# Patient Record
Sex: MALE | Race: WHITE | NOT HISPANIC OR LATINO | Employment: FULL TIME | URBAN - METROPOLITAN AREA
[De-identification: names, ages, dates, MRNs, and addresses within clinical notes are randomized per-mention and may not be internally consistent; named-entity substitution may affect disease eponyms.]

---

## 2019-09-17 ENCOUNTER — TRANSFERRED RECORDS (OUTPATIENT)
Dept: HEALTH INFORMATION MANAGEMENT | Facility: CLINIC | Age: 59
End: 2019-09-17

## 2020-09-28 ENCOUNTER — COMMUNICATION - HEALTHEAST (OUTPATIENT)
Dept: SCHEDULING | Facility: CLINIC | Age: 60
End: 2020-09-28

## 2020-10-19 ENCOUNTER — TRANSFERRED RECORDS (OUTPATIENT)
Dept: HEALTH INFORMATION MANAGEMENT | Facility: CLINIC | Age: 60
End: 2020-10-19

## 2020-10-23 ENCOUNTER — TRANSFERRED RECORDS (OUTPATIENT)
Dept: HEALTH INFORMATION MANAGEMENT | Facility: CLINIC | Age: 60
End: 2020-10-23

## 2020-12-14 ENCOUNTER — TRANSFERRED RECORDS (OUTPATIENT)
Dept: HEALTH INFORMATION MANAGEMENT | Facility: CLINIC | Age: 60
End: 2020-12-14

## 2021-01-11 ENCOUNTER — TRANSFERRED RECORDS (OUTPATIENT)
Dept: HEALTH INFORMATION MANAGEMENT | Facility: CLINIC | Age: 61
End: 2021-01-11

## 2021-01-26 ENCOUNTER — MEDICAL CORRESPONDENCE (OUTPATIENT)
Dept: HEALTH INFORMATION MANAGEMENT | Facility: CLINIC | Age: 61
End: 2021-01-26

## 2021-02-01 ENCOUNTER — TRANSFERRED RECORDS (OUTPATIENT)
Dept: HEALTH INFORMATION MANAGEMENT | Facility: CLINIC | Age: 61
End: 2021-02-01

## 2021-02-03 ENCOUNTER — REFERRAL (OUTPATIENT)
Dept: TRANSPLANT | Facility: CLINIC | Age: 61
End: 2021-02-03

## 2021-02-03 VITALS — WEIGHT: 167.99 LBS | HEIGHT: 71 IN | BODY MASS INDEX: 23.52 KG/M2

## 2021-02-03 DIAGNOSIS — N18.6 END STAGE RENAL DISEASE (H): ICD-10-CM

## 2021-02-03 DIAGNOSIS — N18.6 ESRD (END STAGE RENAL DISEASE) (H): Primary | ICD-10-CM

## 2021-02-03 DIAGNOSIS — Z01.818 PRE-TRANSPLANT EVALUATION FOR KIDNEY TRANSPLANT: ICD-10-CM

## 2021-02-03 DIAGNOSIS — Z76.82 ORGAN TRANSPLANT CANDIDATE: ICD-10-CM

## 2021-02-03 SDOH — HEALTH STABILITY: MENTAL HEALTH: HOW OFTEN DO YOU HAVE A DRINK CONTAINING ALCOHOL?: MONTHLY OR LESS

## 2021-02-03 SDOH — HEALTH STABILITY: MENTAL HEALTH: HOW MANY STANDARD DRINKS CONTAINING ALCOHOL DO YOU HAVE ON A TYPICAL DAY?: NOT ASKED

## 2021-02-03 SDOH — HEALTH STABILITY: MENTAL HEALTH: HOW OFTEN DO YOU HAVE 6 OR MORE DRINKS ON ONE OCCASION?: NOT ASKED

## 2021-02-03 ASSESSMENT — MIFFLIN-ST. JEOR: SCORE: 1589.13

## 2021-02-03 NOTE — TELEPHONE ENCOUNTER
PCP: Antonio Hawthorne  Referring Provider: Deyanira Henriquez   Referring Diagnosis: ESRD    Is patient under the age of 65? Y  Is patient diabetic? N  Is patient on insulin?   Was patient offered a pancreas transplant referral?     Is patient in a group home/assisted living? n  Does patient have a guardian? n    Referral intake process completed.  Patient is aware that after financial approval is received, medical records will be requested.   Patient confirmed for a callback from transplant coordinator on February 18, 2021. (within 2 weeks)  Tentative evaluation date March 2, 2021. (within 4 weeks)    Confirmed coordinator will discuss evaluation process in more detail at the time of their call.   Patient is aware of the need to arrange age appropriate cancer screening, vaccinations, and dental care.  Reminded patient to complete questionnaire, complete medical records release, and review packet prior to evaluation visit .  Assessed patient for special needs (ie--wheelchair, assistance, guardian, and ):  none   Patient instructed to call [phone] with questions.     Patient gave verbal consent during intake call to obtain medical records and documents outside of [facility]:  yes

## 2021-02-03 NOTE — LETTER
Tacos Dominguez  [address]  [facility] [address]   1960              February 3, 2021                                                                                        MEDICAL RECORDS REQUEST    [facility] Records Request                      Facility: [facility]    Thank you for referring your patient to the [facility], in order to process the referral we will need the following information;    1. Demographics  2. 2728 form   3. Immunizations records  4. Providers Progress notes, (last 3 note)      Please call our office at [phone] if you have any questions or concerns.                Please fax all paper records to [phone] within 3-5 business days.      Thank you,   The SOT Referral Intake Team     [facility]  Solid Organ Transplant Office  [address] [place] [address], [address] [address]

## 2021-02-03 NOTE — LETTER
February 10, 2021      Tacos Dominguez  [address]  [facility] [address]          Dear Tacos,    Thank you for your interest in the Transplant Center at [facility]. We look forward to being a part of your care team and assisting you through the transplant process.    As we discussed, your transplant coordinator is Barbara Vasquez (Kidney).  You may call your coordinator at any time with questions or concerns.  Your first scheduled call will be on February 18, 2021.  If this needs to change, call [phone].    Please complete the following.    1. Fill out and return the enclosed forms    Authorization for Electronic Communication    Authorization to Discuss Protected Health Information    Authorization for Release of Protected Health Information    Authorization for Care Everywhere Release of Information    2. Sign up for:    [employer], access to your electronic medical record (see enclosed pamphlet)    [email or web address].[email or web address], a transplant education website    You can use these tools to learn more about your transplant, communicate with your care team, and track your medical details  Sincerely,  Solid Organ Transplant  [facility]\    cc: Antonio Benitez (PCP) Deyanira Coto PA-C ([facility])

## 2021-02-08 ENCOUNTER — TRANSFERRED RECORDS (OUTPATIENT)
Dept: HEALTH INFORMATION MANAGEMENT | Facility: CLINIC | Age: 61
End: 2021-02-08

## 2021-02-18 NOTE — TELEPHONE ENCOUNTER
Called pt patricia to move his kidney eval from March 2 to Thurs March 25 due to being on dialysis.  I asked for him to call back to confirm,

## 2021-02-18 NOTE — TELEPHONE ENCOUNTER
Reviewed pt's chart for pre-kidney transplant evaluation planning. Pt lives in [place]. Pt has ESRD with unknown etiology. He has followed with nephrology since 2014, with stable kidney function CKD stage 3. In October, a wellness check was called to his home and he was found on the floor an altered mental state. Patient does not remember these events. He was intubated prior to transfer to [facility] due to respiratory compromise and hypotension requiring pressors. Noted to be in a fib with RVR, cardioverted and mag repleted. His creatinine was 19 on arrival and he was started on dialysis. Biopsy not done. Pt is on hemo dialysis via CVC until his fistula is mature. Pt is not diabetic. Other hx includes Crohn's disease s/p hemicollectomy.  Heart hx: during October admission there was suspicion for CVA/NSTEMI; lexiscan completed with LVEF 52%.  Lung status: no history of infections or oxygen use. Surgical hx includes: colostomy creation.  BMI 24 on 10/22/2020.  Last colonoscopy was during admission in October, findings were negative.  Dental: encouraged to keep up to date. Pt is a smoker- states he smokes a pack a week; does occasionally consume alcohol, and abstains from recreational drugs. Pt is independent w/ ADLs, working full time. Pt lives w/ wife and daughter and has support following transplant.    Patient has an account with [email or web address] and has watched the kidney transplant videos. Confirmed STD March 2; but patient would like to come into clinic. Informed pt they will hear from scheduling to arrange the evaluation. Smartset orders entered, chart routed to scheduling pool.

## 2021-03-20 ENCOUNTER — HEALTH MAINTENANCE LETTER (OUTPATIENT)
Age: 61
End: 2021-03-20

## 2021-03-22 ENCOUNTER — TELEPHONE (OUTPATIENT)
Dept: TRANSPLANT | Facility: CLINIC | Age: 61
End: 2021-03-22

## 2021-03-22 NOTE — TELEPHONE ENCOUNTER
Spoke with patient and confirmed upcoming PKE appointments 3/25/21 starting at 7:30 am. Patient instructed he may eat breakfast, take regularly scheduled medications and have 1 adult visitor accompany him. Patient stated no Covid 19 symptoms and/or exposure and has received 2 doses of vaccination. Patient has call back numbers for questions/concerns/need to reschedule.

## 2021-03-23 NOTE — PROGRESS NOTES
[facility] Solid Organ Transplant  Outpatient MNT: Kidney Transplant Evaluation    Current BMI: 24.1 (HT 70.5 in,  lbs/78 kg)  BMI is within recommendation of <35 for kidney transplant    Frailty Assessment -- Not Frail (0/5)      Time Spent: 15 minutes  Visit Type: Initial   Referring Physician: Tristin   Pt accompanied by: self     History of previous txp: none   Dialysis: yes    Dialysis Info: HD 10/2020 MWF 5 am   Protein supplement: no     Nutrition Assessment  Appetite: good/baseline    Vitamins, Supplements, Pertinent Meds: calcium, vit D, iron, MVI, tums (takes ~50% of the time)   Herbal Medicines/Supplements: none     Edema: none     Weight hx: overall stable     Food Security: any concerns about having enough money to buy food or access to grocery stores? No     Diet Recall  Breakfast [employer] ([employer] + pancakes)   Lunch None    Dinner Eggs/toast or steak with potatoes   Snacks Small snacks throughout the day- crackers, banana    Beverages Coffee, 1 regular Coke/day, water    Alcohol 6 drinks/month    Dining out 5x/week      Physical Activity  Walks dog daily 60 minutes (year round)     Labs (Jan 2021)  K 4.4   Phos 13.4-->this week 11 per pt    Nutrition Diagnosis  No nutrition diagnosis identified at this time     Nutrition Intervention  Nutrition education provided:  Discussed sodium intake (low sodium foods and drinks, seasoning food without salt and tips for low sodium diet).  Reviewed wnl K level, elevated phos level (although improving). Reviewed protein needs for dialysis.     Reviewed post txp diet guidelines in brief (will review in further detail post txp):  (1) Review of proper food safety measures d/t immunosuppressant therapy post-op and increased risk for food-borne illness    (2) Avoid the following post txp d/t risk for rejection, unknown effects on the organs, and/or potential interactions with immunosuppressants:  - Herbal, Chinese, holistic, chiropractic, natural,  alternative medicines and supplements  - Detoxes and cleanses  - Weight loss pills  - Protein powders or other products with extracts or herbs (ie green tea extract)    (3) Med regimen and possible side effects    Patient Understanding: Pt verbalized understanding of education provided.  Expected Engagement: Good  Follow-Up Plans: PRN     Nutrition Goals  No nutrition goals identified at this time     Purvi Arora, RD, LD, CCTD

## 2021-03-25 ENCOUNTER — OFFICE VISIT (OUTPATIENT)
Dept: TRANSPLANT | Facility: CLINIC | Age: 61
End: 2021-03-25
Attending: INTERNAL MEDICINE
Payer: COMMERCIAL

## 2021-03-25 ENCOUNTER — ANCILLARY PROCEDURE (OUTPATIENT)
Dept: CARDIOLOGY | Facility: CLINIC | Age: 61
End: 2021-03-25
Attending: PHYSICIAN ASSISTANT
Payer: COMMERCIAL

## 2021-03-25 ENCOUNTER — ANCILLARY PROCEDURE (OUTPATIENT)
Dept: GENERAL RADIOLOGY | Facility: CLINIC | Age: 61
End: 2021-03-25
Attending: PHYSICIAN ASSISTANT
Payer: COMMERCIAL

## 2021-03-25 ENCOUNTER — DOCUMENTATION ONLY (OUTPATIENT)
Dept: TRANSPLANT | Facility: CLINIC | Age: 61
End: 2021-03-25

## 2021-03-25 VITALS
BODY MASS INDEX: 23.94 KG/M2 | SYSTOLIC BLOOD PRESSURE: 148 MMHG | HEART RATE: 83 BPM | OXYGEN SATURATION: 98 % | WEIGHT: 171 LBS | DIASTOLIC BLOOD PRESSURE: 79 MMHG | HEIGHT: 71 IN

## 2021-03-25 VITALS
HEIGHT: 71 IN | WEIGHT: 171 LBS | DIASTOLIC BLOOD PRESSURE: 79 MMHG | HEART RATE: 83 BPM | OXYGEN SATURATION: 98 % | SYSTOLIC BLOOD PRESSURE: 148 MMHG | BODY MASS INDEX: 23.94 KG/M2

## 2021-03-25 DIAGNOSIS — N18.6 ESRD (END STAGE RENAL DISEASE) (H): ICD-10-CM

## 2021-03-25 DIAGNOSIS — Z01.818 PRE-TRANSPLANT EVALUATION FOR KIDNEY TRANSPLANT: ICD-10-CM

## 2021-03-25 DIAGNOSIS — Z76.82 ORGAN TRANSPLANT CANDIDATE: ICD-10-CM

## 2021-03-25 DIAGNOSIS — N18.6 END STAGE RENAL DISEASE (H): ICD-10-CM

## 2021-03-25 LAB
ABO + RH BLD: NORMAL
ALBUMIN SERPL-MCNC: 3.4 G/DL (ref 3.4–5)
ALBUMIN UR-MCNC: 100 MG/DL
ALP SERPL-CCNC: 91 U/L (ref 40–150)
ALT SERPL W P-5'-P-CCNC: 22 U/L (ref 0–70)
ANION GAP SERPL CALCULATED.3IONS-SCNC: 15 MMOL/L (ref 3–14)
APPEARANCE UR: ABNORMAL
APTT PPP: 33 SEC (ref 22–37)
AST SERPL W P-5'-P-CCNC: 16 U/L (ref 0–45)
BACTERIA #/AREA URNS HPF: ABNORMAL /HPF
BASOPHILS # BLD AUTO: 0.1 10E9/L (ref 0–0.2)
BASOPHILS NFR BLD AUTO: 1 %
BILIRUB SERPL-MCNC: 0.5 MG/DL (ref 0.2–1.3)
BILIRUB UR QL STRIP: NEGATIVE
BLD GP AB SCN SERPL QL: NORMAL
BLOOD BANK CMNT PATIENT-IMP: NORMAL
BUN SERPL-MCNC: 34 MG/DL (ref 7–30)
CALCIUM SERPL-MCNC: 7.2 MG/DL (ref 8.5–10.1)
CHLORIDE SERPL-SCNC: 105 MMOL/L (ref 94–109)
CO2 SERPL-SCNC: 20 MMOL/L (ref 20–32)
COLOR UR AUTO: YELLOW
CREAT SERPL-MCNC: 9.8 MG/DL (ref 0.66–1.25)
DIFFERENTIAL METHOD BLD: ABNORMAL
EOSINOPHIL # BLD AUTO: 0.2 10E9/L (ref 0–0.7)
EOSINOPHIL NFR BLD AUTO: 2.9 %
ERYTHROCYTE [DISTWIDTH] IN BLOOD BY AUTOMATED COUNT: 14.1 % (ref 10–15)
GFR SERPL CREATININE-BSD FRML MDRD: 5 ML/MIN/{1.73_M2}
GLUCOSE SERPL-MCNC: 78 MG/DL (ref 70–99)
GLUCOSE UR STRIP-MCNC: NEGATIVE MG/DL
HBV CORE AB SERPL QL IA: NONREACTIVE
HBV SURFACE AB SERPL IA-ACNC: 0.75 M[IU]/ML
HBV SURFACE AG SERPL QL IA: NONREACTIVE
HCT VFR BLD AUTO: 32.8 % (ref 40–53)
HCV AB SERPL QL IA: NONREACTIVE
HGB BLD-MCNC: 10.4 G/DL (ref 13.3–17.7)
HGB UR QL STRIP: ABNORMAL
HIV 1+2 AB+HIV1 P24 AG SERPL QL IA: NONREACTIVE
IMM GRANULOCYTES # BLD: 0 10E9/L (ref 0–0.4)
IMM GRANULOCYTES NFR BLD: 0.4 %
INR PPP: 1.31 (ref 0.86–1.14)
KETONES UR STRIP-MCNC: NEGATIVE MG/DL
LEUKOCYTE ESTERASE UR QL STRIP: NEGATIVE
LYMPHOCYTES # BLD AUTO: 1.1 10E9/L (ref 0.8–5.3)
LYMPHOCYTES NFR BLD AUTO: 16.1 %
MCH RBC QN AUTO: 33.5 PG (ref 26.5–33)
MCHC RBC AUTO-ENTMCNC: 31.7 G/DL (ref 31.5–36.5)
MCV RBC AUTO: 106 FL (ref 78–100)
MONOCYTES # BLD AUTO: 0.4 10E9/L (ref 0–1.3)
MONOCYTES NFR BLD AUTO: 5.5 %
NEUTROPHILS # BLD AUTO: 5 10E9/L (ref 1.6–8.3)
NEUTROPHILS NFR BLD AUTO: 74.1 %
NITRATE UR QL: NEGATIVE
NRBC # BLD AUTO: 0 10*3/UL
NRBC BLD AUTO-RTO: 0 /100
PH UR STRIP: 6 PH (ref 5–7)
PLATELET # BLD AUTO: 232 10E9/L (ref 150–450)
POTASSIUM SERPL-SCNC: 3.2 MMOL/L (ref 3.4–5.3)
PROT SERPL-MCNC: 7.9 G/DL (ref 6.8–8.8)
RBC # BLD AUTO: 3.1 10E12/L (ref 4.4–5.9)
RBC #/AREA URNS AUTO: 1 /HPF (ref 0–2)
SODIUM SERPL-SCNC: 140 MMOL/L (ref 133–144)
SOURCE: ABNORMAL
SP GR UR STRIP: 1.02 (ref 1–1.03)
SPECIMEN EXP DATE BLD: NORMAL
SPECIMEN EXP DATE BLD: NORMAL
SQUAMOUS #/AREA URNS AUTO: 1 /HPF (ref 0–1)
T PALLIDUM AB SER QL: NONREACTIVE
UROBILINOGEN UR STRIP-MCNC: 0.2 MG/DL (ref 0–2)
WBC # BLD AUTO: 6.8 10E9/L (ref 4–11)
WBC #/AREA URNS AUTO: 2 /HPF (ref 0–5)

## 2021-03-25 PROCEDURE — 93306 TTE W/DOPPLER COMPLETE: CPT | Performed by: INTERNAL MEDICINE

## 2021-03-25 PROCEDURE — 86886 COOMBS TEST INDIRECT TITER: CPT | Performed by: PATHOLOGY

## 2021-03-25 PROCEDURE — 86900 BLOOD TYPING SEROLOGIC ABO: CPT | Performed by: PATHOLOGY

## 2021-03-25 PROCEDURE — 86850 RBC ANTIBODY SCREEN: CPT | Performed by: PATHOLOGY

## 2021-03-25 PROCEDURE — 85670 THROMBIN TIME PLASMA: CPT | Performed by: PATHOLOGY

## 2021-03-25 PROCEDURE — 81241 F5 GENE: CPT | Performed by: PATHOLOGY

## 2021-03-25 PROCEDURE — 86787 VARICELLA-ZOSTER ANTIBODY: CPT | Performed by: PATHOLOGY

## 2021-03-25 PROCEDURE — 85613 RUSSELL VIPER VENOM DILUTED: CPT | Performed by: PATHOLOGY

## 2021-03-25 PROCEDURE — 71046 X-RAY EXAM CHEST 2 VIEWS: CPT | Performed by: RADIOLOGY

## 2021-03-25 PROCEDURE — 86706 HEP B SURFACE ANTIBODY: CPT | Performed by: PATHOLOGY

## 2021-03-25 PROCEDURE — 86704 HEP B CORE ANTIBODY TOTAL: CPT | Performed by: PATHOLOGY

## 2021-03-25 PROCEDURE — 86780 TREPONEMA PALLIDUM: CPT | Performed by: PATHOLOGY

## 2021-03-25 PROCEDURE — 86803 HEPATITIS C AB TEST: CPT | Performed by: PATHOLOGY

## 2021-03-25 PROCEDURE — 85390 FIBRINOLYSINS SCREEN I&R: CPT | Mod: 90 | Performed by: PATHOLOGY

## 2021-03-25 PROCEDURE — 87340 HEPATITIS B SURFACE AG IA: CPT | Performed by: PATHOLOGY

## 2021-03-25 PROCEDURE — 85730 THROMBOPLASTIN TIME PARTIAL: CPT | Mod: 59 | Performed by: PATHOLOGY

## 2021-03-25 PROCEDURE — 85025 COMPLETE CBC W/AUTO DIFF WBC: CPT | Performed by: PATHOLOGY

## 2021-03-25 PROCEDURE — 81240 F2 GENE: CPT | Mod: 90 | Performed by: PATHOLOGY

## 2021-03-25 PROCEDURE — 86644 CMV ANTIBODY: CPT | Performed by: PATHOLOGY

## 2021-03-25 PROCEDURE — 86481 TB AG RESPONSE T-CELL SUSP: CPT | Performed by: PATHOLOGY

## 2021-03-25 PROCEDURE — 85730 THROMBOPLASTIN TIME PARTIAL: CPT | Performed by: PATHOLOGY

## 2021-03-25 PROCEDURE — 99204 OFFICE O/P NEW MOD 45 MIN: CPT | Performed by: SURGERY

## 2021-03-25 PROCEDURE — 99245 OFF/OP CONSLTJ NEW/EST HI 55: CPT

## 2021-03-25 PROCEDURE — 36415 COLL VENOUS BLD VENIPUNCTURE: CPT | Performed by: PATHOLOGY

## 2021-03-25 PROCEDURE — 86901 BLOOD TYPING SEROLOGIC RH(D): CPT | Performed by: PATHOLOGY

## 2021-03-25 PROCEDURE — 86665 EPSTEIN-BARR CAPSID VCA: CPT | Performed by: PATHOLOGY

## 2021-03-25 PROCEDURE — 85610 PROTHROMBIN TIME: CPT | Performed by: PATHOLOGY

## 2021-03-25 PROCEDURE — 81001 URINALYSIS AUTO W/SCOPE: CPT | Performed by: PATHOLOGY

## 2021-03-25 PROCEDURE — G0452 MOLECULAR PATHOLOGY INTERPR: HCPCS | Performed by: PATHOLOGY

## 2021-03-25 PROCEDURE — 86147 CARDIOLIPIN ANTIBODY EA IG: CPT | Performed by: PATHOLOGY

## 2021-03-25 PROCEDURE — 80053 COMPREHEN METABOLIC PANEL: CPT | Performed by: PATHOLOGY

## 2021-03-25 PROCEDURE — 86905 BLOOD TYPING RBC ANTIGENS: CPT | Performed by: PATHOLOGY

## 2021-03-25 RX ORDER — FERROUS SULFATE 325(65) MG
325 TABLET ORAL
COMMUNITY
Start: 2019-09-02 | End: 2021-07-29

## 2021-03-25 RX ORDER — ASPIRIN 81 MG/1
81 TABLET, CHEWABLE ORAL EVERY MORNING
COMMUNITY
Start: 2020-10-29 | End: 2022-11-23

## 2021-03-25 RX ORDER — AMLODIPINE BESYLATE 10 MG/1
10 TABLET ORAL
COMMUNITY
End: 2021-11-01

## 2021-03-25 RX ORDER — MIDODRINE HYDROCHLORIDE 5 MG/1
TABLET ORAL
COMMUNITY
Start: 2021-01-24 | End: 2021-07-29

## 2021-03-25 RX ORDER — DOXAZOSIN 4 MG/1
4 TABLET ORAL
COMMUNITY
End: 2021-11-01

## 2021-03-25 RX ORDER — FOLIC ACID 1 MG/1
1 TABLET ORAL
COMMUNITY
Start: 2020-10-29 | End: 2021-10-29

## 2021-03-25 RX ORDER — PHENOL 1.4 %
10 AEROSOL, SPRAY (ML) MUCOUS MEMBRANE
COMMUNITY
End: 2022-11-30

## 2021-03-25 RX ORDER — CALCIUM CARBONATE/VITAMIN D3 500-10/5ML
1 LIQUID (ML) ORAL EVERY MORNING
COMMUNITY
Start: 2021-01-04 | End: 2022-11-30

## 2021-03-25 RX ORDER — MULTIPLE VITAMINS W/ MINERALS TAB 9MG-400MCG
TAB ORAL
COMMUNITY
End: 2022-03-04

## 2021-03-25 RX ORDER — CITALOPRAM HYDROBROMIDE 40 MG/1
20 TABLET ORAL
COMMUNITY
Start: 2020-10-01 | End: 2021-07-29

## 2021-03-25 RX ORDER — SIMVASTATIN 20 MG
20 TABLET ORAL EVERY MORNING
COMMUNITY
Start: 2021-03-10

## 2021-03-25 RX ORDER — AMIODARONE HYDROCHLORIDE 200 MG/1
TABLET ORAL
COMMUNITY
Start: 2021-03-14 | End: 2021-06-21

## 2021-03-25 RX ORDER — HYDROXYZINE HYDROCHLORIDE 50 MG/1
50 TABLET, FILM COATED ORAL
COMMUNITY
Start: 2019-08-09 | End: 2021-07-29

## 2021-03-25 RX ORDER — IBUPROFEN 200 MG
1000 CAPSULE ORAL
COMMUNITY
Start: 2020-10-01 | End: 2021-07-29

## 2021-03-25 RX ORDER — CYANOCOBALAMIN 1000 UG/ML
1000 INJECTION, SOLUTION INTRAMUSCULAR; SUBCUTANEOUS
COMMUNITY
Start: 2020-10-29 | End: 2021-03-29

## 2021-03-25 ASSESSMENT — MIFFLIN-ST. JEOR
SCORE: 1594.84
SCORE: 1594.84

## 2021-03-25 NOTE — NURSING NOTE
"Chief Complaint   Patient presents with     Consult     PKE Eval     Blood pressure (!) 148/79, pulse 83, height 1.791 m (5' 10.5\"), weight 77.6 kg (171 lb), SpO2 98 %.    Tash Faith on 3/25/2021 at 8:43 AM    "

## 2021-03-25 NOTE — PROGRESS NOTES
TRANSPLANT NEPHROLOGY RECIPIENT EVALUATION NOTE    Assessment and Plan:  # Kidney Transplant Evaluation: Patient is a good candidate overall. Benefits of a living donor transplant were discussed.    # ESKD from unknown etiology (no kidney biopsy): unclear in origin and never biopsied. I suspect this is related to recurrent CLARISA in the settings of dehydration with short bowels. An element of IgA can not be r/o although his UAs in our system did not show significant hematuria, although historic.      # Cardiac Risk: recent MI, hypokinesis on stress test. paroxysmal atrial fibrillation. Will need LHC     # Crohn's / chronic diarrhea: active on his colonoscopy exam, will need to follow up with GI, will even need a trial of MMF.    # TIA: needs carotid     # left renal mass: needs formal urology evaluation, possible RCC. This will need to be either biopsied or removed.     # Right incisional hernia: surgery team plans on using the left side    # Health Maintenance: Lung Cancer screening recent CT scan will need to be reviewed, Colonoscopy: Up to date, Dermatology: Not up to date (several lesions on his back) and Dental: Up to date    Discussed the risks and benefits of a transplant, including the risk of surgery and immunosuppression medications.  Patient's overall evaluation will be discussed in the Transplant Program's regular meeting with a final recommendation on the patients suitability for transplant to be made at that time.    Evaluation:  Tacos Dominguez was seen in consultation at the request of Dr. David Stein for evaluation as a potential kidney transplant recipient.    Reason for Visit:  Tacos Dominguez is a 61 year old male with ESKD from possibly recurrent CLARISA in the settings of IBD vs IBD related IgA (he was never biopsied), who presents for kidney transplant evaluation.    History of Present Illness:    Scar is delightful 61 year old gentleman who lives alone. He had known crohn's disease s/p  surgical resection of large segment of his small bowels. He has chronic diarrhea that he learned to mitigate over the years by drinking less fluid. In 2008 his creatinine was noted at 2 mg/dL. His UAs showed minimal albumin and no rbcs. He had a recent hospitalization after was found down at home for at least 2 days. This was discovered when his work send the police for a wellness check. He was then taken to one of the [facility] hospital and was found in to be in acute on chronic renal failure. He was initiated on dialysis. He had marked troponinemia and abnormal stress but he was not told he had sustain a cardiac event. Unclear if the diagnosis was made or he was not aware.   He had atrial fibrillation and was discovered to have left renal mass that is vascular. He did not call urologic evaluation but he mentioned that he was told a biopsy is not needed. He used to smoke leading up to his remarkable hospitalization but quit ever since. He had hx of TIA in the past. He smoked for over 40 years at least 1PPD.   He does not see GI for his IBD and not on any meds. He had an ostomy on the right side with very large incisional hernia.          Kidney Disease Hx:        Kidney Disease Dx: Unknown etiology       Biopsy Proven: No         On Dialysis: Yes, Date initiated: 10/2020, Dialysis Type: [facility] HD; and Dialysis unit: [facility]        Primary Nephrologist: Raquel        H/o Kidney Stones: None current but historic        H/o Recurrent/Frequent UTI: No         Diabetic Hx: None           Cardiac/Vascular Disease Risk Factors:        Cardiac Risk Factors: Hypertension, CKD, CVA/TIA, Smoking, Age (Male > 55, Female > 65) and Family History of Premature CAD       Known CAD: yes on Lexiscan had an abnormal perfusion, depressed ef and hypokinesis        Known PAD/Caludication Symptoms: No       Known Heart Failure: No       Arrhythmia: No       Pulmonary Hypertension: No       Valvular Disease: No       Other:  Syncope         Viral Serology Status       CMV IgG Antibody: Unknown       EBV IgG Antibody: Unknown         Volume Status/Weight:        Volume status: Euvolemic       Weight:  Acceptable BMI       BMI: Body mass index is 24.19 kg/m .         Functional Capacity/Frailty:        Excellent, work full time. Able to walk any distance but does not exercise regularly.       Fatigue/Decreased Energy: [x] No [] Yes Occasional    Chest Pain or SOB with Exertion: [x] No [] Yes    Significant Weight Change: [x] No [] Yes    Nausea, Vomiting or Diarrhea: [] No [x] Yes Chronic diarrhea due to crohn's    Fever, Sweats or Chills:  [x] No [] Yes    Leg Swelling [x] No [] Yes        History of Cancer: None    Other Significant Medical Issues:   As above     Review of Systems:  A comprehensive review of systems was obtained and negative, except as noted in the HPI or PMH.    Past Medical History:   Medical record was reviewed and PMH was discussed with patient and noted below.  Past Medical History:   Diagnosis Date     Hypertension        Past Social History:   Past Surgical History:   Procedure Laterality Date     VASCULAR SURGERY      dialysis access- left upper     Personal history of bleeding or anesthesia problems: No    Family History:  Family History   Problem Relation Age of Onset     Breast Cancer Mother      Coronary Artery Disease Father      Hypertension Brother        Personal History:   Social History     Socioeconomic History     Marital status:      Spouse name: Not on file     Number of children: Not on file     Years of education: Not on file     Highest education level: Not on file   Occupational History     Not on file   Social Needs     Financial resource strain: Not on file     Food insecurity     Worry: Not on file     Inability: Not on file     Transportation needs     Medical: Not on file     Non-medical: Not on file   Tobacco Use     Smoking status: Former Smoker     Packs/day: 0.75     Years:  50.00     Pack years: 37.50     Quit date: 10/19/2020     Years since quittin.5     Smokeless tobacco: Never Used   Substance and Sexual Activity     Alcohol use: Yes     Frequency: Monthly or less     Comment: rare     Drug use: Not Currently     Sexual activity: Not on file   Lifestyle     Physical activity     Days per week: Not on file     Minutes per session: Not on file     Stress: Not on file   Relationships     Social connections     Talks on phone: Not on file     Gets together: Not on file     Attends Latter-day service: Not on file     Active member of club or organization: Not on file     Attends meetings of clubs or organizations: Not on file     Relationship status: Not on file     Intimate partner violence     Fear of current or ex partner: Not on file     Emotionally abused: Not on file     Physically abused: Not on file     Forced sexual activity: Not on file   Other Topics Concern     Parent/sibling w/ CABG, MI or angioplasty before 65F 55M? Not Asked   Social History Narrative     Not on file       Allergies:  Allergies   Allergen Reactions     Lisinopril Anaphylaxis and Other (See Comments)     Shortness of breath       Medications:  Current Outpatient Medications   Medication Sig     amiodarone (PACERONE) 200 MG tablet TAKE ONE TABLET BY MOUTH AT BEDTIME. START AFTER FINISHING AMIODARONE 400 MG TABLETS     amLODIPine (NORVASC) 10 MG tablet Take 10 mg by mouth     aspirin (ASA) 81 MG chewable tablet Take 81 mg by mouth     calcium carbonate 500 mg, elemental, (OSCAL 500) 1250 (500 Ca) MG TABS tablet Take 1,000 mg by mouth     cholecalciferol 25 MCG (1000 UT) TABS Take 2,000 Units by mouth     citalopram (CELEXA) 40 MG tablet Take 20 mg by mouth     doxazosin (CARDURA) 4 MG tablet Take 4 mg by mouth     ferrous sulfate (FEROSUL) 325 (65 Fe) MG tablet Take 325 mg by mouth     folic acid (FOLVITE) 1 MG tablet Take 1 mg by mouth     hydrOXYzine (ATARAX) 50 MG tablet Take 50 mg by mouth      "magnesium oxide 400 MG CAPS Take 1 tablet by mouth     Melatonin 10 MG TABS tablet      midodrine (PROAMATINE) 5 MG tablet TAKE 1 TABLET BY MOUTH THREE TIMES A WEEK WITH DIALYSIS     multivitamin w/minerals (MULTI-VITAMIN) tablet      omeprazole (PRILOSEC) 20 MG DR capsule Take 20 mg by mouth     simvastatin (ZOCOR) 20 MG tablet TAKE ONE TABLET BY MOUTH IN THE EVENING. INDICATIONS: CEREBROVASCULAR ACCIDENT OR STROKE     No current facility-administered medications for this visit.        Vitals:  BP (!) 148/79   Pulse 83   Ht 1.791 m (5' 10.5\")   Wt 77.6 kg (171 lb)   SpO2 98%   BMI 24.19 kg/m      Exam:  GENERAL APPEARANCE: alert and no distress  HENT: mouth without ulcers or lesions  LYMPHATICS: no cervical or supraclavicular nodes  RESP: lungs clear to auscultation - no rales, rhonchi or wheezes  CV: regular rhythm, normal rate, no rub, no murmur  EDEMA: no LE edema bilaterally  ABDOMEN: soft, nondistended, nontender, bowel sounds normal  MS: extremities normal - no gross deformities noted, no evidence of inflammation in joints, no muscle tenderness  SKIN: no rash, several skin lesions on the back some with irregular border and some acanthosis.   Iliac: pulses felt on the right but slightly less on the left     Results:   No results found for this or any previous visit (from the past 336 hour(s)).          "

## 2021-03-25 NOTE — PROGRESS NOTES
Transplant Surgery Consult Note     Medical record number: [id number]  YOB: 1960,   Consult requested by Dr García  for evaluation of kidney transplant candidacy.    Assessment and Recommendations:Mr. Dominguez appears to be a good candidate for kidney transplantation and has a good understanding of the risks and benefits of this approach to the management of renal failure. The following issues should be addressed prior to finalizing his transplant candidacy:   60 yo male on dialysis since Oct 2020  HTN presumed etioology  H/O Crohns - currently controlled   Multiple laparotomies with bowel resection  Long midline scar  Large right lower quadrant stomal site hernia from 2006 - softball sized, reducible  Surgical team deferred on repair due to op risk  Blood transfusions +  Physically active and functional status is excellent    Will need to do left sided tx    Risks of the surgical procedure including but not limited to the rare risk of mortality discussed in detail. Patient verbalized good understanding and had several pertinent questions which were answered satisfactorily.     Immunosuppressive regimen, management and long term risks discussed in detail.      Transplant order: Mr. Dominguez has End stage renal failure due to hypertension whose condition is not expected to resolve, is expected to progress, and is expected to continue to develop related comorbid conditions.  Recommend he be considered as a candidate for kidney.  Cardiology consult for cardiac risk stratification to be ordered: Yes  CT abdomen and pelvis without contrast to be ordered for assessment of vascular targets: Yes  Transplant listing labs ordered to include HLA, ABOx2, Cr, etc.  Dietician consult ordered: Yes  Social work consult ordered: Yes  Imaging reports reviewed:  yes  Radiology images reviewed:no  Recipient suitable to move forward with work up of living donors:  Yes      The majority of our visit was spent in counselling,  discussing the medical and surgical risks of kidney transplantation. We discussed approximate wait time and how that is influenced by issues such as blood type and sensitization (PRA) and access to a living donor. I contrasted potential waiting time for living vs  donor kidneys from  normal (0-85%) or higher (%) kidney donor profile index (KDPI) donors and their associated outcomes. I would not recommend this individual to consider kidneys from high KDPI donors. The reason for this decision is best summarized as: potential living donors. Potential surgical complications of kidney transplantation include bleeding, superficial or deep wound complications (infection, hernia, lymphocele), ureteral anastomotic failure (leak or stenosis), graft thrombosis, need for reoperation and other issues such as cardiac complications, pneumonia, deep venous thrombosis, pulmonary embolism, post transplant diabetes and death. The potential for recurrent disease or need for retransplantation was also addressed. We discussed the possible need for ureteral stent (and subsequent removal), and the utility of protocol biopsy and laboratory studies to evaluate for rejection or recurrent disease. We discussed the risk of graft rejection, our center's average graft and patient survival rates, immunosuppression protocols, as well as the potential opportunity to participate in clinical trials.  We also discussed the average length of stay, recovery process, and posttransplant lab and monitoring protocol.  I emphasized the need for strict immunosuppression medication adherence and the potential for complications of immunosuppression such as skin cancer or lymphoma, as well as a very low but not zero risk of donor-derived disease transmission risks (infection, cancer). Mr. Dominguez asked good questions and his candidacy will be reviewed at our Multidisciplinary Selection Committee. Thank you for the opportunity to participate in Mr.  Angelica's care.      Total time: 60 minutes  Counselling time: 35 minutes    .    ---------------------------------------------------------------------------------------------------    HPI: Mr. Dominguez has End stage renal failure due to hypertension. The patient is non-diabetic.       The patient is on dialysis.    Has potential kidney donors:  Yes .  Interested in participation in paired exchange if a donor is willing: Doesn't know     The patient has the following pertinent history:       No    Yes  Dialysis:    []      [] via:       Blood Transfusion                  []      []  Number of units:   Most recently:  Pregnancy:    []      [] Number:       Previous Transplant:  []      [] Details:    Cancer    []      [] Comment:   Kidney stones   []      [] Comment:      Recurrent infections  []      []  Type:                  Bladder dysfunction  []      [] Cause:    Claudication   []      [] Distance:    Previous Amputation  []      [] Cause:     Chronic anticoagulation  []      [] Indication:   Anabaptism  []      []      Past Medical History:   Diagnosis Date     Hypertension      Past Surgical History:   Procedure Laterality Date     VASCULAR SURGERY      dialysis access- left upper     No family history on file.  Social History     Socioeconomic History     Marital status: Single     Spouse name: Not on file     Number of children: Not on file     Years of education: Not on file     Highest education level: Not on file   Occupational History     Not on file   Social Needs     Financial resource strain: Not on file     Food insecurity     Worry: Not on file     Inability: Not on file     Transportation needs     Medical: Not on file     Non-medical: Not on file   Tobacco Use     Smoking status: Former Smoker     Packs/day: 0.75     Years: 50.00     Pack years: 37.50     Quit date: 10/19/2020     Years since quittin.4     Smokeless tobacco: Never Used   Substance and Sexual Activity     Alcohol use: Yes      Frequency: Monthly or less     Comment: rare     Drug use: Not Currently     Sexual activity: Not on file   Lifestyle     Physical activity     Days per week: Not on file     Minutes per session: Not on file     Stress: Not on file   Relationships     Social connections     Talks on phone: Not on file     Gets together: Not on file     Attends Jainism service: Not on file     Active member of club or organization: Not on file     Attends meetings of clubs or organizations: Not on file     Relationship status: Not on file     Intimate partner violence     Fear of current or ex partner: Not on file     Emotionally abused: Not on file     Physically abused: Not on file     Forced sexual activity: Not on file   Other Topics Concern     Parent/sibling w/ CABG, MI or angioplasty before 65F 55M? Not Asked   Social History Narrative     Not on file       ROS:   CONSTITUTIONAL:  No fevers or chills  EYES: negative for icterus  ENT:  negative for hearing loss, tinnitus and sore throat  RESPIRATORY:  negative for cough, sputum, dyspnea  CARDIOVASCULAR:  negative for chest pain Fatigue  GASTROINTESTINAL:  negative for nausea, vomiting, diarrhea or constipation  GENITOURINARY:  negative for incontinence, dysuria, bladder emptying problems  HEME:  No easy bruising  INTEGUMENT:  negative for rash and pruritus  NEURO:  Negative for headache, seizure disorder  Allergies:   Allergies   Allergen Reactions     Lisinopril Anaphylaxis     Medications:  Prescription Medications as of 3/25/2021       Rx Number Disp Refills Start End Last Dispensed Date Next Fill Date Owning Pharmacy    amiodarone (PACERONE) 200 MG tablet    3/14/2021        Sig: TAKE ONE TABLET BY MOUTH AT BEDTIME. START AFTER FINISHING AMIODARONE 400 MG TABLETS    Class: Historical    amLODIPine (NORVASC) 10 MG tablet            Sig: Take 10 mg by mouth    Class: Historical    Route: Oral    aspirin (ASA) 81 MG chewable tablet    10/29/2020        Sig: Take 81 mg by  "mouth    Class: Historical    Route: Oral    calcium carbonate 500 mg, elemental, (OSCAL 500) 1250 (500 Ca) MG TABS tablet    10/1/2020        Sig: Take 1,000 mg by mouth    Class: Historical    Route: Oral    cholecalciferol 25 MCG (1000 UT) TABS    10/30/2020        Sig: Take 2,000 Units by mouth    Class: Historical    Route: Oral    citalopram (CELEXA) 40 MG tablet    10/1/2020        Sig: Take 20 mg by mouth    Class: Historical    Route: Oral    cyanocobalamin (CYANOCOBALAMIN) 1000 MCG/ML injection    10/29/2020 3/29/2021       Sig: Inject 1,000 mcg into the muscle every 30 days    Class: Historical    Route: Intramuscular    doxazosin (CARDURA) 4 MG tablet            Sig: Take 4 mg by mouth    Class: Historical    Route: Oral    ferrous sulfate (FEROSUL) 325 (65 Fe) MG tablet    9/2/2019        Sig: Take 325 mg by mouth    Class: Historical    Route: Oral    folic acid (FOLVITE) 1 MG tablet    10/29/2020 10/29/2021       Sig: Take 1 mg by mouth    Class: Historical    Route: Oral    hydrOXYzine (ATARAX) 50 MG tablet    8/9/2019        Sig: Take 50 mg by mouth    Class: Historical    Route: Oral    magnesium oxide 400 MG CAPS    1/4/2021        Sig: Take 1 tablet by mouth    Class: Historical    Route: Oral    Melatonin 10 MG TABS tablet            Class: Historical    midodrine (PROAMATINE) 5 MG tablet    1/24/2021        Sig: TAKE 1 TABLET BY MOUTH THREE TIMES A WEEK WITH DIALYSIS    Class: Historical    multivitamin w/minerals (MULTI-VITAMIN) tablet            Class: Historical    omeprazole (PRILOSEC) 20 MG DR capsule    10/29/2020        Sig: Take 20 mg by mouth    Class: Historical    Route: Oral    simvastatin (ZOCOR) 20 MG tablet    3/10/2021        Sig: TAKE ONE TABLET BY MOUTH IN THE EVENING. INDICATIONS: CEREBROVASCULAR ACCIDENT OR STROKE    Class: Historical        Exam:     BP (!) 148/79   Pulse 83   Ht 1.791 m (5' 10.5\")   Wt 77.6 kg (171 lb)   SpO2 98%   BMI 24.19 kg/m    Appearance: in no " apparent distress.   Skin: normal  Eyes:  no redness or discharge.  Sclera anicteric  Head and Neck: Normal, no rashes or jaundice  Respiratory: easy respirations, no audible wheezing.  Abdomen: flat, Surgical scars consistent with history , large hernia as noted above    Psychiatric: Normal mood and affect    Diagnostics:   No results found for this or any previous visit (from the past 672 hour(s)).  No results found for: CPRA

## 2021-03-25 NOTE — LETTER
3/25/2021         RE: Tacos Dominguez  [address] [facility] [address]        Dear Colleague,    Thank you for referring your patient, Tacos Dominguez, to the [facility]ANSPL[facility]. Please see a copy of my visit note below.    TRANSPLANT NEPHROLOGY RECIPIENT EVALUATION NOTE    Assessment and Plan:  # Kidney Transplant Evaluation: Patient is a good candidate overall. Benefits of a living donor transplant were discussed.    # ESKD from unknown etiology (no kidney biopsy): unclear in origin and never biopsied. I suspect this is related to recurrent CLARISA in the settings of dehydration with short bowels. An element of IgA can not be r/o although his UAs in our system did not show significant hematuria, although historic.      # Cardiac Risk: recent MI, hypokinesis on stress test. paroxysmal atrial fibrillation. Will need LHC     # Crohn's / chronic diarrhea: active on his colonoscopy exam, will need to follow up with GI, will even need a trial of MMF.    # TIA: needs carotid     # left renal mass: needs formal urology evaluation, possible RCC. This will need to be either biopsied or removed.     # Right incisional hernia: surgery team plans on using the left side    # Health Maintenance: Lung Cancer screening recent CT scan will need to be reviewed, Colonoscopy: Up to date, Dermatology: Not up to date (several lesions on his back) and Dental: Up to date    Discussed the risks and benefits of a transplant, including the risk of surgery and immunosuppression medications.  Patient's overall evaluation will be discussed in the Transplant Program's regular meeting with a final recommendation on the patients suitability for transplant to be made at that time.    Evaluation:  Tacos Dominguez was seen in consultation at the request of Dr. David Stein for evaluation as a potential kidney transplant recipient.    Reason for Visit:  Tacos Dominguez is a 61 year old male with ESKD from possibly  recurrent CLARISA in the settings of IBD vs IBD related IgA (he was never biopsied), who presents for kidney transplant evaluation.    History of Present Illness:    Scar is delightful 61 year old gentleman who lives alone. He had known crohn's disease s/p surgical resection of large segment of his small bowels. He has chronic diarrhea that he learned to mitigate over the years by drinking less fluid. In 2008 his creatinine was noted at 2 mg/dL. His UAs showed minimal albumin and no rbcs. He had a recent hospitalization after was found down at home for at least 2 days. This was discovered when his work send the police for a wellness check. He was then taken to one of the [facility] hospital and was found in to be in acute on chronic renal failure. He was initiated on dialysis. He had marked troponinemia and abnormal stress but he was not told he had sustain a cardiac event. Unclear if the diagnosis was made or he was not aware.   He had atrial fibrillation and was discovered to have left renal mass that is vascular. He did not call urologic evaluation but he mentioned that he was told a biopsy is not needed. He used to smoke leading up to his remarkable hospitalization but quit ever since. He had hx of TIA in the past. He smoked for over 40 years at least 1PPD.   He does not see GI for his IBD and not on any meds. He had an ostomy on the right side with very large incisional hernia.          Kidney Disease Hx:        Kidney Disease Dx: Unknown etiology       Biopsy Proven: No         On Dialysis: Yes, Date initiated: 10/2020, Dialysis Type: [facility]; and Dialysis unit: [facility]        Primary Nephrologist: Raquel        H/o Kidney Stones: None current but historic        H/o Recurrent/Frequent UTI: No         Diabetic Hx: None           Cardiac/Vascular Disease Risk Factors:        Cardiac Risk Factors: Hypertension, CKD, CVA/TIA, Smoking, Age (Male > 55, Female > 65) and Family History of Premature CAD        Known CAD: yes on Lexiscan had an abnormal perfusion, depressed ef and hypokinesis        Known PAD/Caludication Symptoms: No       Known Heart Failure: No       Arrhythmia: No       Pulmonary Hypertension: No       Valvular Disease: No       Other: Syncope         Viral Serology Status       CMV IgG Antibody: Unknown       EBV IgG Antibody: Unknown         Volume Status/Weight:        Volume status: Euvolemic       Weight:  Acceptable BMI       BMI: Body mass index is 24.19 kg/m .         Functional Capacity/Frailty:        Excellent, work full time. Able to walk any distance but does not exercise regularly.       Fatigue/Decreased Energy: [x] No [] Yes Occasional    Chest Pain or SOB with Exertion: [x] No [] Yes    Significant Weight Change: [x] No [] Yes    Nausea, Vomiting or Diarrhea: [] No [x] Yes Chronic diarrhea due to crohn's    Fever, Sweats or Chills:  [x] No [] Yes    Leg Swelling [x] No [] Yes        History of Cancer: None    Other Significant Medical Issues:   As above     Review of Systems:  A comprehensive review of systems was obtained and negative, except as noted in the HPI or PMH.    Past Medical History:   Medical record was reviewed and PMH was discussed with patient and noted below.  Past Medical History:   Diagnosis Date     Hypertension        Past Social History:   Past Surgical History:   Procedure Laterality Date     VASCULAR SURGERY      dialysis access- left upper     Personal history of bleeding or anesthesia problems: No    Family History:  No family history on file.    Personal History:   Social History     Socioeconomic History     Marital status: Single     Spouse name: Not on file     Number of children: Not on file     Years of education: Not on file     Highest education level: Not on file   Occupational History     Not on file   Social Needs     Financial resource strain: Not on file     Food insecurity     Worry: Not on file     Inability: Not on file     Transportation needs      Medical: Not on file     Non-medical: Not on file   Tobacco Use     Smoking status: Former Smoker     Packs/day: 0.75     Years: 50.00     Pack years: 37.50     Quit date: 10/19/2020     Years since quittin.4     Smokeless tobacco: Never Used   Substance and Sexual Activity     Alcohol use: Yes     Frequency: Monthly or less     Comment: rare     Drug use: Not Currently     Sexual activity: Not on file   Lifestyle     Physical activity     Days per week: Not on file     Minutes per session: Not on file     Stress: Not on file   Relationships     Social connections     Talks on phone: Not on file     Gets together: Not on file     Attends Jehovah's witness service: Not on file     Active member of club or organization: Not on file     Attends meetings of clubs or organizations: Not on file     Relationship status: Not on file     Intimate partner violence     Fear of current or ex partner: Not on file     Emotionally abused: Not on file     Physically abused: Not on file     Forced sexual activity: Not on file   Other Topics Concern     Parent/sibling w/ CABG, MI or angioplasty before 65F 55M? Not Asked   Social History Narrative     Not on file       Allergies:  Allergies   Allergen Reactions     Lisinopril Anaphylaxis       Medications:  Current Outpatient Medications   Medication Sig     amiodarone (PACERONE) 200 MG tablet TAKE ONE TABLET BY MOUTH AT BEDTIME. START AFTER FINISHING AMIODARONE 400 MG TABLETS     amLODIPine (NORVASC) 10 MG tablet Take 10 mg by mouth     aspirin (ASA) 81 MG chewable tablet Take 81 mg by mouth     calcium carbonate 500 mg, elemental, (OSCAL 500) 1250 (500 Ca) MG TABS tablet Take 1,000 mg by mouth     cholecalciferol 25 MCG (1000 UT) TABS Take 2,000 Units by mouth     citalopram (CELEXA) 40 MG tablet Take 20 mg by mouth     cyanocobalamin (CYANOCOBALAMIN) 1000 MCG/ML injection Inject 1,000 mcg into the muscle every 30 days     doxazosin (CARDURA) 4 MG tablet Take 4 mg by mouth      "ferrous sulfate (FEROSUL) 325 (65 Fe) MG tablet Take 325 mg by mouth     folic acid (FOLVITE) 1 MG tablet Take 1 mg by mouth     hydrOXYzine (ATARAX) 50 MG tablet Take 50 mg by mouth     magnesium oxide 400 MG CAPS Take 1 tablet by mouth     Melatonin 10 MG TABS tablet      midodrine (PROAMATINE) 5 MG tablet TAKE 1 TABLET BY MOUTH THREE TIMES A WEEK WITH DIALYSIS     multivitamin w/minerals (MULTI-VITAMIN) tablet      omeprazole (PRILOSEC) 20 MG DR capsule Take 20 mg by mouth     simvastatin (ZOCOR) 20 MG tablet TAKE ONE TABLET BY MOUTH IN THE EVENING. INDICATIONS: CEREBROVASCULAR ACCIDENT OR STROKE     No current facility-administered medications for this visit.        Vitals:  BP (!) 148/79   Pulse 83   Ht 1.791 m (5' 10.5\")   Wt 77.6 kg (171 lb)   SpO2 98%   BMI 24.19 kg/m      Exam:  GENERAL APPEARANCE: alert and no distress  HENT: mouth without ulcers or lesions  LYMPHATICS: no cervical or supraclavicular nodes  RESP: lungs clear to auscultation - no rales, rhonchi or wheezes  CV: regular rhythm, normal rate, no rub, no murmur  EDEMA: no LE edema bilaterally  ABDOMEN: soft, nondistended, nontender, bowel sounds normal  MS: extremities normal - no gross deformities noted, no evidence of inflammation in joints, no muscle tenderness  SKIN: no rash, several skin lesions on the back some with irregular border and some acanthosis.   Iliac: pulses felt on the right but slightly less on the left     Results:   Recent Results (from the past 336 hour(s))   EKG 12-lead, tracing only [EKG1]    Collection Time: 03/25/21 11:57 AM   Result Value Ref Range    Interpretation ECG Click View Image link to view waveform and result    UA with Microscopic reflex to Culture    Collection Time: 03/25/21 12:25 PM    Specimen: Midstream Urine   Result Value Ref Range    Color Urine Yellow     Appearance Urine Slightly Cloudy     Glucose Urine Negative NEG^Negative mg/dL    Bilirubin Urine Negative NEG^Negative    Ketones Urine " Negative NEG^Negative mg/dL    Specific Gravity Urine 1.020 1.003 - 1.035    Blood Urine Large (A) NEG^Negative    pH Urine 6.0 5.0 - 7.0 pH    Protein Albumin Urine 100 (A) NEG^Negative mg/dL    Urobilinogen mg/dL 0.2 0.0 - 2.0 mg/dL    Nitrite Urine Negative NEG^Negative    Leukocyte Esterase Urine Negative NEG^Negative    Source Midstream Urine     WBC Urine 2 0 - 5 /HPF    RBC Urine 1 0 - 2 /HPF    Bacteria Urine Few (A) NEG^Negative /HPF    Squamous Epithelial /HPF Urine 1 0 - 1 /HPF   Blood Group A Subtype    Collection Time: 03/25/21 12:29 PM   Result Value Ref Range    Antigen Type A1 Negative      Blood Bank Comment       Serologic tests for A1 subtype were negative, indicating the patient is a subgroup of A,   such as A2.  For clinical relevance of this finding, please page the Transfusion MD on   call.     Comprehensive metabolic panel [LAB17]    Collection Time: 03/25/21 12:29 PM   Result Value Ref Range    Sodium 140 133 - 144 mmol/L    Potassium 3.2 (L) 3.4 - 5.3 mmol/L    Chloride 105 94 - 109 mmol/L    Carbon Dioxide 20 20 - 32 mmol/L    Anion Gap 15 (H) 3 - 14 mmol/L    Glucose 78 70 - 99 mg/dL    Urea Nitrogen 34 (H) 7 - 30 mg/dL    Creatinine 9.80 (H) 0.66 - 1.25 mg/dL    GFR Estimate 5 (L) >60 mL/min/[1.73_m2]    GFR Estimate If Black 6 (L) >60 mL/min/[1.73_m2]    Calcium 7.2 (L) 8.5 - 10.1 mg/dL    Bilirubin Total 0.5 0.2 - 1.3 mg/dL    Albumin 3.4 3.4 - 5.0 g/dL    Protein Total 7.9 6.8 - 8.8 g/dL    Alkaline Phosphatase 91 40 - 150 U/L    ALT 22 0 - 70 U/L    AST 16 0 - 45 U/L   CBC with platelets differential [[id number]]    Collection Time: 03/25/21 12:29 PM   Result Value Ref Range    WBC 6.8 4.0 - 11.0 10e9/L    RBC Count 3.10 (L) 4.4 - 5.9 10e12/L    Hemoglobin 10.4 (L) 13.3 - 17.7 g/dL    Hematocrit 32.8 (L) 40.0 - 53.0 %     (H) 78 - 100 fl    MCH 33.5 (H) 26.5 - 33.0 pg    MCHC 31.7 31.5 - 36.5 g/dL    RDW 14.1 10.0 - 15.0 %    Platelet Count 232 150 - 450 10e9/L    Diff Method  Automated Method     % Neutrophils 74.1 %    % Lymphocytes 16.1 %    % Monocytes 5.5 %    % Eosinophils 2.9 %    % Basophils 1.0 %    % Immature Granulocytes 0.4 %    Nucleated RBCs 0 0 /100    Absolute Neutrophil 5.0 1.6 - 8.3 10e9/L    Absolute Lymphocytes 1.1 0.8 - 5.3 10e9/L    Absolute Monocytes 0.4 0.0 - 1.3 10e9/L    Absolute Eosinophils 0.2 0.0 - 0.7 10e9/L    Absolute Basophils 0.1 0.0 - 0.2 10e9/L    Abs Immature Granulocytes 0.0 0 - 0.4 10e9/L    Absolute Nucleated RBC 0.0    Cardiolipin Karo IgG and IgM [LAB 6836]    Collection Time: 03/25/21 12:29 PM   Result Value Ref Range    Cardiolipin Antibody IgG <1.6 0.0 - 19.9 GPL-U/mL    Cardiolipin Antibody IgM 0.6 0.0 - 19.9 MPL-U/mL   INR [[id number]]    Collection Time: 03/25/21 12:29 PM   Result Value Ref Range    INR 1.31 (H) 0.86 - 1.14   Partial thromboplastin time [LAB56]    Collection Time: 03/25/21 12:29 PM   Result Value Ref Range    PTT 33 22 - 37 sec   Thrombin time [[id number]]    Collection Time: 03/25/21 12:29 PM   Result Value Ref Range    Thrombin Time 15.2 13.0 - 19.0 sec   CMV Antibody IgG [[id number]]    Collection Time: 03/25/21 12:29 PM   Result Value Ref Range    CMV Antibody IgG <0.2 0.0 - 0.8 AI   EBV Capsid Antibody IgG [[id number]]    Collection Time: 03/25/21 12:29 PM   Result Value Ref Range    EBV Capsid Antibody IgG >8.0 (H) 0.0 - 0.8 AI   Hepatitis B core antibody [[id number]]    Collection Time: 03/25/21 12:29 PM   Result Value Ref Range    Hepatitis B Core Karo Nonreactive NR^Nonreactive   Hepatitis B Surface Antibody [[id number]]    Collection Time: 03/25/21 12:29 PM   Result Value Ref Range    Hepatitis B Surface Antibody 0.75 <8.00 m[IU]/mL   Hepatitis B surface antigen [[id number]]    Collection Time: 03/25/21 12:29 PM   Result Value Ref Range    Hep B Surface Agn Nonreactive NR^Nonreactive   Hepatitis C antibody [[id number]]    Collection Time: 03/25/21 12:29 PM   Result Value Ref Range    Hepatitis C Antibody Nonreactive NR^Nonreactive   HIV  Antigen Antibody Combo Pretransplant    Collection Time: 03/25/21 12:29 PM   Result Value Ref Range    HIV Antigen Antibody Combo Pretransplant Nonreactive NR^Nonreactive   Quantiferon-TB Gold Plus    Collection Time: 03/25/21 12:29 PM    Specimen: Blood   Result Value Ref Range    MTB Quantiferon Result Negative NEG^Negative    TB1 Ag minus Nil 0.00 IU/mL    TB2 Ag minus Nil 0.00 IU/mL    Mitogen minus Nil 6.57 IU/mL    NIL Result 0.00 IU/mL   Varicella Zoster Virus Antibody IgG [[id number]]    Collection Time: 03/25/21 12:29 PM   Result Value Ref Range    Varicella Zoster Virus Antibody IgG 5.8 (H) 0.0 - 0.8 AI   Treponema Abs w Reflex to RPR and Titer [[id number]]    Collection Time: 03/25/21 12:29 PM   Result Value Ref Range    Treponema Antibodies Nonreactive NR^Nonreactive   ABO/Rh type and screen [[id number]]    Collection Time: 03/25/21 12:30 PM   Result Value Ref Range    ABO A     RH(D) Pos     Antibody Screen Neg     Test Valid Only At          [facility]    Specimen Expires 03/28/2021    Antibody titer red cell [[id number]]    Collection Time: 03/25/21 12:30 PM   Result Value Ref Range    Antibody Titer Anti B: IgM 64, IgG 32    ABO type    Collection Time: 03/25/21 12:53 PM   Result Value Ref Range    ABO A     RH(D) Pos     Specimen Expires 03/28/2021        Again, thank you for allowing me to participate in the care of your patient.        Sincerely,        DONAL

## 2021-03-25 NOTE — PROGRESS NOTES
Kidney Transplant Referral - 2/3/2021      Summary    Team s concerns/comments:   1) Cardial risk assessment  2) PAD assessment  3) Crohn's disease/chronic diarrhea  4) TIA  5) Left renal mass/possible RCC  6) Incisional hernia  7) Hx tobacco use  8) Skin check  9) Health maintentance    Candidacy category: Yellow    Action/Plan:   1) EKG, Echo today, Cardiology consult, C  2) Abd/pelvic CT 9/26/20 [facility].       Abd/iliac US w doppler ordered  3) GI consult,  May need trial of MMF  4) Bilateral carotid US ordered  5) Urology consult, will need biopsied or removal  6) Left side placement surgical plan  7) Recently quit, review outside Chest CT  8) Dermatology consult  9) Colonoscopy, Dental UTD    Expected Selection Meeting Discussion: 4/7/21

## 2021-03-25 NOTE — PROGRESS NOTES
Psychosocial Assessment  Patient Name/ Age: Tacos Dominguez 61 year old   Medical Record #: [id number]  Duration of Interview:     40  min  Process:   Face-to-Face Interview                (counseling < 50%)   Present at Appointment: Scar        :BORIS Griggs, LICSW Date:  March 25, 2021        Type of transplant: Kidney    Donor type:   Scar indicated at this time he does not know of any potential donors.   [facility]   Prior Transplants:    No Status of Transplant:       Current Living Situation    Location:   [address] [place] [address]  With Whom: lives alone       Family/ Social Support:    Scar has one daughter Lilia (35) who lives in [place], [place].  She is mentally and physically handicapped.  Scar has four brothers (1-[place], [place], 1-[place] and [place] [place] and one sister ([place], [place]).   Available, helpful   Committed relationship:  Scar indicated he is  and does not consider his ex-spouse to be a part of his support system.   Single   Other supports:   Friends Available, helpful       Activities/ Functional Ability    Current level: Ambulatory and independent with ADL's     Transportation Drives self       Vocational/Employment/Financial     Employment  [employer]   Full time   Job Description  Computer  administration      Income   Salary/wages   Insurance  [facility] through employer.    At this time, patient can afford medication costs:  Yes  Private Insurance       Medical Status    Current mode of treatment for ESRD Dialysis   Complications - Non diabetic        Behavioral    Tobacco Use No Chemical Dependency No   Scar indicated he quit smoking in October 2020. Scar indicated he may have six drinks a month.     Psychiatric Impairment No  Scar indicated he has PRN anxiety medication which continues to be of assistance.    Reading ability Good  Education Level: Some College Recent Legal History No          Coping Style/Strategies: Scar indicated when  "under stress  He will \"deal with it\" or \"don't think about it\".   Ability to Adhere to Complex Medical Regime: Yes     Adherence History:  Scar indicated he will usually follow his physician's recommendations.        Education  _X_ Medicare  _X_ Rehabilitation  _X_ Donor issues  _X_ Community resources  _X_ Post discharge housing  _X_ Financial resources  _X_ Medical insurance options  _X_ Psych adjustment  _X_ Family adjustment  _X_ Health Care Directive - Provided Education and Provided a HCD. Psychosocial Risks of Transplant Reviewed and Discussed:  _X_ Increased stress related to emotional,            family, social, employment or financial           situation  _X_ Affect on work and/or disability benefits  _X_ Affect on future life insurance  _X_ Transplant outcome expectations may           not be met  _X_ Mental Health Risks: anxiety,           depression, PTSD, guilt, grief and           chronic fatigue     Notable Items:   None noted.       Final Evaluation/Assessment   Patient seemed to process information well. Appeared well informed, motivated and able to follow post transplant requirements. Behavior was appropriate during interview. Has adequate income and insurance coverage. Adequate social support. No major contraindications noted for transplant.  At this time patient appears to understand the risks and benefits of transplant.      Recommendation  Acceptable    Selection Criteria Met:  Plan for support Yes   Chemical Dependence Yes   Smoking Yes   Mental Health Yes   Adequate Finances Yes    Signature: BORIS Griggs, Dorothea Dix Psychiatric CenterSW   Title: Clinical      "

## 2021-03-25 NOTE — LETTER
3/25/2021         RE: Tacos Dominguez  [address] [facility] [address]        Dear Colleague,    Thank you for referring your patient, Tacos Dominguez, to the [facility]. Please see a copy of my visit note below.    Transplant Surgery Consult Note     Medical record number: [id number]  YOB: 1960,   Consult requested by Dr García  for evaluation of kidney transplant candidacy.    Assessment and Recommendations:Mr. Dominguez appears to be a good candidate for kidney transplantation and has a good understanding of the risks and benefits of this approach to the management of renal failure. The following issues should be addressed prior to finalizing his transplant candidacy:   60 yo male on dialysis since Oct 2020  HTN presumed etioology  H/O Crohns - currently controlled   Multiple laparotomies with bowel resection  Long midline scar  Large right lower quadrant stomal site hernia from 2006 - softball sized, reducible  Surgical team deferred on repair due to op risk  Blood transfusions +  Physically active and functional status is excellent    Will need to do left sided tx    Risks of the surgical procedure including but not limited to the rare risk of mortality discussed in detail. Patient verbalized good understanding and had several pertinent questions which were answered satisfactorily.     Immunosuppressive regimen, management and long term risks discussed in detail.      Transplant order: Mr. Dominguez has End stage renal failure due to hypertension whose condition is not expected to resolve, is expected to progress, and is expected to continue to develop related comorbid conditions.  Recommend he be considered as a candidate for kidney.  Cardiology consult for cardiac risk stratification to be ordered: Yes  CT abdomen and pelvis without contrast to be ordered for assessment of vascular targets: Yes  Transplant listing labs ordered to include HLA, ABOx2, Cr,  etc.  Dietician consult ordered: Yes  Social work consult ordered: Yes  Imaging reports reviewed:  yes  Radiology images reviewed:no  Recipient suitable to move forward with work up of living donors:  Yes      The majority of our visit was spent in counselling, discussing the medical and surgical risks of kidney transplantation. We discussed approximate wait time and how that is influenced by issues such as blood type and sensitization (PRA) and access to a living donor. I contrasted potential waiting time for living vs  donor kidneys from  normal (0-85%) or higher (%) kidney donor profile index (KDPI) donors and their associated outcomes. I would not recommend this individual to consider kidneys from high KDPI donors. The reason for this decision is best summarized as: potential living donors. Potential surgical complications of kidney transplantation include bleeding, superficial or deep wound complications (infection, hernia, lymphocele), ureteral anastomotic failure (leak or stenosis), graft thrombosis, need for reoperation and other issues such as cardiac complications, pneumonia, deep venous thrombosis, pulmonary embolism, post transplant diabetes and death. The potential for recurrent disease or need for retransplantation was also addressed. We discussed the possible need for ureteral stent (and subsequent removal), and the utility of protocol biopsy and laboratory studies to evaluate for rejection or recurrent disease. We discussed the risk of graft rejection, our center's average graft and patient survival rates, immunosuppression protocols, as well as the potential opportunity to participate in clinical trials.  We also discussed the average length of stay, recovery process, and posttransplant lab and monitoring protocol.  I emphasized the need for strict immunosuppression medication adherence and the potential for complications of immunosuppression such as skin cancer or lymphoma, as well as  a very low but not zero risk of donor-derived disease transmission risks (infection, cancer). Mr. Dominguez asked good questions and his candidacy will be reviewed at our Multidisciplinary Selection Committee. Thank you for the opportunity to participate in Mr. Dominguez's care.      Total time: 60 minutes  Counselling time: 35 minutes    .    ---------------------------------------------------------------------------------------------------    HPI: Mr. Dominguez has End stage renal failure due to hypertension. The patient is non-diabetic.       The patient is on dialysis.    Has potential kidney donors:  Yes .  Interested in participation in paired exchange if a donor is willing: Doesn't know     The patient has the following pertinent history:       No    Yes  Dialysis:    []      [] via:       Blood Transfusion                  []      []  Number of units:   Most recently:  Pregnancy:    []      [] Number:       Previous Transplant:  []      [] Details:    Cancer    []      [] Comment:   Kidney stones   []      [] Comment:      Recurrent infections  []      []  Type:                  Bladder dysfunction  []      [] Cause:    Claudication   []      [] Distance:    Previous Amputation  []      [] Cause:     Chronic anticoagulation  []      [] Indication:   Mandaeism  []      []      Past Medical History:   Diagnosis Date     Hypertension      Past Surgical History:   Procedure Laterality Date     VASCULAR SURGERY      dialysis access- left upper     No family history on file.  Social History     Socioeconomic History     Marital status: Single     Spouse name: Not on file     Number of children: Not on file     Years of education: Not on file     Highest education level: Not on file   Occupational History     Not on file   Social Needs     Financial resource strain: Not on file     Food insecurity     Worry: Not on file     Inability: Not on file     Transportation needs     Medical: Not on file     Non-medical: Not  on file   Tobacco Use     Smoking status: Former Smoker     Packs/day: 0.75     Years: 50.00     Pack years: 37.50     Quit date: 10/19/2020     Years since quittin.4     Smokeless tobacco: Never Used   Substance and Sexual Activity     Alcohol use: Yes     Frequency: Monthly or less     Comment: rare     Drug use: Not Currently     Sexual activity: Not on file   Lifestyle     Physical activity     Days per week: Not on file     Minutes per session: Not on file     Stress: Not on file   Relationships     Social connections     Talks on phone: Not on file     Gets together: Not on file     Attends [employer] service: Not on file     Active member of club or organization: Not on file     Attends meetings of clubs or organizations: Not on file     Relationship status: Not on file     Intimate partner violence     Fear of current or ex partner: Not on file     Emotionally abused: Not on file     Physically abused: Not on file     Forced sexual activity: Not on file   Other Topics Concern     Parent/sibling w/ CABG, MI or angioplasty before 65F 55M? Not Asked   Social History Narrative     Not on file       ROS:   CONSTITUTIONAL:  No fevers or chills  EYES: negative for icterus  ENT:  negative for hearing loss, tinnitus and sore throat  RESPIRATORY:  negative for cough, sputum, dyspnea  CARDIOVASCULAR:  negative for chest pain Fatigue  GASTROINTESTINAL:  negative for nausea, vomiting, diarrhea or constipation  GENITOURINARY:  negative for incontinence, dysuria, bladder emptying problems  HEME:  No easy bruising  INTEGUMENT:  negative for rash and pruritus  NEURO:  Negative for headache, seizure disorder  Allergies:   Allergies   Allergen Reactions     Lisinopril Anaphylaxis     Medications:  Prescription Medications as of 3/25/2021       Rx Number Disp Refills Start End Last Dispensed Date Next Fill Date Owning Pharmacy    amiodarone (PACERONE) 200 MG tablet    3/14/2021        Sig: TAKE ONE TABLET BY MOUTH AT  BEDTIME. START AFTER FINISHING AMIODARONE 400 MG TABLETS    Class: Historical    amLODIPine (NORVASC) 10 MG tablet            Sig: Take 10 mg by mouth    Class: Historical    Route: Oral    aspirin (ASA) 81 MG chewable tablet    10/29/2020        Sig: Take 81 mg by mouth    Class: Historical    Route: Oral    calcium carbonate 500 mg, elemental, (OSCAL 500) 1250 (500 Ca) MG TABS tablet    10/1/2020        Sig: Take 1,000 mg by mouth    Class: Historical    Route: Oral    cholecalciferol 25 MCG (1000 UT) TABS    10/30/2020        Sig: Take 2,000 Units by mouth    Class: Historical    Route: Oral    citalopram (CELEXA) 40 MG tablet    10/1/2020        Sig: Take 20 mg by mouth    Class: Historical    Route: Oral    cyanocobalamin (CYANOCOBALAMIN) 1000 MCG/ML injection    10/29/2020 3/29/2021       Sig: Inject 1,000 mcg into the muscle every 30 days    Class: Historical    Route: Intramuscular    doxazosin (CARDURA) 4 MG tablet            Sig: Take 4 mg by mouth    Class: Historical    Route: Oral    ferrous sulfate (FEROSUL) 325 (65 Fe) MG tablet    9/2/2019        Sig: Take 325 mg by mouth    Class: Historical    Route: Oral    folic acid (FOLVITE) 1 MG tablet    10/29/2020 10/29/2021       Sig: Take 1 mg by mouth    Class: Historical    Route: Oral    hydrOXYzine (ATARAX) 50 MG tablet    8/9/2019        Sig: Take 50 mg by mouth    Class: Historical    Route: Oral    magnesium oxide 400 MG CAPS    1/4/2021        Sig: Take 1 tablet by mouth    Class: Historical    Route: Oral    Melatonin 10 MG TABS tablet            Class: Historical    midodrine (PROAMATINE) 5 MG tablet    1/24/2021        Sig: TAKE 1 TABLET BY MOUTH THREE TIMES A WEEK WITH DIALYSIS    Class: Historical    multivitamin w/minerals (MULTI-VITAMIN) tablet            Class: Historical    omeprazole (PRILOSEC) 20 MG DR capsule    10/29/2020        Sig: Take 20 mg by mouth    Class: Historical    Route: Oral    simvastatin (ZOCOR) 20 MG tablet     "3/10/2021        Sig: TAKE ONE TABLET BY MOUTH IN THE EVENING. INDICATIONS: CEREBROVASCULAR ACCIDENT OR STROKE    Class: Historical        Exam:     BP (!) 148/79   Pulse 83   Ht 1.791 m (5' 10.5\")   Wt 77.6 kg (171 lb)   SpO2 98%   BMI 24.19 kg/m    Appearance: in no apparent distress.   Skin: normal  Eyes:  no redness or discharge.  Sclera anicteric  Head and Neck: Normal, no rashes or jaundice  Respiratory: easy respirations, no audible wheezing.  Abdomen: flat, Surgical scars consistent with history , large hernia as noted above    Psychiatric: Normal mood and affect    Diagnostics:   No results found for this or any previous visit (from the past 672 hour(s)).  No results found for: CPRA      Again, thank you for allowing me to participate in the care of your patient.        Sincerely,        David Stein MD    "

## 2021-03-26 LAB
BLD GP AB SCN TITR SERPL: NORMAL {TITER}
CARDIOLIPIN ANTIBODY IGG: <1.6 GPL-U/ML (ref 0–19.9)
CARDIOLIPIN ANTIBODY IGM: 0.6 MPL-U/ML (ref 0–19.9)
CMV IGG SERPL QL IA: <0.2 AI (ref 0–0.8)
EBV VCA IGG SER QL IA: >8 AI (ref 0–0.8)
GAMMA INTERFERON BACKGROUND BLD IA-ACNC: 0 IU/ML
INTERPRETATION ECG - MUSE: NORMAL
M TB IFN-G CD4+ BCKGRND COR BLD-ACNC: 6.57 IU/ML
M TB TUBERC IFN-G BLD QL: NEGATIVE
MITOGEN IGNF BCKGRD COR BLD-ACNC: 0 IU/ML
MITOGEN IGNF BCKGRD COR BLD-ACNC: 0 IU/ML
THROMBIN TIME: 15.2 SEC (ref 13–19)
VZV IGG SER QL IA: 5.8 AI (ref 0–0.8)

## 2021-03-26 NOTE — TELEPHONE ENCOUNTER
MEDICAL RECORDS REQUEST   [facility]  Urology Clinic  [address] [place] [address]  PHONE: [phone]  Fax: [phone]        FUTURE VISIT INFORMATION                                                   Tacos Dominguez : 1960 scheduled for future visit at [facility]    APPOINTMENT INFORMATION:    Date: 2021    Provider:  Liam RICHARD    Reason for Visit/Diagnosis: Organ transplant     REFERRAL INFORMATION:    Referring provider:  N/A    Specialty: N/A    Referring providers clinic:      Clinic contact number:  N/A    RECORDS REQUESTED FOR VISIT                                                     NOTES  STATUS/DETAILS   OFFICE NOTE from referring provider  yes   OFFICE NOTE from other specialist  no   DISCHARGE SUMMARY from hospital  no   DISCHARGE REPORT from the ER  no   OPERATIVE REPORT  yes   MEDICATION LIST  no     PRE-VISIT CHECKLIST      Record collection complete Yes   Appointment appropriately scheduled           (right time/right provider) Yes   MyChart activation Yes   Questionnaire complete If no, please explain: In process      Completed by: Daiana Schilling

## 2021-03-29 LAB — LA PPP-IMP: NEGATIVE

## 2021-03-30 LAB
A* LOCUS: NORMAL
A*: NORMAL
ABTEST METHOD: NORMAL
B* LOCUS: NORMAL
BW-1: NORMAL
C* LOCUS: NORMAL
COPATH REPORT: NORMAL
DPA1* NMDP: NORMAL
DPA1*: NORMAL
DPB1* NMDP: NORMAL
DPB1*: NORMAL
DQA1*LOCUS: NORMAL
DQB1* LOCUS: NORMAL
DRB1* LOCUS: NORMAL
DRB5* LOCUS: NORMAL
DRSSO TEST METHOD: NORMAL
PROTOCOL CUTOFF: NORMAL
SA1 CELL: NORMAL
SA1 COMMENTS: NORMAL
SA1 HI RISK ABY: NORMAL
SA1 MOD RISK ABY: NORMAL
SA1 TEST METHOD: NORMAL
SA2 CELL: NORMAL
SA2 COMMENTS: NORMAL
SA2 HI RISK ABY UA: NORMAL
SA2 MOD RISK ABY: NORMAL
SA2 TEST METHOD: NORMAL
UNACCEPTABLE ANTIGEN: NORMAL
UNOS CPRA: 22

## 2021-03-31 ENCOUNTER — COMMITTEE REVIEW (OUTPATIENT)
Dept: TRANSPLANT | Facility: CLINIC | Age: 61
End: 2021-03-31

## 2021-03-31 NOTE — COMMITTEE REVIEW
Abdominal Committee Review Note     Evaluation Date: 3/25/2021  Committee Review Date: 3/31/2021    Organ being evaluated for: Kidney    Transplant Phase: Evaluation  Transplant Status: Active    Transplant Coordinator: Barbara Vasquez  Transplant Surgeon:       Referring Physician: Provider Not In System    Primary Diagnosis:   Secondary Diagnosis:     Committee Review Members:  Nephrology Wesley Hill MD, Xavier Carrizales MD, Fred Calderon MD   Nurse Ayleen Blackwell, JR   Pharmacy Kemal Santiago [facility]    - Clinical Jaydadarius Engel[place] [facility]   Transplant Priyanka Perez, RN, Asya Matos, JR, Krysta Noonan, JR, Sandy Sands, JR, David Stein MD, Barbara Vasquez, JR, Yanni Kim RN   Transplant Surgery Humberto Marquez MD, David Stein MD       Transplant Eligibility: Irreversible chronic kidney disease treated w/dialysis or expected need for dialysis    Committee Review Decision: Approved    Relative Contraindications:     Absolute Contraindications:     Committee Chair David Stein MD verbally attested to the committee's decision.    Committee Discussion Details: Reviewed pt's medical status and evaluation results to date.  Pt is determined to be a fair candidate for kidney transplant. Dr. Stein recommends the following items be completed as part of the pt's evaluation:   1. Cardiac assessment with L heart cath.    2. GI consult with Willian Kee for Crohns assessment  3. Urology consult for renal mass  4. Dermatology consult  5. Iliac US     Will not list the pt inactive status at this time.  Pt will be rediscussed once the above items are complete.

## 2021-04-01 NOTE — TELEPHONE ENCOUNTER
REFERRAL INFORMATION:    Referring Provider:  Fred Calderon MD    Referring Clinic:  [facility]     Reason for Visit/Diagnosis: Crohn's, diarrhea, apt per Pt,     FUTURE VISIT INFORMATION:    Appointment Date: 6/29/2021    Appointment Time: 1PM     NOTES STATUS DETAILS   OFFICE NOTE from Referring Provider Internal 3/25/2021 OV Fred Calderon MD   OFFICE NOTE from Other Specialist Care Everywhere / Internal  11/13/08 Jeffrey Vo (Epic)    5/15/2014 Jeffrey Vo MD ( [facility] )   HOSPITAL DISCHARGE SUMMARY/  ED VISITS Care Everywhere 10/19/2020 - 10/30/2020 (regions)  06/12/2006 - 06/26/2006 (regions)       OPERATIVE REPORT Care Everywhere 05/16/2006 RESECTION SMALL BOWEL ([facility])    MEDICATION LIST Internal         ENDOSCOPY  Care Everywhere 10/29/2020 GI Upper Endoscopy    ([facility])      COLONOSCOPY Care Everywhere 10/29/2020, 2/9/12 Colonoscopy    ([facility])    ERCP N/A    EUS N/A    STOOL TESTING N/A    PERTINENT LABS Internal    PATHOLOGY REPORTS (RELATED) N/A    IMAGING (CT, MRI, EGD, MRCP, Small Bowel Follow Through/SBT, MR/CT Enterography) Care Everywhere [facility]  9/26/2020 CT Abd Pelvis        Records Requested  04/01/21 @ 5:03PM Requester - Amay    Facility  [facility]   Release of Information  [address] [place] [id number]  Tel [phone]  Fax [phone]   Outcome 9/26/2020 CT Abd Pelvis

## 2021-04-05 ENCOUNTER — TELEPHONE (OUTPATIENT)
Dept: TRANSPLANT | Facility: CLINIC | Age: 61
End: 2021-04-05

## 2021-04-05 ENCOUNTER — NURSE TRIAGE (OUTPATIENT)
Dept: NURSING | Facility: CLINIC | Age: 61
End: 2021-04-05

## 2021-04-05 NOTE — TELEPHONE ENCOUNTER
Friend of patient asking to be contacted about being possible donor, Cortney Dominguez LE[address]DR[address] 73 former  employee ph [phone].     She would like to be contacted about being a donor and ordering testing to see if she is a fit.    Sil Evans RN  [facility] Nurse Advisor

## 2021-04-05 NOTE — TELEPHONE ENCOUNTER
Left message for patient to call back regarding evaluation next steps, made aware that he will be receiving a letter via [employer] as well.

## 2021-04-05 NOTE — Clinical Note
Hi marya Scar has orders written by Dr. Calderon for cardiology, gastroenterology, urology and iliac/carotid ultrasounds. He called to get scheduled but the orders couldn't be seen. If this is still an issue, please let me know and we can figure out what's up. Otherwise, he'd like to get these on the books. Thanks!

## 2021-04-05 NOTE — LETTER
04/05/21      Tacos Dominguez  [address]  [facility] [address]      Dear Tacos,      It was a pleasure to see you recently for consideration of kidney transplantation. Your pre-transplant evaluation results were reviewed at our Multidisciplinary Selection Committee. The committee is requesting the following items are completed before determining your candidacy:      1. Cardiology consult due to your history of heart attack. The transplant team is requesting a left heart catheterization.  2. Gastroenterology consult for follow up on your Crohn's disease.  3. Urology consult for a formal assessment of the mass on your kidney  4. Ultrasounds of your Iliac and carotid arteries and veins  5. Dermatology consult for cancer risk screening.      Items #1-4 can be scheduled with our transplant team. You should consult your primary care to set up a visit with a dermatologist in your insurance network. For any questions, please contact the Transplant Office at [phone].      I believe we have spoken about this in the past but I'd like to remind you of a great resource for those patients being evaluated/preparing for transplant that [facility] has developed called [facility] Transplant Place, [email or web address]. We suggest patients watch videos under the Kidney I and Kidney II categories to get a feel for what they can expect. I think you have created an account but if I am remembering wrong, that is a great place to go to learn and/or review information about transplant.       In evaluation our providers spoke with you about living donation. If you have anyone that is interested in donating a kidney to you, they can register at [email or web address]. The site has a new address but if the old addressed is used, it should route to the current. They can also call [phone] and option 3 will reach our living donor team.        If you have questions about your transplant evaluation, please call my direct  line at [phone].     Sincerely,      Barbara Vasquez  Solid Organ Transplant  [facility], [facility]    CC: Dr. Lorenzo

## 2021-04-06 NOTE — TELEPHONE ENCOUNTER
Spoke with Scar about his evaluation. He gave his sister information on the living donor program. He will wait to hear from scheduling to get his appointments scheduled. Otherwise he is doing well and has no questions.

## 2021-04-06 NOTE — TELEPHONE ENCOUNTER
Patient called and left message stating that he received the [employer] message regarding his evaluation but when he tried to schedule the appointments the schedulers couldn't find the orders.    Attempted to reach patient to address this, left a message stating that I would have the transplant schedulers call him and if they had problems seeing the orders we would figure it out before he's scheduled. Given [facility] contact info if needed.

## 2021-04-20 ENCOUNTER — TELEPHONE (OUTPATIENT)
Dept: TRANSPLANT | Facility: CLINIC | Age: 61
End: 2021-04-20

## 2021-04-20 NOTE — TELEPHONE ENCOUNTER
Spoke with the patient and confirmed Urology, Aorta and Carotid Ultrasounds on 4/23/21, Cardiology on 4/26/21 and Gastroenterology on 6/29.  Informed patient an itinerary can be accessed on [employer].

## 2021-04-21 NOTE — TELEPHONE ENCOUNTER
Action    Action Taken 4-21: Requested from :    EKG Strips    Cardiac monitor strips   4 most recent    10-1-19   4-21: [facility]:    NM Card Lexiscan    CTA Chest   10-29-20    10-19-20   4-21: Requested 10-20-20 echo from [facility]    4-22: sent EKGs and biotel to scanning, resolved lexiscan and CTA chest  4-22: resolved echo in PACS

## 2021-04-23 ENCOUNTER — ANCILLARY PROCEDURE (OUTPATIENT)
Dept: ULTRASOUND IMAGING | Facility: CLINIC | Age: 61
End: 2021-04-23
Attending: INTERNAL MEDICINE
Payer: COMMERCIAL

## 2021-04-23 ENCOUNTER — PRE VISIT (OUTPATIENT)
Dept: UROLOGY | Facility: CLINIC | Age: 61
End: 2021-04-23

## 2021-04-23 ENCOUNTER — OFFICE VISIT (OUTPATIENT)
Dept: UROLOGY | Facility: CLINIC | Age: 61
End: 2021-04-23
Payer: COMMERCIAL

## 2021-04-23 VITALS
WEIGHT: 171 LBS | HEIGHT: 70 IN | SYSTOLIC BLOOD PRESSURE: 130 MMHG | HEART RATE: 87 BPM | BODY MASS INDEX: 24.48 KG/M2 | DIASTOLIC BLOOD PRESSURE: 80 MMHG

## 2021-04-23 DIAGNOSIS — N18.6 ESRD (END STAGE RENAL DISEASE) (H): ICD-10-CM

## 2021-04-23 DIAGNOSIS — N28.1 ACQUIRED CYST OF KIDNEY: ICD-10-CM

## 2021-04-23 DIAGNOSIS — Z76.82 ORGAN TRANSPLANT CANDIDATE: ICD-10-CM

## 2021-04-23 PROCEDURE — 93880 EXTRACRANIAL BILAT STUDY: CPT | Mod: GC | Performed by: RADIOLOGY

## 2021-04-23 PROCEDURE — 93978 VASCULAR STUDY: CPT | Mod: GC | Performed by: RADIOLOGY

## 2021-04-23 PROCEDURE — 99204 OFFICE O/P NEW MOD 45 MIN: CPT | Performed by: UROLOGY

## 2021-04-23 ASSESSMENT — PAIN SCALES - GENERAL: PAINLEVEL: NO PAIN (0)

## 2021-04-23 ASSESSMENT — MIFFLIN-ST. JEOR: SCORE: 1586.9

## 2021-04-23 NOTE — LETTER
"4/23/2021       RE: Tacos Dominguez  [address]  [facility] [address]     Dear Colleague,    Thank you for referring your patient, Tacos Dominguez, to the [facility] [place] at [facility]. Please see a copy of my visit note below.      Urology Clinic    Assessment and Plan  Left 5cm possible renal mass but probable cyst in patient on kidney transplant list and history of Crohns and incisional hernia.  New since 2008.      Plan for CT Abdomen/Pelvis with and without contrast given poor quality of previous CT scan.    __________________________________________________    HPI  He is here at the request of Dr Calderon for left renal mass as part of kidney transplant evaluation.  This is a new finding.  He denies any flank pain, hematuria, family history, or previous renal mass or cyst.      From Dr Calderon 3/25/21 note  \"Scar is delightful 61 year old gentleman who lives alone. He had known crohn's disease s/p surgical resection of large segment of his small bowels. He has chronic diarrhea that he learned to mitigate over the years by drinking less fluid. In 2008 his creatinine was noted at 2 mg/dL. His UAs showed minimal albumin and no rbcs. He had a recent hospitalization after was found down at home for at least 2 days. This was discovered when his work send the police for a wellness check. He was then taken to one of the [facility] hospital and was found in to be in acute on chronic renal failure. He was initiated on dialysis. He had marked troponinemia and abnormal stress but he was not told he had sustain a cardiac event. Unclear if the diagnosis was made or he was not aware.   He had atrial fibrillation and was discovered to have left renal mass that is vascular. He did not call urologic evaluation but he mentioned that he was told a biopsy is not needed. He used to smoke leading up to his remarkable hospitalization but quit ever since. He " "had hx of TIA in the past. He smoked for over 40 years at least 1PPD.   He does not see GI for his IBD and not on any meds. He had an ostomy on the right side with very large incisional hernia. \"    10/20/20 renal ultrasound [facility]    Radiologist Impression  EXAM: US RENAL W BLADDER  LOCATION: [facility]  DATE/TIME: 10/20/2020 2:49 AM    INDICATION: Further evaluation of left renal mass.  COMPARISON: CTA chest, abdomen, and pelvis yesterday.  TECHNIQUE: Routine Bilateral Renal and Bladder Ultrasound.    FINDINGS:    RIGHT KIDNEY: 10.0 cm. 8 mm benign avascular interpolar cortical cyst. No hydronephrosis, shadowing stone or solid mass.     LEFT KIDNEY: 10.0 cm. 5.4 cm benign avascular left lower pole cortical cyst corresponds to the finding of interest on the prior CT. No hydronephrosis, shadowing stone or solid mass.     BLADDER: Mostly decompressed by the Alcantara catheter.    Bowel containing spigelian hernia on the right incompletely imaged by ultrasound.    IMPRESSION:  Benign bilateral renal cysts.          3/25/21 Chest Xray   I reviewed the radiologic images and report from this radiologic exam.  My independent interpretation is:    No lung mass    Radiologist Impression  1. Right IJ central line tip at the superior atriocaval junction.  2. No cardiopulmonary disease identified.      10/19/20 CT Abdomen/Pelvis with and without contrast.  Angio primarily  I reviewed the radiologic images and report from this radiologic exam.  My independent interpretation is:    Left renal mass cyst vs solid mass, but mass poorly characterized.    Radiologist Impression  1.  Atherosclerotic aorta in the chest, abdomen and pelvis without aneurysm. Tight stenosis of the celiac axis origin, likely attributable to arcuate ligament impression. There is a thin dissection flap in the distal aorta at the bifurcation and in the proximal right common iliac artery, without flow limitation. Left IJ dialysis catheter " "placement.    2.  Mild emphysematous changes, diffuse thickening of the bronchi and foci of endobronchial mucus plugging. Groundglass density infiltrates in the right upper lobe raise the possibility of an infectious or an inflammatory process. Evidence of remote granulomatous disease. Trace bilateral effusions. The patient is intubated.    3.  Further dilatation of the pancreatic duct, without obstructive lesion or stone material. Stable mild peripancreatic soft tissue stranding.    4.  Normal cardiac size. Mild coronary artery calcifications. Small pericardial effusion.    5.  Enlarging exophytic hypodense lesion left kidney. Consider targeted ultrasound of the left kidney. No stones or obstruction on either side. Alcantara catheter in situ, interval placement. Prostatomegaly.    6.  Right hemicolectomy. Diffuse thickening of the colon representing long segment colitis, without secondary complications. Right abdominal wall hernia containing unobstructed small bowel loops.        2/28/08 renal ultrasound and 11/29/06 CT Abdomen/Pelvis with and without contrast show no evidence of mass.         PHYSICAL EXAM  Vitals:    04/23/21 0701   BP: 130/80   Pulse: 87   Weight: 77.6 kg (171 lb)   Height: 1.778 m (5' 10\")     Wt Readings from Last 3 Encounters:   04/23/21 77.6 kg (171 lb)   03/25/21 77.6 kg (171 lb)   03/25/21 77.6 kg (171 lb)     Constitutional: Alert, no acute distress  Psychiatric: Normal mood and affect  Head: Normocephalic.  Neck: Neck supple. No adenopathy. Thyroid symmetric, normal size  Back: No spinal tenderness.  No costovertebral angle tenderness  Cardiovascular: RRR.  Respiratory: Good diaphragmatic excursion.  Gastrointestinal: Abdomen soft, non-tender. No masses, organomegaly. Left sided hernia  Skin: no suspicious lesions or rashes on abdomen  Extremities: No lower extremity edema.  No clubbing or cyanosis.  Neurologic:  Cranial nerves grossly intact.  Equal strength and sensation on bilateral " extremities.   : Deferred    I reviewed the following labs  I reviewed the following laboratory data and went over findings with patient:  Recent Labs   Lab Test 03/25/21  1229   WBC 6.8   HGB 10.4*        Recent Labs   Lab Test 03/25/21  1229   CR 9.80*   GFRESTIMATED 5*   GFRESTBLACK 6*   GLC 78           CC  Patient Care Team:  Antonio Madrigal as PCP - General (Family Medicine)  Eric Wheeler MD as MD (Urology)  Olga Bowles, RN as Registered Nurse (Oncology)  JACQUI LINO    Copy to patient  JACQUELIN SNYDER  [address] [facility] [address]

## 2021-04-23 NOTE — PROGRESS NOTES
"  Urology Clinic    Assessment and Plan  Left 5cm possible renal mass but probable cyst in patient on kidney transplant list and history of Crohns and incisional hernia.  New since 2008.      Plan for CT Abdomen/Pelvis with and without contrast given poor quality of previous CT scan.    __________________________________________________    HPI  He is here at the request of Dr Calderon for left renal mass as part of kidney transplant evaluation.  This is a new finding.  He denies any flank pain, hematuria, family history, or previous renal mass or cyst.      From Dr Calderon 3/25/21 note  \"Scar is delightful 61 year old gentleman who lives alone. He had known crohn's disease s/p surgical resection of large segment of his small bowels. He has chronic diarrhea that he learned to mitigate over the years by drinking less fluid. In 2008 his creatinine was noted at 2 mg/dL. His UAs showed minimal albumin and no rbcs. He had a recent hospitalization after was found down at home for at least 2 days. This was discovered when his work send the police for a wellness check. He was then taken to one of the [facility] hospital and was found in to be in acute on chronic renal failure. He was initiated on dialysis. He had marked troponinemia and abnormal stress but he was not told he had sustain a cardiac event. Unclear if the diagnosis was made or he was not aware.   He had atrial fibrillation and was discovered to have left renal mass that is vascular. He did not call urologic evaluation but he mentioned that he was told a biopsy is not needed. He used to smoke leading up to his remarkable hospitalization but quit ever since. He had hx of TIA in the past. He smoked for over 40 years at least 1PPD.   He does not see GI for his IBD and not on any meds. He had an ostomy on the right side with very large incisional hernia. \"    10/20/20 renal ultrasound [facility]    Radiologist Impression  EXAM: US RENAL W BLADDER  [facility]  DATE/TIME: 10/20/2020 2:49 AM    INDICATION: Further evaluation of left renal mass.  COMPARISON: CTA chest, abdomen, and pelvis yesterday.  TECHNIQUE: Routine Bilateral Renal and Bladder Ultrasound.    FINDINGS:    RIGHT KIDNEY: 10.0 cm. 8 mm benign avascular interpolar cortical cyst. No hydronephrosis, shadowing stone or solid mass.     LEFT KIDNEY: 10.0 cm. 5.4 cm benign avascular left lower pole cortical cyst corresponds to the finding of interest on the prior CT. No hydronephrosis, shadowing stone or solid mass.     BLADDER: Mostly decompressed by the Alcantara catheter.    Bowel containing spigelian hernia on the right incompletely imaged by ultrasound.    IMPRESSION:  Benign bilateral renal cysts.          3/25/21 Chest Xray   I reviewed the radiologic images and report from this radiologic exam.  My independent interpretation is:    No lung mass    Radiologist Impression  1. Right IJ central line tip at the superior atriocaval junction.  2. No cardiopulmonary disease identified.      10/19/20 CT Abdomen/Pelvis with and without contrast.  Angio primarily  I reviewed the radiologic images and report from this radiologic exam.  My independent interpretation is:    Left renal mass cyst vs solid mass, but mass poorly characterized.    Radiologist Impression  1.  Atherosclerotic aorta in the chest, abdomen and pelvis without aneurysm. Tight stenosis of the celiac axis origin, likely attributable to arcuate ligament impression. There is a thin dissection flap in the distal aorta at the bifurcation and in the proximal right common iliac artery, without flow limitation. Left IJ dialysis catheter placement.    2.  Mild emphysematous changes, diffuse thickening of the bronchi and foci of endobronchial mucus plugging. Groundglass density infiltrates in the right upper lobe raise the possibility of an infectious or an inflammatory process. Evidence of remote granulomatous disease. Trace bilateral effusions. The  "patient is intubated.    3.  Further dilatation of the pancreatic duct, without obstructive lesion or stone material. Stable mild peripancreatic soft tissue stranding.    4.  Normal cardiac size. Mild coronary artery calcifications. Small pericardial effusion.    5.  Enlarging exophytic hypodense lesion left kidney. Consider targeted ultrasound of the left kidney. No stones or obstruction on either side. Alcantara catheter in situ, interval placement. Prostatomegaly.    6.  Right hemicolectomy. Diffuse thickening of the colon representing long segment colitis, without secondary complications. Right abdominal wall hernia containing unobstructed small bowel loops.        2/28/08 renal ultrasound and 11/29/06 CT Abdomen/Pelvis with and without contrast show no evidence of mass.         PHYSICAL EXAM  Vitals:    04/23/21 0701   BP: 130/80   Pulse: 87   Weight: 77.6 kg (171 lb)   Height: 1.778 m (5' 10\")     Wt Readings from Last 3 Encounters:   04/23/21 77.6 kg (171 lb)   03/25/21 77.6 kg (171 lb)   03/25/21 77.6 kg (171 lb)     Constitutional: Alert, no acute distress  Psychiatric: Normal mood and affect  Head: Normocephalic.  Neck: Neck supple. No adenopathy. Thyroid symmetric, normal size  Back: No spinal tenderness.  No costovertebral angle tenderness  Cardiovascular: RRR.  Respiratory: Good diaphragmatic excursion.  Gastrointestinal: Abdomen soft, non-tender. No masses, organomegaly. Left sided hernia  Skin: no suspicious lesions or rashes on abdomen  Extremities: No lower extremity edema.  No clubbing or cyanosis.  Neurologic:  Cranial nerves grossly intact.  Equal strength and sensation on bilateral extremities.   : Deferred    I reviewed the following labs  I reviewed the following laboratory data and went over findings with patient:  Recent Labs   Lab Test 03/25/21  1229   WBC 6.8   HGB 10.4*        Recent Labs   Lab Test 03/25/21  1229   CR 9.80*   GFRESTIMATED 5*   GFRESTBLACK 6*   GLC 78 "           CC  Patient Care Team:  Antonio Madrigal as PCP - General (Family Medicine)  Eric Wheeler MD as MD (Urology)  Olga Bowles, RN as Registered Nurse (Oncology)  JACQUI LINO    Copy to patient  KRAIG CHAUDHARI  [address]  [facility] [address]

## 2021-04-23 NOTE — PATIENT INSTRUCTIONS
Please call [phone] to schedule CT.    It was a pleasure meeting with you today.  Thank you for allowing me and my team the privilege of caring for you today.  YOU are the reason we are here, and I truly hope we provided you with the excellent service you deserve.  Please let us know if there is anything else we can do for you so that we can be sure you are leaving completely satisfied with your care experience.

## 2021-04-26 ENCOUNTER — TEAM CONFERENCE (OUTPATIENT)
Dept: TRANSPLANT | Facility: CLINIC | Age: 61
End: 2021-04-26

## 2021-04-26 ENCOUNTER — PRE VISIT (OUTPATIENT)
Dept: CARDIOLOGY | Facility: CLINIC | Age: 61
End: 2021-04-26

## 2021-04-26 ENCOUNTER — VIRTUAL VISIT (OUTPATIENT)
Dept: CARDIOLOGY | Facility: CLINIC | Age: 61
End: 2021-04-26
Attending: INTERNAL MEDICINE
Payer: COMMERCIAL

## 2021-04-26 DIAGNOSIS — I48.91 ATRIAL FIBRILLATION, UNSPECIFIED TYPE (H): ICD-10-CM

## 2021-04-26 DIAGNOSIS — I21.4 NSTEMI (NON-ST ELEVATED MYOCARDIAL INFARCTION) (H): ICD-10-CM

## 2021-04-26 DIAGNOSIS — Z01.810 PRE-OPERATIVE CARDIOVASCULAR EXAMINATION: Primary | ICD-10-CM

## 2021-04-26 DIAGNOSIS — Z76.82 ORGAN TRANSPLANT CANDIDATE: ICD-10-CM

## 2021-04-26 DIAGNOSIS — N18.6 ESRD (END STAGE RENAL DISEASE) (H): ICD-10-CM

## 2021-04-26 DIAGNOSIS — I10 BENIGN ESSENTIAL HYPERTENSION: ICD-10-CM

## 2021-04-26 PROCEDURE — 99204 OFFICE O/P NEW MOD 45 MIN: CPT | Mod: 95 | Performed by: INTERNAL MEDICINE

## 2021-04-26 RX ORDER — POTASSIUM CHLORIDE 1500 MG/1
40 TABLET, EXTENDED RELEASE ORAL
Status: CANCELLED | OUTPATIENT
Start: 2021-04-26

## 2021-04-26 RX ORDER — SODIUM CHLORIDE 9 MG/ML
INJECTION, SOLUTION INTRAVENOUS CONTINUOUS
Status: CANCELLED | OUTPATIENT
Start: 2021-04-26

## 2021-04-26 RX ORDER — ASPIRIN 81 MG/1
81 TABLET ORAL DAILY
Status: CANCELLED | OUTPATIENT
Start: 2021-04-26 | End: 2021-04-28

## 2021-04-26 RX ORDER — POTASSIUM CHLORIDE 1500 MG/1
20 TABLET, EXTENDED RELEASE ORAL
Status: CANCELLED | OUTPATIENT
Start: 2021-04-26

## 2021-04-26 NOTE — TELEPHONE ENCOUNTER
ATTENDEES: Christina Botello RN; Priyanka Perez, JR; Krysta Noonan, JR; Dr. Carey     DISCUSSION:  Reviewed iliacs and carotid US.    DECISION: Both okay for transplant. Dr. Carey requests that CT ordered by urology be extended thru pelvis to use for transplant clearance as well.

## 2021-04-26 NOTE — PROGRESS NOTES
[facility]  CARDIOVASCULAR MEDICINE VIDEO VISIT NOTE    Referring Provider: Fred Calderon   Primary Care Provider: Antonio Madrigal     Patient Name: Tacos Dominguez   MRN: [id number]     PERTINENT CLINICAL HISTORY:   Tacos Dominguez is a 61 year old male w/ h/o Crohn's dz, HTN, and CKD on HD (started 10/2020).      Mr. Dominguez was admitted to [facility] in 10/2020 after being found down with 2 days of unknown whereabouts.  He had CLARISA with Cr 19, NSTEMI w/ Tpn peaked at 77, and MSSA pna.  He was found to have afib w/ RVR as well and was treated with amiodarone.  He was not on immunosuppressants for his Crohn's at the time.  He was also found to have a distal aortic dissection extending to the R JULISA - found on imaging with no intervention.  His cardiac workup included a nuclear stress test showing no ischemia and a normal EF.  There was inferior hypokinesis noted on TTE.  He did not have a coronary angiogram at that time.  However, he needed HD for his CLARISA and is now undergoing evaluation for a renal transplant.    Since his admission, he has done well.  He denies any chest pain or SOB with exertion.  He could walk for miles if needed without symptoms.         PAST MEDICAL HISTORY:     Past Medical History:   Diagnosis Date     Hypertension         PAST SURGICAL HISTORY:     Past Surgical History:   Procedure Laterality Date     VASCULAR SURGERY      dialysis access- left upper        CURRENT MEDICATIONS:     Current Outpatient Medications   Medication Sig Dispense Refill     amiodarone (PACERONE) 200 MG tablet TAKE ONE TABLET BY MOUTH AT BEDTIME. START AFTER FINISHING AMIODARONE 400 MG TABLETS       amLODIPine (NORVASC) 10 MG tablet Take 10 mg by mouth       aspirin (ASA) 81 MG chewable tablet Take 81 mg by mouth       calcium carbonate 500 mg, elemental, (OSCAL 500) 1250 (500 Ca) MG TABS tablet Take 1,000 mg by mouth       cholecalciferol 25 MCG (1000 UT) TABS Take 2,000 Units by mouth        citalopram (CELEXA) 40 MG tablet Take 20 mg by mouth       doxazosin (CARDURA) 4 MG tablet Take 4 mg by mouth       ferrous sulfate (FEROSUL) 325 (65 Fe) MG tablet Take 325 mg by mouth       folic acid (FOLVITE) 1 MG tablet Take 1 mg by mouth       hydrOXYzine (ATARAX) 50 MG tablet Take 50 mg by mouth       magnesium oxide 400 MG CAPS Take 1 tablet by mouth       Melatonin 10 MG TABS tablet        multivitamin w/minerals (MULTI-VITAMIN) tablet        omeprazole (PRILOSEC) 20 MG DR capsule Take 20 mg by mouth       simvastatin (ZOCOR) 20 MG tablet TAKE ONE TABLET BY MOUTH IN THE EVENING. INDICATIONS: CEREBROVASCULAR ACCIDENT OR STROKE       midodrine (PROAMATINE) 5 MG tablet TAKE 1 TABLET BY MOUTH THREE TIMES A WEEK WITH DIALYSIS          ALLERGIES:     Allergies   Allergen Reactions     Lisinopril Anaphylaxis and Other (See Comments)     Shortness of breath        FAMILY HISTORY:   No family history on file.     SOCIAL HISTORY:     Social History     Socioeconomic History     Marital status: Single     Spouse name: None     Number of children: None     Years of education: None     Highest education level: None   Occupational History     None   Social Needs     Financial resource strain: None     Food insecurity     Worry: None     Inability: None     Transportation needs     Medical: None     Non-medical: None   Tobacco Use     Smoking status: Former Smoker     Packs/day: 0.75     Years: 50.00     Pack years: 37.50     Quit date: 10/19/2020     Years since quittin.5     Smokeless tobacco: Never Used   Substance and Sexual Activity     Alcohol use: Yes     Frequency: Monthly or less     Comment: rare     Drug use: Not Currently     Sexual activity: None   Lifestyle     Physical activity     Days per week: None     Minutes per session: None     Stress: None   Relationships     Social connections     Talks on phone: None     Gets together: None     Attends Zoroastrian service: None     Active member of club or  organization: None     Attends meetings of clubs or organizations: None     Relationship status: None     Intimate partner violence     Fear of current or ex partner: None     Emotionally abused: None     Physically abused: None     Forced sexual activity: None   Other Topics Concern     Parent/sibling w/ CABG, MI or angioplasty before 65F 55M? Not Asked   Social History Narrative     None        REVIEW OF SYSTEMS:   A comprehensive review of systems was performed and negative unless otherwise noted in the HPI above.      PHYSICAL EXAMINATION:   No vitals were collected as this was a video visit.    Constitutional: no acute distress, pleasant and cooperative, appears overall well.  Eyes: EOMI, PERRLA, sclera white, conjunctiva clear, without icterus  Cardiovascular: extremities with no edema or cyanosis  Respiratory: no audible wheezes or coughing; no accessory muscle use  Gastrointestinal: non distended  Musculoskeletal: normal muscle bulk and range of motion  Skin: normal skin appearance without worrisome lesions.   Neurologic: AOx3, gait smooth and symmetric  Psychiatric: appropriate affect, eye contact, intact thought and speech    The rest of a comprehensive physical examination is deferred due to PHE (public health emergency) video visit restrictions     LABORATORY DATA:     LIPID RESULTS:  No results for input(s): CHOL, HDL, LDL, TRIG, CHOLHDLRATIO in the last 86752 hours.     LIVER ENZYME RESULTS:  Recent Labs   Lab Test 03/25/21  1229   AST 16   ALT 22       CBC RESULTS:  Recent Labs   Lab Test 03/25/21  1229   WBC 6.8   HGB 10.4*   HCT 32.8*          BMP RESULTS:  Recent Labs   Lab Test 03/25/21  1229      POTASSIUM 3.2*   CHLORIDE 105   CO2 20   ANIONGAP 15*   GLC 78   BUN 34*   CR 9.80*   KARY 7.2*       A1C RESULTS:  No results found for: A1C    INR RESULTS:  Recent Labs   Lab Test 03/25/21  1229   INR 1.31*          PROCEDURES & FURTHER ASSESSMENTS:     ECHO: 10/2020  Summary    1.  Technically difficult exam.    2. Patient imaged supine, on ventilator.    3. Contrast imaging agent used.    4. Sinus rhythm during study.    5. Left atrial chamber dimension is mildly enlarged.    6. Right atrial chamber dimension is normal.    7. Left ventricular chamber dimension is normal.    8. The inferior wall is hypokinetic.    9. Low normal LV systolic function, EF 50-55%.    10. Borderline concentric LVH.    11. Grade 1 diastolic filling pattern (impaired relaxation with low to normal filling pressure).    12. Right ventricular chamber dimension is normal.    13. Right ventricular systolic function is normal.    14. There is mild mitral valve regurgitation.    15. There is mild tricuspid valve regurgitation.    16. The pulmonary artery systolic pressure cannot be estimated due to inadequate tricuspid regurgitation jet.    17. IVC is dilated, likely related to patient being on ventilator.    18. Compared to report of study dated 9/1/2019, inferior wall hypokinesis is now present.    STRESS TEST:  Nuclear: 10/2020  Summary    1. A regadenoson infusion pharmacologic stress test was performed.    2. Functional capacity was not assessed.    3. Negative stress ECG for ST segment depression.    4. Myocardial perfusion imaging is abnormal.    5. Fixed inferior wall perfusion abnormality indicative of a non transmural scar without significant ischemia.    6. Low normal left ventricular systolic function. Calculated LVEF 52 %.    7. Mild hypokinesis of the inferior wall consistent with non transmural scar.     CLINICAL IMPRESSION:   Tacos Dominguez is a 61 year old male w/ h/o Crohn's dz, HTN, and CKD on HD (started 10/2020).  He had an NSTEMI in 10/2020 without coronary angiogram (Tpn 77) and had inferior hypokinesis noted on TTE.  He also had afib with RVR and is taking amiodarone.    Plan:  --Coronary angiogram for evaluation of current surgical risk and past NSTEMI etiology  --Consider switching simvastatin to  atorvastatin following angiogram  --Continue ASA 81mg Qday  --Consider switching amiodarone to beta blocker after angiogram    Follow-up: 6 months    Thank you for allowing us to take part in the care of this very pleasant patient.  Please do not hesitate to call if any further questions or concerns arise.    I spent 50 min reviewing the medical record, meeting with the patient, and completing this note.    Judson Ybarra MD, PhD  Interventional/Critical Care Cardiology  [phone]    April 26, 2021        Patient Care Team:  Antonio Madrigal as PCP - General (Family Medicine)  Eric Wheeler MD as MD (Urology)  Olga Bowles, RN as Registered Nurse (Oncology)  David Stein MD as Assigned Surgical Provider  JACQUI LINO

## 2021-04-26 NOTE — PROGRESS NOTES
"Scar is a 61 year old who is being evaluated via a billable video visit.      How would you like to obtain your AVS? MyChart  If the video visit is dropped, the invitation should be resent by: Send to e-mail at: [email or web address].Multispectral Imaging  Will anyone else be joining your video visit? No    Vitals - Patient Reported  Weight (Patient Reported): 76 kg (167 lb 8.8 oz)  Height (Patient Reported): 177.8 cm (5' 10\")  BMI (Based on Pt Reported Ht/Wt): 24.04  Pain Score: No Pain (0)  Pain Loc: Chest      6}    Video-Visit Details    Video Start Time: 1:34 PM    Video End Time:1:46 PM    Originating Location (pt. Location): Home    Distant Location (provider location):  [facility] HEART [facility]     Platform used for Video Visit: Allen      "

## 2021-04-26 NOTE — LETTER
"4/26/2021      RE: Tacos Dominguez  [address] [facility] [address]       Dear Colleague,    Thank you for the opportunity to participate in the care of your patient, Tacos Dominguez, at the [facility] HEART [facility]. Please see a copy of my visit note below.    Scar is a 61 year old who is being evaluated via a billable video visit.      How would you like to obtain your AVS? MyChart  If the video visit is dropped, the invitation should be resent by: Send to e-mail at: [email or web address]  Will anyone else be joining your video visit? No    Vitals - Patient Reported  Weight (Patient Reported): 76 kg (167 lb 8.8 oz)  Height (Patient Reported): 177.8 cm (5' 10\")  BMI (Based on Pt Reported Ht/Wt): 24.04  Pain Score: No Pain (0)  Pain Loc: Chest      6}    Video-Visit Details    Video Start Time: 1:34 PM    Video End Time:1:46 PM    Originating Location (pt. Location): Home    Distant Location (provider location):  [facility]     Platform used for Video Visit: [facility]  CARDIOVASCULAR MEDICINE VIDEO VISIT NOTE    Referring Provider: Fred Calderon   Primary Care Provider: Antonio Madrigal     Patient Name: Tacos Dominguez   MRN: [id number]     PERTINENT CLINICAL HISTORY:   Tacos Dominguez is a 61 year old male w/ h/o Crohn's dz, HTN, and CKD on HD (started 10/2020).      Mr. Dominguez was admitted to [facility] in 10/2020 after being found down with 2 days of unknown whereabouts.  He had CLARISA with Cr 19, NSTEMI w/ Tpn peaked at 77, and MSSA pna.  He was found to have afib w/ RVR as well and was treated with amiodarone.  He was not on immunosuppressants for his Crohn's at the time.  He was also found to have a distal aortic dissection extending to the R JULISA - found on imaging with no intervention.  His cardiac workup included a nuclear stress test showing no ischemia and " a normal EF.  There was inferior hypokinesis noted on TTE.  He did not have a coronary angiogram at that time.  However, he needed HD for his CLARISA and is now undergoing evaluation for a renal transplant.    Since his admission, he has done well.  He denies any chest pain or SOB with exertion.  He could walk for miles if needed without symptoms.         PAST MEDICAL HISTORY:     Past Medical History:   Diagnosis Date     Hypertension         PAST SURGICAL HISTORY:     Past Surgical History:   Procedure Laterality Date     VASCULAR SURGERY      dialysis access- left upper        CURRENT MEDICATIONS:     Current Outpatient Medications   Medication Sig Dispense Refill     amiodarone (PACERONE) 200 MG tablet TAKE ONE TABLET BY MOUTH AT BEDTIME. START AFTER FINISHING AMIODARONE 400 MG TABLETS       amLODIPine (NORVASC) 10 MG tablet Take 10 mg by mouth       aspirin (ASA) 81 MG chewable tablet Take 81 mg by mouth       calcium carbonate 500 mg, elemental, (OSCAL 500) 1250 (500 Ca) MG TABS tablet Take 1,000 mg by mouth       cholecalciferol 25 MCG (1000 UT) TABS Take 2,000 Units by mouth       citalopram (CELEXA) 40 MG tablet Take 20 mg by mouth       doxazosin (CARDURA) 4 MG tablet Take 4 mg by mouth       ferrous sulfate (FEROSUL) 325 (65 Fe) MG tablet Take 325 mg by mouth       folic acid (FOLVITE) 1 MG tablet Take 1 mg by mouth       hydrOXYzine (ATARAX) 50 MG tablet Take 50 mg by mouth       magnesium oxide 400 MG CAPS Take 1 tablet by mouth       Melatonin 10 MG TABS tablet        multivitamin w/minerals (MULTI-VITAMIN) tablet        omeprazole (PRILOSEC) 20 MG DR capsule Take 20 mg by mouth       simvastatin (ZOCOR) 20 MG tablet TAKE ONE TABLET BY MOUTH IN THE EVENING. INDICATIONS: CEREBROVASCULAR ACCIDENT OR STROKE       midodrine (PROAMATINE) 5 MG tablet TAKE 1 TABLET BY MOUTH THREE TIMES A WEEK WITH DIALYSIS          ALLERGIES:     Allergies   Allergen Reactions     Lisinopril Anaphylaxis and Other (See Comments)      Shortness of breath        FAMILY HISTORY:   No family history on file.     SOCIAL HISTORY:     Social History     Socioeconomic History     Marital status: Single     Spouse name: None     Number of children: None     Years of education: None     Highest education level: None   Occupational History     None   Social Needs     Financial resource strain: None     Food insecurity     Worry: None     Inability: None     Transportation needs     Medical: None     Non-medical: None   Tobacco Use     Smoking status: Former Smoker     Packs/day: 0.75     Years: 50.00     Pack years: 37.50     Quit date: 10/19/2020     Years since quittin.5     Smokeless tobacco: Never Used   Substance and Sexual Activity     Alcohol use: Yes     Frequency: Monthly or less     Comment: rare     Drug use: Not Currently     Sexual activity: None   Lifestyle     Physical activity     Days per week: None     Minutes per session: None     Stress: None   Relationships     Social connections     Talks on phone: None     Gets together: None     Attends Hoah[employer] service: None     Active member of club or organization: None     Attends meetings of clubs or organizations: None     Relationship status: None     Intimate partner violence     Fear of current or ex partner: None     Emotionally abused: None     Physically abused: None     Forced sexual activity: None   Other Topics Concern     Parent/sibling w/ CABG, MI or angioplasty before 65F 55M? Not Asked   Social History Narrative     None        REVIEW OF SYSTEMS:   A comprehensive review of systems was performed and negative unless otherwise noted in the HPI above.      PHYSICAL EXAMINATION:   No vitals were collected as this was a video visit.    Constitutional: no acute distress, pleasant and cooperative, appears overall well.  Eyes: EOMI, PERRLA, sclera white, conjunctiva clear, without icterus  Cardiovascular: extremities with no edema or cyanosis  Respiratory: no audible wheezes or  coughing; no accessory muscle use  Gastrointestinal: non distended  Musculoskeletal: normal muscle bulk and range of motion  Skin: normal skin appearance without worrisome lesions.   Neurologic: AOx3, gait smooth and symmetric  Psychiatric: appropriate affect, eye contact, intact thought and speech    The rest of a comprehensive physical examination is deferred due to PHE (public health emergency) video visit restrictions     LABORATORY DATA:     LIPID RESULTS:  No results for input(s): CHOL, HDL, LDL, TRIG, CHOLHDLRATIO in the last 74257 hours.     LIVER ENZYME RESULTS:  Recent Labs   Lab Test 03/25/21  1229   AST 16   ALT 22       CBC RESULTS:  Recent Labs   Lab Test 03/25/21  1229   WBC 6.8   HGB 10.4*   HCT 32.8*          BMP RESULTS:  Recent Labs   Lab Test 03/25/21  1229      POTASSIUM 3.2*   CHLORIDE 105   CO2 20   ANIONGAP 15*   GLC 78   BUN 34*   CR 9.80*   KARY 7.2*       A1C RESULTS:  No results found for: A1C    INR RESULTS:  Recent Labs   Lab Test 03/25/21  1229   INR 1.31*          PROCEDURES & FURTHER ASSESSMENTS:     ECHO: 10/2020  Summary    1. Technically difficult exam.    2. Patient imaged supine, on ventilator.    3. Contrast imaging agent used.    4. Sinus rhythm during study.    5. Left atrial chamber dimension is mildly enlarged.    6. Right atrial chamber dimension is normal.    7. Left ventricular chamber dimension is normal.    8. The inferior wall is hypokinetic.    9. Low normal LV systolic function, EF 50-55%.    10. Borderline concentric LVH.    11. Grade 1 diastolic filling pattern (impaired relaxation with low to normal filling pressure).    12. Right ventricular chamber dimension is normal.    13. Right ventricular systolic function is normal.    14. There is mild mitral valve regurgitation.    15. There is mild tricuspid valve regurgitation.    16. The pulmonary artery systolic pressure cannot be estimated due to inadequate tricuspid regurgitation jet.    17. IVC is  dilated, likely related to patient being on ventilator.    18. Compared to report of study dated 9/1/2019, inferior wall hypokinesis is now present.    STRESS TEST:  Nuclear: 10/2020  Summary    1. A regadenoson infusion pharmacologic stress test was performed.    2. Functional capacity was not assessed.    3. Negative stress ECG for ST segment depression.    4. Myocardial perfusion imaging is abnormal.    5. Fixed inferior wall perfusion abnormality indicative of a non transmural scar without significant ischemia.    6. Low normal left ventricular systolic function. Calculated LVEF 52 %.    7. Mild hypokinesis of the inferior wall consistent with non transmural scar.     CLINICAL IMPRESSION:   Tacos Dominguez is a 61 year old male w/ h/o Crohn's dz, HTN, and CKD on HD (started 10/2020).  He had an NSTEMI in 10/2020 without coronary angiogram (Tpn 77) and had inferior hypokinesis noted on TTE.  He also had afib with RVR and is taking amiodarone.    Plan:  --Coronary angiogram for evaluation of current surgical risk and past NSTEMI etiology  --Consider switching simvastatin to atorvastatin following angiogram  --Continue ASA 81mg Qday  --Consider switching amiodarone to beta blocker after angiogram    Follow-up: 6 months    Thank you for allowing us to take part in the care of this very pleasant patient.  Please do not hesitate to call if any further questions or concerns arise.    I spent 50 min reviewing the medical record, meeting with the patient, and completing this note.    Judson Ybarra MD, PhD  Interventional/Critical Care Cardiology  [phone]    April 26, 2021    CC  Patient Care Team:  Antonio Madrigal as PCP - General (Family Medicine)  Eric Wheeler MD as MD (Urology)  Olga Bowles RN as Registered Nurse (Oncology)  David Stein MD as Assigned Surgical Provider  JACQUI LINO

## 2021-04-26 NOTE — PATIENT INSTRUCTIONS
You have been scheduled for a coronary angiogram on Tuesday May 25.  Please arrive at the [facility] Waiting Room at 11:00 am    [facility]  [address] [place] [address]    You will be contacted by our COVID team for testing. They will be responsible for getting you the results.    Due to COVID the hospital is allowing only one visitor per patient.    Please do not eat or drink anything after midnight. You should take all your morning medications as prescribed with sips of water.       When you arrive you will be escorted back to the pre procedure area called 2A. Here they will insert an IV, draw labs, and obtain a short medical history. Please bring an updated list of your current medications.    A physician will come and talk with you about the procedure and obtain consent.    A nurse from the Cardiac Catheterization Lab will then escort you to the procedure area. You will be receiving sedation during the procedure so you will need someone to drive you to and from the hospital.    After the procedure you will recover for a short period (2 - 6 hours) on 6B. You will be discharged with instructions for post procedure care.  However, depending on what the angiogram shows you may have to have stents placed and this might require an overnight stay. We ask that you bring a small bag of necessities for your comfort if you would need to stay overnight. DO NOT BRING ANY VALUABLES!

## 2021-05-11 DIAGNOSIS — Z11.59 ENCOUNTER FOR SCREENING FOR OTHER VIRAL DISEASES: ICD-10-CM

## 2021-05-21 ENCOUNTER — AMBULATORY - HEALTHEAST (OUTPATIENT)
Dept: LAB | Facility: CLINIC | Age: 61
End: 2021-05-21

## 2021-05-21 ENCOUNTER — RECORDS - HEALTHEAST (OUTPATIENT)
Dept: ADMINISTRATIVE | Facility: OTHER | Age: 61
End: 2021-05-21

## 2021-05-21 DIAGNOSIS — Z11.59 SCREENING FOR VIRAL DISEASE: ICD-10-CM

## 2021-05-22 LAB
SARS-COV-2 PCR COMMENT: NORMAL
SARS-COV-2 RNA SPEC QL NAA+PROBE: NEGATIVE
SARS-COV-2 VIRUS SPECIMEN SOURCE: NORMAL

## 2021-05-23 ENCOUNTER — COMMUNICATION - HEALTHEAST (OUTPATIENT)
Dept: SCHEDULING | Facility: CLINIC | Age: 61
End: 2021-05-23

## 2021-05-24 ENCOUNTER — TELEPHONE (OUTPATIENT)
Dept: CARDIOLOGY | Facility: CLINIC | Age: 61
End: 2021-05-24

## 2021-05-24 NOTE — TELEPHONE ENCOUNTER
Left voicemail for patient to complete Travel Screen for Cardiac Cath Lab appointment on 5/25/21 and inform patient of updated Visitor Policy.  COVID negative.  Jessica Wheeler RN

## 2021-05-25 ENCOUNTER — APPOINTMENT (OUTPATIENT)
Dept: MEDSURG UNIT | Facility: CLINIC | Age: 61
End: 2021-05-25
Attending: INTERNAL MEDICINE
Payer: COMMERCIAL

## 2021-05-25 ENCOUNTER — HOSPITAL ENCOUNTER (OUTPATIENT)
Facility: CLINIC | Age: 61
Discharge: HOME | End: 2021-05-25
Attending: INTERNAL MEDICINE | Admitting: INTERNAL MEDICINE
Payer: COMMERCIAL

## 2021-05-25 ENCOUNTER — APPOINTMENT (OUTPATIENT)
Dept: LAB | Facility: CLINIC | Age: 61
End: 2021-05-25
Attending: INTERNAL MEDICINE
Payer: COMMERCIAL

## 2021-05-25 VITALS
HEART RATE: 85 BPM | BODY MASS INDEX: 23.77 KG/M2 | RESPIRATION RATE: 18 BRPM | DIASTOLIC BLOOD PRESSURE: 71 MMHG | OXYGEN SATURATION: 100 % | SYSTOLIC BLOOD PRESSURE: 131 MMHG | HEIGHT: 71 IN | WEIGHT: 169.75 LBS | TEMPERATURE: 98.2 F

## 2021-05-25 DIAGNOSIS — Z76.82 ORGAN TRANSPLANT CANDIDATE: ICD-10-CM

## 2021-05-25 DIAGNOSIS — N18.6 ESRD (END STAGE RENAL DISEASE) (H): ICD-10-CM

## 2021-05-25 LAB
ANION GAP SERPL CALCULATED.3IONS-SCNC: 14 MMOL/L (ref 3–14)
B-HCG SERPL-ACNC: <1 IU/L
BUN SERPL-MCNC: 36 MG/DL (ref 7–30)
CALCIUM SERPL-MCNC: 6.6 MG/DL (ref 8.5–10.1)
CHLORIDE SERPL-SCNC: 101 MMOL/L (ref 94–109)
CHOLEST SERPL-MCNC: 86 MG/DL
CO2 SERPL-SCNC: 21 MMOL/L (ref 20–32)
CREAT SERPL-MCNC: 10.6 MG/DL (ref 0.66–1.25)
ERYTHROCYTE [DISTWIDTH] IN BLOOD BY AUTOMATED COUNT: 14.2 % (ref 10–15)
GFR SERPL CREATININE-BSD FRML MDRD: 5 ML/MIN/{1.73_M2}
GLUCOSE SERPL-MCNC: 73 MG/DL (ref 70–99)
HCT VFR BLD AUTO: 33.3 % (ref 40–53)
HDLC SERPL-MCNC: 62 MG/DL
HGB BLD-MCNC: 10.6 G/DL (ref 13.3–17.7)
INR PPP: 1.33 (ref 0.86–1.14)
LDLC SERPL CALC-MCNC: 3 MG/DL
MCH RBC QN AUTO: 32.3 PG (ref 26.5–33)
MCHC RBC AUTO-ENTMCNC: 31.8 G/DL (ref 31.5–36.5)
MCV RBC AUTO: 102 FL (ref 78–100)
NONHDLC SERPL-MCNC: 24 MG/DL
PLATELET # BLD AUTO: 224 10E9/L (ref 150–450)
POTASSIUM SERPL-SCNC: 3.6 MMOL/L (ref 3.4–5.3)
RBC # BLD AUTO: 3.28 10E12/L (ref 4.4–5.9)
SODIUM SERPL-SCNC: 136 MMOL/L (ref 133–144)
TRIGL SERPL-MCNC: 108 MG/DL
WBC # BLD AUTO: 7.2 10E9/L (ref 4–11)

## 2021-05-25 PROCEDURE — 93454 CORONARY ARTERY ANGIO S&I: CPT | Performed by: INTERNAL MEDICINE

## 2021-05-25 PROCEDURE — 250N000011 HC RX IP 250 OP 636: Performed by: INTERNAL MEDICINE

## 2021-05-25 PROCEDURE — 85610 PROTHROMBIN TIME: CPT | Performed by: INTERNAL MEDICINE

## 2021-05-25 PROCEDURE — 99152 MOD SED SAME PHYS/QHP 5/>YRS: CPT | Performed by: INTERNAL MEDICINE

## 2021-05-25 PROCEDURE — 250N000009 HC RX 250: Performed by: INTERNAL MEDICINE

## 2021-05-25 PROCEDURE — 250N000013 HC RX MED GY IP 250 OP 250 PS 637: Performed by: INTERNAL MEDICINE

## 2021-05-25 PROCEDURE — 999N000054 HC STATISTIC EKG NON-CHARGEABLE

## 2021-05-25 PROCEDURE — C1894 INTRO/SHEATH, NON-LASER: HCPCS | Performed by: INTERNAL MEDICINE

## 2021-05-25 PROCEDURE — 85027 COMPLETE CBC AUTOMATED: CPT | Performed by: INTERNAL MEDICINE

## 2021-05-25 PROCEDURE — 36415 COLL VENOUS BLD VENIPUNCTURE: CPT | Performed by: INTERNAL MEDICINE

## 2021-05-25 PROCEDURE — 80048 BASIC METABOLIC PNL TOTAL CA: CPT | Performed by: INTERNAL MEDICINE

## 2021-05-25 PROCEDURE — 80061 LIPID PANEL: CPT | Performed by: INTERNAL MEDICINE

## 2021-05-25 PROCEDURE — 84702 CHORIONIC GONADOTROPIN TEST: CPT | Performed by: INTERNAL MEDICINE

## 2021-05-25 PROCEDURE — 272N000002 HC OR SUPPLY OTHER OPNP: Performed by: INTERNAL MEDICINE

## 2021-05-25 PROCEDURE — 93010 ELECTROCARDIOGRAM REPORT: CPT | Performed by: INTERNAL MEDICINE

## 2021-05-25 PROCEDURE — 999N000142 HC STATISTIC PROCEDURE PREP ONLY

## 2021-05-25 PROCEDURE — 272N000001 HC OR GENERAL SUPPLY STERILE: Performed by: INTERNAL MEDICINE

## 2021-05-25 PROCEDURE — 250N000011 HC RX IP 250 OP 636: Performed by: NURSE PRACTITIONER

## 2021-05-25 PROCEDURE — 93005 ELECTROCARDIOGRAM TRACING: CPT

## 2021-05-25 RX ORDER — NALOXONE HYDROCHLORIDE 0.4 MG/ML
0.4 INJECTION, SOLUTION INTRAMUSCULAR; INTRAVENOUS; SUBCUTANEOUS
Status: CANCELLED | OUTPATIENT
Start: 2021-05-25

## 2021-05-25 RX ORDER — LIDOCAINE 40 MG/G
CREAM TOPICAL
Status: CANCELLED | OUTPATIENT
Start: 2021-05-25

## 2021-05-25 RX ORDER — EPTIFIBATIDE 2 MG/ML
1 INJECTION, SOLUTION INTRAVENOUS CONTINUOUS PRN
Status: DISCONTINUED | OUTPATIENT
Start: 2021-05-25 | End: 2021-05-25 | Stop reason: HOSPADM

## 2021-05-25 RX ORDER — NALOXONE HYDROCHLORIDE 0.4 MG/ML
0.2 INJECTION, SOLUTION INTRAMUSCULAR; INTRAVENOUS; SUBCUTANEOUS
Status: CANCELLED | OUTPATIENT
Start: 2021-05-25

## 2021-05-25 RX ORDER — HYDRALAZINE HYDROCHLORIDE 20 MG/ML
INJECTION INTRAMUSCULAR; INTRAVENOUS
Status: DISCONTINUED | OUTPATIENT
Start: 2021-05-25 | End: 2021-05-25 | Stop reason: HOSPADM

## 2021-05-25 RX ORDER — FENTANYL CITRATE 50 UG/ML
INJECTION, SOLUTION INTRAMUSCULAR; INTRAVENOUS
Status: DISCONTINUED | OUTPATIENT
Start: 2021-05-25 | End: 2021-05-25 | Stop reason: HOSPADM

## 2021-05-25 RX ORDER — ASPIRIN 81 MG/1
81 TABLET ORAL DAILY
Status: DISCONTINUED | OUTPATIENT
Start: 2021-05-25 | End: 2021-05-25 | Stop reason: HOSPADM

## 2021-05-25 RX ORDER — HEPARIN SODIUM 10000 [USP'U]/100ML
100-1000 INJECTION, SOLUTION INTRAVENOUS CONTINUOUS PRN
Status: DISCONTINUED | OUTPATIENT
Start: 2021-05-25 | End: 2021-05-25 | Stop reason: HOSPADM

## 2021-05-25 RX ORDER — NALOXONE HYDROCHLORIDE 0.4 MG/ML
0.4 INJECTION, SOLUTION INTRAMUSCULAR; INTRAVENOUS; SUBCUTANEOUS
Status: DISCONTINUED | OUTPATIENT
Start: 2021-05-25 | End: 2021-05-25 | Stop reason: HOSPADM

## 2021-05-25 RX ORDER — SODIUM CHLORIDE 9 MG/ML
INJECTION, SOLUTION INTRAVENOUS CONTINUOUS
Status: DISCONTINUED | OUTPATIENT
Start: 2021-05-25 | End: 2021-05-25 | Stop reason: HOSPADM

## 2021-05-25 RX ORDER — POTASSIUM CHLORIDE 750 MG/1
40 TABLET, EXTENDED RELEASE ORAL
Status: DISCONTINUED | OUTPATIENT
Start: 2021-05-25 | End: 2021-05-25 | Stop reason: HOSPADM

## 2021-05-25 RX ORDER — ARGATROBAN 1 MG/ML
350 INJECTION, SOLUTION INTRAVENOUS
Status: DISCONTINUED | OUTPATIENT
Start: 2021-05-25 | End: 2021-05-25 | Stop reason: HOSPADM

## 2021-05-25 RX ORDER — ATROPINE SULFATE 0.1 MG/ML
0.5 INJECTION INTRAVENOUS
Status: DISCONTINUED | OUTPATIENT
Start: 2021-05-25 | End: 2021-05-25 | Stop reason: HOSPADM

## 2021-05-25 RX ORDER — FLUMAZENIL 0.1 MG/ML
0.2 INJECTION, SOLUTION INTRAVENOUS
Status: DISCONTINUED | OUTPATIENT
Start: 2021-05-25 | End: 2021-05-25 | Stop reason: HOSPADM

## 2021-05-25 RX ORDER — EPTIFIBATIDE 2 MG/ML
180 INJECTION, SOLUTION INTRAVENOUS EVERY 10 MIN PRN
Status: DISCONTINUED | OUTPATIENT
Start: 2021-05-25 | End: 2021-05-25 | Stop reason: HOSPADM

## 2021-05-25 RX ORDER — DOPAMINE HYDROCHLORIDE 160 MG/100ML
2-20 INJECTION, SOLUTION INTRAVENOUS CONTINUOUS PRN
Status: DISCONTINUED | OUTPATIENT
Start: 2021-05-25 | End: 2021-05-25 | Stop reason: HOSPADM

## 2021-05-25 RX ORDER — FENTANYL CITRATE 50 UG/ML
25-50 INJECTION, SOLUTION INTRAMUSCULAR; INTRAVENOUS
Status: ACTIVE | OUTPATIENT
Start: 2021-05-25 | End: 2021-05-25

## 2021-05-25 RX ORDER — NITROGLYCERIN 20 MG/100ML
10-200 INJECTION INTRAVENOUS CONTINUOUS PRN
Status: DISCONTINUED | OUTPATIENT
Start: 2021-05-25 | End: 2021-05-25 | Stop reason: HOSPADM

## 2021-05-25 RX ORDER — ACETAMINOPHEN 325 MG/1
650 TABLET ORAL EVERY 4 HOURS PRN
Status: CANCELLED | OUTPATIENT
Start: 2021-05-25

## 2021-05-25 RX ORDER — IOPAMIDOL 755 MG/ML
INJECTION, SOLUTION INTRAVASCULAR
Status: DISCONTINUED | OUTPATIENT
Start: 2021-05-25 | End: 2021-05-25 | Stop reason: HOSPADM

## 2021-05-25 RX ORDER — NALOXONE HYDROCHLORIDE 0.4 MG/ML
0.2 INJECTION, SOLUTION INTRAMUSCULAR; INTRAVENOUS; SUBCUTANEOUS
Status: DISCONTINUED | OUTPATIENT
Start: 2021-05-25 | End: 2021-05-25 | Stop reason: HOSPADM

## 2021-05-25 RX ORDER — DOBUTAMINE HYDROCHLORIDE 200 MG/100ML
2-20 INJECTION INTRAVENOUS CONTINUOUS PRN
Status: DISCONTINUED | OUTPATIENT
Start: 2021-05-25 | End: 2021-05-25 | Stop reason: HOSPADM

## 2021-05-25 RX ORDER — POTASSIUM CHLORIDE 750 MG/1
20 TABLET, EXTENDED RELEASE ORAL
Status: DISCONTINUED | OUTPATIENT
Start: 2021-05-25 | End: 2021-05-25 | Stop reason: HOSPADM

## 2021-05-25 RX ORDER — ARGATROBAN 1 MG/ML
150 INJECTION, SOLUTION INTRAVENOUS
Status: DISCONTINUED | OUTPATIENT
Start: 2021-05-25 | End: 2021-05-25 | Stop reason: HOSPADM

## 2021-05-25 RX ADMIN — ASPIRIN 81 MG: 81 TABLET, COATED ORAL at 13:41

## 2021-05-25 RX ADMIN — FENTANYL CITRATE 50 MCG: 50 INJECTION, SOLUTION INTRAMUSCULAR; INTRAVENOUS at 16:37

## 2021-05-25 ASSESSMENT — MIFFLIN-ST. JEOR: SCORE: 1597.13

## 2021-05-25 NOTE — DISCHARGE INSTRUCTIONS
Going Home after an Angioplasty or Stent Placement (Cardiac)  ______________________________________________    After you go home:    Have an adult stay with you for 24 hours.    Drink plenty of fluids.    You may eat your normal diet, unless your doctor tells you otherwise.    For 24 hours:    Relax and take it easy.    Do NOT smoke.    Do NOT make any important or legal decisions.    Do NOT drive or operate machines at home or at work.    Do NOT drink alcohol.    Remove the Band-Aid after 24 hours. If there is minor oozing, apply another Band-aid and remove it after 12 hours.    For 2 days, do NOT have sex or do any heavy exercise.    Do NOT take a bath, or use a hot tub or pool for at least 3 days. You may shower.    Care of groin site  It is normal to have a small bruise or lump at the site.    Do not scrub the site.    For the first 2 days: Do not stoop or squat. When you cough, sneeze or move your bowels, hold your hand over the puncture site and press gently.    Do not lift more than 10 pounds for at least 3 to 5 days.    Do not use lotion or powder near the puncture site for 3 days.    If you start bleeding from the site in your groin, lie down flat and press firmly  on the site. Call your doctor as soon as you can.    Medicines    If you have started taking Plavix or Effient, do not stop taking it until you talk to your heart doctor (cardiologist).    If you are on metformin (Glucophage), do not restart it until you have blood tests (within 2 to 3 days after discharge). When your doctor tells you it is safe, you may restart the metformin.    If you have stopped any other medicines, check with your nurse or provider about when to restart them.    Call 911 right away if you have bleeding that is heavy or does not stop.    Call your doctor if:    You have a large or growing hard lump around the site.    The site is red, swollen, hot or tender.    Blood or fluid is draining from the site.    You have chills  or a fever greater than 101 F (38 C).    Your leg or arm feels numb or cool.    You have hives, a rash or unusual itching.      [facility] [place]:  [phone] (7 days a week)

## 2021-05-25 NOTE — PROGRESS NOTES
Prepped for cors procedure.  Denies pain.  Patient trying to get post-procedure transport now.  H & P current.  Labs complete.  Needs consent.

## 2021-05-25 NOTE — PROGRESS NOTES
Potassium = 3.6.  Verified with RENETTA Guzman that replacement not needed at this time as he is a dialysis patient.

## 2021-05-25 NOTE — PROGRESS NOTES
D/I/A: Manual pressure held for 20 minutes to R Groin.  Sheath, size 4FR RFA,  pulled by Venkata Whitman RN.  Site CDI, no hematoma.  Stasis achieved at 1705. Off bedrest @ 1905.  A+O x4 and making needs known.  Denies pain or sob.  VSSA.  Tolerated sheath removal well.  P: Continue to monitor status.  Discharge to home once meeting criteria.

## 2021-05-26 LAB — INTERPRETATION ECG - MUSE: NORMAL

## 2021-05-28 ENCOUNTER — TELEPHONE (OUTPATIENT)
Dept: CARDIOLOGY | Facility: CLINIC | Age: 61
End: 2021-05-28

## 2021-05-28 NOTE — TELEPHONE ENCOUNTER
Left message for patient to call [phone], option one and then option four to schedule follow up with [facility] post angiogram

## 2021-06-07 ASSESSMENT — ENCOUNTER SYMPTOMS
INSOMNIA: 1
JAUNDICE: 0
CONSTIPATION: 0
DEPRESSION: 0
BOWEL INCONTINENCE: 0
DIARRHEA: 1
DECREASED CONCENTRATION: 0
NAUSEA: 0
NERVOUS/ANXIOUS: 1
HEARTBURN: 0
ABDOMINAL PAIN: 0
PANIC: 1
RECTAL PAIN: 0
BLOOD IN STOOL: 0
BLOATING: 0
VOMITING: 0

## 2021-06-15 ENCOUNTER — ANCILLARY PROCEDURE (OUTPATIENT)
Dept: CT IMAGING | Facility: CLINIC | Age: 61
End: 2021-06-15
Attending: UROLOGY
Payer: COMMERCIAL

## 2021-06-15 DIAGNOSIS — N28.1 ACQUIRED CYST OF KIDNEY: ICD-10-CM

## 2021-06-15 LAB — RADIOLOGIST FLAGS: ABNORMAL

## 2021-06-15 PROCEDURE — 74178 CT ABD&PLV WO CNTR FLWD CNTR: CPT | Performed by: RADIOLOGY

## 2021-06-15 RX ORDER — IOPAMIDOL 755 MG/ML
104 INJECTION, SOLUTION INTRAVASCULAR ONCE
Status: COMPLETED | OUTPATIENT
Start: 2021-06-15 | End: 2021-06-15

## 2021-06-15 RX ADMIN — IOPAMIDOL 104 ML: 755 INJECTION, SOLUTION INTRAVASCULAR at 07:12

## 2021-06-15 NOTE — PROGRESS NOTES
"[facility]  CARDIOVASCULAR MEDICINE CLINIC NOTE    Referring Provider: Megan Ralph   Primary Care Provider: Antonio Madrigal     Patient Name: Tacos Dominguez   MRN: [id number]     PERTINENT CLINICAL HISTORY:   Tacos Dominguez is a 61 year old male with past medical history of HTN, Crohn's disease, AF w/ RVR, and NSTEMI 10/20 who presents for follow up after coronary angiogram. Patient was last seen by Cardiology (Dr. Ybarra) in 4/21. Given NSTEMI in 10/20 without definitive assessment of coronaries at that time, he was referred for coronary angiogram. He underwent angiogram on 5/25/21 which showed minimal non-obstructive CAD.     Scar has been feeling well since angiogram. We briefly reviewed the results which showed non-obstructive CAD (he had been informed of this at the time of the procedure). Per his report, the right groin site had a small lump and was \"black and blue\" for a few days but lump is now gone. Denies pain at the site and no numbness, tingling, or pain in the leg. He has gotten back to his regular activities such as walking the dog. He denies chest pain, SOB, COHEN, palpitations, lightheadedness, dizziness, leg swelling, and weight gain. Of note, blood pressure today is 98/66 (96/61 on recheck) an -113. Heart rate is regular. He had dialysis today and reports that he often has lower blood pressures after the run; typically the dialysis center will keep him there until SBP >100. He lost 1 kg of weight during the run so presumably 1 liter of volume removed. He reports that he is sometimes \"dry from the weekend\" on Mondays since he is so active and thus can have lower BPs. During visit today he is completely asymptomatic. Had previously been taking midodrine with HD though no longer takes it. Had AF w/ RVR during hospitalization in 10/20 and was cardioverted at that time and started on amiodarone. This was discontinued by Cardiology at OSH.      Cardiac medications include " amlodipine 10 mg, ASA 81 mg, and simvastatin 20 mg.    PAST MEDICAL HISTORY:     Past Medical History:   Diagnosis Date     Crohn's colitis (H)      Hypertension         PAST SURGICAL HISTORY:     Past Surgical History:   Procedure Laterality Date     CV CORONARY ANGIOGRAM N/A 5/25/2021    Procedure: CV CORONARY ANGIOGRAM;  Surgeon: Judson Ybarra MD;  Location:  HEART CARDIAC CATH LAB     VASCULAR SURGERY      dialysis access- left upper        CURRENT MEDICATIONS:     Current Outpatient Medications   Medication Sig Dispense Refill     amiodarone (PACERONE) 200 MG tablet TAKE ONE TABLET BY MOUTH AT BEDTIME. START AFTER FINISHING AMIODARONE 400 MG TABLETS       amLODIPine (NORVASC) 10 MG tablet Take 10 mg by mouth       aspirin (ASA) 81 MG chewable tablet Take 81 mg by mouth       calcium carbonate 500 mg, elemental, (OSCAL 500) 1250 (500 Ca) MG TABS tablet Take 1,000 mg by mouth       cholecalciferol 25 MCG (1000 UT) TABS Take 2,000 Units by mouth       citalopram (CELEXA) 40 MG tablet Take 20 mg by mouth       doxazosin (CARDURA) 4 MG tablet Take 4 mg by mouth       ferrous sulfate (FEROSUL) 325 (65 Fe) MG tablet Take 325 mg by mouth       folic acid (FOLVITE) 1 MG tablet Take 1 mg by mouth       hydrOXYzine (ATARAX) 50 MG tablet Take 50 mg by mouth       magnesium oxide 400 MG CAPS Take 1 tablet by mouth       Melatonin 10 MG TABS tablet        midodrine (PROAMATINE) 5 MG tablet TAKE 1 TABLET BY MOUTH THREE TIMES A WEEK WITH DIALYSIS       multivitamin w/minerals (MULTI-VITAMIN) tablet        omeprazole (PRILOSEC) 20 MG DR capsule Take 20 mg by mouth       simvastatin (ZOCOR) 20 MG tablet TAKE ONE TABLET BY MOUTH IN THE EVENING. INDICATIONS: CEREBROVASCULAR ACCIDENT OR STROKE          ALLERGIES:     Allergies   Allergen Reactions     Lisinopril Anaphylaxis and Other (See Comments)     Shortness of breath        FAMILY HISTORY:     Family History   Problem Relation Age of Onset     Breast Cancer Mother       Coronary Artery Disease Father      Hypertension Brother         SOCIAL HISTORY:     Social History     Socioeconomic History     Marital status:      Spouse name: Not on file     Number of children: Not on file     Years of education: Not on file     Highest education level: Not on file   Occupational History     Not on file   Social Needs     Financial resource strain: Not on file     Food insecurity     Worry: Not on file     Inability: Not on file     Transportation needs     Medical: Not on file     Non-medical: Not on file   Tobacco Use     Smoking status: Former Smoker     Packs/day: 0.75     Years: 50.00     Pack years: 37.50     Quit date: 10/19/2020     Years since quittin.6     Smokeless tobacco: Never Used   Substance and Sexual Activity     Alcohol use: Yes     Frequency: Monthly or less     Comment: rare     Drug use: Not Currently     Sexual activity: Not on file   Lifestyle     Physical activity     Days per week: Not on file     Minutes per session: Not on file     Stress: Not on file   Relationships     Social connections     Talks on phone: Not on file     Gets together: Not on file     Attends Rastafarian service: Not on file     Active member of club or organization: Not on file     Attends meetings of clubs or organizations: Not on file     Relationship status: Not on file     Intimate partner violence     Fear of current or ex partner: Not on file     Emotionally abused: Not on file     Physically abused: Not on file     Forced sexual activity: Not on file   Other Topics Concern     Parent/sibling w/ CABG, MI or angioplasty before 65F 55M? Not Asked   Social History Narrative     Not on file     Tacos  reports current alcohol use. and  reports that he quit smoking about 7 months ago. He has a 37.50 pack-year smoking history. He has never used smokeless tobacco..     REVIEW OF SYSTEMS:   A comprehensive review of systems was performed and negative unless otherwise noted in the HPI  "above.      PHYSICAL EXAMINATION:   BP 98/66 (BP Location: Right arm, Patient Position: Chair, Cuff Size: Adult Large)   Pulse 113   Ht 1.786 m (5' 10.32\")   Wt 77.1 kg (170 lb)   SpO2 97%   BMI 24.17 kg/m     Body mass index is 24.17 kg/m .  Wt Readings from Last 2 Encounters:   05/25/21 77 kg (169 lb 12.1 oz)   04/23/21 77.6 kg (171 lb)     Constitutional: no acute distress, pleasant and cooperative, appears overall well.  Eyes:sclera white, conjunctiva clear, without icterus or pallor   Ears/Nose/Mouth/Throat: Pinna, tragus, and external canal non-tender are normal, Nares patent b/l, moist mucous membranes  Cardiovascular: tachycardia, radial pulse regular, nl S1S2, JVP not elevated, extremities with no edema or cyanosis  Respiratory: clear to auscultation and percussion bilaterally anterior and posterior  Gastrointestinal: soft, nontender, non distended, no hepatosplenomegaly or masses  Musculoskeletal: normal muscle bulk and tone, joints   Skin: normal skin appearance without worrisome lesions.   Neurologic: Alert and oriented, face symmetric, normal gait  Psychiatric: appropriate affect, eye contact, intact thought and speech       LABORATORY DATA:     LIPID RESULTS:  Recent Labs   Lab Test 05/25/21  1143   CHOL 86   HDL 62   LDL 3   TRIG 108        LIVER ENZYME RESULTS:  Recent Labs   Lab Test 03/25/21  1229   AST 16   ALT 22       CBC RESULTS:  Recent Labs   Lab Test 05/25/21  1143 03/25/21  1229   WBC 7.2 6.8   HGB 10.6* 10.4*   HCT 33.3* 32.8*    232       BMP RESULTS:  Recent Labs   Lab Test 05/25/21  1143 03/25/21  1229    140   POTASSIUM 3.6 3.2*   CHLORIDE 101 105   CO2 21 20   ANIONGAP 14 15*   GLC 73 78   BUN 36* 34*   CR 10.60* 9.80*   KARY 6.6* 7.2*       A1C RESULTS:  No results found for: A1C    INR RESULTS:  Recent Labs   Lab Test 05/25/21  1143 03/25/21  1229   INR 1.33* 1.31*          PROCEDURES & FURTHER ASSESSMENTS:     ECHO: 3/21 -   Global and regional left ventricular " function is normal with an EF of 60-65%.  Right ventricular function, chamber size, wall motion, and thickness are  normal.  Mild to moderate mitral insufficiency is present.  The inferior vena cava was normal in size with preserved respiratory  variability.     There is no prior study for direct comparison.    CARDIAC CATH: 5/21 -     Minimal non-obstructive coronary artery disease.     CLINICAL IMPRESSION:     Tacos Dominguez is a 61 year old male with past medical history of HTN, Crohn's disease, AF w/ RVR, and NSTEMI 10/20 who presents for follow up after coronary angiogram.    PLAN:  Non-obstructive CAD  -continue ASA 81 mg   -continue simvastatin 20 mg     HTN  Takes amlodipine for HTN. Previously had been taking midodrine on HD days though no longer taking. BP 90s/60s today as he had HD run earlier today. Also tachycardic to 110s though radial pulse is regular on palpitation. Asymptomatic.   -encouraged oral fluid intake   -recheck BP this evening on home cuff   -call 911 or present to ED if any new lightheadedness, dizziness, SOB, or chest pain    AF w/ RVR   Developed AF w/ RVR during hospitalization in 10/20. He was cardioverted and started on amiodarone; this was discontinued by his primary Cardiologist at [facility] in 2/21. CHADS-Vasc 4. Anticoagulation was discussed at OSH Cardiology visit in 2/21 but contraindicated due to patient's prior severe anemia of unknown etiology.   -radial pulse normal on palpation today   -removed amiodarone from med list     Follow-up: with Cardiology in one year    Thank you for allowing me to take part in the care of this very pleasant patient.  Please do not hesitate to call if any further questions or concerns arise.    Jayshree Nowak, ILANA APRN CNP  Interventional and Critical Care Cardiology  [phone]    CC  Patient Care Team:  Antonio Madrigal as PCP - General (Family Medicine)  Eric Wheeler MD as MD (Urology)  Olga Bowles, JR as  Registered Nurse (Oncology)  Eric Wheeler MD as Assigned Surgical Provider  Judson Ybarra MD as Assigned Heart and Vascular Provider  QUENTIN BLANK    Answers for HPI/ROS submitted by the patient on 6/7/2021   General Symptoms: No  Skin Symptoms: No  HENT Symptoms: No  EYE SYMPTOMS: No  HEART SYMPTOMS: No  LUNG SYMPTOMS: No  INTESTINAL SYMPTOMS: Yes  URINARY SYMPTOMS: No  REPRODUCTIVE SYMPTOMS: No  SKELETAL SYMPTOMS: No  BLOOD SYMPTOMS: No  NERVOUS SYSTEM SYMPTOMS: No  MENTAL HEALTH SYMPTOMS: Yes  Heart burn or indigestion: No  Nausea: No  Vomiting: No  Abdominal pain: No  Bloating: No  Constipation: No  Diarrhea: Yes  Blood in stool: No  Black stools: No  Rectal or Anal pain: No  Fecal incontinence: No  Yellowing of skin or eyes: No  Vomit with blood: No  Change in stools: No  Nervous or Anxious: Yes  Depression: No  Trouble sleeping: Yes  Trouble thinking or concentrating: No  Mood changes: No  Panic attacks: Yes

## 2021-06-16 ENCOUNTER — TELEPHONE (OUTPATIENT)
Dept: UROLOGY | Facility: CLINIC | Age: 61
End: 2021-06-16

## 2021-06-21 ENCOUNTER — OFFICE VISIT (OUTPATIENT)
Dept: CARDIOLOGY | Facility: CLINIC | Age: 61
End: 2021-06-21
Attending: NURSE PRACTITIONER
Payer: COMMERCIAL

## 2021-06-21 VITALS
HEART RATE: 113 BPM | WEIGHT: 170 LBS | SYSTOLIC BLOOD PRESSURE: 98 MMHG | DIASTOLIC BLOOD PRESSURE: 66 MMHG | BODY MASS INDEX: 24.34 KG/M2 | OXYGEN SATURATION: 97 % | HEIGHT: 70 IN

## 2021-06-21 DIAGNOSIS — I10 BENIGN ESSENTIAL HYPERTENSION: ICD-10-CM

## 2021-06-21 DIAGNOSIS — Z98.890 S/P CORONARY ANGIOGRAM: Primary | ICD-10-CM

## 2021-06-21 DIAGNOSIS — N18.6 ESRD (END STAGE RENAL DISEASE) (H): ICD-10-CM

## 2021-06-21 DIAGNOSIS — I21.4 NSTEMI (NON-ST ELEVATED MYOCARDIAL INFARCTION) (H): ICD-10-CM

## 2021-06-21 DIAGNOSIS — I48.91 ATRIAL FIBRILLATION, UNSPECIFIED TYPE (H): ICD-10-CM

## 2021-06-21 PROCEDURE — G0463 HOSPITAL OUTPT CLINIC VISIT: HCPCS

## 2021-06-21 PROCEDURE — 99213 OFFICE O/P EST LOW 20 MIN: CPT | Performed by: NURSE PRACTITIONER

## 2021-06-21 ASSESSMENT — PAIN SCALES - GENERAL: PAINLEVEL: NO PAIN (0)

## 2021-06-21 ASSESSMENT — MIFFLIN-ST. JEOR: SCORE: 1587.36

## 2021-06-21 NOTE — LETTER
"6/21/2021      RE: Tacos Dominguez  [address]  [facility] [place] [address]       Dear Colleague,    Thank you for the opportunity to participate in the care of your patient, Tacos Dominguez, at the [facility] [place] at [facility]. Please see a copy of my visit note below.    [facility]  CARDIOVASCULAR MEDICINE CLINIC NOTE    Referring Provider: Megan Ralph   Primary Care Provider: Antonio Madrigal     Patient Name: Tacos Dominguez   MRN: [id number]     PERTINENT CLINICAL HISTORY:   Tacos Dominguez is a 61 year old male with past medical history of HTN, Crohn's disease, AF w/ RVR, and NSTEMI 10/20 who presents for follow up after coronary angiogram. Patient was last seen by Cardiology (Dr. Ybarra) in 4/21. Given NSTEMI in 10/20 without definitive assessment of coronaries at that time, he was referred for coronary angiogram. He underwent angiogram on 5/25/21 which showed minimal non-obstructive CAD.     Scar has been feeling well since angiogram. We briefly reviewed the results which showed non-obstructive CAD (he had been informed of this at the time of the procedure). Per his report, the right groin site had a small lump and was \"black and blue\" for a few days but lump is now gone. Denies pain at the site and no numbness, tingling, or pain in the leg. He has gotten back to his regular activities such as walking the dog. He denies chest pain, SOB, COHEN, palpitations, lightheadedness, dizziness, leg swelling, and weight gain. Of note, blood pressure today is 98/66 (96/61 on recheck) an -113. Heart rate is regular. He had dialysis today and reports that he often has lower blood pressures after the run; typically the dialysis center will keep him there until SBP >100. He lost 1 kg of weight during the run so presumably 1 liter of volume removed. He reports that he is sometimes \"dry from the weekend\" on Mondays since he " is so active and thus can have lower BPs. During visit today he is completely asymptomatic. Had previously been taking midodrine with HD though no longer takes it. Had AF w/ RVR during hospitalization in 10/20 and was cardioverted at that time and started on amiodarone. This was discontinued by Cardiology at OSH.      Cardiac medications include amlodipine 10 mg, ASA 81 mg, and simvastatin 20 mg.    PAST MEDICAL HISTORY:     Past Medical History:   Diagnosis Date     Crohn's colitis (H)      Hypertension         PAST SURGICAL HISTORY:     Past Surgical History:   Procedure Laterality Date     CV CORONARY ANGIOGRAM N/A 5/25/2021    Procedure: CV CORONARY ANGIOGRAM;  Surgeon: Judson Ybarra MD;  Location:  HEART CARDIAC CATH LAB     VASCULAR SURGERY      dialysis access- left upper        CURRENT MEDICATIONS:     Current Outpatient Medications   Medication Sig Dispense Refill     amiodarone (PACERONE) 200 MG tablet TAKE ONE TABLET BY MOUTH AT BEDTIME. START AFTER FINISHING AMIODARONE 400 MG TABLETS       amLODIPine (NORVASC) 10 MG tablet Take 10 mg by mouth       aspirin (ASA) 81 MG chewable tablet Take 81 mg by mouth       calcium carbonate 500 mg, elemental, (OSCAL 500) 1250 (500 Ca) MG TABS tablet Take 1,000 mg by mouth       cholecalciferol 25 MCG (1000 UT) TABS Take 2,000 Units by mouth       citalopram (CELEXA) 40 MG tablet Take 20 mg by mouth       doxazosin (CARDURA) 4 MG tablet Take 4 mg by mouth       ferrous sulfate (FEROSUL) 325 (65 Fe) MG tablet Take 325 mg by mouth       folic acid (FOLVITE) 1 MG tablet Take 1 mg by mouth       hydrOXYzine (ATARAX) 50 MG tablet Take 50 mg by mouth       magnesium oxide 400 MG CAPS Take 1 tablet by mouth       Melatonin 10 MG TABS tablet        midodrine (PROAMATINE) 5 MG tablet TAKE 1 TABLET BY MOUTH THREE TIMES A WEEK WITH DIALYSIS       multivitamin w/minerals (MULTI-VITAMIN) tablet        omeprazole (PRILOSEC) 20 MG DR capsule Take 20 mg by mouth       simvastatin  (ZOCOR) 20 MG tablet TAKE ONE TABLET BY MOUTH IN THE EVENING. INDICATIONS: CEREBROVASCULAR ACCIDENT OR STROKE          ALLERGIES:     Allergies   Allergen Reactions     Lisinopril Anaphylaxis and Other (See Comments)     Shortness of breath        FAMILY HISTORY:     Family History   Problem Relation Age of Onset     Breast Cancer Mother      Coronary Artery Disease Father      Hypertension Brother         SOCIAL HISTORY:     Social History     Socioeconomic History     Marital status:      Spouse name: Not on file     Number of children: Not on file     Years of education: Not on file     Highest education level: Not on file   Occupational History     Not on file   Social Needs     Financial resource strain: Not on file     Food insecurity     Worry: Not on file     Inability: Not on file     Transportation needs     Medical: Not on file     Non-medical: Not on file   Tobacco Use     Smoking status: Former Smoker     Packs/day: 0.75     Years: 50.00     Pack years: 37.50     Quit date: 10/19/2020     Years since quittin.6     Smokeless tobacco: Never Used   Substance and Sexual Activity     Alcohol use: Yes     Frequency: Monthly or less     Comment: rare     Drug use: Not Currently     Sexual activity: Not on file   Lifestyle     Physical activity     Days per week: Not on file     Minutes per session: Not on file     Stress: Not on file   Relationships     Social connections     Talks on phone: Not on file     Gets together: Not on file     Attends Congregation service: Not on file     Active member of club or organization: Not on file     Attends meetings of clubs or organizations: Not on file     Relationship status: Not on file     Intimate partner violence     Fear of current or ex partner: Not on file     Emotionally abused: Not on file     Physically abused: Not on file     Forced sexual activity: Not on file   Other Topics Concern     Parent/sibling w/ CABG, MI or angioplasty before 65F 55M? Not  "Asked   Social History Narrative     Not on file     Tacos  reports current alcohol use. and  reports that he quit smoking about 7 months ago. He has a 37.50 pack-year smoking history. He has never used smokeless tobacco..     REVIEW OF SYSTEMS:   A comprehensive review of systems was performed and negative unless otherwise noted in the HPI above.      PHYSICAL EXAMINATION:   BP 98/66 (BP Location: Right arm, Patient Position: Chair, Cuff Size: Adult Large)   Pulse 113   Ht 1.786 m (5' 10.32\")   Wt 77.1 kg (170 lb)   SpO2 97%   BMI 24.17 kg/m     Body mass index is 24.17 kg/m .  Wt Readings from Last 2 Encounters:   05/25/21 77 kg (169 lb 12.1 oz)   04/23/21 77.6 kg (171 lb)     Constitutional: no acute distress, pleasant and cooperative, appears overall well.  Eyes:sclera white, conjunctiva clear, without icterus or pallor   Ears/Nose/Mouth/Throat: Pinna, tragus, and external canal non-tender are normal, Nares patent b/l, moist mucous membranes  Cardiovascular: tachycardia, radial pulse regular, nl S1S2, JVP not elevated, extremities with no edema or cyanosis  Respiratory: clear to auscultation and percussion bilaterally anterior and posterior  Gastrointestinal: soft, nontender, non distended, no hepatosplenomegaly or masses  Musculoskeletal: normal muscle bulk and tone, joints   Skin: normal skin appearance without worrisome lesions.   Neurologic: Alert and oriented, face symmetric, normal gait  Psychiatric: appropriate affect, eye contact, intact thought and speech       LABORATORY DATA:     LIPID RESULTS:  Recent Labs   Lab Test 05/25/21  1143   CHOL 86   HDL 62   LDL 3   TRIG 108        LIVER ENZYME RESULTS:  Recent Labs   Lab Test 03/25/21  1229   AST 16   ALT 22       CBC RESULTS:  Recent Labs   Lab Test 05/25/21  1143 03/25/21  1229   WBC 7.2 6.8   HGB 10.6* 10.4*   HCT 33.3* 32.8*    232       BMP RESULTS:  Recent Labs   Lab Test 05/25/21  1143 03/25/21  1229    140   POTASSIUM 3.6 3.2* "   CHLORIDE 101 105   CO2 21 20   ANIONGAP 14 15*   GLC 73 78   BUN 36* 34*   CR 10.60* 9.80*   KARY 6.6* 7.2*       A1C RESULTS:  No results found for: A1C    INR RESULTS:  Recent Labs   Lab Test 05/25/21  1143 03/25/21  1229   INR 1.33* 1.31*          PROCEDURES & FURTHER ASSESSMENTS:     ECHO: 3/21 -   Global and regional left ventricular function is normal with an EF of 60-65%.  Right ventricular function, chamber size, wall motion, and thickness are  normal.  Mild to moderate mitral insufficiency is present.  The inferior vena cava was normal in size with preserved respiratory  variability.     There is no prior study for direct comparison.    CARDIAC CATH: 5/21 -     Minimal non-obstructive coronary artery disease.     CLINICAL IMPRESSION:     Tacos Dominguez is a 61 year old male with past medical history of HTN, Crohn's disease, AF w/ RVR, and NSTEMI 10/20 who presents for follow up after coronary angiogram.    PLAN:  Non-obstructive CAD  -continue ASA 81 mg   -continue simvastatin 20 mg     HTN  Takes amlodipine for HTN. Previously had been taking midodrine on HD days though no longer taking. BP 90s/60s today as he had HD run earlier today. Also tachycardic to 110s though radial pulse is regular on palpitation. Asymptomatic.   -encouraged oral fluid intake   -recheck BP this evening on home cuff   -call 911 or present to ED if any new lightheadedness, dizziness, SOB, or chest pain    AF w/ RVR   Developed AF w/ RVR during hospitalization in 10/20. He was cardioverted and started on amiodarone; this was discontinued by his primary Cardiologist at [facility] in 2/21. CHADS-Vasc 4. Anticoagulation was discussed at OSH Cardiology visit in 2/21 but contraindicated due to patient's prior severe anemia of unknown etiology.   -radial pulse normal on palpation today   -removed amiodarone from med list     Follow-up: with Cardiology in one year    Thank you for allowing me to take part in the care of this very  pleasant patient.  Please do not hesitate to call if any further questions or concerns arise.    Jayshree Nowak DNP APRN CNP  Interventional and Critical Care Cardiology  [phone]    CC  Patient Care Team:  Antonio Madrigal as PCP - General (Family Medicine)  Eric Wheeler MD as MD (Urology)  Olga Bowles, RN as Registered Nurse (Oncology)  Judson Ybarra MD as Assigned Heart and Vascular Provider  QUENTIN BLANK    Answers for HPI/ROS submitted by the patient on 6/7/2021   General Symptoms: No  Skin Symptoms: No  HENT Symptoms: No  EYE SYMPTOMS: No  HEART SYMPTOMS: No  LUNG SYMPTOMS: No  INTESTINAL SYMPTOMS: Yes  URINARY SYMPTOMS: No  REPRODUCTIVE SYMPTOMS: No  SKELETAL SYMPTOMS: No  BLOOD SYMPTOMS: No  NERVOUS SYSTEM SYMPTOMS: No  MENTAL HEALTH SYMPTOMS: Yes  Heart burn or indigestion: No  Nausea: No  Vomiting: No  Abdominal pain: No  Bloating: No  Constipation: No  Diarrhea: Yes  Blood in stool: No  Black stools: No  Rectal or Anal pain: No  Fecal incontinence: No  Yellowing of skin or eyes: No  Vomit with blood: No  Change in stools: No  Nervous or Anxious: Yes  Depression: No  Trouble sleeping: Yes  Trouble thinking or concentrating: No  Mood changes: No  Panic attacks: Yes        Please do not hesitate to contact me if you have any questions/concerns.     Sincerely,     JANNET Andrade CNP

## 2021-06-21 NOTE — NURSING NOTE
Chief Complaint   Patient presents with     Follow Up     ESRD     Vitals were taken and medications reconciled.    Barry Samson, EMT  1:48 PM

## 2021-06-29 ENCOUNTER — VIRTUAL VISIT (OUTPATIENT)
Dept: GASTROENTEROLOGY | Facility: CLINIC | Age: 61
End: 2021-06-29
Attending: INTERNAL MEDICINE
Payer: COMMERCIAL

## 2021-06-29 ENCOUNTER — PRE VISIT (OUTPATIENT)
Dept: GASTROENTEROLOGY | Facility: CLINIC | Age: 61
End: 2021-06-29

## 2021-06-29 VITALS — BODY MASS INDEX: 23.71 KG/M2 | HEIGHT: 71 IN

## 2021-06-29 DIAGNOSIS — K50.818 CROHN'S DISEASE OF BOTH SMALL AND LARGE INTESTINE WITH OTHER COMPLICATION (H): Primary | ICD-10-CM

## 2021-06-29 DIAGNOSIS — Z76.82 ORGAN TRANSPLANT CANDIDATE: ICD-10-CM

## 2021-06-29 DIAGNOSIS — N18.6 ESRD (END STAGE RENAL DISEASE) (H): ICD-10-CM

## 2021-06-29 DIAGNOSIS — K90.9 DIARRHEA DUE TO MALABSORPTION: ICD-10-CM

## 2021-06-29 DIAGNOSIS — R19.7 DIARRHEA DUE TO MALABSORPTION: ICD-10-CM

## 2021-06-29 PROCEDURE — 99204 OFFICE O/P NEW MOD 45 MIN: CPT | Mod: GT | Performed by: INTERNAL MEDICINE

## 2021-06-29 RX ORDER — CHOLESTYRAMINE 4 G/9G
1 POWDER, FOR SUSPENSION ORAL
Qty: 60 PACKET | Refills: 1 | Status: SHIPPED | OUTPATIENT
Start: 2021-06-29 | End: 2021-11-01

## 2021-06-29 ASSESSMENT — PAIN SCALES - GENERAL: PAINLEVEL: NO PAIN (0)

## 2021-06-29 NOTE — PATIENT INSTRUCTIONS
PLAN  ---Referral for colonoscopy under CS   ---Referral to Dr. Menjivar for nutrition  ---FC, to be mailed  ---Cholestyramine   ---Avoid NSAIDs  ---Continue to avoid smoking

## 2021-06-29 NOTE — PROGRESS NOTES
Scar is a 61 year old who is being evaluated via a billable video visit.      How would you like to obtain your AVS? MyChart  If the video visit is dropped, the invitation should be resent by: Send to e-mail at: [email or web address]  Will anyone else be joining your video visit? No      Video Start Time: 1:17 PM    Video-Visit Details    Type of service:  Video Visit    Originating Location (pt. Location): Home    Distant Location (provider location):  [facility] [place]     Platform used for Video Visit: Allen     CD NEW    PATIENT: Tacos Dominguez    MRN: [id number]    Date of Birth 1960    Tel: There are no phone numbers on file.    PCP: Antonio Madrigal     HPI: Mr. Dominguez is a 61 year old male here to establish care for Crohn's disease.    In 1975, underwent an emergency appendectomy and was dx with CD at that time, underwent R crystal. Had multiple surgeries after then, 76, 77, 78, including LOAs and a cholecystectomy. Reports having 9 feet of bowel left, in total. Did well until 4/2006, at which time he had intermittent flares and underwent resection with a colostomy bag that was reversed in 11/2006.  At that time, he required TPN for weight loss.  Had a peristomal hernia that was not corrected in an effort to avoid further bowel loss. Previously saw Dr. Finnegan, 2013/2014.     Currently, has 4-5 BMs per day, loose. No blood. Occasional urgency based on diet. Weight has been stable around 75-80 kg.     Last cscope was in 10/2020 that showed ulcers in the TI and at the anastomosis.   Does have ESRD in the setting of CLARISA from afib and aortic dissection in April 2021.  Skin cancer (BCC) was removed on forearm 2 years.     Quit smoking in October 2020. Smoked about 1/2-3/4 ppd x 50 years.     Noteworthy diet history- well balanced    HBI  General well-being 0 = very well  Abdominal pain 0 = none  Number of liquid stools per day 4-5   Abdominal mass cannot assess  virtually  Current Complications arthralgias (knees)    Constitutional symptoms:  Fever NO  Weight loss NO    Other GI symptoms present REFLUX SYMPTOMS - acid taste in mouth  Takes omeprazole 20 mg before breakfast      [place] Classification  AGE AT DIAGNOSIS: A1 below 16 y  CURRENT DISEASE LOCATION: jejunum  L4 upper GI: YES possibly, given chronic gastritis seen on EGD in 10/2020  DISEASE BEHAVIOR (since disease onset): B2: stricturing  Perianal disease: NO    Total number of IBD surgeries (except perianal): multiple    Remaining bowel: 9 feet, per patient    Current IBD Medications:  none    Past IBD Medications:   Sulfasalazine in the 1970s  Remicade around early s. Was  On this for at least 2 years. Stopped due to remission.   Azathioprine ~    Past Medical History:   Diagnosis Date     Crohn's colitis (H)      Hypertension         Past Surgical History:   Procedure Laterality Date     CV CORONARY ANGIOGRAM N/A 2021    Procedure: CV CORONARY ANGIOGRAM;  Surgeon: Judson Ybarra MD;  Location:  [facility]     VASCULAR SURGERY      dialysis access- left upper       Social History     Tobacco Use     Smoking status: Former Smoker     Packs/day: 0.75     Years: 50.00     Pack years: 37.50     Quit date: 10/19/2020     Years since quittin.6     Smokeless tobacco: Never Used   Substance Use Topics     Alcohol use: Yes     Frequency: Monthly or less     Comment: rare       Family History   Problem Relation Age of Onset     Breast Cancer Mother      Coronary Artery Disease Father      Hypertension Brother        Allergies   Allergen Reactions     Lisinopril Anaphylaxis and Other (See Comments)     Shortness of breath        Outpatient Encounter Medications as of 2021   Medication Sig Dispense Refill     amLODIPine (NORVASC) 10 MG tablet Take 10 mg by mouth       aspirin (ASA) 81 MG chewable tablet Take 81 mg by mouth       calcium carbonate 500 mg, elemental, (OSCAL 500) 1250 (500  "Ca) MG TABS tablet Take 1,000 mg by mouth       cholecalciferol 25 MCG (1000 UT) TABS Take 2,000 Units by mouth       cholestyramine (QUESTRAN) 4 g packet Take 1 packet (4 g) by mouth 3 times daily (with meals) 60 packet 1     citalopram (CELEXA) 40 MG tablet Take 20 mg by mouth       ferrous sulfate (FEROSUL) 325 (65 Fe) MG tablet Take 325 mg by mouth       folic acid (FOLVITE) 1 MG tablet Take 1 mg by mouth       hydrOXYzine (ATARAX) 50 MG tablet Take 50 mg by mouth       Melatonin 10 MG TABS tablet        multivitamin w/minerals (MULTI-VITAMIN) tablet        omeprazole (PRILOSEC) 20 MG DR capsule Take 20 mg by mouth       simvastatin (ZOCOR) 20 MG tablet TAKE ONE TABLET BY MOUTH IN THE EVENING. INDICATIONS: CEREBROVASCULAR ACCIDENT OR STROKE       doxazosin (CARDURA) 4 MG tablet Take 4 mg by mouth       magnesium oxide 400 MG CAPS Take 1 tablet by mouth       midodrine (PROAMATINE) 5 MG tablet TAKE 1 TABLET BY MOUTH THREE TIMES A WEEK WITH DIALYSIS       No facility-administered encounter medications on file as of 6/29/2021.       NSAID  YES ASA 81 mg daily for heart health.     Review of Systems  Complete 10 System ROS performed. All are negative except as documented below, in the HPI, or in patient questionnaire from today's visit.    1) Constitutional: No fevers, chills, night sweats or malaise, weight loss or gain  2) Skin: No rash  3) Pulmonary: No wheeze, SOB, cough, sputum or hemoptysis  4) Cardiovascular: No Chest pain or palpitations  5) Genitourinary: No blood in urine or dysuria  6) Endocrine: No increased sweating, hunger, thirst or thyroid problems  7) Hematologic: No bruising and easy bleeding  8) Musculoskeletal: no new pain in joints or limitation in ROM  9) Neurologic: No dizziness, paresthesias or weakness or falls  10) Psychiatric:  not depressed/anxious, no sleep problems    PHYSICAL EXAM  Vitals: Ht 1.803 m (5' 11\")   BMI 23.71 kg/m      General appearance  Healthy appearing adult, in no " acute distress     Eyes  Sclera anicteric  Pupils round and reactive to light     Ears, nose, mouth and throat  No obvious external lesions of ears and nose  Hearing intact     Neck  Symmetric  No obvious external lesions     Respiratory  Normal respiration, no use of accessory muscles      MSK  Gait normal     Skin  No rashes or jaundice      Psychiatric  Oriented to person, place and time  Appropriate mood and affect.       DATA:  Reviewed in detail past documentation, medications and prior workup available in electronic health records or through outside records.    PERTINENT STUDIES:  Most recent CBC:  WBC   Date Value Ref Range Status   05/25/2021 7.2 4.0 - 11.0 10e9/L Final   ]  Hemoglobin   Date Value Ref Range Status   05/25/2021 10.6 (L) 13.3 - 17.7 g/dL Final   ]   Platelet Count   Date Value Ref Range Status   05/25/2021 224 150 - 450 10e9/L Final       Most recent coag:  INR   Date Value Ref Range Status   05/25/2021 1.33 (H) 0.86 - 1.14 Final       Most recent hepatic panel:  AST   Date Value Ref Range Status   03/25/2021 16 0 - 45 U/L Final     ALT   Date Value Ref Range Status   03/25/2021 22 0 - 70 U/L Final     Bilirubin Conjugated   Date Value Ref Range Status   11/13/2008 0.0 0.0 - 0.3 mg/dL Final      Bilirubin Total   Date Value Ref Range Status   03/25/2021 0.5 0.2 - 1.3 mg/dL Final     Albumin   Date Value Ref Range Status   03/25/2021 3.4 3.4 - 5.0 g/dL Final     Alkaline Phosphatase   Date Value Ref Range Status   03/25/2021 91 40 - 150 U/L Final       Most recent creatinine:  Creatinine   Date Value Ref Range Status   05/25/2021 10.60 (H) 0.66 - 1.25 mg/dL Final     Endoscopy:   10/2020:  EGD: chronic gastritis. Bx neg for HP.  Cscope: ulcers in small intestine and at anastomosis. Bx normal.    Imaging:  CT a/p:    IMPRESSION:   1. Marked dilation of the main pancreatic duct up to 2.8 cm in the  pancreatic tail with associated parenchymal atrophy, raising concern  for main duct type IPMN,  though sequela of prior pancreatitis could  have a similar appearance. Follow-up GI consultation as directed  below.  2. Atheromatous changes of the infrarenal aorta and iliac arteries.  Iliac arteries demonstrating a medial and posterior predominance of  calcification, greater on the right.   3. Multiple simple and hemorrhagic/proteinaceous cysts arising from  the atrophic kidneys; no evidence of abnormal enhancement to suggest  neoplasm.  4. Incidentally noted sigmoid pneumatosis without abnormal  enhancement, wall thickening, or associated portal venous gas. This is  likely benign idiopathic pneumatosis in the asymptomatic patient,  though recommend correlation with clinical exam and lactate levels as  bowel ischemia can have similar findings.  5. Slightly increased bowel containing right anterolateral abdominal  wall hernia.    IMPRESSION:    Mr. Dominguez is a 61 year old here with long-standing Crohn's disease s/p multiple bowel resections with minimal remaining bowel. Fortunately, he is doing well, not requiring TPN and stable in weight. He is not on any maintenance CD medications. His last endoscopic examination did report active disease, but bx were negative. We will proceed with a colonoscopy at this time. If there is disease recurrence (not just Rutgeert's 2a disease), we will nee to initiate a biologic therapy.     # Crohn's disease, dx age 16, s/p multiple bowel resections, on no maintenance therapy  # Loose stools    PLAN:  ---Referral for colonoscopy under CS   ---Referral to Dr. Menjivar for nutrition  ---Fecal calprotectin check, to be mailed  ---Cholestyramine   ---Avoid NSAIDs  ---Continue to avoid smoking   ---Messaged Dr. Preston (pancreas specialist) re: 2.8 cm likely IPMN     Return in about 3 months (around 9/29/2021).    Betty Rubio MD   of Medicine  Division of Gastroenterology, Hepatology and Nutrition  [facility]    June 29, 2021    46 minutes spent on the  date of the encounter performing chart review, history and exam, documentation and further activities as noted above.

## 2021-06-29 NOTE — LETTER
6/29/2021         RE: Tacos Dominguez  [address]  [place] [facility] [address]        Dear Colleague,    Thank you for referring your patient, Tacos Dominguez, to the [facility] [place]. Please see a copy of my visit note below.    Scar is a 61 year old who is being evaluated via a billable video visit.      How would you like to obtain your AVS? MyChart  If the video visit is dropped, the invitation should be resent by: Send to e-mail at: [email or web address]  Will anyone else be joining your video visit? No      Video Start Time: 1:17 PM    Video-Visit Details    Type of service:  Video Visit    Originating Location (pt. Location): Home    Distant Location (provider location):  [facility]     Platform used for Video Visit: Allen     CD NEW    PATIENT: Tacos Dominguez    MRN: [id number]    Date of Birth 1960    Tel: There are no phone numbers on file.    PCP: Antonio Madrigal     HPI: Mr. Dominguez is a 61 year old male here to establish care for Crohn's disease.    In 1975, underwent an emergency appendectomy and was dx with CD at that time, underwent R crystal. Had multiple surgeries after then, 76, 77, 78, including LOAs and a cholecystectomy. Reports having 9 feet of bowel left, in total. Did well until 4/2006, at which time he had intermittent flares and underwent resection with a colostomy bag that was reversed in 11/2006.  At that time, he required TPN for weight loss.  Had a peristomal hernia that was not corrected in an effort to avoid further bowel loss. Previously saw Dr. Finnegna, 2013/2014.     Currently, has 4-5 BMs per day, loose. No blood. Occasional urgency based on diet. Weight has been stable around 75-80 kg.     Last cscope was in 10/2020 that showed ulcers in the TI and at the anastomosis.   Does have ESRD in the setting of CLARISA from afib and aortic dissection in April 2021.  Skin cancer (BCC) was  removed on forearm 2 years.     Quit smoking in 2020. Smoked about 1/2-3/4 ppd x 50 years.     Noteworthy diet history- well balanced    HBI  General well-being 0 = very well  Abdominal pain 0 = none  Number of liquid stools per day 4-5   Abdominal mass cannot assess virtually  Current Complications arthralgias (knees)    Constitutional symptoms:  Fever NO  Weight loss NO    Other GI symptoms present REFLUX SYMPTOMS - acid taste in mouth  Takes omeprazole 20 mg before breakfast      [place] Classification  AGE AT DIAGNOSIS: A1 below 16 y  CURRENT DISEASE LOCATION: jejunum  L4 upper GI: YES possibly, given chronic gastritis seen on EGD in 10/2020  DISEASE BEHAVIOR (since disease onset): B2: stricturing  Perianal disease: NO    Total number of IBD surgeries (except perianal): multiple    Remaining bowel: 9 feet, per patient    Current IBD Medications:  none    Past IBD Medications:   Sulfasalazine in the 1970s  Remicade around early . Was  On this for at least 2 years. Stopped due to remission.   Azathioprine ~    Past Medical History:   Diagnosis Date     Crohn's colitis (H)      Hypertension         Past Surgical History:   Procedure Laterality Date     CV CORONARY ANGIOGRAM N/A 2021    Procedure: CV CORONARY ANGIOGRAM;  Surgeon: Judson Ybarra MD;  Location:  HEART CARDIAC CATH LAB     VASCULAR SURGERY      dialysis access- left upper       Social History     Tobacco Use     Smoking status: Former Smoker     Packs/day: 0.75     Years: 50.00     Pack years: 37.50     Quit date: 10/19/2020     Years since quittin.6     Smokeless tobacco: Never Used   Substance Use Topics     Alcohol use: Yes     Frequency: Monthly or less     Comment: rare       Family History   Problem Relation Age of Onset     Breast Cancer Mother      Coronary Artery Disease Father      Hypertension Brother        Allergies   Allergen Reactions     Lisinopril Anaphylaxis and Other (See Comments)     Shortness of  breath        Outpatient Encounter Medications as of 6/29/2021   Medication Sig Dispense Refill     amLODIPine (NORVASC) 10 MG tablet Take 10 mg by mouth       aspirin (ASA) 81 MG chewable tablet Take 81 mg by mouth       calcium carbonate 500 mg, elemental, (OSCAL 500) 1250 (500 Ca) MG TABS tablet Take 1,000 mg by mouth       cholecalciferol 25 MCG (1000 UT) TABS Take 2,000 Units by mouth       cholestyramine (QUESTRAN) 4 g packet Take 1 packet (4 g) by mouth 3 times daily (with meals) 60 packet 1     citalopram (CELEXA) 40 MG tablet Take 20 mg by mouth       ferrous sulfate (FEROSUL) 325 (65 Fe) MG tablet Take 325 mg by mouth       folic acid (FOLVITE) 1 MG tablet Take 1 mg by mouth       hydrOXYzine (ATARAX) 50 MG tablet Take 50 mg by mouth       Melatonin 10 MG TABS tablet        multivitamin w/minerals (MULTI-VITAMIN) tablet        omeprazole (PRILOSEC) 20 MG DR capsule Take 20 mg by mouth       simvastatin (ZOCOR) 20 MG tablet TAKE ONE TABLET BY MOUTH IN THE EVENING. INDICATIONS: CEREBROVASCULAR ACCIDENT OR STROKE       doxazosin (CARDURA) 4 MG tablet Take 4 mg by mouth       magnesium oxide 400 MG CAPS Take 1 tablet by mouth       midodrine (PROAMATINE) 5 MG tablet TAKE 1 TABLET BY MOUTH THREE TIMES A WEEK WITH DIALYSIS       No facility-administered encounter medications on file as of 6/29/2021.       NSAID  YES ASA 81 mg daily for heart health.     Review of Systems  Complete 10 System ROS performed. All are negative except as documented below, in the HPI, or in patient questionnaire from today's visit.    1) Constitutional: No fevers, chills, night sweats or malaise, weight loss or gain  2) Skin: No rash  3) Pulmonary: No wheeze, SOB, cough, sputum or hemoptysis  4) Cardiovascular: No Chest pain or palpitations  5) Genitourinary: No blood in urine or dysuria  6) Endocrine: No increased sweating, hunger, thirst or thyroid problems  7) Hematologic: No bruising and easy bleeding  8) Musculoskeletal: no new  "pain in joints or limitation in ROM  9) Neurologic: No dizziness, paresthesias or weakness or falls  10) Psychiatric:  not depressed/anxious, no sleep problems    PHYSICAL EXAM  Vitals: Ht 1.803 m (5' 11\")   BMI 23.71 kg/m      General appearance  Healthy appearing adult, in no acute distress     Eyes  Sclera anicteric  Pupils round and reactive to light     Ears, nose, mouth and throat  No obvious external lesions of ears and nose  Hearing intact     Neck  Symmetric  No obvious external lesions     Respiratory  Normal respiration, no use of accessory muscles      MSK  Gait normal     Skin  No rashes or jaundice      Psychiatric  Oriented to person, place and time  Appropriate mood and affect.       DATA:  Reviewed in detail past documentation, medications and prior workup available in electronic health records or through outside records.    PERTINENT STUDIES:  Most recent CBC:  WBC   Date Value Ref Range Status   05/25/2021 7.2 4.0 - 11.0 10e9/L Final   ]  Hemoglobin   Date Value Ref Range Status   05/25/2021 10.6 (L) 13.3 - 17.7 g/dL Final   ]   Platelet Count   Date Value Ref Range Status   05/25/2021 224 150 - 450 10e9/L Final       Most recent coag:  INR   Date Value Ref Range Status   05/25/2021 1.33 (H) 0.86 - 1.14 Final       Most recent hepatic panel:  AST   Date Value Ref Range Status   03/25/2021 16 0 - 45 U/L Final     ALT   Date Value Ref Range Status   03/25/2021 22 0 - 70 U/L Final     Bilirubin Conjugated   Date Value Ref Range Status   11/13/2008 0.0 0.0 - 0.3 mg/dL Final      Bilirubin Total   Date Value Ref Range Status   03/25/2021 0.5 0.2 - 1.3 mg/dL Final     Albumin   Date Value Ref Range Status   03/25/2021 3.4 3.4 - 5.0 g/dL Final     Alkaline Phosphatase   Date Value Ref Range Status   03/25/2021 91 40 - 150 U/L Final       Most recent creatinine:  Creatinine   Date Value Ref Range Status   05/25/2021 10.60 (H) 0.66 - 1.25 mg/dL Final     Endoscopy:   10/2020:  EGD: chronic gastritis. Bx " neg for HP.  Cscope: ulcers in small intestine and at anastomosis. Bx normal.    Imaging:  CT a/p:    IMPRESSION:   1. Marked dilation of the main pancreatic duct up to 2.8 cm in the  pancreatic tail with associated parenchymal atrophy, raising concern  for main duct type IPMN, though sequela of prior pancreatitis could  have a similar appearance. Follow-up GI consultation as directed  below.  2. Atheromatous changes of the infrarenal aorta and iliac arteries.  Iliac arteries demonstrating a medial and posterior predominance of  calcification, greater on the right.   3. Multiple simple and hemorrhagic/proteinaceous cysts arising from  the atrophic kidneys; no evidence of abnormal enhancement to suggest  neoplasm.  4. Incidentally noted sigmoid pneumatosis without abnormal  enhancement, wall thickening, or associated portal venous gas. This is  likely benign idiopathic pneumatosis in the asymptomatic patient,  though recommend correlation with clinical exam and lactate levels as  bowel ischemia can have similar findings.  5. Slightly increased bowel containing right anterolateral abdominal  wall hernia.    IMPRESSION:    Mr. Dominguez is a 61 year old here with long-standing Crohn's disease s/p multiple bowel resections with minimal remaining bowel. Fortunately, he is doing well, not requiring TPN and stable in weight. He is not on any maintenance CD medications. His last endoscopic examination did report active disease, but bx were negative. We will proceed with a colonoscopy at this time. If there is disease recurrence (not just Rutgeert's 2a disease), we will nee to initiate a biologic therapy.     # Crohn's disease, dx age 16, s/p multiple bowel resections, on no maintenance therapy  # Loose stools    PLAN:  ---Referral for colonoscopy under CS   ---Referral to Dr. Menjivar for nutrition  ---Fecal calprotectin check, to be mailed  ---Cholestyramine   ---Avoid NSAIDs  ---Continue to avoid smoking   ---Messaged   Mohan (pancreas specialist) re: 2.8 cm likely IPMN     Return in about 3 months (around 9/29/2021).    Betty Rubio MD   of Medicine  Division of Gastroenterology, Hepatology and Nutrition  [facility]    June 29, 2021    46 minutes spent on the date of the encounter performing chart review, history and exam, documentation and further activities as noted above.            Again, thank you for allowing me to participate in the care of your patient.        Sincerely,        Betty Rubio MD

## 2021-07-01 ENCOUNTER — DOCUMENTATION ONLY (OUTPATIENT)
Dept: GASTROENTEROLOGY | Facility: CLINIC | Age: 61
End: 2021-07-01

## 2021-07-01 ENCOUNTER — PATIENT OUTREACH (OUTPATIENT)
Dept: GASTROENTEROLOGY | Facility: CLINIC | Age: 61
End: 2021-07-01

## 2021-07-01 NOTE — PROGRESS NOTES
Fecal elizabeth kit mailed via [employer] to patient's home address listed in chart.    ROSALINA Rivero

## 2021-07-01 NOTE — PROGRESS NOTES
Called patient to discuss referral from Dr Rubio regarding incidental panc finding on CT. Dr Preston offering clinic vs procedure  Left VM    Cammie Santoyo, RN, BSN,   [facility]  Care coordinator   [phone]  Fax [phone]

## 2021-07-02 ENCOUNTER — PATIENT OUTREACH (OUTPATIENT)
Dept: GASTROENTEROLOGY | Facility: CLINIC | Age: 61
End: 2021-07-02

## 2021-07-02 NOTE — PROGRESS NOTES
Called to discuss with patient.  EUS vs clinic with Dr Preston  HD on M/W/Fr    Explained they can expect a call from  for date and time of procedure, will need a , someone to stay with them for 24 hours and should stay in town for 24 hours (within 45 min of Hospital) post procedure    Patient needs to get pre-op physical completed. If outside  health system will need physical faxed to number [phone]   If you do not get a preop physical, your procedure could be cancelled, patient voiced understanding*    Preop Plan: Recent office visit with cardiology on 6/21 with full assessment    Med Review    Blood thinner -  none  ASA - yes  Diabetic - none    COVID test discussed: yes understands needs to be within 4 days of procedure    Patient Education r/t procedure: gonzalo uses for education    Does patient have any history of gastric bypass/gastric surgery/altered panc/bili anatomy? Ileostomy takedown in 2006    A pre-op nurse will call 1-2 days prior to the procedure. Is advised to be NPO/no solid food 8 hours before the procedure. Ok to drink clear liquids (Water, Apple Juice or Gatorade) up to 2 hours prior to procedure.     Verbalized understanding of all instructions. All questions answered.      Will clarify sedation with Dr Preston before ordering    Cammie Santoyo, RN, BSN,   [facility]  Care coordinator   [phone]  Fax [phone]

## 2021-07-06 DIAGNOSIS — K50.818 CROHN'S DISEASE OF BOTH SMALL AND LARGE INTESTINE WITH OTHER COMPLICATION (H): Primary | ICD-10-CM

## 2021-07-09 ENCOUNTER — AMBULATORY - HEALTHEAST (OUTPATIENT)
Dept: LAB | Facility: CLINIC | Age: 61
End: 2021-07-09

## 2021-07-09 ENCOUNTER — TELEPHONE (OUTPATIENT)
Dept: GASTROENTEROLOGY | Facility: CLINIC | Age: 61
End: 2021-07-09

## 2021-07-09 DIAGNOSIS — K50.818 CROHN'S DISEASE OF BOTH SMALL AND LARGE INTESTINE WITH OTHER COMPLICATION (H): ICD-10-CM

## 2021-07-09 NOTE — TELEPHONE ENCOUNTER
I spoke to Scar about the CT findings, with marked dilation of the PD and need for EUS with Dr. Preston. Scar was understanding and in agreement. Plan will be to perform EUS and cscope under the same sedation. All questions were answered.

## 2021-07-12 ENCOUNTER — PATIENT OUTREACH (OUTPATIENT)
Dept: GASTROENTEROLOGY | Facility: CLINIC | Age: 61
End: 2021-07-12

## 2021-07-12 DIAGNOSIS — Z11.59 ENCOUNTER FOR SCREENING FOR OTHER VIRAL DISEASES: ICD-10-CM

## 2021-07-12 LAB — CALPROTECTIN STL-MCNT: 88.1 MG/KG (ref 0–49.9)

## 2021-07-12 NOTE — PROGRESS NOTES
Called patient to discuss scheduling of procedure, colonoscopy and EUS, with MAC sedation in Endo.  Left VM  Will send AVIAhart message with colonoscopy prep information  Will message endo to assist in scheduling    Cammie Santoyo RN, BSN,   [facility]  Care coordinator   [phone]  Fax [phone]

## 2021-07-13 ENCOUNTER — TELEPHONE (OUTPATIENT)
Dept: GASTROENTEROLOGY | Facility: CLINIC | Age: 61
End: 2021-07-13

## 2021-07-13 DIAGNOSIS — K50.818 CROHN'S DISEASE OF BOTH SMALL AND LARGE INTESTINE WITH OTHER COMPLICATION (H): Primary | ICD-10-CM

## 2021-07-13 RX ORDER — BISACODYL 5 MG
TABLET, DELAYED RELEASE (ENTERIC COATED) ORAL
Qty: 4 TABLET | Refills: 0 | Status: SHIPPED | OUTPATIENT
Start: 2021-07-13 | End: 2021-11-01

## 2021-07-13 NOTE — TELEPHONE ENCOUNTER
"Pre assessment questions completed for upcoming colonoscopy/EUS procedure scheduled on 7/20/21    COVID test scheduled 7/16/21    Pre-op scheduled 6/21/21 cardiology Jayshree Nowak - per patient outreach encounter dated 7/2/21 from Cammie Santoyo - \"Preop Plan: Recent office visit with cardiology on 6/21 with full assessment\"    Golytely prep script sent to [facility] pharmacy. Prep instructions sent via [employer]. Patient with ESRD    Reviewed Golytely prep instructions with patient.     Procedural arrival time and facility location reviewed.    Designated  policy reviewed.    Patient verbalized understanding and had no questions or concerns at this time.    Bri Cordero RN      "

## 2021-07-16 ENCOUNTER — LAB (OUTPATIENT)
Dept: LAB | Facility: CLINIC | Age: 61
End: 2021-07-16
Payer: COMMERCIAL

## 2021-07-16 DIAGNOSIS — Z11.59 ENCOUNTER FOR SCREENING FOR OTHER VIRAL DISEASES: ICD-10-CM

## 2021-07-16 PROCEDURE — U0003 INFECTIOUS AGENT DETECTION BY NUCLEIC ACID (DNA OR RNA); SEVERE ACUTE RESPIRATORY SYNDROME CORONAVIRUS 2 (SARS-COV-2) (CORONAVIRUS DISEASE [COVID-19]), AMPLIFIED PROBE TECHNIQUE, MAKING USE OF HIGH THROUGHPUT TECHNOLOGIES AS DESCRIBED BY CMS-2020-01-R: HCPCS

## 2021-07-16 PROCEDURE — U0005 INFEC AGEN DETEC AMPLI PROBE: HCPCS

## 2021-07-17 LAB — SARS-COV-2 RNA RESP QL NAA+PROBE: NEGATIVE

## 2021-07-19 ENCOUNTER — ANESTHESIA EVENT (OUTPATIENT)
Dept: GASTROENTEROLOGY | Facility: CLINIC | Age: 61
End: 2021-07-19
Payer: COMMERCIAL

## 2021-07-20 ENCOUNTER — ANESTHESIA (OUTPATIENT)
Dept: GASTROENTEROLOGY | Facility: CLINIC | Age: 61
End: 2021-07-20
Payer: COMMERCIAL

## 2021-07-20 ENCOUNTER — HOSPITAL ENCOUNTER (OUTPATIENT)
Facility: CLINIC | Age: 61
Discharge: HOME | End: 2021-07-20
Attending: INTERNAL MEDICINE | Admitting: INTERNAL MEDICINE
Payer: COMMERCIAL

## 2021-07-20 VITALS
RESPIRATION RATE: 16 BRPM | DIASTOLIC BLOOD PRESSURE: 71 MMHG | HEART RATE: 80 BPM | TEMPERATURE: 98 F | OXYGEN SATURATION: 96 % | WEIGHT: 171.3 LBS | HEIGHT: 71 IN | BODY MASS INDEX: 23.98 KG/M2 | SYSTOLIC BLOOD PRESSURE: 128 MMHG

## 2021-07-20 DIAGNOSIS — K86.2 PANCREAS CYST: Primary | ICD-10-CM

## 2021-07-20 LAB
COLONOSCOPY: NORMAL
POTASSIUM BLD-SCNC: 3.3 MMOL/L (ref 3.4–5.3)
UPPER EUS: NORMAL

## 2021-07-20 PROCEDURE — 43238 EGD US FINE NEEDLE BX/ASPIR: CPT | Mod: XS | Performed by: INTERNAL MEDICINE

## 2021-07-20 PROCEDURE — 88305 TISSUE EXAM BY PATHOLOGIST: CPT | Mod: TC | Performed by: INTERNAL MEDICINE

## 2021-07-20 PROCEDURE — 258N000003 HC RX IP 258 OP 636: Performed by: NURSE ANESTHETIST, CERTIFIED REGISTERED

## 2021-07-20 PROCEDURE — 250N000009 HC RX 250: Performed by: NURSE ANESTHETIST, CERTIFIED REGISTERED

## 2021-07-20 PROCEDURE — 45380 COLONOSCOPY AND BIOPSY: CPT | Performed by: INTERNAL MEDICINE

## 2021-07-20 PROCEDURE — 370N000017 HC ANESTHESIA TECHNICAL FEE, PER MIN: Performed by: INTERNAL MEDICINE

## 2021-07-20 PROCEDURE — 250N000011 HC RX IP 250 OP 636: Performed by: NURSE ANESTHETIST, CERTIFIED REGISTERED

## 2021-07-20 PROCEDURE — 43242 EGD US FINE NEEDLE BX/ASPIR: CPT | Mod: XS | Performed by: INTERNAL MEDICINE

## 2021-07-20 PROCEDURE — 36415 COLL VENOUS BLD VENIPUNCTURE: CPT | Performed by: ANESTHESIOLOGY

## 2021-07-20 PROCEDURE — 88342 IMHCHEM/IMCYTCHM 1ST ANTB: CPT | Mod: TC | Performed by: INTERNAL MEDICINE

## 2021-07-20 PROCEDURE — 84132 ASSAY OF SERUM POTASSIUM: CPT | Performed by: ANESTHESIOLOGY

## 2021-07-20 RX ORDER — ONDANSETRON 4 MG/1
4 TABLET, ORALLY DISINTEGRATING ORAL EVERY 6 HOURS PRN
Status: CANCELLED | OUTPATIENT
Start: 2021-07-20

## 2021-07-20 RX ORDER — ONDANSETRON 2 MG/ML
INJECTION INTRAMUSCULAR; INTRAVENOUS PRN
Status: DISCONTINUED | OUTPATIENT
Start: 2021-07-20 | End: 2021-07-20

## 2021-07-20 RX ORDER — LIDOCAINE 40 MG/G
CREAM TOPICAL
Status: DISCONTINUED | OUTPATIENT
Start: 2021-07-20 | End: 2021-07-20 | Stop reason: HOSPADM

## 2021-07-20 RX ORDER — FLUMAZENIL 0.1 MG/ML
0.2 INJECTION, SOLUTION INTRAVENOUS
Status: CANCELLED | OUTPATIENT
Start: 2021-07-20 | End: 2021-07-21

## 2021-07-20 RX ORDER — NALOXONE HYDROCHLORIDE 0.4 MG/ML
0.4 INJECTION, SOLUTION INTRAMUSCULAR; INTRAVENOUS; SUBCUTANEOUS
Status: CANCELLED | OUTPATIENT
Start: 2021-07-20

## 2021-07-20 RX ORDER — LEVOFLOXACIN 5 MG/ML
INJECTION, SOLUTION INTRAVENOUS PRN
Status: DISCONTINUED | OUTPATIENT
Start: 2021-07-20 | End: 2021-07-20

## 2021-07-20 RX ORDER — LIDOCAINE HYDROCHLORIDE 20 MG/ML
INJECTION, SOLUTION INFILTRATION; PERINEURAL PRN
Status: DISCONTINUED | OUTPATIENT
Start: 2021-07-20 | End: 2021-07-20

## 2021-07-20 RX ORDER — NALOXONE HYDROCHLORIDE 0.4 MG/ML
0.2 INJECTION, SOLUTION INTRAMUSCULAR; INTRAVENOUS; SUBCUTANEOUS
Status: CANCELLED | OUTPATIENT
Start: 2021-07-20

## 2021-07-20 RX ORDER — SODIUM CHLORIDE, SODIUM LACTATE, POTASSIUM CHLORIDE, CALCIUM CHLORIDE 600; 310; 30; 20 MG/100ML; MG/100ML; MG/100ML; MG/100ML
INJECTION, SOLUTION INTRAVENOUS CONTINUOUS PRN
Status: DISCONTINUED | OUTPATIENT
Start: 2021-07-20 | End: 2021-07-20

## 2021-07-20 RX ORDER — LIDOCAINE HYDROCHLORIDE 20 MG/ML
SOLUTION OROPHARYNGEAL PRN
Status: DISCONTINUED | OUTPATIENT
Start: 2021-07-20 | End: 2021-07-20

## 2021-07-20 RX ORDER — PROPOFOL 10 MG/ML
INJECTION, EMULSION INTRAVENOUS CONTINUOUS PRN
Status: DISCONTINUED | OUTPATIENT
Start: 2021-07-20 | End: 2021-07-20

## 2021-07-20 RX ORDER — ONDANSETRON 2 MG/ML
4 INJECTION INTRAMUSCULAR; INTRAVENOUS EVERY 6 HOURS PRN
Status: CANCELLED | OUTPATIENT
Start: 2021-07-20

## 2021-07-20 RX ORDER — LEVOFLOXACIN 250 MG/1
250 TABLET, FILM COATED ORAL EVERY OTHER DAY
Qty: 2 TABLET | Refills: 0 | Status: SHIPPED | OUTPATIENT
Start: 2021-07-22 | End: 2021-11-01

## 2021-07-20 RX ORDER — ONDANSETRON 2 MG/ML
4 INJECTION INTRAMUSCULAR; INTRAVENOUS
Status: DISCONTINUED | OUTPATIENT
Start: 2021-07-20 | End: 2021-07-20 | Stop reason: HOSPADM

## 2021-07-20 RX ADMIN — PROPOFOL 30 MG: 10 INJECTION, EMULSION INTRAVENOUS at 11:58

## 2021-07-20 RX ADMIN — TOPICAL ANESTHETIC 1 EACH: 200 SPRAY DENTAL; PERIODONTAL at 11:47

## 2021-07-20 RX ADMIN — PHENYLEPHRINE HYDROCHLORIDE 50 MCG: 10 INJECTION INTRAVENOUS at 12:58

## 2021-07-20 RX ADMIN — PHENYLEPHRINE HYDROCHLORIDE 50 MCG: 10 INJECTION INTRAVENOUS at 12:53

## 2021-07-20 RX ADMIN — PHENYLEPHRINE HYDROCHLORIDE 100 MCG: 10 INJECTION INTRAVENOUS at 13:02

## 2021-07-20 RX ADMIN — LEVOFLOXACIN 500 MG: 5 INJECTION, SOLUTION INTRAVENOUS at 11:45

## 2021-07-20 RX ADMIN — ONDANSETRON 4 MG: 2 INJECTION INTRAMUSCULAR; INTRAVENOUS at 11:52

## 2021-07-20 RX ADMIN — LIDOCAINE HYDROCHLORIDE 5 ML: 20 SOLUTION ORAL; TOPICAL at 11:46

## 2021-07-20 RX ADMIN — SODIUM CHLORIDE, POTASSIUM CHLORIDE, SODIUM LACTATE AND CALCIUM CHLORIDE: 600; 310; 30; 20 INJECTION, SOLUTION INTRAVENOUS at 11:45

## 2021-07-20 RX ADMIN — LIDOCAINE HYDROCHLORIDE 30 MG: 20 INJECTION, SOLUTION INFILTRATION; PERINEURAL at 11:48

## 2021-07-20 RX ADMIN — PROPOFOL 125 MCG/KG/MIN: 10 INJECTION, EMULSION INTRAVENOUS at 11:49

## 2021-07-20 RX ADMIN — PHENYLEPHRINE HYDROCHLORIDE 50 MCG: 10 INJECTION INTRAVENOUS at 12:45

## 2021-07-20 ASSESSMENT — LIFESTYLE VARIABLES: TOBACCO_USE: 1

## 2021-07-20 ASSESSMENT — ENCOUNTER SYMPTOMS: DYSRHYTHMIAS: 1

## 2021-07-20 ASSESSMENT — MIFFLIN-ST. JEOR: SCORE: 1604.13

## 2021-07-20 NOTE — OR NURSING
The following page was sent to Dr. Preston: 3C Angelica. Patient has met discharge criteria pending a bedside visit by you. When can we expect you? Merete Charge RN [phone]

## 2021-07-20 NOTE — DISCHARGE SUMMARY
Care of patient assumed from previous RN @ 1420, discharge instructions reviewed with patient and friend, Vasu via phone, friend verbalized understanding of all instructions, all questions addressed, written instructions sent home.

## 2021-07-20 NOTE — DISCHARGE INSTRUCTIONS
[facility] Colonoscopy/Flexible Sigmoidoscopy with Monitored Anesthesia Care  For 24 hours after your procedure  Sedation:  1. Get plenty of rest. A responsible adult must stay with you for at least 24 hours after you leave the hospital.   2. Do not drive or use heavy equipment. If you have weakness or tingling, don't drive or use heavy equipment until this feeling goes away.  3. Do not drink alcohol.  4. Avoid strenuous or risky activities. Ask for help when climbing stairs.   5. You may feel lightheaded. IF so, sit for a few minutes before standing. Have someone help you get up.   6. If you have nausea (feel sick to your stomach): Drink only clear liquids such as apple juice, ginger ale, broth or 7-Up. Rest may also help. Be sure to drink enough fluids. Move to a regular diet as you feel able.  7. You may have a slight fever. Call the doctor if your fever is over 100 F (37.7 C) (taken under the tongue) or lasts longer than 24 hours.  8. You may have a dry mouth, a sore throat, muscle aches or trouble sleeping. These should go away after 24 hours.  9. Do not make important or legal decisions.   Procedural:  1. You may return to your normal diet and medicines.  2. Air was placed in your colon during the exam in order to see it. Walking helps to pass the air.  3. You may take Tylenol (acetaminophen) for pain unless your doctor has told you not to.   Do not take aspirin or ibuprofen (Advil, Motrin, or other anti-inflammatory  drugs) for _____ days.  Call your doctor for any of the following  1. Unusual pain in belly or chest pain not relieved with passing air.  2. More than 1 to 2 Tablespoons of bleeding from your rectum.  3. Signs of infection (fever, growing tenderness at the surgery site, a large amount of drainage or bleeding, severe pain, foul-smelling drainage, redness, swelling).  4. It has been over 8 to 10 hours since surgery and you are still not able to urinate (pass water).  5. Headache for over 24  hours.  6. Numbness, tingling or weakness the day after surgery (if you had spinal anesthesia).  Follow-up:  xWe took small tissue samples or polyps to study. Your doctor will call you with the results within two weeks.  If you have severe pain, bleeding, or shortness of breath, go to an emergency room.  If you have questions, call:  Endoscopy: Monday to Friday, 8 a.m. to 4:30 p.m. [phone] (We may have to call you back)  After hours: Hospital  [phone] (Ask for the GI fellow on call)

## 2021-07-20 NOTE — ANESTHESIA PREPROCEDURE EVALUATION
Anesthesia Pre-Procedure Evaluation    Patient: Tacos Dominguez   MRN: [id number] : 1960        Preoperative Diagnosis: Crohn's disease of both small and large intestine with other complication (H) [K50.818]   Procedure : Procedure(s):  COLONOSCOPY  ENDOSCOPIC ULTRASOUND, ESOPHAGOSCOPY / UPPER GASTROINTESTINAL TRACT (GI)     Past Medical History:   Diagnosis Date     Crohn's colitis (H)      Hypertension       Past Surgical History:   Procedure Laterality Date     CV CORONARY ANGIOGRAM N/A 2021    Procedure: CV CORONARY ANGIOGRAM;  Surgeon: Judson Ybarra MD;  Location:  HEART CARDIAC CATH LAB     VASCULAR SURGERY      dialysis access- left upper      Allergies   Allergen Reactions     Lisinopril Anaphylaxis and Other (See Comments)     Shortness of breath      Social History     Tobacco Use     Smoking status: Former Smoker     Packs/day: 0.75     Years: 50.00     Pack years: 37.50     Quit date: 10/19/2020     Years since quittin.7     Smokeless tobacco: Never Used   Substance Use Topics     Alcohol use: Yes     Comment: rare      Wt Readings from Last 1 Encounters:   21 77.7 kg (171 lb 4.8 oz)        Anesthesia Evaluation   Pt has had prior anesthetic.     No history of anesthetic complications       ROS/MED HX  ENT/Pulmonary:     (+) tobacco use, Past use,  (-) sleep apnea   Neurologic: Comment: Found down late , per patient, large workup that was negative for cause      Cardiovascular: Comment: Stress 10/2020 fixed inferior wall perfusion abnormality EF 52%    C 2021: pRCA 30% otherwise clean    TTE 3/3021:  LVEF 60-65%  RV normal  Mild to mod MR    (+) hypertension--CAD -past MI --dysrhythmias (1x episode of Afib s/p conversion, now NSR), a-fib,     METS/Exercise Tolerance: 4 - Raking leaves, gardening    Hematologic:       Musculoskeletal:       GI/Hepatic: Comment: Hx of Crohn's disease s/p multiple bowel resections    (+) GERD,     Renal/Genitourinary:     (+) renal  disease, type: ESRD, Pt requires dialysis, type: Hemodialysis,     Endo:       Psychiatric/Substance Use:       Infectious Disease:       Malignancy:       Other:            Physical Exam    Airway        Mallampati: I   TM distance: > 3 FB   Neck ROM: full   Mouth opening: > 3 cm    Respiratory Devices and Support         Dental  no notable dental history         Cardiovascular          Rhythm and rate: regular and normal     Pulmonary           breath sounds clear to auscultation           OUTSIDE LABS:  CBC:   Lab Results   Component Value Date    WBC 7.2 05/25/2021    WBC 6.8 03/25/2021    HGB 10.6 (L) 05/25/2021    HGB 10.4 (L) 03/25/2021    HCT 33.3 (L) 05/25/2021    HCT 32.8 (L) 03/25/2021     05/25/2021     03/25/2021     BMP:   Lab Results   Component Value Date     05/25/2021     03/25/2021    POTASSIUM 3.3 (L) 07/20/2021    POTASSIUM 3.6 05/25/2021    CHLORIDE 101 05/25/2021    CHLORIDE 105 03/25/2021    CO2 21 05/25/2021    CO2 20 03/25/2021    BUN 36 (H) 05/25/2021    BUN 34 (H) 03/25/2021    CR 10.60 (H) 05/25/2021    CR 9.80 (H) 03/25/2021    GLC 73 05/25/2021    GLC 78 03/25/2021     COAGS:   Lab Results   Component Value Date    PTT 33 03/25/2021    INR 1.33 (H) 05/25/2021     POC: No results found for: BGM, HCG, HCGS  HEPATIC:   Lab Results   Component Value Date    ALBUMIN 3.4 03/25/2021    PROTTOTAL 7.9 03/25/2021    ALT 22 03/25/2021    AST 16 03/25/2021    ALKPHOS 91 03/25/2021    BILITOTAL 0.5 03/25/2021     OTHER:   Lab Results   Component Value Date    PH 7.16 (LL) 09/28/2020    KARY 6.6 (L) 05/25/2021    PHOS 7.8 (H) 09/28/2020    MAG 1.8 09/30/2020    LIPASE 115 (H) 09/27/2020    CRP 5.6 05/07/2008    SED 36 (H) 05/31/2007       Anesthesia Plan    ASA Status:  3   NPO Status:  NPO Appropriate    Anesthesia Type: MAC.              Consents    Anesthesia Plan(s) and associated risks, benefits, and realistic alternatives discussed. Questions answered and  patient/representative(s) expressed understanding.     - Discussed with:  Patient         Postoperative Care            Comments:                Don Avila MD

## 2021-07-20 NOTE — ANESTHESIA CARE TRANSFER NOTE
Patient: Tacos Dominguez    Procedure(s):  COLONOSCOPY, WITH POLYPECTOMY AND BIOPSY  ESOPHAGOGASTRODUODENOSCOPY, WITH FINE NEEDLE ASPIRATION BIOPSY, WITH ENDOSCOPIC ULTRASOUND GUIDANCE    Diagnosis: Crohn's disease of both small and large intestine with other complication (H) [K50.818]  Diagnosis Additional Information: No value filed.    Anesthesia Type:   MAC     Note:    Oropharynx: oropharynx clear of all foreign objects and spontaneously breathing  Level of Consciousness: awake  Oxygen Supplementation: room air    Independent Airway: airway patency satisfactory and stable  Dentition: dentition unchanged  Vital Signs Stable: post-procedure vital signs reviewed and stable  Report to RN Given: handoff report given  Patient transferred to: Phase II    Handoff Report: Identifed the Patient, Identified the Reponsible Provider, Reviewed the pertinent medical history, Discussed the surgical course, Reviewed Intra-OP anesthesia mangement and issues during anesthesia, Set expectations for post-procedure period and Allowed opportunity for questions and acknowledgement of understanding      Vitals:  Vitals Value Taken Time   /70 07/20/21 1325   Temp 98 07/20/21 1325   Pulse 84 07/20/21 1325   Resp 16 07/20/21 1325   SpO2 99 07/20/21 1325       Electronically Signed By: JANNET Lyle CRNA  July 20, 2021  1:25 PM

## 2021-07-20 NOTE — ANESTHESIA POSTPROCEDURE EVALUATION
Patient: Tacos Dominguez    Procedure(s):  COLONOSCOPY, WITH POLYPECTOMY AND BIOPSY  ESOPHAGOGASTRODUODENOSCOPY, WITH FINE NEEDLE ASPIRATION BIOPSY, WITH ENDOSCOPIC ULTRASOUND GUIDANCE    Diagnosis:Crohn's disease of both small and large intestine with other complication (H) [K50.818]  Diagnosis Additional Information: No value filed.    Anesthesia Type:  MAC    Note:  Disposition: Outpatient   Postop Pain Control: Uneventful            Sign Out: Well controlled pain   PONV: No   Neuro/Psych: Uneventful            Sign Out: Acceptable/Baseline neuro status   Airway/Respiratory: Uneventful            Sign Out: Acceptable/Baseline resp. status   CV/Hemodynamics: Uneventful            Sign Out: Acceptable CV status; No obvious hypovolemia; No obvious fluid overload   Other NRE:    DID A NON-ROUTINE EVENT OCCUR?            Last vitals:  Vitals Value Taken Time   /71 07/20/21 1329   Temp 36.7  C (98  F) 07/20/21 1329   Pulse 80 07/20/21 1329   Resp 16 07/20/21 1329   SpO2 96 % 07/20/21 1329       Electronically Signed By: Don Avila MD  July 20, 2021  2:38 PM

## 2021-07-21 PROCEDURE — 88305 TISSUE EXAM BY PATHOLOGIST: CPT | Mod: 26 | Performed by: PATHOLOGY

## 2021-07-21 PROCEDURE — 88342 IMHCHEM/IMCYTCHM 1ST ANTB: CPT | Mod: 26 | Performed by: PATHOLOGY

## 2021-07-22 LAB
PATH REPORT.COMMENTS IMP SPEC: NORMAL
PATH REPORT.FINAL DX SPEC: NORMAL
PATH REPORT.GROSS SPEC: NORMAL
PATH REPORT.RELEVANT HX SPEC: NORMAL

## 2021-07-22 PROCEDURE — 88173 CYTOPATH EVAL FNA REPORT: CPT | Mod: 26 | Performed by: PATHOLOGY

## 2021-07-22 PROCEDURE — 88305 TISSUE EXAM BY PATHOLOGIST: CPT | Mod: 26 | Performed by: PATHOLOGY

## 2021-07-26 LAB
PATH REPORT.ADDENDUM SPEC: NORMAL
PATH REPORT.COMMENTS IMP SPEC: NORMAL
PATH REPORT.FINAL DX SPEC: NORMAL
PATH REPORT.GROSS SPEC: NORMAL
PATH REPORT.MICROSCOPIC SPEC OTHER STN: NORMAL
PATH REPORT.RELEVANT HX SPEC: NORMAL
PHOTO IMAGE: NORMAL

## 2021-07-29 ENCOUNTER — TELEPHONE (OUTPATIENT)
Dept: GASTROENTEROLOGY | Facility: CLINIC | Age: 61
End: 2021-07-29

## 2021-07-29 DIAGNOSIS — D49.0 IPMN (INTRADUCTAL PAPILLARY MUCINOUS NEOPLASM): ICD-10-CM

## 2021-07-29 DIAGNOSIS — K86.2 PANCREAS CYST: Primary | ICD-10-CM

## 2021-07-29 NOTE — TELEPHONE ENCOUNTER
Kailyn -   See below. Please arrange for  1) Surgical oncology consultation re consideration of distal pancreatectomy for IPMN  2) MRI/MRCP with contrast in 3 months and clinic visit with me 1 week after.        Called pt to regroup re the colonoscopy and EUS results.    Ileal biopsies show active Crohn's with negative CMV staining. Results forwarded to Dr. Rubio in the IBD clinic.    The cytology from the pancreas aspirate was essentially acellular, however this was thick highly viscous mucin consistent with the mucinous lesion (IPMN).     I recommended formal consultation with surgical oncology as this is consistent with main duct disease, however discussed that with his comorbidity we may well continue to follow expectantly. Will tentatively plan for alternating EUS and MRI every 3 months unless resected.    SIXTO Preston MD  Professor of Medicine  Division of Gastroenterology, Hepatology and Nutrition  [facility]

## 2021-07-30 ENCOUNTER — PATIENT OUTREACH (OUTPATIENT)
Dept: SURGERY | Facility: CLINIC | Age: 61
End: 2021-07-30

## 2021-08-02 NOTE — PROGRESS NOTES
New Patient Oncology Nurse Navigator Note     Referring provider: Dr. Preston    Referring Clinic/Organization: [facility]     Referred to: Hepatobiliary Surgery      Requested provider (if applicable): Dr. Sarath Smith    Referral Received: 08/02/21       Evaluation for : IPMN     Clinical History (per Nurse review of records provided):          Past Medical History:   Diagnosis Date     Crohn's colitis (H)      Hypertension        Past Surgical History:   Procedure Laterality Date     COLONOSCOPY N/A 7/20/2021    Procedure: COLONOSCOPY, WITH POLYPECTOMY AND BIOPSY;  Surgeon: Eric Preston MD;  Location:  GI     CV CORONARY ANGIOGRAM N/A 5/25/2021    Procedure: CV CORONARY ANGIOGRAM;  Surgeon: Judson Ybarra MD;  Location:  HEART CARDIAC CATH LAB     ESOPHAGOSCOPY, GASTROSCOPY, DUODENOSCOPY (EGD), COMBINED N/A 7/20/2021    Procedure: ESOPHAGOGASTRODUODENOSCOPY, WITH FINE NEEDLE ASPIRATION BIOPSY, WITH ENDOSCOPIC ULTRASOUND GUIDANCE;  Surgeon: Eric Preston MD;  Location:  GI     VASCULAR SURGERY      dialysis access- left upper       Current Outpatient Medications   Medication Sig Dispense Refill     amLODIPine (NORVASC) 10 MG tablet Take 10 mg by mouth       aspirin (ASA) 81 MG chewable tablet Take 81 mg by mouth       bisacodyl (DULCOLAX) 5 MG EC tablet Take as directed. Two days before the exam take 2 tablets at bedtime. One day before the exam take 2 tablets at 3:00pm. 4 tablet 0     cholecalciferol 25 MCG (1000 UT) TABS Take 2,000 Units by mouth       cholestyramine (QUESTRAN) 4 g packet Take 1 packet (4 g) by mouth 3 times daily (with meals) 60 packet 1     doxazosin (CARDURA) 4 MG tablet Take 4 mg by mouth       folic acid (FOLVITE) 1 MG tablet Take 1 mg by mouth       levofloxacin (LEVAQUIN) 250 MG tablet Take 1 tablet (250 mg) by mouth every other day 2 tablet 0     magnesium oxide 400 MG CAPS Take 1 tablet by mouth       Melatonin 10 MG TABS tablet        multivitamin  w/minerals (MULTI-VITAMIN) tablet        omeprazole (PRILOSEC) 20 MG DR capsule Take 20 mg by mouth       polyethylene glycol (GOLYTELY) 236 g suspension Take as directed. One day before exam fill the jug with water. Cover and shake until well mixed. At 6:00pm start drinking an 8oz glass of mixture every 15 minutes until jug is 1/2 empty. Store the rest in the refrigerator. Day of exam 6 hours before exam drink the other 1/2 of jug until it is gone 4000 mL 0     simvastatin (ZOCOR) 20 MG tablet TAKE ONE TABLET BY MOUTH IN THE EVENING. INDICATIONS: CEREBROVASCULAR ACCIDENT OR STROKE             Allergies   Allergen Reactions     Lisinopril Anaphylaxis and Other (See Comments)     Shortness of breath          Clinical Assessment / Barriers to Care (Per Nurse):    None at this time.     Records Location:     [facility] Everywhere     Records Needed:     NONE AT THIS TIME      Additional testing needed prior to consult:     NONE AT THIS TIME    Referral updates and Plan:       Consult with Surgical Oncology       Priscilla Benitez, RN, BSN   Surgical Oncology New Patient Nurse Navigator  [facility]  [phone]

## 2021-09-20 ENCOUNTER — OFFICE VISIT (OUTPATIENT)
Dept: SURGERY | Facility: CLINIC | Age: 61
End: 2021-09-20
Attending: INTERNAL MEDICINE
Payer: COMMERCIAL

## 2021-09-20 VITALS
TEMPERATURE: 98.8 F | BODY MASS INDEX: 23.99 KG/M2 | SYSTOLIC BLOOD PRESSURE: 122 MMHG | RESPIRATION RATE: 16 BRPM | WEIGHT: 172 LBS | HEART RATE: 110 BPM | OXYGEN SATURATION: 98 % | DIASTOLIC BLOOD PRESSURE: 76 MMHG

## 2021-09-20 DIAGNOSIS — D49.0 IPMN (INTRADUCTAL PAPILLARY MUCINOUS NEOPLASM): ICD-10-CM

## 2021-09-20 PROCEDURE — G0463 HOSPITAL OUTPT CLINIC VISIT: HCPCS

## 2021-09-20 PROCEDURE — 99204 OFFICE O/P NEW MOD 45 MIN: CPT | Performed by: SURGERY

## 2021-09-20 ASSESSMENT — PAIN SCALES - GENERAL: PAINLEVEL: NO PAIN (0)

## 2021-09-20 NOTE — NURSING NOTE
"Oncology Rooming Note    September 20, 2021 12:04 PM   Tacos Dominguez is a 61 year old male who presents for:    No chief complaint on file.    Initial Vitals: /76   Pulse 110   Temp 98.8  F (37.1  C) (Oral)   Resp 16   Wt 78 kg (172 lb)   SpO2 98%   BMI 23.99 kg/m   Estimated body mass index is 23.99 kg/m  as calculated from the following:    Height as of 7/20/21: 1.803 m (5' 11\").    Weight as of this encounter: 78 kg (172 lb). Body surface area is 1.98 meters squared.  No Pain (0) Comment: Data Unavailable   No LMP for male patient.  Allergies reviewed: Yes  Medications reviewed: Yes    Medications: Medication refills not needed today.  Pharmacy name entered into [employer]: [id number] [address] [facility]    Clinical concerns: New pt consult. Dr Smith was notified.      Suzanna Espinosa CMA                "

## 2021-09-20 NOTE — LETTER
9/20/2021         RE: Tacos Dominguez  [address]  [place] [facility] [address]        Dear Colleague,    Thank you for referring your patient, Tacos Dominguez, to the [facility]. Please see a copy of my visit note below.    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 Meets criteria for high risk features. Recommended distal pancreatectomy and splenectomy. 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    Patient is building a house and would like to delay surgery for a few months, which I thought was reasonable. I did discuss risk of malignant transformation of IPMN.    Plan is for repeat MRI in October with Dr. Preston.    I will follow up in January with another new MRI per patients request to delay surgery until new year.    I am happy to see sooner if any new or worrisome findings appear on MRI in October.    45 minutes spent on this case - 30 minutes face to face and 15 minutes in review of imaging, history and coordination of care.        Again, thank you for allowing me to participate in the care of your patient.        Sincerely,        Sarath Smith MD

## 2021-09-22 NOTE — PROGRESS NOTES
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 Meets criteria for high risk features. Recommended distal pancreatectomy and splenectomy. 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    Patient is building a house and would like to delay surgery for a few months, which I thought was reasonable. I did discuss risk of malignant transformation of IPMN.    Plan is for repeat MRI in October with Dr. Preston.    I will follow up in January with another new MRI per patients request to delay surgery until new year.    I am happy to see sooner if any new or worrisome findings appear on MRI in October.    45 minutes spent on this case - 30 minutes face to face and 15 minutes in review of imaging, history and coordination of care.

## 2021-09-25 ENCOUNTER — PATIENT OUTREACH (OUTPATIENT)
Dept: GASTROENTEROLOGY | Facility: CLINIC | Age: 61
End: 2021-09-25

## 2021-10-13 ENCOUNTER — DOCUMENTATION ONLY (OUTPATIENT)
Dept: SURGERY | Facility: CLINIC | Age: 61
End: 2021-10-13

## 2021-10-13 NOTE — PROGRESS NOTES
Received case request. Sent Sina Weibo to coordinate date in January with Dr. Smith.    __    URGENCY: January 1/2022 - patient wants to wait til then.     Pre Procedure testing needed:   MRI   PAC   Visit with tejas PEREZ, and MRI at least 2 weeks prior to the OR date.       Additional Instructions Case Request       Surgeon Procedure time (incision-close in minutes): 240 min   H&P to be completed by?: PAC provider   Postop appointment? 2-3 weeks   Surgical assistants?: No   Other\additional comments as needed?:  __

## 2021-10-22 ENCOUNTER — TELEPHONE (OUTPATIENT)
Dept: TRANSPLANT | Facility: CLINIC | Age: 61
End: 2021-10-22

## 2021-10-24 ENCOUNTER — HEALTH MAINTENANCE LETTER (OUTPATIENT)
Age: 61
End: 2021-10-24

## 2021-10-26 ENCOUNTER — ANCILLARY PROCEDURE (OUTPATIENT)
Dept: MRI IMAGING | Facility: CLINIC | Age: 61
End: 2021-10-26
Attending: INTERNAL MEDICINE
Payer: COMMERCIAL

## 2021-10-26 DIAGNOSIS — D49.0 IPMN (INTRADUCTAL PAPILLARY MUCINOUS NEOPLASM): ICD-10-CM

## 2021-10-26 PROCEDURE — A9585 GADOBUTROL INJECTION: HCPCS | Performed by: RADIOLOGY

## 2021-10-26 PROCEDURE — 74183 MRI ABD W/O CNTR FLWD CNTR: CPT | Performed by: RADIOLOGY

## 2021-10-26 RX ORDER — GADOBUTROL 604.72 MG/ML
7.5 INJECTION INTRAVENOUS ONCE
Status: COMPLETED | OUTPATIENT
Start: 2021-10-26 | End: 2021-10-26

## 2021-10-26 RX ADMIN — GADOBUTROL 7.5 ML: 604.72 INJECTION INTRAVENOUS at 10:18

## 2021-10-26 NOTE — DISCHARGE INSTRUCTIONS
MRI Contrast Discharge Instructions    The IV contrast you received today will pass out of your body in your  urine. This will happen in the next 24 hours. You will not feel this process.  Your urine will not change color.    Drink at least 4 extra glasses of water or juice today (unless your doctor  has restricted your fluids). This reduces the stress on your kidneys.  You may take your regular medicines.    If you are on dialysis: It is best to have dialysis today.    If you have a reaction: Most reactions happen right away. If you have  any new symptoms after leaving the hospital (such as hives or swelling),  call your hospital at the correct number below. Or call your family doctor.  If you have breathing distress or wheezing, call 911.    Special instructions: ***    I have read and understand the above information.    Signature:______________________________________ Date:___________    Staff:__________________________________________ Date:___________     Time:__________    [facility] Departments:    ___[place]: [phone]  ___[facility]: [phone]  ___[place]: [phone] ___[facility]: [phone]  ___[facility]: [phone]  ___[facility]: [phone]  ___[phone]  ___[facility]: [phone]  ___[phone]

## 2021-10-29 ENCOUNTER — DOCUMENTATION ONLY (OUTPATIENT)
Dept: SURGERY | Facility: CLINIC | Age: 61
End: 2021-10-29

## 2021-10-29 NOTE — PROGRESS NOTES
RNCC: Priscilla Benitez;  [phone]    Surgery is scheduled with Dr. Smith on 3/17 at [facility].  Scheduled per patient.    Additional appointments (pre-op/post-op):   MRI on 3/3 at 12:45 PM at the [facility]    H&P to be completed by PAC: Scheduled in person on 3/3 at 2:00 PM    Pre-surgical consult with Dr. Smith on 3/7 at 1:30 PM    COVID-19 test: 3/15 at [facility]    Post-op: 4/4 as a in person visit.    The RN completed the education regarding the surgery.     Patient will receive surgery arrival and start time from PAC.    The surgery packet was sent via US mail. and sent via [employer].    Patient will complete COVID-19 test that was scheduled by surgical coordinator 2-4 days prior to surgery.     I sent a MyChart to confirm the information above, will call if the patient requests.

## 2021-11-01 ENCOUNTER — VIRTUAL VISIT (OUTPATIENT)
Dept: GASTROENTEROLOGY | Facility: CLINIC | Age: 61
End: 2021-11-01
Attending: INTERNAL MEDICINE
Payer: COMMERCIAL

## 2021-11-01 DIAGNOSIS — K50.818 CROHN'S DISEASE OF BOTH SMALL AND LARGE INTESTINE WITH OTHER COMPLICATION (H): ICD-10-CM

## 2021-11-01 DIAGNOSIS — N18.6 ESRD (END STAGE RENAL DISEASE) (H): ICD-10-CM

## 2021-11-01 DIAGNOSIS — D49.0 IPMN (INTRADUCTAL PAPILLARY MUCINOUS NEOPLASM): Primary | ICD-10-CM

## 2021-11-01 PROCEDURE — G0463 HOSPITAL OUTPT CLINIC VISIT: HCPCS | Mod: PN,RTG | Performed by: INTERNAL MEDICINE

## 2021-11-01 PROCEDURE — 999N001193 HC VIDEO/TELEPHONE VISIT; NO CHARGE

## 2021-11-01 PROCEDURE — 99213 OFFICE O/P EST LOW 20 MIN: CPT | Mod: GT | Performed by: INTERNAL MEDICINE

## 2021-11-01 NOTE — PROGRESS NOTES
Scar is a 61 year old who is being evaluated via a billable video visit.      How would you like to obtain your AVS? MyChart  If the video visit is dropped, the invitation should be resent by: Text to cell phone: [phone]   Will anyone else be joining your video visit? No    Video Start Time: 8:04  Video-Visit Details    Type of service:  Video Visit    Video End Time:8:13    Originating Location (pt. Location): Home    Distant Location (provider location):  [facility]     Platform used for Video Visit: Allen     60 yo male with history of IPMN being seen for folllow-up after a surveillance MRI.    PMH significant for Crohn's with multiple bowel surgeries being followed by Dr. Rubio. CKD since an aortic dissection 4/2021 now on HD. Basal cell. Htn.    Had incidentally dilated pancreatic duct with cystic change in the pancreatic tail incidentally identified on a non-contrast CT 6/15/21.    EUS by myself 7/20/21 showed progressive fusiform dilation of the main pancreatic duct towards the left of midline with the duct measuring up to 13 mm in diameter. Needle aspiration returned thick viscous/gelatinous mucus. This was too viscous to perform fluid analysis. Cytology was bland but only a small amount of fluid was obtained. There were no solid mural nodules. The exam also suggested changes of chronic pancreatitis however there were no obstructing lesions.    He was referred to Dr. Smith in Surgical Oncology who recommended distal pancreatectomy, however the pt wished to delay this until March 2022 due the fact that he is in the midst of building a new home.      MRI 10/26/21 showed:  IMPRESSION:   1. Dilatation of the main pancreatic duct (measuring up to 3.1 cm) in  the pancreatic body/tail with associated parenchymal atrophy which has  slightly worsened compared to 6/15/2021 CT (and substantially worsened  since 9/26/2020), concerning for main duct type IPMN. No  convincing  associated nodular enhancement or other aggressive features to suggest  malignant transformation.  2. Atrophy of the bilateral kidneys with multiple renal cysts, the  largest with proteinaceous/hemorrhagic signal measuring 6.2 x 5.2 cm.  Attention follow-up.  3. Bowel containing right anterolateral abdominal wall hernia without  evidence for strangulation or obstruction.  4. Hepatic and splenic iron deposition.    He is feeling well. Has chronic diarrhea due to Crohn's but no steatorrhea. His weight is stable at 78 kg. Denies jaundice or significant abdominal pain.    A/P:  1) IPMN. No new concerning findings on recent MRI to suggest a need for urgent surgery.  He has scheduled follow-up with Dr. Smith in January with a repeat MRI and surgery scheduled in March. At this point, I will defer further follow-up to Dr. Smith, however will likely resume care post-operatively to coordinate surveillance of the remaining pancreas (likely with MRI in 1 year after resection unless unexpected findings).    2) Crohn's disease. Colonoscopy in July showed active Crohn's. Has not started any new medications as of yet and scheduled for visit with Dr. Rubio in January. Per report plan was to start biologics. I sent message to Dr.s Rubio and Luis to update re plans for surgery, which may include splenectomy. May wish to initiate vaccinations prior to any biologic and also discuss timing of beginning biologic in relationship to surgery.    At this time, will follow-up prn at discretion of Dr. Smith post-op.    Total time including visit, documentation and review of imaging was 21 minutes.    SIXTO Preston MD  Professor of Medicine  Division of Gastroenterology, Hepatology and Nutrition  [facility]

## 2021-11-01 NOTE — LETTER
11/1/2021         RE: Tacos Dominguez  [address]  [facility] [address]        Dear Colleague,    Thank you for referring your patient, Tacos Dominguez, to the [facility]. Please see a copy of my visit note below.    Scar is a 61 year old who is being evaluated via a billable video visit.      How would you like to obtain your AVS? MyChart  If the video visit is dropped, the invitation should be resent by: Text to cell phone: [phone]   Will anyone else be joining your video visit? No    Video Start Time: 8:04  Video-Visit Details    Type of service:  Video Visit    Video End Time:8:13    Originating Location (pt. Location): Home    Distant Location (provider location):  [facility]     Platform used for Video Visit: JoonJuan Antonio     62 yo male with history of IPMN being seen for folllow-up after a surveillance MRI.    PMH significant for Crohn's with multiple bowel surgeries being followed by Dr. Rubio. CKD since an aortic dissection 4/2021 now on HD. Basal cell. Htn.    Had incidentally dilated pancreatic duct with cystic change in the pancreatic tail incidentally identified on a non-contrast CT 6/15/21.    EUS by myself 7/20/21 showed progressive fusiform dilation of the main pancreatic duct towards the left of midline with the duct measuring up to 13 mm in diameter. Needle aspiration returned thick viscous/gelatinous mucus. This was too viscous to perform fluid analysis. Cytology was bland but only a small amount of fluid was obtained. There were no solid mural nodules. The exam also suggested changes of chronic pancreatitis however there were no obstructing lesions.    He was referred to Dr. Smith in Surgical Oncology who recommended distal pancreatectomy, however the pt wished to delay this until March 2022 due the fact that he is in the midst of building a new home.      MRI 10/26/21 showed:  IMPRESSION:   1. 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 No convincing  associated nodular enhancement or other aggressive features to suggest  malignant transformation.  2. Atrophy of the bilateral kidneys with multiple renal cysts, the  largest with proteinaceous/hemorrhagic signal measuring 6.2 x 5.2 cm.  Attention follow-up.  3. Bowel containing right anterolateral abdominal wall hernia without  evidence for strangulation or obstruction.  4. Hepatic and splenic iron deposition.    He is feeling well. Has chronic diarrhea due to Crohn's but no steatorrhea. His weight is stable at 78 kg. Denies jaundice or significant abdominal pain.    A/P:  1) IPMN. No new concerning findings on recent MRI to suggest a need for urgent surgery.  He has scheduled follow-up with Dr. Smith in January with a repeat MRI and surgery scheduled in March. At this point, I will defer further follow-up to Dr. Smith, however will likely resume care post-operatively to coordinate surveillance of the remaining pancreas (likely with MRI in 1 year after resection unless unexpected findings).    2) Crohn's disease. Colonoscopy in July showed active Crohn's. Has not started any new medications as of yet and scheduled for visit with Dr. Rubio in January. Per report plan was to start biologics. I sent message to Dr.s Rubio and Luis to update re plans for surgery, which may include splenectomy. May wish to initiate vaccinations prior to any biologic and also discuss timing of beginning biologic in relationship to surgery.    At this time, will follow-up prn at discretion of Dr. Smith post-op.    Total time including visit, documentation and review of imaging was 21 minutes.    SIXTO Preston MD  Professor of Medicine  Division of Gastroenterology, Hepatology  and Nutrition  [facility]

## 2021-11-09 NOTE — PROGRESS NOTES
Clinical Pharmacy Consult:                                                    Tacos Dominguez is a 61 year old male contacted via secure video for a clinical pharmacist consult.  He was referred to me from Dr. Rubio.     He is not seen for CMR today due to coverage.    Reason for Consult: discuss vaccinations prior to biologic start/planned splenectomy    Discussion:     Notes his primary care provider is Dr. Vianey Benitez ([facility]). Goes to [facility] in [place] but he is building a house [place] towards [place] which should be done in December, so he may have to move locations. He was in the service previously and notes being vaccinated for many things at once. Reports they just went down the line and got what felt like 10 jabs at one time.    IBD Health Care Maintenance:    Vaccinations:  All patients on biologics should avoid live vaccines.    -- Influenza (every year) up-to-date for 21-22 season, got at [facility] 10/27/21  -- TdaP (every 10 years) last 7/2012, due 7/2022  -- Pneumococcal Pneumonia    Prevnar-13: 6/16/2021 per [facility], Prevnar-7 noted 10/31/2000,    Pneumovax-23: 11/10/1994, [facility] records note unspecific pneumonia vaccine 12/2020  -- COVID-19 1/27/21 and 2/24/21, doesn't feel it is really needed at this time  -- Yearly assessment for latent Tb (verbal screening and exam, PPD or QuantiFERON-Tb testing)       negative in March 2021    One time confirmation of immunity or serologies:  -- Hepatitis A (serologies or immunizations) potentially during service  -- Hepatitis B (serologies or immunizations) 6/9/2014 and 7/8/2014, [facility] scanned records indicate non-immune 2020, non-immune 3/25/21  -- Varicella/Zoster not on file, had Shingles, denies Shingrix  -- Meningococcal meningitis (all patients at risk for meningitis)-- not on file    Vaccinations recommended for pre/post splenectomy:  - Pneumonia vaccines: in alignment with recommendations for IBD on immunosuppression  above  - Hib x1 dose (booster not applicable)  - MenACWY  - MenB  - seasonal influenza: in alignment with recommendations for IBD on immunosuppression above     Due to the planned immunosuppression in this patient, I would not advise administration of live vaccines such as varicella/VZV, intranasal influenza, MMR, or yellow fever vaccine (if traveling).        Plan:  1. Scar to consider the following vaccines:   -- covid booster (one dose)   -- pneumovax-23 Received 12/2020 per [facility]   -- hepatitis B series (3 doses -  Recombivax HB at 0, 1, 6 months [minimum intervals: dose 1 to dose 2: 4 weeks / dose 2 to dose 3: 8 weeks / dose 1 to dose 3: 16 weeks])   -- Shingrix (2 doses  by 2-6 months)   -- Men ACWY (Menactra 2 doses  by at least 8 weeks)   -- Men B (Bexsero 2 doses  by at least 4 weeks)   -- Hib (one dose)    Start (now) Week 4 Week 8  Week 12 After Splenectomy   Hib COVID-19 booster Menactra Dose #2 Shingrix #2 Hep B Dose #3   Menactra Dose #1 Shingrix #1 Bexsero Dose #2     Bexsero Dose #1 Hep B Dose #2      Hep B Dose #1            Ideally he can get as many immunizations as possible by 1/6/2021 (10 weeks prior to surgery). Vaccinations should not be given within 14 days of surgery (3/17/22 - scheduled). I routed a staff message to his care team and will connect back with Scar after I hear back from them on the plan as listed above.    Contacted [facility] to confirm vaccinations and spoke with Rosemary. They did give him Pnemovax-23 in December 2020 and Prevnar-13 in June 2021. She notes that he refused hepatitis B. They do not have the Shingrix, meningococcal, Hib, nor COVID-19 booster. He can get the hepatitis B series there if he would like, but the others are not available for them to give.

## 2021-11-10 ENCOUNTER — VIRTUAL VISIT (OUTPATIENT)
Dept: PHARMACY | Facility: CLINIC | Age: 61
End: 2021-11-10
Payer: COMMERCIAL

## 2021-11-10 DIAGNOSIS — K50.018 CROHN'S DISEASE OF SMALL INTESTINE WITH OTHER COMPLICATION (H): Primary | ICD-10-CM

## 2021-11-10 PROCEDURE — 99207 PR NO CHARGE LOS: CPT | Performed by: PHARMACIST

## 2021-11-16 ENCOUNTER — VIRTUAL VISIT (OUTPATIENT)
Dept: GASTROENTEROLOGY | Facility: CLINIC | Age: 61
End: 2021-11-16
Payer: COMMERCIAL

## 2021-11-16 DIAGNOSIS — K50.018 CROHN'S DISEASE OF SMALL INTESTINE WITH OTHER COMPLICATION (H): Primary | ICD-10-CM

## 2021-11-16 PROCEDURE — 99215 OFFICE O/P EST HI 40 MIN: CPT | Mod: GT | Performed by: INTERNAL MEDICINE

## 2021-11-16 NOTE — LETTER
11/16/2021         RE: Tacos Dominguez  [address] [facility] [address]        Dear Colleague,    Thank you for referring your patient, Tacos Dominguez, to the [facility] [place]. Please see a copy of my visit note below.    CD follow up    PATIENT: Tacos Dominguez    MRN: [id number]    Date of Birth 1960    Tel: There are no phone numbers on file.    PCP: Antonio Madrigal     HPI: Mr. Dominguez is a 61 year old male here to establish care for Crohn's disease.    In 1975, underwent an emergency appendectomy and was dx with CD at that time, underwent R crystal. Had multiple surgeries after then, 76, 77, 78, including LOAs and a cholecystectomy. Reports having 9 feet of bowel left, in total. Did well until 4/2006, at which time he had intermittent flares and underwent resection with a colostomy bag that was reversed in 11/2006.  At that time, he required TPN for weight loss.  Had a peristomal hernia that was not corrected in an effort to avoid further bowel loss. Previously saw Dr. Finnegan, 2013/2014.     Currently, has 4-5 BMs per day, loose. No blood. Occasional urgency based on diet. Weight has been stable around 75-80 kg.     Last cscope was in 10/2020 that showed ulcers in the TI and at the anastomosis.   Does have ESRD in the setting of CLARISA from afib and aortic dissection in April 2021.  Skin cancer (BCC) was removed on forearm 2 years.     Quit smoking in October 2020. Smoked about 1/2-3/4 ppd x 50 years.     Noteworthy diet history- well balanced    HBI  General well-being 0 = very well  Abdominal pain 0 = none  Number of liquid stools per day 4-5   Abdominal mass cannot assess virtually  Current Complications arthralgias (knees)    Constitutional symptoms:  Fever NO  Weight loss NO    Other GI symptoms present REFLUX SYMPTOMS - acid taste in mouth  Takes omeprazole 20 mg before breakfast      Yamil Classification  AGE AT DIAGNOSIS: A1 below 16  y  CURRENT DISEASE LOCATION: jejunum  L4 upper GI: YES possibly, given chronic gastritis seen on EGD in 10/2020  DISEASE BEHAVIOR (since disease onset): B2: stricturing  Perianal disease: NO    Total number of IBD surgeries (except perianal): multiple    Remaining bowel: 9 feet, per patient    Current IBD Medications:  none    Past IBD Medications:   Sulfasalazine in the 1970s  Remicade around early 2000s. Was  On this for at least 2 years. Stopped due to remission.   Azathioprine ~    Interval history, 21 (virtual)  Continues to feel well. Unchanged HBI from above.     Icscope in July showed active disease (below).     Cholestyramine was not very helpful for loose stools.   Smokes up to 2 cigs per day. Has tried Chantix and gum in the past. Will get OTC patches.     Met with Jayshree Jordan, Pharm D on 11/10 with recs for the following     -- COVID booster  -- pneumovax-23 Received 2020 per [facility].  -- hepatitis B series  -- Shingrix  -- Men ACWY/B  -- Hib    Past Medical History:   Diagnosis Date     Crohn's colitis (H)      Hypertension         Past Surgical History:   Procedure Laterality Date     COLONOSCOPY N/A 2021    Procedure: COLONOSCOPY, WITH POLYPECTOMY AND BIOPSY;  Surgeon: Eric Preston MD;  Location:  GI     CV CORONARY ANGIOGRAM N/A 2021    Procedure: CV CORONARY ANGIOGRAM;  Surgeon: Judson Ybarra MD;  Location:  HEART CARDIAC CATH LAB     ESOPHAGOSCOPY, GASTROSCOPY, DUODENOSCOPY (EGD), COMBINED N/A 2021    Procedure: ESOPHAGOGASTRODUODENOSCOPY, WITH FINE NEEDLE ASPIRATION BIOPSY, WITH ENDOSCOPIC ULTRASOUND GUIDANCE;  Surgeon: Eric Preston MD;  Location:  GI     VASCULAR SURGERY      dialysis access- left upper       Social History     Tobacco Use     Smoking status: Former Smoker     Packs/day: 0.75     Years: 50.00     Pack years: 37.50     Quit date: 10/19/2020     Years since quittin.0     Smokeless tobacco: Never Used   Substance Use  Topics     Alcohol use: Yes     Comment: rare       Family History   Problem Relation Age of Onset     Breast Cancer Mother      Coronary Artery Disease Father      Hypertension Brother        Allergies   Allergen Reactions     Lisinopril Anaphylaxis and Other (See Comments)     Shortness of breath        Outpatient Encounter Medications as of 11/16/2021   Medication Sig Dispense Refill     aspirin (ASA) 81 MG chewable tablet Take 81 mg by mouth       cholecalciferol 25 MCG (1000 UT) TABS Take 2,000 Units by mouth       magnesium oxide 400 MG CAPS Take 1 tablet by mouth       Melatonin 10 MG TABS tablet        multivitamin w/minerals (MULTI-VITAMIN) tablet        omeprazole (PRILOSEC) 20 MG DR capsule Take 20 mg by mouth       simvastatin (ZOCOR) 20 MG tablet TAKE ONE TABLET BY MOUTH IN THE EVENING. INDICATIONS: CEREBROVASCULAR ACCIDENT OR STROKE       No facility-administered encounter medications on file as of 11/16/2021.      NSAID  YES ASA 81 mg daily for heart health.     Review of Systems  Complete 10 System ROS performed. All are negative except as documented below, in the HPI, or in patient questionnaire from today's visit.    1) Constitutional: No fevers, chills, night sweats or malaise, weight loss or gain  2) Skin: No rash  3) Pulmonary: No wheeze, SOB, cough, sputum or hemoptysis  4) Cardiovascular: No Chest pain or palpitations  5) Genitourinary: No blood in urine or dysuria  6) Endocrine: No increased sweating, hunger, thirst or thyroid problems  7) Hematologic: No bruising and easy bleeding  8) Musculoskeletal: no new pain in joints or limitation in ROM  9) Neurologic: No dizziness, paresthesias or weakness or falls  10) Psychiatric:  not depressed/anxious, no sleep problems    PHYSICAL EXAM  General appearance  Healthy appearing adult, in no acute distress     Eyes  Sclera anicteric  Pupils round and reactive to light     Ears, nose, mouth and throat  No obvious external lesions of ears and  nose  Hearing intact     Neck  Symmetric  No obvious external lesions     Respiratory  Normal respiration, no use of accessory muscles      MSK  Gait normal     Skin  No rashes or jaundice      Psychiatric  Oriented to person, place and time  Appropriate mood and affect.       DATA:  Reviewed in detail past documentation, medications and prior workup available in electronic health records or through outside records.    PERTINENT STUDIES:  Most recent CBC:  WBC   Date Value Ref Range Status   05/25/2021 7.2 4.0 - 11.0 10e9/L Final   ]  Hemoglobin   Date Value Ref Range Status   05/25/2021 10.6 (L) 13.3 - 17.7 g/dL Final   ]   Platelet Count   Date Value Ref Range Status   05/25/2021 224 150 - 450 10e9/L Final       Most recent coag:  INR   Date Value Ref Range Status   05/25/2021 1.33 (H) 0.86 - 1.14 Final       Most recent hepatic panel:  AST   Date Value Ref Range Status   03/25/2021 16 0 - 45 U/L Final     ALT   Date Value Ref Range Status   03/25/2021 22 0 - 70 U/L Final     Bilirubin Conjugated   Date Value Ref Range Status   11/13/2008 0.0 0.0 - 0.3 mg/dL Final      Bilirubin Total   Date Value Ref Range Status   03/25/2021 0.5 0.2 - 1.3 mg/dL Final     Albumin   Date Value Ref Range Status   03/25/2021 3.4 3.4 - 5.0 g/dL Final     Alkaline Phosphatase   Date Value Ref Range Status   03/25/2021 91 40 - 150 U/L Final       Most recent creatinine:  Creatinine   Date Value Ref Range Status   05/25/2021 10.60 (H) 0.66 - 1.25 mg/dL Final     Endoscopy:   7/2021:   - Normal perirectal exam.                        - Apparenty end-end ileocolonic anastomosis at                        approximately 70 cm (likely distal transverse colon).                        - 15 mm ulcer in the terminal ileum. Additional                        cobblestoning in this region. Biopsies obtained.                        - Unusual diverticulae in the distal 5-10 cm of the                        rectum without ulceration. Perhaps related to  past                        fistulous disease.     PATH:  A. TERMINAL ILEUM, BIOPSY:  Severe ileitis with ulceration and granulation tissue; consistent with severe chronic (Crohn) ileitis; negative for dysplasia; report of CMV immunohistochemistry to follow     B. TERMINAL ILEUM, LABELED BIOPSY OF ULCER:  Chronic active ileitis with granulomas; consistent with mildly active Crohn; no ulceration or dysplasia identified    10/2020:  EGD: chronic gastritis. Bx neg for HP.  Cscope: ulcers in small intestine and at anastomosis. Bx normal.    Imaging:  CT a/p:    IMPRESSION:   1. Marked dilation of the main pancreatic duct up to 2.8 cm in the  pancreatic tail with associated parenchymal atrophy, raising concern  for main duct type IPMN, though sequela of prior pancreatitis could  have a similar appearance. Follow-up GI consultation as directed  below.  2. Atheromatous changes of the infrarenal aorta and iliac arteries.  Iliac arteries demonstrating a medial and posterior predominance of  calcification, greater on the right.   3. Multiple simple and hemorrhagic/proteinaceous cysts arising from  the atrophic kidneys; no evidence of abnormal enhancement to suggest  neoplasm.  4. Incidentally noted sigmoid pneumatosis without abnormal  enhancement, wall thickening, or associated portal venous gas. This is  likely benign idiopathic pneumatosis in the asymptomatic patient,  though recommend correlation with clinical exam and lactate levels as  bowel ischemia can have similar findings.  5. Slightly increased bowel containing right anterolateral abdominal  wall hernia.    IMPRESSION:    Mr. Dominguez is a 61 year old here with long-standing Crohn's disease s/p multiple bowel resections with minimal remaining bowel. Fortunately, he is not requiring TPN and stable in weight. He is not on any maintenance CD medications. His last endoscopic examination in July 2021 showed active disease with ulceration in the ileum, with bx confirming  severe inflammation. Given active disease, we will initiate biologic therapy. We discussed various options and due to the excellent safety profile and effectiveness, we will proceed with Stelara.     # Active Crohn's disease, dx age 16, s/p multiple bowel resections, on no maintenance therapy    PLAN:  ---Start Stelara   -------Aim for first infusion to be on Dec 23, first injection on Feb 17. This way, he will be 4 weeks from his injection at the time of his scheduled distal pancreatectomy (with possible splenectomy).   ---Smoking cessation encouraged   ---Continue to avoid NSAIDs  ---Vaccines recommended per Jayshree Jordan PharmD (Jayshree is looking into whether or not he can get these at [facility]):     -- COVID booster  -- pneumovax-23 Received 12/2020 per [facility].  -- hepatitis B series  -- Shingrix  -- Men ACWY/B  -- Hib    We discussed the risks, benefits and alternatives to Stelara (ustekinumab) for Crohn's disease. Risks discussed include risk of infections and reactivation of latent infections. Serious infections (bacterial, fungal and viral) were observed in clinical trials, but not at increased rates over placebo. There is a theoretical risk for mycobacterial infections and salmonella. Pre-treatment evaluation for TB will be done to minimize risk. Malignancies were reported in some patients in clinical trials, but not at increased frequency compared to placebo. Hypersensitivity reactions, including anaphylaxis, are reported but are rare. One case of reversible posterior leukoencephalopathy syndrome (RPLS) was observed in clinical studies of psoriasis and psoriatic arthritis, however no cases have been observed in Crohn's disease. Patients to be treated with Stelara should have all age-appropriate immunizations, but should not be given BCG during treatment or one year following discontinuation of Stelara. There is slight increased risk for nasopharyngitis, injection site reactions, and vuvlovaginal  candidiasis, UTI, sinusitis, bronchitis. Listeria meningitis and ophthalmic herpes were reported in 1 patient each. Approximately 3% of patients develop antibodies to Stelara. There is limited information to guide use of Stelara during pregnancy. No adverse developmental effects were observed in monkeys exposed to doses 100 time greater than use. The patient was instructed to call immediately for signs or symptoms of infection, such as fever, sweats, chills, muscle aches, cough, shortness of breath, blood in phlegm, weight loss, warm, red or painful skin or sores, burning with urination or frequent urination, severe fatigue. Patient was also advised to call if they know they have TB or have been in close contact with someone with TB. Patient was also instructed to call immediately for headaches, seizures, confusion or vision problems. Dosing is given as intravenous load of 260 mg for patients up to 55 kg, 390 mg for patients >55 kg to 85 kg, and 520 mg for patients greater than 85 kg.     Return in about 2 months (around 1/16/2022) for already scheduled with me, Follow up.    Betty Rubio MD   of Medicine  Division of Gastroenterology, Hepatology and Nutrition  [facility]    40 minutes spent on the date of the encounter performing chart review, history and exam, documentation and further activities as noted above.          Again, thank you for allowing me to participate in the care of your patient.      Sincerely,    Betty Rubio MD

## 2021-11-16 NOTE — PATIENT INSTRUCTIONS
PLAN  ---Start Stelara   -------Aim for first infusion to be on Dec 23, first injection on Feb 17   MyChart lit on Stelara   ---Smoking cessation encouraged   ---Continue to avoid NSAIDs  ---Vaccines recommended per Jayshree Jordan, PharmD:     -- COVID booster  -- pneumovax-23 Received 12/2020 per [facility].  -- hepatitis B series  -- Shingrix  -- Men ACWY/B  -- Hib    We discussed the risks, benefits and alternatives to Stelara (ustekinumab) for Crohn's disease. Risks discussed include risk of infections and reactivation of latent infections. Serious infections (bacterial, fungal and viral) were observed in clinical trials, but not at increased rates over placebo. There is a theoretical risk for mycobacterial infections and salmonella. Pre-treatment evaluation for TB will be done to minimize risk. Malignancies were reported in some patients in clinical trials, but not at increased frequency compared to placebo. Hypersensitivity reactions, including anaphylaxis, are reported but are rare. One case of reversible posterior leukoencephalopathy syndrome (RPLS) was observed in clinical studies of psoriasis and psoriatic arthritis, however no cases have been observed in Crohn's disease. Patients to be treated with Stelara should have all age-appropriate immunizations, but should not be given BCG during treatment or one year following discontinuation of Stelara. There is slight increased risk for nasopharyngitis, injection site reactions, and vuvlovaginal candidiasis, UTI, sinusitis, bronchitis. Listeria meningitis and ophthalmic herpes were reported in 1 patient each. Approximately 3% of patients develop antibodies to Stelara. There is limited information to guide use of Stelara during pregnancy. No adverse developmental effects were observed in monkeys exposed to doses 100 time greater than use. The patient was instructed to call immediately for signs or symptoms of infection, such as fever, sweats, chills, muscle aches,  cough, shortness of breath, blood in phlegm, weight loss, warm, red or painful skin or sores, burning with urination or frequent urination, severe fatigue. Patient was also advised to call if they know they have TB or have been in close contact with someone with TB. Patient was also instructed to call immediately for headaches, seizures, confusion or vision problems. Dosing is given as intravenous load of 260 mg for patients up to 55 kg, 390 mg for patients >55 kg to 85 kg, and 520 mg for patients greater than 85 kg.

## 2021-11-16 NOTE — PROGRESS NOTES
Scar is a 61 year old who is being evaluated via a billable video visit.      How would you like to obtain your AVS? MyChart  If the video visit is dropped, the invitation should be resent by: Text to cell phone: [phone]  Will anyone else be joining your video visit? No      Video Start Time: 12:04 PM  Video-Visit Details    Type of service:  Video Visit    Video End Time: 1:34 PM    Originating Location (pt. Location): Home    Distant Location (provider location):  [facility] [place]     Platform used for Video Visit: [employer]       CD follow up    PATIENT: Tacos Dominguez    MRN: [id number]    Date of Birth 1960    Tel: There are no phone numbers on file.    PCP: Antonio Madrigal     HPI: Mr. Dominguez is a 61 year old male here to establish care for Crohn's disease.    In 1975, underwent an emergency appendectomy and was dx with CD at that time, underwent R crystal. Had multiple surgeries after then, 76, 77, 78, including LOAs and a cholecystectomy. Reports having 9 feet of bowel left, in total. Did well until 4/2006, at which time he had intermittent flares and underwent resection with a colostomy bag that was reversed in 11/2006.  At that time, he required TPN for weight loss.  Had a peristomal hernia that was not corrected in an effort to avoid further bowel loss. Previously saw Dr. Finnegan, 2013/2014.     Currently, has 4-5 BMs per day, loose. No blood. Occasional urgency based on diet. Weight has been stable around 75-80 kg.     Last cscope was in 10/2020 that showed ulcers in the TI and at the anastomosis.   Does have ESRD in the setting of CLARISA from afib and aortic dissection in April 2021.  Skin cancer (BCC) was removed on forearm 2 years.     Quit smoking in October 2020. Smoked about 1/2-3/4 ppd x 50 years.     Noteworthy diet history- well balanced    HBI  General well-being 0 = very well  Abdominal pain 0 = none  Number of liquid stools per day 4-5    Abdominal mass cannot assess virtually  Current Complications arthralgias (knees)    Constitutional symptoms:  Fever NO  Weight loss NO    Other GI symptoms present REFLUX SYMPTOMS - acid taste in mouth  Takes omeprazole 20 mg before breakfast      [place] Classification  AGE AT DIAGNOSIS: A1 below 16 y  CURRENT DISEASE LOCATION: jejunum  L4 upper GI: YES possibly, given chronic gastritis seen on EGD in 10/2020  DISEASE BEHAVIOR (since disease onset): B2: stricturing  Perianal disease: NO    Total number of IBD surgeries (except perianal): multiple    Remaining bowel: 9 feet, per patient    Current IBD Medications:  none    Past IBD Medications:   Sulfasalazine in the 1970s  Remicade around early 2000s. Was  On this for at least 2 years. Stopped due to remission.   Azathioprine ~2014    Interval history, 11/16/21 (virtual)  Continues to feel well. Unchanged HBI from above.     Icscope in July showed active disease (below).     Cholestyramine was not very helpful for loose stools.   Smokes up to 2 cigs per day. Has tried Chantix and gum in the past. Will get OTC patches.     Met with Jayshree Jordan, Pharm D on 11/10 with recs for the following     -- COVID booster  -- pneumovax-23 Received 12/2020 per B[facility].  -- hepatitis B series  -- Shingrix  -- Men ACWY/B  -- Hib    Past Medical History:   Diagnosis Date     Crohn's colitis (H)      Hypertension         Past Surgical History:   Procedure Laterality Date     COLONOSCOPY N/A 7/20/2021    Procedure: COLONOSCOPY, WITH POLYPECTOMY AND BIOPSY;  Surgeon: Eric Preston MD;  Location:  GI     CV CORONARY ANGIOGRAM N/A 5/25/2021    Procedure: CV CORONARY ANGIOGRAM;  Surgeon: Judson Ybarra MD;  Location:  HEART CARDIAC CATH LAB     ESOPHAGOSCOPY, GASTROSCOPY, DUODENOSCOPY (EGD), COMBINED N/A 7/20/2021    Procedure: ESOPHAGOGASTRODUODENOSCOPY, WITH FINE NEEDLE ASPIRATION BIOPSY, WITH ENDOSCOPIC ULTRASOUND GUIDANCE;  Surgeon: Eric Preston MD;   Location:  GI     VASCULAR SURGERY      dialysis access- left upper       Social History     Tobacco Use     Smoking status: Former Smoker     Packs/day: 0.75     Years: 50.00     Pack years: 37.50     Quit date: 10/19/2020     Years since quittin.0     Smokeless tobacco: Never Used   Substance Use Topics     Alcohol use: Yes     Comment: rare       Family History   Problem Relation Age of Onset     Breast Cancer Mother      Coronary Artery Disease Father      Hypertension Brother        Allergies   Allergen Reactions     Lisinopril Anaphylaxis and Other (See Comments)     Shortness of breath        Outpatient Encounter Medications as of 2021   Medication Sig Dispense Refill     aspirin (ASA) 81 MG chewable tablet Take 81 mg by mouth       cholecalciferol 25 MCG (1000 UT) TABS Take 2,000 Units by mouth       magnesium oxide 400 MG CAPS Take 1 tablet by mouth       Melatonin 10 MG TABS tablet        multivitamin w/minerals (MULTI-VITAMIN) tablet        omeprazole (PRILOSEC) 20 MG DR capsule Take 20 mg by mouth       simvastatin (ZOCOR) 20 MG tablet TAKE ONE TABLET BY MOUTH IN THE EVENING. INDICATIONS: CEREBROVASCULAR ACCIDENT OR STROKE       No facility-administered encounter medications on file as of 2021.      NSAID  YES ASA 81 mg daily for heart health.     Review of Systems  Complete 10 System ROS performed. All are negative except as documented below, in the HPI, or in patient questionnaire from today's visit.    1) Constitutional: No fevers, chills, night sweats or malaise, weight loss or gain  2) Skin: No rash  3) Pulmonary: No wheeze, SOB, cough, sputum or hemoptysis  4) Cardiovascular: No Chest pain or palpitations  5) Genitourinary: No blood in urine or dysuria  6) Endocrine: No increased sweating, hunger, thirst or thyroid problems  7) Hematologic: No bruising and easy bleeding  8) Musculoskeletal: no new pain in joints or limitation in ROM  9) Neurologic: No dizziness, paresthesias or  weakness or falls  10) Psychiatric:  not depressed/anxious, no sleep problems    PHYSICAL EXAM  General appearance  Healthy appearing adult, in no acute distress     Eyes  Sclera anicteric  Pupils round and reactive to light     Ears, nose, mouth and throat  No obvious external lesions of ears and nose  Hearing intact     Neck  Symmetric  No obvious external lesions     Respiratory  Normal respiration, no use of accessory muscles      MSK  Gait normal     Skin  No rashes or jaundice      Psychiatric  Oriented to person, place and time  Appropriate mood and affect.       DATA:  Reviewed in detail past documentation, medications and prior workup available in electronic health records or through outside records.    PERTINENT STUDIES:  Most recent CBC:  WBC   Date Value Ref Range Status   05/25/2021 7.2 4.0 - 11.0 10e9/L Final   ]  Hemoglobin   Date Value Ref Range Status   05/25/2021 10.6 (L) 13.3 - 17.7 g/dL Final   ]   Platelet Count   Date Value Ref Range Status   05/25/2021 224 150 - 450 10e9/L Final       Most recent coag:  INR   Date Value Ref Range Status   05/25/2021 1.33 (H) 0.86 - 1.14 Final       Most recent hepatic panel:  AST   Date Value Ref Range Status   03/25/2021 16 0 - 45 U/L Final     ALT   Date Value Ref Range Status   03/25/2021 22 0 - 70 U/L Final     Bilirubin Conjugated   Date Value Ref Range Status   11/13/2008 0.0 0.0 - 0.3 mg/dL Final      Bilirubin Total   Date Value Ref Range Status   03/25/2021 0.5 0.2 - 1.3 mg/dL Final     Albumin   Date Value Ref Range Status   03/25/2021 3.4 3.4 - 5.0 g/dL Final     Alkaline Phosphatase   Date Value Ref Range Status   03/25/2021 91 40 - 150 U/L Final       Most recent creatinine:  Creatinine   Date Value Ref Range Status   05/25/2021 10.60 (H) 0.66 - 1.25 mg/dL Final     Endoscopy:   7/2021:   - Normal perirectal exam.                        - Apparenty end-end ileocolonic anastomosis at                        approximately 70 cm (likely distal  transverse colon).                        - 15 mm ulcer in the terminal ileum. Additional                        cobblestoning in this region. Biopsies obtained.                        - Unusual diverticulae in the distal 5-10 cm of the                        rectum without ulceration. Perhaps related to past                        fistulous disease.     PATH:  A. TERMINAL ILEUM, BIOPSY:  Severe ileitis with ulceration and granulation tissue; consistent with severe chronic (Crohn) ileitis; negative for dysplasia; report of CMV immunohistochemistry to follow     B. TERMINAL ILEUM, LABELED BIOPSY OF ULCER:  Chronic active ileitis with granulomas; consistent with mildly active Crohn; no ulceration or dysplasia identified    10/2020:  EGD: chronic gastritis. Bx neg for HP.  Cscope: ulcers in small intestine and at anastomosis. Bx normal.    Imaging:  CT a/p:    IMPRESSION:   1. Marked dilation of the main pancreatic duct up to 2.8 cm in the  pancreatic tail with associated parenchymal atrophy, raising concern  for main duct type IPMN, though sequela of prior pancreatitis could  have a similar appearance. Follow-up GI consultation as directed  below.  2. Atheromatous changes of the infrarenal aorta and iliac arteries.  Iliac arteries demonstrating a medial and posterior predominance of  calcification, greater on the right.   3. Multiple simple and hemorrhagic/proteinaceous cysts arising from  the atrophic kidneys; no evidence of abnormal enhancement to suggest  neoplasm.  4. Incidentally noted sigmoid pneumatosis without abnormal  enhancement, wall thickening, or associated portal venous gas. This is  likely benign idiopathic pneumatosis in the asymptomatic patient,  though recommend correlation with clinical exam and lactate levels as  bowel ischemia can have similar findings.  5. Slightly increased bowel containing right anterolateral abdominal  wall hernia.    IMPRESSION:    Mr. Dominguez is a 61 year old here with  long-standing Crohn's disease s/p multiple bowel resections with minimal remaining bowel. Fortunately, he is not requiring TPN and stable in weight. He is not on any maintenance CD medications. His last endoscopic examination in July 2021 showed active disease with ulceration in the ileum, with bx confirming severe inflammation. Given active disease, we will initiate biologic therapy. We discussed various options and due to the excellent safety profile and effectiveness, we will proceed with Stelara.     # Active Crohn's disease, dx age 16, s/p multiple bowel resections, on no maintenance therapy    PLAN:  ---Start Stelara   -------Aim for first infusion to be on Dec 23, first injection on Feb 17. This way, he will be 4 weeks from his injection at the time of his scheduled distal pancreatectomy (with possible splenectomy).   ---Smoking cessation encouraged   ---Continue to avoid NSAIDs  ---Vaccines recommended per Jayshree Jordan PharmD (Jayshree is looking into whether or not he can get these at [facility]):     -- COVID booster  -- pneumovax-23 Received 12/2020 per [facility].  -- hepatitis B series  -- Shingrix  -- Men ACWY/B  -- Hib    We discussed the risks, benefits and alternatives to Stelara (ustekinumab) for Crohn's disease. Risks discussed include risk of infections and reactivation of latent infections. Serious infections (bacterial, fungal and viral) were observed in clinical trials, but not at increased rates over placebo. There is a theoretical risk for mycobacterial infections and salmonella. Pre-treatment evaluation for TB will be done to minimize risk. Malignancies were reported in some patients in clinical trials, but not at increased frequency compared to placebo. Hypersensitivity reactions, including anaphylaxis, are reported but are rare. One case of reversible posterior leukoencephalopathy syndrome (RPLS) was observed in clinical studies of psoriasis and psoriatic arthritis, however no cases have been  observed in Crohn's disease. Patients to be treated with Stelara should have all age-appropriate immunizations, but should not be given BCG during treatment or one year following discontinuation of Stelara. There is slight increased risk for nasopharyngitis, injection site reactions, and vuvlovaginal candidiasis, UTI, sinusitis, bronchitis. Listeria meningitis and ophthalmic herpes were reported in 1 patient each. Approximately 3% of patients develop antibodies to Stelara. There is limited information to guide use of Stelara during pregnancy. No adverse developmental effects were observed in monkeys exposed to doses 100 time greater than use. The patient was instructed to call immediately for signs or symptoms of infection, such as fever, sweats, chills, muscle aches, cough, shortness of breath, blood in phlegm, weight loss, warm, red or painful skin or sores, burning with urination or frequent urination, severe fatigue. Patient was also advised to call if they know they have TB or have been in close contact with someone with TB. Patient was also instructed to call immediately for headaches, seizures, confusion or vision problems. Dosing is given as intravenous load of 260 mg for patients up to 55 kg, 390 mg for patients >55 kg to 85 kg, and 520 mg for patients greater than 85 kg.     Return in about 2 months (around 1/16/2022) for already scheduled with me, Follow up.    Betty Rubio MD   of Medicine  Division of Gastroenterology, Hepatology and Nutrition  [facility]    40 minutes spent on the date of the encounter performing chart review, history and exam, documentation and further activities as noted above.

## 2021-11-17 ENCOUNTER — TELEPHONE (OUTPATIENT)
Dept: GASTROENTEROLOGY | Facility: CLINIC | Age: 61
End: 2021-11-17
Payer: COMMERCIAL

## 2021-11-17 NOTE — PATIENT INSTRUCTIONS
It was nice speaking with you today.     1. I will connect with [facility] to confirm the vaccinations they have given you recently. I will then outline a plan for vaccinations with your care team and connect back with you once we have a set plan.    Please let me know if you have any questions or concerns.    Jayshree Jordan PharmD, BCACP  MTM Pharmacist   [facility] Gastroenterology and Rheumatology  Phone: [phone]

## 2021-11-17 NOTE — TELEPHONE ENCOUNTER
You can go to any [facility] facility to have these drawn.  Please know that many [facility] facilities across the [facility] have changed hours and guidelines for entering the building due to the COVID-19 virus.  Please call ahead to make a lab  appointment prior to have your labs drawn.  If you are going to the clinics and surgery center to have your lab drawn you can call [phone]

## 2021-11-17 NOTE — TELEPHONE ENCOUNTER
----- Message from Jayshree Jordan [facility] sent at 11/16/2021  8:02 PM CST -----  Regarding: RE: Upcoming surgery and possibly starting biologics  Hello,    After our meeting and speaking with his dialysis center regarding vaccinations, here is what I have outlined to get him as caught up as possible before surgery. Scar is on board with grouping multiple vaccines in the same day.     Now: Hib, Menactra dose #1, Bexsero dose #1, Hep B dose #1    Week 4: COVID-19 booster, Shingrix dose #1, Hep B dose #2    Week 8: Menactra dose #2, Bexsero dose #2     Week 12: Shingrix dose #2    Post-splenectomy: Hep B dose #3    Unfortunately [facility] is only able to give him the hepatitis B series, so we will try to help him get vaccinated through primary care. Please let me know if you have any concerns or other feedback.     His pneumococcal status is considered up-to-date, though Brayan gave Prevnar-13 too close to his Pneumovax-23.     Jayshree Jordan PharmD, BCACP  MTM Pharmacist   [facility] Gastroenterology and Rheumatology  Phone: [phone]  ----- Message -----  From: Priscilla Benitez RN  Sent: 11/3/2021  12:12 PM CST  To: Eric Preston MD, Sarath Smith MD, #  Subject: RE: Upcoming surgery and possibly starting b#    He is on the books for surgery in March with our team.     ThanksKJ   ----- Message -----  From: Sarath Smith MD  Sent: 11/3/2021  11:58 AM CDT  To: Eric Preston MD, Betty Rubio MD, #  Subject: RE: Upcoming surgery and possibly starting b#    Timing sounds OK to me.  I agree that he should get his vaccines prior to starting if we can...    HIB, meningococcus, and pneumococcus  ThanksE  ----- Message -----  From: Betty Rubio MD  Sent: 11/2/2021   2:14 PM CDT  To: Eric Preston MD, Sarath Smith MD, #  Subject: RE: Upcoming surgery and possibly starting b#    Moris Buchanan,    Thank you for the follow up. Based on your colonoscopy findings, he does have active  Crohn's disease and I think we should start him on Stelara. We can time this so that he is 4 weeks out from his dose before and after the surgery. Sarath, are you OK with this?    I should have seen him in Sep, but for some reason he his scheduled to see me in January. Domi -- can you please move up his appointment with me so that I see him in the next 2-3 weeks and discuss Stelara at that appointment?    I briefly discussed him with Jayshree Jordan, PharmD, and she will plan to see Scar to discuss vaccines prior to his possible splenectomy and biologic start.    Thanks,  Betty  ----- Message -----  From: Eric Preston MD  Sent: 11/1/2021   8:29 AM CDT  To: Sarath Smith MD, Betty Rubio MD  Subject: Upcoming surgery and possibly starting biolo#    Sarath/Betty:    This pt has active Crohn's by colonoscopy in July and will be planning distal pancreatectomy in March, possibly requiring splenectomy.    My understanding from Betty's notes is that she may be starting a biologic.  I wanted to raised the issue to both of you re whether pt should receive splenectomy vaccinations prior to starting the biologics and also whether the planned surgery would influence timing of starting this.    Dewayne Preston

## 2021-11-22 ENCOUNTER — TELEPHONE (OUTPATIENT)
Dept: GASTROENTEROLOGY | Facility: CLINIC | Age: 61
End: 2021-11-22
Payer: COMMERCIAL

## 2021-11-22 DIAGNOSIS — K50.10 CROHN'S DISEASE OF LARGE INTESTINE (H): ICD-10-CM

## 2021-11-22 NOTE — TELEPHONE ENCOUNTER
Orders placed for infusion therapy to obtain coverage for stelara for this patient at the [facility]      ----- Message from Betty Rubio MD sent at 11/18/2021  9:55 AM CST -----  Regarding: RE: Upcoming surgery and possibly starting biologics  Wonderful, thank you very much Jayshree, for being so thorough!   Scar will work with his PCP to get this done, correct? Should we contact his PCP re: this?  ----- Message -----  From: Jayshree Jordan, [facility]  Sent: 11/16/2021   8:15 PM CST  To: Eric Preston MD, Sarath Smith MD, #  Subject: RE: Upcoming surgery and possibly starting b#    Hello,    After our meeting and speaking with his dialysis center regarding vaccinations, here is what I have outlined to get him as caught up as possible before surgery. Scar is on board with grouping multiple vaccines in the same day.     Now: Hib, Menactra dose #1, Bexsero dose #1, Hep B dose #1    Week 4: COVID-19 booster, Shingrix dose #1, Hep B dose #2    Week 8: Menactra dose #2, Bexsero dose #2     Week 12: Shingrix dose #2    Post-splenectomy: Hep B dose #3    Unfortunately [facility] is only able to give him the hepatitis B series, so we will try to help him get vaccinated through primary care. Please let me know if you have any concerns or other feedback.     His pneumococcal status is considered up-to-date, though Brayan gave Prevnar-13 too close to his Pneumovax-23.     Jayshree Jordan PharmD, BCACP  MTM Pharmacist   [facility] Gastroenterology and Rheumatology  Phone: [phone]  ----- Message -----  From: Priscilla Benitez, RN  Sent: 11/3/2021  12:12 PM CST  To: Eric Preston MD, Sarath Smith MD, #  Subject: RE: Upcoming surgery and possibly starting b#    He is on the books for surgery in March with our team.     ThanksKJ   ----- Message -----  From: Sarath Smith MD  Sent: 11/3/2021  11:58 AM CDT  To: Eric Preston MD, Betty Rubio MD, #  Subject: RE: Upcoming surgery and possibly  starting b#    Timing sounds OK to me.  I agree that he should get his vaccines prior to starting if we can...    HIB, meningococcus, and pneumococcus  HectorCHEY  ----- Message -----  From: Betty Rubio MD  Sent: 11/2/2021   2:14 PM CDT  To: Eric Preston MD, Sarath Smith MD, #  Subject: RE: Upcoming surgery and possibly starting b#    Moris Buchanan,    Thank you for the follow up. Based on your colonoscopy findings, he does have active Crohn's disease and I think we should start him on Stelara. We can time this so that he is 4 weeks out from his dose before and after the surgery. Sarath, are you OK with this?    I should have seen him in Sep, but for some reason he his scheduled to see me in January. Domi -- can you please move up his appointment with me so that I see him in the next 2-3 weeks and discuss Stelara at that appointment?    I briefly discussed him with Jayshree Jordan, Danielle, and she will plan to see Scar to discuss vaccines prior to his possible splenectomy and biologic start.    Betty Young  ----- Message -----  From: Eric Preston MD  Sent: 11/1/2021   8:29 AM CDT  To: Sarath Smith MD, Betty Rubio MD  Subject: Upcoming surgery and possibly starting biolo#    Sarath/Betty:    This pt has active Crohn's by colonoscopy in July and will be planning distal pancreatectomy in March, possibly requiring splenectomy.    My understanding from Betty's notes is that she may be starting a biologic.  I wanted to raised the issue to both of you re whether pt should receive splenectomy vaccinations prior to starting the biologics and also whether the planned surgery would influence timing of starting this.    Dewayne Preston

## 2021-12-01 ENCOUNTER — TELEPHONE (OUTPATIENT)
Dept: GASTROENTEROLOGY | Facility: CLINIC | Age: 61
End: 2021-12-01
Payer: COMMERCIAL

## 2021-12-01 NOTE — TELEPHONE ENCOUNTER
PA Initiation    Medication: STELARA  Insurance Company: [employer] ([facility]) - Phone [phone] Fax [phone]  Pharmacy Filling the Rx: [facility] - [place], [place] - [address]  Filling Pharmacy Phone:    Filling Pharmacy Fax:    Start Date: 12/1/2021    KRAIG CHAUDHARI ([place]: [id number])

## 2021-12-06 NOTE — TELEPHONE ENCOUNTER
Faxed letter back to insurance regarding letter insurance sent outlining reason why patient cannot obtain Stelara - all reasons do not make sense.

## 2021-12-06 NOTE — TELEPHONE ENCOUNTER
FUTURE VISIT INFORMATION      SURGERY INFORMATION:    Date: 3/17/22    Location: [facility]u or    Surgeon:  Sarath Smith MD    Anesthesia Type:  general    Procedure: OPEN DISTAL PANCREATECTOMY WITH SPLENECTOMY    RECORDS REQUESTED FROM:       Primary Care Provider: Antonio Hawthorne MD- [facility]    Pertinent Medical History: atrial fibrillation, hypertension    Most recent EKG+ Tracin21    Most recent ECHO: 3/25/21    Most recent Cardiac Stress Test: 10/29/20- [facility]    Most recent Coronary Angiogram: 21

## 2021-12-13 ENCOUNTER — TELEPHONE (OUTPATIENT)
Dept: GASTROENTEROLOGY | Facility: CLINIC | Age: 61
End: 2021-12-13
Payer: COMMERCIAL

## 2021-12-13 NOTE — TELEPHONE ENCOUNTER
Called insurance ([employer] line) - spoke to Chaitanya.  He stated that the appeal is under review with case # [id number] - decision is set for 12/17/21

## 2021-12-17 ENCOUNTER — PATIENT OUTREACH (OUTPATIENT)
Dept: GASTROENTEROLOGY | Facility: CLINIC | Age: 61
End: 2021-12-17
Payer: COMMERCIAL

## 2021-12-22 ENCOUNTER — PATIENT OUTREACH (OUTPATIENT)
Dept: GASTROENTEROLOGY | Facility: CLINIC | Age: 61
End: 2021-12-22
Payer: COMMERCIAL

## 2021-12-22 DIAGNOSIS — K50.10 CROHN'S DISEASE OF LARGE INTESTINE (H): Primary | ICD-10-CM

## 2021-12-22 RX ORDER — ALBUTEROL SULFATE 90 UG/1
1-2 AEROSOL, METERED RESPIRATORY (INHALATION)
Status: CANCELLED
Start: 2021-12-22

## 2021-12-22 RX ORDER — METHYLPREDNISOLONE SODIUM SUCCINATE 125 MG/2ML
125 INJECTION, POWDER, LYOPHILIZED, FOR SOLUTION INTRAMUSCULAR; INTRAVENOUS
Status: CANCELLED
Start: 2021-12-22

## 2021-12-22 RX ORDER — NALOXONE HYDROCHLORIDE 0.4 MG/ML
0.2 INJECTION, SOLUTION INTRAMUSCULAR; INTRAVENOUS; SUBCUTANEOUS
Status: CANCELLED | OUTPATIENT
Start: 2021-12-22

## 2021-12-22 RX ORDER — ALBUTEROL SULFATE 0.83 MG/ML
2.5 SOLUTION RESPIRATORY (INHALATION)
Status: CANCELLED | OUTPATIENT
Start: 2021-12-22

## 2021-12-22 RX ORDER — DIPHENHYDRAMINE HYDROCHLORIDE 50 MG/ML
50 INJECTION INTRAMUSCULAR; INTRAVENOUS
Status: CANCELLED
Start: 2021-12-22

## 2021-12-22 RX ORDER — HEPARIN SODIUM,PORCINE 10 UNIT/ML
5 VIAL (ML) INTRAVENOUS
Status: CANCELLED | OUTPATIENT
Start: 2021-12-22

## 2021-12-22 RX ORDER — EPINEPHRINE 1 MG/ML
0.3 INJECTION, SOLUTION, CONCENTRATE INTRAVENOUS EVERY 5 MIN PRN
Status: CANCELLED | OUTPATIENT
Start: 2021-12-22

## 2021-12-22 RX ORDER — MEPERIDINE HYDROCHLORIDE 25 MG/ML
25 INJECTION INTRAMUSCULAR; INTRAVENOUS; SUBCUTANEOUS EVERY 30 MIN PRN
Status: CANCELLED | OUTPATIENT
Start: 2021-12-22

## 2021-12-22 RX ORDER — HEPARIN SODIUM (PORCINE) LOCK FLUSH IV SOLN 100 UNIT/ML 100 UNIT/ML
5 SOLUTION INTRAVENOUS
Status: CANCELLED | OUTPATIENT
Start: 2021-12-22

## 2021-12-23 ENCOUNTER — INFUSION THERAPY VISIT (OUTPATIENT)
Dept: INFUSION THERAPY | Facility: CLINIC | Age: 61
End: 2021-12-23
Attending: INTERNAL MEDICINE
Payer: COMMERCIAL

## 2021-12-23 VITALS
WEIGHT: 179.1 LBS | BODY MASS INDEX: 24.98 KG/M2 | OXYGEN SATURATION: 96 % | TEMPERATURE: 97.9 F | HEART RATE: 82 BPM | DIASTOLIC BLOOD PRESSURE: 80 MMHG | SYSTOLIC BLOOD PRESSURE: 140 MMHG

## 2021-12-23 DIAGNOSIS — K50.119 CROHN'S DISEASE OF LARGE INTESTINE WITH COMPLICATION (H): Primary | ICD-10-CM

## 2021-12-23 PROCEDURE — 258N000003 HC RX IP 258 OP 636: Performed by: INTERNAL MEDICINE

## 2021-12-23 PROCEDURE — 96365 THER/PROPH/DIAG IV INF INIT: CPT

## 2021-12-23 PROCEDURE — 250N000011 HC RX IP 250 OP 636: Performed by: INTERNAL MEDICINE

## 2021-12-23 RX ORDER — HEPARIN SODIUM,PORCINE 10 UNIT/ML
5 VIAL (ML) INTRAVENOUS
Status: CANCELLED | OUTPATIENT
Start: 2021-12-23

## 2021-12-23 RX ORDER — MEPERIDINE HYDROCHLORIDE 25 MG/ML
25 INJECTION INTRAMUSCULAR; INTRAVENOUS; SUBCUTANEOUS EVERY 30 MIN PRN
Status: CANCELLED | OUTPATIENT
Start: 2021-12-23

## 2021-12-23 RX ORDER — NALOXONE HYDROCHLORIDE 0.4 MG/ML
0.2 INJECTION, SOLUTION INTRAMUSCULAR; INTRAVENOUS; SUBCUTANEOUS
Status: CANCELLED | OUTPATIENT
Start: 2021-12-23

## 2021-12-23 RX ORDER — DIPHENHYDRAMINE HYDROCHLORIDE 50 MG/ML
50 INJECTION INTRAMUSCULAR; INTRAVENOUS
Status: CANCELLED
Start: 2021-12-23

## 2021-12-23 RX ORDER — ALBUTEROL SULFATE 90 UG/1
1-2 AEROSOL, METERED RESPIRATORY (INHALATION)
Status: CANCELLED
Start: 2021-12-23

## 2021-12-23 RX ORDER — ALBUTEROL SULFATE 0.83 MG/ML
2.5 SOLUTION RESPIRATORY (INHALATION)
Status: CANCELLED | OUTPATIENT
Start: 2021-12-23

## 2021-12-23 RX ORDER — HEPARIN SODIUM (PORCINE) LOCK FLUSH IV SOLN 100 UNIT/ML 100 UNIT/ML
5 SOLUTION INTRAVENOUS
Status: CANCELLED | OUTPATIENT
Start: 2021-12-23

## 2021-12-23 RX ORDER — EPINEPHRINE 1 MG/ML
0.3 INJECTION, SOLUTION, CONCENTRATE INTRAVENOUS EVERY 5 MIN PRN
Status: CANCELLED | OUTPATIENT
Start: 2021-12-23

## 2021-12-23 RX ORDER — METHYLPREDNISOLONE SODIUM SUCCINATE 125 MG/2ML
125 INJECTION, POWDER, LYOPHILIZED, FOR SOLUTION INTRAMUSCULAR; INTRAVENOUS
Status: CANCELLED
Start: 2021-12-23

## 2021-12-23 RX ADMIN — USTEKINUMAB 390 MG: 130 SOLUTION INTRAVENOUS at 13:59

## 2021-12-23 ASSESSMENT — PAIN SCALES - GENERAL: PAINLEVEL: NO PAIN (0)

## 2021-12-23 NOTE — PATIENT INSTRUCTIONS
EDUCATION POST BIOLOGICAL/CHEMOTHERAPY INFUSION  Call the triage nurse at your clinic or seek medical attention if you have chills and/or temperature greater than or equal to 100.5, uncontrolled nausea/vomiting, diarrhea, constipation, dizziness, shortness of breath, chest pain, heart palpitations, weakness or any other new or concerning symptoms, questions or concerns.  You can not have any live virus vaccines prior to or during treatment or up to 6 months post infusion.  If you have an upcoming surgery, medical procedure or dental procedure during treatment, this should be discussed with your ordering physician and your surgeon/dentist.  If you are having any concerning symptom, if you are unsure if you should get your next infusion or wish to speak to a provider before your next infusion, please call your care coordinator or triage nurse at your clinic to notify them so we can adequately serve you.         Patient Education     Stelara Prefilled Syringe 90 mg/1 mL  Uses  This medicine is used for the following purposes:    arthritis    inflammatory bowel disease    psoriasis  Instructions  This medicine is used by injecting it into the skin. Please ask your doctor, nurse or pharmacist for the correct places on your body where this medicine can be injected.  Carefully follow the instructions for preparing this medicine before injection.  Read and make sure you understand the instructions for measuring your dose and using the syringe before using this medicine.  Do not dilute the medicine.  Do not mix this medicine with other solutions.  Check the medicine before each use. If the liquid medicine has any particles in it, appears discolored, or if the vial appears damaged, do not use it.  Do not shake the medicine before using.  Store new medicine in the refrigerator until you are ready to use it. Do not allow them to freeze.  If needed, this medicine may be stored at room temperature for 30 days or less. Do not put  it back in the refrigerator.  Protect medicine from light.  Take the medicine out of the refrigerator about 30 minutes before use to warm to room temperature.  Injecting cold drug may be uncomfortable.  Never use any medicine that has .  Discard any remaining medicine after your dose is given.  Please ask your doctor, nurse, or pharmacist how to discard unused medicines safely.  Ask your doctor, nurse or pharmacist to show you how to use this medicine correctly.  You or a family member can be trained to give this medicine at home.  Change the location of the injection each time. Choose a location at least 1 inch from the last injection.  Tell your doctor if you have ever had an allergic reaction to latex.  It may take several weeks for this medicine to fully work.  It is important that you keep taking each dose of this medicine on time even if you are feeling well.  If you miss a dose, contact your doctor for instructions.  Please tell your doctor and pharmacist about all the medicines you take. Include both prescription and over-the-counter medicines. Also tell them about any vitamins, herbal medicines, or anything else you take for your health.  If your symptoms do not improve or they worsen while on this medicine, contact your doctor.  It is very important that you follow your doctor's instructions for all blood tests.  It is very important that you keep all appointments for medical exams and tests while on this medicine.  Do not take the medicine more than once during 24 hours.  Cautions  Tell your doctor and pharmacist if you ever had an allergic reaction to a medicine. Symptoms of an allergic reaction can include trouble breathing, skin rash, itching, swelling, or severe dizziness.  Some patients on this medicine have developed severe, life-threatening infections. Please speak with your doctor about the risks and benefits of using this medicine.  Some patients taking this medicine have experienced  serious side effects. Please speak with your doctor to understand the risks and benefits associated with this medicine.  Do not use the medication any more than instructed.  Call the doctor if there are any signs of confusion or unusual changes in behavior.  This medicine may reduce your body's ability to fight infections. Try to avoid contact with people with colds, flu or other infections.  Contact your doctor if you develop any signs of a new infection such as fever, cough, sore throat, or chills.  Wash your hands often and avoid close contact with people with infections such as colds and flu.  Speak with your health care provider before receiving any vaccinations.  Tell the doctor or pharmacist if you are pregnant, planning to be pregnant, or breastfeeding.  Ask your pharmacist if this medicine can interact with any of your other medicines. Be sure to tell them about all the medicines you take.  Please tell all your doctors and dentists that you are on this medicine before they provide care.  Do not start or stop any other medicines without first speaking to your doctor or pharmacist.  Call your doctor right away if you notice any unusual bleeding or bruising.  Used needles and syringes should be thrown away properly in a medical waste container. Ask your doctor or pharmacist if you need help.  Do not share this medicine with anyone who has not been prescribed this medicine.  This medicine can cause serious side effects in some patients. Important information from the [place] Food and Drug Administration (FDA) is available from your pharmacist. Please review it carefully with your pharmacist to understand the risks associated with this medicine.  Side Effects  The following is a list of some common side effects from this medicine. Please speak with your doctor about what you should do if you experience these or other side effects.    back pain    unusual bruising or discoloration on skin    increased risk of  cancer    lack of energy and tiredness    headaches    reaction at the area of the injection (pain, redness, swelling)    itching    pain near injection site    sore throat    upper respiratory infection  Call your doctor or get medical help right away if you notice any of these more serious side effects:    confusion    cough that does not go away    fever or chills    flu-like symptoms    severe or persistent headache    muscle pain    seizures    shortness of breath    skin changes - brown or black area with uneven edges or coloring    change in the size or color of a skin mole    stomach pain    unusual or unexplained tiredness or weakness    unsteadiness while walking    increased urinary frequency    difficulty or discomfort urinating    blurring or changes of vision    weight loss  A few people may have an allergic reactions to this medicine. Symptoms can include difficulty breathing, skin rash, itching, swelling, or severe dizziness. If you notice any of these symptoms, seek medical help quickly.  Extra  Please speak with your doctor, nurse, or pharmacist if you have any questions about this medicine.  [email or web address]  IMPORTANT NOTE: This document tells you briefly how to take your medicine, but it does not tell you all there is to know about it.Your doctor or pharmacist may give you other documents about your medicine. Please talk to them if you have any questions.Always follow their advice. There is a more complete description of this medicine available in English.Scan this code on your smartphone or tablet or use the web address below. You can also ask your pharmacist for a printout. If you have any questions, please ask your pharmacist.     2021 [employer]

## 2021-12-23 NOTE — PROGRESS NOTES
Infusion Nursing Note:  Tacos Dominguez presents today for Stelara.    Patient seen by provider today: No   present during visit today: Not Applicable.    Note: First dose Stelara. Stelara infused over 60 mins. Will receive Stelara subcutaneous injections at home every 8 weeks.      Intravenous Access:  Peripheral IV placed.    Treatment Conditions:  Biological Infusion Checklist:  ~~~ NOTE: If the patient answers yes to any of the questions below, hold the infusion and contact ordering provider or on-call provider.    1. Have you recently had an elevated temperature, fever, chills, productive cough, coughing for 3 weeks or longer or hemoptysis, abnormal vital signs, night sweats,  chest pain or have you noticed a decrease in your appetite, unexplained weight loss or fatigue? No  2. Do you have any open wounds or new incisions? No  3. Do you have any recent or upcoming hospitalizations, surgeries or dental procedures? No  4. Do you currently have or recently have had any signs of illness or infection or are you on any antibiotics? No  5. Have you had any new, sudden or worsening abdominal pain? No  6. Have you or anyone in your household received a live vaccination in the past 4 weeks? Please note:  No live vaccines while on biologic/chemotherapy until 6 months after the last treatment.  Patient can receive the flu vaccine (shot only) and the pneumovax.  It is optimal for the patient to get these vaccines mid cycle, but they can be given at any time as long as it is not on the day of the infusion. No  7. Have you recently been diagnosed with any new nervous system diseases (ie. Multiple sclerosis, Guillain Henning, seizures, neurological changes) or cancer diagnosis? No  8. Are you on any form of radiation or chemotherapy? No  9. Are you pregnant or breast feeding or do you have plans of pregnancy in the future? No  10. Have you been having any signs of worsening depression or suicidal ideations?   (benlysta only) No  11. Have there been any other new onset medical symptoms? No        Post Infusion Assessment:  Patient tolerated infusion without incident.  Patient observed for 30 minutes post Stelara per protocol.  Site patent and intact, free from redness, edema or discomfort.  No evidence of extravasations.  Access discontinued per protocol.  Biologic Infusion Post Education: Call the triage nurse at your clinic or seek medical attention if you have chills and/or temperature greater than or equal to 100.5, uncontrolled nausea/vomiting, diarrhea, constipation, dizziness, shortness of breath, chest pain, heart palpitations, weakness or any other new or concerning symptoms, questions or concerns.  You cannot have any live virus vaccines prior to or during treatment or up to 6 months post infusion.  If you have an upcoming surgery, medical procedure or dental procedure during treatment, this should be discussed with your ordering physician and your surgeon/dentist.  If you are having any concerning symptom, if you are unsure if you should get your next infusion or wish to speak to a provider before your next infusion, please call your care coordinator or triage nurse at your clinic to notify them so we can adequately serve you.       Discharge Plan:   AVS to patient via MYCHART. Patient discharged in stable condition accompanied by: self.  Departure Mode: Ambulatory.      Heaven Ball RN

## 2022-01-12 ENCOUNTER — MEDICAL CORRESPONDENCE (OUTPATIENT)
Dept: HEALTH INFORMATION MANAGEMENT | Facility: CLINIC | Age: 62
End: 2022-01-12

## 2022-02-15 DIAGNOSIS — Z11.59 ENCOUNTER FOR SCREENING FOR OTHER VIRAL DISEASES: Primary | ICD-10-CM

## 2022-03-03 ENCOUNTER — ANCILLARY PROCEDURE (OUTPATIENT)
Dept: MRI IMAGING | Facility: CLINIC | Age: 62
End: 2022-03-03
Attending: SURGERY
Payer: COMMERCIAL

## 2022-03-03 ENCOUNTER — PRE VISIT (OUTPATIENT)
Dept: SURGERY | Facility: CLINIC | Age: 62
End: 2022-03-03

## 2022-03-03 ENCOUNTER — LAB (OUTPATIENT)
Dept: LAB | Facility: CLINIC | Age: 62
End: 2022-03-03
Payer: COMMERCIAL

## 2022-03-03 ENCOUNTER — OFFICE VISIT (OUTPATIENT)
Dept: SURGERY | Facility: CLINIC | Age: 62
End: 2022-03-03
Payer: COMMERCIAL

## 2022-03-03 ENCOUNTER — ANESTHESIA EVENT (OUTPATIENT)
Dept: SURGERY | Facility: CLINIC | Age: 62
DRG: 405 | End: 2022-03-03
Payer: COMMERCIAL

## 2022-03-03 VITALS
HEART RATE: 82 BPM | SYSTOLIC BLOOD PRESSURE: 172 MMHG | HEIGHT: 71 IN | OXYGEN SATURATION: 99 % | WEIGHT: 176.9 LBS | BODY MASS INDEX: 24.77 KG/M2 | DIASTOLIC BLOOD PRESSURE: 78 MMHG | RESPIRATION RATE: 16 BRPM | TEMPERATURE: 97.8 F

## 2022-03-03 DIAGNOSIS — D49.0 IPMN (INTRADUCTAL PAPILLARY MUCINOUS NEOPLASM): ICD-10-CM

## 2022-03-03 DIAGNOSIS — N18.6 ESRD ON DIALYSIS (H): ICD-10-CM

## 2022-03-03 DIAGNOSIS — Z01.818 PREOP EXAMINATION: Primary | ICD-10-CM

## 2022-03-03 DIAGNOSIS — Z99.2 ESRD ON DIALYSIS (H): ICD-10-CM

## 2022-03-03 DIAGNOSIS — Z01.818 PREOP EXAMINATION: ICD-10-CM

## 2022-03-03 LAB
ABO/RH(D): NORMAL
ALBUMIN SERPL-MCNC: 3.4 G/DL (ref 3.4–5)
ALP SERPL-CCNC: 112 U/L (ref 40–150)
ALT SERPL W P-5'-P-CCNC: 20 U/L (ref 0–70)
ANION GAP SERPL CALCULATED.3IONS-SCNC: 11 MMOL/L (ref 3–14)
ANTIBODY SCREEN: NEGATIVE
AST SERPL W P-5'-P-CCNC: 16 U/L (ref 0–45)
BILIRUB SERPL-MCNC: 0.6 MG/DL (ref 0.2–1.3)
BUN SERPL-MCNC: 39 MG/DL (ref 7–30)
CALCIUM SERPL-MCNC: 7.8 MG/DL (ref 8.5–10.1)
CHLORIDE BLD-SCNC: 103 MMOL/L (ref 94–109)
CO2 SERPL-SCNC: 27 MMOL/L (ref 20–32)
CREAT SERPL-MCNC: 8.76 MG/DL (ref 0.66–1.25)
ERYTHROCYTE [DISTWIDTH] IN BLOOD BY AUTOMATED COUNT: 13.3 % (ref 10–15)
FERRITIN SERPL-MCNC: 739 NG/ML (ref 26–388)
GFR SERPL CREATININE-BSD FRML MDRD: 6 ML/MIN/1.73M2
GLUCOSE BLD-MCNC: 91 MG/DL (ref 70–99)
HCT VFR BLD AUTO: 29.6 % (ref 40–53)
HGB BLD-MCNC: 9.7 G/DL (ref 13.3–17.7)
MCH RBC QN AUTO: 33.4 PG (ref 26.5–33)
MCHC RBC AUTO-ENTMCNC: 32.8 G/DL (ref 31.5–36.5)
MCV RBC AUTO: 102 FL (ref 78–100)
PLATELET # BLD AUTO: 163 10E3/UL (ref 150–450)
POTASSIUM BLD-SCNC: 4.3 MMOL/L (ref 3.4–5.3)
PROT SERPL-MCNC: 6.9 G/DL (ref 6.8–8.8)
RBC # BLD AUTO: 2.9 10E6/UL (ref 4.4–5.9)
SODIUM SERPL-SCNC: 141 MMOL/L (ref 133–144)
SPECIMEN EXPIRATION DATE: NORMAL
WBC # BLD AUTO: 6.8 10E3/UL (ref 4–11)

## 2022-03-03 PROCEDURE — 36415 COLL VENOUS BLD VENIPUNCTURE: CPT | Performed by: PATHOLOGY

## 2022-03-03 PROCEDURE — 86901 BLOOD TYPING SEROLOGIC RH(D): CPT | Performed by: NURSE PRACTITIONER

## 2022-03-03 PROCEDURE — 82728 ASSAY OF FERRITIN: CPT | Performed by: PATHOLOGY

## 2022-03-03 PROCEDURE — 80053 COMPREHEN METABOLIC PANEL: CPT | Performed by: PATHOLOGY

## 2022-03-03 PROCEDURE — 85027 COMPLETE CBC AUTOMATED: CPT | Performed by: PATHOLOGY

## 2022-03-03 PROCEDURE — A9585 GADOBUTROL INJECTION: HCPCS | Performed by: RADIOLOGY

## 2022-03-03 PROCEDURE — 86850 RBC ANTIBODY SCREEN: CPT | Performed by: NURSE PRACTITIONER

## 2022-03-03 PROCEDURE — 99205 OFFICE O/P NEW HI 60 MIN: CPT | Performed by: NURSE PRACTITIONER

## 2022-03-03 PROCEDURE — 74183 MRI ABD W/O CNTR FLWD CNTR: CPT | Performed by: RADIOLOGY

## 2022-03-03 RX ORDER — B COMPLEX, C NO.20/FOLIC ACID 1 MG
1 CAPSULE ORAL EVERY MORNING
COMMUNITY
Start: 2022-02-01

## 2022-03-03 RX ORDER — FOLIC ACID 1 MG/1
1 TABLET ORAL EVERY MORNING
COMMUNITY
Start: 2022-02-19

## 2022-03-03 RX ORDER — CALCIUM ACETATE 667 MG/1
2668 TABLET ORAL
COMMUNITY
Start: 2021-12-03

## 2022-03-03 RX ORDER — GADOBUTROL 604.72 MG/ML
10 INJECTION INTRAVENOUS ONCE
Status: COMPLETED | OUTPATIENT
Start: 2022-03-03 | End: 2022-03-03

## 2022-03-03 RX ORDER — LIDOCAINE/PRILOCAINE 2.5 %-2.5%
CREAM (GRAM) TOPICAL
COMMUNITY
Start: 2022-02-14

## 2022-03-03 RX ADMIN — GADOBUTROL 8.5 ML: 604.72 INJECTION INTRAVENOUS at 13:47

## 2022-03-03 ASSESSMENT — ENCOUNTER SYMPTOMS
DYSRHYTHMIAS: 1
ORTHOPNEA: 0

## 2022-03-03 ASSESSMENT — COPD QUESTIONNAIRES: COPD: 0

## 2022-03-03 ASSESSMENT — PAIN SCALES - GENERAL: PAINLEVEL: NO PAIN (0)

## 2022-03-03 ASSESSMENT — LIFESTYLE VARIABLES: TOBACCO_USE: 1

## 2022-03-03 NOTE — H&P
Pre-Operative H & P     CC:  Preoperative exam to assess for increased cardiopulmonary risk while undergoing surgery and anesthesia.    Date of Encounter: 3/3/2022  Primary Care Physician:  Good Pham     Reason for visit:   Encounter Diagnoses   Name Primary?     Preop examination Yes     IPMN (intraductal papillary mucinous neoplasm)      ESRD on dialysis (H)        HPI  Tacos Dominguez is a 62 year old male who presents for pre-operative H & P in preparation for  Procedure Information     Case: [id number] Date/Time: 03/17/22 0830    Procedure: OPEN DISTAL PANCREATECTOMY WITH SPLENECTOMY (N/A Abdomen)    Anesthesia type: General    Diagnosis: IPMN (intraductal papillary mucinous neoplasm) [D49.0]    Pre-op diagnosis: IPMN (intraductal papillary mucinous neoplasm) [D49.0]    [facility]  / [facility]    Providers: Sarath Smith MD          Tacos Dominguez is a 62 year old male with history of a-fib, distal aortic dissection, hyperlipidemia, old MI, hypertension, hyperlipidemia, tobacco use, anemia, ESRD and history of crohn's disease that has an intraductal papillary mucinous neoplasm (IPMN).  Mr. Dominguez was admitted to [facility] in 10/2020 after being found down at home x 2 days after not reporting to work.  He had CLARISA with Cr 19, NSTEMI w/ Tpn peaked at 77, and MSSA pna.  He was found to have afib w/ RVR as well and was treated with amiodarone.  He was not on immunosuppressants for his Crohn's at the time.  He was also found to have a distal aortic dissection extending to the R JULISA - found on imaging with no intervention.  His cardiac workup included a nuclear stress test showing no ischemia and a normal EF.  There was inferior hypokinesis noted on TTE.  He did not have a coronary angiogram at that time.  However, he needed HD for his CLARISA and is now undergoing evaluation for a renal transplant due to progressing to ESRD.  In the process of being worked up for kidney transplant, there was an  "incidental finding of an IPMN.  He notes that he has to \"get this taken care of\" before being listed for transplant.  He was referred to Dr. Smith for surgical consult and the above listed procedure has now been recommended for further evaluation and treatment.             History is obtained from the patient and chart review    Hx of abnormal bleeding or anti-platelet use: aspirin      Past Medical History  Past Medical History:   Diagnosis Date     Aortic dissection (H)      Benign essential hypertension      Chronic atrial fibrillation (H)      Crohn's colitis (H)      Crohn's disease of large intestine (H) 11/22/2021     Esophageal reflux      ESRD (end stage renal disease) on dialysis (H)      Hypertension      Mixed hyperlipidemia        Past Surgical History  Past Surgical History:   Procedure Laterality Date     ABDOMEN SURGERY      x 6.  colon resections for crohn's disease     COLONOSCOPY N/A 07/20/2021    Procedure: COLONOSCOPY, WITH POLYPECTOMY AND BIOPSY;  Surgeon: Eric Preston MD;  Location:  GI     CV CORONARY ANGIOGRAM N/A 05/25/2021    Procedure: CV CORONARY ANGIOGRAM;  Surgeon: Judson Ybarra MD;  Location:  HEART CARDIAC CATH LAB     ESOPHAGOSCOPY, GASTROSCOPY, DUODENOSCOPY (EGD), COMBINED N/A 07/20/2021    Procedure: ESOPHAGOGASTRODUODENOSCOPY, WITH FINE NEEDLE ASPIRATION BIOPSY, WITH ENDOSCOPIC ULTRASOUND GUIDANCE;  Surgeon: Eric Preston MD;  Location:  GI     VASCULAR SURGERY      dialysis access- left upper       Prior to Admission Medications  Current Outpatient Medications   Medication Sig Dispense Refill     aspirin (ASA) 81 MG chewable tablet Take 81 mg by mouth every morning        calcium acetate (CALPHRON) 667 MG TABS tablet 3 times daily       folic acid (FOLVITE) 1 MG tablet every morning       lidocaine-prilocaine (EMLA) 2.5-2.5 % external cream three times a week Monday, Wednesday, Friday       magnesium oxide 400 MG CAPS Take 1 tablet by mouth every " morning        Melatonin 10 MG TABS tablet Take 10 mg by mouth nightly as needed        multivitamin RENAL (MULTIVITAMIN RENAL) 1 MG capsule every morning       omeprazole (PRILOSEC) 20 MG DR capsule Take 20 mg by mouth every morning        simvastatin (ZOCOR) 20 MG tablet Take 20 mg by mouth every morning        cholecalciferol 25 MCG (1000 UT) TABS Take 2,000 Units by mouth       multivitamin w/minerals (MULTI-VITAMIN) tablet          Allergies  Allergies   Allergen Reactions     Lisinopril Anaphylaxis and Other (See Comments)     Shortness of breath       Social History  Social History     Socioeconomic History     Marital status:      Spouse name: Not on file     Number of children: 1     Years of education: Not on file     Highest education level: Not on file   Occupational History     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 Not Asked   Social History Narrative     Not on file     Social Determinants of Health     Financial Resource Strain: Not on file   Food Insecurity: Not on file   Transportation Needs: Not on file   Physical Activity: Not on file   Stress: Not on file   Social Connections: Not on file   Intimate Partner Violence: Not on file   Housing Stability: Not on file       Family History  Family History   Problem Relation Age of Onset     Breast Cancer Mother      Coronary Artery Disease Father      Hypertension Brother      Anesthesia Reaction No family hx of      Thrombosis No family hx of        Review of Systems  The complete review of systems is negative  other than noted in the HPI or here.   Anesthesia Evaluation   Pt has had prior anesthetic. Type: General and MAC.    No history of anesthetic complications       ROS/MED HX  ENT/Pulmonary:     (+) MICHAEL risk factors, hypertension, tobacco use, Current use,  (-) asthma, COPD and recent URI   Neurologic:  - neg neurologic ROS     Cardiovascular: Comment: Distal aortic dissection    (+) Dyslipidemia hypertension--CAD (non-obstructive CAD) -past MI --dysrhythmias, a-fib, valvular problems/murmurs mitral insufficiency. Previous cardiac testing   Echo: Date: 3/25/21 Results:  Echocardiogram  3/25/21    Interpretation Summary  Global and regional left ventricular function is normal with an EF of 60-65%.  Right ventricular function, chamber size, wall motion, and thickness are  normal.  Mild to moderate mitral insufficiency is present.  The inferior vena cava was normal in size with preserved respiratory  variability.     There is no prior study for direct comparison.    Stress Test: Date: Results:    ECG Reviewed: Date: 5/2021 Results:  Sinus rhythm Left anterior fascicular block Moderate voltage criteria for LVH, may be normal variant Prolonged QT Abnormal ECG When compared with ECG of 25-MAR-2021 11:57, No significant change was found   Cath:  Date: 5/25/21 Results:  Conclusion      Minimal non-obstructive coronary artery disease.   (-) COHEN and orthopnea/PND   METS/Exercise Tolerance: >4 METS    Hematologic:     (+) anemia, history of blood transfusion, no previous transfusion reaction,  (-) history of blood clots   Musculoskeletal:       GI/Hepatic: Comment: Crohn's disease- in remission since 2006    (+) GERD, Asymptomatic on medication,     Renal/Genitourinary:     (+) renal disease, type: ESRD, Pt requires dialysis, type: Hemodialysis,     Endo: Comment: IPMN - neg endo ROS     Psychiatric/Substance Use:  - neg psychiatric ROS     Infectious Disease:  - neg infectious disease ROS  (-) Recent Fever   Malignancy:   (+)  "Malignancy, History of Skin.Skin CA Remission status post Surgery.        Other:  - neg other ROS          BP (!) 172/78   Pulse 82   Temp 97.8  F (36.6  C) (Oral)   Resp 16   Ht 1.803 m (5' 11\")   Wt 80.2 kg (176 lb 14.4 oz)   SpO2 99%   BMI 24.67 kg/m      Physical Exam   Constitutional: Awake, alert, cooperative, no apparent distress, and appears stated age.  Eyes: Pupils equal, round and reactive to light, extra ocular muscles intact, sclera clear, conjunctiva normal.  HENT: Normocephalic, oral pharynx with moist mucus membranes, good dentition. No goiter appreciated.   Respiratory: Clear to auscultation bilaterally, no crackles or wheezing.  Cardiovascular: Regular rate and rhythm, normal S1 and S2, and no murmur noted.  Carotids +2, no bruits. No edema. Palpable pulses to radial  DP and PT arteries.   GI: Normal bowel sounds, soft, non-distended, non-tender, no masses palpated, no hepatosplenomegaly.  Large RLQ incisional hernia.  Surgical scars: midline incision  Lymph/Hematologic: No cervical lymphadenopathy and no supraclavicular lymphadenopathy.  Genitourinary:  deferred  Skin: Warm and dry.  No rashes at anticipated surgical site.   Musculoskeletal: Full ROM of neck. There is no redness, warmth, or swelling of the exposed joints. Gross motor strength is normal.    Neurologic: Awake, alert, oriented to name, place and time. Cranial nerves II-XII are grossly intact. Gait is normal.   Neuropsychiatric: Calm, cooperative. Normal affect.  Other: Left upper arm AV fistula; +bruit and thrill     Labs/Diagnostic Studies   All labs and imaging personally reviewed     Coronary Cath  5/25/2021  Conclusion      Minimal non-obstructive coronary artery disease.       EKG  5/2021  sinus rhythm Left anterior fascicular block Moderate voltage criteria for LVH, may be normal variant Prolonged QT Abnormal ECG When compared with ECG of 25-MAR-2021 11:57, No significant change was found "       Echocardiogram  3/25/21    Interpretation Summary  Global and regional left ventricular function is normal with an EF of 60-65%.  Right ventricular function, chamber size, wall motion, and thickness are  normal.  Mild to moderate mitral insufficiency is present.  The inferior vena cava was normal in size with preserved respiratory  variability.     There is no prior study for direct comparison.      Labs  Component      Latest Ref Rng & Units 3/3/2022   Sodium      133 - 144 mmol/L 141   Potassium      3.4 - 5.3 mmol/L 4.3   Chloride      94 - 109 mmol/L 103   Carbon Dioxide      20 - 32 mmol/L 27   Anion Gap      3 - 14 mmol/L 11   Urea Nitrogen      7 - 30 mg/dL 39 (H)   Creatinine      0.66 - 1.25 mg/dL 8.76 (H)   Calcium      8.5 - 10.1 mg/dL 7.8 (L)   Glucose      70 - 99 mg/dL 91   Alkaline Phosphatase      40 - 150 U/L 112   AST      0 - 45 U/L 16   ALT      0 - 70 U/L 20   Protein Total      6.8 - 8.8 g/dL 6.9   Albumin      3.4 - 5.0 g/dL 3.4   Bilirubin Total      0.2 - 1.3 mg/dL 0.6   GFR Estimate      >60 mL/min/1.73m2 6 (L)   WBC      4.0 - 11.0 10e3/uL 6.8   RBC Count      4.40 - 5.90 10e6/uL 2.90 (L)   Hemoglobin      13.3 - 17.7 g/dL 9.7 (L)   Hematocrit      40.0 - 53.0 % 29.6 (L)   MCV      78 - 100 fL 102 (H)   MCH      26.5 - 33.0 pg 33.4 (H)   MCHC      31.5 - 36.5 g/dL 32.8   RDW      10.0 - 15.0 % 13.3   Platelet Count      150 - 450 10e3/uL 163   Ferritin      26 - 388 ng/mL 739 (H)       The patient's records and results personally reviewed by this provider.     Outside records reviewed from: Care Everywhere        Assessment      Tacos Dominguez is a 62 year old male was seen as a PAC referral for risk assessment and optimization for anesthesia.    Plan/Recommendations  Pt will be optimized for the proposed procedure.  See below for details on the assessment, risk, and preoperative recommendations    NEUROLOGY  - No history of TIA, CVA or seizure    -Post Op delirium risk factors:   No risk identified    ENT  - No current airway concerns.  Will need to be reassessed day of surgery.  Mallampati: II  TM: > 3    CARDIAC  - He was found down in October 2020 after 2 days.  Admission diagnosis was MI, A-fib and CLARISA.  A-fib converted with amiodarone and there has been no evidence of reoccurrence.  Cardiac cath wasn't done at that time.  Kidneys never recovered and he now is on dialysis for ESRD.  He is being worked up for kidney transplant so he had an echocardiogram and cardiac cath this past year.  -Echocardiogram on 3/25/21 showed EF of 60-65%, normal ventricular function and mild/moderate mitral insufficiency.  The cardiac cath showed minimal non-obstructive CAD.    -He reports that his blood pressures are fairly labile depending on dialysis so he isn't on any hypertension medication.  His blood pressure in clinic today was 187/101 and 172/78.    - He has a thin distal aortic dissection that was noted originally on CT angio done at outside facility on 10/29/2020. No intervention was indicated.  He had a MRCP today that notes it to be stable.  Encourage tight intra-op blood pressure control.    - METS (Metabolic Equivalents)= >4.  He reports that he reports that he walks 0.25 miles per day for exercise.     Patient performs 4 or more METS exercise without symptoms  Total Score: 0      RCRI-High risk: Class 4  >11% complication reate  Total Score: 3           RCRI: High Risk Surgery    RCRI: CAD    RCRI: Elevated Creatinine        PULMONARY    MICHAEL Medium Risk  Total Score: 3           MICHAEL: Hypertension    MICHAEL: Over 50 ys old    MICHAEL: Male      - Denies asthma or inhaler use   He quit smoking in 2020, but recently restarted smoking.  He notes that he has purchased some nicotine patches (unsure of dosage) and plans to start those this weekend to try to quit again prior to surgery.    - Tobacco History      History   Smoking Status     Current Every Day Smoker     Packs/day: 0.50     Years: 50.00      "Last attempt to quit: 10/19/2020   Smokeless Tobacco     Never Used       GI  Denies GERD  Crohn's disease in remission since last abdominal surgery in 2006.  No longer has a colostomy.    PONV Low Risk  Total Score: 1           1 AN PONV: Intended Post Op Opioids        /RENAL  + ESRD as noted above in cardiac section.   On dialysis MWF at [facility].  Has a left upper arm AV fistula for dialysis access.    Hoping to get on kidney transplant list soon.     ENDOCRINE    - BMI: Estimated body mass index is 24.67 kg/m  as calculated from the following:    Height as of this encounter: 1.803 m (5' 11\").    Weight as of this encounter: 80.2 kg (176 lb 14.4 oz).      HEME  VTE Low Risk 0.5%  Total Score: 3           VTE: Greater than 59 yrs old    VTE: Male      + chronic anemia.  Hgb today is 9.7 which is significantly lower than the previous at 11.2.  Ferritin is above normal limits so iron infusion prior to surgery wouldn't be indicated.  Will notify Dr. Smith via staff messaging.    Hold aspirin 7 days prior to surgery.     **Addendum - Message received back form Dr. Smith noting okay to proceed as planned with hgb at 9.7.**            Patient was discussed with Dr Duffy    The patient is optimized for their procedure. AVS with information on surgery time/arrival time, meds and NPO status given by nursing staff. No further diagnostic testing indicated.      On the day of service:     Prep time: 22 minutes  Visit time: 25 minutes  Documentation time: 24 minutes  ------------------------------------------  Total time: 71 minutes      JANNET Badillo CNP  Preoperative Assessment Center  [facility]  Clinic and Surgery Center  Phone: [phone]  Fax: [phone]  "

## 2022-03-03 NOTE — PATIENT INSTRUCTIONS
Preparing for Your Surgery      Name:  Tacos Dominguez   MRN:  [id number]   :  1960   Today's Date:  3/3/2022       Arriving for surgery:  Surgery date:  3/17/22  Arrival time:  6:30am    Restrictions due to COVID 19       Effective 22 [facility] is implementing the following visitor policy:     1 person may accompany the patient through the Pre-Op process.      That same person may wait in the Surgery Waiting room, provided there is enough room to social distance         Inpatients are allowed 2 visitors per day for the duration of their stay.        Visitors must wear a mask.      Visitors must not be ill.      Visiting hours are 8 am to 8 pm.    [employer] parking is available for anyone with mobility limitations or disabilities.  ([place]  24 hours/ 7 days a week; [facility]  7 am- 3:30 pm, Mon- Fri)    Please come to:     [facility] [address]  [address] [place] [address]    -   Parking is available in the Patient Visitor Ramp on [facility].     -   When entering the hospital you will be asked COVID screening questions, you will then be directed to Registration.  Registration will direct you to the 3rd floor Surgery waiting room.     -   Please ask if you need an escort or a wheelchair to the Surgery Waiting Room.  Preop number- [phone]     What can I eat or drink?  -  You may eat and drink normally for up to 8 hours before your surgery. (Until 12:30am on 3/17/22)  -  You may have clear liquids until 2 hours before surgery. (Until 6:30am on 3/17/22)    Examples of clear liquids:  Water  Clear broth  Juices (apple, white grape, white cranberry  and cider) without pulp  Noncarbonated, powder based beverages  (lemonade and David-Aid)  Sodas (Sprite, 7-Up, ginger ale and seltzer)  Coffee or tea (without milk or cream)  Gatorade    -  No Alcohol for at least 24 hours before surgery     Which medicines can I  take?    Hold Aspirin for 7 days before surgery.   Hold Multivitamins for 7 days before surgery.  Hold Supplements for 7 days before surgery.  Hold Ibuprofen (Advil, Motrin) for 1 day before surgery--unless otherwise directed by surgeon.  Hold Naproxen (Aleve) for 4 days before surgery.    -  DO NOT take these medications the day of surgery:   Calcium acetate   Vitamin D   Folic acid   Magnesium oxide       -  PLEASE TAKE these medications the day of surgery:   Omeprazole (prilosec)   Simvastatin (zocor)    How do I prepare myself?  - Please take 2 showers before surgery using Scrubcare or Hibiclens soap.    Use this soap only from the neck to your toes.     Leave the soap on your skin for one minute--then rinse thoroughly.      You may use your own shampoo and conditioner; no other hair products.   - Please remove all jewelry and body piercings.  - No lotions, deodorants or fragrance.  - Bring your ID and insurance card.    -If you have a Deep Brain Stimulator, Spinal Cord Stimulator or any neuro stimulator device---you must bring the remote control to the hospital     - All patients are required to have a Covid-19 test within 4 days of surgery/procedure.      -Patients will be contacted by the [facility] scheduling team within 1 week of surgery to make an appointment.      - Patients may call the Scheduling team at [phone] if they have not been scheduled within 4 days of  surgery.        Questions or Concerns:    - For any questions regarding the day of surgery or your hospital stay, please contact the Pre Admission Nursing Office at [phone].       - If you have health changes between today and your surgery please call your surgeon.       For questions after surgery please call your surgeons office.

## 2022-03-04 NOTE — RESULT ENCOUNTER NOTE
Shannan Balderrama,    Your test results are attached.  All of your labs are essentially okay for surgery.  Your hemoglobin level is a lower than prior and shows you are still anemic (typical when on dialysis), so I have a message out to Dr. Smith about that.  We will notify you if anything else needs to be done.        Liz Benitez DNP, RN, ANP-C

## 2022-03-07 ENCOUNTER — VIRTUAL VISIT (OUTPATIENT)
Dept: SURGERY | Facility: CLINIC | Age: 62
End: 2022-03-07
Attending: SURGERY
Payer: COMMERCIAL

## 2022-03-07 DIAGNOSIS — D49.0 IPMN (INTRADUCTAL PAPILLARY MUCINOUS NEOPLASM): Primary | ICD-10-CM

## 2022-03-07 PROCEDURE — 99214 OFFICE O/P EST MOD 30 MIN: CPT | Mod: GT | Performed by: SURGERY

## 2022-03-07 NOTE — PROGRESS NOTES
"Scar is a 62 year old who is being evaluated via a billable video visit.      If the video visit is dropped, the invitation should be resent by: Text to cell phone: [phone]  Will anyone else be joining your video visit? No      Video Start Time: 1:28 PM  Video-Visit DetailsREASON FOR EVALUATION:  Main duct IPMN of the pancreas.    HISTORY OF PRESENT ILLNESS:  Mr. Dominguez is a 63-year-old gentleman well known to me.  He has a very complicated medical history with renal failure requiring dialysis, as well as Crohn disease.  He has also had an aortic dissection, AFib, hypertension, hyperlipidemia and tobacco abuse.  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  I met him initially at the end of last year, but because of personal issues, he preferred to delay surgery, and I am, therefore, seeing him currently to discuss moving forward.  Mr. Dominguez feels well and has no new medical issues.  He does have \"short gut syndrome,\" but that is unchanged.  He had a recent MRI, which reveals no significant change in the dilation of his pancreatic duct.  He has dilation over 3 cm of the main duct and this is consistent with main duct IPMN.  The duct tapers down towards normal size as it gets towards the neck of the pancreas.  The head appears essentially normal.    I discussed with Mr. Dominguez our plans for surgical intervention next week in the form of an open distal pancreatectomy with possible splenectomy.  I discussed the details of that surgery including risks such as bleeding, infection, wound healing complications, venous thromboembolism, and a risk of pancreatic fistula as well as diabetes and pancreatic insufficiency.  I discussed that we would hope to leave the spleen intact, but there is certainly a possibility that will have to be removed as well, which " could lead to mild immunosuppression.  Mr. Dominguez remembered all these issues from our last conversation and really had no additional questions for me today.  He does have a very complicated abdomen because of previous surgical history due to his Crohn disease as well as a fairly large hernia.  We discussed that we would not be correcting any abdominal wall hernia and that we would be using a separate incision in the left subcostal area to hopefully get into the abdomen safely, avoiding some of the more high risk areas from his previous surgeries.    I will look forward to seeing Mr. Dominguez again next week for a planned open distal pancreatectomy with possible splenectomy.  We will also get Nephrology involved during his hospital stay because he is dialysis dependent and will likely need to be dialyzed for fluid management.    A total of 20 minutes was spent on virtual video visit today.  An additional 15 minutes was spent in review of his interval history, recent imaging studies and coordination of care.        Type of service:  Video Visit    Video End Time:1:46 PM    Originating Location (pt. Location): Home    Distant Location (provider location):  [facility]     Platform used for Video Visit: ThisClicks

## 2022-03-07 NOTE — LETTER
"  3/7/2022     RE: Tacos Dominguez  [address]  [facility] [address]    Dear Colleague,    Thank you for referring your patient, Tacos Dominguez, to the [facility]. Please see a copy of my visit note below.    Scar is a 62 year old who is being evaluated via a billable video visit.      If the video visit is dropped, the invitation should be resent by: Text to cell phone: [phone]  Will anyone else be joining your video visit? No      Video Start Time: 1:28 PM  Video-Visit DetailsREASON FOR EVALUATION:  Main duct IPMN of the pancreas.    HISTORY OF PRESENT ILLNESS:  Mr. Dominguez is a 63-year-old gentleman well known to me.  He has a very complicated medical history with renal failure requiring dialysis, as well as Crohn disease.  He has also had an aortic dissection, AFib, hypertension, hyperlipidemia and tobacco abuse.  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  I met him initially at the end of last year, but because of personal issues, he preferred to delay surgery, and I am, therefore, seeing him currently to discuss moving forward.  Mr. Dominguez feels well and has no new medical issues.  He does have \"short gut syndrome,\" but that is unchanged.  He had a recent MRI, which reveals no significant change in the dilation of his pancreatic duct.  He has dilation over 3 cm of the main duct and this is consistent with main duct IPMN.  The duct tapers down towards normal size as it gets towards the neck of the pancreas.  The head appears essentially normal.    I discussed with Mr. Dominguez our plans for surgical intervention next week in the form of an open distal pancreatectomy with possible splenectomy.  I discussed the details of that surgery including risks such as bleeding, infection, wound healing complications, " venous thromboembolism, and a risk of pancreatic fistula as well as diabetes and pancreatic insufficiency.  I discussed that we would hope to leave the spleen intact, but there is certainly a possibility that will have to be removed as well, which could lead to mild immunosuppression.  Mr. Dominguez remembered all these issues from our last conversation and really had no additional questions for me today.  He does have a very complicated abdomen because of previous surgical history due to his Crohn disease as well as a fairly large hernia.  We discussed that we would not be correcting any abdominal wall hernia and that we would be using a separate incision in the left subcostal area to hopefully get into the abdomen safely, avoiding some of the more high risk areas from his previous surgeries.    I will look forward to seeing Mr. Dominguez again next week for a planned open distal pancreatectomy with possible splenectomy.  We will also get Nephrology involved during his hospital stay because he is dialysis dependent and will likely need to be dialyzed for fluid management.    A total of 20 minutes was spent on virtual video visit today.  An additional 15 minutes was spent in review of his interval history, recent imaging studies and coordination of care.    Type of service:  Video Visit  Video End Time:1:46 PM  Originating Location (pt. Location): Home  Distant Location (provider location):  [facility]   Platform used for Video Visit: Allen     Again, thank you for allowing me to participate in the care of your patient.      Sincerely,    Sarath Smith MD

## 2022-03-15 ENCOUNTER — LAB (OUTPATIENT)
Dept: LAB | Facility: CLINIC | Age: 62
End: 2022-03-15
Attending: FAMILY MEDICINE
Payer: COMMERCIAL

## 2022-03-15 DIAGNOSIS — Z20.822 ENCOUNTER FOR LABORATORY TESTING FOR COVID-19 VIRUS: ICD-10-CM

## 2022-03-15 DIAGNOSIS — Z11.59 ENCOUNTER FOR SCREENING FOR OTHER VIRAL DISEASES: ICD-10-CM

## 2022-03-15 PROCEDURE — U0005 INFEC AGEN DETEC AMPLI PROBE: HCPCS

## 2022-03-15 PROCEDURE — U0003 INFECTIOUS AGENT DETECTION BY NUCLEIC ACID (DNA OR RNA); SEVERE ACUTE RESPIRATORY SYNDROME CORONAVIRUS 2 (SARS-COV-2) (CORONAVIRUS DISEASE [COVID-19]), AMPLIFIED PROBE TECHNIQUE, MAKING USE OF HIGH THROUGHPUT TECHNOLOGIES AS DESCRIBED BY CMS-2020-01-R: HCPCS

## 2022-03-16 LAB — SARS-COV-2 RNA RESP QL NAA+PROBE: NEGATIVE

## 2022-03-17 ENCOUNTER — ANESTHESIA (OUTPATIENT)
Dept: SURGERY | Facility: CLINIC | Age: 62
DRG: 405 | End: 2022-03-17
Payer: COMMERCIAL

## 2022-03-17 ENCOUNTER — APPOINTMENT (OUTPATIENT)
Dept: GENERAL RADIOLOGY | Facility: CLINIC | Age: 62
DRG: 405 | End: 2022-03-17
Attending: SURGERY
Payer: COMMERCIAL

## 2022-03-17 ENCOUNTER — HOSPITAL ENCOUNTER (INPATIENT)
Facility: CLINIC | Age: 62
LOS: 5 days | Discharge: HOME | DRG: 405 | End: 2022-03-22
Attending: SURGERY | Admitting: SURGERY
Payer: COMMERCIAL

## 2022-03-17 DIAGNOSIS — D49.0 IPMN (INTRADUCTAL PAPILLARY MUCINOUS NEOPLASM): Primary | ICD-10-CM

## 2022-03-17 LAB
ANION GAP SERPL CALCULATED.3IONS-SCNC: 9 MMOL/L (ref 3–14)
APTT PPP: 32 SECONDS (ref 22–38)
BASE EXCESS BLDA CALC-SCNC: -1.9 MMOL/L (ref -9.6–2)
BLD PROD TYP BPU: NORMAL
BLD PROD TYP BPU: NORMAL
BLOOD COMPONENT TYPE: NORMAL
BLOOD COMPONENT TYPE: NORMAL
BUN SERPL-MCNC: 26 MG/DL (ref 7–30)
CA-I BLD-MCNC: 4.2 MG/DL (ref 4.4–5.2)
CALCIUM SERPL-MCNC: 7.7 MG/DL (ref 8.5–10.1)
CHLORIDE BLD-SCNC: 108 MMOL/L (ref 94–109)
CO2 SERPL-SCNC: 23 MMOL/L (ref 20–32)
CODING SYSTEM: NORMAL
CODING SYSTEM: NORMAL
CREAT SERPL-MCNC: 7.24 MG/DL (ref 0.66–1.25)
CREAT SERPL-MCNC: 7.29 MG/DL (ref 0.66–1.25)
CROSSMATCH: NORMAL
CROSSMATCH: NORMAL
ERYTHROCYTE [DISTWIDTH] IN BLOOD BY AUTOMATED COUNT: 13.3 % (ref 10–15)
GFR SERPL CREATININE-BSD FRML MDRD: 8 ML/MIN/1.73M2
GFR SERPL CREATININE-BSD FRML MDRD: 8 ML/MIN/1.73M2
GLUCOSE BLD-MCNC: 108 MG/DL (ref 70–99)
GLUCOSE BLD-MCNC: 96 MG/DL (ref 70–99)
GLUCOSE BLDC GLUCOMTR-MCNC: 73 MG/DL (ref 70–99)
HCO3 BLDA-SCNC: 24 MMOL/L (ref 21–28)
HCT VFR BLD AUTO: 27.4 % (ref 40–53)
HGB BLD-MCNC: 9 G/DL (ref 13.3–17.7)
HGB BLD-MCNC: 9.3 G/DL (ref 13.3–17.7)
HGB BLD-MCNC: 9.9 G/DL (ref 13.3–17.7)
INR PPP: 1.3 (ref 0.85–1.15)
LACTATE BLD-SCNC: 0.8 MMOL/L
MCH RBC QN AUTO: 34.1 PG (ref 26.5–33)
MCHC RBC AUTO-ENTMCNC: 32.8 G/DL (ref 31.5–36.5)
MCV RBC AUTO: 104 FL (ref 78–100)
O2/TOTAL GAS SETTING VFR VENT: 52 %
OXYHGB MFR BLDA: 96 % (ref 92–100)
PCO2 BLDA: 42 MM HG (ref 35–45)
PH BLDA: 7.36 [PH] (ref 7.35–7.45)
PLATELET # BLD AUTO: 144 10E3/UL (ref 150–450)
PO2 BLDA: 245 MM HG (ref 80–105)
POTASSIUM BLD-SCNC: 3.7 MMOL/L (ref 3.5–5)
POTASSIUM BLD-SCNC: 3.8 MMOL/L (ref 3.4–5.3)
POTASSIUM BLD-SCNC: 4.3 MMOL/L (ref 3.4–5.3)
RBC # BLD AUTO: 2.64 10E6/UL (ref 4.4–5.9)
SODIUM BLD-SCNC: 140 MMOL/L (ref 133–144)
SODIUM SERPL-SCNC: 140 MMOL/L (ref 133–144)
UNIT ABO/RH: NORMAL
UNIT ABO/RH: NORMAL
UNIT NUMBER: NORMAL
UNIT NUMBER: NORMAL
UNIT STATUS: NORMAL
UNIT STATUS: NORMAL
UNIT TYPE ISBT: 6200
UNIT TYPE ISBT: 6200
WBC # BLD AUTO: 15.8 10E3/UL (ref 4–11)

## 2022-03-17 PROCEDURE — 88331 PATH CONSLTJ SURG 1 BLK 1SPC: CPT | Mod: 26 | Performed by: PATHOLOGY

## 2022-03-17 PROCEDURE — P9041 ALBUMIN (HUMAN),5%, 50ML: HCPCS | Performed by: ANESTHESIOLOGY

## 2022-03-17 PROCEDURE — 0FBG0ZZ EXCISION OF PANCREAS, OPEN APPROACH: ICD-10-PCS | Performed by: SURGERY

## 2022-03-17 PROCEDURE — 250N000009 HC RX 250: Performed by: REGISTERED NURSE

## 2022-03-17 PROCEDURE — 82330 ASSAY OF CALCIUM: CPT

## 2022-03-17 PROCEDURE — 82810 BLOOD GASES O2 SAT ONLY: CPT

## 2022-03-17 PROCEDURE — 84132 ASSAY OF SERUM POTASSIUM: CPT | Performed by: STUDENT IN AN ORGANIZED HEALTH CARE EDUCATION/TRAINING PROGRAM

## 2022-03-17 PROCEDURE — 999N000141 HC STATISTIC PRE-PROCEDURE NURSING ASSESSMENT: Performed by: SURGERY

## 2022-03-17 PROCEDURE — 258N000003 HC RX IP 258 OP 636: Performed by: ANESTHESIOLOGY

## 2022-03-17 PROCEDURE — 36415 COLL VENOUS BLD VENIPUNCTURE: CPT | Performed by: ANESTHESIOLOGY

## 2022-03-17 PROCEDURE — 85730 THROMBOPLASTIN TIME PARTIAL: CPT | Performed by: STUDENT IN AN ORGANIZED HEALTH CARE EDUCATION/TRAINING PROGRAM

## 2022-03-17 PROCEDURE — 250N000011 HC RX IP 250 OP 636: Performed by: STUDENT IN AN ORGANIZED HEALTH CARE EDUCATION/TRAINING PROGRAM

## 2022-03-17 PROCEDURE — 85014 HEMATOCRIT: CPT | Performed by: STUDENT IN AN ORGANIZED HEALTH CARE EDUCATION/TRAINING PROGRAM

## 2022-03-17 PROCEDURE — 85610 PROTHROMBIN TIME: CPT | Performed by: STUDENT IN AN ORGANIZED HEALTH CARE EDUCATION/TRAINING PROGRAM

## 2022-03-17 PROCEDURE — 250N000009 HC RX 250: Performed by: STUDENT IN AN ORGANIZED HEALTH CARE EDUCATION/TRAINING PROGRAM

## 2022-03-17 PROCEDURE — 370N000017 HC ANESTHESIA TECHNICAL FEE, PER MIN: Performed by: SURGERY

## 2022-03-17 PROCEDURE — 250N000013 HC RX MED GY IP 250 OP 250 PS 637: Performed by: STUDENT IN AN ORGANIZED HEALTH CARE EDUCATION/TRAINING PROGRAM

## 2022-03-17 PROCEDURE — 250N000009 HC RX 250: Performed by: ANESTHESIOLOGY

## 2022-03-17 PROCEDURE — 82565 ASSAY OF CREATININE: CPT | Performed by: ANESTHESIOLOGY

## 2022-03-17 PROCEDURE — 48140 PARTIAL REMOVAL OF PANCREAS: CPT | Mod: 22 | Performed by: SURGERY

## 2022-03-17 PROCEDURE — 710N000010 HC RECOVERY PHASE 1, LEVEL 2, PER MIN: Performed by: SURGERY

## 2022-03-17 PROCEDURE — 250N000011 HC RX IP 250 OP 636: Performed by: ANESTHESIOLOGY

## 2022-03-17 PROCEDURE — 272N000001 HC OR GENERAL SUPPLY STERILE: Performed by: SURGERY

## 2022-03-17 PROCEDURE — 88309 TISSUE EXAM BY PATHOLOGIST: CPT | Mod: 26 | Performed by: PATHOLOGY

## 2022-03-17 PROCEDURE — 999N000157 HC STATISTIC RCP TIME EA 10 MIN

## 2022-03-17 PROCEDURE — 84132 ASSAY OF SERUM POTASSIUM: CPT | Performed by: ANESTHESIOLOGY

## 2022-03-17 PROCEDURE — 86923 COMPATIBILITY TEST ELECTRIC: CPT | Performed by: SURGERY

## 2022-03-17 PROCEDURE — 85018 HEMOGLOBIN: CPT | Performed by: ANESTHESIOLOGY

## 2022-03-17 PROCEDURE — 999N000065 XR CROSSTABLE LATERAL THORACIC SPINE PORTABLE: Mod: TC

## 2022-03-17 PROCEDURE — 999N000015 HC STATISTIC ARTERIAL MONITORING DAILY

## 2022-03-17 PROCEDURE — 250N000011 HC RX IP 250 OP 636: Performed by: REGISTERED NURSE

## 2022-03-17 PROCEDURE — 250N000025 HC SEVOFLURANE, PER MIN: Performed by: SURGERY

## 2022-03-17 PROCEDURE — 0FNG0ZZ RELEASE PANCREAS, OPEN APPROACH: ICD-10-PCS | Performed by: SURGERY

## 2022-03-17 PROCEDURE — 360N000077 HC SURGERY LEVEL 4, PER MIN: Performed by: SURGERY

## 2022-03-17 PROCEDURE — 88331 PATH CONSLTJ SURG 1 BLK 1SPC: CPT | Mod: TC | Performed by: SURGERY

## 2022-03-17 PROCEDURE — 120N000002 HC R&B MED SURG/OB UMMC

## 2022-03-17 PROCEDURE — 258N000003 HC RX IP 258 OP 636: Performed by: REGISTERED NURSE

## 2022-03-17 PROCEDURE — 76998 US GUIDE INTRAOP: CPT | Mod: 26 | Performed by: SURGERY

## 2022-03-17 RX ORDER — AMOXICILLIN 250 MG
2 CAPSULE ORAL 2 TIMES DAILY
Status: DISCONTINUED | OUTPATIENT
Start: 2022-03-17 | End: 2022-03-18

## 2022-03-17 RX ORDER — NALOXONE HYDROCHLORIDE 0.4 MG/ML
0.2 INJECTION, SOLUTION INTRAMUSCULAR; INTRAVENOUS; SUBCUTANEOUS
Status: DISCONTINUED | OUTPATIENT
Start: 2022-03-17 | End: 2022-03-17 | Stop reason: HOSPADM

## 2022-03-17 RX ORDER — HYDROMORPHONE HYDROCHLORIDE 1 MG/ML
0.2 INJECTION, SOLUTION INTRAMUSCULAR; INTRAVENOUS; SUBCUTANEOUS EVERY 5 MIN PRN
Status: DISCONTINUED | OUTPATIENT
Start: 2022-03-17 | End: 2022-03-17

## 2022-03-17 RX ORDER — ACETAMINOPHEN 500 MG
1000 TABLET ORAL 4 TIMES DAILY
Status: DISCONTINUED | OUTPATIENT
Start: 2022-03-17 | End: 2022-03-18

## 2022-03-17 RX ORDER — ONDANSETRON 2 MG/ML
4 INJECTION INTRAMUSCULAR; INTRAVENOUS EVERY 30 MIN PRN
Status: DISCONTINUED | OUTPATIENT
Start: 2022-03-17 | End: 2022-03-17 | Stop reason: HOSPADM

## 2022-03-17 RX ORDER — SODIUM CHLORIDE 9 MG/ML
INJECTION, SOLUTION INTRAVENOUS CONTINUOUS PRN
Status: DISCONTINUED | OUTPATIENT
Start: 2022-03-17 | End: 2022-03-17

## 2022-03-17 RX ORDER — HEPARIN SODIUM 5000 [USP'U]/.5ML
5000 INJECTION, SOLUTION INTRAVENOUS; SUBCUTANEOUS
Status: COMPLETED | OUTPATIENT
Start: 2022-03-17 | End: 2022-03-17

## 2022-03-17 RX ORDER — NALOXONE HYDROCHLORIDE 0.4 MG/ML
0.4 INJECTION, SOLUTION INTRAMUSCULAR; INTRAVENOUS; SUBCUTANEOUS
Status: DISCONTINUED | OUTPATIENT
Start: 2022-03-17 | End: 2022-03-22 | Stop reason: HOSPADM

## 2022-03-17 RX ORDER — FENTANYL CITRATE 50 UG/ML
25 INJECTION, SOLUTION INTRAMUSCULAR; INTRAVENOUS
Status: CANCELLED | OUTPATIENT
Start: 2022-03-17

## 2022-03-17 RX ORDER — SODIUM CHLORIDE, SODIUM LACTATE, POTASSIUM CHLORIDE, CALCIUM CHLORIDE 600; 310; 30; 20 MG/100ML; MG/100ML; MG/100ML; MG/100ML
INJECTION, SOLUTION INTRAVENOUS CONTINUOUS
Status: DISCONTINUED | OUTPATIENT
Start: 2022-03-17 | End: 2022-03-17 | Stop reason: HOSPADM

## 2022-03-17 RX ORDER — NALOXONE HYDROCHLORIDE 0.4 MG/ML
0.4 INJECTION, SOLUTION INTRAMUSCULAR; INTRAVENOUS; SUBCUTANEOUS
Status: DISCONTINUED | OUTPATIENT
Start: 2022-03-17 | End: 2022-03-17 | Stop reason: HOSPADM

## 2022-03-17 RX ORDER — ONDANSETRON 2 MG/ML
4 INJECTION INTRAMUSCULAR; INTRAVENOUS EVERY 6 HOURS PRN
Status: DISCONTINUED | OUTPATIENT
Start: 2022-03-17 | End: 2022-03-22 | Stop reason: HOSPADM

## 2022-03-17 RX ORDER — DEXMEDETOMIDINE HYDROCHLORIDE 4 UG/ML
.1-.5 INJECTION, SOLUTION INTRAVENOUS CONTINUOUS
Status: DISCONTINUED | OUTPATIENT
Start: 2022-03-17 | End: 2022-03-17 | Stop reason: HOSPADM

## 2022-03-17 RX ORDER — DEXMEDETOMIDINE HYDROCHLORIDE 4 UG/ML
.1-1.2 INJECTION, SOLUTION INTRAVENOUS CONTINUOUS
Status: DISCONTINUED | OUTPATIENT
Start: 2022-03-17 | End: 2022-03-17

## 2022-03-17 RX ORDER — NALOXONE HYDROCHLORIDE 0.4 MG/ML
0.2 INJECTION, SOLUTION INTRAMUSCULAR; INTRAVENOUS; SUBCUTANEOUS
Status: DISCONTINUED | OUTPATIENT
Start: 2022-03-17 | End: 2022-03-22 | Stop reason: HOSPADM

## 2022-03-17 RX ORDER — PHENYLEPHRINE HCL IN 0.9% NACL 50MG/250ML
.1-.5 PLASTIC BAG, INJECTION (ML) INTRAVENOUS CONTINUOUS
Status: DISCONTINUED | OUTPATIENT
Start: 2022-03-17 | End: 2022-03-19

## 2022-03-17 RX ORDER — AMOXICILLIN 250 MG
1 CAPSULE ORAL 2 TIMES DAILY
Status: DISCONTINUED | OUTPATIENT
Start: 2022-03-17 | End: 2022-03-18

## 2022-03-17 RX ORDER — METRONIDAZOLE 500 MG/100ML
500 INJECTION, SOLUTION INTRAVENOUS
Status: COMPLETED | OUTPATIENT
Start: 2022-03-17 | End: 2022-03-17

## 2022-03-17 RX ORDER — LIDOCAINE 40 MG/G
CREAM TOPICAL
Status: DISCONTINUED | OUTPATIENT
Start: 2022-03-17 | End: 2022-03-17 | Stop reason: HOSPADM

## 2022-03-17 RX ORDER — FENTANYL CITRATE 50 UG/ML
25 INJECTION, SOLUTION INTRAMUSCULAR; INTRAVENOUS EVERY 5 MIN PRN
Status: DISCONTINUED | OUTPATIENT
Start: 2022-03-17 | End: 2022-03-17

## 2022-03-17 RX ORDER — OXYCODONE HYDROCHLORIDE 5 MG/1
5 TABLET ORAL EVERY 4 HOURS PRN
Status: DISCONTINUED | OUTPATIENT
Start: 2022-03-17 | End: 2022-03-17 | Stop reason: HOSPADM

## 2022-03-17 RX ORDER — FENTANYL CITRATE 50 UG/ML
INJECTION, SOLUTION INTRAMUSCULAR; INTRAVENOUS PRN
Status: DISCONTINUED | OUTPATIENT
Start: 2022-03-17 | End: 2022-03-17

## 2022-03-17 RX ORDER — DEXAMETHASONE SODIUM PHOSPHATE 4 MG/ML
INJECTION, SOLUTION INTRA-ARTICULAR; INTRALESIONAL; INTRAMUSCULAR; INTRAVENOUS; SOFT TISSUE PRN
Status: DISCONTINUED | OUTPATIENT
Start: 2022-03-17 | End: 2022-03-17

## 2022-03-17 RX ORDER — ALBUMIN, HUMAN INJ 5% 5 %
250 SOLUTION INTRAVENOUS ONCE
Status: COMPLETED | OUTPATIENT
Start: 2022-03-17 | End: 2022-03-17

## 2022-03-17 RX ORDER — MEPERIDINE HYDROCHLORIDE 25 MG/ML
12.5 INJECTION INTRAMUSCULAR; INTRAVENOUS; SUBCUTANEOUS
Status: DISCONTINUED | OUTPATIENT
Start: 2022-03-17 | End: 2022-03-17 | Stop reason: HOSPADM

## 2022-03-17 RX ORDER — LIDOCAINE HYDROCHLORIDE 20 MG/ML
INJECTION, SOLUTION INFILTRATION; PERINEURAL PRN
Status: DISCONTINUED | OUTPATIENT
Start: 2022-03-17 | End: 2022-03-17

## 2022-03-17 RX ORDER — LIDOCAINE 40 MG/G
CREAM TOPICAL
Status: DISCONTINUED | OUTPATIENT
Start: 2022-03-17 | End: 2022-03-22 | Stop reason: HOSPADM

## 2022-03-17 RX ORDER — ONDANSETRON 2 MG/ML
INJECTION INTRAMUSCULAR; INTRAVENOUS PRN
Status: DISCONTINUED | OUTPATIENT
Start: 2022-03-17 | End: 2022-03-17

## 2022-03-17 RX ORDER — ONDANSETRON 4 MG/1
4 TABLET, ORALLY DISINTEGRATING ORAL EVERY 30 MIN PRN
Status: DISCONTINUED | OUTPATIENT
Start: 2022-03-17 | End: 2022-03-17 | Stop reason: HOSPADM

## 2022-03-17 RX ORDER — FENTANYL CITRATE 50 UG/ML
25-50 INJECTION, SOLUTION INTRAMUSCULAR; INTRAVENOUS
Status: DISCONTINUED | OUTPATIENT
Start: 2022-03-17 | End: 2022-03-17 | Stop reason: HOSPADM

## 2022-03-17 RX ORDER — POLYETHYLENE GLYCOL 3350 17 G/17G
17 POWDER, FOR SOLUTION ORAL DAILY
Status: DISCONTINUED | OUTPATIENT
Start: 2022-03-17 | End: 2022-03-18

## 2022-03-17 RX ORDER — MIDODRINE HYDROCHLORIDE 5 MG/1
5 TABLET ORAL ONCE
Status: DISCONTINUED | OUTPATIENT
Start: 2022-03-17 | End: 2022-03-17 | Stop reason: HOSPADM

## 2022-03-17 RX ORDER — METHOCARBAMOL 100 MG/ML
500 INJECTION, SOLUTION INTRAMUSCULAR; INTRAVENOUS ONCE
Status: COMPLETED | OUTPATIENT
Start: 2022-03-17 | End: 2022-03-17

## 2022-03-17 RX ORDER — NALBUPHINE HYDROCHLORIDE 10 MG/ML
2.5-5 INJECTION, SOLUTION INTRAMUSCULAR; INTRAVENOUS; SUBCUTANEOUS EVERY 6 HOURS PRN
Status: DISCONTINUED | OUTPATIENT
Start: 2022-03-17 | End: 2022-03-17

## 2022-03-17 RX ORDER — SODIUM CHLORIDE, SODIUM LACTATE, POTASSIUM CHLORIDE, CALCIUM CHLORIDE 600; 310; 30; 20 MG/100ML; MG/100ML; MG/100ML; MG/100ML
INJECTION, SOLUTION INTRAVENOUS CONTINUOUS
Status: DISCONTINUED | OUTPATIENT
Start: 2022-03-17 | End: 2022-03-17

## 2022-03-17 RX ORDER — ONDANSETRON 4 MG/1
4 TABLET, ORALLY DISINTEGRATING ORAL EVERY 6 HOURS PRN
Status: DISCONTINUED | OUTPATIENT
Start: 2022-03-17 | End: 2022-03-22 | Stop reason: HOSPADM

## 2022-03-17 RX ORDER — DEXMEDETOMIDINE HYDROCHLORIDE 4 UG/ML
.1-1.2 INJECTION, SOLUTION INTRAVENOUS CONTINUOUS
Status: DISCONTINUED | OUTPATIENT
Start: 2022-03-17 | End: 2022-03-17 | Stop reason: HOSPADM

## 2022-03-17 RX ORDER — SODIUM CHLORIDE 9 MG/ML
INJECTION, SOLUTION INTRAVENOUS CONTINUOUS
Status: DISCONTINUED | OUTPATIENT
Start: 2022-03-17 | End: 2022-03-17 | Stop reason: HOSPADM

## 2022-03-17 RX ORDER — PHENYLEPHRINE HCL IN 0.9% NACL 50MG/250ML
.1-1 PLASTIC BAG, INJECTION (ML) INTRAVENOUS CONTINUOUS
Status: DISCONTINUED | OUTPATIENT
Start: 2022-03-17 | End: 2022-03-17 | Stop reason: HOSPADM

## 2022-03-17 RX ORDER — FLUMAZENIL 0.1 MG/ML
0.2 INJECTION, SOLUTION INTRAVENOUS
Status: DISCONTINUED | OUTPATIENT
Start: 2022-03-17 | End: 2022-03-17 | Stop reason: HOSPADM

## 2022-03-17 RX ORDER — PROPOFOL 10 MG/ML
INJECTION, EMULSION INTRAVENOUS PRN
Status: DISCONTINUED | OUTPATIENT
Start: 2022-03-17 | End: 2022-03-17

## 2022-03-17 RX ORDER — LEVOFLOXACIN 5 MG/ML
500 INJECTION, SOLUTION INTRAVENOUS
Status: COMPLETED | OUTPATIENT
Start: 2022-03-17 | End: 2022-03-17

## 2022-03-17 RX ORDER — HALOPERIDOL 5 MG/ML
2 INJECTION INTRAMUSCULAR ONCE
Status: COMPLETED | OUTPATIENT
Start: 2022-03-17 | End: 2022-03-17

## 2022-03-17 RX ADMIN — PROPOFOL 180 MG: 10 INJECTION, EMULSION INTRAVENOUS at 08:30

## 2022-03-17 RX ADMIN — PHENYLEPHRINE HYDROCHLORIDE 0.3 MCG/KG/MIN: 10 INJECTION INTRAVENOUS at 09:22

## 2022-03-17 RX ADMIN — HEPARIN SODIUM 5000 UNITS: 5000 INJECTION, SOLUTION INTRAVENOUS; SUBCUTANEOUS at 07:54

## 2022-03-17 RX ADMIN — FENTANYL CITRATE 150 MCG: 50 INJECTION, SOLUTION INTRAMUSCULAR; INTRAVENOUS at 08:30

## 2022-03-17 RX ADMIN — PHENYLEPHRINE HYDROCHLORIDE 100 MCG: 10 INJECTION INTRAVENOUS at 09:49

## 2022-03-17 RX ADMIN — ALBUMIN HUMAN 250 ML: 0.05 INJECTION, SOLUTION INTRAVENOUS at 12:47

## 2022-03-17 RX ADMIN — HYDROMORPHONE HYDROCHLORIDE 0.2 MG: 1 INJECTION, SOLUTION INTRAMUSCULAR; INTRAVENOUS; SUBCUTANEOUS at 12:27

## 2022-03-17 RX ADMIN — MIDAZOLAM 2 MG: 1 INJECTION INTRAMUSCULAR; INTRAVENOUS at 08:17

## 2022-03-17 RX ADMIN — BUPIVACAINE HYDROCHLORIDE 4 ML: 2.5 INJECTION, SOLUTION EPIDURAL; INFILTRATION; INTRACAUDAL; PERINEURAL at 13:00

## 2022-03-17 RX ADMIN — PHENYLEPHRINE HYDROCHLORIDE 100 MCG: 10 INJECTION INTRAVENOUS at 09:20

## 2022-03-17 RX ADMIN — PHENYLEPHRINE HYDROCHLORIDE 100 MCG: 10 INJECTION INTRAVENOUS at 09:04

## 2022-03-17 RX ADMIN — FENTANYL CITRATE 25 MCG: 50 INJECTION, SOLUTION INTRAMUSCULAR; INTRAVENOUS at 14:53

## 2022-03-17 RX ADMIN — HYDROMORPHONE HYDROCHLORIDE 0.2 MG: 1 INJECTION, SOLUTION INTRAMUSCULAR; INTRAVENOUS; SUBCUTANEOUS at 12:20

## 2022-03-17 RX ADMIN — ROCURONIUM BROMIDE 50 MG: 50 INJECTION, SOLUTION INTRAVENOUS at 08:30

## 2022-03-17 RX ADMIN — SENNOSIDES AND DOCUSATE SODIUM 1 TABLET: 50; 8.6 TABLET ORAL at 20:00

## 2022-03-17 RX ADMIN — ROCURONIUM BROMIDE 20 MG: 50 INJECTION, SOLUTION INTRAVENOUS at 10:15

## 2022-03-17 RX ADMIN — HALOPERIDOL LACTATE 2 MG: 5 INJECTION, SOLUTION INTRAMUSCULAR at 12:50

## 2022-03-17 RX ADMIN — BUPIVACAINE HYDROCHLORIDE: 7.5 INJECTION, SOLUTION EPIDURAL; RETROBULBAR at 16:18

## 2022-03-17 RX ADMIN — SODIUM CHLORIDE: 9 INJECTION, SOLUTION INTRAVENOUS at 07:30

## 2022-03-17 RX ADMIN — PROCHLORPERAZINE EDISYLATE 5 MG: 5 INJECTION INTRAMUSCULAR; INTRAVENOUS at 12:35

## 2022-03-17 RX ADMIN — BUPIVACAINE HYDROCHLORIDE: 7.5 INJECTION, SOLUTION EPIDURAL; RETROBULBAR at 12:20

## 2022-03-17 RX ADMIN — Medication 0.2 MCG/KG/MIN: at 12:55

## 2022-03-17 RX ADMIN — LIDOCAINE HYDROCHLORIDE 100 MG: 20 INJECTION, SOLUTION INFILTRATION; PERINEURAL at 08:30

## 2022-03-17 RX ADMIN — PHENYLEPHRINE HYDROCHLORIDE 50 MCG: 10 INJECTION INTRAVENOUS at 10:43

## 2022-03-17 RX ADMIN — FENTANYL CITRATE 50 MCG: 50 INJECTION, SOLUTION INTRAMUSCULAR; INTRAVENOUS at 09:38

## 2022-03-17 RX ADMIN — FENTANYL CITRATE 50 MCG: 50 INJECTION, SOLUTION INTRAMUSCULAR; INTRAVENOUS at 12:18

## 2022-03-17 RX ADMIN — DEXMEDETOMIDINE HYDROCHLORIDE 0.4 MCG/KG/HR: 100 INJECTION, SOLUTION INTRAVENOUS at 09:27

## 2022-03-17 RX ADMIN — Medication: at 13:46

## 2022-03-17 RX ADMIN — SODIUM CHLORIDE: 9 INJECTION, SOLUTION INTRAVENOUS at 08:24

## 2022-03-17 RX ADMIN — MIDAZOLAM 1 MG: 1 INJECTION INTRAMUSCULAR; INTRAVENOUS at 07:30

## 2022-03-17 RX ADMIN — ROCURONIUM BROMIDE 30 MG: 50 INJECTION, SOLUTION INTRAVENOUS at 10:52

## 2022-03-17 RX ADMIN — PHENYLEPHRINE HYDROCHLORIDE 200 MCG: 10 INJECTION INTRAVENOUS at 08:54

## 2022-03-17 RX ADMIN — DEXAMETHASONE SODIUM PHOSPHATE 4 MG: 4 INJECTION, SOLUTION INTRA-ARTICULAR; INTRALESIONAL; INTRAMUSCULAR; INTRAVENOUS; SOFT TISSUE at 09:07

## 2022-03-17 RX ADMIN — METHOCARBAMOL 500 MG: 100 INJECTION, SOLUTION INTRAMUSCULAR; INTRAVENOUS at 15:26

## 2022-03-17 RX ADMIN — FENTANYL CITRATE 25 MCG: 50 INJECTION, SOLUTION INTRAMUSCULAR; INTRAVENOUS at 14:59

## 2022-03-17 RX ADMIN — LEVOFLOXACIN 500 MG: 5 INJECTION, SOLUTION INTRAVENOUS at 08:38

## 2022-03-17 RX ADMIN — SODIUM CHLORIDE, POTASSIUM CHLORIDE, SODIUM LACTATE AND CALCIUM CHLORIDE: 600; 310; 30; 20 INJECTION, SOLUTION INTRAVENOUS at 16:44

## 2022-03-17 RX ADMIN — FENTANYL CITRATE 50 MCG: 50 INJECTION, SOLUTION INTRAMUSCULAR; INTRAVENOUS at 10:30

## 2022-03-17 RX ADMIN — PHENYLEPHRINE HYDROCHLORIDE 200 MCG: 10 INJECTION INTRAVENOUS at 08:37

## 2022-03-17 RX ADMIN — ROCURONIUM BROMIDE 50 MG: 50 INJECTION, SOLUTION INTRAVENOUS at 08:59

## 2022-03-17 RX ADMIN — METRONIDAZOLE 500 MG: 500 INJECTION, SOLUTION INTRAVENOUS at 07:40

## 2022-03-17 RX ADMIN — DEXMEDETOMIDINE HYDROCHLORIDE 0.4 MCG/KG/HR: 400 INJECTION INTRAVENOUS at 12:40

## 2022-03-17 RX ADMIN — SUGAMMADEX 200 MG: 100 INJECTION, SOLUTION INTRAVENOUS at 12:04

## 2022-03-17 RX ADMIN — HYDROMORPHONE HYDROCHLORIDE 0.2 MG: 1 INJECTION, SOLUTION INTRAMUSCULAR; INTRAVENOUS; SUBCUTANEOUS at 13:32

## 2022-03-17 RX ADMIN — ROCURONIUM BROMIDE 20 MG: 50 INJECTION, SOLUTION INTRAVENOUS at 09:52

## 2022-03-17 RX ADMIN — FENTANYL CITRATE 50 MCG: 50 INJECTION, SOLUTION INTRAMUSCULAR; INTRAVENOUS at 07:35

## 2022-03-17 RX ADMIN — ONDANSETRON 4 MG: 2 INJECTION INTRAMUSCULAR; INTRAVENOUS at 11:34

## 2022-03-17 ASSESSMENT — ENCOUNTER SYMPTOMS
DYSRHYTHMIAS: 1
ORTHOPNEA: 0

## 2022-03-17 ASSESSMENT — ACTIVITIES OF DAILY LIVING (ADL)
ADLS_ACUITY_SCORE: 3
ADLS_ACUITY_SCORE: 1
ADLS_ACUITY_SCORE: 3
ADLS_ACUITY_SCORE: 1
ADLS_ACUITY_SCORE: 3

## 2022-03-17 ASSESSMENT — COPD QUESTIONNAIRES: COPD: 0

## 2022-03-17 ASSESSMENT — LIFESTYLE VARIABLES: TOBACCO_USE: 1

## 2022-03-17 NOTE — OR NURSING
Pt dialyzed yesterday. He does urinate a little, about 4-5 tablespoons at a time,m a few times a day.. His dialysis schedule is Mon-Wed-Fri. He has a large hernia to his RUQ.

## 2022-03-17 NOTE — ANESTHESIA PROCEDURE NOTES
Arterial Line Procedure Note    Pre-Procedure   Staff -        Anesthesiologist:  Behrens, Christopher J, MD       Performed By: anesthesiologist       Location: OR       Pre-Anesthestic Checklist: patient identified, IV checked, site marked, risks and benefits discussed, informed consent, monitors and equipment checked, pre-op evaluation, at physician/surgeon's request and post-op pain management  Timeout:       Correct Patient: Yes        Correct Procedure: Yes        Correct Site: Yes        Correct Position: Yes   Line Placement:   This line was placed Post Induction  Procedure   Procedure: arterial line       Laterality: right       Insertion Site: radial.  Sterile Prep        All elements of maximal sterile barrier technique followed       Patient Prep/Sterile Barriers: hand hygiene, sterile gloves, hat, mask       Skin prep: Chloraprep  Insertion/Injection        Technique: Seldinger Technique        Catheter Type/Size: 20 gauge, 12 cm  Narrative         Secured by: suture       Tegaderm dressing used.       Complications: None apparent,      Arterial waveform: Yes

## 2022-03-17 NOTE — BRIEF OP NOTE
[facility]    Brief Operative Note    Pre-operative diagnosis: IPMN (intraductal papillary mucinous neoplasm) [D49.0]  Post-operative diagnosis Same as pre-operative diagnosis    Procedure: Procedure(s):  Open Subtotal Pancreatectomy, intra-op ultrasound  Laparotomy, extensiv lysis adhesions, combined  Surgeon: Surgeon(s) and Role:     * Sarath Smith MD - Primary     * Neftaly Muñiz MD - Resident - Assisting  Anesthesia: Combined General with Block   Estimated Blood Loss: Less than 50 ml    Drains: Keyon-Chen  Specimens:   ID Type Source Tests Collected by Time Destination   1 : Body and Tail of Pancreas, Margin Marked with Reinforced Staple Line Tissue Pancreas SURGICAL PATHOLOGY EXAM, RESEARCH SPECIMEN FOR BIONET TESTING Sarath Smith MD 3/17/2022 11:11 AM      Findings:  Significant adhesions. Spleen preserved  Complications: None.  Implants: * No implants in log *    surg onc, 7C  Tylenol, PCA, epidural  NPO, chips/meds  TKO IVF d/t ESRD  Renal consult for HD  DVT ppx in AM  PT/OT    Neftaly Muñiz MD  PGY-5 Surgery

## 2022-03-17 NOTE — PROGRESS NOTES
"/69   Pulse 73   Temp 97.3  F (36.3  C) (Axillary)   Resp 14   Ht 1.803 m (5' 11\")   Wt 76.1 kg (167 lb 12.3 oz)   SpO2 100%   BMI 23.40 kg/m       Neuro: A&Ox4.   Cardiac: Afebrile, VSS.   Respiratory: 2L .On capno, arrived on the floor at 1630.  GI/: on HD- MWF. No BM this shift.   Diet/appetite: ice chips. Denies nausea   Activity: evening will get the pt up.   Pain: Agreeable to current regimen.  Skin: No new deficits noted. Incision CDI.  Lines: PIVx1. PCA dilaudid and Epidural.  Drains: JASMIN  No new complaints.Will continue to monitor and follow plan of care.       "

## 2022-03-17 NOTE — ANESTHESIA POSTPROCEDURE EVALUATION
Patient: Tacos Dominguez    Procedure: Procedure(s):  Open Subtotal Pancreatectomy, intra-op ultrasound  Laparotomy, extensiv lysis adhesions, combined       Anesthesia Type:  General    Note:  Disposition: Admission   Postop Pain Control: Uneventful            Sign Out: Well controlled pain   PONV: No   Neuro/Psych: Uneventful            Sign Out: Acceptable/Baseline neuro status   Airway/Respiratory: Uneventful            Sign Out: Acceptable/Baseline resp. status   CV/Hemodynamics: Uneventful            Sign Out: Acceptable CV status   Other NRE: NONE   DID A NON-ROUTINE EVENT OCCUR? No           Last vitals:  Vitals Value Taken Time   /68 03/17/22 1600   Temp 36.2  C (97.2  F) 03/17/22 1600   Pulse 70 03/17/22 1613   Resp 27 03/17/22 1613   SpO2 100 % 03/17/22 1614   Vitals shown include unvalidated device data.    Electronically Signed By: Christopher J. Behrens, MD  March 17, 2022  4:15 PM

## 2022-03-17 NOTE — ANESTHESIA CARE TRANSFER NOTE
Patient: Tacos Dominguez    Procedure: Procedure(s):  Open Subtotal Pancreatectomy, intra-op ultrasound  Laparotomy, extensiv lysis adhesions, combined       Diagnosis: IPMN (intraductal papillary mucinous neoplasm) [D49.0]  Diagnosis Additional Information: No value filed.    Anesthesia Type:   General     Note:    Oropharynx: oropharynx clear of all foreign objects and spontaneously breathing  Level of Consciousness: awake  Oxygen Supplementation: room air    Independent Airway: airway patency satisfactory and stable  Dentition: dentition unchanged    Report to RN Given: handoff report given  Patient transferred to: PACU  Comments: Started epidural pump at 5 ml/hr upon arrival to PACU, restarted phenylephrine and precedex infusions, Dr. Behrens called to bedside for uncontrolled pain and hypotension.   Handoff Report: Identifed the Patient, Identified the Reponsible Provider, Reviewed the pertinent medical history, Discussed the surgical course, Reviewed Intra-OP anesthesia mangement and issues during anesthesia, Set expectations for post-procedure period and Allowed opportunity for questions and acknowledgement of understanding      Vitals:  Vitals Value Taken Time   /67 03/17/22 1300   Temp     Pulse 63 03/17/22 1303   Resp 6 03/17/22 1303   SpO2 100 % 03/17/22 1303   Vitals shown include unvalidated device data.    Electronically Signed By: JANNET Sarmiento CRNA  March 17, 2022  1:04 PM

## 2022-03-17 NOTE — OP NOTE
Procedure Date: 03/17/2022    SURGEON:  Sarath Smith MD    FIRST ASSISTANT:  Neftaly Muñiz MD, PGY-5    PREOPERATIVE DIAGNOSIS:  Main duct intraductal papillary mucinous neoplasm of the pancreas.    POSTOPERATIVE DIAGNOSES:     1.  Extensive intraoperative extensive intra-abdominal adhesions.  2.  Main duct intraductal papillary mucinous neoplasm of the pancreas.    PROCEDURES PERFORMED:     1.  Exploratory laparotomy.  2.  Extensive lysis of adhesions lasting over 1 hour.  3.  Intraoperative pancreatic ultrasound.  4.  Open subtotal pancreatectomy.    ANESTHESIA:  General.    ESTIMATED BLOOD LOSS:  Less than 50 mL    SPECIMENS:   Neck, body and tail of the pancreas.    DESCRIPTION OF PROCEDURE:  Mr. Dominguez was put to sleep and positioned by Anesthesia.  He was prepped and draped sterilely.  Pause for the cause was performed and was accurate.  We entered the abdomen through a left subcostal incision, making an effort to stay away from his multiple previous abdominal incisions.  Immediately on getting through the fascia, we were met with dense intra-abdominal adhesions.  We spent well over 60 minutes and really almost 90 minutes freeing up adhesions.  Once we had freed up enough of the abdominal wall to get a self-retaining retractor in place, we did that.  The stomach was densely adherent and essentially inseparable from the left lateral segment of the liver as well as the diaphragm anteriorly and those adhesions were not taken down.  We were able to free up the colon and small bowel and ultimately we were able to open the gastrocolic ligament using the LigaSure device.  The lesser sac was similarly obliterated by adhesions, so the dissection was extremely tedious.  Ultimately, we were able to free up the back wall of the stomach from the anterior surface of the pancreas and grossly we could see cystic changes essentially throughout the pancreas, although towards the neck it did seem to normalize.  We then  performed an intraoperative ultrasound to get a feel for exactly exact extent of the abnormality.  The pancreatic duct was clearly abnormal essentially up to the confluence of the splenic vein with the superior mesenteric vein.  Because of that, it was my feeling that we would need to perform an essentially subtotal pancreatectomy, carrying our dissection all the way across the SMV.      We mobilized the colon inferiorly and began taking down the splenic flexure.  The short gastric vessels were left fully intact at this time.  At this point, we had the pancreas exposed and we began our dissection along the inferior border of the pancreas.  This portion of the surgery was actually quite straightforward.  Once we had dissected the entire inferior border.  We turned our attention to getting around the pancreatic parenchyma.  We made a tunnel over the superior mesenteric vein and encircled the neck of the pancreas with a vessel loop.  We were then able to use an Endo-HEATH 60 black load stapling device with reinforcement to divide the neck of the pancreas.  Hemostasis was excellent and the pancreas did not fracture during this process.  We then were able to circumferentially dissect and isolate the splenic artery and splenic vein.  Both were divided using individual white load stapling devices.  We then performed a medial to lateral dissection, preserving Gerota's fascia.  The posterior portion of this dissection was actually quite straightforward, although the tissue planes along the splenocolic ligament were completely obliterated by previous adhesions.  Ultimately, we had the neck, body and tail of the pancreas completely freed up and it did seem that we would be able to preserve the spleen.  We ultimately used 2 firings of an Endo-HEATH white load stapling device to divide the final tissue at the tip of the tail of the pancreas and then the specimen was removed and sent to pathology.  Frozen section of the pancreatic  neck revealed a mucinous metaplasia, but no evidence of high-grade dysplasia.      With that in mind, we did not proceed with a total pancreatectomy.  We inspected for hemostasis.  There was really no bleeding.  There was some raw surface oozing, but very minimal from our extensive adhesiolysis.  We also inspected the spleen, which remained pink and viable.  With that, we irrigated the abdomen thoroughly.  We placed a 15 fluted drain in our surgical bed and out through the abdominal wall and we tacked it in place using 3-0 nylon suture.  We then closed the abdomen in 2 layers using 0 looped PDS.  Subcutaneous tissues were irrigated with a liter of saline and the skin was closed in 2 layers using 3-0 Vicryl, followed by 4-0 Monocryl followed by Dermabond.  The patient tolerated the procedure well, was extubated in the operating room, transferred to recovery room in stable condition.      I was present throughout the entire procedure as described.    Sarath Smith MD        D: 2022   T: 2022   MT: ANNITA    Name:     KRAIG CHAUDHARIMattie  MRN:      [id number]        Account:        [id number]   :      1960           Procedure Date: 2022     Document: [id number]

## 2022-03-17 NOTE — OR NURSING
Epidural Thoracic catheter/block performed by Archana Odom. Pt tolerated well. Patient denies any adverse signs or symptoms. See flow sheet/MAR for monitoring.

## 2022-03-17 NOTE — ANESTHESIA PROCEDURE NOTES
Epidural catheter Procedure Note    Pre-Procedure   Staff -        Anesthesiologist:  Stephen Odom DO       Resident/Fellow: Rosario Reyes MD       Performed By: resident       Location: pre-op       Procedure Start/Stop Times: 3/17/2022 7:30 AM and 3/17/2022 7:45 AM       Pre-Anesthestic Checklist: patient identified, IV checked, risks and benefits discussed, informed consent, monitors and equipment checked, pre-op evaluation, at physician/surgeon's request and post-op pain management  Timeout:       Correct Patient: Yes        Correct Procedure: Yes        Correct Site: Yes        Correct Position: Yes   Procedure Documentation  Procedure: epidural catheter       Diagnosis: Post-op pain       Patient Position: sitting       Skin prep: Chloraprep       Local skin infiltrated with 3 mL of 1% lidocaine.        Insertion Site: T7-8. (midline approach).       Technique: LORT saline        CM at 6.5 cm.       Needle Type: Touhy needle       Needle Gauge: 17.        Needle Length (Inches): 3.5        Catheter: 19 G.         Catheter threaded easily.         Threaded 11 cm at skin.         # of attempts: 1 and  # of redirects:  1    Assessment/Narrative         Paresthesias: No.      Test dose of mL lidocaine 1.5% w/ 1:200,000 epinephrine at 07:42 CDT.         Test dose negative, 3 minutes after injection, for signs of intravascular, subdural, or intrathecal injection.       Insertion/Infusion Method: LORT saline       Aspiration negative for Heme or CSF via Epidural Catheter.

## 2022-03-17 NOTE — ANESTHESIA PROCEDURE NOTES
Airway       Patient location during procedure: OR       Procedure Start/Stop Times: 3/17/2022 8:33 AM  Staff -        Anesthesiologist:  Behrens, Christopher J, MD       CRNA: Licha Pal APRN CRNA       Performed By: CRNAIndications and Patient Condition       Indications for airway management: vernon-procedural       Induction type:intravenous       Mask difficulty assessment: 1 - vent by mask    Final Airway Details       Final airway type: endotracheal airway       Successful airway: ETT - single  Endotracheal Airway Details        ETT size (mm): 8.0       Cuffed: yes       Successful intubation technique: direct laryngoscopy       DL Blade Type: MAC 4       Grade View of Cords: 2       Adjucts: stylet       Position: Right       Measured from: lips       Secured at (cm): 23       Bite block used: None    Post intubation assessment        Placement verified by: capnometry, equal breath sounds and chest rise        Number of attempts at approach: 1       Number of other approaches attempted: 0       Secured with: pink tape       Ease of procedure: easy       Dentition: Intact and Unchanged

## 2022-03-17 NOTE — ANESTHESIA PREPROCEDURE EVALUATION
"  Pre-Operative H & P     CC:  Preoperative exam to assess for increased cardiopulmonary risk while undergoing surgery and anesthesia.    Date of Encounter: 3/3/2022  Primary Care Physician:  Good Pham     Reason for visit:   No diagnosis found.    HPI  Tacos Dominguez is a 62 year old male who presents for pre-operative H & P in preparation for  Procedure Information     Case: [id number] Date/Time: 03/17/22 0830    Procedure: OPEN DISTAL PANCREATECTOMY WITH SPLENECTOMY (N/A Abdomen)    Anesthesia type: Combined General with Block    Diagnosis: IPMN (intraductal papillary mucinous neoplasm) [D49.0]    Pre-op diagnosis: IPMN (intraductal papillary mucinous neoplasm) [D49.0]    [facility]  / [facility]    Providers: Sarath Smith MD          Tacos Dominguez is a 62 year old male with history of a-fib, distal aortic dissection, hyperlipidemia, old MI, hypertension, hyperlipidemia, tobacco use, anemia, ESRD and history of crohn's disease that has an intraductal papillary mucinous neoplasm (IPMN).  Mr. Dominguez was admitted to [facility] in 10/2020 after being found down at home x 2 days after not reporting to work.  He had CLARISA with Cr 19, NSTEMI w/ Tpn peaked at 77, and MSSA pna.  He was found to have afib w/ RVR as well and was treated with amiodarone.  He was not on immunosuppressants for his Crohn's at the time.  He was also found to have a distal aortic dissection extending to the R JULISA - found on imaging with no intervention.  His cardiac workup included a nuclear stress test showing no ischemia and a normal EF.  There was inferior hypokinesis noted on TTE.  He did not have a coronary angiogram at that time.  However, he needed HD for his CLARISA and is now undergoing evaluation for a renal transplant due to progressing to ESRD.  In the process of being worked up for kidney transplant, there was an incidental finding of an IPMN.  He notes that he has to \"get this taken care of\" before being listed for " transplant.  He was referred to Dr. Smith for surgical consult and the above listed procedure has now been recommended for further evaluation and treatment.             History is obtained from the patient and chart review    Hx of abnormal bleeding or anti-platelet use: aspirin      Past Medical History  Past Medical History:   Diagnosis Date     Aortic dissection (H)     distal, thin.  stable/chronic on MRCP 3/2022     Benign essential hypertension      Chronic atrial fibrillation (H)      Crohn's colitis (H)      Crohn's disease of large intestine (H) 11/22/2021     Esophageal reflux      ESRD (end stage renal disease) on dialysis (H)      Hypertension      Mixed hyperlipidemia        Past Surgical History  Past Surgical History:   Procedure Laterality Date     ABDOMEN SURGERY      x 6.  colon resections for crohn's disease     COLONOSCOPY N/A 07/20/2021    Procedure: COLONOSCOPY, WITH POLYPECTOMY AND BIOPSY;  Surgeon: Eric Preston MD;  Location:  GI     CV CORONARY ANGIOGRAM N/A 05/25/2021    Procedure: CV CORONARY ANGIOGRAM;  Surgeon: Judson Ybarra MD;  Location:  HEART CARDIAC CATH LAB     ESOPHAGOSCOPY, GASTROSCOPY, DUODENOSCOPY (EGD), COMBINED N/A 07/20/2021    Procedure: ESOPHAGOGASTRODUODENOSCOPY, WITH FINE NEEDLE ASPIRATION BIOPSY, WITH ENDOSCOPIC ULTRASOUND GUIDANCE;  Surgeon: Eric Preston MD;  Location:  GI     VASCULAR SURGERY      dialysis access- left upper       Prior to Admission Medications  No current outpatient medications on file.       Allergies  Allergies   Allergen Reactions     Lisinopril Anaphylaxis and Other (See Comments)     Shortness of breath       Social History  Social History     Socioeconomic History     Marital status:      Spouse name: Not on file     Number of children: 1     Years of education: Not on file     Highest education level: Not on file   Occupational History     Occupation: [employer] ,    Tobacco Use      Smoking status: Current Every Day Smoker     Packs/day: 0.50     Years: 50.00     Pack years: 25.00     Last attempt to quit: 10/19/2020     Years since quittin.4     Smokeless tobacco: Never Used   Substance and Sexual Activity     Alcohol use: Yes     Comment: rare     Drug use: Not Currently     Sexual activity: Not on file   Other Topics Concern     Parent/sibling w/ CABG, MI or angioplasty before 65F 55M? Not Asked   Social History Narrative     Not on file     Social Determinants of Health     Financial Resource Strain: Not on file   Food Insecurity: Not on file   Transportation Needs: Not on file   Physical Activity: Not on file   Stress: Not on file   Social Connections: Not on file   Intimate Partner Violence: Not on file   Housing Stability: Not on file       Family History  Family History   Problem Relation Age of Onset     Breast Cancer Mother      Coronary Artery Disease Father      Hypertension Brother      Anesthesia Reaction No family hx of      Thrombosis No family hx of        Review of Systems  The complete review of systems is negative other than noted in the HPI or here.   Anesthesia Evaluation   Pt has had prior anesthetic. Type: General and MAC.    No history of anesthetic complications       ROS/MED HX  ENT/Pulmonary:     (+) MICHAEL risk factors, hypertension, tobacco use, Current use,  (-) asthma, COPD and recent URI   Neurologic:  - neg neurologic ROS     Cardiovascular: Comment: Distal aortic dissection    (+) Dyslipidemia hypertension--CAD (non-obstructive CAD) -past MI --dysrhythmias, a-fib, valvular problems/murmurs mitral insufficiency. Previous cardiac testing   Echo: Date: 3/25/21 Results:  Echocardiogram  3/25/21    Interpretation Summary  Global and regional left ventricular function is normal with an EF of 60-65%.  Right ventricular function, chamber size, wall motion, and thickness are  normal.  Mild to moderate mitral insufficiency is present.  The inferior vena cava was normal  in size with preserved respiratory  variability.     There is no prior study for direct comparison.    Stress Test: Date: Results:    ECG Reviewed: Date: 5/2021 Results:  Sinus rhythm Left anterior fascicular block Moderate voltage criteria for LVH, may be normal variant Prolonged QT Abnormal ECG When compared with ECG of 25-MAR-2021 11:57, No significant change was found   Cath:  Date: 5/25/21 Results:  Conclusion      Minimal non-obstructive coronary artery disease.   (-) COHEN, orthopnea/PND, murmur and wheezes   METS/Exercise Tolerance: >4 METS    Hematologic:     (+) anemia, history of blood transfusion, no previous transfusion reaction,  (-) history of blood clots   Musculoskeletal:       GI/Hepatic: Comment: Crohn's disease- in remission since 2006    (+) GERD, Asymptomatic on medication,     Renal/Genitourinary:     (+) renal disease, type: ESRD, Pt requires dialysis, type: Hemodialysis,     Endo: Comment: IPMN - neg endo ROS     Psychiatric/Substance Use:  - neg psychiatric ROS     Infectious Disease:  - neg infectious disease ROS  (-) Recent Fever   Malignancy:   (+) Malignancy, History of Skin.Skin CA Remission status post Surgery.        Other:  - neg other ROS          There were no vitals taken for this visit.     Labs/Diagnostic Studies   All labs and imaging personally reviewed     Coronary Cath  5/25/2021  Conclusion      Minimal non-obstructive coronary artery disease.       EKG  5/2021  sinus rhythm Left anterior fascicular block Moderate voltage criteria for LVH, may be normal variant Prolonged QT Abnormal ECG When compared with ECG of 25-MAR-2021 11:57, No significant change was found       Echocardiogram  3/25/21    Interpretation Summary  Global and regional left ventricular function is normal with an EF of 60-65%.  Right ventricular function, chamber size, wall motion, and thickness are  normal.  Mild to moderate mitral insufficiency is present.  The inferior vena cava was normal in size with  preserved respiratory  variability.     There is no prior study for direct comparison.      Labs  Component      Latest Ref Rng & Units 3/3/2022   Sodium      133 - 144 mmol/L 141   Potassium      3.4 - 5.3 mmol/L 4.3   Chloride      94 - 109 mmol/L 103   Carbon Dioxide      20 - 32 mmol/L 27   Anion Gap      3 - 14 mmol/L 11   Urea Nitrogen      7 - 30 mg/dL 39 (H)   Creatinine      0.66 - 1.25 mg/dL 8.76 (H)   Calcium      8.5 - 10.1 mg/dL 7.8 (L)   Glucose      70 - 99 mg/dL 91   Alkaline Phosphatase      40 - 150 U/L 112   AST      0 - 45 U/L 16   ALT      0 - 70 U/L 20   Protein Total      6.8 - 8.8 g/dL 6.9   Albumin      3.4 - 5.0 g/dL 3.4   Bilirubin Total      0.2 - 1.3 mg/dL 0.6   GFR Estimate      >60 mL/min/1.73m2 6 (L)   WBC      4.0 - 11.0 10e3/uL 6.8   RBC Count      4.40 - 5.90 10e6/uL 2.90 (L)   Hemoglobin      13.3 - 17.7 g/dL 9.7 (L)   Hematocrit      40.0 - 53.0 % 29.6 (L)   MCV      78 - 100 fL 102 (H)   MCH      26.5 - 33.0 pg 33.4 (H)   MCHC      31.5 - 36.5 g/dL 32.8   RDW      10.0 - 15.0 % 13.3   Platelet Count      150 - 450 10e3/uL 163   Ferritin      26 - 388 ng/mL 739 (H)       The patient's records and results personally reviewed by this provider.     Outside records reviewed from: Care Everywhere        Assessment      Tacos Dominguez is a 62 year old male was seen as a PAC referral for risk assessment and optimization for anesthesia.    Plan/Recommendations  Pt will be optimized for the proposed procedure.  See below for details on the assessment, risk, and preoperative recommendations    NEUROLOGY  - No history of TIA, CVA or seizure    -Post Op delirium risk factors:  No risk identified    ENT  - No current airway concerns.  Will need to be reassessed day of surgery.  Mallampati: II  TM: > 3    CARDIAC  - He was found down in October 2020 after 2 days.  Admission diagnosis was MI, A-fib and CLARISA.  A-fib converted with amiodarone and there has been no evidence of reoccurrence.   Cardiac cath wasn't done at that time.  Kidneys never recovered and he now is on dialysis for ESRD.  He is being worked up for kidney transplant so he had an echocardiogram and cardiac cath this past year.  -Echocardiogram on 3/25/21 showed EF of 60-65%, normal ventricular function and mild/moderate mitral insufficiency.  The cardiac cath showed minimal non-obstructive CAD.    -He reports that his blood pressures are fairly labile depending on dialysis so he isn't on any hypertension medication.  His blood pressure in clinic today was 187/101 and 172/78.    - He has a thin distal aortic dissection that was noted originally on CT angio done at outside facility on 10/29/2020. No intervention was indicated.  He had a MRCP today that notes it to be stable.  Encourage tight intra-op blood pressure control.    - METS (Metabolic Equivalents)= >4.  He reports that he reports that he walks 0.25 miles per day for exercise.     Patient performs 4 or more METS exercise without symptoms            Total Score: 0      RCRI-High risk: Class 4  >11% complication reate            Total Score: 3    RCRI: High Risk Surgery    RCRI: CAD    RCRI: Elevated Creatinine        PULMONARY    MICHAEL Medium Risk            Total Score: 3    MICHAEL: Hypertension    MICHAEL: Over 50 ys old    MICHAEL: Male      - Denies asthma or inhaler use   He quit smoking in 2020, but recently restarted smoking.  He notes that he has purchased some nicotine patches (unsure of dosage) and plans to start those this weekend to try to quit again prior to surgery.    - Tobacco History      History   Smoking Status     Current Every Day Smoker     Packs/day: 0.50     Years: 50.00     Last attempt to quit: 10/19/2020   Smokeless Tobacco     Never Used       GI  Denies GERD  Crohn's disease in remission since last abdominal surgery in 2006.  No longer has a colostomy.    PONV Low Risk  Total Score: 1           1 AN PONV: Intended Post Op Opioids        /RENAL  + ESRD as noted  "above in cardiac section.   On dialysis MWF at [facility].  Has a left upper arm AV fistula for dialysis access.    Hoping to get on kidney transplant list soon.     ENDOCRINE    - BMI: Estimated body mass index is 23.4 kg/m  as calculated from the following:    Height as of an earlier encounter on 3/17/22: 1.803 m (5' 11\").    Weight as of an earlier encounter on 3/17/22: 76.1 kg (167 lb 12.3 oz).      HEME  VTE Low Risk 0.26%            Total Score: 1    VTE: Greater than 59 yrs old      + chronic anemia.  Hgb today is 9.7 which is significantly lower than the previous at 11.2.  Ferritin is above normal limits so iron infusion prior to surgery wouldn't be indicated.  Will notify Dr. Smith via staff messaging.    Hold aspirin 7 days prior to surgery.     **Addendum - Message received back form Dr. Smith noting okay to proceed as planned with hgb at 9.7.**            Patient was discussed with Dr Duffy    The patient is optimized for their procedure. AVS with information on surgery time/arrival time, meds and NPO status given by nursing staff. No further diagnostic testing indicated.      On the day of service:     Prep time: 22 minutes  Visit time: 25 minutes  Documentation time: 24 minutes  ------------------------------------------  Total time: 71 minutes      JANNET Badillo CNP  Preoperative Assessment Center  [facility]  Phone: [phone]  Fax: [phone]    Physical Exam    Airway        Mallampati: II   TM distance: > 3 FB   Neck ROM: full   Mouth opening: > 3 cm    Respiratory Devices and Support         Dental  no notable dental history         Cardiovascular          Rhythm and rate: regular and normal (-) no systolic click and no murmur    Pulmonary   pulmonary exam normal        breath sounds clear to auscultation   (-) no wheezes          Anesthesia Plan    ASA Status:  4   NPO Status:  NPO Appropriate    Anesthesia Type: General.     - " Airway: ETT   Induction: Intravenous.   Maintenance: Inhalation.   Techniques and Equipment:     - Lines/Monitors: 2nd IV, Arterial Line     - Blood: T&C     - Drips/Meds: Dexmed. infusion     Consents    Anesthesia Plan(s) and associated risks, benefits, and realistic alternatives discussed. Questions answered and patient/representative(s) expressed understanding.    - Discussed:     - Discussed with:  Patient      - Extended Intubation/Ventilatory Support Discussed: Yes.      - Patient is DNR/DNI Status: No    Use of blood products discussed: Yes.     - Discussed with: Patient.     Postoperative Care    Pain management: IV analgesics.   PONV prophylaxis: Ondansetron (or other 5HT-3), Dexamethasone or Solumedrol     Comments:

## 2022-03-18 PROBLEM — I21.4 NSTEMI (NON-ST ELEVATED MYOCARDIAL INFARCTION) (H): Status: ACTIVE | Noted: 2021-04-26

## 2022-03-18 PROBLEM — I10 BENIGN ESSENTIAL HYPERTENSION: Status: ACTIVE | Noted: 2021-04-26

## 2022-03-18 PROBLEM — I48.91 ATRIAL FIBRILLATION (H): Status: ACTIVE | Noted: 2021-04-26

## 2022-03-18 PROBLEM — K50.10 CROHN'S DISEASE OF LARGE INTESTINE (H): Status: ACTIVE | Noted: 2021-11-22

## 2022-03-18 PROBLEM — N18.6 ESRD (END STAGE RENAL DISEASE) (H): Status: ACTIVE | Noted: 2021-04-26

## 2022-03-18 LAB
AMYLASE FLD-CCNC: 1561 U/L
AMYLASE SERPL-CCNC: 34 U/L (ref 30–110)
ANION GAP SERPL CALCULATED.3IONS-SCNC: 14 MMOL/L (ref 3–14)
BUN SERPL-MCNC: 40 MG/DL (ref 7–30)
CALCIUM SERPL-MCNC: 8.3 MG/DL (ref 8.5–10.1)
CHLORIDE BLD-SCNC: 101 MMOL/L (ref 94–109)
CO2 SERPL-SCNC: 21 MMOL/L (ref 20–32)
CREAT SERPL-MCNC: 9.55 MG/DL (ref 0.66–1.25)
ERYTHROCYTE [DISTWIDTH] IN BLOOD BY AUTOMATED COUNT: 13.5 % (ref 10–15)
GFR SERPL CREATININE-BSD FRML MDRD: 6 ML/MIN/1.73M2
GLUCOSE BLD-MCNC: 104 MG/DL (ref 70–99)
GLUCOSE BLDC GLUCOMTR-MCNC: 73 MG/DL (ref 70–99)
GLUCOSE BLDC GLUCOMTR-MCNC: 84 MG/DL (ref 70–99)
GLUCOSE BLDC GLUCOMTR-MCNC: 86 MG/DL (ref 70–99)
GLUCOSE BLDC GLUCOMTR-MCNC: 92 MG/DL (ref 70–99)
HCT VFR BLD AUTO: 29.7 % (ref 40–53)
HGB BLD-MCNC: 9.4 G/DL (ref 13.3–17.7)
MCH RBC QN AUTO: 33.6 PG (ref 26.5–33)
MCHC RBC AUTO-ENTMCNC: 31.6 G/DL (ref 31.5–36.5)
MCV RBC AUTO: 106 FL (ref 78–100)
PLATELET # BLD AUTO: 144 10E3/UL (ref 150–450)
POTASSIUM BLD-SCNC: 4.6 MMOL/L (ref 3.4–5.3)
RBC # BLD AUTO: 2.8 10E6/UL (ref 4.4–5.9)
SODIUM SERPL-SCNC: 136 MMOL/L (ref 133–144)
WBC # BLD AUTO: 10.1 10E3/UL (ref 4–11)

## 2022-03-18 PROCEDURE — 82150 ASSAY OF AMYLASE: CPT | Performed by: PHYSICIAN ASSISTANT

## 2022-03-18 PROCEDURE — 85014 HEMATOCRIT: CPT | Performed by: STUDENT IN AN ORGANIZED HEALTH CARE EDUCATION/TRAINING PROGRAM

## 2022-03-18 PROCEDURE — 99253 IP/OBS CNSLTJ NEW/EST LOW 45: CPT | Performed by: PHYSICIAN ASSISTANT

## 2022-03-18 PROCEDURE — 250N000013 HC RX MED GY IP 250 OP 250 PS 637

## 2022-03-18 PROCEDURE — 250N000013 HC RX MED GY IP 250 OP 250 PS 637: Performed by: PHYSICIAN ASSISTANT

## 2022-03-18 PROCEDURE — 250N000011 HC RX IP 250 OP 636: Performed by: STUDENT IN AN ORGANIZED HEALTH CARE EDUCATION/TRAINING PROGRAM

## 2022-03-18 PROCEDURE — 99024 POSTOP FOLLOW-UP VISIT: CPT | Performed by: PHYSICIAN ASSISTANT

## 2022-03-18 PROCEDURE — 250N000013 HC RX MED GY IP 250 OP 250 PS 637: Performed by: STUDENT IN AN ORGANIZED HEALTH CARE EDUCATION/TRAINING PROGRAM

## 2022-03-18 PROCEDURE — 80048 BASIC METABOLIC PNL TOTAL CA: CPT | Performed by: STUDENT IN AN ORGANIZED HEALTH CARE EDUCATION/TRAINING PROGRAM

## 2022-03-18 PROCEDURE — 120N000002 HC R&B MED SURG/OB UMMC

## 2022-03-18 PROCEDURE — 82150 ASSAY OF AMYLASE: CPT | Performed by: STUDENT IN AN ORGANIZED HEALTH CARE EDUCATION/TRAINING PROGRAM

## 2022-03-18 PROCEDURE — 250N000011 HC RX IP 250 OP 636: Performed by: PHYSICIAN ASSISTANT

## 2022-03-18 PROCEDURE — 36415 COLL VENOUS BLD VENIPUNCTURE: CPT | Performed by: STUDENT IN AN ORGANIZED HEALTH CARE EDUCATION/TRAINING PROGRAM

## 2022-03-18 RX ORDER — POLYETHYLENE GLYCOL 3350 17 G/17G
17 POWDER, FOR SOLUTION ORAL DAILY
Status: DISCONTINUED | OUTPATIENT
Start: 2022-03-18 | End: 2022-03-22 | Stop reason: HOSPADM

## 2022-03-18 RX ORDER — SODIUM CHLORIDE 9 MG/ML
INJECTION, SOLUTION INTRAVENOUS CONTINUOUS
Status: DISCONTINUED | OUTPATIENT
Start: 2022-03-18 | End: 2022-03-21

## 2022-03-18 RX ORDER — HYDROXYZINE HYDROCHLORIDE 25 MG/1
50 TABLET, FILM COATED ORAL EVERY 6 HOURS PRN
Status: DISCONTINUED | OUTPATIENT
Start: 2022-03-18 | End: 2022-03-22 | Stop reason: HOSPADM

## 2022-03-18 RX ORDER — METHOCARBAMOL 500 MG/1
500 TABLET, FILM COATED ORAL EVERY 6 HOURS PRN
Status: DISCONTINUED | OUTPATIENT
Start: 2022-03-18 | End: 2022-03-22 | Stop reason: HOSPADM

## 2022-03-18 RX ORDER — AMOXICILLIN 250 MG
2 CAPSULE ORAL 2 TIMES DAILY
Status: DISCONTINUED | OUTPATIENT
Start: 2022-03-18 | End: 2022-03-22 | Stop reason: HOSPADM

## 2022-03-18 RX ORDER — LIDOCAINE/PRILOCAINE 2.5 %-2.5%
CREAM (GRAM) TOPICAL DAILY PRN
Status: DISCONTINUED | OUTPATIENT
Start: 2022-03-18 | End: 2022-03-22 | Stop reason: HOSPADM

## 2022-03-18 RX ORDER — DEXTROSE MONOHYDRATE 25 G/50ML
25-50 INJECTION, SOLUTION INTRAVENOUS
Status: DISCONTINUED | OUTPATIENT
Start: 2022-03-18 | End: 2022-03-22 | Stop reason: HOSPADM

## 2022-03-18 RX ORDER — NICOTINE POLACRILEX 4 MG
15-30 LOZENGE BUCCAL
Status: DISCONTINUED | OUTPATIENT
Start: 2022-03-18 | End: 2022-03-22 | Stop reason: HOSPADM

## 2022-03-18 RX ORDER — HYDROXYZINE HYDROCHLORIDE 25 MG/1
25 TABLET, FILM COATED ORAL EVERY 6 HOURS PRN
Status: DISCONTINUED | OUTPATIENT
Start: 2022-03-18 | End: 2022-03-22 | Stop reason: HOSPADM

## 2022-03-18 RX ORDER — AMOXICILLIN 250 MG
1 CAPSULE ORAL 2 TIMES DAILY
Status: DISCONTINUED | OUTPATIENT
Start: 2022-03-18 | End: 2022-03-22 | Stop reason: HOSPADM

## 2022-03-18 RX ORDER — SIMETHICONE 80 MG
80 TABLET,CHEWABLE ORAL EVERY 6 HOURS PRN
Status: DISCONTINUED | OUTPATIENT
Start: 2022-03-18 | End: 2022-03-22 | Stop reason: HOSPADM

## 2022-03-18 RX ADMIN — HYDROXYZINE HYDROCHLORIDE 50 MG: 25 TABLET, FILM COATED ORAL at 15:27

## 2022-03-18 RX ADMIN — ONDANSETRON 4 MG: 2 INJECTION INTRAMUSCULAR; INTRAVENOUS at 12:11

## 2022-03-18 RX ADMIN — HYDROXYZINE HYDROCHLORIDE 50 MG: 25 TABLET, FILM COATED ORAL at 09:49

## 2022-03-18 RX ADMIN — METHOCARBAMOL 500 MG: 500 TABLET ORAL at 19:01

## 2022-03-18 RX ADMIN — SENNOSIDES AND DOCUSATE SODIUM 2 TABLET: 50; 8.6 TABLET ORAL at 20:07

## 2022-03-18 RX ADMIN — HYDROXYZINE HYDROCHLORIDE 50 MG: 25 TABLET, FILM COATED ORAL at 21:26

## 2022-03-18 RX ADMIN — METHOCARBAMOL 500 MG: 500 TABLET ORAL at 13:11

## 2022-03-18 RX ADMIN — Medication: at 09:39

## 2022-03-18 RX ADMIN — SIMETHICONE CHEW TAB 80 MG 80 MG: 80 TABLET ORAL at 20:17

## 2022-03-18 ASSESSMENT — ACTIVITIES OF DAILY LIVING (ADL)
ADLS_ACUITY_SCORE: 3

## 2022-03-18 NOTE — CONSULTS
Nephrology Initial Consult  March 18, 2022      Tacos Dominguez MRN:[id number] YOB: 1960  Date of Admission:3/17/2022  Primary care provider: Good Pham  Requesting physician: Sarath Smith MD    ASSESSMENT AND RECOMMENDATIONS:   Tacos Dominguez is a 62 year old male with PMH of afib, distal aortic dissection, HTN, MI, hypertension, tobacco use, Crohn's disease, ESRD, admitted s/p partial pancreatectomy 3/17/22 for IPMN found as part of transplant evaluation.     ESKD: dialyzes MWF at [facility] with Dr. Scherer. Run time: 3.5 hrs. Access: LUE AVF. EDW: 76.5 kg. Does use heparin with OP HD but will hold for now post op. Dialyzes on K4.0/Ca 3.0  - Patient is having significant post op pain today and would like to defer dialysis until tomorrow (Saturday); lytes are stable. Will return to MWF schedule next week.  - Uses EMLA cream on fistula (ordered)  - Hold heparin post op    Volume: EDW 76.5 kg, minimal UOP, usual UF 3 to 3.5 kg  - very gentle UF tomorrow, 1 kg    BP: normotensive     Anemia: hgb 9.4 g/dL; on venofer 50 mg qMonday, epogen 1600 units per HD  - Continue venofer  - Will hold epogen in setting of malignancy (defer to oncology for when to restart)    BMD: recent ; hectorol 1.5 mcg per HD, on tums 2 tabs in between meals and at bedtime, Phoslo 3-4 tabs tid WM  - Continue home meds    Recommendations were communicated to primary team via this note and in person       RENETTA Vyas  [phone]      REASON FOR CONSULT: ESKD/dialysis    HISTORY OF PRESENT ILLNESS:  Tacos Dominguez is a 62 year old male with PMH of afib, distal aortic dissection, HTN, MI, hypertension, tobacco use, Crohn's disease, ESRD, admitted s/p partial pancreatectomy 3/17/22 for IPMN found as part of transplant evaluation. Outpatient HD records obtained and reviewed. Patient was seen bedside, having significant post op pain today and wanting to hold off on dialysis until tomorrow.  Lytes and breathing are ok so will plan on run tomorrow with gentle UF. Pt denies n/v, CP, SOB, chills    PAST MEDICAL HISTORY:  Reviewed with patient on 03/18/2022     Past Medical History:   Diagnosis Date     Aortic dissection (H)     distal, thin.  stable/chronic on MRCP 3/2022     Benign essential hypertension      Chronic atrial fibrillation (H)      Crohn's colitis (H)      Crohn's disease of large intestine (H) 11/22/2021     Esophageal reflux      ESRD (end stage renal disease) on dialysis (H)      Hypertension      Mixed hyperlipidemia        Past Surgical History:   Procedure Laterality Date     ABDOMEN SURGERY      x 6.  colon resections for crohn's disease     COLONOSCOPY N/A 07/20/2021    Procedure: COLONOSCOPY, WITH POLYPECTOMY AND BIOPSY;  Surgeon: Eric Preston MD;  [facility]     CV CORONARY ANGIOGRAM N/A 05/25/2021    Procedure: CV CORONARY ANGIOGRAM;  Surgeon: Judson Ybarra MD;  Location:  HEART CARDIAC CATH LAB     ESOPHAGOSCOPY, GASTROSCOPY, DUODENOSCOPY (EGD), COMBINED N/A 07/20/2021    Procedure: ESOPHAGOGASTRODUODENOSCOPY, WITH FINE NEEDLE ASPIRATION BIOPSY, WITH ENDOSCOPIC ULTRASOUND GUIDANCE;  Surgeon: Eric Preston MD;  [facility]     LAPAROTOMY, LYSIS ADHESIONS, COMBINED N/A 3/17/2022    Procedure: Laparotomy, extensive lysis adhesions, combined;  Surgeon: Sarath Smith MD;  [facility]     PANCREATECTOMY PARTIAL N/A 3/17/2022    Procedure: Open Subtotal Pancreatectomy, intra-op ultrasound;  Surgeon: Sarath Smith MD;  [facility]     VASCULAR SURGERY      dialysis access- left upper        MEDICATIONS:  PTA Meds  Prior to Admission medications    Medication Sig Last Dose Taking? Auth Provider   aspirin (ASA) 81 MG chewable tablet Take 81 mg by mouth every morning  Past Week at 3/10 Yes Reported, Patient   magnesium oxide 400 MG CAPS Take 1 tablet by mouth every morning  Past Week at 3/10 Yes Reported, Patient   Melatonin 10 MG TABS tablet  Take 10 mg by mouth nightly as needed  Past Week at 3/10 Yes Reported, Patient   multivitamin RENAL (MULTIVITAMIN RENAL) 1 MG capsule every morning Past Week at 3/10 Yes Reported, Patient   omeprazole (PRILOSEC) 20 MG DR capsule Take 20 mg by mouth every morning  Past Week at 3/10 Yes Reported, Patient   simvastatin (ZOCOR) 20 MG tablet Take 20 mg by mouth every morning  Past Week at 3/10 Yes Reported, Patient   calcium acetate (CALPHRON) 667 MG TABS tablet 3 times daily  at 3/10  Reported, Patient   folic acid (FOLVITE) 1 MG tablet every morning  at 3/10  Reported, Patient   lidocaine-prilocaine (EMLA) 2.5-2.5 % external cream three times a week Monday, Wednesday, Friday   Reported, Patient      Current Meds    sodium chloride 0.9%  250 mL Intravenous Once in dialysis/CRRT     sodium chloride 0.9%  300 mL Hemodialysis Machine Once     acetaminophen  1,000 mg Oral 4x Daily     insulin aspart  1-6 Units Subcutaneous Q4H     - MEDICATION INSTRUCTIONS -   Does not apply Once     polyethylene glycol  17 g Oral Daily     senna-docusate  1 tablet Oral BID    Or     senna-docusate  2 tablet Oral BID     sodium chloride (PF)  3 mL Intracatheter Q8H     Infusion Meds    fentanyl       phenylephrine Stopped (03/17/22 1529)     sodium chloride 10 mL/hr at 03/18/22 0958     - MEDICATION INSTRUCTIONS -         ALLERGIES:    Allergies   Allergen Reactions     Lisinopril Anaphylaxis and Other (See Comments)     Shortness of breath       REVIEW OF SYSTEMS:  A comprehensive of systems was negative except as noted above.    SOCIAL HISTORY:   Social History     Socioeconomic History     Marital status:      Spouse name: Not on file     Number of children: 1     Years of education: Not on file     Highest education level: Not on file   Occupational History     Occupation: [employer] ,    Tobacco Use     Smoking status: Current Every Day Smoker     Packs/day: 0.50     Years: 50.00     Pack years: 25.00      "Last attempt to quit: 10/19/2020     Years since quittin.4     Smokeless tobacco: Never Used   Substance and Sexual Activity     Alcohol use: Yes     Comment: rare     Drug use: Not Currently     Sexual activity: Not on file   Other Topics Concern     Parent/sibling w/ CABG, MI or angioplasty before 65F 55M? Not Asked   Social History Narrative     Not on file     Social Determinants of Health     Financial Resource Strain: Not on file   Food Insecurity: Not on file   Transportation Needs: Not on file   Physical Activity: Not on file   Stress: Not on file   Social Connections: Not on file   Intimate Partner Violence: Not on file   Housing Stability: Not on file     Reviewed with patient     FAMILY MEDICAL HISTORY:   Family History   Problem Relation Age of Onset     Breast Cancer Mother      Coronary Artery Disease Father      Hypertension Brother      Anesthesia Reaction No family hx of      Thrombosis No family hx of      No family history of kidney disease      PHYSICAL EXAM:   Temp  Av.2  F (36.2  C)  Min: 96.8  F (36  C)  Max: 97.6  F (36.4  C)  Arterial Line BP  Min: 83/36  Max: 136/75  Arterial Line MAP (mmHg)  Av.6 mmHg  Min: 53 mmHg  Max: 96 mmHg      Pulse  Av.7  Min: 60  Max: 111 Resp  Av.2  Min: 11  Max: 34  SpO2  Av %  Min: 93 %  Max: 100 %       /64   Pulse 63   Temp 97.6  F (36.4  C) (Temporal)   Resp 16   Ht 1.803 m (5' 11\")   Wt 76.1 kg (167 lb 12.3 oz)   SpO2 97%   BMI 23.40 kg/m        Admit Weight: 76.1 kg (167 lb 12.3 oz)     GENERAL APPEARANCE: alert, having a lot of post op pain  EYES: no scleral icterus, pupils equal  Pulmonary: lungs clear to auscultation with equal breath sounds bilaterally   CV: regular rhythm, normal rate   - Edema no peripheral  GI: soft, nontender, normal bowel sounds  MS: no evidence of inflammation in joints, no muscle tenderness  : no maza  SKIN: no rash, warm, dry, no cyanosis  NEURO: face symmetric, a/o3  Access: LUE AVF " with good thrill    LABS:   I have reviewed the following labs:  CMP  Recent Labs   Lab 03/18/22  1139 03/18/22  0826 03/17/22  1417 03/17/22  1116 03/17/22  0653   NA  --  136 140 140  --    POTASSIUM  --  4.6 3.8 3.7 4.3   CHLORIDE  --  101 108  --   --    CO2  --  21 23  --   --    ANIONGAP  --  14 9  --   --    GLC 86 104* 108* 96  --    BUN  --  40* 26  --   --    CR  --  9.55* 7.24*  --  7.29*   GFRESTIMATED  --  6* 8*  --  8*   KARY  --  8.3* 7.7*  --   --      CBC  Recent Labs   Lab 03/18/22  0826 03/17/22  1417 03/17/22  1116 03/17/22  0653   HGB 9.4* 9.0* 9.3* 9.9*   WBC 10.1 15.8*  --   --    RBC 2.80* 2.64*  --   --    HCT 29.7* 27.4*  --   --    * 104*  --   --    MCH 33.6* 34.1*  --   --    MCHC 31.6 32.8  --   --    RDW 13.5 13.3  --   --    * 144*  --   --      INR  Recent Labs   Lab 03/17/22  1426   INR 1.30*   PTT 32     ABG  Recent Labs   Lab 03/17/22  1116   PH 7.36   PCO2 42   PO2 245*   HCO3 24   O2PER 52.0      URINE STUDIES  Recent Labs   Lab Test 03/25/21  1225 09/26/20  1451   COLOR Yellow Yellow   APPEARANCE Slightly Cloudy Clear   URINEGLC Negative Negative   URINEBILI Negative Negative   URINEKETONE Negative Negative   SG 1.020 1.005   UBLD Large* Moderate*   URINEPH 6.0 5.0   PROTEIN 100* 30 mg/dL*   UROBILINOGEN  --  <2.0 E.U./dL   NITRITE Negative Negative   LEUKEST Negative Negative   RBCU 1 0-2   WBCU 2 0-5     No lab results found.  PTH  No lab results found.  IRON STUDIES  Recent Labs   Lab Test 03/03/22  1559   GRACE 739*       IMAGING:  Reviewed     Bryanna Saeed PA-C

## 2022-03-18 NOTE — PROGRESS NOTES
Surgery cross cover note    Paged regarding patient having increased pain.  Anesthesia pain team assessed on 3/17 at approximately 2000.  Gave epidural bolus at that time.    PCA Dilaudid at 0.2 mg every 10 minutes, although lockout rate was set at 0.6 mg/h.  Lockout rate increased to 1.2 mg/h.    Steven Senior MD  PGY1 [facility]  3/18/2022  [phone]

## 2022-03-18 NOTE — PROGRESS NOTES
Surgical Oncology Progress Note    Interval History:  Epidural leaking and stopped this morning, now on dilaudid PCA with poor pain control. He reports in the past he has had better pain control with fentanyl and atarax. He denies nausea.     Physical Exam:   Temp:  [96.8  F (36  C)-97.6  F (36.4  C)] 97.6  F (36.4  C)  Pulse:  [] 94  Resp:  [11-25] 16  BP: ()/(46-79) 125/63  MAP:  [53 mmHg-96 mmHg] 78 mmHg  Arterial Line BP: ()/(36-75) 128/50  SpO2:  [93 %-100 %] 93 %  General: Alert, oriented, appears comfortable, NAD.  Respiratory: breathing non labored  Abdomen: Abdomen is soft, non-tender, non-distended. Incision is clean, dry and intact. Drain with serosanguinous output.     Data:   All laboratory and imaging data in the past 24 hours reviewed  I/O last 3 completed shifts:  In: 649.29 [I.V.:649.29]  Out: 75 [Drains:75]  Recent Labs   Lab Test 03/17/22  1426 03/17/22  1417 03/17/22  1116 03/17/22  0653 03/03/22  1601 05/25/21  1143 03/25/21  1229   WBC  --  15.8*  --   --  6.8 7.2 6.8   HGB  --  9.0* 9.3* 9.9* 9.7* 10.6* 10.4*   PLT  --  144*  --   --  163 224 232   INR 1.30*  --   --   --   --  1.33* 1.31*      Recent Labs   Lab Test 03/18/22  0826 03/17/22  1417 03/17/22  1116 03/17/22  0653 03/17/22  0638 03/03/22  1559    140 140  --   --  141   POTASSIUM 4.6 3.8 3.7 4.3  --  4.3   CHLORIDE 101 108  --   --   --  103   CO2 21 23  --   --   --  27   BUN 40* 26  --   --   --  39*   CR 9.55* 7.24*  --  7.29*  --  8.76*   ANIONGAP 14 9  --   --   --  11   KARY 8.3* 7.7*  --   --   --  7.8*   * 108* 96  --    < > 91    < > = values in this interval not displayed.      Recent Labs   Lab Test 03/03/22  1559   PROTTOTAL 6.9   ALBUMIN 3.4   BILITOTAL 0.6   ALKPHOS 112   AST 16   ALT 20     Assessment and Plan:     Scar Dominguez is a 62 year old male with history of a-fib, aortic dissection, hyperlipidemia, old MI, hypertension, crohn's, and ESRD now POD 1 s/p subtotal pancreatectomy for  IPMN.     - Pain poorly controlled. Epidural discontinued. Switch to fentanyl PCA. Atarax added. Scheduled acetaminophen.   - NPO, miralax, senna  - JASMIN drain amylase today and on POD3  - ESRD: Nephology following, planning for dialysis today.   - Heparin this morning for DVT prophylaxsis pending morning labs.     Seen with Chief resident who will discuss with Staff.     BARBARA VazC   Surgical Oncology

## 2022-03-18 NOTE — PROGRESS NOTES
"Pain Service Progress Note  [facility]  Date: March 18, 2022      Patient Name: Tacos Dominguez  MRN: [id number]  Age: 62 year old  Sex: male      Plan/Recommendations:  1. Regional Anesthesia/Analgesia  Epidural infusion stopped since 0400  0820 epidural catheter removed without complication. Dark tip intact.  No tenderness, erythema, heme, edema.  Small bandage applied    Wait at least 4 hrs to start lovenox    Pain Service will sign off.    Discussed with attending anesthesiologist      Overnight Events: Nonfunctional epidural stopped      Subjective:  I don't think the epidural was ever working, pain unchanged since stopped overnight.   Reports postoperative abdominal pain 8/10, and initially when PCA dose increased he was able to control pain, but past few hours he is getting minimal relief with Dilaudid PCA, states Dilaudid doesn't usually work for him so primary team has ordered Fentanyl PCA and he is waiting for the new cassette.  Patient verbalizes understanding and agreement of plan to remove epidural catheter.       Diet: NPO for Medical/Clinical Reasons Except for: Meds, Ice Chips    Relevant Labs:  Recent Labs   Lab Test 03/17/22  1426 03/17/22  1417   INR 1.30*  --    PLT  --  144*   PTT 32  --    BUN  --  26       Physical Exam:  Vitals: /63 (BP Location: Right arm)   Pulse 94   Temp 97.6  F (36.4  C) (Temporal)   Resp 16   Ht 1.803 m (5' 11\")   Wt 76.1 kg (167 lb 12.3 oz)   SpO2 93%   BMI 23.40 kg/m      Physical Exam:   Orientation:  Alert, oriented, and in no acute distress: Yes  Sedation: No    Motor Examination:  5/5 Strength in lower extremities: Yes    Catheter Site:   Catheter entry site is clean/dry/intact: Yes    Tender: No      Relevant Medications:  Current Pain Medications:  Medications related to Pain Management (From now, onward)    Start     Dose/Rate Route Frequency Ordered Stop    03/18/22 0830  fentaNYL (SUBLIMAZE) PCA 50 mcg/mL OPIOID NAIVE " "         Intravenous CONTINUOUS 03/18/22 0730      03/18/22 0800  senna-docusate (SENOKOT-S/PERICOLACE) 8.6-50 MG per tablet 1 tablet        \"Or\" Linked Group Details    1 tablet Oral 2 TIMES DAILY 03/18/22 0730      03/18/22 0800  senna-docusate (SENOKOT-S/PERICOLACE) 8.6-50 MG per tablet 2 tablet        \"Or\" Linked Group Details    2 tablet Oral 2 TIMES DAILY 03/18/22 0730      03/18/22 0800  polyethylene glycol (MIRALAX) Packet 17 g         17 g Oral DAILY 03/18/22 0730      03/18/22 0729  hydrOXYzine (ATARAX) tablet 25 mg        \"Or\" Linked Group Details    25 mg Oral EVERY 6 HOURS PRN 03/18/22 0729      03/18/22 0729  hydrOXYzine (ATARAX) tablet 50 mg        \"Or\" Linked Group Details    50 mg Oral EVERY 6 HOURS PRN 03/18/22 0729      03/18/22 0716  lidocaine 1 % 0.5 mL         0.5 mL Intradermal ONCE PRN 03/18/22 0716      03/18/22 0716  lidocaine 1 % 0.5 mL         0.5 mL Intradermal ONCE PRN 03/18/22 0716      03/17/22 1700  acetaminophen (TYLENOL) tablet 1,000 mg         1,000 mg Oral 4 TIMES DAILY 03/17/22 1645      03/17/22 1645  lidocaine 1 % 0.1-1 mL         0.1-1 mL Other EVERY 1 HOUR PRN 03/17/22 1645      03/17/22 1645  lidocaine (LMX4) cream          Topical EVERY 1 HOUR PRN 03/17/22 1645            Primary Service Contacted with Recommendations? No    Sandee Rodríguez, JANNET CNP  3/18/2022    Time/Communication:  I personally spent 12 minutes with greater than 50% in consultation, education, counseliing and coordination of care.  Also discussed with RN    Contact Info (24 hour job code pager is the last 4 digits) For in-house use only:   Job code ID: [email or web address] [id number]   [place] 0506  Peds [phone]  [place] phone: dial * * * [phone], enter jobcode ID, then enter call-back number.    Text: Use [employer] on the Intranet <Paging/Directory> tab and enter Jobcode ID.   If no call back at any time, contact the hospital  and ask for Regional Anesthesia attending or backup     "

## 2022-03-18 NOTE — PROGRESS NOTES
"SURGERY PROGRESS NOTE    SUBJECTIVE  Increased pain overnight, dilaudid does not work well wondering about PO options. No vomiting, some nausea. No gas. Epidural dislodged overnight.    OBJECTIVE  /79 (BP Location: Right arm)   Pulse 100   Temp 96.8  F (36  C) (Oral)   Resp 18   Ht 1.803 m (5' 11\")   Wt 76.1 kg (167 lb 12.3 oz)   SpO2 94%   BMI 23.40 kg/m      Exam:   General: AAOx4, NAD, sitting in bed  CV: regular rate, warm, well-perfused  Pulm: no dyspnea, breathing comfortably on RA  Abd: soft, non-distended, appropriately tender, incision clean dry intact, JASMIN drain will small amount of serosanguinous fluid.   Extremities: no edema  Neuro: moving all extremities spontaneously without apparent deficit    I&Os:  Intake/Output Summary (Last 24 hours) at 3/18/2022 0551  Last data filed at 3/17/2022 2153  Gross per 24 hour   Intake 626.29 ml   Output 60 ml   Net 566.29 ml     JASMIN drain: 15 ml of serosanguinous fluid    Labs: Pending    Imaging: None    ASSESSMENT  Tacos Dominguez is a 62 year old male with a history of atrial fibrillation, hypertension, hyperlipidemia, renal failure requiring hemodialysis, Crohn's disease, and main duct IPMN now s/p open subtotal pancreatectomy on 3/17/22.     PLAN  - pain control: acetaminophen, dilaudid prn   - try atarax   - JASMIN drain to thumbprint suction   - Clear liquid diet  - Nephrology consult for chronic renal failure  - PT/OT      Fany Pulido  Medical Student, 3rd Year     "

## 2022-03-18 NOTE — PLAN OF CARE
Assumed cares of pt [id number]. Pt is AOVSS on 2L NC. Pain managed with PCA and epidural. Pt rating pain 8/10, per colo/rectal team writer paged pain service to see pt. Anesthesia explained to pt the need to lay flat for epidural to take full effect and also gave pt bolus. Pt was able to stand and dangle at bedside, denies any numbness or tingling and no lightheadedness or dizziness. Around 230 pt was unable to lay still and reports he had been trying to get comfortable for an hour. Paged team and PCA orders adjusted to correct hour totals. Pt resting more comfortably after adjustment. Pt is NPO with ice chips. Denies nausea. PIV TKO with PCA. JASMIN drain to bulb suction. Pt does HD MWF, no UOP. Pt is burping but doesn't feel he has passed gas, BS are faint.   0530 - Pt called and stated his back was wet and he could feel something running down it. Assessed epidural site and dressing is wet and fluid is leaking out the bottom left corner, stopped pump, placed gauze over leak and paged out to anesthesia pain team. Anesthesia will have morning team come to assess and remove epidural. Pt on RA and maintaining sats >92%. Incision is CDI/ESTRELLITA. JASMIN drain to thumbprint with 45mls output. Continue POC

## 2022-03-18 NOTE — PLAN OF CARE
VSS. Pt up with SBA. Pain somewhat controlled with Fentanyl PCA, Robaxin and Atarax. NPO with ice chips. Not passing gas, no BM. Pt on hemodialysis, anuric. JASMIN with small amount of serosang output. Abdominal binder on. Cont. POC

## 2022-03-19 ENCOUNTER — APPOINTMENT (OUTPATIENT)
Dept: OCCUPATIONAL THERAPY | Facility: CLINIC | Age: 62
DRG: 405 | End: 2022-03-19
Attending: STUDENT IN AN ORGANIZED HEALTH CARE EDUCATION/TRAINING PROGRAM
Payer: COMMERCIAL

## 2022-03-19 LAB
GLUCOSE BLDC GLUCOMTR-MCNC: 112 MG/DL (ref 70–99)
GLUCOSE BLDC GLUCOMTR-MCNC: 114 MG/DL (ref 70–99)
GLUCOSE BLDC GLUCOMTR-MCNC: 132 MG/DL (ref 70–99)
GLUCOSE BLDC GLUCOMTR-MCNC: 61 MG/DL (ref 70–99)
GLUCOSE BLDC GLUCOMTR-MCNC: 67 MG/DL (ref 70–99)
GLUCOSE BLDC GLUCOMTR-MCNC: 86 MG/DL (ref 70–99)
GLUCOSE BLDC GLUCOMTR-MCNC: 86 MG/DL (ref 70–99)
LACTATE SERPL-SCNC: 0.6 MMOL/L (ref 0.7–2)

## 2022-03-19 PROCEDURE — 250N000013 HC RX MED GY IP 250 OP 250 PS 637

## 2022-03-19 PROCEDURE — 97535 SELF CARE MNGMENT TRAINING: CPT | Mod: GO

## 2022-03-19 PROCEDURE — 97530 THERAPEUTIC ACTIVITIES: CPT | Mod: GO

## 2022-03-19 PROCEDURE — 99024 POSTOP FOLLOW-UP VISIT: CPT | Performed by: PHYSICIAN ASSISTANT

## 2022-03-19 PROCEDURE — 5A1D70Z PERFORMANCE OF URINARY FILTRATION, INTERMITTENT, LESS THAN 6 HOURS PER DAY: ICD-10-PCS | Performed by: STUDENT IN AN ORGANIZED HEALTH CARE EDUCATION/TRAINING PROGRAM

## 2022-03-19 PROCEDURE — 250N000011 HC RX IP 250 OP 636: Performed by: SURGERY

## 2022-03-19 PROCEDURE — 83605 ASSAY OF LACTIC ACID: CPT | Performed by: INTERNAL MEDICINE

## 2022-03-19 PROCEDURE — 999N000147 HC STATISTIC PT IP EVAL DEFER: Performed by: PHYSICAL THERAPIST

## 2022-03-19 PROCEDURE — 97165 OT EVAL LOW COMPLEX 30 MIN: CPT | Mod: GO

## 2022-03-19 PROCEDURE — 250N000013 HC RX MED GY IP 250 OP 250 PS 637: Performed by: STUDENT IN AN ORGANIZED HEALTH CARE EDUCATION/TRAINING PROGRAM

## 2022-03-19 PROCEDURE — 250N000009 HC RX 250: Performed by: SURGERY

## 2022-03-19 PROCEDURE — 120N000002 HC R&B MED SURG/OB UMMC

## 2022-03-19 PROCEDURE — 90937 HEMODIALYSIS REPEATED EVAL: CPT

## 2022-03-19 PROCEDURE — 258N000003 HC RX IP 258 OP 636: Performed by: SURGERY

## 2022-03-19 PROCEDURE — 250N000013 HC RX MED GY IP 250 OP 250 PS 637: Performed by: PHYSICIAN ASSISTANT

## 2022-03-19 PROCEDURE — 99233 SBSQ HOSP IP/OBS HIGH 50: CPT | Performed by: STUDENT IN AN ORGANIZED HEALTH CARE EDUCATION/TRAINING PROGRAM

## 2022-03-19 RX ORDER — DOXERCALCIFEROL 4 UG/2ML
2 INJECTION INTRAVENOUS
Status: COMPLETED | OUTPATIENT
Start: 2022-03-19 | End: 2022-03-19

## 2022-03-19 RX ADMIN — SIMETHICONE CHEW TAB 80 MG 80 MG: 80 TABLET ORAL at 08:27

## 2022-03-19 RX ADMIN — SODIUM CHLORIDE 300 ML: 9 INJECTION, SOLUTION INTRAVENOUS at 15:05

## 2022-03-19 RX ADMIN — HYDROXYZINE HYDROCHLORIDE 50 MG: 25 TABLET, FILM COATED ORAL at 18:54

## 2022-03-19 RX ADMIN — SIMETHICONE CHEW TAB 80 MG 80 MG: 80 TABLET ORAL at 18:54

## 2022-03-19 RX ADMIN — HYDROXYZINE HYDROCHLORIDE 50 MG: 25 TABLET, FILM COATED ORAL at 09:50

## 2022-03-19 RX ADMIN — Medication: at 15:06

## 2022-03-19 RX ADMIN — METHOCARBAMOL 500 MG: 500 TABLET ORAL at 07:43

## 2022-03-19 RX ADMIN — DOXERCALCIFEROL 2 MCG: 4 INJECTION, SOLUTION INTRAVENOUS at 15:06

## 2022-03-19 RX ADMIN — LIDOCAINE AND PRILOCAINE: 25; 25 CREAM TOPICAL at 12:15

## 2022-03-19 RX ADMIN — SODIUM CHLORIDE 250 ML: 9 INJECTION, SOLUTION INTRAVENOUS at 15:05

## 2022-03-19 RX ADMIN — METHOCARBAMOL 500 MG: 500 TABLET ORAL at 23:32

## 2022-03-19 RX ADMIN — METHOCARBAMOL 500 MG: 500 TABLET ORAL at 16:25

## 2022-03-19 RX ADMIN — HYDROXYZINE HYDROCHLORIDE 50 MG: 25 TABLET, FILM COATED ORAL at 03:47

## 2022-03-19 RX ADMIN — SENNOSIDES AND DOCUSATE SODIUM 2 TABLET: 50; 8.6 TABLET ORAL at 07:44

## 2022-03-19 RX ADMIN — SENNOSIDES AND DOCUSATE SODIUM 2 TABLET: 50; 8.6 TABLET ORAL at 21:42

## 2022-03-19 RX ADMIN — POLYETHYLENE GLYCOL 3350 17 G: 17 POWDER, FOR SOLUTION ORAL at 07:43

## 2022-03-19 RX ADMIN — SIMETHICONE CHEW TAB 80 MG 80 MG: 80 TABLET ORAL at 02:23

## 2022-03-19 RX ADMIN — METHOCARBAMOL 500 MG: 500 TABLET ORAL at 01:09

## 2022-03-19 ASSESSMENT — ACTIVITIES OF DAILY LIVING (ADL)
ADLS_ACUITY_SCORE: 3
ADLS_ACUITY_SCORE: 5
ADLS_ACUITY_SCORE: 3
ADLS_ACUITY_SCORE: 3
ADLS_ACUITY_SCORE: 5
PREVIOUS_RESPONSIBILITIES: MEAL PREP;HOUSEKEEPING;LAUNDRY;SHOPPING;MEDICATION MANAGEMENT;FINANCES;DRIVING;WORK
ADLS_ACUITY_SCORE: 3
ADLS_ACUITY_SCORE: 5
ADLS_ACUITY_SCORE: 3
ADLS_ACUITY_SCORE: 5
ADLS_ACUITY_SCORE: 5
ADLS_ACUITY_SCORE: 3
ADLS_ACUITY_SCORE: 5
ADLS_ACUITY_SCORE: 3
ADLS_ACUITY_SCORE: 5
ADLS_ACUITY_SCORE: 5

## 2022-03-19 NOTE — PROGRESS NOTES
03/19/22 0941   Quick Adds   Type of Visit Initial Occupational Therapy Evaluation   Living Environment   People in Home alone   Current Living Arrangements house   Home Accessibility no concerns   Transportation Anticipated car, drives self;family or friend will provide   Living Environment Comments Pt lives alone with his dog in a 1 level house with no RIGOBERTO. Pt has a tub/shower combo. Pt has arranged for a friend to take care of his dog for 1 week post discharge.    Self-Care   Usual Activity Tolerance good   Current Activity Tolerance fair   Regular Exercise No   Equipment Currently Used at Home none   Fall history within last six months no   Activity/Exercise/Self-Care Comment Pt is IND with all ADLs and mobility at baseline.    Instrumental Activities of Daily Living (IADL)   Previous Responsibilities meal prep;housekeeping;laundry;shopping;medication management;finances;driving;work   IADL Comments Pt is IND with all IADLs at baseline. Pt works from home at a [employer] job full time.    General Information   Onset of Illness/Injury or Date of Surgery 03/17/22   Referring Physician Neftaly Muñiz MD   Patient/Family Therapy Goal Statement (OT) return home    Additional Occupational Profile Info/Pertinent History of Current Problem Scar Dominguez is a 62 year old male with history of a-fib, aortic dissection, hyperlipidemia, old MI, hypertension, crohn's, and ESRD now POD 1 s/p subtotal pancreatectomy for IPMN.    Existing Precautions/Restrictions abdominal   Left Upper Extremity (Weight-bearing Status) other (see comments)  (10#)   Right Upper Extremity (Weight-bearing Status) other (see comments)  (10#)   Left Lower Extremity (Weight-bearing Status) full weight-bearing (FWB)   Right Lower Extremity (Weight-bearing Status) full weight-bearing (FWB)   General Observations and Info Pt sitting EOB upon arrival, agreeable to therapy.    Cognitive Status Examination   Orientation Status orientation to person, place and  time   Affect/Mental Status (Cognitive) WFL   Follows Commands WFL   Visual Perception   Visual Impairment/Limitations corrective lenses full-time   Sensory   Sensory Comments Pt reports some intermittent n/t in UEs, reporting mostly during dialysis or when waking from sleep, baseline.   Pain Assessment   Patient Currently in Pain Yes, see Vital Sign flowsheet   Integumentary/Edema   Integumentary/Edema no deficits were identifed   Posture   Posture forward head position;protracted shoulders   Range of Motion Comprehensive   General Range of Motion bilateral upper extremity ROM WFL   Strength Comprehensive (MMT)   Comment, General Manual Muscle Testing (MMT) Assessment generalized weakness post-op   Muscle Tone Assessment   Muscle Tone Quick Adds No deficits were identified   Coordination   Upper Extremity Coordination No deficits were identified   Gross Motor Coordination No deficits were identified   Fine Motor Coordination No deficits were identified   Bed Mobility   Bed Mobility supine-sit;sit-supine   Supine-Sit Day (Bed Mobility) supervision;verbal cues   Sit-Supine Day (Bed Mobility) supervision;verbal cues   Assistive Device (Bed Mobility) bed rails   Comment (Bed Mobility) HOB raised 45 degrees   Transfers   Transfers sit-stand transfer   Sit-Stand Transfer   Sit-Stand Day (Transfers) supervision   Activities of Daily Living   BADL Assessment/Intervention bathing;lower body dressing;grooming;toileting   Bathing Assessment/Intervention   Day Level (Bathing) supervision   Lower Body Dressing Assessment/Training   Day Level (Lower Body Dressing) supervision;verbal cues   Position (Lower Body Dressing) edge of bed sitting   Grooming Assessment/Training   Day Level (Grooming) supervision;set up   Toileting   Day Level (Toileting) supervision   Clinical Impression   Criteria for Skilled Therapeutic Interventions Met (OT) Yes, treatment indicated   OT  Diagnosis Pt with decreased activity tolerance and new post-surgical abdominal precautions impacting I/ADL I   Influenced by the following impairments abdominal precautions   OT Problem List-Impairments impacting ADL problems related to;activity tolerance impaired;strength;post-surgical precautions;pain   Assessment of Occupational Performance 3-5 Performance Deficits   Identified Performance Deficits dressing, bathing, toileting, g/h, home mgmt   Planned Therapy Interventions (OT) ADL retraining;IADL retraining;bed mobility training;transfer training;home program guidelines;progressive activity/exercise   Clinical Decision Making Complexity (OT) low complexity   Anticipated Equipment Needs Upon Discharge (OT) other (see comments)  (TBD with further therapy)   Risk & Benefits of therapy have been explained evaluation/treatment results reviewed;care plan/treatment goals reviewed;risks/benefits reviewed;current/potential barriers reviewed;participants voiced agreement with care plan;participants included;patient   Clinical Impression Comments Pt will benefit from skilled OT while IP to progress safety and independence with I/ADLs within precautions.    OT Discharge Planning   OT Discharge Recommendation (DC Rec) home with assist   OT Rationale for DC Rec Pt presents below baseline, limited by activity tolerance, pain, and post-surgical precautions. Anticipate once medically ready for discharge, pt will be safe to return home. Would recommend A for heavier I/ADLs to adhere to precautions.    OT Brief overview of current status SBA with GB; encourage ambulation in hallway w/o pushing IV pole   Total Evaluation Time (Minutes)   Total Evaluation Time (Minutes) 5   OT Goals   Therapy Frequency (OT) Daily   OT Predicated Duration/Target Date for Goal Attainment 03/25/22   OT Goals Hygiene/Grooming;Lower Body Dressing;Toilet Transfer/Toileting;Home Management;OT Goal 1   OT: Hygiene/Grooming independent;within  precautions;while standing   OT: Lower Body Dressing Modified independent;within precautions   OT: Toilet Transfer/Toileting Independent;toilet transfer;cleaning and garment management;within precautions   OT: Home Management Supervision/stand-by assist;with light demand household tasks;within precautions;ambulatory level   OT: Goal 1 Pt will demo safe tub transfer with SBA within precautions.

## 2022-03-19 NOTE — PROGRESS NOTES
HEMODIALYSIS TREATMENT NOTE    Date: 3/19/2022  Time: 6:17 PM    Data:  Pre Wt:     Desired Wt: 77 kg   Post Wt:    Weight change:   kg  Ultrafiltration - Post Run Net Total Removed (mL): 1000 mL  Vascular Access Status: patent  Dialyzer Rinse: Light, Streaked  Total Blood Volume Processed: 84.2 L Liters  Total Dialysis (Treatment) Time: 3.5 Hours    Lab:   No    Interventions:  Robaxin  Hectorol    Assessment:  Pt ran for 3.5 hours on a K2/Ca3 bath with a 400 BFR/600 DFR. Pt complaints of back discomfort, PRN pain medication admin and assistance provided with reposition. Run otherwise uneventful. Pt rinsed back and hemostasis achieved in about 8 minutes. Report given to PCN.      Plan:    Per renal team

## 2022-03-19 NOTE — PROGRESS NOTES
"  Nephrology Progress Note  03/19/2022         Assessment & Recommendations:   Tacos Dominguez is a 62 year old male with PMH of afib, distal aortic dissection, HTN, MI, hypertension, tobacco use, Crohn's disease, ESRD, admitted s/p partial pancreatectomy 3/17/22 for IPMN found as part of transplant evaluation.     ESRD: Typically dialyzes MWF at [facility] with Dr. Scherer for 3.5hrs via LUE AVF to EDW 76.5kg with heparin.  -HD later today, holding heparin due to post-op status, only 1kg of UF planned  -plan to return to MWF schedule of HD on Monday    Volume status: mildly hypervolemic by today's weight, limited UF as above on HD today due to likely third spacing of edema post-op    BP: normotensive / acceptable BP    Electrolytes, acid-base: no BMP today, will base HD on yesterday's labs given stability in serum K in recent labs    Anemia: hgb 9.4 g/dL on 3/18; on venofer 50 mg qMonday, epogen 1600 units per HD  - Continue venofer  - Will hold epogen in setting of malignancy (defer to oncology for when to restart)     BMD: recent ; hectorol 1.5 mcg per HD, on tums 2 tabs in between meals and at bedtime, Phoslo 3-4 tabs tid WM  - Continue home meds    Recommendations were communicated to primary team via note.    Balta Ascencio MD   Division of Renal Disease and Hypertension  [id number]  [facility]  myairmail  Vocera Web Console    Interval History :   Nursing and provider notes from last 24 hours reviewed.  In the last 24 hours Tacos Dominguez has improving abdominal pain. SOB resolved.       Review of Systems:   I reviewed the following systems:  GI: as above   Neuro:  no confusion  Constitutional:  no fever or chills  CV: no dyspnea or edema.  no chest pain.    Physical Exam:   I/O last 3 completed shifts:  In: 218.17 [I.V.:218.17]  Out: 20 [Drains:20]   /67 (BP Location: Left arm)   Pulse 58   Temp 98.6  F (37  C) (Temporal)   Resp 18   Ht 1.803 m (5' 11\")   Wt 78 kg (171 lb 15.3 oz)  "  SpO2 96%   BMI 23.98 kg/m       GENERAL APPEARANCE: NAD  EYES:  no scleral icterus  PULM: lungs CTAB  CV: regular rhythm, normal rate, no rub     -edema - none in LEs   GI: binder present, underneath are two large c/d/i surgical wounds and a drain  INTEGUMENT: no cyanosis, no rash on exposed skin  NEURO:  AOx3      Labs:   All labs reviewed by me  Electrolytes/Renal - Recent Labs   Lab Test 03/19/22  1213 03/19/22  0828 03/19/22  0741 03/18/22  1139 03/18/22  0826 03/17/22  1417 03/17/22  1116 03/17/22  0653 03/17/22  0638 03/03/22  1559 09/30/20  1351 09/30/20  0502 09/29/20  1219 09/29/20  0512 09/28/20  1813 09/28/20  0525 09/26/20  2312 09/26/20  1817   NA  --   --   --   --  136 140 140  --   --  141   < > 142   < > 138   < > 141   < > 140   POTASSIUM  --   --   --   --  4.6 3.8 3.7 4.3  --  4.3   < > 3.4*   < > 3.0*   < > 3.5   < > 2.5*   CHLORIDE  --   --   --   --  101 108  --   --   --  103   < > 99   < > 106   < > 112*   < > 109*   CO2  --   --   --   --  21 23  --   --   --  27   < > 24   < > 15*   < > 11*   < > 10*   BUN  --   --   --   --  40* 26  --   --   --  39*   < > 69*   < > 78*   < > 81*   < >  --    CR  --   --   --   --  9.55* 7.24*  --  7.29*  --  8.76*   < > 6.18*   < > 7.55*   < > 8.74*   < >  --    GLC 67* 86 61*   < > 104* 108* 96  --    < > 91   < > 113   < > 104   < > 88   < >  --    KARY  --   --   --   --  8.3* 7.7*  --   --   --  7.8*   < > 7.1*   < > 6.8*   < > 6.4*   < >  --    MAG  --   --   --   --   --   --   --   --   --   --   --  1.8  --  1.6*  --  1.9   < >  --    PHOS  --   --   --   --   --   --   --   --   --   --   --   --   --   --   --  7.8*  --  9.0*    < > = values in this interval not displayed.       CBC -   Recent Labs   Lab Test 03/18/22  0826 03/17/22  1417 03/17/22  1116 03/17/22  0653 03/03/22  1601   WBC 10.1 15.8*  --   --  6.8   HGB 9.4* 9.0* 9.3*   < > 9.7*   * 144*  --   --  163    < > = values in this interval not displayed.       LFTs -   Recent  Labs   Lab Test 03/03/22  1559 03/25/21  1229 10/01/20  0446   ALKPHOS 112 91 84   BILITOTAL 0.6 0.5 0.4   ALT 20 22 15   AST 16 16 17   PROTTOTAL 6.9 7.9 5.7*   ALBUMIN 3.4 3.4 2.4*       Iron Panel -   Recent Labs   Lab Test 03/03/22  1559   GRACE 739*         Imaging:  All imaging studies reviewed by me.     Current Medications:    sodium chloride 0.9%  250 mL Intravenous Once in dialysis/CRRT     sodium chloride 0.9%  300 mL Hemodialysis Machine Once     doxercalciferol  2 mcg Intravenous Once in dialysis/CRRT     insulin aspart  1-6 Units Subcutaneous Q4H     - MEDICATION INSTRUCTIONS -   Does not apply Once     polyethylene glycol  17 g Oral Daily     senna-docusate  1 tablet Oral BID    Or     senna-docusate  2 tablet Oral BID     sodium chloride (PF)  3 mL Intracatheter Q8H       fentanyl       sodium chloride 10 mL/hr at 03/18/22 0958     - MEDICATION INSTRUCTIONS -       Balta Ascencio MD

## 2022-03-19 NOTE — PLAN OF CARE
"  Plan of Care Note    Reason for Admission: IPMN (intraductal papillary mucinous neoplasm)   Procedures: 03/17/22: Open Subtotal Pancreatectomy, intra-op ultrasound (N/A Abdomen); Laparotomy, extensive lysis adhesions  IV Access/Incisions/Drains/Wounds:   Peripheral IV 03/17/22 Right Lower forearm (Active)   Site Assessment [facility] 03/18/22 1945   Line Status Infusing 03/18/22 1945   Phlebitis Scale 0-->no symptoms 03/18/22 1945   Infiltration Scale 0 03/18/22 1945   Number of days: 2       Hemodialysis Vascular Access Arteriovenous fistula Left Arm (Active)   Site Assessment [facility] 03/18/22 1945   Number of days:        Closed/Suction Drain 1 Left Abdomen Bulb 15 [place] LOT [id number] EXP 12- (Active)   Site Description [facility] 03/18/22 1945   Dressing Status Normal: Clean, Dry & Intact 03/18/22 1945   Drainage Appearance Bloody/Bright Red 03/18/22 1945   Status To bulb suction 03/18/22 1945   Output (ml) 10 ml 03/18/22 1945   Number of days: 2       Incision/Surgical Site 03/17/22 Left;Transverse Abdomen (Active)   Incision Assessment [facility] 03/18/22 1945   Closure Liquid bandage 03/18/22 1945   Incision Drainage Amount None 03/18/22 1945   Dressing Intervention Open to air / No Dressing 03/18/22 1945   Number of days: 2   IVF: None  VS: /66 (BP Location: Right arm)   Pulse 70   Temp 99  F (37.2  C) (Temporal)   Resp 16   Ht 1.803 m (5' 11\")   Wt 76.1 kg (167 lb 12.3 oz)   SpO2 97%   BMI 23.40 kg/m    Diet: NPO for Medical/Clinical Reasons Except for: Meds, Ice Chips    Activity: UAL/SBA at times  Pain Management: Fentanyl PCA, Robaxin, Simethicone, Atarax  GI/: Voiding spontaneously, -BM, -Flatus, -Nausea  Neuro: A&Ox4  Team: SurgOnc  Pertinent Labs: None      Shift Summary  Patient didn't sleep much on shift. Patient main c/o pain associated with gas. Paged the team to get Simethicone which patient said helped with the pain. I talked with patient about talking with morning team to see if they can change " the frequency of some of the adjunct pain meds to help with current pain. Continue POC.

## 2022-03-19 NOTE — PLAN OF CARE
VSS. Pt up with SBA. Pain somewhat controlled with Fentanyl PCA, robaxin, atarax and simethicone. Tolerating clear liquid diet. Not passing gas, no BM. Pt currently at dialysis. Cont. POC.

## 2022-03-19 NOTE — PROGRESS NOTES
surg onc note  3/19    No major events. Feeling better this AM. No nausea. Pain tolerable. No bowel function yet.     Vitals wnl  Abdomen soft, non-tender  Incision looks great.  JASMIN with serosang output    POD2 distal panc for IPMN. Doing well.     Tylenol, fent PCA  Clears  Anticipate HD today  Ambulate  Heparin ppx    Neftaly Muñiz MD  PGY-5 Surgery

## 2022-03-20 ENCOUNTER — APPOINTMENT (OUTPATIENT)
Dept: OCCUPATIONAL THERAPY | Facility: CLINIC | Age: 62
DRG: 405 | End: 2022-03-20
Attending: SURGERY
Payer: COMMERCIAL

## 2022-03-20 LAB
AMYLASE FLD-CCNC: 482 U/L
ANION GAP SERPL CALCULATED.3IONS-SCNC: 8 MMOL/L (ref 3–14)
BUN SERPL-MCNC: 27 MG/DL (ref 7–30)
CALCIUM SERPL-MCNC: 8.7 MG/DL (ref 8.5–10.1)
CHLORIDE BLD-SCNC: 98 MMOL/L (ref 94–109)
CO2 SERPL-SCNC: 27 MMOL/L (ref 20–32)
CREAT SERPL-MCNC: 8.12 MG/DL (ref 0.66–1.25)
GFR SERPL CREATININE-BSD FRML MDRD: 7 ML/MIN/1.73M2
GLUCOSE BLD-MCNC: 137 MG/DL (ref 70–99)
GLUCOSE BLDC GLUCOMTR-MCNC: 106 MG/DL (ref 70–99)
GLUCOSE BLDC GLUCOMTR-MCNC: 118 MG/DL (ref 70–99)
GLUCOSE BLDC GLUCOMTR-MCNC: 123 MG/DL (ref 70–99)
GLUCOSE BLDC GLUCOMTR-MCNC: 90 MG/DL (ref 70–99)
GLUCOSE BLDC GLUCOMTR-MCNC: 92 MG/DL (ref 70–99)
POTASSIUM BLD-SCNC: 3.5 MMOL/L (ref 3.4–5.3)
SODIUM SERPL-SCNC: 133 MMOL/L (ref 133–144)

## 2022-03-20 PROCEDURE — 99024 POSTOP FOLLOW-UP VISIT: CPT | Performed by: PHYSICIAN ASSISTANT

## 2022-03-20 PROCEDURE — 36415 COLL VENOUS BLD VENIPUNCTURE: CPT | Performed by: SURGERY

## 2022-03-20 PROCEDURE — 93010 ELECTROCARDIOGRAM REPORT: CPT | Performed by: INTERNAL MEDICINE

## 2022-03-20 PROCEDURE — 250N000013 HC RX MED GY IP 250 OP 250 PS 637: Performed by: PHYSICIAN ASSISTANT

## 2022-03-20 PROCEDURE — 97530 THERAPEUTIC ACTIVITIES: CPT | Mod: GO

## 2022-03-20 PROCEDURE — 97535 SELF CARE MNGMENT TRAINING: CPT | Mod: GO

## 2022-03-20 PROCEDURE — 250N000013 HC RX MED GY IP 250 OP 250 PS 637

## 2022-03-20 PROCEDURE — 80048 BASIC METABOLIC PNL TOTAL CA: CPT | Performed by: SURGERY

## 2022-03-20 PROCEDURE — 250N000013 HC RX MED GY IP 250 OP 250 PS 637: Performed by: STUDENT IN AN ORGANIZED HEALTH CARE EDUCATION/TRAINING PROGRAM

## 2022-03-20 PROCEDURE — 120N000002 HC R&B MED SURG/OB UMMC

## 2022-03-20 PROCEDURE — 82150 ASSAY OF AMYLASE: CPT | Performed by: STUDENT IN AN ORGANIZED HEALTH CARE EDUCATION/TRAINING PROGRAM

## 2022-03-20 PROCEDURE — 93005 ELECTROCARDIOGRAM TRACING: CPT

## 2022-03-20 PROCEDURE — 250N000011 HC RX IP 250 OP 636: Performed by: STUDENT IN AN ORGANIZED HEALTH CARE EDUCATION/TRAINING PROGRAM

## 2022-03-20 RX ADMIN — POLYETHYLENE GLYCOL 3350 17 G: 17 POWDER, FOR SOLUTION ORAL at 07:35

## 2022-03-20 RX ADMIN — Medication: at 17:32

## 2022-03-20 RX ADMIN — HYDROXYZINE HYDROCHLORIDE 50 MG: 25 TABLET, FILM COATED ORAL at 01:30

## 2022-03-20 RX ADMIN — SIMETHICONE CHEW TAB 80 MG 80 MG: 80 TABLET ORAL at 01:30

## 2022-03-20 RX ADMIN — HYDROXYZINE HYDROCHLORIDE 50 MG: 25 TABLET, FILM COATED ORAL at 07:36

## 2022-03-20 RX ADMIN — METHOCARBAMOL 500 MG: 500 TABLET ORAL at 12:38

## 2022-03-20 RX ADMIN — SIMETHICONE CHEW TAB 80 MG 80 MG: 80 TABLET ORAL at 14:09

## 2022-03-20 RX ADMIN — METHOCARBAMOL 500 MG: 500 TABLET ORAL at 18:53

## 2022-03-20 RX ADMIN — SIMETHICONE CHEW TAB 80 MG 80 MG: 80 TABLET ORAL at 07:36

## 2022-03-20 RX ADMIN — SENNOSIDES AND DOCUSATE SODIUM 2 TABLET: 50; 8.6 TABLET ORAL at 07:37

## 2022-03-20 RX ADMIN — SIMETHICONE CHEW TAB 80 MG 80 MG: 80 TABLET ORAL at 20:45

## 2022-03-20 RX ADMIN — HYDROXYZINE HYDROCHLORIDE 50 MG: 25 TABLET, FILM COATED ORAL at 20:45

## 2022-03-20 RX ADMIN — HYDROXYZINE HYDROCHLORIDE 50 MG: 25 TABLET, FILM COATED ORAL at 14:09

## 2022-03-20 RX ADMIN — METHOCARBAMOL 500 MG: 500 TABLET ORAL at 05:57

## 2022-03-20 ASSESSMENT — ACTIVITIES OF DAILY LIVING (ADL)
ADLS_ACUITY_SCORE: 5

## 2022-03-20 NOTE — PROGRESS NOTES
Patient experiencing discomfort and pain in abdomen due to lack of passing flatus and BM. Better than yesterday, 4 on scale from 1-10.     Pain eased by simethicone, miralax and the senna-docusate. Still using PCA but less. Patient stated main use before and after walks.     Keep encouraging activity, and monitor for passing flatus, and BM. Keep giving medications to ease the abdominal discomfort.

## 2022-03-20 NOTE — PLAN OF CARE
"   Plan of Care Note     Reason for Admission: IPMN (intraductal papillary mucinous neoplasm)   Procedures: 03/17/22: Open Subtotal Pancreatectomy, intra-op ultrasound (N/A Abdomen); Laparotomy, extensive lysis adhesions  IV Access/Incisions/Drains/Wounds:   Peripheral IV 03/17/22 Right Lower forearm (Active)   Site Assessment [facility] 03/20/22 0000   Line Status Infusing 03/20/22 0000   Phlebitis Scale 0-->no symptoms 03/20/22 0000   Infiltration Scale 0 03/20/22 0000   Number of days: 3       Hemodialysis Vascular Access Arteriovenous fistula Left Arm (Active)   Site Assessment [facility] 03/20/22 0000   Cannulation Needle Size 15 03/19/22 1803   Dressing Intervention New dressing 03/19/22 1803   Dressing Status Clean, dry, intact 03/19/22 1803   Verification by x-ray Not applicable 03/19/22 1803   Hand Off Report Yes 03/19/22 1803   Number of days:        Closed/Suction Drain 1 Left Abdomen Bulb 15 [place] LOT [id number] EXP 12- (Active)   Site Description [facility] 03/19/22 2130   Dressing Status Normal: Clean, Dry & Intact 03/19/22 2130   Dressing Change Due 03/21/22 03/19/22 0900   Drainage Appearance Bloody/Bright Red 03/19/22 2130   Status To bulb suction 03/19/22 2130   Output (ml) 10 ml 03/19/22 1256   Number of days: 3       Incision/Surgical Site 03/17/22 Left;Transverse Abdomen (Active)   Incision Assessment [facility] 03/19/22 2130   Closure Liquid bandage 03/19/22 2130   Incision Drainage Amount None 03/19/22 2130   Dressing Intervention Open to air / No Dressing 03/19/22 2130   Number of days: 3   IVF: NS 10 ml/hr  VS: /65 (BP Location: Right arm)   Pulse 120   Temp 99.1  F (37.3  C) (Oral)   Resp 16   Ht 1.803 m (5' 11\")   Wt 78 kg (171 lb 15.3 oz)   SpO2 98%   BMI 23.98 kg/m     Diet: Clear Liquid Diet      Activity: UAL/SBA at times  Pain Management: Fentanyl PCA, Robaxin, Simethicone, Atarax  GI/: Voiding spontaneously, -BM, -Flatus, -Nausea  Neuro: A&Ox4  Team: SurgOnc  Pertinent Labs: None      "   Shift Summary  Patient slept well between cares. Patient mentioned that he feels a lot better than he did on last night. Patient plans to ambulate at least 4 times today but may need some encouragement. Continue POC.

## 2022-03-20 NOTE — PROGRESS NOTES
surg onc note  3/20    No major events. No nausea. Pain improved. No bowel function still.     Vitals with some tachycardia  Abdomen soft, abd binder in place  Incision looks great.  JASMIN with serosang output    POD3 distal panc for IPMN. Doing well. AROBF    Tylenol, fent PCA  Clears  EKG and lytes  Ambulate  Heparin ppx    Neftaly Muñiz MD  PGY-5 Surgery

## 2022-03-20 NOTE — PLAN OF CARE
VSS. Pt up ad jil. Ambulated in halls. Pain controlled with fentanyl PCA, robaxin, simethicone and atarax. Tolerating clear liquid diet. Passing gas, pt had 2 loose BM's. JASMIN with scant amount of serosang output. Cont. POC.

## 2022-03-21 ENCOUNTER — APPOINTMENT (OUTPATIENT)
Dept: OCCUPATIONAL THERAPY | Facility: CLINIC | Age: 62
DRG: 405 | End: 2022-03-21
Attending: SURGERY
Payer: COMMERCIAL

## 2022-03-21 LAB
ANION GAP SERPL CALCULATED.3IONS-SCNC: 12 MMOL/L (ref 3–14)
BUN SERPL-MCNC: 42 MG/DL (ref 7–30)
CALCIUM SERPL-MCNC: 7.9 MG/DL (ref 8.5–10.1)
CHLORIDE BLD-SCNC: 96 MMOL/L (ref 94–109)
CO2 SERPL-SCNC: 23 MMOL/L (ref 20–32)
CREAT SERPL-MCNC: 9.37 MG/DL (ref 0.66–1.25)
GFR SERPL CREATININE-BSD FRML MDRD: 6 ML/MIN/1.73M2
GLUCOSE BLD-MCNC: 96 MG/DL (ref 70–99)
GLUCOSE BLDC GLUCOMTR-MCNC: 100 MG/DL (ref 70–99)
GLUCOSE BLDC GLUCOMTR-MCNC: 101 MG/DL (ref 70–99)
GLUCOSE BLDC GLUCOMTR-MCNC: 103 MG/DL (ref 70–99)
GLUCOSE BLDC GLUCOMTR-MCNC: 155 MG/DL (ref 70–99)
GLUCOSE BLDC GLUCOMTR-MCNC: 75 MG/DL (ref 70–99)
GLUCOSE BLDC GLUCOMTR-MCNC: 78 MG/DL (ref 70–99)
GLUCOSE BLDC GLUCOMTR-MCNC: 82 MG/DL (ref 70–99)
HGB BLD-MCNC: 8 G/DL (ref 13.3–17.7)
MAGNESIUM SERPL-MCNC: 1.2 MG/DL (ref 1.6–2.3)
PHOSPHATE SERPL-MCNC: 4.7 MG/DL (ref 2.5–4.5)
POTASSIUM BLD-SCNC: 3.6 MMOL/L (ref 3.4–5.3)
SODIUM SERPL-SCNC: 131 MMOL/L (ref 133–144)

## 2022-03-21 PROCEDURE — 250N000013 HC RX MED GY IP 250 OP 250 PS 637: Performed by: PHYSICIAN ASSISTANT

## 2022-03-21 PROCEDURE — 250N000011 HC RX IP 250 OP 636: Performed by: SURGERY

## 2022-03-21 PROCEDURE — 250N000011 HC RX IP 250 OP 636: Performed by: STUDENT IN AN ORGANIZED HEALTH CARE EDUCATION/TRAINING PROGRAM

## 2022-03-21 PROCEDURE — 97530 THERAPEUTIC ACTIVITIES: CPT | Mod: GO

## 2022-03-21 PROCEDURE — 84100 ASSAY OF PHOSPHORUS: CPT | Performed by: SURGERY

## 2022-03-21 PROCEDURE — 120N000002 HC R&B MED SURG/OB UMMC

## 2022-03-21 PROCEDURE — 250N000009 HC RX 250: Performed by: SURGERY

## 2022-03-21 PROCEDURE — 999N000128 HC STATISTIC PERIPHERAL IV START W/O US GUIDANCE

## 2022-03-21 PROCEDURE — 90937 HEMODIALYSIS REPEATED EVAL: CPT

## 2022-03-21 PROCEDURE — 250N000013 HC RX MED GY IP 250 OP 250 PS 637: Performed by: STUDENT IN AN ORGANIZED HEALTH CARE EDUCATION/TRAINING PROGRAM

## 2022-03-21 PROCEDURE — 250N000013 HC RX MED GY IP 250 OP 250 PS 637

## 2022-03-21 PROCEDURE — 99024 POSTOP FOLLOW-UP VISIT: CPT | Performed by: PHYSICIAN ASSISTANT

## 2022-03-21 PROCEDURE — 85018 HEMOGLOBIN: CPT | Performed by: SURGERY

## 2022-03-21 PROCEDURE — 258N000003 HC RX IP 258 OP 636: Performed by: SURGERY

## 2022-03-21 PROCEDURE — 83735 ASSAY OF MAGNESIUM: CPT | Performed by: SURGERY

## 2022-03-21 PROCEDURE — 80048 BASIC METABOLIC PNL TOTAL CA: CPT | Performed by: INTERNAL MEDICINE

## 2022-03-21 RX ORDER — OXYCODONE HYDROCHLORIDE 5 MG/1
5-10 TABLET ORAL EVERY 4 HOURS PRN
Status: DISCONTINUED | OUTPATIENT
Start: 2022-03-21 | End: 2022-03-22 | Stop reason: HOSPADM

## 2022-03-21 RX ORDER — MAGNESIUM OXIDE 400 MG/1
400 TABLET ORAL 2 TIMES DAILY
Status: DISCONTINUED | OUTPATIENT
Start: 2022-03-21 | End: 2022-03-22 | Stop reason: HOSPADM

## 2022-03-21 RX ORDER — PANTOPRAZOLE SODIUM 40 MG/1
40 TABLET, DELAYED RELEASE ORAL EVERY MORNING
Status: DISCONTINUED | OUTPATIENT
Start: 2022-03-22 | End: 2022-03-22 | Stop reason: HOSPADM

## 2022-03-21 RX ORDER — LANOLIN ALCOHOL/MO/W.PET/CERES
400 CREAM (GRAM) TOPICAL DAILY
Status: DISCONTINUED | OUTPATIENT
Start: 2022-03-22 | End: 2022-03-22 | Stop reason: HOSPADM

## 2022-03-21 RX ORDER — DOXERCALCIFEROL 4 UG/2ML
2 INJECTION INTRAVENOUS
Status: COMPLETED | OUTPATIENT
Start: 2022-03-21 | End: 2022-03-21

## 2022-03-21 RX ORDER — CALCIUM ACETATE 667 MG/1
2668 CAPSULE ORAL
Status: DISCONTINUED | OUTPATIENT
Start: 2022-03-21 | End: 2022-03-22 | Stop reason: HOSPADM

## 2022-03-21 RX ORDER — HEPARIN SODIUM 5000 [USP'U]/.5ML
5000 INJECTION, SOLUTION INTRAVENOUS; SUBCUTANEOUS EVERY 8 HOURS
Status: DISCONTINUED | OUTPATIENT
Start: 2022-03-21 | End: 2022-03-22 | Stop reason: HOSPADM

## 2022-03-21 RX ADMIN — Medication 400 MG: at 20:16

## 2022-03-21 RX ADMIN — HEPARIN SODIUM 5000 UNITS: 5000 INJECTION, SOLUTION INTRAVENOUS; SUBCUTANEOUS at 17:20

## 2022-03-21 RX ADMIN — SIMETHICONE CHEW TAB 80 MG 80 MG: 80 TABLET ORAL at 15:10

## 2022-03-21 RX ADMIN — LIDOCAINE HYDROCHLORIDE 0.5 ML: 10 INJECTION, SOLUTION EPIDURAL; INFILTRATION; INTRACAUDAL; PERINEURAL at 11:02

## 2022-03-21 RX ADMIN — SIMETHICONE CHEW TAB 80 MG 80 MG: 80 TABLET ORAL at 03:44

## 2022-03-21 RX ADMIN — CALCIUM ACETATE 2668 MG: 667 CAPSULE ORAL at 17:53

## 2022-03-21 RX ADMIN — METHOCARBAMOL 500 MG: 500 TABLET ORAL at 01:12

## 2022-03-21 RX ADMIN — SODIUM CHLORIDE 250 ML: 9 INJECTION, SOLUTION INTRAVENOUS at 11:00

## 2022-03-21 RX ADMIN — SENNOSIDES AND DOCUSATE SODIUM 1 TABLET: 50; 8.6 TABLET ORAL at 20:16

## 2022-03-21 RX ADMIN — Medication: at 11:03

## 2022-03-21 RX ADMIN — OXYCODONE HYDROCHLORIDE 10 MG: 5 TABLET ORAL at 08:13

## 2022-03-21 RX ADMIN — METHOCARBAMOL 500 MG: 500 TABLET ORAL at 06:52

## 2022-03-21 RX ADMIN — IRON SUCROSE 50 MG: 20 INJECTION, SOLUTION INTRAVENOUS at 11:02

## 2022-03-21 RX ADMIN — HYDROXYZINE HYDROCHLORIDE 50 MG: 25 TABLET, FILM COATED ORAL at 03:43

## 2022-03-21 RX ADMIN — DOXERCALCIFEROL 2 MCG: 4 INJECTION, SOLUTION INTRAVENOUS at 11:02

## 2022-03-21 RX ADMIN — HEPARIN SODIUM 5000 UNITS: 5000 INJECTION, SOLUTION INTRAVENOUS; SUBCUTANEOUS at 07:56

## 2022-03-21 RX ADMIN — SODIUM CHLORIDE 300 ML: 9 INJECTION, SOLUTION INTRAVENOUS at 11:00

## 2022-03-21 ASSESSMENT — ACTIVITIES OF DAILY LIVING (ADL)
ADLS_ACUITY_SCORE: 5

## 2022-03-21 NOTE — PROGRESS NOTES
HEMODIALYSIS TREATMENT NOTE    Date: 3/21/2022  Time: 2:44 PM    Data:  Pre Wt:   78.1kg  Desired Wt: 76.5 kg   Post Wt:  76.1kg  Weight change:  2.0 kg  Ultrafiltration - Post Run Net Total Removed (mL): 2300 mL  Vascular Access Status: patent  Dialyzer Rinse: Light, Streaked  Total Blood Volume Processed: 81.39 L Liters  Total Dialysis (Treatment) Time: 3.5 Hours    Lab:   Yes    Interventions:  hectorol admin and iron ivp- pt tolerated medications. Hemostasis obtained within 10 minutes. Bg/dL- no interventions. Pt refused insulin. Floor nurse aware     Assessment:  A&ox4. Hr-RRR. 20rpm. Lung sounds diminished ant & post bul/bll. Trace edema throughout all extremities. No complaints since last tx r/t HD. Pt c/o gas post tx- no interventions. B/t+. Hemostasis obtained within 10 minutes.      Plan:    Follow-up w renal team and JR Goff

## 2022-03-21 NOTE — PROGRESS NOTES
SURGERY PROGRESS NOTE     SUBJECTIVE  Pain controlled. Having gas and bowel movements.      OBJECTIVE  Temp: 99.1  F (37.3  C) Temp src: Temporal BP: 135/70 Pulse: 104   Resp: 16 SpO2: 94 % O2 Device: None (Room air)      Exam:   General: AAOx4, NAD, sitting in bed  CV: warm, well-perfused  Pulm: no dyspnea, breathing comfortably on RA  Abd: soft, mild distention, appropriately tender, incision clean dry intact, JASMIN drain will small amount of dark red serosanguinous fluid.   Extremities: no edema  Neuro: moving all extremities spontaneously without apparent deficit     I&Os:      Intake/Output Summary (Last 24 hours) at 3/21/2022 0630  Last data filed at 3/20/2022 2259  Gross per 24 hour   Intake 2536 ml   Output 25 ml   Net 2511 ml        JASMIN drain: 25 ml yesterday     Labs: None     Imaging: None    EKG  ECG results from 03/17/22   EKG 12-lead, tracing only     Value    Systolic Blood Pressure     Diastolic Blood Pressure     Ventricular Rate 111    Atrial Rate 111    IN Interval 130    QRS Duration 114        QTc 492    P Axis 66    R AXIS -51    T Axis 66    Interpretation ECG      Sinus tachycardia  Left anterior fascicular block  Moderate voltage criteria for LVH, may be normal variant  Abnormal ECG  When compared with ECG of 25-MAY-2021 13:25,  No significant change was found         ASSESSMENT  Tacos Dominguez is a 62 year old male with a history of atrial fibrillation, hypertension, hyperlipidemia, renal failure requiring hemodialysis, Crohn's disease, and main duct IPMN now s/p open subtotal pancreatectomy on 3/17/22.      PLAN  - pain control: acetaminophen, fentanyl PCA  - JASMIN drain to thumbprint suction   - Start regular diet  - PT/OT, ambulate  - Lovenox        Fany Pulido  Medical Student, 3rd Year

## 2022-03-21 NOTE — PROGRESS NOTES
"  Nephrology Progress Note  03/21/2022         Assessment & Recommendations:   Tacos Dominguez is a 62 year old male with PMH of afib, distal aortic dissection, HTN, MI, hypertension, tobacco use, Crohn's disease, ESRD, admitted s/p partial pancreatectomy 3/17/22 for IPMN found as part of transplant evaluation.     ESRD: Typically dialyzes MWF at [facility] with Dr. Scherer for 3.5hrs via LUE AVF to EDW 76.5kg with heparin.  -dialysis per MWF schedule   - Hold heparin in post op period    Volume status: EDW 76.5 kg  - UF to dry weight today    BP: well controlled      Anemia: hgb 9.4 g/dL on 3/18; on venofer 50 mg qMonday, epogen 1600 units per HD  - Continue venofer  - Discussed with surgical oncology, non-malignant findings, ok to resume epogen  - Would recheck hemoglobin (ordered)     BMD: Ca 7.9, alb 3.4, no phos, recent ; hectorol 1.5 mcg per HD, on tums 2 tabs in between meals and at bedtime, Phoslo 3-4 tabs tid WM  - Continue home meds    Recommendations were communicated to primary team via note and phone    RENETTA Vyas   [phone]    Interval History :   Seen on dialysis, stable run to dry weight. Post op pain controlled. Likely discharge tomorrow. Talked with surgery onc team, findings were non-malignant, ok to give ANGELES. Pt denies n/v, CP, SOB, chills    Review of Systems:   4 point ROS neg other than as noted above    Physical Exam:   I/O last 3 completed shifts:  In: 2133 [P.O.:1320; I.V.:813]  Out: 25 [Drains:25]   BP (!) 143/79   Pulse 93   Temp 98.2  F (36.8  C) (Oral)   Resp 18   Ht 1.803 m (5' 11\")   Wt 78.1 kg (172 lb 3.2 oz)   SpO2 (!) 89%   BMI 24.02 kg/m       GENERAL APPEARANCE: NAD  EYES:  no scleral icterus  PULM: CTA   CV: regular rhythm, normal rate      -edema - no peripheral  GI: binder present, underneath are two large c/d/i surgical wounds and a drain  INTEGUMENT: no cyanosis, no rash on exposed skin  NEURO:  AOx3  Access: LUE AVF      Labs:   All labs " reviewed by me  Electrolytes/Renal -   Recent Labs   Lab Test 03/21/22  1208 03/21/22  1204 03/21/22  0800 03/20/22  1138 03/20/22  1046 03/18/22  1139 03/18/22  0826 09/30/20  1351 09/30/20  0502 09/29/20  1219 09/29/20  0512 09/28/20  1813 09/28/20  0525 09/26/20  2312 09/26/20  1817   *  --   --   --  133  --  136   < > 142   < > 138   < > 141   < > 140   POTASSIUM 3.6  --   --   --  3.5  --  4.6   < > 3.4*   < > 3.0*   < > 3.5   < > 2.5*   CHLORIDE 96  --   --   --  98  --  101   < > 99   < > 106   < > 112*   < > 109*   CO2 23  --   --   --  27  --  21   < > 24   < > 15*   < > 11*   < > 10*   BUN 42*  --   --   --  27  --  40*   < > 69*   < > 78*   < > 81*   < >  --    CR 9.37*  --   --   --  8.12*  --  9.55*   < > 6.18*   < > 7.55*   < > 8.74*   < >  --    GLC 96 155* 78   < > 137*   < > 104*   < > 113   < > 104   < > 88   < >  --    KARY 7.9*  --   --   --  8.7  --  8.3*   < > 7.1*   < > 6.8*   < > 6.4*   < >  --    MAG  --   --   --   --   --   --   --   --  1.8  --  1.6*  --  1.9   < >  --    PHOS  --   --   --   --   --   --   --   --   --   --   --   --  7.8*  --  9.0*    < > = values in this interval not displayed.       CBC -   Recent Labs   Lab Test 03/18/22  0826 03/17/22  1417 03/17/22  1116 03/17/22  0653 03/03/22  1601   WBC 10.1 15.8*  --   --  6.8   HGB 9.4* 9.0* 9.3*   < > 9.7*   * 144*  --   --  163    < > = values in this interval not displayed.       LFTs -   Recent Labs   Lab Test 03/03/22  1559 03/25/21  1229 10/01/20  0446   ALKPHOS 112 91 84   BILITOTAL 0.6 0.5 0.4   ALT 20 22 15   AST 16 16 17   PROTTOTAL 6.9 7.9 5.7*   ALBUMIN 3.4 3.4 2.4*       Iron Panel -   Recent Labs   Lab Test 03/03/22  1559   GRACE 739*         Imaging:  All imaging studies reviewed by me.     Current Medications:    heparin ANTICOAGULANT  5,000 Units Subcutaneous Q8H     insulin aspart  1-6 Units Subcutaneous Q4H     polyethylene glycol  17 g Oral Daily     senna-docusate  1 tablet Oral BID    Or      senna-docusate  2 tablet Oral BID     sodium chloride (PF)  3 mL Intracatheter Q8H       - MEDICATION INSTRUCTIONS -       Bryanna Saeed PA

## 2022-03-21 NOTE — PLAN OF CARE
Time: 4652-6465    Reason for Admission: POD #4 partial pancreatectomy for IPMN.    Activity: Indpendent    Neuro: A&O x4. Calm and cooperative.     GI/: Pt on dialysis. Voiding small amounts. No BM this shift.     Diet: Regular, tolerating.     Incisions/Drains: Transverse Abd incision with liquid bandage, CDI. L JASMIN to thumbprint suction.     IV Access: R PIV saline locked.     Labs: B and 155. Dialysis RN called and reported pt refusing Novolog.     Vitals: Slightly hypertensive. AOVSS on RA.    Pain: Pt reported some abdominal pain. PRN oxycodone given x1.     New changes this shift: Pt left for dialysis around 1100.    Plan: Possible discharge tomorrow. Continue with plan of care.

## 2022-03-21 NOTE — PLAN OF CARE
"Plan of Care Note     Reason for Admission: IPMN (intraductal papillary mucinous neoplasm)   Procedures: 03/17/22: Open Subtotal Pancreatectomy, intra-op ultrasound (N/A Abdomen); Laparotomy, extensive lysis adhesions  IV Access/Incisions/Drains/Wounds:   Peripheral IV 03/17/22 Right Lower forearm (Active)   Site Assessment [facility] 03/20/22 2100   Line Status Infusing 03/20/22 2100   Phlebitis Scale 0-->no symptoms 03/20/22 2100   Infiltration Scale 0 03/20/22 2100   Number of days: 4       Hemodialysis Vascular Access Arteriovenous fistula Left Arm (Active)   Site Assessment [facility] 03/20/22 2100   Cannulation Needle Size 15 03/19/22 1803   Dressing Intervention New dressing 03/19/22 1803   Dressing Status Clean, dry, intact 03/19/22 1803   Verification by x-ray Not applicable 03/19/22 1803   Hand Off Report Yes 03/19/22 1803   Number of days:        Closed/Suction Drain 1 Left Abdomen Bulb 15 [place] LOT [id number] EXP 12- (Active)   Site Description [facility] 03/20/22 2111   Dressing Status Dressing Changed 03/20/22 2111   Dressing Change Due 03/21/22 03/20/22 2111   Drainage Appearance Bloody/Bright Red 03/20/22 2111   Status To bulb suction 03/20/22 2111   Output (ml) 10 ml 03/20/22 2111   Number of days: 4       Incision/Surgical Site 03/17/22 Left;Transverse Abdomen (Active)   Incision Assessment [facility] 03/20/22 2100   Closure Liquid bandage 03/20/22 2100   Incision Drainage Amount None 03/20/22 2100   Dressing Intervention Open to air / No Dressing 03/20/22 2100   Number of days: 4   IVF: NS 10 ml/hr  VS: /70 (BP Location: Right arm)   Pulse 104   Temp 99.1  F (37.3  C) (Temporal)   Resp 16   Ht 1.803 m (5' 11\")   Wt 78 kg (171 lb 15.3 oz)   SpO2 94%   BMI 23.98 kg/m      Diet: Clear Liquid Diet      Activity: UAL/SBA at times  Pain Management: Fentanyl PCA, Robaxin, Simethicone, Atarax  GI/: Voiding spontaneously, +BM, +Flatus, -Nausea  Neuro: A&Ox4  Team: SurgOnc  Pertinent Labs: None        Shift " Summary  Patient slept well between cares. Patient ambulated in halls twice on shift. Continue POC.

## 2022-03-21 NOTE — PROGRESS NOTES
Surgical Oncology Progress Note  03/21/2022    S: No acute events. Pain well managed. Had 4 bowel movements. Ambulating.    O:  Vitals:    03/19/22 2210 03/20/22 0626 03/20/22 1400 03/20/22 2200   BP: 117/65 117/63 131/68 135/70   BP Location: Right arm Right arm Right arm Right arm   Pulse: 120 120 104 104   Resp: 16 16 16 16   Temp: 99.1  F (37.3  C) 97  F (36.1  C) 99.2  F (37.3  C) 99.1  F (37.3  C)   TempSrc: Oral Oral Oral Temporal   SpO2: 98% 96% 99% 94%   Weight:       Height:         I/O last 3 completed shifts:  In: 2133 [P.O.:1320; I.V.:813]  Out: 25 [Drains:25]    Exam:  Alert, calmly lying in bed, NAD  Non labored breathing on room air  Abdomen soft, non distended, appropriately tender, incision c/d/i with skin glue in place  Abdominal JASMIN with serosanguinous output    A/P: 62 M  history of a-fib, aortic dissection, hyperlipidemia, old MI, hypertension, crohn's, and ESRD HD dependent, s/p distal pancreatectomy for IPMN on 3/17    - Stop PCA, switch to PO oxycodone  - ESRD: nephrology consult, appreciate recs, dialyzed 3/19  - SQH  - sliding scale insulin  - Regular diet  - Possible discharge tomorrow    Discussed with staff    Olu Perez MD, PhD, PGY-3  General Surgery

## 2022-03-22 VITALS
HEART RATE: 98 BPM | TEMPERATURE: 97.9 F | BODY MASS INDEX: 23.49 KG/M2 | SYSTOLIC BLOOD PRESSURE: 137 MMHG | OXYGEN SATURATION: 96 % | RESPIRATION RATE: 16 BRPM | DIASTOLIC BLOOD PRESSURE: 67 MMHG | WEIGHT: 167.77 LBS | HEIGHT: 71 IN

## 2022-03-22 LAB
ATRIAL RATE - MUSE: 111 BPM
DIASTOLIC BLOOD PRESSURE - MUSE: NORMAL MMHG
GLUCOSE BLDC GLUCOMTR-MCNC: 135 MG/DL (ref 70–99)
GLUCOSE BLDC GLUCOMTR-MCNC: 98 MG/DL (ref 70–99)
HOLD SPECIMEN: NORMAL
INTERPRETATION ECG - MUSE: NORMAL
P AXIS - MUSE: 66 DEGREES
PLATELET # BLD AUTO: 161 10E3/UL (ref 150–450)
PR INTERVAL - MUSE: 130 MS
QRS DURATION - MUSE: 114 MS
QT - MUSE: 362 MS
QTC - MUSE: 492 MS
R AXIS - MUSE: -51 DEGREES
SYSTOLIC BLOOD PRESSURE - MUSE: NORMAL MMHG
T AXIS - MUSE: 66 DEGREES
VENTRICULAR RATE- MUSE: 111 BPM

## 2022-03-22 PROCEDURE — 250N000013 HC RX MED GY IP 250 OP 250 PS 637: Performed by: STUDENT IN AN ORGANIZED HEALTH CARE EDUCATION/TRAINING PROGRAM

## 2022-03-22 PROCEDURE — 250N000013 HC RX MED GY IP 250 OP 250 PS 637

## 2022-03-22 PROCEDURE — 36415 COLL VENOUS BLD VENIPUNCTURE: CPT | Performed by: SURGERY

## 2022-03-22 PROCEDURE — 250N000013 HC RX MED GY IP 250 OP 250 PS 637: Performed by: PHYSICIAN ASSISTANT

## 2022-03-22 PROCEDURE — 85049 AUTOMATED PLATELET COUNT: CPT | Performed by: SURGERY

## 2022-03-22 PROCEDURE — 250N000011 HC RX IP 250 OP 636: Performed by: STUDENT IN AN ORGANIZED HEALTH CARE EDUCATION/TRAINING PROGRAM

## 2022-03-22 PROCEDURE — 99024 POSTOP FOLLOW-UP VISIT: CPT | Performed by: PHYSICIAN ASSISTANT

## 2022-03-22 RX ORDER — OXYCODONE HYDROCHLORIDE 5 MG/1
5 TABLET ORAL EVERY 6 HOURS PRN
Qty: 12 TABLET | Refills: 0 | Status: SHIPPED | OUTPATIENT
Start: 2022-03-22 | End: 2022-03-25

## 2022-03-22 RX ORDER — HEPARIN SODIUM 5000 [USP'U]/.5ML
5000 INJECTION, SOLUTION INTRAVENOUS; SUBCUTANEOUS EVERY 8 HOURS
Qty: 33 ML | Refills: 0 | Status: SHIPPED | OUTPATIENT
Start: 2022-03-22 | End: 2022-04-13

## 2022-03-22 RX ADMIN — HYDROXYZINE HYDROCHLORIDE 50 MG: 25 TABLET, FILM COATED ORAL at 00:56

## 2022-03-22 RX ADMIN — HEPARIN SODIUM 5000 UNITS: 5000 INJECTION, SOLUTION INTRAVENOUS; SUBCUTANEOUS at 00:57

## 2022-03-22 RX ADMIN — OXYCODONE HYDROCHLORIDE 10 MG: 5 TABLET ORAL at 00:56

## 2022-03-22 RX ADMIN — Medication 1 CAPSULE: at 07:53

## 2022-03-22 RX ADMIN — PANTOPRAZOLE SODIUM 40 MG: 40 TABLET, DELAYED RELEASE ORAL at 07:52

## 2022-03-22 RX ADMIN — METHOCARBAMOL 500 MG: 500 TABLET ORAL at 00:56

## 2022-03-22 RX ADMIN — SIMETHICONE CHEW TAB 80 MG 80 MG: 80 TABLET ORAL at 00:56

## 2022-03-22 RX ADMIN — SIMETHICONE CHEW TAB 80 MG 80 MG: 80 TABLET ORAL at 06:37

## 2022-03-22 RX ADMIN — Medication 400 MG: at 07:52

## 2022-03-22 RX ADMIN — HEPARIN SODIUM 5000 UNITS: 5000 INJECTION, SOLUTION INTRAVENOUS; SUBCUTANEOUS at 07:51

## 2022-03-22 RX ADMIN — Medication 400 MCG: at 07:52

## 2022-03-22 RX ADMIN — HYDROXYZINE HYDROCHLORIDE 50 MG: 25 TABLET, FILM COATED ORAL at 06:37

## 2022-03-22 RX ADMIN — METHOCARBAMOL 500 MG: 500 TABLET ORAL at 06:37

## 2022-03-22 RX ADMIN — OXYCODONE HYDROCHLORIDE 10 MG: 5 TABLET ORAL at 06:38

## 2022-03-22 RX ADMIN — OXYCODONE HYDROCHLORIDE 10 MG: 5 TABLET ORAL at 10:42

## 2022-03-22 RX ADMIN — CALCIUM ACETATE 2668 MG: 667 CAPSULE ORAL at 07:52

## 2022-03-22 RX ADMIN — SENNOSIDES AND DOCUSATE SODIUM 1 TABLET: 50; 8.6 TABLET ORAL at 07:53

## 2022-03-22 ASSESSMENT — ACTIVITIES OF DAILY LIVING (ADL)
ADLS_ACUITY_SCORE: 7
ADLS_ACUITY_SCORE: 5
ADLS_ACUITY_SCORE: 7
ADLS_ACUITY_SCORE: 5
ADLS_ACUITY_SCORE: 7
ADLS_ACUITY_SCORE: 5
ADLS_ACUITY_SCORE: 5

## 2022-03-22 NOTE — PROGRESS NOTES
Care Management Discharge Note    Discharge Date: 03/22/2022       Discharge Disposition: Home    Discharge Services:  OP HD    Discharge DME:  None    Discharge Transportation: car, drives self, family or friend will provide    Private pay costs discussed: e    Handoff Referral Completed: Yes    Additional Information:  Per care team rounds team anticipates discharge today.   left for [facility] with update.    Rosemary Fraga RN BSN, PHN, ACM-RN  7A RN Care Coordinator  Phone: [phone]  Pager [phone]  To contact the [facility] RNCC, Page: [phone]    3/22/2022 12:10 PM

## 2022-03-22 NOTE — DISCHARGE SUMMARY
[facility] Discharge Summary    Tacos Dominguez MRN# [id number]   Age: 62 year old YOB: 1960     Date of Admission:  3/17/2022  Date of Discharge::  3/22/2022 11:24 AM  Admitting Physician:  Sarath Smith MD  Discharge Physician:  Snehal Ardon MD     PCP:  [facility]    Disposition: Patient discharged to home in stable condition.    Admission Diagnosis:  Past Medical History:   Diagnosis Date     Aortic dissection (H)     distal, thin.  stable/chronic on MRCP 3/2022     Benign essential hypertension      Chronic atrial fibrillation (H)      Crohn's colitis (H)      Crohn's disease of large intestine (H) 11/22/2021     Esophageal reflux      ESRD (end stage renal disease) on dialysis (H)      Hypertension      Mixed hyperlipidemia        Discharge Diagnosis:  Patient Active Problem List   Diagnosis     Acquired cyst of kidney     ESRD (end stage renal disease) (H)     Organ transplant candidate     NSTEMI (non-ST elevated myocardial infarction) (H)     Atrial fibrillation (H)     Benign essential hypertension     Crohn's disease of large intestine (H)     IPMN (intraductal papillary mucinous neoplasm)       Discharge medications  Current Discharge Medication List      START taking these medications    Details   Heparin Sodium, Porcine, (HEPARIN ANTICOAGULANT) 5000 UNIT/0.5ML injection Inject 0.5 mLs (5,000 Units) Subcutaneous every 8 hours for 22 days  Qty: 33 mL, Refills: 0    Associated Diagnoses: IPMN (intraductal papillary mucinous neoplasm)      oxyCODONE (ROXICODONE) 5 MG tablet Take 1 tablet (5 mg) by mouth every 6 hours as needed for pain  Qty: 12 tablet, Refills: 0    Associated Diagnoses: IPMN (intraductal papillary mucinous neoplasm)         CONTINUE these medications which have NOT CHANGED    Details   aspirin (ASA) 81 MG chewable tablet Take 81 mg by mouth every morning       magnesium oxide 400 MG CAPS Take 1 tablet by mouth every morning        Melatonin 10 MG TABS tablet Take 10 mg by mouth nightly as needed       multivitamin RENAL (MULTIVITAMIN RENAL) 1 MG capsule every morning      omeprazole (PRILOSEC) 20 MG DR capsule Take 20 mg by mouth every morning       simvastatin (ZOCOR) 20 MG tablet Take 20 mg by mouth every morning       calcium acetate (CALPHRON) 667 MG TABS tablet 2,668 mg 3 times daily       folic acid (FOLVITE) 1 MG tablet Take 1 mg by mouth every morning       lidocaine-prilocaine (EMLA) 2.5-2.5 % external cream three times a week Monday, Wednesday, Friday             Follow up, Special Instructions:  After Care     Future Labs/Procedures    Diet     Comments:    Follow this diet upon discharge: Orders Placed This Encounter      Regular Diet Adult    Discharge Instructions     Comments:    If your travel plans upon discharge include prolonged periods of sitting (a lengthy car or plane ride), it is highly beneficial to get up and walk at least once per hour to help prevent swelling and blood clots.     You may shower after operation, let warm soapy water run over incision and pat dry. Do not submerge, soak, or scrub incision.    Take incentive spirometer home for continued frequent use    You will be discharged with narcotic pain medications. Please take them only as needed and do not operate a car or heavy machinery for 24 hours after taking them.  Narcotic pain medications like oxycodone are constipating. Please ensure that you are drinking adequate amounts of fluids and taking stool softeners while you are taking narcotics. Take Miralax as needed for constipation.     Do not drive until you can with stand pressing the brake pedal quickly and fully without pain and you are not distracted by pain.     Call for fever greater than 101.5, chills, red skin around incision, drainage from incision, increased swelling from the incision, bleeding from the incision that is not controlled with light pressure, inability to tolerate diet,new  "nausea/vomiting or other new/worsening symptoms.   You may also call the surgical oncology nurse care coordinator from 8:00am-4:30pm [facility] at [phone]. For after hours questions or concerns you can contact the on-call surgical oncology resident (nights and weekends [phone] and ask \"I would like to page the Surgical Oncology Resident on call.\"). In emergencies, call [phone]    Follow Up:  Follow up in clinic with Dr. Smith in 2 weeks. You should be called to make an appointment within 3 business days. If you are not contacted, call [phone] to make an appointment.            Procedures:  1.  Exploratory laparotomy.  2.  Extensive lysis of adhesions lasting over 1 hour.  3.  Intraoperative pancreatic ultrasound.  4.  Open subtotal pancreatectomy.    Consultations:  NEPHROLOGY ESRD ADULT IP CONSULT  OCCUPATIONAL THERAPY ADULT IP CONSULT  PHYSICAL THERAPY ADULT IP CONSULT  VASCULAR ACCESS CARE ADULT IP CONSULT    Brief HPI:  62 year old year old male with a history of Crohn's disease, A fib, MI, ESRD on HD, DM, who was found to have IPMN. He presents electively for the above procedure.     Hospital Course:  The patient was admitted and underwent the above procedure. The patient tolerated the procedure well. The patient recovered well with no post-operative complications. Prior to discharge pain was controlled with oral pain medication and the patient was able to ambulate and void without difficulty. The patient received appropriate education post operatively. On POD #5 the patient was discharged to home.    Surgical pathology  Pending     Olu Perez MD, PhD, PGY-3  General Surgery    "

## 2022-03-22 NOTE — PLAN OF CARE
"Plan of Care Note     Reason for Admission: IPMN (intraductal papillary mucinous neoplasm)   Procedures: 03/17/22: Open Subtotal Pancreatectomy, intra-op ultrasound (N/A Abdomen); Laparotomy, extensive lysis adhesions  IV Access/Incisions/Drains/Wounds:   Peripheral IV 03/21/22 Right;Posterior Hand (Active)   Site Assessment [facility] 03/22/22 0000   Line Status Saline locked 03/22/22 0000   Phlebitis Scale 0-->no symptoms 03/22/22 0000   Infiltration Scale 0 03/22/22 0000   Infiltration Site Treatment Method  None 03/21/22 1720   Number of days: 1       Hemodialysis Vascular Access Arteriovenous fistula Left Arm (Active)   Site Assessment [facility] 03/22/22 0000   Cannulation Needle Size 15 03/21/22 1050   Dressing Intervention Other (Comment) 03/21/22 1720   Dressing Status Clean, dry, intact 03/21/22 1720   Verification by x-ray Not applicable 03/21/22 1050   Hand Off Report Yes 03/19/22 1803   Number of days:        Closed/Suction Drain 1 Left Abdomen Bulb 15 [place] LOT [id number] EXP 12- (Active)   Site Description [facility] 03/22/22 0056   Dressing Status Normal: Clean, Dry & Intact 03/22/22 0056   Dressing Change Due 03/21/22 03/20/22 2111   Drainage Appearance Serous 03/22/22 0056   Status Other (Comment) 03/21/22 1724   Output (ml) 0 ml 03/22/22 0056   Number of days: 5       Incision/Surgical Site 03/17/22 Left;Transverse Abdomen (Active)   Incision Assessment [facility] 03/22/22 0056   Closure Liquid bandage 03/22/22 0056   Incision Drainage Amount None 03/22/22 0056   Dressing Intervention Open to air / No Dressing 03/22/22 0056   Number of days: 5   IVF: None  VS: BP (!) 144/70 (BP Location: Right arm)   Pulse 109   Temp 98.6  F (37  C) (Oral)   Resp 18   Ht 1.803 m (5' 11\")   Wt 76.1 kg (167 lb 12.3 oz)   SpO2 95%   BMI 23.40 kg/m    Diet: Clear Liquid Diet      Activity: UAL  Pain Management: Oxycodone, Robaxin, Simethicone, Atarax  GI/: Voiding spontaneously, +BM, +Flatus, -Nausea  Neuro: A&Ox4  Team: " SurgOnc  Pertinent Labs: None        Shift Summary  Patient slept well between cares. Patient ambulated in halls once on shift. Blood sugars stable with no coverage. Continue POC.

## 2022-03-22 NOTE — PLAN OF CARE
Time: 0008-4810    Reason for Admission: POD#4, partial pancreatectomy for IPMN.    Activity: Independent, ambulated in may x2     Neuro:  A&Ox4, calm and cooperative    GI/:  Denies N/V, pt on dialysis, no void today, no BM this shift    Diet:  Reg, tolerating well    Incisions/Drains: R PIV saline locked, JASMIN drain to thumb-print suction, transverse abd incision wih liquid bandage- CDI, right umbilical hernia    Labs:  BG q 4 hrs: 101 and 103, no Novolog given.    Pain: Reported minimal pain, scheduled meds given. Reported heartburn.    Plan: Continue POC, possible discharge tomorrow.    Caty Schilling RN on 3/21/2022 at 10:32 PM

## 2022-03-22 NOTE — PLAN OF CARE
Pt adequate for discharge. AVS printed and reviewed with pt. Heparin received from pharmacy and education was completed as able. Pt did not want to wait until next dose was due to demonstrate how to administer by himself. PIV was removed. Pt belongings were gathered and pt was transported to patient/visitor ramp via wheelchair.

## 2022-03-23 NOTE — PROGRESS NOTES
Dialysis Discharge Summary Brief    [facility]  Division of Nephrology  Nephrology Discharge Dialysis Orders  Ph: [phone]  Fax: [phone]    Tacos Dominguez  MRN: [id number]  YOB: 1960    [facility]: [facility]  Primary Nephrologist: Dr. Scherer    Please page me at  with any questions. Thanks much. Bryanna Saeed PA-C    Tacos Dominguez is a 62 year old male with PMH of afib, distal aortic dissection, HTN, MI, hypertension, tobacco use, Crohn's disease, ESRD, admitted s/p partial pancreatectomy 3/17/22 for IPMN found as part of transplant evaluation.      ESRD: Typically dialyzes MWF at [facility] with Dr. Scherer for 3.5hrs via LUE AVF to EDW 76.5kg with heparin.  -dialysis per MWF schedule   - Hold heparin in post op period (through 3/25)     Volume status: EDW 76.5 kg        BP: well controlled       Anemia:  on venofer 50 mg qMonday, epogen 1600 units per HD  - Continue venofer and ANGELES (ok for ANGELES, per surgical oncology)  - Hgb 8.0 g/dL on 3/21     BMD: Ca 7.9, alb 3.4, no phos, recent ; hectorol 1.5 mcg per HD, on tums 2 tabs in between meals and at bedtime, Phoslo 3-4 tabs tid WM         Date of Admission: 3/17/2022  Date of Discharge: 3/22/2022  Discharge Diagnosis:    ICD-10-CM    1. IPMN (intraductal papillary mucinous neoplasm)  D49.0 Heparin Sodium, Porcine, (HEPARIN ANTICOAGULANT) 5000 UNIT/0.5ML injection     oxyCODONE (ROXICODONE) 5 MG tablet          Name of physician completing this form: RENETTA Vyas

## 2022-03-24 ENCOUNTER — PATIENT OUTREACH (OUTPATIENT)
Dept: ONCOLOGY | Facility: CLINIC | Age: 62
End: 2022-03-24
Payer: COMMERCIAL

## 2022-03-24 NOTE — PROGRESS NOTES
Post Op Discharge Call    Surgery: Distal pancreatectomy with splenectomy     Surgery date: 3/17/2022    Discharge Date:  3/23/22    Immediate Concerns: None at this time.     Pain:   No concerns with pain management. , Using pain medications as recommended with appropriate relief. , Discussed use of Tylenol /Acetaminophen  (1000 mg every 8 hours)  and Discussed use of Ibuprofen (600 mg every 8 hours)     Incision:   No concerns, healing well, no redness, drainage or edema reported.     Drains:   No drain.   Drain to thumb print suction.   30-50mls of output reported in 24 hour period.    Diet:   Regular diet   Denies nausea and vomiting.     Bowels:   Passing gas.   Patient reports he has had bowel movement post op.   Taking stool softeners daily.   Denies feelings of constipation and cramping.     Activity:   No difficulty with ADLs reported.   Patient is up independently at home.   Encourage patient to continue to stay active.     Post op/follow up plans:   Post op appointment scheduled,confirmed date and time with patient.       Future Appointments   Date Time Provider Department Center   4/4/2022  1:30 PM Sarath Smith MD [facility]   6/22/2022 10:00 AM Betty Rubio MD [facility]         Patient has our direct number for any questions or concerns that may arise.      Priscilla Benitez, RN, BSN  Care Coordinator - Surgical Oncology

## 2022-03-25 LAB
PATH REPORT.COMMENTS IMP SPEC: NORMAL
PATH REPORT.COMMENTS IMP SPEC: NORMAL
PATH REPORT.FINAL DX SPEC: NORMAL
PATH REPORT.GROSS SPEC: NORMAL
PATH REPORT.INTRAOP OBS SPEC DOC: NORMAL
PATH REPORT.MICROSCOPIC SPEC OTHER STN: NORMAL
PATH REPORT.RELEVANT HX SPEC: NORMAL
PHOTO IMAGE: NORMAL

## 2022-03-30 ENCOUNTER — PATIENT OUTREACH (OUTPATIENT)
Dept: GASTROENTEROLOGY | Facility: CLINIC | Age: 62
End: 2022-03-30
Payer: COMMERCIAL

## 2022-03-30 DIAGNOSIS — K50.10 CROHN'S DISEASE OF LARGE INTESTINE (H): Primary | ICD-10-CM

## 2022-03-30 RX ORDER — USTEKINUMAB 90 MG/ML
INJECTION, SOLUTION SUBCUTANEOUS
Qty: 1 ML | Refills: 1 | Status: SHIPPED | OUTPATIENT
Start: 2022-03-30 | End: 2022-07-29

## 2022-03-30 NOTE — PROGRESS NOTES
Order placed for the patient to continue stelara injections.  Will contact the patient to set up first dose.

## 2022-04-04 ENCOUNTER — OFFICE VISIT (OUTPATIENT)
Dept: SURGERY | Facility: CLINIC | Age: 62
End: 2022-04-04
Attending: SURGERY
Payer: COMMERCIAL

## 2022-04-04 VITALS
RESPIRATION RATE: 16 BRPM | BODY MASS INDEX: 22.19 KG/M2 | HEART RATE: 96 BPM | WEIGHT: 159.1 LBS | DIASTOLIC BLOOD PRESSURE: 76 MMHG | SYSTOLIC BLOOD PRESSURE: 119 MMHG | TEMPERATURE: 98.5 F | OXYGEN SATURATION: 100 %

## 2022-04-04 DIAGNOSIS — D49.0 IPMN (INTRADUCTAL PAPILLARY MUCINOUS NEOPLASM): Primary | ICD-10-CM

## 2022-04-04 PROCEDURE — G0463 HOSPITAL OUTPT CLINIC VISIT: HCPCS

## 2022-04-04 PROCEDURE — 99024 POSTOP FOLLOW-UP VISIT: CPT | Performed by: SURGERY

## 2022-04-04 ASSESSMENT — PAIN SCALES - GENERAL: PAINLEVEL: NO PAIN (0)

## 2022-04-04 NOTE — NURSING NOTE
"Oncology Rooming Note    April 4, 2022 1:08 PM   Tacos Dominguez is a 62 year old male who presents for:    Chief Complaint   Patient presents with     Oncology Clinic Visit     IPMN (intraductal papillary mucinous neoplasm)     Initial Vitals: /76 (BP Location: Right arm, Patient Position: Sitting, Cuff Size: Adult Regular)   Pulse 96   Temp 98.5  F (36.9  C) (Oral)   Resp 16   Wt 72.2 kg (159 lb 1.6 oz)   SpO2 100%   BMI 22.19 kg/m   Estimated body mass index is 22.19 kg/m  as calculated from the following:    Height as of 3/17/22: 1.803 m (5' 11\").    Weight as of this encounter: 72.2 kg (159 lb 1.6 oz). Body surface area is 1.9 meters squared.  No Pain (0) Comment: Data Unavailable   No LMP for male patient.  Allergies reviewed: Yes  Medications reviewed: Yes    Medications: Medication refills not needed today.  Pharmacy name entered into EPIC:    [facility] - [place], [place] - [address] [place] [address] [facility] - [place], [place] - [address]    Clinical concerns: Please discuss if patient is to continue Heparin. Hopes to have drain removed today. Decreased appetite reported.       Evie Gutierrez LPN April 4, 2022 1:09 PM              "

## 2022-04-04 NOTE — LETTER
4/4/2022         RE: Tacos Dominguez  [address]  [place] [address]        Dear Colleague,    Thank you for referring your patient, Tacos Dominguez, to the [facility]. Please see a copy of my visit note below.    HISTORY OF PRESENT ILLNESS:  Tacos Dominguez looks great today.  He had a subtotal pancreatectomy for main duct IPMN.  He recovered uneventfully and has been doing well at home.  He did leave a drain in place; however, it fell out at home and the suture was cut and removed today in clinic.  He denies any fevers, chills or night sweats.    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  All lymph nodes were negative.  There was no invasive adenocarcinoma seen.  The pancreatic head margin revealed low-grade IPMN, which we knew about prior to surgery.  We did not perform a completion pancreatectomy because of the patient's short bowel syndrome and the difficulties associated with severe pancreatic insufficiency, etc.  Therefore, we discussed again today the plan for ongoing surveillance for the remnant head of his pancreas and he is fully aware and we discussed once again today, that if worrisome features develop within the head of the pancreas, there could come a time where a completion pancreatectomy would be necessary, although hopefully we will be able to avoid that.    Mr. Dominguez states that he is eating well.  He does have a followup set up with his gastroenterologist who cares for him for his Crohn disease.  I did recommend a fecal elastase level at some point just to be sure that he does not pancreas insufficiency or to find out if he does, which could be treated with Creon if necessary, but he preferred to address that with his current GI doctor, which I think is fine.    From a surgical standpoint, Mr. Dominguez looks great.  I did encourage him to call if he develops any fevers, chills, night sweats, because his drain did  come out prematurely.  Barring any problems from that, I will not need to see him again.  We will also get reconnected with Dr. Preston for ongoing surveillance of the head of his pancreas.          Again, thank you for allowing me to participate in the care of your patient.      Sincerely,    Sarath Smith MD

## 2022-04-04 NOTE — PROGRESS NOTES
HISTORY OF PRESENT ILLNESS:  Tacos Dominguez looks great today.  He had a subtotal pancreatectomy for main duct IPMN.  He recovered uneventfully and has been doing well at home.  He did leave a drain in place; however, it fell out at home and the suture was cut and removed today in clinic.  He denies any fevers, chills or night sweats.    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  All lymph nodes were negative.  There was no invasive adenocarcinoma seen.  The pancreatic head margin revealed low-grade IPMN, which we knew about prior to surgery.  We did not perform a completion pancreatectomy because of the patient's short bowel syndrome and the difficulties associated with severe pancreatic insufficiency, etc.  Therefore, we discussed again today the plan for ongoing surveillance for the remnant head of his pancreas and he is fully aware and we discussed once again today, that if worrisome features develop within the head of the pancreas, there could come a time where a completion pancreatectomy would be necessary, although hopefully we will be able to avoid that.    Mr. Dominguez states that he is eating well.  He does have a followup set up with his gastroenterologist who cares for him for his Crohn disease.  I did recommend a fecal elastase level at some point just to be sure that he does not pancreas insufficiency or to find out if he does, which could be treated with Creon if necessary, but he preferred to address that with his current GI doctor, which I think is fine.    From a surgical standpoint, Mr. Dominguez looks great.  I did encourage him to call if he develops any fevers, chills, night sweats, because his drain did come out prematurely.  Barring any problems from that, I will not need to see him again.  We will also get reconnected with Dr. Preston for ongoing surveillance of the head of his pancreas.

## 2022-04-10 ENCOUNTER — HEALTH MAINTENANCE LETTER (OUTPATIENT)
Age: 62
End: 2022-04-10

## 2022-04-12 ENCOUNTER — TELEPHONE (OUTPATIENT)
Dept: PHARMACY | Facility: CLINIC | Age: 62
End: 2022-04-12
Payer: COMMERCIAL

## 2022-04-12 DIAGNOSIS — D49.0 IPMN (INTRADUCTAL PAPILLARY MUCINOUS NEOPLASM): Primary | ICD-10-CM

## 2022-04-12 NOTE — PROGRESS NOTES
Crohn's/Ulcerative Colitis Clinical Follow Up Assessment  Assessment Link -Crohn's/Ulcerative Colitis Clinical Follow Up Assessment     2022/04/12 15:36:39 [facility], by Yany Bertrand     Activity date 2022/04/12       Providers    RACHEL MONTEMAYOR    Medications    STELARA    Summary notes  Spoke with Scar for assessment. Current Regimen: Stelara 90 mg every 8 wks   Tolerability: Denies side effects and injection site reactions.    Dosing Schedule: First dose: 4/7 (pt gave early despite mychart message from RN stating to give on 4/14), next due on June 2nd. Pt using calendar to keep track. Discussed how to order refills.   Pt says he is healing well after surgery. He is happy to be able to eat more and put some weight back on. Pt has no questions or concerns today.   Follow up: Patient opted out of future therapy management pharmacist calls.    Radha Bertrand PharmD  Therapy Management Pharmacist  [facility]

## 2022-06-13 NOTE — TELEPHONE ENCOUNTER
Obtained consent for Eplet testing via  msg from patient; emailed written consent via ReplySend. Patient aware.    Detail Level: Detailed Photo Preface (Leave Blank If You Do Not Want): Photographs were obtained today

## 2022-06-15 NOTE — TELEPHONE ENCOUNTER
Attempted to call patient yesterday.  Tried again today and left a message with my cell number.   No assistance needed

## 2022-07-18 DIAGNOSIS — K50.10 CROHN'S DISEASE OF LARGE INTESTINE (H): ICD-10-CM

## 2022-07-18 RX ORDER — USTEKINUMAB 90 MG/ML
INJECTION, SOLUTION SUBCUTANEOUS
Qty: 1 ML | Refills: 1 | Status: CANCELLED | OUTPATIENT
Start: 2022-07-18

## 2022-07-21 NOTE — TELEPHONE ENCOUNTER
[facility] Call Center    Phone Message    May a detailed message be left on voicemail: yes     Reason for Call: Medication Refill Request    Has the patient contacted the pharmacy for the refill? Yes     Name of medication being requested: ustekinumab (STELARA) 90 MG/ML    Provider who prescribed the medication: Dr. Betty Rubio    Pharmacy:  Specialty    Date medication is needed: RX is needed today so that medication is delivered tomorrow, 7/22/22     Action Taken: Message routed to:  [facility]    Travel Screening: Not Applicable

## 2022-07-21 NOTE — TELEPHONE ENCOUNTER
ustekinumab (STELARA) 90 MG/ML  Last Written Prescription Date:3/30/22  Last Fill Quantity: 1ML,   # refills: 1  Last Office Visit : 11/16/21  Future Office visit:  10/26/22  Routing refill request to provider for review/approval because:  Drug not on the refill protocol

## 2022-07-30 ENCOUNTER — LAB (OUTPATIENT)
Dept: LAB | Facility: CLINIC | Age: 62
End: 2022-07-30
Payer: COMMERCIAL

## 2022-07-30 DIAGNOSIS — K50.10 CROHN'S DISEASE OF LARGE INTESTINE (H): ICD-10-CM

## 2022-07-30 LAB
BASOPHILS # BLD AUTO: 0.1 10E3/UL (ref 0–0.2)
BASOPHILS NFR BLD AUTO: 1 %
EOSINOPHIL # BLD AUTO: 0.2 10E3/UL (ref 0–0.7)
EOSINOPHIL NFR BLD AUTO: 2 %
ERYTHROCYTE [DISTWIDTH] IN BLOOD BY AUTOMATED COUNT: 12.9 % (ref 10–15)
ERYTHROCYTE [SEDIMENTATION RATE] IN BLOOD BY WESTERGREN METHOD: 40 MM/HR (ref 0–20)
HCT VFR BLD AUTO: 33.2 % (ref 40–53)
HGB BLD-MCNC: 11 G/DL (ref 13.3–17.7)
LYMPHOCYTES # BLD AUTO: 0.9 10E3/UL (ref 0.8–5.3)
LYMPHOCYTES NFR BLD AUTO: 11 %
MCH RBC QN AUTO: 33 PG (ref 26.5–33)
MCHC RBC AUTO-ENTMCNC: 33.1 G/DL (ref 31.5–36.5)
MCV RBC AUTO: 100 FL (ref 78–100)
MONOCYTES # BLD AUTO: 0.5 10E3/UL (ref 0–1.3)
MONOCYTES NFR BLD AUTO: 6 %
NEUTROPHILS # BLD AUTO: 6.6 10E3/UL (ref 1.6–8.3)
NEUTROPHILS NFR BLD AUTO: 81 %
PLATELET # BLD AUTO: 120 10E3/UL (ref 150–450)
RBC # BLD AUTO: 3.33 10E6/UL (ref 4.4–5.9)
WBC # BLD AUTO: 8.2 10E3/UL (ref 4–11)

## 2022-07-30 PROCEDURE — 36415 COLL VENOUS BLD VENIPUNCTURE: CPT

## 2022-07-30 PROCEDURE — 80053 COMPREHEN METABOLIC PANEL: CPT

## 2022-07-30 PROCEDURE — 86140 C-REACTIVE PROTEIN: CPT

## 2022-07-30 PROCEDURE — 85652 RBC SED RATE AUTOMATED: CPT

## 2022-07-30 PROCEDURE — 85025 COMPLETE CBC W/AUTO DIFF WBC: CPT

## 2022-07-31 ENCOUNTER — TELEPHONE (OUTPATIENT)
Dept: MULTI SPECIALTY CLINIC | Facility: CLINIC | Age: 62
End: 2022-07-31

## 2022-07-31 ENCOUNTER — NURSE TRIAGE (OUTPATIENT)
Dept: NURSING | Facility: CLINIC | Age: 62
End: 2022-07-31

## 2022-07-31 LAB
ALBUMIN SERPL-MCNC: 3.7 G/DL (ref 3.4–5)
ALP SERPL-CCNC: 90 U/L (ref 40–150)
ALT SERPL W P-5'-P-CCNC: 21 U/L (ref 0–70)
ANION GAP SERPL CALCULATED.3IONS-SCNC: 7 MMOL/L (ref 3–14)
AST SERPL W P-5'-P-CCNC: 19 U/L (ref 0–45)
BILIRUB SERPL-MCNC: 0.9 MG/DL (ref 0.2–1.3)
BUN SERPL-MCNC: 14 MG/DL (ref 7–30)
CALCIUM SERPL-MCNC: 8.9 MG/DL (ref 8.5–10.1)
CHLORIDE BLD-SCNC: 100 MMOL/L (ref 94–109)
CO2 SERPL-SCNC: 30 MMOL/L (ref 20–32)
CREAT SERPL-MCNC: 5.93 MG/DL (ref 0.66–1.25)
CRP SERPL-MCNC: 10.7 MG/L
GFR SERPL CREATININE-BSD FRML MDRD: 10 ML/MIN/1.73M2
GLUCOSE BLD-MCNC: 166 MG/DL (ref 70–99)
POTASSIUM BLD-SCNC: 3.5 MMOL/L (ref 3.4–5.3)
PROT SERPL-MCNC: 7.1 G/DL (ref 6.8–8.8)
SODIUM SERPL-SCNC: 137 MMOL/L (ref 133–144)

## 2022-07-31 NOTE — TELEPHONE ENCOUNTER
Was paged by [facility] in [facility] regarding abnormal Creatinine value. Number I was given was not correct and brought me to nurse triage line. I spoke with one of the nurses who will work on finding the  and providing them with my contact information.     Upon chart review, I am not able to see the lab value from [facility]'s labs. However, it appears patient is ESRD patient and has had creatinine values as high as 9+ in the past, and any elevation in creatinine would likely not be unexpected at this time.     If I am unable to be reached by the lab, these values will be reviewed by the ordering provider during normal business hours and any additional interventions can be pursued at that time.    Judson Alegre MD  GI Fellow

## 2022-07-31 NOTE — TELEPHONE ENCOUNTER
"Lab paged by \"Sharon\". Call came to  main [facility].  [facility] [place] lab is now closed. [facility] lab is now closed. Unable to locate further information for provider calling as to reason for call.     Yoly Gaviria RN Triage Nurse Advisor 5:50 PM 7/31/2022   "

## 2022-08-19 ENCOUNTER — OFFICE VISIT (OUTPATIENT)
Dept: FAMILY MEDICINE | Facility: CLINIC | Age: 62
End: 2022-08-19
Payer: COMMERCIAL

## 2022-08-19 DIAGNOSIS — D49.2 NEOPLASM OF UNSPECIFIED BEHAVIOR OF BONE, SOFT TISSUE, AND SKIN: ICD-10-CM

## 2022-08-19 DIAGNOSIS — D22.9 MULTIPLE BENIGN NEVI: Primary | ICD-10-CM

## 2022-08-19 DIAGNOSIS — L82.1 SEBORRHEIC KERATOSIS: ICD-10-CM

## 2022-08-19 DIAGNOSIS — L82.0 SEBORRHEIC KERATOSIS, INFLAMED: ICD-10-CM

## 2022-08-19 PROCEDURE — 11103 TANGNTL BX SKIN EA SEP/ADDL: CPT | Mod: XS | Performed by: DERMATOLOGY

## 2022-08-19 PROCEDURE — 88305 TISSUE EXAM BY PATHOLOGIST: CPT | Performed by: DERMATOLOGY

## 2022-08-19 PROCEDURE — 99203 OFFICE O/P NEW LOW 30 MIN: CPT | Mod: 25 | Performed by: DERMATOLOGY

## 2022-08-19 PROCEDURE — 11102 TANGNTL BX SKIN SINGLE LES: CPT | Mod: XS | Performed by: DERMATOLOGY

## 2022-08-19 PROCEDURE — 17110 DESTRUCTION B9 LES UP TO 14: CPT | Performed by: DERMATOLOGY

## 2022-08-19 RX ORDER — LIDOCAINE HYDROCHLORIDE AND EPINEPHRINE 10; 10 MG/ML; UG/ML
3 INJECTION, SOLUTION INFILTRATION; PERINEURAL ONCE
Status: DISCONTINUED | OUTPATIENT
Start: 2022-08-19 | End: 2024-01-01

## 2022-08-19 ASSESSMENT — PAIN SCALES - GENERAL: PAINLEVEL: NO PAIN (0)

## 2022-08-19 NOTE — LETTER
8/19/2022         RE: Tacos Dominguez  [address]  [place] [address]        Dear Colleague,    Thank you for referring your patient, Tacos Dominguez, to the [facility]. Please see a copy of my visit note below.    [facility] Note - EP    Encounter Date: Aug 19, 2022    Dermatology Problem List:  0. NUBs x3 s/p shave biopsy 8/19/22  - Right proximal forearm  - Right distal forearm  - Left proximal forearm  2. ISK, left nasal sidewall s/p cryotherapy 8/19/22  3. Hx of ESRD pending kidney transplant   4. Hx of immunosuppression  - Stelara 1 mL (90 mg) injection every 8 weeks for Crohn's disease  ____________________________________________    Assessment & Plan:     1. NUBs, right proximal forearm ddx SCC vs. HAK vs. PN, right distal forearm SCC vs. HAK vs. PN, and left proximal forearm ddx BCC vs. scar vs. other  - Shave biopsy today, see procedure note below.    2. ISK, left nasal dorsum   - Cryotherapy today, see procedure note below.     # Benign lesions  Multiple benign nevi, seborrheic keratoses. Explained to patient benign nature of lesion. No treatment is necessary at this time unless the lesion changes or becomes symptomatic.   - ABCDs of melanoma were discussed and self skin checks were advised.  - Sun precaution was advised including the use of sun screens of SPF 30 or higher, sun protective clothing, and avoidance of tanning beds    Procedures Performed:   - Shave biopsy procedure note, location(s): see above. After discussion of benefits and risks including but not limited to bleeding, infection, scar, incomplete removal, recurrence, and non-diagnostic biopsy, written consent and photographs were obtained. The area was cleaned with isopropyl alcohol. 0.5mL of 1% lidocaine with epinephrine was injected to obtain adequate anesthesia of lesion(s). Shave biopsy at site(s) performed. Hemostasis was achieved with aluminium chloride. Petrolatum ointment and  a sterile dressing were applied. The patient tolerated the procedure and no complications were noted. The patient was provided with verbal and written post care instructions.     - Cryotherapy procedure note, location(s): see above. After verbal consent and discussion of risks and benefits including, but not limited to, dyspigmentation/scar, blister, and pain, 1 lesion(s) was(were) treated with 1-2 mm freeze border for 1-2 cycles with liquid nitrogen. Post cryotherapy instructions were provided.    Follow-up: pending pathology    Scribe Disclosure:  IShon, am serving as a scribe to document services personally performed by Don Khan MD based on data collection and the provider's statements to me.     Don Khan MD  Dermatology/Dermatopathology Staff Physician  , Department of Dermatology    ____________________________________________    CC: Derm Problem and Skin Check      HPI:  Mr. Tacos Dominguez is a(n) extremely pleasant 62 year old male who presents today as a new patient for a pre-kidney transplant FBSE. The patient notes a history of BCC to the right forearm with multiple nodules currently present near the previous cancer sites. He also mentions a spot of concern on his left nasal sidewall. He denies a family history of melanoma.    The patient denies any other painful, bleeding, or nonhealing lesions, or any new or changing moles.    Patient is otherwise feeling well, without additional skin concerns.     Labs Reviewed:  N/A    Physical Exam:  Vitals: There were no vitals taken for this visit.  SKIN: Total skin excluding the undergarment areas was performed. The exam included the head/face, neck, both arms, chest, back, abdomen, both legs, digits and/or nails.   - 1 cm plaque comprised of 3 separate keratotic nodules adjacent to a prior NMSC scar on the right distal forearm.  - 8 mm paque comprised of 2 separate keratotic nodules on the right proximal forearm.  -  5 mm shiny erythematous papule with a solitary telangectasia proximally and white fibrous-appearing structures on dermoscopy.  - There is a tan to brown waxy stuck on papule with surrounding erythema on the left nasal sidewall.  - Multiple regular brown pigmented macules and papules are identified on the trunk and extremities.   - There are waxy stuck on tan to brown papules on the trunk and extremities.   - No other lesions of concern on areas examined.     Medications:  Current Outpatient Medications   Medication     aspirin (ASA) 81 MG chewable tablet     calcium acetate (CALPHRON) 667 MG TABS tablet     folic acid (FOLVITE) 1 MG tablet     lidocaine-prilocaine (EMLA) 2.5-2.5 % external cream     multivitamin RENAL (MULTIVITAMIN RENAL) 1 MG capsule     omeprazole (PRILOSEC) 20 MG DR capsule     simvastatin (ZOCOR) 20 MG tablet     ustekinumab (STELARA) 90 MG/ML     magnesium oxide 400 MG CAPS     Melatonin 10 MG TABS tablet     Current Facility-Administered Medications   Medication     lidocaine 1% with EPINEPHrine 1:100,000 injection 3 mL      Past Medical History:   Patient Active Problem List   Diagnosis     Acquired cyst of kidney     ESRD (end stage renal disease) (H)     Organ transplant candidate     NSTEMI (non-ST elevated myocardial infarction) (H)     Atrial fibrillation (H)     Benign essential hypertension     Crohn's disease of large intestine (H)     IPMN (intraductal papillary mucinous neoplasm)     Past Medical History:   Diagnosis Date     Aortic dissection (H)     distal, thin.  stable/chronic on MRCP 3/2022     Benign essential hypertension      Chronic atrial fibrillation (H)      Crohn's colitis (H)      Crohn's disease of large intestine (H) 11/22/2021     Esophageal reflux      ESRD (end stage renal disease) on dialysis (H)      History of basal cell carcinoma      Hypertension      Mixed hyperlipidemia        CC Referred Self, MD  No address on file on close of this encounter.    This note  has been created using voice recognition software; while it has been reviewed, some errors may persist.       Again, thank you for allowing me to participate in the care of your patient.        Sincerely,        Don Khan MD

## 2022-08-19 NOTE — PROGRESS NOTES
[facility] Dermatology Clinic Note - EP    Encounter Date: Aug 19, 2022    Dermatology Problem List:  0. NUBs x3 s/p shave biopsy 8/19/22  - Right proximal forearm  - Right distal forearm  - Left proximal forearm  2. ISK, left nasal sidewall s/p cryotherapy 8/19/22  3. Hx of ESRD pending kidney transplant   4. Hx of immunosuppression  - Stelara 1 mL (90 mg) injection every 8 weeks for Crohn's disease  ____________________________________________    Assessment & Plan:     1. NUBs, right proximal forearm ddx SCC vs. HAK vs. PN, right distal forearm SCC vs. HAK vs. PN, and left proximal forearm ddx BCC vs. scar vs. other  - Shave biopsy today, see procedure note below.    2. ISK, left nasal dorsum   - Cryotherapy today, see procedure note below.     # Benign lesions  Multiple benign nevi, seborrheic keratoses. Explained to patient benign nature of lesion. No treatment is necessary at this time unless the lesion changes or becomes symptomatic.   - ABCDs of melanoma were discussed and self skin checks were advised.  - Sun precaution was advised including the use of sun screens of SPF 30 or higher, sun protective clothing, and avoidance of tanning beds    Procedures Performed:   - Shave biopsy procedure note, location(s): see above. After discussion of benefits and risks including but not limited to bleeding, infection, scar, incomplete removal, recurrence, and non-diagnostic biopsy, written consent and photographs were obtained. The area was cleaned with isopropyl alcohol. 0.5mL of 1% lidocaine with epinephrine was injected to obtain adequate anesthesia of lesion(s). Shave biopsy at site(s) performed. Hemostasis was achieved with aluminium chloride. Petrolatum ointment and a sterile dressing were applied. The patient tolerated the procedure and no complications were noted. The patient was provided with verbal and written post care instructions.     - Cryotherapy procedure note, location(s): see above. After verbal consent  and discussion of risks and benefits including, but not limited to, dyspigmentation/scar, blister, and pain, 1 lesion(s) was(were) treated with 1-2 mm freeze border for 1-2 cycles with liquid nitrogen. Post cryotherapy instructions were provided.    Follow-up: pending pathology    Scribe Disclosure:  I, Shonjim Villavicencio, am serving as a scribe to document services personally performed by Don Khan MD based on data collection and the provider's statements to me.     Don Khan MD  Dermatology/Dermatopathology Staff Physician  , Department of Dermatology    ____________________________________________    CC: Derm Problem and Skin Check      HPI:  Mr. Tacos Dominguez is a(n) extremely pleasant 62 year old male who presents today as a new patient for a pre-kidney transplant FBSE. The patient notes a history of BCC to the right forearm with multiple nodules currently present near the previous cancer sites. He also mentions a spot of concern on his left nasal sidewall. He denies a family history of melanoma.    The patient denies any other painful, bleeding, or nonhealing lesions, or any new or changing moles.    Patient is otherwise feeling well, without additional skin concerns.     Labs Reviewed:  N/A    Physical Exam:  Vitals: There were no vitals taken for this visit.  SKIN: Total skin excluding the undergarment areas was performed. The exam included the head/face, neck, both arms, chest, back, abdomen, both legs, digits and/or nails.   - 1 cm plaque comprised of 3 separate keratotic nodules adjacent to a prior NMSC scar on the right distal forearm.  - 8 mm paque comprised of 2 separate keratotic nodules on the right proximal forearm.  - 5 mm shiny erythematous papule with a solitary telangectasia proximally and white fibrous-appearing structures on dermoscopy.  - There is a tan to brown waxy stuck on papule with surrounding erythema on the left nasal sidewall.  - Multiple regular brown  pigmented macules and papules are identified on the trunk and extremities.   - There are waxy stuck on tan to brown papules on the trunk and extremities.   - No other lesions of concern on areas examined.     Medications:  Current Outpatient Medications   Medication     aspirin (ASA) 81 MG chewable tablet     calcium acetate (CALPHRON) 667 MG TABS tablet     folic acid (FOLVITE) 1 MG tablet     lidocaine-prilocaine (EMLA) 2.5-2.5 % external cream     multivitamin RENAL (MULTIVITAMIN RENAL) 1 MG capsule     omeprazole (PRILOSEC) 20 MG DR capsule     simvastatin (ZOCOR) 20 MG tablet     ustekinumab (STELARA) 90 MG/ML     magnesium oxide 400 MG CAPS     Melatonin 10 MG TABS tablet     Current Facility-Administered Medications   Medication     lidocaine 1% with EPINEPHrine 1:100,000 injection 3 mL      Past Medical History:   Patient Active Problem List   Diagnosis     Acquired cyst of kidney     ESRD (end stage renal disease) (H)     Organ transplant candidate     NSTEMI (non-ST elevated myocardial infarction) (H)     Atrial fibrillation (H)     Benign essential hypertension     Crohn's disease of large intestine (H)     IPMN (intraductal papillary mucinous neoplasm)     Past Medical History:   Diagnosis Date     Aortic dissection (H)     distal, thin.  stable/chronic on MRCP 3/2022     Benign essential hypertension      Chronic atrial fibrillation (H)      Crohn's colitis (H)      Crohn's disease of large intestine (H) 11/22/2021     Esophageal reflux      ESRD (end stage renal disease) on dialysis (H)      History of basal cell carcinoma      Hypertension      Mixed hyperlipidemia        CC Referred Self, MD  No address on file on close of this encounter.    This note has been created using voice recognition software; while it has been reviewed, some errors may persist.

## 2022-08-19 NOTE — PATIENT INSTRUCTIONS
Wound Care After a Biopsy    What is a skin biopsy?  A skin biopsy allows the doctor to examine a very small piece of tissue under the microscope to determine the diagnosis and the best treatment for the skin condition. A local anesthetic (numbing medicine)  is injected with a very small needle into the skin area to be tested. A small piece of skin is taken from the area. Sometimes a suture (stitch) is used.     What are the risks of a skin biopsy?  I will experience scar, bleeding, swelling, pain, crusting and redness. I may experience incomplete removal or recurrence. Risks of this procedure are excessive bleeding, bruising, infection, nerve damage, numbness, thick (hypertrophic or keloidal) scar and non-diagnostic biopsy.    How should I care for my wound for the first 24 hours?  Keep the wound dry and covered for 24 hours  If it bleeds, hold direct pressure on the area for 15 minutes. If bleeding does not stop then go to the emergency room  Avoid strenuous exercise the first 1-2 days or as your doctor instructs you    How should I care for the wound after 24 hours?  After 24 hours, remove the bandage  You may bathe or shower as normal  If you had a scalp biopsy, you can shampoo as usual and can use shower water to clean the biopsy site daily  Clean the wound twice a day with gentle soap and water  Do not scrub, be gentle  Apply white petroleum/Vaseline after cleaning the wound with a cotton swab or a clean finger, and keep the site covered with a Bandaid /bandage. Bandages are not necessary with a scalp biopsy  If you are unable to cover the site with a Bandaid /bandage, re-apply ointment 2-3 times a day to keep the site moist. Moisture will help with healing  Avoid strenuous activity for first 1-2 days  Avoid lakes, rivers, pools, and oceans until the stitches are removed or the site is healed    How do I clean my wound?  Wash hands thoroughly with soap or use hand  before all wound care  Clean the  wound with gentle soap and water  Apply white petroleum/Vaseline  to wound after it is clean  Replace the Bandaid /bandage to keep the wound covered for the first few days or as instructed by your doctor  If you had a scalp biopsy, warm shower water to the area on a daily basis should suffice    What should I use to clean my wound?   Cotton-tipped applicators (Qtips )  White petroleum jelly (Vaseline ). Use a clean new container and use Q-tips to apply.  Bandaids   as needed  Gentle soap     How should I care for my wound long term?  Do not get your wound dirty  Keep up with wound care for one week or until the area is healed.  A small scab will form and fall off by itself when the area is completely healed. The area will be red and will become pink in color as it heals. Sun protection is very important for how your scar will turn out. Sunscreen with an SPF 30 or greater is recommended once the area is healed.  You should have some soreness but it should be mild and slowly go away over several days. Talk to your doctor about using tylenol for pain,    When should I call my doctor?  If you have increased:   Pain or swelling  Pus or drainage (clear or slightly yellow drainage is ok)  Temperature over 100F  Spreading redness or warmth around wound    When will I hear about my results?  The biopsy results can take 2 weeks to come back.  Your results will automatically release to [employer] before your provider has even reviewed them.  The clinic will call you with the results, send you a [employer] message, or have you schedule a follow-up clinic or phone time to discuss the results.  Contact our clinics if you do not hear from us in 2 weeks.    Who should I call with questions?  [facility]: [phone]  [facility]: [phone]  For urgent needs outside of business hours call the [facility] at [phone] and ask for the dermatology resident on call      Cryotherapy    What is it?  Use of a very cold liquid, such as liquid nitrogen, to freeze and destroy abnormal skin cells that need to be removed    What should I expect?  Tenderness and redness  A small blister that might grow and fill with dark purple blood. There may be crusting.  More than one treatment may be needed if the lesions do not go away.    How do I care for the treated area?  Gently wash the area with your hands when bathing.  Use a thin layer of Vaseline to help with healing. You may use a Band-Aid.   The area should heal within 7-10 days and may leave behind a pink or lighter color.   Do not use an antibiotic or Neosporin ointment.   You may take acetaminophen (Tylenol) for pain.     Call your doctor if you have:  Severe pain  Signs of infection (warmth, redness, cloudy yellow drainage, and or a bad smell)  Questions or concerns    Who should I call with questions?      [facility]: [phone]      [facility]: [phone]      For urgent needs outside of business hours call the [facility] at [phone] and ask for the dermatology resident on call       Patient Education     Checking for Skin Cancer  You can find cancer early by checking your skin each month. There are 3 kinds of skin cancer. They are melanoma, basal cell carcinoma, and squamous cell carcinoma. Doing monthly skin checks is the best way to find new marks or skin changes. Follow the instructions below for checking your skin.   The ABCDEs of checking moles for melanoma   Check your moles or growths for signs of melanoma using ABCDE:   Asymmetry: the sides of the mole or growth don t match  Border: the edges are ragged, notched, or blurred  Color: the color within the mole or growth varies  Diameter: the mole or growth is larger than 6 mm (size of a pencil eraser)  Evolving: the size, shape, or color of the mole or growth is changing (evolving is not shown in  the images below)    Checking for other types of skin cancer  Basal cell carcinoma or squamous cell carcinoma have symptoms such as:     A spot or mole that looks different from all other marks on your skin  Changes in how an area feels, such as itching, tenderness, or pain  Changes in the skin's surface, such as oozing, bleeding, or scaliness  A sore that does not heal  New swelling or redness beyond the border of a mole    Who s at risk?  Anyone can get skin cancer. But you are at greater risk if you have:   Fair skin, light-colored hair, or light-colored eyes  Many moles or abnormal moles on your skin  A history of sunburns from sunlight or tanning beds  A family history of skin cancer  A history of exposure to radiation or chemicals  A weakened immune system  If you have had skin cancer in the past, you are at risk for recurring skin cancer.   How to check your skin  Do your monthly skin checkups in front of a full-length mirror. Check all parts of your body, including your:   Head (ears, face, neck, and scalp)  Torso (front, back, and sides)  Arms (tops, undersides, upper, and lower armpits)  Hands (palms, backs, and fingers, including under the nails)  Buttocks and genitals  Legs (front, back, and sides)  Feet (tops, soles, toes, including under the nails, and between toes)  If you have a lot of moles, take digital photos of them each month. Make sure to take photos both up close and from a distance. These can help you see if any moles change over time.   Most skin changes are not cancer. But if you see any changes in your skin, call your doctor right away. Only he or she can diagnose a problem. If you have skin cancer, seeing your doctor can be the first step toward getting the treatment that could save your life.   [employer] last reviewed this educational content on 4/1/2019 2000-2020 [employer]. [address] [place] [address], [place] [address]. All rights reserved. This information is not intended  as a substitute for professional medical care. Always follow your healthcare professional's instructions.       When should I call my doctor?  If you are worsening or not improving, please, contact us or seek urgent care as noted below.     Who should I call with questions (adults)?  [facility] (adult and pediatric): [phone]  [facility] (adult): [phone]  For urgent needs outside of business hours call the [facility] at [phone] and ask for the dermatology resident on call to be paged  If this is a medical emergency and you are unable to reach an ER, Call 911    Who should I call with questions (pediatric)?  [facility]- Pediatric Dermatology  Dr. Julieta Alvarez, Dr. Hussein Chávez, Dr. Hoda Jeffery, RENETTA Carlos, Dr. Mervat Jacinto, Dr. Alena Dawson & Dr. Nando Stanley  Non-urgent nurse triage line; [phone]- Emily and Viji NORRIS Care Coordinators   Den[place] (/Complex ) [phone]    If you need a prescription refill, please contact your pharmacy. Refills are approved or denied by our Physicians during normal business hours, Monday through Fridays  Per office policy, refills will not be granted if you have not been seen within the past year (or sooner depending on your child's condition)    Scheduling Information:  Pediatric Appointment Scheduling and Call Center [phone]  Radiology Scheduling- [phone]  Sedation Unit Scheduling- [phone]  [place] Scheduling- General [phone]; Pediatric Dermatology [phone]  Main  Services: [phone]  Armenian: [phone]  Slovak: [phone]  Hmong/Djiboutian/Chilean: [phone]  Preadmission Nursing Department Fax Number: [phone] (Fax all pre-operative paperwork to this number)    For urgent matters arising during evenings, weekends, or holidays that cannot wait for normal business hours  please call [phone] and ask for the dermatology resident on call to be paged.

## 2022-08-22 LAB
PATH REPORT.COMMENTS IMP SPEC: ABNORMAL
PATH REPORT.COMMENTS IMP SPEC: ABNORMAL
PATH REPORT.COMMENTS IMP SPEC: YES
PATH REPORT.FINAL DX SPEC: ABNORMAL
PATH REPORT.GROSS SPEC: ABNORMAL
PATH REPORT.MICROSCOPIC SPEC OTHER STN: ABNORMAL
PATH REPORT.RELEVANT HX SPEC: ABNORMAL

## 2022-08-23 ENCOUNTER — TELEPHONE (OUTPATIENT)
Dept: FAMILY MEDICINE | Facility: CLINIC | Age: 62
End: 2022-08-23

## 2022-08-23 NOTE — TELEPHONE ENCOUNTER
Pt called back and was given Dr. Khan' message below.  Scheduled excision for 11/4 at 12:45 pm and 6 month skin check on 2/17 at 10 am.    Pt states understanding.    Hillary HENSON RN  [facility] [place]  [phone]

## 2022-08-23 NOTE — TELEPHONE ENCOUNTER
----- Message from Don Khan MD sent at 8/23/2022  7:11 AM CDT -----  Biopsy showed carcinoma - please schedule at next available excision slot, and ensure 6 month follow up skin check is scheduled. Thanks!    Left proximal forearm BCC.

## 2022-08-23 NOTE — TELEPHONE ENCOUNTER
Left message for pt to call dermatology nurse at [phone].  Advised there is no vm at this number, if no answer to call back at a later time.    Give Dr. Khan' message below and schedule excision.    Hillary HENSON RN  [facility] [place]  [phone]

## 2022-09-20 DIAGNOSIS — N28.1 ACQUIRED CYST OF KIDNEY: Primary | ICD-10-CM

## 2022-09-27 ENCOUNTER — DOCUMENTATION ONLY (OUTPATIENT)
Dept: UROLOGY | Facility: CLINIC | Age: 62
End: 2022-09-27

## 2022-09-29 DIAGNOSIS — K50.10 CROHN'S DISEASE OF LARGE INTESTINE (H): ICD-10-CM

## 2022-09-29 RX ORDER — USTEKINUMAB 90 MG/ML
INJECTION, SOLUTION SUBCUTANEOUS
Qty: 1 ML | Refills: 0 | Status: SHIPPED | OUTPATIENT
Start: 2022-09-29 | End: 2022-11-15

## 2022-09-29 NOTE — TELEPHONE ENCOUNTER
Last Clinic Visit: 11/16/2021  [facility] [place]  Recommended 2 month follow up  NV 11/23/22  Most recent CRP, CBC, ESR, hep panel 7/30/22

## 2022-10-06 ENCOUNTER — ANCILLARY PROCEDURE (OUTPATIENT)
Dept: CT IMAGING | Facility: CLINIC | Age: 62
End: 2022-10-06
Attending: PHYSICIAN ASSISTANT
Payer: COMMERCIAL

## 2022-10-06 LAB
CREAT BLD-MCNC: 10.1 MG/DL (ref 0.7–1.3)
GFR SERPL CREATININE-BSD FRML MDRD: 5 ML/MIN/1.73M2

## 2022-10-06 PROCEDURE — 82565 ASSAY OF CREATININE: CPT | Performed by: PATHOLOGY

## 2022-10-06 PROCEDURE — 74178 CT ABD&PLV WO CNTR FLWD CNTR: CPT | Mod: GC | Performed by: RADIOLOGY

## 2022-10-06 RX ORDER — IOPAMIDOL 755 MG/ML
88 INJECTION, SOLUTION INTRAVASCULAR ONCE
Status: COMPLETED | OUTPATIENT
Start: 2022-10-06 | End: 2022-10-06

## 2022-10-06 RX ADMIN — IOPAMIDOL 88 ML: 755 INJECTION, SOLUTION INTRAVASCULAR at 07:48

## 2022-10-07 ENCOUNTER — PATIENT OUTREACH (OUTPATIENT)
Dept: UROLOGY | Facility: CLINIC | Age: 62
End: 2022-10-07

## 2022-10-07 NOTE — TELEPHONE ENCOUNTER
Writer reached out to pt to inform him of his imaging results.     Writer specifically stated that he needs to follow up with a cardiologist as there is an area on his aorta that needs to be monitored. Pt states he has never seen a cardiologist; the spot on his aorta was noted on imagining in 2020.       Pt has worked with a coordinator at dialysis for other referral processes. Pt states that he will work with her to coordinate an appointment with a cardiologist. Will continue to follow as needed.

## 2022-10-13 ENCOUNTER — MYC MEDICAL ADVICE (OUTPATIENT)
Dept: OTHER | Age: 62
End: 2022-10-13

## 2022-10-13 DIAGNOSIS — N18.6 END STAGE RENAL DISEASE (H): ICD-10-CM

## 2022-10-13 DIAGNOSIS — Z01.818 PRE-TRANSPLANT EVALUATION FOR KIDNEY TRANSPLANT: Primary | ICD-10-CM

## 2022-10-21 NOTE — PROGRESS NOTES
S  Patient complains of gas discomfort and due to lack of BM. Pain due to surgery managed properly by PCA. Does not help with the gas pain though.     O  Pain due to gas 8 on scale from 1-10. Continuous pain. Simethicone helped a little in relieving pain/discomfort.     A  BM and gas exchange still work in progress s/p. Medications given to help relieve pain due to gas build up. Laxatives given to get things moving. Switched from NPO to clear liquids, tolerating well. Promoting some activity to help get bowel moving.    P  Keep monitoring, pain, and administering medications to help with the gas pain and return of bowel function.    Dr. Briggs aware of Hct and Hgb from 10/21/2022 of 21.9 and 7.1

## 2022-10-24 ENCOUNTER — TRANSFERRED RECORDS (OUTPATIENT)
Dept: HEALTH INFORMATION MANAGEMENT | Facility: CLINIC | Age: 62
End: 2022-10-24

## 2022-10-24 LAB
ALT SERPL-CCNC: 17 U/L (ref 10–49)
AST SERPL-CCNC: 14 U/L (ref 0–33)
CREATININE (EXTERNAL): 12.99 MG/DL (ref 0.7–1.3)
POTASSIUM (EXTERNAL): 4 MEQ/L (ref 3.5–5.5)

## 2022-10-25 ENCOUNTER — TEAM CONFERENCE (OUTPATIENT)
Dept: TRANSPLANT | Facility: CLINIC | Age: 62
End: 2022-10-25

## 2022-11-03 NOTE — PROGRESS NOTES
DERMATOLOGIC EXCISION SURGERY PROCEDURE NOTE   Dermatology Problem List:  1. BCC, left proximal forearm, s/p shave biopsy 8/19/22  2. ISK, left nasal sidewall s/p cryotherapy 8/19/22  3. Hx of ESRD pending kidney transplant   4. Hx of immunosuppression  - Stelara 1 mL (90 mg) injection every 8 weeks for Crohn's disease  5. Benign biopsy  - Verrucoid keratosis, Right proximal forearm, s/p shave biopsy 8/19/22  - Verrucoid keratosis, Right distal forearm, s/p shave biopsy 8/19/22  NAME OF PROCEDURE: Excision with intermediate linear closure  Staff surgeon: Dr. Khan  Resident surgeon: Dr. Garrido  Scrub nurse: None    PRE-OPERATIVE DIAGNOSIS:  Basal Cell Carcinoma  POST-OPERATIVE DIAGNOSIS: Same   LOCATION: left proximal forearm  FINAL EXCISION SIZE(DEFECT SIZE): 2.1 cm  MARGIN: 0.3 cm  FINAL REPAIR LENGTH: 4.2 cm   ANESTHESIA: 1% lidocaine with 1:100,000 epinephrine    INDICATIONS: This patient presented with a 1.5cm scar from Basal Cell Carcinoma biopsy. Excision was indicated.   We discussed the principles of treatment and most likely complications including bleeding, infection, scarring, alteration in skin color and sensation, wound dehiscence,muscle weakness in the area, or recurrence of the lesion or disease. We reviewed that on occasion, after healing, a secondary procedure or revision may be recommended in order to obtain the best cosmetic or functional result.     PROCEDURE: The patient was taken to the operative suite. Time-out was performed. The treatment area was anesthetized. The area was washed with Hibiclens, rinsed with saline and draped with sterile towels. The lesion was delineated and excised down to deep subcutaneous fat in a fusiform manner. Hemostasis was obtained by electrocoagulation.     REPAIR:  After the excision of the tumor, the area was extensively and carefully undermined using blunt Metzenbaum scissors. Hemostasis was obtained with spot electrocautery and ligation of vessels where  necessary. The subcutaneous and dermal layers were then closed with 4-0 Vicryl sutures. The epidermis was then carefully approximated along the length of the wound using 4-0 Prolene running sutures.   Estimated blood loss was less than 10 ml for all surgical sites. A sterile pressure dressing was applied and wound care instructions, with a written handout, were given. The [facility] was [facility] alert and ambulatory.    Staff Involved:  Scribe/Staff    Scribe Disclosure:  I, DELIA SCOTT, am serving as a scribe to document services personally performed by Don Khan MD based on data collection and the provider's statements to me.     Staff attestation:  The documentation recorded by the scribe accurately reflects the services I personally performed and the decisions I personally made. I have made edits where needed.    Don Khan MD  Staff Dermatologist and Dermatopathologist  , Department of Dermatology

## 2022-11-03 NOTE — TELEPHONE ENCOUNTER
Image Review Meeting    ATTENDEES: Dr. Marquez    IMAGES REVIEWED: Non-contrast abdominal/pelvic CT completed 10/6/22    DECISION: Patient has targets available for kidney transplant. Cyst is followed by urology and does not require any additional imaging for transplant    INCIDENTALS: No

## 2022-11-04 ENCOUNTER — OFFICE VISIT (OUTPATIENT)
Dept: FAMILY MEDICINE | Facility: CLINIC | Age: 62
End: 2022-11-04
Payer: COMMERCIAL

## 2022-11-04 DIAGNOSIS — C44.619 BASAL CELL CARCINOMA (BCC) OF LEFT FOREARM: ICD-10-CM

## 2022-11-04 PROCEDURE — 11603 EXC TR-EXT MAL+MARG 2.1-3 CM: CPT | Performed by: DERMATOLOGY

## 2022-11-04 PROCEDURE — 12032 INTMD RPR S/A/T/EXT 2.6-7.5: CPT | Performed by: DERMATOLOGY

## 2022-11-04 PROCEDURE — 88305 TISSUE EXAM BY PATHOLOGIST: CPT | Performed by: PATHOLOGY

## 2022-11-04 NOTE — LETTER
11/4/2022         RE: Tacos Dominguez  [address]  [facility] [address]        Dear Colleague,    Thank you for referring your patient, Tacos Dominguez, to the [facility]. Please see a copy of my visit note below.    DERMATOLOGIC EXCISION SURGERY PROCEDURE NOTE   Dermatology Problem List:  1. BCC, left proximal forearm, s/p shave biopsy 8/19/22  2. ISK, left nasal sidewall s/p cryotherapy 8/19/22  3. Hx of ESRD pending kidney transplant   4. Hx of immunosuppression  - Stelara 1 mL (90 mg) injection every 8 weeks for Crohn's disease  5. Benign biopsy  - Verrucoid keratosis, Right proximal forearm, s/p shave biopsy 8/19/22  - Verrucoid keratosis, Right distal forearm, s/p shave biopsy 8/19/22  NAME OF PROCEDURE: Excision with intermediate linear closure  Staff surgeon: Dr. Khan  Resident surgeon: Dr. Garrido  Scrub nurse: None    PRE-OPERATIVE DIAGNOSIS:  Basal Cell Carcinoma  POST-OPERATIVE DIAGNOSIS: Same   LOCATION: left proximal forearm  FINAL EXCISION SIZE(DEFECT SIZE): 2.1 cm  MARGIN: 0.3 cm  FINAL REPAIR LENGTH: 4.2 cm   ANESTHESIA: 1% lidocaine with 1:100,000 epinephrine    INDICATIONS: This patient presented with a 1.5cm scar from Basal Cell Carcinoma biopsy. Excision was indicated.   We discussed the principles of treatment and most likely complications including bleeding, infection, scarring, alteration in skin color and sensation, wound dehiscence,muscle weakness in the area, or recurrence of the lesion or disease. We reviewed that on occasion, after healing, a secondary procedure or revision may be recommended in order to obtain the best cosmetic or functional result.     PROCEDURE: The patient was taken to the operative suite. Time-out was performed. The treatment area was anesthetized. The area was washed with Hibiclens, rinsed with saline and draped with sterile towels. The lesion was delineated and excised down to deep subcutaneous fat in a fusiform manner.  Hemostasis was obtained by electrocoagulation.     REPAIR:  After the excision of the tumor, the area was extensively and carefully undermined using blunt Metzenbaum scissors. Hemostasis was obtained with spot electrocautery and ligation of vessels where necessary. The subcutaneous and dermal layers were then closed with 4-0 Vicryl sutures. The epidermis was then carefully approximated along the length of the wound using 4-0 Prolene running sutures.   Estimated blood loss was less than 10 ml for all surgical sites. A sterile pressure dressing was applied and wound care instructions, with a written handout, were given. The patient was [facility] alert and ambulatory.    Staff Involved:  Scribe/Staff    Scribe Disclosure:  I, DELIA SCOTT, am serving as a scribe to document services personally performed by Don Khan MD based on data collection and the provider's statements to me.     Staff attestation:  The documentation recorded by the scribe accurately reflects the services I personally performed and the decisions I personally made. I have made edits where needed.    Don Khan MD  Staff Dermatologist and Dermatopathologist  , Department of Dermatology             Again, thank you for allowing me to participate in the care of your patient.        Sincerely,        Don Khan MD

## 2022-11-04 NOTE — PATIENT INSTRUCTIONS
Excision/Mohs Wound Care Instructions  I will experience scar, altered skin color, bleeding, swelling, pain, crusting and redness. I may experience altered sensation. Risks are excessive bleeding, infection, muscle weakness, thick (hypertrophic or keloidal) scar, and recurrence. A second procedure may be recommended to obtain the best cosmetic or functional result.  Possible complications of any surgical procedure are bleeding, infection, scarring, alteration in skin color and sensation, muscle weakness in the area, wound dehiscence or seperation, or recurrence of the lesion or disease. On occasion, after healing, a secondary procedure or revision may be recommended in order to obtain the best cosmetic or functional result.   After your surgery, a pressure bandage will be placed over the area that has sutures. This will help prevent bleeding. Please follow these instructions until you come back to clinic for suture removal on 11/17/22 8am in [place], as they will help you to prevent complications as your wound heals.  For the First 48 hours After Surgery:  Leave the pressure bandage on and keep it dry. If it should come loose, you may retape it, but do not take it off.  Relax and take it easy. Do not do any vigorous exercise, heavy lifting, or bending forward. This could cause the wound to bleed.  Post-operative pain is usually mild. You may alternate between 1000 mg of Tylenol (acetaminophen) and 400 mg of Ibuprofen every 4 hours.  Do not take more than 4,000mg of acetaminophen in a 24 hour period or 3200 mg of Ibuprofen in a 24 hr period.  Avoid alcohol and vitamin E as these may increase your tendency to bleed.  You may put an ice pack around the bandaged area for 20 minutes every 2-3 hours. This may help reduce swelling, bruising, and pain. Make sure the ice pack is waterproof so that the pressure bandage does not get wet.   You may see a small amount of drainage or blood on your pressure bandage. This is  normal. However, if drainage or bleeding continues or saturates the bandage, you will need to apply firm pressure over the bandage with a washcloth for 15 minutes. If bleeding continues after applying pressure for 15 minutes then go to the nearest emergency room.  48 Hours After Surgery  Carefully remove the bandage and start daily wound care and dressing changes. You may also now shower and get the wound wet.  Daily Wound Care:  Wash wound with a mild soap and water.  Use caution when washing the wound, be gentle and do not let the forceful shower stream hit the wound directly.  Pat dry.  Apply Vaseline (from a new container or tube) over the suture line with a Q-tip. It is very important to keep the wound continuously moist, as wounds heal best in a moist environment.  Keep the site covered until sutures have either been removed.  You can cover it with a Telfa (non-stick) dressing and tape or a band-aid.    If you are unable to keep wound covered, you must apply Vaseline every 2-3 hours (while awake) to ensure it is being kept moist for optimal healing. A dressing overnight is recommended to keep the area moist.  Call Us If:  You have pain that is not controlled with Tylenol/Ibuprofen  You have signs or symptoms of an infection, such as: fever over 100 degrees F, redness, warmth, or foul-smelling or yellow drainage from the wound.  Who should I call with questions?  [facility]: [phone]   [facility]: [phone]  For urgent needs outside of business hours call the [facility] at [phone] and ask to speak with the dermatology resident on call

## 2022-11-07 ENCOUNTER — TELEPHONE (OUTPATIENT)
Dept: FAMILY MEDICINE | Facility: CLINIC | Age: 62
End: 2022-11-07

## 2022-11-07 ENCOUNTER — MEDICAL CORRESPONDENCE (OUTPATIENT)
Dept: HEALTH INFORMATION MANAGEMENT | Facility: CLINIC | Age: 62
End: 2022-11-07

## 2022-11-07 NOTE — TELEPHONE ENCOUNTER
Subject: Appointment canceled                               Appointment canceled for Tacos Dominguez ([id number])   Visit Type: NURSE ONLY   Date        Time      Length    Provider                  Department   11/17/2022   8:00 AM  15 mins.  Ec N                      [facility]      Reason for Cancellation: Patient Initiated      Patient Comments: The Nurse where I have dialysis will be removing the stiches as it is much closer to my location. If you have questions, please contact me at [phone] which is my mobile number, thank you!       Nurse appt for suture removal canceled.    Hillary HENSON RN  [facility] [place]  [phone]

## 2022-11-08 LAB
PATH REPORT.COMMENTS IMP SPEC: NORMAL
PATH REPORT.COMMENTS IMP SPEC: NORMAL
PATH REPORT.FINAL DX SPEC: NORMAL
PATH REPORT.GROSS SPEC: NORMAL
PATH REPORT.MICROSCOPIC SPEC OTHER STN: NORMAL
PATH REPORT.RELEVANT HX SPEC: NORMAL

## 2022-11-10 ENCOUNTER — TELEPHONE (OUTPATIENT)
Dept: GASTROENTEROLOGY | Facility: CLINIC | Age: 62
End: 2022-11-10

## 2022-11-10 DIAGNOSIS — K50.10 CROHN'S DISEASE OF LARGE INTESTINE (H): ICD-10-CM

## 2022-11-15 RX ORDER — USTEKINUMAB 90 MG/ML
INJECTION, SOLUTION SUBCUTANEOUS
Qty: 1 ML | Refills: 0 | Status: SHIPPED | OUTPATIENT
Start: 2022-11-15 | End: 2022-12-22

## 2022-11-15 NOTE — TELEPHONE ENCOUNTER
[facility] Call Center    Phone Message    May a detailed message be left on voicemail: yes     Reason for Call: Medication Refill Request    Has the patient contacted the pharmacy for the refill? Yes   Name of medication being requested: ustekinumab (STELARA) 90 MG/ML  Provider who prescribed the medication:   Pharmacy: [facility] - [facility] [address]   Date medication is needed: Asap         Action Taken: Message routed to:  [facility] ([facility]): [facility]    Travel Screening: Not Applicable

## 2022-11-15 NOTE — TELEPHONE ENCOUNTER
Patient is overdue for labs. Sent [employer] message with lab reminder. Follow-up appointment with Dr. Rubio scheduled for 11/23.

## 2022-11-16 ENCOUNTER — HOSPITAL ENCOUNTER (OUTPATIENT)
Facility: CLINIC | Age: 62
End: 2022-11-16
Admitting: RADIOLOGY
Payer: COMMERCIAL

## 2022-11-18 ENCOUNTER — LAB (OUTPATIENT)
Dept: LAB | Facility: CLINIC | Age: 62
End: 2022-11-18
Payer: COMMERCIAL

## 2022-11-18 DIAGNOSIS — Z20.822 ENCOUNTER FOR LABORATORY TESTING FOR COVID-19 VIRUS: ICD-10-CM

## 2022-11-18 DIAGNOSIS — K50.10 CROHN'S DISEASE OF LARGE INTESTINE (H): ICD-10-CM

## 2022-11-18 LAB
ALBUMIN SERPL BCG-MCNC: 4.6 G/DL (ref 3.5–5.2)
ALP SERPL-CCNC: 65 U/L (ref 40–129)
ALT SERPL W P-5'-P-CCNC: 23 U/L (ref 10–50)
ANION GAP SERPL CALCULATED.3IONS-SCNC: 16 MMOL/L (ref 7–15)
AST SERPL W P-5'-P-CCNC: 33 U/L (ref 10–50)
BASOPHILS # BLD MANUAL: 0 10E3/UL (ref 0–0.2)
BASOPHILS NFR BLD MANUAL: 1 %
BILIRUB SERPL-MCNC: 0.8 MG/DL
BUN SERPL-MCNC: 12 MG/DL (ref 8–23)
CALCIUM SERPL-MCNC: 10 MG/DL (ref 8.8–10.2)
CHLORIDE SERPL-SCNC: 97 MMOL/L (ref 98–107)
CREAT SERPL-MCNC: 5.34 MG/DL (ref 0.67–1.17)
CRP SERPL-MCNC: 4.42 MG/L
DEPRECATED HCO3 PLAS-SCNC: 29 MMOL/L (ref 22–29)
EOSINOPHIL # BLD MANUAL: 0.1 10E3/UL (ref 0–0.7)
EOSINOPHIL NFR BLD MANUAL: 3 %
ERYTHROCYTE [DISTWIDTH] IN BLOOD BY AUTOMATED COUNT: 13.2 % (ref 10–15)
ERYTHROCYTE [SEDIMENTATION RATE] IN BLOOD BY WESTERGREN METHOD: 44 MM/HR (ref 0–20)
GFR SERPL CREATININE-BSD FRML MDRD: 11 ML/MIN/1.73M2
GLUCOSE SERPL-MCNC: 129 MG/DL (ref 70–99)
HCT VFR BLD AUTO: 32.8 % (ref 40–53)
HGB BLD-MCNC: 11.1 G/DL (ref 13.3–17.7)
LYMPHOCYTES # BLD MANUAL: 0.7 10E3/UL (ref 0.8–5.3)
LYMPHOCYTES NFR BLD MANUAL: 16 %
MCH RBC QN AUTO: 35.7 PG (ref 26.5–33)
MCHC RBC AUTO-ENTMCNC: 33.8 G/DL (ref 31.5–36.5)
MCV RBC AUTO: 106 FL (ref 78–100)
MONOCYTES # BLD MANUAL: 0.2 10E3/UL (ref 0–1.3)
MONOCYTES NFR BLD MANUAL: 4 %
NEUTROPHILS # BLD MANUAL: 3.3 10E3/UL (ref 1.6–8.3)
NEUTROPHILS NFR BLD MANUAL: 76 %
PLAT MORPH BLD: ABNORMAL
PLATELET # BLD AUTO: 88 10E3/UL (ref 150–450)
POTASSIUM SERPL-SCNC: 4.1 MMOL/L (ref 3.4–5.3)
PROT SERPL-MCNC: 7.5 G/DL (ref 6.4–8.3)
RBC # BLD AUTO: 3.11 10E6/UL (ref 4.4–5.9)
RBC MORPH BLD: ABNORMAL
SODIUM SERPL-SCNC: 142 MMOL/L (ref 136–145)
WBC # BLD AUTO: 4.3 10E3/UL (ref 4–11)

## 2022-11-18 PROCEDURE — 80053 COMPREHEN METABOLIC PANEL: CPT

## 2022-11-18 PROCEDURE — U0003 INFECTIOUS AGENT DETECTION BY NUCLEIC ACID (DNA OR RNA); SEVERE ACUTE RESPIRATORY SYNDROME CORONAVIRUS 2 (SARS-COV-2) (CORONAVIRUS DISEASE [COVID-19]), AMPLIFIED PROBE TECHNIQUE, MAKING USE OF HIGH THROUGHPUT TECHNOLOGIES AS DESCRIBED BY CMS-2020-01-R: HCPCS

## 2022-11-18 PROCEDURE — 85027 COMPLETE CBC AUTOMATED: CPT

## 2022-11-18 PROCEDURE — 85007 BL SMEAR W/DIFF WBC COUNT: CPT

## 2022-11-18 PROCEDURE — 85652 RBC SED RATE AUTOMATED: CPT

## 2022-11-18 PROCEDURE — 36415 COLL VENOUS BLD VENIPUNCTURE: CPT

## 2022-11-18 PROCEDURE — 86140 C-REACTIVE PROTEIN: CPT

## 2022-11-18 PROCEDURE — U0005 INFEC AGEN DETEC AMPLI PROBE: HCPCS

## 2022-11-19 LAB — SARS-COV-2 RNA RESP QL NAA+PROBE: POSITIVE

## 2022-11-23 ENCOUNTER — VIRTUAL VISIT (OUTPATIENT)
Dept: GASTROENTEROLOGY | Facility: CLINIC | Age: 62
End: 2022-11-23
Payer: COMMERCIAL

## 2022-11-23 DIAGNOSIS — K50.00 CROHN'S DISEASE OF ILEUM WITHOUT COMPLICATION (H): Primary | ICD-10-CM

## 2022-11-23 PROCEDURE — 99214 OFFICE O/P EST MOD 30 MIN: CPT | Mod: GT | Performed by: INTERNAL MEDICINE

## 2022-11-23 RX ORDER — AMLODIPINE BESYLATE 10 MG/1
10 TABLET ORAL DAILY
COMMUNITY
Start: 2022-11-21 | End: 2024-01-01

## 2022-11-23 NOTE — PATIENT INSTRUCTIONS
PLAN  ---Continue Stelara every 8 weeks  ------------BW every 3 months  ---Referral for colonoscopy for disease activity assessment  ---Re-referral to Jayshree Jordan PharmD   ---Stop smoking   ---Continue to avoid NSAIDs

## 2022-11-23 NOTE — LETTER
11/23/2022         RE: Tacos Dominguez  [address]  [facility] [address]        Dear Colleague,    Thank you for referring your patient, Tacos Dominguez, to the [facility] [place]. Please see a copy of my visit note below.    Scar is a 62 year old who is being evaluated via a billable video visit.      How would you like to obtain your AVS? MyChart  If the video visit is dropped, the invitation should be resent by: Text to cell phone: [phone]  Will anyone else be joining your video visit? No    Hadleysilva Flanaganshila      Video-Visit Details    Video Start Time: 3:19 PM    Type of service:  Video Visit    Video End Time: 3:35 PM    Originating Location (pt. Location): Home        Distant Location (provider location):  Off-site    Platform used for Video Visit: [facility]       CD follow up    PATIENT: Tacos Dominguez    MRN: [id number]    Date of Birth 1960    Tel: There are no phone numbers on file.    PCP: Antonio Madrigal     HPI: Mr. Dominguez is a 61 year old male here to establish care for Crohn's disease.    In 1975, underwent an emergency appendectomy and was dx with CD at that time, underwent R crystal. Had multiple surgeries after then, 76, 77, 78, including LOAs and a cholecystectomy. Reports having 9 feet of bowel left, in total. Did well until 4/2006, at which time he had intermittent flares and underwent resection with a colostomy bag that was reversed in 11/2006.  At that time, he required TPN for weight loss.  Had a peristomal hernia that was not corrected in an effort to avoid further bowel loss. Previously saw Dr. Finnegan, 2013/2014.     Currently, has 4-5 BMs per day, loose. No blood. Occasional urgency based on diet. Weight has been stable around 75-80 kg.     Last cscope was in 10/2020 that showed ulcers in the TI and at the anastomosis.   Does have ESRD in the setting of CLARISA from afib and aortic dissection in April 2021.  Skin cancer (BCC) was  removed on forearm 2 years.     Quit smoking in October 2020. Smoked about 1/2-3/4 ppd x 50 years.     Noteworthy diet history- well balanced    HBI  General well-being 0 = very well  Abdominal pain 0 = none  Number of liquid stools per day 4-5   Abdominal mass cannot assess virtually  Current Complications arthralgias (knees)    Constitutional symptoms:  Fever NO  Weight loss NO    Other GI symptoms present REFLUX SYMPTOMS - acid taste in mouth  Takes omeprazole 20 mg before breakfast      [place] Classification  AGE AT DIAGNOSIS: A1 below 16 y  CURRENT DISEASE LOCATION: jejunum  L4 upper GI: YES possibly, given chronic gastritis seen on EGD in 10/2020  DISEASE BEHAVIOR (since disease onset): B2: stricturing  Perianal disease: NO    Total number of IBD surgeries (except perianal): multiple    Remaining bowel: 9 feet, per patient    Current IBD Medications:  none    Past IBD Medications:   Sulfasalazine in the 1970s  Remicade around early 2000s. Was  On this for at least 2 years. Stopped due to remission.   Azathioprine ~2014    Interval history, 11/16/21 (virtual)  Continues to feel well. Unchanged HBI from above.     Icscope in July showed active disease (below).     Cholestyramine was not very helpful for loose stools.   Smokes up to 2 cigs per day. Has tried Chantix and gum in the past. Will get OTC patches.     Met with Jayshree Jordan, Pharm D on 11/10 with recs for the following     -- COVID booster  -- pneumovax-23 Received 12/2020 per Brayan.  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 Smith on 3/2022. 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    Continues on Stelara; last injection was last Tuesday. Every 8 weeks.     Smoking; down to a 1/4 pack per day. Using patches. Trying to quit.      HBI  General well-being 0 = very well  Abdominal pain 0 =  none  Number of liquid stools per day 4-5   Abdominal mass cannot assess virtually  Current Complications arthralgias resolved      Past Medical History:   Diagnosis Date     Aortic dissection (H)     distal, thin.  stable/chronic on MRCP 3/2022     Benign essential hypertension      Chronic atrial fibrillation (H)      Crohn's colitis (H)      Crohn's disease of large intestine (H) 2021     Esophageal reflux      ESRD (end stage renal disease) on dialysis (H)      History of basal cell carcinoma      Hypertension      Mixed hyperlipidemia         Past Surgical History:   Procedure Laterality Date     ABDOMEN SURGERY      x 6.  colon resections for crohn's disease     COLONOSCOPY N/A 2021    Procedure: COLONOSCOPY, WITH POLYPECTOMY AND BIOPSY;  Surgeon: Eric Preston MD;  [facility]     CV CORONARY ANGIOGRAM N/A 2021    Procedure: CV CORONARY ANGIOGRAM;  Surgeon: Judson Ybarra MD;  Location:  [facility]     ESOPHAGOSCOPY, GASTROSCOPY, DUODENOSCOPY (EGD), COMBINED N/A 2021    Procedure: ESOPHAGOGASTRODUODENOSCOPY, WITH FINE NEEDLE ASPIRATION BIOPSY, WITH ENDOSCOPIC ULTRASOUND GUIDANCE;  Surgeon: Eric Preston MD;  [facility]     LAPAROTOMY, LYSIS ADHESIONS, COMBINED N/A 3/17/2022    Procedure: Laparotomy, extensive lysis adhesions, combined;  Surgeon: Sarath Smith MD;  [facility]     PANCREATECTOMY PARTIAL N/A 3/17/2022    Procedure: Open Subtotal Pancreatectomy, intra-op ultrasound;  Surgeon: Sarath Smith MD;  [facility]     VASCULAR SURGERY      dialysis access- left upper       Social History     Tobacco Use     Smoking status: Every Day     Packs/day: 0.50     Years: 50.00     Pack years: 25.00     Types: Cigarettes     Last attempt to quit: 10/19/2020     Years since quittin.0     Smokeless tobacco: Never   Substance Use Topics     Alcohol use: Yes     Comment: rare       Family History   Problem Relation Age of Onset      Breast Cancer Mother      Coronary Artery Disease Father      Hypertension Brother      Anesthesia Reaction No family hx of      Thrombosis No family hx of        Allergies   Allergen Reactions     Lisinopril Anaphylaxis and Other (See Comments)     Shortness of breath        Outpatient Encounter Medications as of 11/23/2022   Medication Sig Dispense Refill     calcium acetate (CALPHRON) 667 MG TABS tablet 2,668 mg 3 times daily        folic acid (FOLVITE) 1 MG tablet Take 1 mg by mouth every morning        lidocaine-prilocaine (EMLA) 2.5-2.5 % external cream three times a week Monday, Wednesday, Friday       multivitamin RENAL (MULTIVITAMIN RENAL) 1 MG capsule every morning       omeprazole (PRILOSEC) 20 MG DR capsule Take 20 mg by mouth every morning        simvastatin (ZOCOR) 20 MG tablet Take 20 mg by mouth every morning        ustekinumab (STELARA) 90 MG/ML Inject 1 ml ( 90 mg) subcutaneous every 8 weeks. 11/15/22: needs labs completed for more refills. 1 mL 0     amLODIPine (NORVASC) 10 MG tablet        aspirin (ASA) 81 MG chewable tablet Take 81 mg by mouth every morning        magnesium oxide 400 MG CAPS Take 1 tablet by mouth every morning  (Patient not taking: Reported on 8/19/2022)       Melatonin 10 MG TABS tablet Take 10 mg by mouth nightly as needed        Facility-Administered Encounter Medications as of 11/23/2022   Medication Dose Route Frequency Provider Last Rate Last Admin     lidocaine 1% with EPINEPHrine 1:100,000 injection 3 mL  3 mL Intradermal Once Don Khan MD          NSAID  No     Review of Systems  Complete 10 System ROS performed. All are negative except as documented below, in the HPI, or in patient questionnaire from today's visit.    1) Constitutional: No fevers, chills, night sweats or malaise, weight loss or gain  2) Skin: No rash  3) Pulmonary: No wheeze, SOB, cough, sputum or hemoptysis  4) Cardiovascular: No Chest pain or palpitations  5) Genitourinary: No blood in  urine or dysuria  6) Endocrine: No increased sweating, hunger, thirst or thyroid problems  7) Hematologic: No bruising and easy bleeding  8) Musculoskeletal: no new pain in joints or limitation in ROM  9) Neurologic: No dizziness, paresthesias or weakness or falls  10) Psychiatric:  not depressed/anxious, no sleep problems    PHYSICAL EXAM  General appearance  Healthy appearing adult, in no acute distress     Eyes  Sclera anicteric  Pupils round and reactive to light     Ears, nose, mouth and throat  No obvious external lesions of ears and nose  Hearing intact     Neck  Symmetric  No obvious external lesions     Respiratory  Normal respiration, no use of accessory muscles      MSK  Gait normal     Skin  No rashes or jaundice      Psychiatric  Oriented to person, place and time  Appropriate mood and affect.       DATA:  Reviewed in detail past documentation, medications and prior workup available in electronic health records or through outside records.    PERTINENT STUDIES:  Most recent CBC:  WBC   Date Value Ref Range Status   05/25/2021 7.2 4.0 - 11.0 10e9/L Final     WBC Count   Date Value Ref Range Status   11/18/2022 4.3 4.0 - 11.0 10e3/uL Final   ]  Hemoglobin   Date Value Ref Range Status   11/18/2022 11.1 (L) 13.3 - 17.7 g/dL Final   05/25/2021 10.6 (L) 13.3 - 17.7 g/dL Final   ]   Platelet Count   Date Value Ref Range Status   11/18/2022 88 (L) 150 - 450 10e3/uL Final   05/25/2021 224 150 - 450 10e9/L Final       Most recent coag:  INR   Date Value Ref Range Status   03/17/2022 1.30 (H) 0.85 - 1.15 Final   05/25/2021 1.33 (H) 0.86 - 1.14 Final       Most recent hepatic panel:  AST   Date Value Ref Range Status   11/18/2022 33 10 - 50 U/L Final   03/25/2021 16 0 - 45 U/L Final     ALT   Date Value Ref Range Status   11/18/2022 23 10 - 50 U/L Final   03/25/2021 22 0 - 70 U/L Final     Bilirubin Conjugated   Date Value Ref Range Status   11/13/2008 0.0 0.0 - 0.3 mg/dL Final      Bilirubin Total   Date Value Ref  Range Status   11/18/2022 0.8 <=1.2 mg/dL Final   03/25/2021 0.5 0.2 - 1.3 mg/dL Final     Albumin   Date Value Ref Range Status   11/18/2022 4.6 3.5 - 5.2 g/dL Final   07/30/2022 3.7 3.4 - 5.0 g/dL Final   03/25/2021 3.4 3.4 - 5.0 g/dL Final     Alkaline Phosphatase   Date Value Ref Range Status   11/18/2022 65 40 - 129 U/L Final   03/25/2021 91 40 - 150 U/L Final     Most recent creatinine:  Creatinine   Date Value Ref Range Status   11/18/2022 5.34 (H) 0.67 - 1.17 mg/dL Final   05/25/2021 10.60 (H) 0.66 - 1.25 mg/dL Final     Endoscopy:   7/2021:   - Normal perirectal exam.                        - Apparenty end-end ileocolonic anastomosis at                        approximately 70 cm (likely distal transverse colon).                        - 15 mm ulcer in the terminal ileum. Additional                        cobblestoning in this region. Biopsies obtained.                        - Unusual diverticulae in the distal 5-10 cm of the                        rectum without ulceration. Perhaps related to past                        fistulous disease.     PATH:  A. TERMINAL ILEUM, BIOPSY:  Severe ileitis with ulceration and granulation tissue; consistent with severe chronic (Crohn) ileitis; negative for dysplasia; report of CMV immunohistochemistry to follow     B. TERMINAL ILEUM, LABELED BIOPSY OF ULCER:  Chronic active ileitis with granulomas; consistent with mildly active Crohn; no ulceration or dysplasia identified    10/2020:  EGD: chronic gastritis. Bx neg for HP.  Cscope: ulcers in small intestine and at anastomosis. Bx normal.    Imaging:  CT a/p:    IMPRESSION:   1. Marked dilation of the main pancreatic duct up to 2.8 cm in the  pancreatic tail with associated parenchymal atrophy, raising concern  for main duct type IPMN, though sequela of prior pancreatitis could  have a similar appearance. Follow-up GI consultation as directed  below.  2. Atheromatous changes of the infrarenal aorta and iliac  arteries.  Iliac arteries demonstrating a medial and posterior predominance of  calcification, greater on the right.   3. Multiple simple and hemorrhagic/proteinaceous cysts arising from  the atrophic kidneys; no evidence of abnormal enhancement to suggest  neoplasm.  4. Incidentally noted sigmoid pneumatosis without abnormal  enhancement, wall thickening, or associated portal venous gas. This is  likely benign idiopathic pneumatosis in the asymptomatic patient,  though recommend correlation with clinical exam and lactate levels as  bowel ischemia can have similar findings.  5. Slightly increased bowel containing right anterolateral abdominal  wall hernia.    IMPRESSION:    Mr. Dominguez is a 62 year old here with long-standing Crohn's disease s/p multiple bowel resections with minimal remaining bowel. Fortunately, he is not requiring TPN and stable in weight. He started on Stelara about 1 year ago and is feeling well, although he still has 4-5 BMs per day (not sure if this is related to his recent subtotal pancreatectomy in March, however). Also, he does still smoke about 1/4 ppd which is counterproductive to Crohn's healing; he is trying to quit using patches.     # Stelara every 8 weeks  # Active Crohn's disease, dx age 16, s/p multiple bowel resections, on no maintenance therapy    PLAN:  ---Continue Stelara every 8 weeks  ------------BW every 3 months  ---Referral for colonoscopy for disease activity assessment  ---Re-referral to Jayshree Jordan PharmD   ---Stop smoking   ---Continue to avoid NSAIDs    Return in about 6 months (around 5/23/2023).    30 minutes spent on the date of the encounter performing chart review, history and exam, documentation and further activities as noted above.      Again, thank you for allowing me to participate in the care of your patient.      Sincerely,    Betty Rubio MD

## 2022-11-23 NOTE — PROGRESS NOTES
Scar is a 62 year old who is being evaluated via a billable video visit.      How would you like to obtain your AVS? MyChart  If the video visit is dropped, the invitation should be resent by: Text to cell phone: [phone]  Will anyone else be joining your video visit? No    Debbie Miller      Video-Visit Details    Video Start Time: 3:19 PM    Type of service:  Video Visit    Video End Time: 3:35 PM    Originating Location (pt. Location): Home        Distant Location (provider location):  Off-site    Platform used for Video Visit: [facility]       CD follow up    PATIENT: Tacos Dominguez    MRN: [id number]    Date of Birth 1960    Tel: There are no phone numbers on file.    PCP: Antonio Madrigal     HPI: Mr. Dominguez is a 61 year old male here to establish care for Crohn's disease.    In 1975, underwent an emergency appendectomy and was dx with CD at that time, underwent R crystal. Had multiple surgeries after then, 76, 77, 78, including LOAs and a cholecystectomy. Reports having 9 feet of bowel left, in total. Did well until 4/2006, at which time he had intermittent flares and underwent resection with a colostomy bag that was reversed in 11/2006.  At that time, he required TPN for weight loss.  Had a peristomal hernia that was not corrected in an effort to avoid further bowel loss. Previously saw Dr. Finnegan, 2013/2014.     Currently, has 4-5 BMs per day, loose. No blood. Occasional urgency based on diet. Weight has been stable around 75-80 kg.     Last cscope was in 10/2020 that showed ulcers in the TI and at the anastomosis.   Does have ESRD in the setting of CLARISA from afib and aortic dissection in April 2021.  Skin cancer (BCC) was removed on forearm 2 years.     Quit smoking in October 2020. Smoked about 1/2-3/4 ppd x 50 years.     Noteworthy diet history- well balanced    HBI  General well-being 0 = very well  Abdominal pain 0 = none  Number of liquid stools per day 4-5   Abdominal mass cannot assess  virtually  Current Complications arthralgias (knees)    Constitutional symptoms:  Fever NO  Weight loss NO    Other GI symptoms present REFLUX SYMPTOMS - acid taste in mouth  Takes omeprazole 20 mg before breakfast      [place] Classification  AGE AT DIAGNOSIS: A1 below 16 y  CURRENT DISEASE LOCATION: jejunum  L4 upper GI: YES possibly, given chronic gastritis seen on EGD in 10/2020  DISEASE BEHAVIOR (since disease onset): B2: stricturing  Perianal disease: NO    Total number of IBD surgeries (except perianal): multiple    Remaining bowel: 9 feet, per patient    Current IBD Medications:  none    Past IBD Medications:   Sulfasalazine in the 1970s  Remicade around early 2000s. Was  On this for at least 2 years. Stopped due to remission.   Azathioprine ~2014    Interval history, 11/16/21 (virtual)  Continues to feel well. Unchanged HBI from above.     Icscope in July showed active disease (below).     Cholestyramine was not very helpful for loose stools.   Smokes up to 2 cigs per day. Has tried Chantix and gum in the past. Will get OTC patches.     Met with Jayshree Jordan, Pharm D on 11/10 with recs for the following     -- COVID booster  -- pneumovax-23 Received 12/2020 per Brayan.  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 Smith on 3/2022. 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    Continues on Stelara; last injection was last Tuesday. Every 8 weeks.     Smoking; down to a 1/4 pack per day. Using patches. Trying to quit.      HBI  General well-being 0 = very well  Abdominal pain 0 = none  Number of liquid stools per day 4-5   Abdominal mass cannot assess virtually  Current Complications arthralgias resolved      Past Medical History:   Diagnosis Date     Aortic dissection (H)     distal, thin.  stable/chronic on MRCP 3/2022     Benign essential  hypertension      Chronic atrial fibrillation (H)      Crohn's colitis (H)      Crohn's disease of large intestine (H) 2021     Esophageal reflux      ESRD (end stage renal disease) on dialysis (H)      History of basal cell carcinoma      Hypertension      Mixed hyperlipidemia         Past Surgical History:   Procedure Laterality Date     ABDOMEN SURGERY      x 6.  colon resections for crohn's disease     COLONOSCOPY N/A 2021    Procedure: COLONOSCOPY, WITH POLYPECTOMY AND BIOPSY;  Surgeon: Eric Preston MD;  [facility]     CV CORONARY ANGIOGRAM N/A 2021    Procedure: CV CORONARY ANGIOGRAM;  Surgeon: Judson Ybarra MD;  Location:  [facility]     ESOPHAGOSCOPY, GASTROSCOPY, DUODENOSCOPY (EGD), COMBINED N/A 2021    Procedure: ESOPHAGOGASTRODUODENOSCOPY, WITH FINE NEEDLE ASPIRATION BIOPSY, WITH ENDOSCOPIC ULTRASOUND GUIDANCE;  Surgeon: Eric Preston MD;  [facility]     LAPAROTOMY, LYSIS ADHESIONS, COMBINED N/A 3/17/2022    Procedure: Laparotomy, extensive lysis adhesions, combined;  Surgeon: Sarath Smith MD;  [facility]     PANCREATECTOMY PARTIAL N/A 3/17/2022    Procedure: Open Subtotal Pancreatectomy, intra-op ultrasound;  Surgeon: Sarath Smith MD;  [facility]     VASCULAR SURGERY      dialysis access- left upper       Social History     Tobacco Use     Smoking status: Every Day     Packs/day: 0.50     Years: 50.00     Pack years: 25.00     Types: Cigarettes     Last attempt to quit: 10/19/2020     Years since quittin.0     Smokeless tobacco: Never   Substance Use Topics     Alcohol use: Yes     Comment: rare       Family History   Problem Relation Age of Onset     Breast Cancer Mother      Coronary Artery Disease Father      Hypertension Brother      Anesthesia Reaction No family hx of      Thrombosis No family hx of        Allergies   Allergen Reactions     Lisinopril Anaphylaxis and Other (See Comments)     Shortness of  breath        Outpatient Encounter Medications as of 11/23/2022   Medication Sig Dispense Refill     calcium acetate (CALPHRON) 667 MG TABS tablet 2,668 mg 3 times daily        folic acid (FOLVITE) 1 MG tablet Take 1 mg by mouth every morning        lidocaine-prilocaine (EMLA) 2.5-2.5 % external cream three times a week Monday, Wednesday, Friday       multivitamin RENAL (MULTIVITAMIN RENAL) 1 MG capsule every morning       omeprazole (PRILOSEC) 20 MG DR capsule Take 20 mg by mouth every morning        simvastatin (ZOCOR) 20 MG tablet Take 20 mg by mouth every morning        ustekinumab (STELARA) 90 MG/ML Inject 1 ml ( 90 mg) subcutaneous every 8 weeks. 11/15/22: needs labs completed for more refills. 1 mL 0     amLODIPine (NORVASC) 10 MG tablet        aspirin (ASA) 81 MG chewable tablet Take 81 mg by mouth every morning        magnesium oxide 400 MG CAPS Take 1 tablet by mouth every morning  (Patient not taking: Reported on 8/19/2022)       Melatonin 10 MG TABS tablet Take 10 mg by mouth nightly as needed        Facility-Administered Encounter Medications as of 11/23/2022   Medication Dose Route Frequency Provider Last Rate Last Admin     lidocaine 1% with EPINEPHrine 1:100,000 injection 3 mL  3 mL Intradermal Once Don Khan MD          NSAID  No     Review of Systems  Complete 10 System ROS performed. All are negative except as documented below, in the HPI, or in patient questionnaire from today's visit.    1) Constitutional: No fevers, chills, night sweats or malaise, weight loss or gain  2) Skin: No rash  3) Pulmonary: No wheeze, SOB, cough, sputum or hemoptysis  4) Cardiovascular: No Chest pain or palpitations  5) Genitourinary: No blood in urine or dysuria  6) Endocrine: No increased sweating, hunger, thirst or thyroid problems  7) Hematologic: No bruising and easy bleeding  8) Musculoskeletal: no new pain in joints or limitation in ROM  9) Neurologic: No dizziness, paresthesias or weakness or  falls  10) Psychiatric:  not depressed/anxious, no sleep problems    PHYSICAL EXAM  General appearance  Healthy appearing adult, in no acute distress     Eyes  Sclera anicteric  Pupils round and reactive to light     Ears, nose, mouth and throat  No obvious external lesions of ears and nose  Hearing intact     Neck  Symmetric  No obvious external lesions     Respiratory  Normal respiration, no use of accessory muscles      MSK  Gait normal     Skin  No rashes or jaundice      Psychiatric  Oriented to person, place and time  Appropriate mood and affect.       DATA:  Reviewed in detail past documentation, medications and prior workup available in electronic health records or through outside records.    PERTINENT STUDIES:  Most recent CBC:  WBC   Date Value Ref Range Status   05/25/2021 7.2 4.0 - 11.0 10e9/L Final     WBC Count   Date Value Ref Range Status   11/18/2022 4.3 4.0 - 11.0 10e3/uL Final   ]  Hemoglobin   Date Value Ref Range Status   11/18/2022 11.1 (L) 13.3 - 17.7 g/dL Final   05/25/2021 10.6 (L) 13.3 - 17.7 g/dL Final   ]   Platelet Count   Date Value Ref Range Status   11/18/2022 88 (L) 150 - 450 10e3/uL Final   05/25/2021 224 150 - 450 10e9/L Final       Most recent coag:  INR   Date Value Ref Range Status   03/17/2022 1.30 (H) 0.85 - 1.15 Final   05/25/2021 1.33 (H) 0.86 - 1.14 Final       Most recent hepatic panel:  AST   Date Value Ref Range Status   11/18/2022 33 10 - 50 U/L Final   03/25/2021 16 0 - 45 U/L Final     ALT   Date Value Ref Range Status   11/18/2022 23 10 - 50 U/L Final   03/25/2021 22 0 - 70 U/L Final     Bilirubin Conjugated   Date Value Ref Range Status   11/13/2008 0.0 0.0 - 0.3 mg/dL Final      Bilirubin Total   Date Value Ref Range Status   11/18/2022 0.8 <=1.2 mg/dL Final   03/25/2021 0.5 0.2 - 1.3 mg/dL Final     Albumin   Date Value Ref Range Status   11/18/2022 4.6 3.5 - 5.2 g/dL Final   07/30/2022 3.7 3.4 - 5.0 g/dL Final   03/25/2021 3.4 3.4 - 5.0 g/dL Final     Alkaline  Phosphatase   Date Value Ref Range Status   11/18/2022 65 40 - 129 U/L Final   03/25/2021 91 40 - 150 U/L Final       Most recent creatinine:  Creatinine   Date Value Ref Range Status   11/18/2022 5.34 (H) 0.67 - 1.17 mg/dL Final   05/25/2021 10.60 (H) 0.66 - 1.25 mg/dL Final     Endoscopy:   7/2021:   - Normal perirectal exam.                        - Apparenty end-end ileocolonic anastomosis at                        approximately 70 cm (likely distal transverse colon).                        - 15 mm ulcer in the terminal ileum. Additional                        cobblestoning in this region. Biopsies obtained.                        - Unusual diverticulae in the distal 5-10 cm of the                        rectum without ulceration. Perhaps related to past                        fistulous disease.     PATH:  A. TERMINAL ILEUM, BIOPSY:  Severe ileitis with ulceration and granulation tissue; consistent with severe chronic (Crohn) ileitis; negative for dysplasia; report of CMV immunohistochemistry to follow     B. TERMINAL ILEUM, LABELED BIOPSY OF ULCER:  Chronic active ileitis with granulomas; consistent with mildly active Crohn; no ulceration or dysplasia identified    10/2020:  EGD: chronic gastritis. Bx neg for HP.  Cscope: ulcers in small intestine and at anastomosis. Bx normal.    Imaging:  CT a/p:    IMPRESSION:   1. Marked dilation of the main pancreatic duct up to 2.8 cm in the  pancreatic tail with associated parenchymal atrophy, raising concern  for main duct type IPMN, though sequela of prior pancreatitis could  have a similar appearance. Follow-up GI consultation as directed  below.  2. Atheromatous changes of the infrarenal aorta and iliac arteries.  Iliac arteries demonstrating a medial and posterior predominance of  calcification, greater on the right.   3. Multiple simple and hemorrhagic/proteinaceous cysts arising from  the atrophic kidneys; no evidence of abnormal enhancement to  suggest  neoplasm.  4. Incidentally noted sigmoid pneumatosis without abnormal  enhancement, wall thickening, or associated portal venous gas. This is  likely benign idiopathic pneumatosis in the asymptomatic patient,  though recommend correlation with clinical exam and lactate levels as  bowel ischemia can have similar findings.  5. Slightly increased bowel containing right anterolateral abdominal  wall hernia.    IMPRESSION:    Mr. Dominguez is a 62 year old here with long-standing Crohn's disease s/p multiple bowel resections with minimal remaining bowel. Fortunately, he is not requiring TPN and stable in weight. He started on Stelara about 1 year ago and is feeling well, although he still has 4-5 BMs per day (not sure if this is related to his recent subtotal pancreatectomy in March, however). Also, he does still smoke about 1/4 ppd which is counterproductive to Crohn's healing; he is trying to quit using patches.     # Stelara every 8 weeks  # Active Crohn's disease, dx age 16, s/p multiple bowel resections, on no maintenance therapy    PLAN:  ---Continue Stelara every 8 weeks  ------------BW every 3 months  ---Referral for colonoscopy for disease activity assessment  ---Re-referral to Jayshree Jordan PharmD   ---Stop smoking   ---Continue to avoid NSAIDs    Return in about 6 months (around 5/23/2023).    Betty Rubio MD   of Medicine  Division of Gastroenterology, Hepatology and Nutrition  [facility]    30 minutes spent on the date of the encounter performing chart review, history and exam, documentation and further activities as noted above.

## 2022-11-25 ENCOUNTER — HOSPITAL ENCOUNTER (OUTPATIENT)
Facility: CLINIC | Age: 62
End: 2022-11-25
Attending: INTERNAL MEDICINE | Admitting: INTERNAL MEDICINE
Payer: COMMERCIAL

## 2022-11-25 ENCOUNTER — TELEPHONE (OUTPATIENT)
Dept: GASTROENTEROLOGY | Facility: CLINIC | Age: 62
End: 2022-11-25

## 2022-11-25 NOTE — TELEPHONE ENCOUNTER
Screening Questions  BLUE  KIND OF PREP RED  LOCATION [review exclusion criteria] GREEN  SEDATION TYPE        Y Are you active on mychart?       SHMIDT Ordering/Referring Provider?        [facility] What type of coverage do you have?      Y - 11/18/2022 Have you had a positive covid test in the last 14 days?     23.7 1. BMI  [BMI 40+ - review exclusion criteria]    Y  2. Are you able to give consent for your medical care? [IF NO,RN REVIEW]        N  3. Are you taking any prescription pain medications on a routine schedule?        3a. EXTENDED PREP What kind of prescription?     N 4. Do you have any chemical dependencies such as alcohol, street drugs, or methadone?    N 5. Do you have any history of post-traumatic stress syndrome, severe anxiety or history of psychosis?      **If yes 3- 5 , please schedule with MAC sedation.**          IF YES TO ANY 6 - 10 - HOSPITAL SETTING ONLY.     N 6.   Do you need assistance transferring?     N 7.   Have you had a heart or lung transplant?    Y 8.   Are you currently on dialysis?   N 9.   Do you use daily home oxygen?   N 10. Do you take nitroglycerin?   10a.  If yes, how often?     11. [FEMALES]   Are you currently pregnant?    11a.  If yes, how many weeks? [ Greater than 12 weeks, OR NEEDED]    N 12. Do you have Pulmonary Hypertension? *NEED PAC APPT AT [facility]*     N 13. [review exclusion criteria]  Do you have any implantable devices in your body (pacemaker, defib, LVAD)?    N 14. In the past 6 months, have you had any heart related issues including cardiomyopathy or heart attack?     14a.  If yes, did it require cardiac stenting if so when?     N 15. Have you had a stroke or Transient ischemic attack (TIA - aka  mini stroke ) within 6 months?      N 16. Do you have mod to severe Obstructive Sleep Apnea?  [Hospital only - Ok at [place]]    N 17. Do you have SEVERE AND UNCONTROLLED asthma? *NEED PAC APPT AT [facility]*     N 18. Are you currently taking any blood thinners?     18a.  "If yes, inform patient to \"follow up w/ ordering provider for bridging instructions.\"    N 19. Do you take the medication Phentermine?    19a. If yes, \"Hold for 7 days before procedure.  Please consult your prescribing provider if you have questions about holding this medication.\"     Y  20. Do you have chronic kidney disease?      N  21. Do you have a diagnosis of diabetes?     N  22. On a regular basis do you go 3-5 days between bowel movements?      23. Preferred LOCAL Pharmacy for Pre Prescription    [ LIST ONLY ONE PHARMACY]        [facility] - [place], [place] - [facility] [address]        - CLOSING REMINDERS -    Informed patient they will need an adult    Cannot take any type of public or medical transportation alone    Conscious Sedation- Needs  for 6 hours after the procedure       MAC/General-Needs  for 24 hours after procedure    Pre-Procedure Covid test to be completed [[facility] PCR Testing Required]    Confirmed Nurse will call to complete assessment       - SCHEDULING DETAILS -     SIXTO EVANS  Surgeon    12/8/2022  Date of Procedure  Lower Endoscopy [Colonoscopy]  Type of Procedure Scheduled  [facility]   STANDARD [facility]-If you answer yes to questions #8, #20, #21Which Colonoscopy Prep was Sent?     MODERATE Sedation Type     NO PAC / Pre-op Required         Additional comments:       "

## 2022-11-25 NOTE — TELEPHONE ENCOUNTER
1st attempt and lvm to schedule 6mo video visit follow-up with gastro/wheeler due around 05/21/2023

## 2022-11-29 RX ORDER — NALOXONE HYDROCHLORIDE 0.4 MG/ML
0.4 INJECTION, SOLUTION INTRAMUSCULAR; INTRAVENOUS; SUBCUTANEOUS
Status: CANCELLED | OUTPATIENT
Start: 2022-11-29

## 2022-11-29 RX ORDER — FLUMAZENIL 0.1 MG/ML
0.2 INJECTION, SOLUTION INTRAVENOUS
Status: CANCELLED | OUTPATIENT
Start: 2022-11-29

## 2022-11-29 RX ORDER — NALOXONE HYDROCHLORIDE 0.4 MG/ML
0.2 INJECTION, SOLUTION INTRAMUSCULAR; INTRAVENOUS; SUBCUTANEOUS
Status: CANCELLED | OUTPATIENT
Start: 2022-11-29

## 2022-11-29 RX ORDER — FENTANYL CITRATE 50 UG/ML
25-50 INJECTION, SOLUTION INTRAMUSCULAR; INTRAVENOUS EVERY 5 MIN PRN
Status: CANCELLED | OUTPATIENT
Start: 2022-11-29

## 2022-11-29 RX ORDER — HEPARIN SODIUM 200 [USP'U]/100ML
1 INJECTION, SOLUTION INTRAVENOUS CONTINUOUS PRN
Status: CANCELLED | OUTPATIENT
Start: 2022-11-29

## 2022-11-29 RX ORDER — LIDOCAINE 40 MG/G
CREAM TOPICAL
Status: CANCELLED | OUTPATIENT
Start: 2022-11-29

## 2022-11-30 ENCOUNTER — VIRTUAL VISIT (OUTPATIENT)
Dept: PHARMACY | Facility: CLINIC | Age: 62
End: 2022-11-30
Attending: INTERNAL MEDICINE
Payer: COMMERCIAL

## 2022-11-30 DIAGNOSIS — K50.018 CROHN'S DISEASE OF SMALL INTESTINE WITH OTHER COMPLICATION (H): Primary | ICD-10-CM

## 2022-11-30 PROCEDURE — 99207 PR NO CHARGE LOS: CPT | Performed by: PHARMACIST

## 2022-11-30 NOTE — PATIENT INSTRUCTIONS
Plan:  1. Scar to consider the following vaccines:   -- tetanus booster    -- influenza vaccine (declined)   -- COVID-19 booster (delcined)     Please reach out if you have any medication questions or concerns going forward. It was nice meeting with you today!     Jayshree Jordan PharmD, BCACP  MTM Pharmacist   [facility]   Phone: [phone]

## 2022-11-30 NOTE — PROGRESS NOTES
Clinical Pharmacy Consult:                                                    Tacos Dominguez is a 62 year old male called for a clinical pharmacist consult.  He was referred to me from Dr. Rubio.    Scar is not seen for CMR today due to coverage.     Reason for Consult: IBD health maintenance review on Stelara    Discussion:     Notes he tested positive for COVID earlier this month. Notes very mild symptoms - was getting pre-screen for a procedure and this did not blossom. He is still getting Dialysis from [facility].    IBD Health Care Maintenance:    Vaccinations:  All patients on biologics should avoid live vaccines.    -- Influenza (every year) declined this year  -- TdaP (every 10 years) last 7/2012, due 7/2022  -- Pneumococcal Pneumonia               Prevnar-13: 1/11/2022, 6/16/2021 per [facility], Prevnar-7 noted 10/31/2000,               Pneumovax-23: 11/10/1994, [facility] records note unspecific pneumonia vaccine 12/2020,   -- COVID-19 1/27/21, 2/24/21, 11/29/2021, declined this year  -- Yearly assessment for latent Tb (verbal screening and exam, PPD or QuantiFERON-Tb testing)       negative in March 2022 per patient      One time confirmation of immunity or serologies:  -- Hepatitis A (serologies or immunizations) potentially during service  -- Hepatitis B (serologies or immunizations) 6/9/2014 and 7/8/2014, [facility] scanned records indicate non-immune 2020, non-immune 3/25/21 -- re-vaccinated 12/7/21, 2/16/2022, 6/15/2022  -- Varicella/Zoster 1/11/2022 and 3/14/2022 (Shingrix)  -- Meningococcal meningitis (all patients at risk for meningitis)-- Menveo 12/7/2021, Menactra 2/16/2022, Bexsero 12/7/2021, 1/11/2022    Due to the immunosuppression in this patient, I would not advise administration of live vaccines such as varicella/VZV, intranasal influenza, MMR, or yellow fever vaccine (if traveling).      Bone mineral density screening   -- Recommend all patients supplement with calcium and vitamin  D   -- Notes he is followed closely by Brayan re: blood levels    Cancer Screening:  Skin cancer screening: Annual visual exam of skin by dermatologist since patient is immunocompromised   -- Had this completed last month - - did have biopsies with some removal, notes follow-up scheduled in February    Depression Screening:  -- Over the last month, have you felt down, depressed, or hopeless? No  -- Over the last month, have you felt little interest or pleasure doing things? No    Misc:  -- Avoid tobacco use   -- Notes he had stopped previously, and now smoking 5 cigarettes (trying patch)    -- Avoid NSAIDs as there is potentially a 25% chance of causing an IBD flare    Plan:  1. Scar to consider the following vaccines:   -- tetanus booster    -- influenza vaccine (declined)   -- COVID-19 booster (delcined)     Jayshree Jordan PharmD, BCACP  MTM Pharmacist   [facility]   Phone: [phone]

## 2022-12-01 ENCOUNTER — TELEPHONE (OUTPATIENT)
Dept: GASTROENTEROLOGY | Facility: CLINIC | Age: 62
End: 2022-12-01

## 2022-12-01 ENCOUNTER — APPOINTMENT (OUTPATIENT)
Dept: INTERVENTIONAL RADIOLOGY/VASCULAR | Facility: CLINIC | Age: 62
End: 2022-12-01
Attending: INTERNAL MEDICINE
Payer: COMMERCIAL

## 2022-12-01 ENCOUNTER — HOSPITAL ENCOUNTER (OUTPATIENT)
Facility: CLINIC | Age: 62
Discharge: HOME | End: 2022-12-01
Attending: RADIOLOGY | Admitting: RADIOLOGY
Payer: COMMERCIAL

## 2022-12-01 VITALS
SYSTOLIC BLOOD PRESSURE: 170 MMHG | DIASTOLIC BLOOD PRESSURE: 95 MMHG | TEMPERATURE: 97.6 F | RESPIRATION RATE: 16 BRPM | HEART RATE: 84 BPM | OXYGEN SATURATION: 99 %

## 2022-12-01 DIAGNOSIS — N18.6 ESRD (END STAGE RENAL DISEASE) (H): ICD-10-CM

## 2022-12-01 DIAGNOSIS — K50.818 CROHN'S DISEASE OF BOTH SMALL AND LARGE INTESTINE WITH OTHER COMPLICATION (H): Primary | ICD-10-CM

## 2022-12-01 PROCEDURE — 99153 MOD SED SAME PHYS/QHP EA: CPT

## 2022-12-01 PROCEDURE — 272N000196 HC ACCESSORY CR5

## 2022-12-01 PROCEDURE — 36902 INTRO CATH DIALYSIS CIRCUIT: CPT

## 2022-12-01 PROCEDURE — 99152 MOD SED SAME PHYS/QHP 5/>YRS: CPT

## 2022-12-01 PROCEDURE — 250N000011 HC RX IP 250 OP 636: Performed by: PHYSICIAN ASSISTANT

## 2022-12-01 PROCEDURE — C1757 CATH, THROMBECTOMY/EMBOLECT: HCPCS

## 2022-12-01 PROCEDURE — 272N000116 HC CATH CR1

## 2022-12-01 PROCEDURE — C1769 GUIDE WIRE: HCPCS

## 2022-12-01 PROCEDURE — 36011 PLACE CATHETER IN VEIN: CPT | Mod: XU

## 2022-12-01 PROCEDURE — 272N000504 HC NEEDLE CR4

## 2022-12-01 PROCEDURE — 272N000559 HC SHEATH CR1

## 2022-12-01 PROCEDURE — C1725 CATH, TRANSLUMIN NON-LASER: HCPCS

## 2022-12-01 PROCEDURE — 250N000011 HC RX IP 250 OP 636

## 2022-12-01 PROCEDURE — 250N000011 HC RX IP 250 OP 636: Performed by: RADIOLOGY

## 2022-12-01 PROCEDURE — 250N000009 HC RX 250

## 2022-12-01 RX ORDER — LIDOCAINE HYDROCHLORIDE 10 MG/ML
INJECTION, SOLUTION EPIDURAL; INFILTRATION; INTRACAUDAL; PERINEURAL
Status: COMPLETED
Start: 2022-12-01 | End: 2022-12-01

## 2022-12-01 RX ORDER — NALOXONE HYDROCHLORIDE 1 MG/ML
0.4 INJECTION INTRAMUSCULAR; INTRAVENOUS; SUBCUTANEOUS
Status: DISCONTINUED | OUTPATIENT
Start: 2022-12-01 | End: 2022-12-02 | Stop reason: HOSPADM

## 2022-12-01 RX ORDER — BISACODYL 5 MG/1
TABLET, DELAYED RELEASE ORAL
Qty: 4 TABLET | Refills: 0 | Status: SHIPPED | OUTPATIENT
Start: 2022-12-01 | End: 2023-02-02

## 2022-12-01 RX ORDER — FENTANYL CITRATE 50 UG/ML
25-50 INJECTION, SOLUTION INTRAMUSCULAR; INTRAVENOUS EVERY 5 MIN PRN
Status: DISCONTINUED | OUTPATIENT
Start: 2022-12-01 | End: 2022-12-02 | Stop reason: HOSPADM

## 2022-12-01 RX ORDER — HEPARIN SODIUM 1000 [USP'U]/ML
500-6000 INJECTION, SOLUTION INTRAVENOUS; SUBCUTANEOUS
Status: COMPLETED | OUTPATIENT
Start: 2022-12-01 | End: 2022-12-01

## 2022-12-01 RX ORDER — NALOXONE HYDROCHLORIDE 1 MG/ML
0.2 INJECTION INTRAMUSCULAR; INTRAVENOUS; SUBCUTANEOUS
Status: DISCONTINUED | OUTPATIENT
Start: 2022-12-01 | End: 2022-12-02 | Stop reason: HOSPADM

## 2022-12-01 RX ORDER — HEPARIN SODIUM 1000 [USP'U]/ML
INJECTION, SOLUTION INTRAVENOUS; SUBCUTANEOUS
Status: COMPLETED
Start: 2022-12-01 | End: 2022-12-01

## 2022-12-01 RX ORDER — HEPARIN SODIUM 200 [USP'U]/100ML
1 INJECTION, SOLUTION INTRAVENOUS CONTINUOUS PRN
Status: DISCONTINUED | OUTPATIENT
Start: 2022-12-01 | End: 2022-12-02 | Stop reason: HOSPADM

## 2022-12-01 RX ORDER — LIDOCAINE 40 MG/G
CREAM TOPICAL
Status: DISCONTINUED | OUTPATIENT
Start: 2022-12-01 | End: 2022-12-02 | Stop reason: HOSPADM

## 2022-12-01 RX ORDER — FLUMAZENIL 0.1 MG/ML
0.2 INJECTION, SOLUTION INTRAVENOUS
Status: DISCONTINUED | OUTPATIENT
Start: 2022-12-01 | End: 2022-12-02 | Stop reason: HOSPADM

## 2022-12-01 RX ORDER — FENTANYL CITRATE 50 UG/ML
INJECTION, SOLUTION INTRAMUSCULAR; INTRAVENOUS
Status: COMPLETED
Start: 2022-12-01 | End: 2022-12-01

## 2022-12-01 RX ADMIN — MIDAZOLAM HYDROCHLORIDE 1 MG: 1 INJECTION, SOLUTION INTRAMUSCULAR; INTRAVENOUS at 09:42

## 2022-12-01 RX ADMIN — HEPARIN SODIUM 1000 UNITS: 1000 INJECTION, SOLUTION INTRAVENOUS; SUBCUTANEOUS at 09:42

## 2022-12-01 RX ADMIN — FENTANYL CITRATE 25 MCG: 50 INJECTION, SOLUTION INTRAMUSCULAR; INTRAVENOUS at 09:36

## 2022-12-01 RX ADMIN — FENTANYL CITRATE 25 MCG: 50 INJECTION, SOLUTION INTRAMUSCULAR; INTRAVENOUS at 09:19

## 2022-12-01 RX ADMIN — MIDAZOLAM HYDROCHLORIDE 1 MG: 1 INJECTION, SOLUTION INTRAMUSCULAR; INTRAVENOUS at 09:32

## 2022-12-01 RX ADMIN — MIDAZOLAM 1 MG: 1 INJECTION INTRAMUSCULAR; INTRAVENOUS at 09:42

## 2022-12-01 RX ADMIN — LIDOCAINE HYDROCHLORIDE 10 ML: 10 INJECTION, SOLUTION EPIDURAL; INFILTRATION; INTRACAUDAL; PERINEURAL at 10:15

## 2022-12-01 RX ADMIN — SODIUM CHLORIDE, PRESERVATIVE FREE 1000 UNITS: 5 INJECTION INTRAVENOUS at 10:08

## 2022-12-01 RX ADMIN — MIDAZOLAM HYDROCHLORIDE 1 MG: 1 INJECTION, SOLUTION INTRAMUSCULAR; INTRAVENOUS at 09:10

## 2022-12-01 RX ADMIN — MIDAZOLAM HYDROCHLORIDE 1 MG: 1 INJECTION, SOLUTION INTRAMUSCULAR; INTRAVENOUS at 09:57

## 2022-12-01 RX ADMIN — FENTANYL CITRATE 50 MCG: 50 INJECTION, SOLUTION INTRAMUSCULAR; INTRAVENOUS at 09:12

## 2022-12-01 RX ADMIN — MIDAZOLAM HYDROCHLORIDE 0.5 MG: 1 INJECTION, SOLUTION INTRAMUSCULAR; INTRAVENOUS at 09:20

## 2022-12-01 RX ADMIN — FENTANYL CITRATE 50 MCG: 50 INJECTION, SOLUTION INTRAMUSCULAR; INTRAVENOUS at 09:10

## 2022-12-01 RX ADMIN — MIDAZOLAM HYDROCHLORIDE 0.5 MG: 1 INJECTION, SOLUTION INTRAMUSCULAR; INTRAVENOUS at 09:30

## 2022-12-01 RX ADMIN — MIDAZOLAM HYDROCHLORIDE 1 MG: 1 INJECTION, SOLUTION INTRAMUSCULAR; INTRAVENOUS at 09:12

## 2022-12-01 RX ADMIN — FENTANYL CITRATE 25 MCG: 50 INJECTION, SOLUTION INTRAMUSCULAR; INTRAVENOUS at 09:43

## 2022-12-01 RX ADMIN — FENTANYL CITRATE 25 MCG: 50 INJECTION, SOLUTION INTRAMUSCULAR; INTRAVENOUS at 09:30

## 2022-12-01 ASSESSMENT — ACTIVITIES OF DAILY LIVING (ADL)
ADLS_ACUITY_SCORE: 35

## 2022-12-01 NOTE — TELEPHONE ENCOUNTER
Patient scheduled for colonoscopy  on 12/8/22 .     +Covid 11/18/22    Pre op exam scheduled: N/A (see below)     Arrival time: 0800    Facility location: [facility]; [address] [place] [address], [place] [address]    Sedation type: Conscious sedation - staff message sent to endoscopy scheduling to clarify as order states MAC and with Dr. Rubio. Patient is scheduled with Dr. Drew and under CS. (writer unsure if Dr Rubio has cases scheduled at [facility])    Anticoagulations? No    Electronic implanted devices? No    Diabetic? No    Indication for procedure: crohn's     Bowel prep recommendation: Standard Golytely d/t ESRD. Patient is on dialysis    Prep instructions sent via [employer]* Bowel prep script sent to    [place], [place] - [address]    Pre visit planning completed.    Bri Cordero RN

## 2022-12-01 NOTE — IP AVS SNAPSHOT
[facility]  [address]  [facility] [address]  Phone: [phone]                                    After Visit Summary   12/1/2022    Tacos Dominguez   MRN: [id number]           After Visit Summary Signature Page    I have received my discharge instructions, and my questions have been answered. I have discussed any challenges I see with this plan with the nurse or doctor.    ..........................................................................................................................................  Patient/Patient Representative Signature      ..........................................................................................................................................  Patient Representative Print Name and Relationship to Patient    ..................................................               ................................................  Date                                   Time    ..........................................................................................................................................  Reviewed by Signature/Title    ...................................................              ..............................................  Date                                               Time          22EPIC Rev 08/18

## 2022-12-01 NOTE — IP AVS SNAPSHOT
After Visit Summary Template Not Found    This Print Group is only intended to be used in the After Visit Summary and can only be used in a report that uses a released After Visit Summary Template.                       MRN:[id number]                      After Visit Summary   12/1/2022    Tacos Dominguez   MRN: [id number]           Visit Information        Department      12/1/2022  8:00 AM [facility]          Review of your medicines      UNREVIEWED medicines. Ask your doctor about these medicines       Dose / Directions   amLODIPine 10 MG tablet  Commonly known as: NORVASC      Refills: 0     calcium acetate 667 MG Tabs tablet  Commonly known as: CALPHRON      Dose: 2,668 mg  2,668 mg 3 times daily  Refills: 0     folic acid 1 MG tablet  Commonly known as: FOLVITE      Dose: 1 mg  Take 1 mg by mouth every morning  Refills: 0     lidocaine-prilocaine 2.5-2.5 % external cream  Commonly known as: EMLA      three times a week Monday, Wednesday, Friday  Refills: 0     multivitamin RENAL 1 MG capsule  Generic drug: multivitamin RENAL      every morning  Refills: 0     omeprazole 20 MG DR capsule  Commonly known as: priLOSEC      Dose: 20 mg  Take 20 mg by mouth every morning  Refills: 0     simvastatin 20 MG tablet  Commonly known as: ZOCOR      Dose: 20 mg  Take 20 mg by mouth every morning  Refills: 0     Stelara 90 MG/ML  Used for: Crohn's disease of large intestine (H)  Generic drug: ustekinumab      Inject 1 ml ( 90 mg) subcutaneous every 8 weeks. 11/15/22: needs labs completed for more refills.  Quantity: 1 mL  Refills: 0              Protect others around you: Learn how to safely use, store and throw away your medicines at www.disposemymeds.org.       Follow-ups after your visit       Your next 10 appointments already scheduled    Dec 08, 2022  Procedure with Eric Moncada MD  [facility] ([facility], [facility] ) [address] [place] [address]  ST  [facility] [place] [address]  [phone]   [facility] is located on the corner of [facility] [facility] and [facility] on the [facility]. It is easily accessible from virtually any point in the [facility] area, via [address] and [address].   Feb 17, 2023 10:00 AM  (Arrive by 9:45 AM)  Return Visit with Don Khan MD  [facility] ([facility] ) [address] [facility] [address] [place] [facility] [address]  [phone]      Mar 02, 2023  9:00 AM  (Arrive by 8:45 AM)  Return Patient with RENETTA Coombs  [facility] [place] ([facility] ) [address] [place] [phone]   Please Note: This is a virtual visit; there is no need to come to the facility.       May 24, 2023  2:20 PM  (Arrive by 2:05 PM)  RETURN INFLAMMATORY BOWEL DISEASE with Betty Rubio MD  [facility] [place] ([facility] ) [address] [place] [id number] [phone]   Please arrive 15 minutes prior to your scheduled appointment time to fill out any necessary paperwork.  If you have [employer], you can complete questionnaires and register insurance information via e-checkin.  Copays cannot be collected via LoveSpace at this time.  After completing e-checkin, you will still need to stop at the  prior to the appointment but the process will be faster.  Other important items needed for your visit are:    -Insurance card, ID and copay  -Any paperwork (FMLA, WC, etc.)  -List of all medications you are taking (include prescriptions, over-the-counter, and herbal)        Care Instructions       Further instructions from your care team       After a Fistulagram -  If you go home with sheaths in place, keep the arm  straight. Don t bend or use your arm until after dialysis.   For the first 24 hours, keep the arm raised on pillows as much as you can. This reduces swelling.   If you have bleeding or swelling at the puncture site, use your fingertip to put gentle pressure on the area. Do this until the bleeding stops or the swelling goes down.   If you lose the pulse in your fistula, or you no longer fill a thrill (buzzing feeling), call your doctor or dialysis clinic  Invasive Radiology Procedures Discharge Instructions    ___Paracentesis   ___Biliary Tube ___Stent/Tube Exchange   ___Nephrostomy Tube  ___Thoracentesis _X__Fistulagram ___Chest Tube Placement ___Biopsy of      The doctor who did your procedure today was Dr. Coombs     Diet and medicines    Go back to your normal diet.  You may start taking your normal medicines again (including Coumadin, or warfarin),         as shown on your medicine sheet.   If you take aspirin, Plavix or other anti-platelet drugs: Start taking it tomorrow.   If take Coumadin (warfarin): Ask your doctor when to have your INR checked      For minor pain, you may take Tylenol (acetaminophen) or Advil (ibuprofen).    Activity and puncture site  You may go back to normal activity in 24 hours. Wait 48 hours before lifting,straining, exercise or other strenuous activity.  For the next day or two, check your puncture site often while you are awake.  Change the Band-Aid or bandage tomorrow.  You may shower tomorrow morning. No bathing or swimming until yourpuncture site has fully healed.     If you received IV medicine to sedate you: __X_versed__X_fentanyl  You may feel drowsy, forgetful or unsteady. For the next 24 hours, do not drive,  drink alcohol or make any important decisions.      Special instructions:Monitor your left arm site for any signs of increased redness, warm or bleeding    After a fistulagram  ??If you go home with sheaths in place, keep the arm straight. Don t bend or use your  arm  until after dialysis.  ??For the first 24 hours, keep the arm raised on pillows as much as you can. This reduces swelling.  ??If you have bleeding or swelling at the puncture site, use your fingertip to put gentle pressure  on the area. Do this until the bleeding stops or the swelling goes down.  ??If you lose the pulse in your fistula, or you no longer fill a thrill (buzzing feeling), call your  doctor or dialysis clinic.  ??Know when to call for help  Call your doctor if you have:  ??A fever greater over 101 F (38.3 C), taken under the tongue.  ??A lot of bleeding or swelling at your puncture site.  ??Pain that is getting worse.  ??Shortness of breath.  Call 911 or go the emergency room if you have:  ??Severe chest pain or trouble breathing.  ??A tube that falls out.  ??Increased blood in your sputum (phlegm).  ??Bleeding that you cannot control.  Important phone numbers:  ??[facility] ..................................................................... [phone]  ??[facility] ........................................................... [phone]  ??[facility] ................................................................ [phone]  ??[facility].............. [phone]  Person receiving instructions Instructor Date/Time    Additional Information About Your Visit       Get[employer]hart Information    [employer] gives you secure access to your electronic health record. If you see a primary care provider, you can also send messages to your care team and make appointments. If you have questions, please call your primary care clinic.  If you do not have a primary care provider, please call [phone] and they will assist you.       Care EveryWhere ID    This is your Care EveryWhere ID. This could be used by other organizations to access your [facility] medical records  [id number]       Your Vitals Were  Most  recent update: 12/1/2022  8:07 AM    Blood Pressure   179/96 (BP Location: Right arm)    Pulse   109    Temperature   98  F (36.7  C) (Temporal)    Respirations   18    Pulse Oximetry   98%          Primary Care Provider  Good Pham MD Fax #  [phone]      Equal Access to Services    GERMAN GORDON : Hadii aad ku hadasho Soomaali, waaxda luqadaha, qaybta kaalmada adeegyada, waxay idiin hayaan adecurtis khbrendash la'aan ah. So [facility] [phone].    ATENCIÓN: Si habla español, tiene a dallas disposición servicios gratuitos de asistencia lingüística. Llame al [phone].    We comply with applicable federal and state civil rights laws, including the [place] Human Rights Act. We do not discriminate on the basis of race, color, creed, Jew, national origin, marital status, age, disability, sex, sexual orientation, or gender identity.    If you would like an itemization of your charges they will now be available in Ondax 30 days after discharge. To access the itemized statements in Ondax go to billing/billing summary. From there select view account. There will be multiple tabs showing an overview of your account, detail, payments, and communications. From the communications tab you can see your monthly statements, your itemized statements, and any billing letters generated for your account. If you do not have a Ondax account and need help getting access please contact Ondax support at [phone].  If you would prefer to have your itemized statements mailed please contact our automated itemized bill request line at [phone] option  2.       Thank you!    Thank you for choosing [facility] for your care. Our goal is always to provide you with excellent care. Hearing back from our patients is one way we can continue to improve our services. Please take a few minutes to complete the written survey that you may receive in the mail after you visit. If you would like to speak to someone directly  about your visit please contact Patient Relations at [phone]. Thank you!              Medication List      ASK your doctor about these medications          Morning Afternoon Evening Bedtime As Needed    amLODIPine 10 MG tablet  Also known as: NORVASC                     calcium acetate 667 MG Tabs tablet  Also known as: CALPHRON  INSTRUCTIONS: 2,668 mg 3 times daily                     folic acid 1 MG tablet  Also known as: FOLVITE  INSTRUCTIONS: Take 1 mg by mouth every morning                     lidocaine-prilocaine 2.5-2.5 % external cream  Also known as: EMLA  INSTRUCTIONS: three times a week Monday, Wednesday, Friday                     multivitamin RENAL 1 MG capsule  INSTRUCTIONS: every morning  Generic drug: multivitamin RENAL                     omeprazole 20 MG DR capsule  Also known as: priLOSEC  INSTRUCTIONS: Take 20 mg by mouth every morning                     simvastatin 20 MG tablet  Also known as: ZOCOR  INSTRUCTIONS: Take 20 mg by mouth every morning                     Stelara 90 MG/ML  INSTRUCTIONS: Inject 1 ml ( 90 mg) subcutaneous every 8 weeks. 11/15/22: needs labs completed for more refills.  Generic drug: ustekinumab

## 2022-12-01 NOTE — DISCHARGE INSTRUCTIONS
After a Fistulagram -  If you go home with sheaths in place, keep the arm straight. Don t bend or use your arm until after dialysis.   For the first 24 hours, keep the arm raised on pillows as much as you can. This reduces swelling.   If you have bleeding or swelling at the puncture site, use your fingertip to put gentle pressure on the area. Do this until the bleeding stops or the swelling goes down.   If you lose the pulse in your fistula, or you no longer fill a thrill (buzzing feeling), call your doctor or dialysis clinic  Invasive Radiology Procedures Discharge Instructions    ___Paracentesis   ___Biliary Tube ___Stent/Tube Exchange   ___Nephrostomy Tube  ___Thoracentesis _X__Fistulagram ___Chest Tube Placement ___Biopsy of      The doctor who did your procedure today was Dr. Coombs     Diet and medicines    Go back to your normal diet.  You may start taking your normal medicines again (including Coumadin, or warfarin),         as shown on your medicine sheet.   If you take aspirin, Plavix or other anti-platelet drugs: Start taking it tomorrow.   If take Coumadin (warfarin): Ask your doctor when to have your INR checked      For minor pain, you may take Tylenol (acetaminophen) or Advil (ibuprofen).    Activity and puncture site  You may go back to normal activity in 24 hours. Wait 48 hours before lifting,straining, exercise or other strenuous activity.  For the next day or two, check your puncture site often while you are awake.  Change the Band-Aid or bandage tomorrow.  You may shower tomorrow morning. No bathing or swimming until yourpuncture site has fully healed.     If you received IV medicine to sedate you: __X_versed__X_fentanyl  You may feel drowsy, forgetful or unsteady. For the next 24 hours, do not drive,  drink alcohol or make any important decisions.      Special instructions:Monitor your left arm site for any signs of increased redness, warm or bleeding    After a fistulagram  ??If you go home  with sheaths in place, keep the arm straight. Don t bend or use your arm  until after dialysis.  ??For the first 24 hours, keep the arm raised on pillows as much as you can. This reduces swelling.  ??If you have bleeding or swelling at the puncture site, use your fingertip to put gentle pressure  on the area. Do this until the bleeding stops or the swelling goes down.  ??If you lose the pulse in your fistula, or you no longer fill a thrill (buzzing feeling), call your  doctor or dialysis clinic.  ??Know when to call for help  Call your doctor if you have:  ??A fever greater over 101 F (38.3 C), taken under the tongue.  ??A lot of bleeding or swelling at your puncture site.  ??Pain that is getting worse.  ??Shortness of breath.  Call 911 or go the emergency room if you have:  ??Severe chest pain or trouble breathing.  ??A tube that falls out.  ??Increased blood in your sputum (phlegm).  ??Bleeding that you cannot control.  Important phone numbers:  ??[facility] ..................................................................... [phone]  ??[facility] ........................................................... [phone]  ??[facility] ................................................................ [phone]  ??[facility].............. [phone]  Person receiving instructions Instructor Date/Time

## 2022-12-02 ENCOUNTER — TELEPHONE (OUTPATIENT)
Dept: INTERNAL MEDICINE | Facility: CLINIC | Age: 62
End: 2022-12-02

## 2022-12-02 ENCOUNTER — TELEPHONE (OUTPATIENT)
Dept: GASTROENTEROLOGY | Facility: CLINIC | Age: 62
End: 2022-12-02

## 2022-12-02 ASSESSMENT — ACTIVITIES OF DAILY LIVING (ADL)
ADLS_ACUITY_SCORE: 35

## 2022-12-02 NOTE — TELEPHONE ENCOUNTER
Caller: Tacos Dominguez      Procedure: COLONOSCOPY    Date, Location, and Surgeon of Procedure Cancelled: 12/8, [facility],  [facility]    Ordering Provider:ALBINA    Reason for cancel (please be detailed, any staff messages or encounters to note?): Patient needs [facility] hospital and to be scheduled with [facility]. Per Ainsley patient should be scheduled with Erick or [place] at . Held spot on 12/7 at  with [facility]. Lvm for patient to call back for rescheduling.        Rescheduled: No

## 2022-12-02 NOTE — TELEPHONE ENCOUNTER
If rescheduled:    Date: 12/7/2022   Location:    Prep Resent: NO(changes to prep?)   Covid Test Rescheduled:    Note any change or update to original order/sedation: MAC

## 2022-12-02 NOTE — TELEPHONE ENCOUNTER
Call received from patient stating he has a full sedation colonoscopy scheduled 12/7/22 and needs a preop. Patient is not a [facility] patient. Per patient his primary care provider is at a [facility]. Patient states he was told by scheduling there are no appointments available within a reasonable distance. Advised unable to schedule sooner than next available due to the fact that patient does not have a [facility] primary care provider to ask for patient to be worked in. Advised patient to contact primary care clinic. Patient agrees.

## 2022-12-05 ENCOUNTER — TRANSFERRED RECORDS (OUTPATIENT)
Dept: MULTI SPECIALTY CLINIC | Facility: CLINIC | Age: 62
End: 2022-12-05

## 2022-12-05 LAB
CREATININE (EXTERNAL): 6.3 MG/DL (ref 0.74–1.35)
GLUCOSE (EXTERNAL): 164 MG/DL (ref 70–140)
POTASSIUM (EXTERNAL): 4 MMOL/L (ref 3.6–5.2)

## 2022-12-05 NOTE — TELEPHONE ENCOUNTER
Upon review primary is not with [facility]. Patient is established at [facility]. Writer unable to see pre op completed or scheduled.     Left message to return call [phone] #4    Bri Cordero RN

## 2022-12-07 ENCOUNTER — ANESTHESIA EVENT (OUTPATIENT)
Dept: GASTROENTEROLOGY | Facility: CLINIC | Age: 62
End: 2022-12-07
Payer: COMMERCIAL

## 2022-12-07 ENCOUNTER — HOSPITAL ENCOUNTER (OUTPATIENT)
Facility: CLINIC | Age: 62
Discharge: HOME | End: 2022-12-07
Attending: INTERNAL MEDICINE | Admitting: INTERNAL MEDICINE
Payer: COMMERCIAL

## 2022-12-07 ENCOUNTER — ANESTHESIA (OUTPATIENT)
Dept: GASTROENTEROLOGY | Facility: CLINIC | Age: 62
End: 2022-12-07
Payer: COMMERCIAL

## 2022-12-07 VITALS
OXYGEN SATURATION: 95 % | HEART RATE: 82 BPM | SYSTOLIC BLOOD PRESSURE: 158 MMHG | RESPIRATION RATE: 17 BRPM | DIASTOLIC BLOOD PRESSURE: 99 MMHG | WEIGHT: 177 LBS | HEIGHT: 71 IN | BODY MASS INDEX: 24.78 KG/M2

## 2022-12-07 LAB — COLONOSCOPY: NORMAL

## 2022-12-07 PROCEDURE — 88305 TISSUE EXAM BY PATHOLOGIST: CPT | Mod: TC | Performed by: INTERNAL MEDICINE

## 2022-12-07 PROCEDURE — 370N000017 HC ANESTHESIA TECHNICAL FEE, PER MIN: Performed by: INTERNAL MEDICINE

## 2022-12-07 PROCEDURE — 258N000003 HC RX IP 258 OP 636: Performed by: NURSE ANESTHETIST, CERTIFIED REGISTERED

## 2022-12-07 PROCEDURE — 999N000010 HC STATISTIC ANES STAT CODE-CRNA PER MINUTE: Performed by: INTERNAL MEDICINE

## 2022-12-07 PROCEDURE — 45380 COLONOSCOPY AND BIOPSY: CPT | Performed by: INTERNAL MEDICINE

## 2022-12-07 PROCEDURE — 250N000011 HC RX IP 250 OP 636: Performed by: NURSE ANESTHETIST, CERTIFIED REGISTERED

## 2022-12-07 PROCEDURE — 250N000009 HC RX 250: Performed by: NURSE ANESTHETIST, CERTIFIED REGISTERED

## 2022-12-07 RX ORDER — ONDANSETRON 2 MG/ML
4 INJECTION INTRAMUSCULAR; INTRAVENOUS EVERY 6 HOURS PRN
Status: CANCELLED | OUTPATIENT
Start: 2022-12-07

## 2022-12-07 RX ORDER — ONDANSETRON 4 MG/1
4 TABLET, ORALLY DISINTEGRATING ORAL EVERY 6 HOURS PRN
Status: CANCELLED | OUTPATIENT
Start: 2022-12-07

## 2022-12-07 RX ORDER — NALOXONE HYDROCHLORIDE 0.4 MG/ML
0.2 INJECTION, SOLUTION INTRAMUSCULAR; INTRAVENOUS; SUBCUTANEOUS
Status: CANCELLED | OUTPATIENT
Start: 2022-12-07

## 2022-12-07 RX ORDER — PROPOFOL 10 MG/ML
INJECTION, EMULSION INTRAVENOUS CONTINUOUS PRN
Status: DISCONTINUED | OUTPATIENT
Start: 2022-12-07 | End: 2022-12-07

## 2022-12-07 RX ORDER — SODIUM CHLORIDE, SODIUM LACTATE, POTASSIUM CHLORIDE, CALCIUM CHLORIDE 600; 310; 30; 20 MG/100ML; MG/100ML; MG/100ML; MG/100ML
INJECTION, SOLUTION INTRAVENOUS CONTINUOUS PRN
Status: DISCONTINUED | OUTPATIENT
Start: 2022-12-07 | End: 2022-12-07

## 2022-12-07 RX ORDER — NALOXONE HYDROCHLORIDE 0.4 MG/ML
0.4 INJECTION, SOLUTION INTRAMUSCULAR; INTRAVENOUS; SUBCUTANEOUS
Status: CANCELLED | OUTPATIENT
Start: 2022-12-07

## 2022-12-07 RX ORDER — ONDANSETRON 2 MG/ML
4 INJECTION INTRAMUSCULAR; INTRAVENOUS
Status: DISCONTINUED | OUTPATIENT
Start: 2022-12-07 | End: 2022-12-07 | Stop reason: HOSPADM

## 2022-12-07 RX ORDER — FLUMAZENIL 0.1 MG/ML
0.2 INJECTION, SOLUTION INTRAVENOUS
Status: CANCELLED | OUTPATIENT
Start: 2022-12-07 | End: 2022-12-08

## 2022-12-07 RX ORDER — ONDANSETRON 2 MG/ML
INJECTION INTRAMUSCULAR; INTRAVENOUS PRN
Status: DISCONTINUED | OUTPATIENT
Start: 2022-12-07 | End: 2022-12-07

## 2022-12-07 RX ORDER — PROCHLORPERAZINE MALEATE 10 MG
10 TABLET ORAL EVERY 6 HOURS PRN
Status: CANCELLED | OUTPATIENT
Start: 2022-12-07

## 2022-12-07 RX ORDER — LIDOCAINE HYDROCHLORIDE 20 MG/ML
INJECTION, SOLUTION INFILTRATION; PERINEURAL PRN
Status: DISCONTINUED | OUTPATIENT
Start: 2022-12-07 | End: 2022-12-07

## 2022-12-07 RX ORDER — PROPOFOL 10 MG/ML
INJECTION, EMULSION INTRAVENOUS PRN
Status: DISCONTINUED | OUTPATIENT
Start: 2022-12-07 | End: 2022-12-07

## 2022-12-07 RX ORDER — LIDOCAINE 40 MG/G
CREAM TOPICAL
Status: DISCONTINUED | OUTPATIENT
Start: 2022-12-07 | End: 2022-12-07 | Stop reason: HOSPADM

## 2022-12-07 RX ADMIN — ONDANSETRON 4 MG: 2 INJECTION INTRAMUSCULAR; INTRAVENOUS at 13:39

## 2022-12-07 RX ADMIN — PROPOFOL 50 MG: 10 INJECTION, EMULSION INTRAVENOUS at 13:39

## 2022-12-07 RX ADMIN — PROPOFOL 150 MCG/KG/MIN: 10 INJECTION, EMULSION INTRAVENOUS at 13:39

## 2022-12-07 RX ADMIN — PROPOFOL 50 MG: 10 INJECTION, EMULSION INTRAVENOUS at 13:42

## 2022-12-07 RX ADMIN — LIDOCAINE HYDROCHLORIDE 50 MG: 20 INJECTION, SOLUTION INFILTRATION; PERINEURAL at 13:39

## 2022-12-07 RX ADMIN — SODIUM CHLORIDE, POTASSIUM CHLORIDE, SODIUM LACTATE AND CALCIUM CHLORIDE: 600; 310; 30; 20 INJECTION, SOLUTION INTRAVENOUS at 13:37

## 2022-12-07 ASSESSMENT — ACTIVITIES OF DAILY LIVING (ADL)
ADLS_ACUITY_SCORE: 35
ADLS_ACUITY_SCORE: 33
ADLS_ACUITY_SCORE: 35

## 2022-12-07 ASSESSMENT — LIFESTYLE VARIABLES: TOBACCO_USE: 1

## 2022-12-07 ASSESSMENT — ENCOUNTER SYMPTOMS: DYSRHYTHMIAS: 1

## 2022-12-07 NOTE — ANESTHESIA POSTPROCEDURE EVALUATION
Patient: Tacos Dominguez    Procedure: Procedure(s):  COLONOSCOPY, WITH POLYPECTOMY AND BIOPSY       Anesthesia Type:  MAC    Note:     Postop Pain Control: Uneventful            Sign Out: Well controlled pain   PONV: No   Neuro/Psych: Uneventful            Sign Out: Acceptable/Baseline neuro status   Airway/Respiratory: Uneventful            Sign Out: Acceptable/Baseline resp. status   CV/Hemodynamics: Uneventful            Sign Out: Acceptable CV status; No obvious hypovolemia; No obvious fluid overload   Other NRE: NONE   DID A NON-ROUTINE EVENT OCCUR? No           Last vitals:  Vitals Value Taken Time   /72 12/07/22 1430   Temp     Pulse 87 12/07/22 1435   Resp 28 12/07/22 1435   SpO2 98 % 12/07/22 1435   Vitals shown include unvalidated device data.    Electronically Signed By: Bennie Cabello MD  December 7, 2022  2:36 PM

## 2022-12-07 NOTE — ANESTHESIA PREPROCEDURE EVALUATION
Anesthesia Pre-Procedure Evaluation    Patient: Tacos Dominguez   MRN: [id number] : 1960        Procedure : Procedure(s):  COLONOSCOPY          Past Medical History:   Diagnosis Date     Aortic dissection (H)     distal, thin.  stable/chronic on MRCP 3/2022     Benign essential hypertension      Chronic atrial fibrillation (H)      Crohn's colitis (H)      Crohn's disease of large intestine (H) 2021     Esophageal reflux      ESRD (end stage renal disease) on dialysis (H)      History of basal cell carcinoma      Hypertension      Mixed hyperlipidemia       Past Surgical History:   Procedure Laterality Date     ABDOMEN SURGERY      x 6.  colon resections for crohn's disease     COLONOSCOPY N/A 2021    Procedure: COLONOSCOPY, WITH POLYPECTOMY AND BIOPSY;  Surgeon: Eric Preston MD;  [facility]     CV CORONARY ANGIOGRAM N/A 2021    Procedure: CV CORONARY ANGIOGRAM;  Surgeon: Judson Ybarra MD;  Location:  [facility]     ESOPHAGOSCOPY, GASTROSCOPY, DUODENOSCOPY (EGD), COMBINED N/A 2021    Procedure: ESOPHAGOGASTRODUODENOSCOPY, WITH FINE NEEDLE ASPIRATION BIOPSY, WITH ENDOSCOPIC ULTRASOUND GUIDANCE;  Surgeon: Eric Preston MD;  [facility]     IR DIALYSIS FISTULOGRAM LEFT  2022     LAPAROTOMY, LYSIS ADHESIONS, COMBINED N/A 3/17/2022    Procedure: Laparotomy, extensive lysis adhesions, combined;  Surgeon: Sarath Smith MD;  [facility]     PANCREATECTOMY PARTIAL N/A 3/17/2022    Procedure: Open Subtotal Pancreatectomy, intra-op ultrasound;  Surgeon: Sarath Smith MD;  [facility]     VASCULAR SURGERY      dialysis access- left upper      Allergies   Allergen Reactions     Lisinopril Anaphylaxis and Other (See Comments)     Shortness of breath      Social History     Tobacco Use     Smoking status: Every Day     Packs/day: 0.50     Years: 50.00     Pack years: 25.00     Types: Cigarettes     Last attempt to quit: 10/19/2020      Years since quittin.1     Smokeless tobacco: Never   Substance Use Topics     Alcohol use: Yes     Comment: rare      Wt Readings from Last 1 Encounters:   22 72.2 kg (159 lb 1.6 oz)        Anesthesia Evaluation   Pt has had prior anesthetic.     No history of anesthetic complications       ROS/MED HX  ENT/Pulmonary:     (+) tobacco use, Current use,  (-) sleep apnea   Neurologic:     (+) CVA,     Cardiovascular: Comment: Aortic dissection    (+) Dyslipidemia hypertension--CAD -past MI --dysrhythmias, a-fib,     METS/Exercise Tolerance:     Hematologic:       Musculoskeletal:       GI/Hepatic: Comment: S/p partial pancreatectomy    (+) GERD, Inflammatory bowel disease,     Renal/Genitourinary:     (+) renal disease, type: ESRD, Pt requires dialysis, type: Hemodialysis,     Endo:    (-) Type II DM   Psychiatric/Substance Use:       Infectious Disease:       Malignancy:       Other:            Physical Exam    Airway        Mallampati: II   TM distance: > 3 FB   Neck ROM: full   Mouth opening: > 3 cm    Respiratory Devices and Support         Dental  no notable dental history         Cardiovascular   cardiovascular exam normal          Pulmonary   pulmonary exam normal                OUTSIDE LABS:  CBC:   Lab Results   Component Value Date    WBC 4.3 2022    WBC 8.2 2022    HGB 11.1 (L) 2022    HGB 11.0 (L) 2022    HCT 32.8 (L) 2022    HCT 33.2 (L) 2022    PLT 88 (L) 2022     (L) 2022     BMP:   Lab Results   Component Value Date     2022     2022    POTASSIUM 4.1 2022    POTASSIUM 3.5 2022    CHLORIDE 97 (L) 2022    CHLORIDE 100 2022    CO2 29 2022    CO2 30 2022    BUN 12.0 2022    BUN 14 2022    CR 5.34 (H) 2022    CR 10.1 (H) 10/06/2022     (H) 2022     (H) 2022     COAGS:   Lab Results   Component Value Date    PTT 32 2022    INR 1.30 (H)  03/17/2022     POC: No results found for: BGM, HCG, HCGS  HEPATIC:   Lab Results   Component Value Date    ALBUMIN 4.6 11/18/2022    PROTTOTAL 7.5 11/18/2022    ALT 23 11/18/2022    AST 33 11/18/2022    ALKPHOS 65 11/18/2022    BILITOTAL 0.8 11/18/2022     OTHER:   Lab Results   Component Value Date    PH 7.36 03/17/2022    LACT 0.6 (L) 03/19/2022    KARY 10.0 11/18/2022    PHOS 4.7 (H) 03/21/2022    MAG 1.2 (L) 03/21/2022    LIPASE 115 (H) 09/27/2020    AMYLASE 34 03/18/2022    CRP 4.42 11/18/2022    SED 44 (H) 11/18/2022       Anesthesia Plan    ASA Status:  3   NPO Status:  NPO Appropriate    Anesthesia Type: MAC.     - Reason for MAC: immobility needed              Consents    Anesthesia Plan(s) and associated risks, benefits, and realistic alternatives discussed. Questions answered and patient/representative(s) expressed understanding.    - Discussed:     - Discussed with:  Patient         Postoperative Care    Pain management: IV analgesics.   PONV prophylaxis: Ondansetron (or other 5HT-3)     Comments:                Bennie Cabello MD

## 2022-12-07 NOTE — ANESTHESIA CARE TRANSFER NOTE
Patient: Tacos Dominguez    Procedure: Procedure(s):  COLONOSCOPY, WITH POLYPECTOMY AND BIOPSY       Diagnosis: Crohn's disease of ileum without complication (H) [K50.00]  Diagnosis Additional Information: No value filed.    Anesthesia Type:   MAC     Note:    Oropharynx: oropharynx clear of all foreign objects and spontaneously breathing  Level of Consciousness: awake  Oxygen Supplementation: face mask  Level of Supplemental Oxygen (L/min / FiO2): 6  Independent Airway: airway patency satisfactory and stable  Dentition: dentition unchanged  Vital Signs Stable: post-procedure vital signs reviewed and stable  Report to RN Given: handoff report given  Patient transferred to: PACU  Comments: At end of procedure, spontaneous respirations, patient alert to voice, able to follow commands. Oxygen via facemask at 6 liters per minute to PACU. Oxygen tubing connected to wall O2 in PACU, SpO2, NiBP, and EKG monitors and alarms on and functioning, report on patient's clinical status given to PACU RN, RN questions answered.  Handoff Report: Identifed the Patient, Identified the Reponsible Provider, Reviewed the pertinent medical history, Discussed the surgical course, Reviewed Intra-OP anesthesia mangement and issues during anesthesia, Set expectations for post-procedure period and Allowed opportunity for questions and acknowledgement of understanding      Vitals:  Vitals Value Taken Time   BP     Temp     Pulse 71 12/07/22 1425   Resp 21 12/07/22 1425   SpO2 100 % 12/07/22 1425   Vitals shown include unvalidated device data.    Electronically Signed By: JANNET Pinon CRNA  December 7, 2022  2:25 PM

## 2022-12-07 NOTE — H&P
Tacos Dominguez  [id number]  male  62 year old      Reason for procedure/surgery: Follow up Crohn's Disease    Patient Active Problem List   Diagnosis     Acquired cyst of kidney     ESRD (end stage renal disease) (H)     Organ transplant candidate     NSTEMI (non-ST elevated myocardial infarction) (H)     Atrial fibrillation (H)     Benign essential hypertension     Crohn's disease of large intestine (H)     IPMN (intraductal papillary mucinous neoplasm)       Past Surgical History:    Past Surgical History:   Procedure Laterality Date     ABDOMEN SURGERY      x 6.  colon resections for crohn's disease     APPENDECTOMY       CHOLECYSTECTOMY       COLONOSCOPY N/A 07/20/2021    Procedure: COLONOSCOPY, WITH POLYPECTOMY AND BIOPSY;  Surgeon: Eric Preston MD;  [facility]     CV CORONARY ANGIOGRAM N/A 05/25/2021    Procedure: CV CORONARY ANGIOGRAM;  Surgeon: Judson Ybarra MD;  Location:  [facility]     ESOPHAGOSCOPY, GASTROSCOPY, DUODENOSCOPY (EGD), COMBINED N/A 07/20/2021    Procedure: ESOPHAGOGASTRODUODENOSCOPY, WITH FINE NEEDLE ASPIRATION BIOPSY, WITH ENDOSCOPIC ULTRASOUND GUIDANCE;  Surgeon: Eric Preston MD;  [facility]     IR DIALYSIS FISTULOGRAM LEFT  12/01/2022     LAPAROTOMY, LYSIS ADHESIONS, COMBINED N/A 03/17/2022    Procedure: Laparotomy, extensive lysis adhesions, combined;  Surgeon: Sarath Smith MD;  [facility]     PANCREATECTOMY PARTIAL N/A 03/17/2022    Procedure: Open Subtotal Pancreatectomy, intra-op ultrasound;  Surgeon: Sarath Smith MD;  [facility]     VASCULAR SURGERY      dialysis access- left upper       Past Medical History:   Past Medical History:   Diagnosis Date     Aortic dissection (H)     distal, thin.  stable/chronic on MRCP 3/2022     Benign essential hypertension      Cerebral infarction (H)      Chronic atrial fibrillation (H)      Crohn's colitis (H)      Crohn's disease of large intestine (H) 11/22/2021     Esophageal  "reflux      ESRD (end stage renal disease) on dialysis (H)      History of basal cell carcinoma      Hypertension      Mixed hyperlipidemia      NSTEMI (non-ST elevated myocardial infarction) (H)        Social History:   Social History     Tobacco Use     Smoking status: Every Day     Packs/day: 0.25     Years: 50.00     Pack years: 12.50     Types: Cigarettes     Last attempt to quit: 10/19/2020     Years since quittin.1     Smokeless tobacco: Never   Substance Use Topics     Alcohol use: Yes     Comment: rare       Family History:   Family History   Problem Relation Age of Onset     Breast Cancer Mother      Coronary Artery Disease Father      Hypertension Brother      Anesthesia Reaction No family hx of      Thrombosis No family hx of        Allergies:   Allergies   Allergen Reactions     Lisinopril Anaphylaxis and Other (See Comments)     Shortness of breath       Active Medications:   No current outpatient medications on file.       Systemic Review:   CONSTITUTIONAL: NEGATIVE for fever, chills, change in weight  ENT/MOUTH: NEGATIVE for ear, mouth and throat problems  RESP: NEGATIVE for significant cough or SOB  CV: NEGATIVE for chest pain, palpitations or peripheral edema    Physical Examination:   Vital Signs: BP (!) 184/96   Resp 16   Ht 1.803 m (5' 11\")   Wt 80.3 kg (177 lb)   SpO2 100%   BMI 24.69 kg/m    GENERAL: healthy, alert and no distress  NECK: no adenopathy, no asymmetry, masses, or scars  RESP: lungs clear to auscultation - no rales, rhonchi or wheezes  CV: regular rate and rhythm, normal S1 S2, no S3 or S4, no murmur, click or rub, no peripheral edema and peripheral pulses strong  ABDOMEN: soft, nontender, no hepatosplenomegaly, no masses and bowel sounds normal  MS: no gross musculoskeletal defects noted, no edema    Plan: Appropriate to proceed as scheduled.      Willian Kee MD  2022    PCP:  [facility]    "

## 2022-12-09 LAB
PATH REPORT.COMMENTS IMP SPEC: NORMAL
PATH REPORT.FINAL DX SPEC: NORMAL
PATH REPORT.GROSS SPEC: NORMAL
PATH REPORT.MICROSCOPIC SPEC OTHER STN: NORMAL
PATH REPORT.RELEVANT HX SPEC: NORMAL
PHOTO IMAGE: NORMAL

## 2022-12-09 PROCEDURE — 88305 TISSUE EXAM BY PATHOLOGIST: CPT | Mod: 26 | Performed by: PATHOLOGY

## 2022-12-09 PROCEDURE — 88342 IMHCHEM/IMCYTCHM 1ST ANTB: CPT | Mod: 26 | Performed by: PATHOLOGY

## 2022-12-12 ENCOUNTER — TELEPHONE (OUTPATIENT)
Dept: GASTROENTEROLOGY | Facility: CLINIC | Age: 62
End: 2022-12-12

## 2022-12-12 DIAGNOSIS — K50.119 CROHN'S DISEASE OF LARGE INTESTINE WITH COMPLICATION (H): Primary | ICD-10-CM

## 2022-12-12 NOTE — TELEPHONE ENCOUNTER
PA Initiation    Medication: Stelara PA renewal  Insurance Company: [employer] ([facility]) - Phone [phone] Fax [phone]  Pharmacy Filling the Rx:    Filling Pharmacy Phone:    Filling Pharmacy Fax:    Start Date: 12/12/2022    Key: [id number]

## 2022-12-21 NOTE — TELEPHONE ENCOUNTER
Prior Authorization Approval    Authorization Effective Date: 12/12/2022  Authorization Expiration Date: 12/12/2023  Medication: Andrea JACKSON Approved  Approved Dose/Quantity: 90mg/ml  Reference #: Key: [id number]   Insurance Company: [employer] ([facility]) - Phone [phone] Fax [phone]  Expected CoPay:       CoPay Card Available:      Foundation Assistance Needed:    Which Pharmacy is filling the prescription (Not needed for infusion/clinic administered): [facility] - [place], [place] - [facility] [address]  Pharmacy Notified: Yes  Patient Notified: Yes

## 2022-12-22 RX ORDER — USTEKINUMAB 90 MG/ML
INJECTION, SOLUTION SUBCUTANEOUS
Qty: 1 ML | Refills: 3 | Status: SHIPPED | OUTPATIENT
Start: 2022-12-22 | End: 2023-02-02

## 2022-12-22 NOTE — TELEPHONE ENCOUNTER
Labs recently completed. Was seen in clinic by Dr. Rubio in November 2022. Plan to proceed with Stelara Q8w. New prescription sent per request associated with PA renewal.

## 2023-01-01 ENCOUNTER — LAB (OUTPATIENT)
Dept: LAB | Facility: CLINIC | Age: 63
End: 2023-01-01
Attending: NURSE PRACTITIONER
Payer: COMMERCIAL

## 2023-01-01 ENCOUNTER — MYC MEDICAL ADVICE (OUTPATIENT)
Dept: GASTROENTEROLOGY | Facility: CLINIC | Age: 63
End: 2023-01-01
Payer: COMMERCIAL

## 2023-01-01 ENCOUNTER — TELEPHONE (OUTPATIENT)
Dept: GASTROENTEROLOGY | Facility: CLINIC | Age: 63
End: 2023-01-01
Payer: COMMERCIAL

## 2023-01-01 ENCOUNTER — ANCILLARY PROCEDURE (OUTPATIENT)
Dept: CT IMAGING | Facility: CLINIC | Age: 63
End: 2023-01-01
Attending: NURSE PRACTITIONER
Payer: COMMERCIAL

## 2023-01-01 ENCOUNTER — APPOINTMENT (OUTPATIENT)
Dept: LAB | Facility: CLINIC | Age: 63
End: 2023-01-01
Payer: COMMERCIAL

## 2023-01-01 ENCOUNTER — VIRTUAL VISIT (OUTPATIENT)
Dept: GASTROENTEROLOGY | Facility: CLINIC | Age: 63
End: 2023-01-01
Payer: COMMERCIAL

## 2023-01-01 ENCOUNTER — TELEPHONE (OUTPATIENT)
Dept: GASTROENTEROLOGY | Facility: CLINIC | Age: 63
End: 2023-01-01

## 2023-01-01 ENCOUNTER — HEALTH MAINTENANCE LETTER (OUTPATIENT)
Age: 63
End: 2023-01-01

## 2023-01-01 ENCOUNTER — DOCUMENTATION ONLY (OUTPATIENT)
Dept: TRANSPLANT | Facility: CLINIC | Age: 63
End: 2023-01-01
Payer: COMMERCIAL

## 2023-01-01 ENCOUNTER — VIRTUAL VISIT (OUTPATIENT)
Dept: GASTROENTEROLOGY | Facility: CLINIC | Age: 63
End: 2023-01-01
Attending: INTERNAL MEDICINE
Payer: COMMERCIAL

## 2023-01-01 ENCOUNTER — INFUSION THERAPY VISIT (OUTPATIENT)
Dept: TRANSPLANT | Facility: CLINIC | Age: 63
End: 2023-01-01
Attending: INTERNAL MEDICINE
Payer: COMMERCIAL

## 2023-01-01 ENCOUNTER — MYC REFILL (OUTPATIENT)
Dept: GASTROENTEROLOGY | Facility: CLINIC | Age: 63
End: 2023-01-01
Payer: COMMERCIAL

## 2023-01-01 ENCOUNTER — MYC MEDICAL ADVICE (OUTPATIENT)
Dept: GASTROENTEROLOGY | Facility: CLINIC | Age: 63
End: 2023-01-01

## 2023-01-01 ENCOUNTER — TELEPHONE (OUTPATIENT)
Dept: TRANSPLANT | Facility: CLINIC | Age: 63
End: 2023-01-01
Payer: COMMERCIAL

## 2023-01-01 ENCOUNTER — OFFICE VISIT (OUTPATIENT)
Dept: PULMONOLOGY | Facility: CLINIC | Age: 63
End: 2023-01-01
Payer: COMMERCIAL

## 2023-01-01 ENCOUNTER — HOSPITAL ENCOUNTER (OUTPATIENT)
Dept: ULTRASOUND IMAGING | Facility: CLINIC | Age: 63
Discharge: HOME | End: 2023-04-13
Attending: SURGERY
Payer: COMMERCIAL

## 2023-01-01 ENCOUNTER — MYC MEDICAL ADVICE (OUTPATIENT)
Dept: OTHER | Age: 63
End: 2023-01-01

## 2023-01-01 ENCOUNTER — OFFICE VISIT (OUTPATIENT)
Dept: OTHER | Facility: CLINIC | Age: 63
End: 2023-01-01
Attending: SURGERY
Payer: COMMERCIAL

## 2023-01-01 ENCOUNTER — LAB (OUTPATIENT)
Dept: LAB | Facility: CLINIC | Age: 63
End: 2023-01-01
Payer: COMMERCIAL

## 2023-01-01 ENCOUNTER — HOSPITAL ENCOUNTER (OUTPATIENT)
Dept: CARDIOLOGY | Facility: CLINIC | Age: 63
Discharge: HOME | End: 2023-12-26
Attending: INTERNAL MEDICINE
Payer: COMMERCIAL

## 2023-01-01 ENCOUNTER — HOSPITAL ENCOUNTER (OUTPATIENT)
Dept: ULTRASOUND IMAGING | Facility: CLINIC | Age: 63
Discharge: HOME | End: 2023-08-24
Attending: SURGERY
Payer: COMMERCIAL

## 2023-01-01 ENCOUNTER — DOCUMENTATION ONLY (OUTPATIENT)
Dept: GASTROENTEROLOGY | Facility: CLINIC | Age: 63
End: 2023-01-01
Payer: COMMERCIAL

## 2023-01-01 ENCOUNTER — TEAM CONFERENCE (OUTPATIENT)
Dept: TRANSPLANT | Facility: CLINIC | Age: 63
End: 2023-01-01
Payer: COMMERCIAL

## 2023-01-01 ENCOUNTER — ANCILLARY PROCEDURE (OUTPATIENT)
Dept: ULTRASOUND IMAGING | Facility: CLINIC | Age: 63
End: 2023-01-01
Attending: NURSE PRACTITIONER
Payer: COMMERCIAL

## 2023-01-01 ENCOUNTER — TELEPHONE (OUTPATIENT)
Dept: CARDIOLOGY | Facility: CLINIC | Age: 63
End: 2023-01-01
Payer: COMMERCIAL

## 2023-01-01 ENCOUNTER — COMMITTEE REVIEW (OUTPATIENT)
Dept: TRANSPLANT | Facility: CLINIC | Age: 63
End: 2023-01-01
Payer: COMMERCIAL

## 2023-01-01 ENCOUNTER — HOSPITAL ENCOUNTER (OUTPATIENT)
Dept: CARDIOLOGY | Facility: CLINIC | Age: 63
Discharge: HOME | End: 2023-12-28
Attending: INTERNAL MEDICINE | Admitting: INTERNAL MEDICINE
Payer: COMMERCIAL

## 2023-01-01 ENCOUNTER — OFFICE VISIT (OUTPATIENT)
Dept: CARDIOLOGY | Facility: CLINIC | Age: 63
End: 2023-01-01
Attending: INTERNAL MEDICINE
Payer: COMMERCIAL

## 2023-01-01 ENCOUNTER — ANCILLARY PROCEDURE (OUTPATIENT)
Dept: MRI IMAGING | Facility: CLINIC | Age: 63
End: 2023-01-01
Attending: INTERNAL MEDICINE
Payer: COMMERCIAL

## 2023-01-01 ENCOUNTER — OFFICE VISIT (OUTPATIENT)
Dept: TRANSPLANT | Facility: CLINIC | Age: 63
End: 2023-01-01
Attending: NURSE PRACTITIONER
Payer: COMMERCIAL

## 2023-01-01 ENCOUNTER — TRANSFERRED RECORDS (OUTPATIENT)
Dept: HEALTH INFORMATION MANAGEMENT | Facility: CLINIC | Age: 63
End: 2023-01-01

## 2023-01-01 ENCOUNTER — OFFICE VISIT (OUTPATIENT)
Dept: FAMILY MEDICINE | Facility: CLINIC | Age: 63
End: 2023-01-01
Payer: COMMERCIAL

## 2023-01-01 VITALS
SYSTOLIC BLOOD PRESSURE: 169 MMHG | WEIGHT: 167.99 LBS | BODY MASS INDEX: 23.43 KG/M2 | HEART RATE: 100 BPM | OXYGEN SATURATION: 93 % | DIASTOLIC BLOOD PRESSURE: 78 MMHG

## 2023-01-01 VITALS — HEIGHT: 71 IN | WEIGHT: 160.94 LBS | BODY MASS INDEX: 22.53 KG/M2

## 2023-01-01 VITALS — SYSTOLIC BLOOD PRESSURE: 161 MMHG | HEART RATE: 80 BPM | DIASTOLIC BLOOD PRESSURE: 65 MMHG

## 2023-01-01 VITALS
BODY MASS INDEX: 24.24 KG/M2 | SYSTOLIC BLOOD PRESSURE: 126 MMHG | HEART RATE: 101 BPM | WEIGHT: 173.8 LBS | DIASTOLIC BLOOD PRESSURE: 63 MMHG | OXYGEN SATURATION: 98 %

## 2023-01-01 VITALS
RESPIRATION RATE: 16 BRPM | HEART RATE: 97 BPM | SYSTOLIC BLOOD PRESSURE: 106 MMHG | OXYGEN SATURATION: 94 % | DIASTOLIC BLOOD PRESSURE: 62 MMHG

## 2023-01-01 VITALS
RESPIRATION RATE: 16 BRPM | TEMPERATURE: 98.6 F | SYSTOLIC BLOOD PRESSURE: 175 MMHG | DIASTOLIC BLOOD PRESSURE: 70 MMHG | HEART RATE: 91 BPM | OXYGEN SATURATION: 96 %

## 2023-01-01 VITALS — BODY MASS INDEX: 25.24 KG/M2 | WEIGHT: 181 LBS

## 2023-01-01 VITALS — HEART RATE: 85 BPM | DIASTOLIC BLOOD PRESSURE: 76 MMHG | SYSTOLIC BLOOD PRESSURE: 143 MMHG

## 2023-01-01 VITALS — BODY MASS INDEX: 22.69 KG/M2 | WEIGHT: 162.7 LBS

## 2023-01-01 DIAGNOSIS — I10 ESSENTIAL HYPERTENSION: ICD-10-CM

## 2023-01-01 DIAGNOSIS — L28.1 PRURIGO NODULARIS: Primary | ICD-10-CM

## 2023-01-01 DIAGNOSIS — N18.6 END STAGE RENAL DISEASE (H): ICD-10-CM

## 2023-01-01 DIAGNOSIS — Z99.2 ESRD (END STAGE RENAL DISEASE) ON DIALYSIS (H): Primary | ICD-10-CM

## 2023-01-01 DIAGNOSIS — N18.6 ESRD (END STAGE RENAL DISEASE) ON DIALYSIS (H): Primary | ICD-10-CM

## 2023-01-01 DIAGNOSIS — Z87.891 HISTORY OF TOBACCO USE: ICD-10-CM

## 2023-01-01 DIAGNOSIS — I25.10 CAD (CORONARY ARTERY DISEASE): Primary | ICD-10-CM

## 2023-01-01 DIAGNOSIS — K50.119 CROHN'S DISEASE OF LARGE INTESTINE WITH COMPLICATION (H): Primary | ICD-10-CM

## 2023-01-01 DIAGNOSIS — I05.9 MITRAL VALVE DISORDER: ICD-10-CM

## 2023-01-01 DIAGNOSIS — N18.6 ESRD (END STAGE RENAL DISEASE) ON DIALYSIS (H): ICD-10-CM

## 2023-01-01 DIAGNOSIS — K50.818 CROHN'S DISEASE OF BOTH SMALL AND LARGE INTESTINE WITH OTHER COMPLICATION (H): Primary | ICD-10-CM

## 2023-01-01 DIAGNOSIS — K50.119 CROHN'S DISEASE OF LARGE INTESTINE WITH COMPLICATION (H): ICD-10-CM

## 2023-01-01 DIAGNOSIS — I35.9 AORTIC VALVE DISORDER: ICD-10-CM

## 2023-01-01 DIAGNOSIS — Z01.818 PRE-TRANSPLANT EVALUATION FOR KIDNEY TRANSPLANT: ICD-10-CM

## 2023-01-01 DIAGNOSIS — I25.10 CARDIOVASCULAR DISEASE: ICD-10-CM

## 2023-01-01 DIAGNOSIS — N18.5 CHRONIC KIDNEY DISEASE, STAGE V (H): ICD-10-CM

## 2023-01-01 DIAGNOSIS — K50.10 CROHN'S DISEASE OF LARGE INTESTINE (H): Primary | ICD-10-CM

## 2023-01-01 DIAGNOSIS — I25.10 CARDIOVASCULAR DISEASE: Primary | ICD-10-CM

## 2023-01-01 DIAGNOSIS — D64.9 ANEMIA: ICD-10-CM

## 2023-01-01 DIAGNOSIS — N18.5 CKD (CHRONIC KIDNEY DISEASE) STAGE 5, GFR LESS THAN 15 ML/MIN (H): ICD-10-CM

## 2023-01-01 DIAGNOSIS — D49.0 IPMN (INTRADUCTAL PAPILLARY MUCINOUS NEOPLASM): ICD-10-CM

## 2023-01-01 DIAGNOSIS — I71.02 AORTIC DISSECTION, ABDOMINAL (H): ICD-10-CM

## 2023-01-01 DIAGNOSIS — K50.10 CROHN'S DISEASE OF LARGE INTESTINE (H): ICD-10-CM

## 2023-01-01 DIAGNOSIS — I07.9 TRICUSPID VALVE DISORDER: ICD-10-CM

## 2023-01-01 DIAGNOSIS — Z87.891 HISTORY OF TOBACCO USE: Primary | ICD-10-CM

## 2023-01-01 DIAGNOSIS — Z76.82 ORGAN TRANSPLANT CANDIDATE: ICD-10-CM

## 2023-01-01 DIAGNOSIS — L85.3 XEROSIS CUTIS: ICD-10-CM

## 2023-01-01 DIAGNOSIS — L82.0 SEBORRHEIC KERATOSES, INFLAMED: ICD-10-CM

## 2023-01-01 DIAGNOSIS — I82.B12 SUBCLAVIAN VEIN OCCLUSION, LEFT (H): ICD-10-CM

## 2023-01-01 DIAGNOSIS — D64.9 ANEMIA: Primary | ICD-10-CM

## 2023-01-01 DIAGNOSIS — I05.9 MITRAL VALVE DISORDER: Primary | ICD-10-CM

## 2023-01-01 DIAGNOSIS — D49.0 IPMN (INTRADUCTAL PAPILLARY MUCINOUS NEOPLASM): Primary | ICD-10-CM

## 2023-01-01 DIAGNOSIS — I77.0 ARTERIOVENOUS FISTULA (H): ICD-10-CM

## 2023-01-01 DIAGNOSIS — D22.9 MULTIPLE BENIGN NEVI: ICD-10-CM

## 2023-01-01 DIAGNOSIS — L82.1 SK (SEBORRHEIC KERATOSIS): ICD-10-CM

## 2023-01-01 DIAGNOSIS — Z99.2 ESRD (END STAGE RENAL DISEASE) ON DIALYSIS (H): ICD-10-CM

## 2023-01-01 DIAGNOSIS — T82.858D AV FISTULA STENOSIS, SUBSEQUENT ENCOUNTER: ICD-10-CM

## 2023-01-01 DIAGNOSIS — N18.6 ESRD (END STAGE RENAL DISEASE) (H): ICD-10-CM

## 2023-01-01 DIAGNOSIS — Z85.828 HISTORY OF BASAL CELL CARCINOMA: ICD-10-CM

## 2023-01-01 DIAGNOSIS — I25.10 CAD (CORONARY ARTERY DISEASE): ICD-10-CM

## 2023-01-01 LAB
A*: NORMAL
A*LOCUS SEROLOGIC EQUIVALENT: 3
A*LOCUS: NORMAL
A*SEROLOGIC EQUIVALENT: 11
ABO/RH(D): NORMAL
ABTEST METHOD: NORMAL
ALBUMIN SERPL BCG-MCNC: 3.7 G/DL (ref 3.5–5.2)
ALBUMIN SERPL BCG-MCNC: 3.8 G/DL (ref 3.5–5.2)
ALBUMIN SERPL BCG-MCNC: 4 G/DL (ref 3.5–5.2)
ALBUMIN SERPL BCG-MCNC: 4.1 G/DL (ref 3.5–5.2)
ALP SERPL-CCNC: 65 U/L (ref 40–129)
ALP SERPL-CCNC: 68 U/L (ref 40–129)
ALP SERPL-CCNC: 71 U/L (ref 40–129)
ALP SERPL-CCNC: 87 U/L (ref 40–129)
ALT SERPL W P-5'-P-CCNC: 13 U/L (ref 0–70)
ALT SERPL W P-5'-P-CCNC: 14 U/L (ref 0–70)
ALT SERPL W P-5'-P-CCNC: 15 U/L (ref 0–70)
ALT SERPL W P-5'-P-CCNC: 16 U/L (ref 10–50)
ANION GAP SERPL CALCULATED.3IONS-SCNC: 11 MMOL/L (ref 7–15)
ANION GAP SERPL CALCULATED.3IONS-SCNC: 11 MMOL/L (ref 7–15)
ANION GAP SERPL CALCULATED.3IONS-SCNC: 15 MMOL/L (ref 7–15)
ANION GAP SERPL CALCULATED.3IONS-SCNC: 16 MMOL/L (ref 7–15)
ANTIBODY SCREEN: NEGATIVE
AST SERPL W P-5'-P-CCNC: 16 U/L (ref 0–45)
AST SERPL W P-5'-P-CCNC: 18 U/L (ref 10–50)
AST SERPL W P-5'-P-CCNC: 22 U/L (ref 0–45)
AST SERPL W P-5'-P-CCNC: 26 U/L (ref 0–45)
B*LOCUS SEROLOGIC EQUIVALENT: 7
B*LOCUS: NORMAL
BASOPHILS # BLD AUTO: 0.1 10E3/UL (ref 0–0.2)
BASOPHILS # BLD AUTO: ABNORMAL 10*3/UL
BASOPHILS # BLD MANUAL: 0 10E3/UL (ref 0–0.2)
BASOPHILS NFR BLD AUTO: 1 %
BASOPHILS NFR BLD AUTO: ABNORMAL %
BASOPHILS NFR BLD MANUAL: 0 %
BASOPHILS NFR BLD MANUAL: 0 %
BASOPHILS NFR BLD MANUAL: 1 %
BILIRUB SERPL-MCNC: 0.5 MG/DL
BILIRUB SERPL-MCNC: 0.8 MG/DL
BILIRUB SERPL-MCNC: 1.1 MG/DL
BILIRUB SERPL-MCNC: 1.1 MG/DL
BLD PROD TYP BPU: NORMAL
BLD PROD TYP BPU: NORMAL
BLOOD COMPONENT TYPE: NORMAL
BLOOD COMPONENT TYPE: NORMAL
BUN SERPL-MCNC: 11.6 MG/DL (ref 8–23)
BUN SERPL-MCNC: 12.6 MG/DL (ref 8–23)
BUN SERPL-MCNC: 19.1 MG/DL (ref 8–23)
BUN SERPL-MCNC: 35.2 MG/DL (ref 8–23)
BW-1: NORMAL
C*LOCUS SEROLOGIC EQUIVALENT: 7
C*LOCUS: NORMAL
CALCIUM SERPL-MCNC: 10.1 MG/DL (ref 8.8–10.2)
CALCIUM SERPL-MCNC: 10.3 MG/DL (ref 8.8–10.2)
CALCIUM SERPL-MCNC: 9.8 MG/DL (ref 8.8–10.2)
CALCIUM SERPL-MCNC: 9.9 MG/DL (ref 8.8–10.2)
CHLORIDE SERPL-SCNC: 94 MMOL/L (ref 98–107)
CHLORIDE SERPL-SCNC: 95 MMOL/L (ref 98–107)
CHLORIDE SERPL-SCNC: 97 MMOL/L (ref 98–107)
CHLORIDE SERPL-SCNC: 98 MMOL/L (ref 98–107)
CODING SYSTEM: NORMAL
CODING SYSTEM: NORMAL
CREAT SERPL-MCNC: 3.61 MG/DL (ref 0.67–1.17)
CREAT SERPL-MCNC: 4.37 MG/DL (ref 0.67–1.17)
CREAT SERPL-MCNC: 5.98 MG/DL (ref 0.67–1.17)
CREAT SERPL-MCNC: 7.66 MG/DL (ref 0.67–1.17)
CROSSMATCH: NORMAL
CROSSMATCH: NORMAL
CRP SERPL-MCNC: 154 MG/L
CRP SERPL-MCNC: 25.5 MG/L
CRP SERPL-MCNC: 26.7 MG/L
CRP SERPL-MCNC: 7.1 MG/L
DEPRECATED HCO3 PLAS-SCNC: 27 MMOL/L (ref 22–29)
DEPRECATED HCO3 PLAS-SCNC: 30 MMOL/L (ref 22–29)
DEPRECATED HCO3 PLAS-SCNC: 31 MMOL/L (ref 22–29)
DEPRECATED HCO3 PLAS-SCNC: 31 MMOL/L (ref 22–29)
DLCOCOR-%PRED-PRE: 70 %
DLCOCOR-PRE: 19.45 ML/MIN/MMHG
DLCOUNC-%PRED-PRE: 60 %
DLCOUNC-PRE: 16.76 ML/MIN/MMHG
DLCOUNC-PRED: 27.72 ML/MIN/MMHG
DPA1*: NORMAL
DPB1*: NORMAL
DQA1*LOCUS: NORMAL
DQB1*LOCUS SEROLOGIC EQUIVALENT: 6
DQB1*LOCUS: NORMAL
DRB1*LOCUS SEROLOGIC EQUIVALENT: 15
DRB1*LOCUS: NORMAL
DRB5*LOCUS SEROLOGIC EQUIVALENT: 51
DRB5*LOCUS: NORMAL
DRSSO TEST METHOD: NORMAL
EGFRCR SERPLBLD CKD-EPI 2021: 7 ML/MIN/1.73M2
EOSINOPHIL # BLD AUTO: 0 10E3/UL (ref 0–0.7)
EOSINOPHIL # BLD AUTO: ABNORMAL 10*3/UL
EOSINOPHIL # BLD MANUAL: 0 10E3/UL (ref 0–0.7)
EOSINOPHIL # BLD MANUAL: 0.1 10E3/UL (ref 0–0.7)
EOSINOPHIL # BLD MANUAL: 0.1 10E3/UL (ref 0–0.7)
EOSINOPHIL NFR BLD AUTO: 1 %
EOSINOPHIL NFR BLD AUTO: ABNORMAL %
EOSINOPHIL NFR BLD MANUAL: 0 %
EOSINOPHIL NFR BLD MANUAL: 2 %
EOSINOPHIL NFR BLD MANUAL: 3 %
ERV-%PRED-PRE: 77 %
ERV-PRE: 1.24 L
ERV-PRED: 1.59 L
ERYTHROCYTE [DISTWIDTH] IN BLOOD BY AUTOMATED COUNT: 14.2 % (ref 10–15)
ERYTHROCYTE [DISTWIDTH] IN BLOOD BY AUTOMATED COUNT: 15.2 % (ref 10–15)
ERYTHROCYTE [DISTWIDTH] IN BLOOD BY AUTOMATED COUNT: 17.2 % (ref 10–15)
ERYTHROCYTE [DISTWIDTH] IN BLOOD BY AUTOMATED COUNT: 18 % (ref 10–15)
ERYTHROCYTE [SEDIMENTATION RATE] IN BLOOD BY WESTERGREN METHOD: 104 MM/HR (ref 0–20)
ERYTHROCYTE [SEDIMENTATION RATE] IN BLOOD BY WESTERGREN METHOD: 65 MM/HR (ref 0–20)
ERYTHROCYTE [SEDIMENTATION RATE] IN BLOOD BY WESTERGREN METHOD: 77 MM/HR (ref 0–20)
ERYTHROCYTE [SEDIMENTATION RATE] IN BLOOD BY WESTERGREN METHOD: 81 MM/HR (ref 0–20)
EXPTIME-PRE: 14.44 SEC
FEF2575-%PRED-POST: 18 %
FEF2575-%PRED-PRE: 13 %
FEF2575-POST: 0.51 L/SEC
FEF2575-PRE: 0.37 L/SEC
FEF2575-PRED: 2.7 L/SEC
FEFMAX-%PRED-PRE: 35 %
FEFMAX-PRE: 3.25 L/SEC
FEFMAX-PRED: 9.16 L/SEC
FERRITIN SERPL-MCNC: 1455 NG/ML (ref 31–409)
FEV1-%PRED-PRE: 34 %
FEV1-PRE: 1.16 L
FEV1FEV6-PRE: 43 %
FEV1FEV6-PRED: 79 %
FEV1FVC-PRE: 34 %
FEV1FVC-PRED: 78 %
FEV1SVC-PRE: 35 %
FEV1SVC-PRED: 74 %
FIFMAX-PRE: 5.29 L/SEC
FOLATE SERPL-MCNC: >40 NG/ML (ref 4.6–34.8)
FRCPLETH-%PRED-PRE: 122 %
FRCPLETH-PRE: 4.53 L
FRCPLETH-PRED: 3.7 L
FVC-%PRED-PRE: 79 %
FVC-PRE: 3.41 L
FVC-PRED: 4.28 L
GFR SERPL CREATININE-BSD FRML MDRD: 10 ML/MIN/1.73M2
GFR SERPL CREATININE-BSD FRML MDRD: 14 ML/MIN/1.73M2
GFR SERPL CREATININE-BSD FRML MDRD: 18 ML/MIN/1.73M2
GLUCOSE SERPL-MCNC: 128 MG/DL (ref 70–99)
GLUCOSE SERPL-MCNC: 146 MG/DL (ref 70–99)
GLUCOSE SERPL-MCNC: 166 MG/DL (ref 70–99)
GLUCOSE SERPL-MCNC: 82 MG/DL (ref 70–99)
HCT VFR BLD AUTO: 23 % (ref 40–53)
HCT VFR BLD AUTO: 24.2 % (ref 40–53)
HCT VFR BLD AUTO: 29.5 % (ref 40–53)
HCT VFR BLD AUTO: 33.2 % (ref 40–53)
HGB BLD-MCNC: 10.5 G/DL (ref 13.3–17.7)
HGB BLD-MCNC: 7.2 G/DL (ref 13.3–17.7)
HGB BLD-MCNC: 7.9 G/DL (ref 13.3–17.7)
HGB BLD-MCNC: 9.3 G/DL (ref 13.3–17.7)
IC-%PRED-PRE: 64 %
IC-PRE: 2.04 L
IC-PRED: 3.18 L
IMM GRANULOCYTES # BLD: 0 10E3/UL
IMM GRANULOCYTES # BLD: ABNORMAL 10*3/UL
IMM GRANULOCYTES NFR BLD: 1 %
IMM GRANULOCYTES NFR BLD: ABNORMAL %
IRON BINDING CAPACITY (ROCHE): 265 UG/DL (ref 240–430)
IRON SATN MFR SERPL: 22 % (ref 15–46)
IRON SERPL-MCNC: 58 UG/DL (ref 61–157)
ISSUE DATE AND TIME: NORMAL
ISSUE DATE AND TIME: NORMAL
LVEF ECHO: NORMAL
LYMPHOCYTES # BLD AUTO: 0.8 10E3/UL (ref 0.8–5.3)
LYMPHOCYTES # BLD AUTO: ABNORMAL 10*3/UL
LYMPHOCYTES # BLD MANUAL: 0.4 10E3/UL (ref 0.8–5.3)
LYMPHOCYTES # BLD MANUAL: 0.6 10E3/UL (ref 0.8–5.3)
LYMPHOCYTES # BLD MANUAL: 0.7 10E3/UL (ref 0.8–5.3)
LYMPHOCYTES NFR BLD AUTO: 13 %
LYMPHOCYTES NFR BLD AUTO: ABNORMAL %
LYMPHOCYTES NFR BLD MANUAL: 10 %
LYMPHOCYTES NFR BLD MANUAL: 8 %
LYMPHOCYTES NFR BLD MANUAL: 9 %
MCH RBC QN AUTO: 33.3 PG (ref 26.5–33)
MCH RBC QN AUTO: 33.7 PG (ref 26.5–33)
MCH RBC QN AUTO: 35.5 PG (ref 26.5–33)
MCH RBC QN AUTO: 35.6 PG (ref 26.5–33)
MCHC RBC AUTO-ENTMCNC: 31.3 G/DL (ref 31.5–36.5)
MCHC RBC AUTO-ENTMCNC: 31.5 G/DL (ref 31.5–36.5)
MCHC RBC AUTO-ENTMCNC: 31.6 G/DL (ref 31.5–36.5)
MCHC RBC AUTO-ENTMCNC: 32.6 G/DL (ref 31.5–36.5)
MCV RBC AUTO: 105 FL (ref 78–100)
MCV RBC AUTO: 107 FL (ref 78–100)
MCV RBC AUTO: 109 FL (ref 78–100)
MCV RBC AUTO: 113 FL (ref 78–100)
MONOCYTES # BLD AUTO: 0.3 10E3/UL (ref 0–1.3)
MONOCYTES # BLD AUTO: ABNORMAL 10*3/UL
MONOCYTES # BLD MANUAL: 0.3 10E3/UL (ref 0–1.3)
MONOCYTES # BLD MANUAL: 0.3 10E3/UL (ref 0–1.3)
MONOCYTES # BLD MANUAL: 0.4 10E3/UL (ref 0–1.3)
MONOCYTES NFR BLD AUTO: 6 %
MONOCYTES NFR BLD AUTO: ABNORMAL %
MONOCYTES NFR BLD MANUAL: 4 %
MONOCYTES NFR BLD MANUAL: 5 %
MONOCYTES NFR BLD MANUAL: 8 %
NEUTROPHILS # BLD AUTO: 5 10E3/UL (ref 1.6–8.3)
NEUTROPHILS # BLD AUTO: ABNORMAL 10*3/UL
NEUTROPHILS # BLD MANUAL: 3.3 10E3/UL (ref 1.6–8.3)
NEUTROPHILS # BLD MANUAL: 6.2 10E3/UL (ref 1.6–8.3)
NEUTROPHILS # BLD MANUAL: 6.4 10E3/UL (ref 1.6–8.3)
NEUTROPHILS NFR BLD AUTO: 80 %
NEUTROPHILS NFR BLD AUTO: ABNORMAL %
NEUTROPHILS NFR BLD MANUAL: 78 %
NEUTROPHILS NFR BLD MANUAL: 84 %
NEUTROPHILS NFR BLD MANUAL: 88 %
NRBC # BLD AUTO: 0 10E3/UL
NRBC BLD AUTO-RTO: 1 /100
PLAT MORPH BLD: ABNORMAL
PLATELET # BLD AUTO: 100 10E3/UL (ref 150–450)
PLATELET # BLD AUTO: 117 10E3/UL (ref 150–450)
PLATELET # BLD AUTO: 144 10E3/UL (ref 150–450)
PLATELET # BLD AUTO: 63 10E3/UL (ref 150–450)
POTASSIUM SERPL-SCNC: 3.5 MMOL/L (ref 3.4–5.3)
POTASSIUM SERPL-SCNC: 4 MMOL/L (ref 3.4–5.3)
POTASSIUM SERPL-SCNC: 4.7 MMOL/L (ref 3.4–5.3)
POTASSIUM SERPL-SCNC: 4.8 MMOL/L (ref 3.4–5.3)
PROT SERPL-MCNC: 6.8 G/DL (ref 6.4–8.3)
PROT SERPL-MCNC: 6.8 G/DL (ref 6.4–8.3)
PROT SERPL-MCNC: 6.9 G/DL (ref 6.4–8.3)
PROT SERPL-MCNC: 7.1 G/DL (ref 6.4–8.3)
PROTOCOL CUTOFF: NORMAL
PSA SERPL DL<=0.01 NG/ML-MCNC: 0.23 NG/ML (ref 0–4.5)
RBC # BLD AUTO: 2.03 10E6/UL (ref 4.4–5.9)
RBC # BLD AUTO: 2.22 10E6/UL (ref 4.4–5.9)
RBC # BLD AUTO: 2.76 10E6/UL (ref 4.4–5.9)
RBC # BLD AUTO: 3.15 10E6/UL (ref 4.4–5.9)
RBC MORPH BLD: ABNORMAL
RVPLETH-%PRED-PRE: 130 %
RVPLETH-PRE: 3.3 L
RVPLETH-PRED: 2.53 L
SA 1 CELL: NORMAL
SA 1 TEST METHOD: NORMAL
SA 2 CELL: NORMAL
SA 2 TEST METHOD: NORMAL
SA1 HI RISK ABY: NORMAL
SA1 MOD RISK ABY: NORMAL
SA2 HI RISK ABY: NORMAL
SA2 MOD RISK ABY: NORMAL
SODIUM SERPL-SCNC: 137 MMOL/L (ref 135–145)
SODIUM SERPL-SCNC: 137 MMOL/L (ref 136–145)
SODIUM SERPL-SCNC: 140 MMOL/L (ref 136–145)
SODIUM SERPL-SCNC: 142 MMOL/L (ref 136–145)
SPECIMEN EXPIRATION DATE: NORMAL
TLCPLETH-%PRED-PRE: 89 %
TLCPLETH-PRE: 6.58 L
TLCPLETH-PRED: 7.33 L
UNACCEPTABLE ANTIGENS: NORMAL
UNIT ABO/RH: NORMAL
UNIT ABO/RH: NORMAL
UNIT NUMBER: NORMAL
UNIT NUMBER: NORMAL
UNIT STATUS: NORMAL
UNIT STATUS: NORMAL
UNIT TYPE ISBT: 600
UNIT TYPE ISBT: 600
UNOS CPRA: 23
VA-%PRED-PRE: 82 %
VA-PRE: 5.54 L
VC-%PRED-PRE: 73 %
VC-PRE: 3.28 L
VC-PRED: 4.48 L
VIT B12 SERPL-MCNC: 364 PG/ML (ref 232–1245)
WBC # BLD AUTO: 4.2 10E3/UL (ref 4–11)
WBC # BLD AUTO: 6.2 10E3/UL (ref 4–11)
WBC # BLD AUTO: 7.3 10E3/UL (ref 4–11)
WBC # BLD AUTO: 7.4 10E3/UL (ref 4–11)
ZZZSA 1  COMMENTS: NORMAL
ZZZSA 2 COMMENTS: NORMAL

## 2023-01-01 PROCEDURE — 93325 DOPPLER ECHO COLOR FLOW MAPG: CPT | Mod: 74

## 2023-01-01 PROCEDURE — 17110 DESTRUCTION B9 LES UP TO 14: CPT | Performed by: PHYSICIAN ASSISTANT

## 2023-01-01 PROCEDURE — 86833 HLA CLASS II HIGH DEFIN QUAL: CPT

## 2023-01-01 PROCEDURE — G0103 PSA SCREENING: HCPCS | Performed by: PATHOLOGY

## 2023-01-01 PROCEDURE — 86832 HLA CLASS I HIGH DEFIN QUAL: CPT

## 2023-01-01 PROCEDURE — 86850 RBC ANTIBODY SCREEN: CPT | Performed by: INTERNAL MEDICINE

## 2023-01-01 PROCEDURE — 83540 ASSAY OF IRON: CPT

## 2023-01-01 PROCEDURE — 93306 TTE W/DOPPLER COMPLETE: CPT | Mod: 26 | Performed by: STUDENT IN AN ORGANIZED HEALTH CARE EDUCATION/TRAINING PROGRAM

## 2023-01-01 PROCEDURE — 85025 COMPLETE CBC W/AUTO DIFF WBC: CPT

## 2023-01-01 PROCEDURE — G0463 HOSPITAL OUTPT CLINIC VISIT: HCPCS

## 2023-01-01 PROCEDURE — 82607 VITAMIN B-12: CPT

## 2023-01-01 PROCEDURE — 85027 COMPLETE CBC AUTOMATED: CPT

## 2023-01-01 PROCEDURE — 250N000009 HC RX 250: Performed by: STUDENT IN AN ORGANIZED HEALTH CARE EDUCATION/TRAINING PROGRAM

## 2023-01-01 PROCEDURE — 36415 COLL VENOUS BLD VENIPUNCTURE: CPT

## 2023-01-01 PROCEDURE — 82728 ASSAY OF FERRITIN: CPT

## 2023-01-01 PROCEDURE — 999N000248 HC STATISTIC IV INSERT WITH US BY RN

## 2023-01-01 PROCEDURE — 93978 VASCULAR STUDY: CPT | Mod: GC | Performed by: SURGERY

## 2023-01-01 PROCEDURE — 80053 COMPREHEN METABOLIC PANEL: CPT | Performed by: PATHOLOGY

## 2023-01-01 PROCEDURE — 85652 RBC SED RATE AUTOMATED: CPT | Performed by: PATHOLOGY

## 2023-01-01 PROCEDURE — P9016 RBC LEUKOCYTES REDUCED: HCPCS | Performed by: INTERNAL MEDICINE

## 2023-01-01 PROCEDURE — 85652 RBC SED RATE AUTOMATED: CPT

## 2023-01-01 PROCEDURE — 93312 ECHO TRANSESOPHAGEAL: CPT | Mod: 26 | Performed by: STUDENT IN AN ORGANIZED HEALTH CARE EDUCATION/TRAINING PROGRAM

## 2023-01-01 PROCEDURE — 93306 TTE W/DOPPLER COMPLETE: CPT

## 2023-01-01 PROCEDURE — 85007 BL SMEAR W/DIFF WBC COUNT: CPT | Performed by: PATHOLOGY

## 2023-01-01 PROCEDURE — 80053 COMPREHEN METABOLIC PANEL: CPT

## 2023-01-01 PROCEDURE — A9585 GADOBUTROL INJECTION: HCPCS | Mod: JZ | Performed by: RADIOLOGY

## 2023-01-01 PROCEDURE — 85027 COMPLETE CBC AUTOMATED: CPT | Performed by: PATHOLOGY

## 2023-01-01 PROCEDURE — 93325 DOPPLER ECHO COLOR FLOW MAPG: CPT | Mod: 26 | Performed by: STUDENT IN AN ORGANIZED HEALTH CARE EDUCATION/TRAINING PROGRAM

## 2023-01-01 PROCEDURE — 94726 PLETHYSMOGRAPHY LUNG VOLUMES: CPT | Performed by: INTERNAL MEDICINE

## 2023-01-01 PROCEDURE — 74183 MRI ABD W/O CNTR FLWD CNTR: CPT | Performed by: RADIOLOGY

## 2023-01-01 PROCEDURE — 258N000003 HC RX IP 258 OP 636: Performed by: STUDENT IN AN ORGANIZED HEALTH CARE EDUCATION/TRAINING PROGRAM

## 2023-01-01 PROCEDURE — 99213 OFFICE O/P EST LOW 20 MIN: CPT | Performed by: SURGERY

## 2023-01-01 PROCEDURE — 86140 C-REACTIVE PROTEIN: CPT

## 2023-01-01 PROCEDURE — 83550 IRON BINDING TEST: CPT

## 2023-01-01 PROCEDURE — 86140 C-REACTIVE PROTEIN: CPT | Performed by: PATHOLOGY

## 2023-01-01 PROCEDURE — 36430 TRANSFUSION BLD/BLD COMPNT: CPT

## 2023-01-01 PROCEDURE — 86833 HLA CLASS II HIGH DEFIN QUAL: CPT | Performed by: NURSE PRACTITIONER

## 2023-01-01 PROCEDURE — 93990 DOPPLER FLOW TESTING: CPT

## 2023-01-01 PROCEDURE — 36415 COLL VENOUS BLD VENIPUNCTURE: CPT | Performed by: PATHOLOGY

## 2023-01-01 PROCEDURE — 99214 OFFICE O/P EST MOD 30 MIN: CPT | Performed by: SURGERY

## 2023-01-01 PROCEDURE — 99215 OFFICE O/P EST HI 40 MIN: CPT | Mod: VID | Performed by: INTERNAL MEDICINE

## 2023-01-01 PROCEDURE — 81378 HLA I & II TYPING HR: CPT | Performed by: SURGERY

## 2023-01-01 PROCEDURE — 82746 ASSAY OF FOLIC ACID SERUM: CPT

## 2023-01-01 PROCEDURE — 99214 OFFICE O/P EST MOD 30 MIN: CPT | Mod: VID | Performed by: INTERNAL MEDICINE

## 2023-01-01 PROCEDURE — 85007 BL SMEAR W/DIFF WBC COUNT: CPT

## 2023-01-01 PROCEDURE — 94729 DIFFUSING CAPACITY: CPT | Performed by: INTERNAL MEDICINE

## 2023-01-01 PROCEDURE — 99213 OFFICE O/P EST LOW 20 MIN: CPT | Mod: 25 | Performed by: INTERNAL MEDICINE

## 2023-01-01 PROCEDURE — 99214 OFFICE O/P EST MOD 30 MIN: CPT | Mod: 25 | Performed by: PHYSICIAN ASSISTANT

## 2023-01-01 PROCEDURE — 94060 EVALUATION OF WHEEZING: CPT | Performed by: INTERNAL MEDICINE

## 2023-01-01 PROCEDURE — 36415 COLL VENOUS BLD VENIPUNCTURE: CPT | Performed by: INTERNAL MEDICINE

## 2023-01-01 PROCEDURE — 71250 CT THORAX DX C-: CPT | Performed by: RADIOLOGY

## 2023-01-01 PROCEDURE — 86923 COMPATIBILITY TEST ELECTRIC: CPT | Performed by: INTERNAL MEDICINE

## 2023-01-01 PROCEDURE — 99214 OFFICE O/P EST MOD 30 MIN: CPT | Mod: VID | Performed by: PHYSICIAN ASSISTANT

## 2023-01-01 PROCEDURE — 99000 SPECIMEN HANDLING OFFICE-LAB: CPT | Performed by: PATHOLOGY

## 2023-01-01 PROCEDURE — 99152 MOD SED SAME PHYS/QHP 5/>YRS: CPT | Mod: 74 | Performed by: STUDENT IN AN ORGANIZED HEALTH CARE EDUCATION/TRAINING PROGRAM

## 2023-01-01 PROCEDURE — 93320 DOPPLER ECHO COMPLETE: CPT | Mod: 26 | Performed by: STUDENT IN AN ORGANIZED HEALTH CARE EDUCATION/TRAINING PROGRAM

## 2023-01-01 PROCEDURE — 250N000011 HC RX IP 250 OP 636: Performed by: STUDENT IN AN ORGANIZED HEALTH CARE EDUCATION/TRAINING PROGRAM

## 2023-01-01 PROCEDURE — G0463 HOSPITAL OUTPT CLINIC VISIT: HCPCS | Performed by: SURGERY

## 2023-01-01 PROCEDURE — 86832 HLA CLASS I HIGH DEFIN QUAL: CPT | Performed by: NURSE PRACTITIONER

## 2023-01-01 PROCEDURE — 99214 OFFICE O/P EST MOD 30 MIN: CPT | Mod: 25 | Performed by: INTERNAL MEDICINE

## 2023-01-01 RX ORDER — LIDOCAINE 40 MG/G
CREAM TOPICAL
Status: DISCONTINUED | OUTPATIENT
Start: 2023-01-01 | End: 2023-01-01 | Stop reason: HOSPADM

## 2023-01-01 RX ORDER — FENTANYL CITRATE 50 UG/ML
50 INJECTION, SOLUTION INTRAMUSCULAR; INTRAVENOUS ONCE
Status: DISCONTINUED | OUTPATIENT
Start: 2023-01-01 | End: 2023-01-01 | Stop reason: HOSPADM

## 2023-01-01 RX ORDER — HEPARIN SODIUM (PORCINE) LOCK FLUSH IV SOLN 100 UNIT/ML 100 UNIT/ML
5 SOLUTION INTRAVENOUS
Status: CANCELLED | OUTPATIENT
Start: 2023-01-01

## 2023-01-01 RX ORDER — SODIUM CHLORIDE 9 MG/ML
INJECTION, SOLUTION INTRAVENOUS CONTINUOUS PRN
Status: DISCONTINUED | OUTPATIENT
Start: 2023-01-01 | End: 2023-01-01 | Stop reason: HOSPADM

## 2023-01-01 RX ORDER — FLUMAZENIL 0.1 MG/ML
0.2 INJECTION, SOLUTION INTRAVENOUS
Status: DISCONTINUED | OUTPATIENT
Start: 2023-01-01 | End: 2023-01-01 | Stop reason: HOSPADM

## 2023-01-01 RX ORDER — USTEKINUMAB 90 MG/ML
INJECTION, SOLUTION SUBCUTANEOUS
Qty: 1 ML | Refills: 0 | Status: CANCELLED | OUTPATIENT
Start: 2023-01-01

## 2023-01-01 RX ORDER — USTEKINUMAB 90 MG/ML
INJECTION, SOLUTION SUBCUTANEOUS
Qty: 1 ML | Refills: 0 | Status: SHIPPED | OUTPATIENT
Start: 2023-01-01 | End: 2023-01-01

## 2023-01-01 RX ORDER — HEPARIN SODIUM,PORCINE 10 UNIT/ML
5 VIAL (ML) INTRAVENOUS
Status: CANCELLED | OUTPATIENT
Start: 2023-01-01

## 2023-01-01 RX ORDER — NALOXONE HYDROCHLORIDE 0.4 MG/ML
0.4 INJECTION, SOLUTION INTRAMUSCULAR; INTRAVENOUS; SUBCUTANEOUS
Status: DISCONTINUED | OUTPATIENT
Start: 2023-01-01 | End: 2023-01-01 | Stop reason: HOSPADM

## 2023-01-01 RX ORDER — GADOBUTROL 604.72 MG/ML
7.5 INJECTION INTRAVENOUS ONCE
Status: COMPLETED | OUTPATIENT
Start: 2023-01-01 | End: 2023-01-01

## 2023-01-01 RX ORDER — USTEKINUMAB 90 MG/ML
INJECTION, SOLUTION SUBCUTANEOUS
Qty: 1 ML | Refills: 5 | Status: SHIPPED | OUTPATIENT
Start: 2023-01-01

## 2023-01-01 RX ORDER — LIDOCAINE HYDROCHLORIDE 20 MG/ML
15 SOLUTION OROPHARYNGEAL ONCE
Status: COMPLETED | OUTPATIENT
Start: 2023-01-01 | End: 2023-01-01

## 2023-01-01 RX ORDER — ACYCLOVIR 200 MG/1
9.5 CAPSULE ORAL
Status: DISCONTINUED | OUTPATIENT
Start: 2023-01-01 | End: 2023-01-01 | Stop reason: HOSPADM

## 2023-01-01 RX ORDER — USTEKINUMAB 90 MG/ML
INJECTION, SOLUTION SUBCUTANEOUS
Qty: 1 ML | Refills: 3 | Status: SHIPPED | OUTPATIENT
Start: 2023-01-01 | End: 2023-01-01

## 2023-01-01 RX ORDER — NALOXONE HYDROCHLORIDE 0.4 MG/ML
0.2 INJECTION, SOLUTION INTRAMUSCULAR; INTRAVENOUS; SUBCUTANEOUS
Status: DISCONTINUED | OUTPATIENT
Start: 2023-01-01 | End: 2023-01-01 | Stop reason: HOSPADM

## 2023-01-01 RX ORDER — FENTANYL CITRATE 50 UG/ML
25 INJECTION, SOLUTION INTRAMUSCULAR; INTRAVENOUS
Status: DISCONTINUED | OUTPATIENT
Start: 2023-01-01 | End: 2023-01-01 | Stop reason: HOSPADM

## 2023-01-01 RX ADMIN — FENTANYL CITRATE 25 MCG: 50 INJECTION, SOLUTION INTRAMUSCULAR; INTRAVENOUS at 10:40

## 2023-01-01 RX ADMIN — MIDAZOLAM 1 MG: 1 INJECTION INTRAMUSCULAR; INTRAVENOUS at 10:45

## 2023-01-01 RX ADMIN — GADOBUTROL 7.5 ML: 604.72 INJECTION INTRAVENOUS at 11:03

## 2023-01-01 RX ADMIN — LIDOCAINE HYDROCHLORIDE 30 ML: 20 SOLUTION ORAL; TOPICAL at 10:24

## 2023-01-01 RX ADMIN — FENTANYL CITRATE 50 MCG: 50 INJECTION, SOLUTION INTRAMUSCULAR; INTRAVENOUS at 10:37

## 2023-01-01 RX ADMIN — MIDAZOLAM 0.5 MG: 1 INJECTION INTRAMUSCULAR; INTRAVENOUS at 10:42

## 2023-01-01 RX ADMIN — MIDAZOLAM 0.5 MG: 1 INJECTION INTRAMUSCULAR; INTRAVENOUS at 10:43

## 2023-01-01 RX ADMIN — TOPICAL ANESTHETIC 0.5 ML: 200 SPRAY DENTAL; PERIODONTAL at 10:29

## 2023-01-01 RX ADMIN — SODIUM CHLORIDE 500 ML/HR: 9 INJECTION, SOLUTION INTRAVENOUS at 11:15

## 2023-01-01 RX ADMIN — MIDAZOLAM 1 MG: 1 INJECTION INTRAMUSCULAR; INTRAVENOUS at 10:40

## 2023-01-01 RX ADMIN — MIDAZOLAM 2 MG: 1 INJECTION INTRAMUSCULAR; INTRAVENOUS at 10:37

## 2023-01-01 ASSESSMENT — PAIN SCALES - GENERAL
PAINLEVEL: NO PAIN (0)

## 2023-01-02 ENCOUNTER — TELEPHONE (OUTPATIENT)
Dept: GASTROENTEROLOGY | Facility: CLINIC | Age: 63
End: 2023-01-02

## 2023-01-02 DIAGNOSIS — Z87.891 HISTORY OF TOBACCO USE: ICD-10-CM

## 2023-01-02 DIAGNOSIS — N18.6 END STAGE RENAL DISEASE (H): ICD-10-CM

## 2023-01-02 DIAGNOSIS — K50.119 CROHN'S DISEASE OF LARGE INTESTINE WITH COMPLICATION (H): Primary | ICD-10-CM

## 2023-01-02 DIAGNOSIS — Z01.818 PRE-TRANSPLANT EVALUATION FOR KIDNEY TRANSPLANT: ICD-10-CM

## 2023-01-02 RX ORDER — USTEKINUMAB 90 MG/ML
INJECTION, SOLUTION SUBCUTANEOUS
Qty: 1 ML | Refills: 3 | Status: SHIPPED | OUTPATIENT
Start: 2023-01-02 | End: 2023-01-01

## 2023-01-02 NOTE — TELEPHONE ENCOUNTER
PA Initiation    Medication: Stelara q4w dosing initiated  Insurance Company: [employer] ([facility]) - Phone [phone] Fax [phone]  Pharmacy Filling the Rx:    Filling Pharmacy Phone:    Filling Pharmacy Fax:    Start Date: 1/2/2023    [id number]

## 2023-01-02 NOTE — TELEPHONE ENCOUNTER
----- Message from Betty Rubio MD sent at 12/30/2022  3:23 PM CST -----  Regarding: RE: Screening colonoscopy  Thanks Zonia. This patient does not have more than 1/3 of his colon involved, so he is not at increased risk for colon cancer and does not require enhanced colon cancer screening. He had a cscope in 2021. Therefore, he does not need a repeat cscope for surveillance/screening purposes at this time.       On a separate note, he does have some persistent (although improved disease) in his ileum.   Sonia -- can you please increase his Stelara to every 4 weeks and notify the patient?  Can you also please place a colorectal surgery consult for evaluation of anorectal findings on Dr. Kee's most recent scope?    Thank you!  E    ----- Message -----  From: Zonia Astudillo RN  Sent: 12/16/2022   3:37 PM CST  To: Betty Rubio MD  Subject: Screening colonoscopy                            Hi Dr. Rubio,     I am on the Colorectal Cancer Specialist Team and was in the process of updating health maintenance for future colonoscopy screening/surveillance. Scar had a cscope with Dr. Kee on 12/7 which was deemed inadequate for colorectal cancer or polyps screening. Please order the repeat colonoscopy with appropriate sedation, timeframe, and double prep.     This will help follow-up rates, lessen the load on PCPs, as well as ensure continuity of care.  Thanks!   Zonia Astudillo RN, BSN  Colorectal Cancer   Division of Gastroenterology at [facility]

## 2023-01-02 NOTE — TELEPHONE ENCOUNTER
Called patient to update him on Dr. Rubio's plan after she reviewed his colonoscopy findings from 12/7/22. No answer; left voicemail with information.    -- Order placed for Stelara every 4 weeks. PA to be initiated.  -- Order placed for CRS consult.

## 2023-01-03 NOTE — TELEPHONE ENCOUNTER
Patient already has a stelara savings card    Prior Authorization Approval    Authorization Effective Date: 12/12/2022  Authorization Expiration Date: 12/12/2023  Medication: Stelara q4w dosing   Approved Dose/Quantity: 1q4w  Reference #: [id number]   Insurance Company: [employer] ([facility]) - Phone [phone] Fax [phone]  Expected CoPay:       CoPay Card Available:      Foundation Assistance Needed:    Which Pharmacy is filling the prescription (Not needed for infusion/clinic administered):    Pharmacy Notified:    Patient Notified:

## 2023-01-05 ENCOUNTER — TELEPHONE (OUTPATIENT)
Dept: GASTROENTEROLOGY | Facility: CLINIC | Age: 63
End: 2023-01-05

## 2023-01-05 NOTE — TELEPHONE ENCOUNTER
----- Message from Betty Rubio MD sent at 1/5/2023  9:46 AM CST -----  Regarding: Stelara trough  Hi L,    Can you please arrange for a stelara trough level check in this patient?    Thanks,    E

## 2023-01-06 NOTE — TELEPHONE ENCOUNTER
Clarified with Dr. Rubio about a plan for a Stelara trough level as we already have approval for Q4w dosing. If the patient takes his next dose at the 8 week interval, please proceed with a trough level prior to injection. If he is able to get his next dose before the 8 week samson and initiate every 4 week dosing earlier, he should do so. In this case, no trough level is necessary.    Called the patient to relay the message. He is going to schedule his delivery for his next dose today. He expects to take it next week (around week 5), in which case he will not proceed with the trough level.    Order for level has NOT been placed. Will follow-up with patient next week to confirm he took his dose at the escalated schedule.

## 2023-01-09 ENCOUNTER — MEDICAL CORRESPONDENCE (OUTPATIENT)
Dept: HEALTH INFORMATION MANAGEMENT | Facility: CLINIC | Age: 63
End: 2023-01-09

## 2023-01-09 DIAGNOSIS — N18.6 ESRD (END STAGE RENAL DISEASE) (H): Primary | ICD-10-CM

## 2023-01-10 ENCOUNTER — MYC MEDICAL ADVICE (OUTPATIENT)
Dept: OTHER | Age: 63
End: 2023-01-10

## 2023-01-10 DIAGNOSIS — Z01.818 PRE-TRANSPLANT EVALUATION FOR KIDNEY TRANSPLANT: Primary | ICD-10-CM

## 2023-01-10 DIAGNOSIS — I10 ESSENTIAL HYPERTENSION: ICD-10-CM

## 2023-01-10 DIAGNOSIS — N18.6 END STAGE RENAL DISEASE (H): ICD-10-CM

## 2023-01-10 DIAGNOSIS — Z76.82 ORGAN TRANSPLANT CANDIDATE: ICD-10-CM

## 2023-01-12 ENCOUNTER — TELEPHONE (OUTPATIENT)
Dept: INTERVENTIONAL RADIOLOGY/VASCULAR | Facility: CLINIC | Age: 63
End: 2023-01-12

## 2023-01-13 ENCOUNTER — APPOINTMENT (OUTPATIENT)
Dept: INTERVENTIONAL RADIOLOGY/VASCULAR | Facility: CLINIC | Age: 63
End: 2023-01-13
Payer: COMMERCIAL

## 2023-01-13 ENCOUNTER — HOSPITAL ENCOUNTER (OUTPATIENT)
Facility: CLINIC | Age: 63
Discharge: HOME | End: 2023-01-13
Admitting: RADIOLOGY
Payer: COMMERCIAL

## 2023-01-13 ENCOUNTER — TRANSFERRED RECORDS (OUTPATIENT)
Dept: HEALTH INFORMATION MANAGEMENT | Facility: CLINIC | Age: 63
End: 2023-01-13

## 2023-01-13 VITALS
HEART RATE: 105 BPM | TEMPERATURE: 97.9 F | DIASTOLIC BLOOD PRESSURE: 74 MMHG | OXYGEN SATURATION: 99 % | SYSTOLIC BLOOD PRESSURE: 187 MMHG | RESPIRATION RATE: 16 BRPM

## 2023-01-13 DIAGNOSIS — N18.6 ESRD (END STAGE RENAL DISEASE) (H): ICD-10-CM

## 2023-01-13 LAB
ANION GAP SERPL CALCULATED.3IONS-SCNC: 14 MMOL/L (ref 3–14)
BUN SERPL-MCNC: 36 MG/DL (ref 7–30)
CALCIUM SERPL-MCNC: 10 MG/DL (ref 8.5–10.1)
CHLORIDE BLD-SCNC: 98 MMOL/L (ref 94–109)
CO2 SERPL-SCNC: 28 MMOL/L (ref 20–32)
CREAT SERPL-MCNC: 9.99 MG/DL (ref 0.66–1.25)
GFR SERPL CREATININE-BSD FRML MDRD: 5 ML/MIN/1.73M2
GLUCOSE BLD-MCNC: 76 MG/DL (ref 70–99)
POTASSIUM BLD-SCNC: 4 MMOL/L (ref 3.4–5.3)
SODIUM SERPL-SCNC: 140 MMOL/L (ref 133–144)

## 2023-01-13 PROCEDURE — 82310 ASSAY OF CALCIUM: CPT | Performed by: NURSE PRACTITIONER

## 2023-01-13 PROCEDURE — 36901 INTRO CATH DIALYSIS CIRCUIT: CPT

## 2023-01-13 PROCEDURE — 272N000564 HC SHEATH CR2

## 2023-01-13 PROCEDURE — 36415 COLL VENOUS BLD VENIPUNCTURE: CPT | Performed by: NURSE PRACTITIONER

## 2023-01-13 PROCEDURE — 272N000570 HC SHEATH CR7

## 2023-01-13 PROCEDURE — 250N000011 HC RX IP 250 OP 636: Performed by: RADIOLOGY

## 2023-01-13 PROCEDURE — 36591 DRAW BLOOD OFF VENOUS DEVICE: CPT

## 2023-01-13 PROCEDURE — 255N000002 HC RX 255 OP 636: Performed by: RADIOLOGY

## 2023-01-13 PROCEDURE — 250N000011 HC RX IP 250 OP 636: Performed by: NURSE PRACTITIONER

## 2023-01-13 PROCEDURE — 999N000163 HC STATISTIC SIMPLE TUBE INSERTION/CHARGE, PORT, CATH, FISTULOGRAM

## 2023-01-13 PROCEDURE — 272N000196 HC ACCESSORY CR5

## 2023-01-13 PROCEDURE — 272N000116 HC CATH CR1

## 2023-01-13 PROCEDURE — 99152 MOD SED SAME PHYS/QHP 5/>YRS: CPT

## 2023-01-13 PROCEDURE — C1769 GUIDE WIRE: HCPCS

## 2023-01-13 PROCEDURE — 272N000302 HC DEVICE INFLATION CR5

## 2023-01-13 PROCEDURE — 272N000124 HC CATH CR11

## 2023-01-13 PROCEDURE — 250N000009 HC RX 250: Performed by: NURSE PRACTITIONER

## 2023-01-13 RX ORDER — NALOXONE HYDROCHLORIDE 0.4 MG/ML
0.4 INJECTION, SOLUTION INTRAMUSCULAR; INTRAVENOUS; SUBCUTANEOUS
Status: DISCONTINUED | OUTPATIENT
Start: 2023-01-13 | End: 2023-01-13 | Stop reason: HOSPADM

## 2023-01-13 RX ORDER — NALOXONE HYDROCHLORIDE 0.4 MG/ML
0.2 INJECTION, SOLUTION INTRAMUSCULAR; INTRAVENOUS; SUBCUTANEOUS
Status: DISCONTINUED | OUTPATIENT
Start: 2023-01-13 | End: 2023-01-13 | Stop reason: HOSPADM

## 2023-01-13 RX ORDER — HYDRALAZINE HYDROCHLORIDE 20 MG/ML
10 INJECTION INTRAMUSCULAR; INTRAVENOUS EVERY 6 HOURS PRN
Status: DISCONTINUED | OUTPATIENT
Start: 2023-01-13 | End: 2023-01-13 | Stop reason: HOSPADM

## 2023-01-13 RX ORDER — FLUMAZENIL 0.1 MG/ML
0.2 INJECTION, SOLUTION INTRAVENOUS
Status: DISCONTINUED | OUTPATIENT
Start: 2023-01-13 | End: 2023-01-13 | Stop reason: HOSPADM

## 2023-01-13 RX ORDER — IOPAMIDOL 612 MG/ML
100 INJECTION, SOLUTION INTRAVASCULAR ONCE
Status: COMPLETED | OUTPATIENT
Start: 2023-01-13 | End: 2023-01-13

## 2023-01-13 RX ORDER — FENTANYL CITRATE 50 UG/ML
25-50 INJECTION, SOLUTION INTRAMUSCULAR; INTRAVENOUS EVERY 5 MIN PRN
Status: DISCONTINUED | OUTPATIENT
Start: 2023-01-13 | End: 2023-01-13 | Stop reason: HOSPADM

## 2023-01-13 RX ORDER — HYDRALAZINE HYDROCHLORIDE 20 MG/ML
10 INJECTION INTRAMUSCULAR; INTRAVENOUS ONCE
Status: COMPLETED | OUTPATIENT
Start: 2023-01-13 | End: 2023-01-13

## 2023-01-13 RX ORDER — HEPARIN SODIUM 200 [USP'U]/100ML
1 INJECTION, SOLUTION INTRAVENOUS CONTINUOUS PRN
Status: DISCONTINUED | OUTPATIENT
Start: 2023-01-13 | End: 2023-01-13 | Stop reason: HOSPADM

## 2023-01-13 RX ADMIN — MIDAZOLAM HYDROCHLORIDE 1 MG: 1 INJECTION, SOLUTION INTRAMUSCULAR; INTRAVENOUS at 16:39

## 2023-01-13 RX ADMIN — MIDAZOLAM HYDROCHLORIDE 0.5 MG: 1 INJECTION, SOLUTION INTRAMUSCULAR; INTRAVENOUS at 16:11

## 2023-01-13 RX ADMIN — HYDRALAZINE HYDROCHLORIDE 10 MG: 20 INJECTION INTRAMUSCULAR; INTRAVENOUS at 16:01

## 2023-01-13 RX ADMIN — MIDAZOLAM HYDROCHLORIDE 1 MG: 1 INJECTION, SOLUTION INTRAMUSCULAR; INTRAVENOUS at 16:24

## 2023-01-13 RX ADMIN — MIDAZOLAM HYDROCHLORIDE 0.5 MG: 1 INJECTION, SOLUTION INTRAMUSCULAR; INTRAVENOUS at 16:07

## 2023-01-13 RX ADMIN — FENTANYL CITRATE 25 MCG: 50 INJECTION INTRAMUSCULAR; INTRAVENOUS at 16:12

## 2023-01-13 RX ADMIN — MIDAZOLAM HYDROCHLORIDE 1 MG: 1 INJECTION, SOLUTION INTRAMUSCULAR; INTRAVENOUS at 15:45

## 2023-01-13 RX ADMIN — FENTANYL CITRATE 50 MCG: 50 INJECTION INTRAMUSCULAR; INTRAVENOUS at 15:35

## 2023-01-13 RX ADMIN — IOPAMIDOL 30 ML: 612 INJECTION, SOLUTION INTRAVENOUS at 16:54

## 2023-01-13 RX ADMIN — FENTANYL CITRATE 50 MCG: 50 INJECTION INTRAMUSCULAR; INTRAVENOUS at 16:40

## 2023-01-13 RX ADMIN — LIDOCAINE HYDROCHLORIDE 4 ML: 10 INJECTION, SOLUTION INFILTRATION; PERINEURAL at 15:44

## 2023-01-13 RX ADMIN — MIDAZOLAM HYDROCHLORIDE 1 MG: 1 INJECTION, SOLUTION INTRAMUSCULAR; INTRAVENOUS at 15:26

## 2023-01-13 RX ADMIN — FENTANYL CITRATE 25 MCG: 50 INJECTION INTRAMUSCULAR; INTRAVENOUS at 16:07

## 2023-01-13 RX ADMIN — HYDRALAZINE HYDROCHLORIDE 10 MG: 20 INJECTION INTRAMUSCULAR; INTRAVENOUS at 15:46

## 2023-01-13 RX ADMIN — FENTANYL CITRATE 50 MCG: 50 INJECTION INTRAMUSCULAR; INTRAVENOUS at 15:50

## 2023-01-13 RX ADMIN — HEPARIN SODIUM 3 BAG: 200 INJECTION, SOLUTION INTRAVENOUS at 16:04

## 2023-01-13 RX ADMIN — FENTANYL CITRATE 25 MCG: 50 INJECTION INTRAMUSCULAR; INTRAVENOUS at 15:38

## 2023-01-13 RX ADMIN — FENTANYL CITRATE 50 MCG: 50 INJECTION INTRAMUSCULAR; INTRAVENOUS at 16:48

## 2023-01-13 RX ADMIN — FENTANYL CITRATE 50 MCG: 50 INJECTION INTRAMUSCULAR; INTRAVENOUS at 16:23

## 2023-01-13 RX ADMIN — MIDAZOLAM HYDROCHLORIDE 1 MG: 1 INJECTION, SOLUTION INTRAMUSCULAR; INTRAVENOUS at 15:40

## 2023-01-13 RX ADMIN — MIDAZOLAM HYDROCHLORIDE 1 MG: 1 INJECTION, SOLUTION INTRAMUSCULAR; INTRAVENOUS at 16:48

## 2023-01-13 RX ADMIN — FENTANYL CITRATE 25 MCG: 50 INJECTION INTRAMUSCULAR; INTRAVENOUS at 15:43

## 2023-01-13 ASSESSMENT — ACTIVITIES OF DAILY LIVING (ADL)
ADLS_ACUITY_SCORE: 35

## 2023-01-13 NOTE — PROGRESS NOTES
Care Suites Admission Nursing Note    Patient Information  Name: Tacos Dominguez  Age: 62 year old  Reason for admission: Fistulogram  Care Suites arrival time: 1200    Visitor Information  Name: none  Informed of visitor restrictions: N/A  1 visitor allowed per patient   Visitor must screen negative for COVID symptoms   Visitor must wear a mask  Waiting rooms closed to visitors    Patient Admission/Assessment   Pre-procedure assessment complete: Yes  If abnormal assessment/labs, provider notified: N/A  NPO: Yes  Medications held per instructions/orders: Yes  Consent: obtained  If applicable, pregnancy test status: deferred  Patient oriented to room: Yes  Education/questions answered: Yes  Plan/other: getting prepped for their procedure    Discharge Planning  Discharge name/phone number: [facility] -   [phone]  Overnight post sedation caregiver: [facility]   Discharge location: [place]    Shena Yin RN

## 2023-01-13 NOTE — IR NOTE
RADIOLOGY POST PROCEDURE NOTE    Patient name: Tacos Dominguez  MRN: [id number]  : 1960    Pre-procedure diagnosis: LUE swelling with subclavian vein occlusion  Post-procedure diagnosis: Same    Procedure Date/Time: 2023  5:24 PM  Procedure: LUE fistulagram and attempt to cross left subclavian vein.  Ultimately unsuccessful in crossing chronic occlusion.  Estimated blood loss: 10 ml  Specimen(s) collected with description: none    The patient tolerated the procedure well with no immediate complications.  Significant findings:  Please see above.    See imaging dictation for procedural details.    Provider name: Juan Ruelas MD  Assistant(s):None

## 2023-01-13 NOTE — IR NOTE
Interventional Radiology Intra-procedural Nursing Note    Patient Name: Tacos Dominguez  Medical Record Number: [id number]  Today's Date: January 13, 2023    Procedure: left upper arm fistulagram with moderate sedation  Start time: 1640  End time: 1642  Report provided to: MARTY RN  Patient depart time and location: 1710  to     Note: Patient entered Interventional Radiology Suite number 2 via cart. Patient awake, alert and oriented. Assisted onto procedural table in supine position. Prepped and draped.  Dr. Ruelas in room. Time out and procedure started. Vital signs stable. Telemetry reading sinustach.    Procedure well tolerated by patient without complications. Procedure end with debrief by Dr. Ruelas.  Manual pressure applied until hemostasis achieved. Quick clot and Tegaderm  dressing applied to left arm interventional procedure access site.    Administered medication totals:  Lidocaine 1% 4 mL Intradermal  Versed 7 mg IVP  Fentanyl 350 mcg IVP  Hydralazine 20 IIVP    Last dose of sedation administered at 1648.

## 2023-01-13 NOTE — DISCHARGE INSTRUCTIONS
Fistulagram Discharge Instructions     After you go home:    You may resume your normal diet  Have an adult stay with you for 6 hours if you received sedation       For 24 hours - due to the sedation you received:  Relax and take it easy  Do NOT make any important or legal decisions  Do NOT drive or operate machines at home or at work  Do NOT drink alcohol    Care of Puncture Site:    For the first 48 hrs, check your puncture site every couple hours while you are awake   You may remove/change the bandage tomorrow  You may shower tomorrow  No tub baths, whirlpools or swimming until your puncture site has fully healed  If puncture site starts to bleed - apply light pressure to site for 5 minutes or until bleeding stops     Activity     You should try to elevate your arm for the remainder of today to prevent increased swelling  You may go back to your normal activity in 24 hours   Wait 48 hours before lifting, straining, exercise or other strenuous activity    Medicines:    You may resume all your medications  For minor pain, you may take Acetaminophen (Tylenol) or Ibuprofen (Advil)                 Call the provider who ordered this procedure if:    Increased pain or a large or growing hard lump around the site  Blood or fluid is draining from the site  The site is red, swollen, hot or tender  Chills or a fever greater than 101 F (38 C)  Pain that is getting worse  Any questions or concerns      Call  911 or go to the Emergency Room if:  Severe pain or trouble breathing  Bleeding that you cannot control      If you have questions call:          [facility] @ [phone]        The provider who performed your procedure was _________________.

## 2023-01-13 NOTE — PROGRESS NOTES
Care Suites Post Procedure Note    Patient Information  Name: Tacos Dominguez  Age: 62 year old    Post Procedure  Time patient returned to Care Suites: 1715  Concerns/abnormal assessment: no  If abnormal assessment, provider notified: N/A  Plan/Other: one hour bedrest following last dose of sedation at 1645    Shena Yin RN

## 2023-01-13 NOTE — PRE-PROCEDURE
GENERAL PRE-PROCEDURE:   Procedure:  Left upper extremity fistulagram  Date/Time:  1/13/2023 1:19 PM    Written consent obtained?: Yes    Risks and benefits: Risks, benefits and alternatives were discussed    Consent given by:  Patient  Patient states understanding of procedure being performed: Yes    Patient's understanding of procedure matches consent: Yes    Procedure consent matches procedure scheduled: Yes    Expected level of sedation:  Moderate  Appropriately NPO:  Yes  ASA Class:  3  Mallampati  :  Grade 2- soft palate, base of uvula, tonsillar pillars, and portion of posterior pharyngeal wall visible  Lungs:  Lungs clear with good breath sounds bilaterally  Heart:  Normal heart sounds and rate  History & Physical reviewed:  History and physical reviewed and no updates needed  Statement of review:  I have reviewed the lab findings, diagnostic data, medications, and the plan for sedation

## 2023-01-14 NOTE — PROGRESS NOTES
Care Suites Discharge Nursing Note    Patient Information  Name: Tacos Dominguez  Age: 62 year old    Discharge Education:  Discharge instructions reviewed: Yes  Additional education/resources provided: none  Patient/patient representative verbalizes understanding: N/A  Patient discharging on new medications: No  Medication education completed: N/A    Discharge Plans:   Discharge location: home  Discharge ride contacted: Yes  Approximate discharge time: 1830    Discharge Criteria:  Discharge criteria met and vital signs stable: Yes    Patient Belongs:  Patient belongings returned to patient: Yes    Shena Yin RN

## 2023-01-19 ENCOUNTER — TELEPHONE (OUTPATIENT)
Dept: OTHER | Facility: CLINIC | Age: 63
End: 2023-01-19
Payer: COMMERCIAL

## 2023-01-19 DIAGNOSIS — N18.6 ESRD (END STAGE RENAL DISEASE) ON DIALYSIS (H): Primary | ICD-10-CM

## 2023-01-19 DIAGNOSIS — Z01.818 PREOP TESTING: ICD-10-CM

## 2023-01-19 DIAGNOSIS — Z99.2 ESRD (END STAGE RENAL DISEASE) ON DIALYSIS (H): Primary | ICD-10-CM

## 2023-01-19 NOTE — TELEPHONE ENCOUNTER
Allergic Rhinitis  Allergic rhinitis is an allergic reaction that affects the nose, and often the eyes. It’s often known as nasal allergies. Nasal allergies are often due to things in the environment that are breathed in. Depending what you are sensitive to, nasal allergies may occur only during certain seasons. Or they may occur year round. Common indoor allergens include house dust mites, mold, cockroaches, and pet dander. Outdoor allergens include pollen from trees, grasses, and weeds.   Symptoms include a drippy, stuffy, and itchy nose. They also include sneezing and red and itchy eyes. You may feel tired more often. Severe allergies may also affect your breathing and trigger a condition called asthma.   Tests can be done to see what allergens are affecting you. You may be referred to an allergy specialist for testing and further evaluation.  Home care  The healthcare provider may prescribe medicines to help relieve allergy symptoms.   Ask the provider for advice on how to avoid substances that you are allergic to. Below are a few tips for each type of allergen.  Pet dander:  · Do not have pets with fur and feathers.  · If you cannot avoid having a pet, keep it out of your bedroom and off upholstered furniture.  Pollen:  · When pollen counts are high, keep windows of your car and home closed. If possible, use an air conditioner instead.  · Wear a filter mask when mowing or doing yard work.  House dust mites:  · Wash bedding every week in warm water and detergent and dry on a hot setting.  · Cover the mattress, box spring, and pillows with allergy covers.   · If possible, sleep in a room with no carpet, curtains, or upholstered furniture.  Cockroaches:  · Store food in sealed containers.  · Remove garbage from the home promptly.  · Fix water leaks  Mold:  · Keep humidity low by using a dehumidifier or air conditioner. Keep the dehumidifier and air conditioner clean and free of mold.  · Clean moldy areas with  Referral received via fax on 1/17/23.    Pt referred to [facility] by LENNY Dick for fistula creation.    Pt needs to be scheduled for BUE vein map and consult with Dr. Gramajo (per referring request).  Will route to scheduling to coordinate an appointment at next available.    Appt note: Ref by LENNY Dick // Dr. Scherer for fistula creation; dialyzes MWF at [place] via [facility].    BARBARA BullN, RN-[facility] [place]       bleach and water.  In general:  · Vacuum once or twice a week. If possible, use a vacuum with a high-efficiency particulate air (HEPA) filter.  · Do not smoke. Avoid cigarette smoke. Cigarette smoke is an irritant that can make symptoms worse.  Follow-up care  Follow up as advised by the health care provider or our staff. If you were referred to an allergy specialist, make this appointment promptly.  When to seek medical advice  Call your healthcare provider right away if the following occur:  · Coughing or wheezing  · Fever greater than 100.4°F (38°C)  · Continuing symptoms, new symptoms, or worsening symptoms  Call 911 right away if you have:  · Trouble breathing  · Hives (raised red bumps)  · Severe swelling of the face or severe itching of the eyes or mouth  © 8062-6704 [employer]. [address] [place] [address]. All rights reserved. This information is not intended as a substitute for professional medical care. Always follow your healthcare professional's instructions.      Oral Steroids: Things you need to know    · When taking oral steroids (such as Prednisone, Prednisolone/Orapred, Medrol), you should take them after eating food and take them early in the day (no later than 5 pm) to avoid stomach upset and insomnia.    · A small percentage of people who take Prednisone can also feel \"over-emotional\" while taking it (anxious, muñoz, etc).  Children will often behave \"hyper\" while on this medication. If these effects occur, and you feel that they cannot be tolerated for the course of the medication, stop the medication.    · While taking oral steroids, you may be more sensitive to sunlight. Protect yourself with sunscreen and ultraviolet-protecting clothing, and avoid prolonged sun exposure especially between 10 a.m to 4 p.m., while on this medication.    *NOTE: We have extended our hours of operation -- see  below!    --------------------------------------------------------------------------------------------------------------  [facility]    Monday - Thursday 8 a.m. - 8 p.m.  *Friday  8 a.m. - 8 p.m.  *Saturday  8 a.m. - 4 p.m.  Sunday   Closed  -*-*-*-*-*-*-*-  The Urgent Care at [facility] ([address] [place])   is now open on SUNDAYS: 8 a.m. to 4 p.m.  -*-*-*-*-*-*-*-  [email or web address]  See current wait times for [facility] in real-time!  Reserve your waiting-room spot in line!   Receive text/email messages if our wait times are long,  so that you can do your waiting at home or work, instead of in our waiting room.     Note: This system does not guarantee an \"appointment time\" to see a provider. Also, patients may be called out of the waiting room \"ahead of turn\" in emergency situations, at discretion of Urgent Care staff.  ----------------  Thank you for choosing [facility] today. We hope you had a pleasant experience and we look forward to serving your future needs.    If you have any questions about your VISIT, please call [phone].    If you have any questions about your BILL, please call [phone].    If you need a copy of your MEDICAL RECORD, please call [phone].    If you receive a survey in the mail about today's services, we hope that you will take a few minutes to let us know about your experience.  --------------------------------------------------------------------------------------------------------------  UNLESS OTHERWISE INSTRUCTED BY YOUR URGENT CARE PROVIDER TODAY, all follow-up for your medical issues should be managed by your primary care provider. The Urgent Care does not manage chronic medical issues or refill medications. You are responsible for scheduling and keeping any necessary follow-up visits with your primary care provider after this visit today.    --------------------------------------------------------------------------------------------------------------  IF YOU WERE PRESCRIBED AN ANTIBIOTIC TODAY: We recommend taking an over-the-counter probiotic (Such as Florajen 3 for adults, or [id number] for children -- AVAILABLE IN THE [facility] and other local pharmacies too) once a day for the entire duration of your antibiotics, and continuing it for 2 weeks after the antibiotics are finished. This will help reduce your chance of developing antibiotic-related diarrhea and/or yeast infections.  --------------------------------------------------------------------------------------------------------------  IF YOU HAVE LAB RESULTS or XRAY REPORTS STILL PENDING: We typically call patients back only if (1) the results are \"significantly abnormal\", (2) we need to change your treatment plan based on the results, or (3) the report is different than what we told you during your visit. If we have not called you about your results 48 hours after your visit, you can call us at [phone] to check on the status.  --------------------------------------------------------------------------------------------------------------    [facility] (Adult and Pediatric)     Dr. Marciano Ruiz  · Monday and Thursday: [place] [phone]  · Tuesday: [place] [phone]  · Wednesday: [place] [phone]        [facility] Providers Accepting New Patients  In [facility]   [phone]  Adolfo Erazo MD Family Practice -- Limited Openings  Karen Arora MD OB/ GYN  Bleser, Vicki NP Pediatrics  Clementina Toscano DO Family Practice and Sports Medicine  Vinayak Correia MD Pediatrics  Kimberly Adams NP Family Practice and Diabetes Management  Chidi Chappell MD Pediatrics  Mariia, Molina DEAL OB/ GYN  Nomi Carlton MD Family Practice and Sports Medicine  Chau Weathers MD Internal Medicine -- Limited Openings  Александр  Misael DEAL Internal Medicine -- Limited Openings  April Lyons  DO Family Practice -- Limited Openings  Suzi Cowart MD Internal Medicine - 25 years and older  Carla Dee MD Family Practice    [place]              [phone]  Radha Medeiros MD Pediatrics  Deanna Curtis MD  OB/ GYN  Saritha Murphy NP Family Practice  Dixie Ruby MD Family Practice  Johana Pickett NP Internal Medicine -- Limited Openings    [place]  [phone]  Shashank Guzman MD Family Practice (starts 11/7/16)  [facility] (through 12/31/16)  Angie Major  DO Pediatrics    [place]   [phone]  Ian Ochoa MD OB/GYN  Leslye Lainez NP Family Practice - 6 months and up  Magdalena Acevedo CNM OB/GYN (Midwife)  Melanie Bah [facility]  Carrie Nava Family Practice  Rowdy Vail MD Family Practice -- Limited Openings  Paresh Arcos MD [facility] -- Limited Openings    [facility] [phone]  JasminmsNathen patrick MD [facility]    [place]             [phone]  Sharon Torres NP Internal Medicine (also follow-up visits for ages 2 and older)  Radha Medeiros MD Pediatrics  ChadwickZach MD Internal Medicine  Deanna Curtis MD OB/GYN  Steffi Ardon MD Internal Medicine -- Limited Openings  Akin Santacruz MD Family Practice  Katlyn Christina MD OB/GYN -- Limited Openings  Chelsey Major  DO Pediatrics  Kanika Layne MD OB/ GYN -- Limited Openings  Irina Warren DO Family Practice  Nila Fan NP Family Practice (ages 12 and older)    [place]              [phone]  Yuri Mayers MD Family Practice  Gladys Santana MD [facility]  Steve Francisco  PA [facility]             [phone]  Kirsten Traore MD Internal Medicine  Padmini Lemon MD Family Practice  Sabra Hunt [facility]/Internal Medicine    [place]  [phone]  Camille Ortez MD Family Practice  Belle Hudson MD [facility]  [facility]  [phone]  Valdez Hoyt MD  Family Practice  Jeremy Duran MD [facility] -- Limited Openings  ChristinaKatlyn garnica MD OB/GYN -- Limited Openings      [place]  [phone]  Karen Arora MD OB/GYN  Nancy Delcid MD Family Practice  Cherelle Sands NP [facility]  Luis Valverde NP Pediatrics  Karlie Rodriguez MD Pediatrics  Saqib Jules MD Internal Medicine  WacoLianne bejarano NP Internal Medicine

## 2023-01-20 NOTE — TELEPHONE ENCOUNTER
Spoke with patient and scheduled his BUE vein map on 2/2/23 at [facility] and scheduled his consult with Dr. Gramajo on 2/2/3.

## 2023-01-30 NOTE — PROGRESS NOTES
[place] [facility]    Tacos Dominguez has end-stage renal disease and comes today for evaluation of permanent hemodialysis access.  Dialyzes at [facility] unit [id number] Has a left upper arm fistula.    Patent fistula itself has been working well but he notices increased swelling in his hand.  He has had 2 attempts at opening up the subclavian vein which was not successful.  Swelling is present but not severe though his arms at least twice the diameter of the dominant right arm.      PMH: Medications:  Norvasc, Zocor, calcium acetate, Prilosec     Allergies: Lisinopril     Medical: Hypertension    Hyperlipidemia on statin    History of Crohn's disease--large intestine     History of NSTEMI and atrial fibrillation    History aortic dissection     Intraductal papillary mucinous neoplasm of pancreas    GERD      Known occlusion left subclavian vein.  Unable to recannulate on 1/13/2023 venogram.  Review of fistulogram reveals a patent left upper arm brachial cephalic fistula with occlusion of the medial cephalic vein and medial subclavian vein.  Internal jugular vein and innominate vein fill via collaterals and appear to be relatively normal diameter.    This was also documented on the fistulogram on 12/1/2022 but they could not pass to the occluded subclavian vein.    Exam: Alert and appropriate.  Normal affect.  Quite pleasant.   Blood pressure 155/73 right arm.  Pulse 88 regular   HEENT= full range of motion.  Thin   Chest = clear.  Easily palpable left intraclavicular cephalic vein fistul a Cardiovascular= regular rate   Left arm is swollen down to the digits.  Full range of motion.   Well-developed left upper arm fistula with strong pulse.      Vein mapping today revealed smaller veins in the right arm.  Left fistula averages approximately 10 mm in diameter.        IMPRESSION: Left arm swelling due to left subclavian vein occlusion that cannot be reopened.  Has extensive collaterals which fill  into the jugular vein which appears to be patent otherwise.  I would recommend salvaging his fistula with an outflow revision from the infraclavicular cephalic vein portion to the jugular vein with a 10 mm thin-walled ringed PTFE graft and this is been discussed with the patient.  Would not ligate any collaterals since they do increase the outflow of the fistula.  This should resolve his arm swelling also.    Risks and benefits were discussed with the patient.  This would be performed under general anesthetic with likely 1 day stay in the hospital.  They would be able to continue using the fistula immediately postoperatively.    35 minutes with patient today including review of prior fistulograms.      Mo Gramajo MD   This note was created using Dragon voice recognition software which may result in transcription errors.      CC: [facility]    Dr Scherer

## 2023-01-31 ENCOUNTER — OFFICE VISIT (OUTPATIENT)
Dept: CARDIOLOGY | Facility: CLINIC | Age: 63
End: 2023-01-31
Attending: NURSE PRACTITIONER
Payer: COMMERCIAL

## 2023-01-31 VITALS
OXYGEN SATURATION: 100 % | SYSTOLIC BLOOD PRESSURE: 179 MMHG | HEIGHT: 69 IN | BODY MASS INDEX: 26.82 KG/M2 | HEART RATE: 96 BPM | WEIGHT: 181.1 LBS | DIASTOLIC BLOOD PRESSURE: 84 MMHG

## 2023-01-31 DIAGNOSIS — N18.6 END STAGE RENAL DISEASE (H): ICD-10-CM

## 2023-01-31 DIAGNOSIS — Z76.82 ORGAN TRANSPLANT CANDIDATE: ICD-10-CM

## 2023-01-31 DIAGNOSIS — Z01.818 PRE-TRANSPLANT EVALUATION FOR KIDNEY TRANSPLANT: ICD-10-CM

## 2023-01-31 DIAGNOSIS — I10 ESSENTIAL HYPERTENSION: ICD-10-CM

## 2023-01-31 PROCEDURE — G0463 HOSPITAL OUTPT CLINIC VISIT: HCPCS | Performed by: INTERNAL MEDICINE

## 2023-01-31 PROCEDURE — 99215 OFFICE O/P EST HI 40 MIN: CPT | Performed by: INTERNAL MEDICINE

## 2023-01-31 PROCEDURE — G0463 HOSPITAL OUTPT CLINIC VISIT: HCPCS

## 2023-01-31 RX ORDER — CALCITRIOL 0.5 UG/1
1.5 CAPSULE, LIQUID FILLED ORAL DAILY
COMMUNITY
Start: 2023-01-30 | End: 2023-01-01

## 2023-01-31 RX ORDER — CARVEDILOL 12.5 MG/1
12.5 TABLET ORAL 2 TIMES DAILY
COMMUNITY
Start: 2023-01-30 | End: 2024-01-01

## 2023-01-31 ASSESSMENT — PAIN SCALES - GENERAL: PAINLEVEL: NO PAIN (0)

## 2023-01-31 NOTE — NURSING NOTE
Chief Complaint   Patient presents with     New Patient     kidney transplant eval           Vitals were taken and medications reconciled.     Kurt Diaz, EMT   10:07 AM

## 2023-01-31 NOTE — LETTER
1/31/2023      RE: Tacos Dominguez  [address]  [facility] [address]       Dear Colleague,    Thank you for the opportunity to participate in the care of your patient, Tacos Dominguez, at the [facility] [place] [facility]. Please see a copy of my visit note below.       SUBJECTIVE:  Tacos Dominguez is a 63 year old male who presents for renal transplant evaluation.  He is currently on hemodialysis for about 3 years as per history.  As per patient he is active and had no cardiac symptoms.  Do have occasional shortness of breath.  Past medical history is significant for  Crohn's dz, HTN, paroxysmal  atrial fibrillation and CKD on HD (started 10/2020).  He had an NSTEMI in 10/2020 without coronary angiogram (trop 77) and had inferior hypokinesis noted on TTE, normal stress test.   Current cardiac medications are carvedilol 12.5 mg twice a day, amlodipine 10 mg daily and simvastatin 20 mg daily..  No diabetes.  Patient is a current smoker planning to quit.    Patient Active Problem List    Diagnosis Date Noted     IPMN (intraductal papillary mucinous neoplasm) 03/17/2022     Priority: Medium     Crohn's disease of large intestine (H) 11/22/2021     Priority: Medium     ESRD (end stage renal disease) (H) 04/26/2021     Priority: Medium     Added automatically from request for surgery [id number]       Organ transplant candidate 04/26/2021     Priority: Medium     Added automatically from request for surgery [id number]       NSTEMI (non-ST elevated myocardial infarction) (H) 04/26/2021     Priority: Medium     Atrial fibrillation (H) 04/26/2021     Priority: Medium     Benign essential hypertension 04/26/2021     Priority: Medium     Acquired cyst of kidney 04/23/2021     Priority: Medium    .  Current Outpatient Medications   Medication Sig     amLODIPine (NORVASC) 10 MG tablet      calcitRIOL (ROCALTROL) 0.5 MCG capsule 1.5 mcg daily     calcium  acetate (CALPHRON) 667 MG TABS tablet 2,668 mg 3 times daily      carvedilol (COREG) 12.5 MG tablet Take 12.5 mg by mouth 2 times daily     folic acid (FOLVITE) 1 MG tablet Take 1 mg by mouth every morning      lidocaine-prilocaine (EMLA) 2.5-2.5 % external cream three times a week Monday, Wednesday, Friday     multivitamin RENAL (MULTIVITAMIN RENAL) 1 MG capsule every morning     omeprazole (PRILOSEC) 20 MG DR capsule Take 20 mg by mouth every morning      simvastatin (ZOCOR) 20 MG tablet Take 20 mg by mouth every morning      ustekinumab (STELARA) 90 MG/ML Inject 1 ml ( 90 mg) subcutaneous every 4 weeks.     bisacodyl (DULCOLAX) 5 MG EC tablet Take 2 tablets at 3 pm the day before your procedure. If your procedure is before 11 am, take 2 additional tablets at 11 pm. If your procedure is after 11 am, take 2 additional tablets at 6 am. For additional instructions refer to your colonoscopy prep instructions. (Patient not taking: Reported on 1/31/2023)     polyethylene glycol (GOLYTELY) 236 g suspension The night before the exam at 6 pm drink an 8-ounce glass every 15 minutes until the jug is half empty. If you arrive before 11 AM: Drink the other half of the Golytely jug at 11 PM night before procedure. If you arrive after 11 AM: Drink the other half of the Golytely jug at 6 AM day of procedure. For additional instructions refer to your colonoscopy prep instructions. (Patient not taking: Reported on 1/31/2023)     ustekinumab (STELARA) 90 MG/ML Inject 1 ml ( 90 mg) subcutaneous every 8 weeks. (Patient not taking: Reported on 1/31/2023)     Current Facility-Administered Medications   Medication     lidocaine 1% with EPINEPHrine 1:100,000 injection 3 mL     Past Medical History:   Diagnosis Date     Aortic dissection (H)     distal, thin.  stable/chronic on MRCP 3/2022     Benign essential hypertension      Cerebral infarction (H)      Chronic atrial fibrillation (H)      Crohn's colitis (H)      Crohn's disease of  large intestine (H) 11/22/2021     Esophageal reflux      ESRD (end stage renal disease) on dialysis (H)      History of basal cell carcinoma      Hypertension      Mixed hyperlipidemia      NSTEMI (non-ST elevated myocardial infarction) (H)      Past Surgical History:   Procedure Laterality Date     ABDOMEN SURGERY      x 6.  colon resections for crohn's disease     APPENDECTOMY       CHOLECYSTECTOMY       COLONOSCOPY N/A 07/20/2021    Procedure: COLONOSCOPY, WITH POLYPECTOMY AND BIOPSY;  Surgeon: Eric Preston MD;  Location:  GI     COLONOSCOPY N/A 12/7/2022    Procedure: COLONOSCOPY, WITH POLYPECTOMY AND BIOPSY;  Surgeon: Willian Kee MD;  Location:  GI     CV CORONARY ANGIOGRAM N/A 05/25/2021    Procedure: CV CORONARY ANGIOGRAM;  Surgeon: Judson Ybarra MD;  Location:  HEART CARDIAC CATH LAB     ESOPHAGOSCOPY, GASTROSCOPY, DUODENOSCOPY (EGD), COMBINED N/A 07/20/2021    Procedure: ESOPHAGOGASTRODUODENOSCOPY, WITH FINE NEEDLE ASPIRATION BIOPSY, WITH ENDOSCOPIC ULTRASOUND GUIDANCE;  Surgeon: Eric Preston MD;  Location:  GI     IR DIALYSIS FISTULOGRAM LEFT  12/01/2022     IR DIALYSIS FISTULOGRAM LEFT  1/13/2023     LAPAROTOMY, LYSIS ADHESIONS, COMBINED N/A 03/17/2022    Procedure: Laparotomy, extensive lysis adhesions, combined;  Surgeon: Sarath Smith MD;  Location:  OR     PANCREATECTOMY PARTIAL N/A 03/17/2022    Procedure: Open Subtotal Pancreatectomy, intra-op ultrasound;  Surgeon: Sarath Smith MD;  Location:  OR     VASCULAR SURGERY      dialysis access- left upper     Allergies   Allergen Reactions     Lisinopril Anaphylaxis and Other (See Comments)     Shortness of breath     Social History     Socioeconomic History     Marital status:      Spouse name: Not on file     Number of children: 1     Years of education: Not on file     Highest education level: Not on file   Occupational History     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 Not Asked   Social History Narrative     Not on file     Social Determinants of Health     Financial Resource Strain: Not on file   Food Insecurity: Not on file   Transportation Needs: Not on file   Physical Activity: Not on file   Stress: Not on file   Social Connections: Not on file   Intimate Partner Violence: Not on file   Housing Stability: Not on file     Family History   Problem Relation Age of Onset     Breast Cancer Mother      Coronary Artery Disease Father      Hypertension Brother      Anesthesia Reaction No family hx of      Thrombosis No family hx of           REVIEW OF SYSTEMS:  General: negative, fever, chills, night sweats  Skin: negative, acne, rash and scaling  Eyes: negative, double vision, eye pain and photophobia  Ears/Nose/Throat: negative, nasal congestion and purulent rhinorrhea  Respiratory: No dyspnea on exertion, No cough, No hemoptysis and negative  Cardiovascular: negative, palpitations, tachycardia, irregular heart beat, chest pain, exertional chest pain or pressure, paroxysmal nocturnal dyspnea, dyspnea on exertion and orthopnea       OBJECTIVE:  Blood pressure (!) 179/84, pulse 96, height 1.754 m (5' 9.06\"), weight 82.1 kg (181 lb 1.6 oz), SpO2 100 %.  General Appearance: alert and no distress  Head: Normocephalic. No masses, lesions, tenderness or abnormalities  Eyes: conjuctiva clear, PERRL, EOM intact  Ears: External ears normal. Canals clear. TM's normal.  Nose: Nares normal  Mouth: normal  Neck: Supple, no cervical adenopathy, no thyromegaly  Lungs: clear to " auscultation  Cardiac: A. fib.  No murmur.       ASSESSMENT/PLAN:  Patient here for renal transplant evaluation.  Currently patient is on hemodialysis for the past 3 years.  As per patient he is not very active but do have occasional shortness of breath but no other cardiac symptoms.  Cardiac risk factors are hypertension, hyperlipidemia and current smoking.  No diabetes or premature coronary artery disease in family.  Patient with prior history of atrial fibrillation but not on anticoagulation currently.  EKG reviewed normal sinus rhythm.  LVH by voltage.  Prior echocardiogram showed normal biventricular function.  Mild to moderate mitral insufficiency otherwise unremarkable.  Because of prior history of NSTEMI with peak troponin of 77 patient had a coronary angiogram on 5/25/2021 which is shown below.                Coronary angiogram showed normal coronary arteries except 30% proximal RCA stenosis.  Patient had a recent coronary angiogram showing nonflow limiting lesions no additional testing is needed at this time.  But will need a repeat echocardiogram to assess mitral regurgitation as well as possible stress test in future.\    Patient may be placed on the waiting list as he do not have any potential donors available.  Will need follow-up in 1 year with a repeat echocardiogram.  Total visit duration 60 minutes.  This included face-to-face interview, physical exam, chart review, review of Care Everywhere, EKGs, echocardiogram coronary angiogram and documentation.      Please do not hesitate to contact me if you have any questions/concerns.     Sincerely,     JEN Oliver MD

## 2023-01-31 NOTE — PROGRESS NOTES
SUBJECTIVE:  Tacos Dominguez is a 63 year old male who presents for renal transplant evaluation.  He is currently on hemodialysis for about 3 years as per history.  As per patient he is active and had no cardiac symptoms.  Do have occasional shortness of breath.  Past medical history is significant for  Crohn's dz, HTN, paroxysmal  atrial fibrillation and CKD on HD (started 10/2020).  He had an NSTEMI in 10/2020 without coronary angiogram (trop 77) and had inferior hypokinesis noted on TTE, normal stress test.   Current cardiac medications are carvedilol 12.5 mg twice a day, amlodipine 10 mg daily and simvastatin 20 mg daily..  No diabetes.  Patient is a current smoker planning to quit.    Patient Active Problem List    Diagnosis Date Noted     IPMN (intraductal papillary mucinous neoplasm) 03/17/2022     Priority: Medium     Crohn's disease of large intestine (H) 11/22/2021     Priority: Medium     ESRD (end stage renal disease) (H) 04/26/2021     Priority: Medium     Added automatically from request for surgery [id number]       Organ transplant candidate 04/26/2021     Priority: Medium     Added automatically from request for surgery [id number]       NSTEMI (non-ST elevated myocardial infarction) (H) 04/26/2021     Priority: Medium     Atrial fibrillation (H) 04/26/2021     Priority: Medium     Benign essential hypertension 04/26/2021     Priority: Medium     Acquired cyst of kidney 04/23/2021     Priority: Medium    .  Current Outpatient Medications   Medication Sig     amLODIPine (NORVASC) 10 MG tablet      calcitRIOL (ROCALTROL) 0.5 MCG capsule 1.5 mcg daily     calcium acetate (CALPHRON) 667 MG TABS tablet 2,668 mg 3 times daily      carvedilol (COREG) 12.5 MG tablet Take 12.5 mg by mouth 2 times daily     folic acid (FOLVITE) 1 MG tablet Take 1 mg by mouth every morning      lidocaine-prilocaine (EMLA) 2.5-2.5 % external cream three times a week Monday, Wednesday, Friday     multivitamin RENAL (MULTIVITAMIN  RENAL) 1 MG capsule every morning     omeprazole (PRILOSEC) 20 MG DR capsule Take 20 mg by mouth every morning      simvastatin (ZOCOR) 20 MG tablet Take 20 mg by mouth every morning      ustekinumab (STELARA) 90 MG/ML Inject 1 ml ( 90 mg) subcutaneous every 4 weeks.     bisacodyl (DULCOLAX) 5 MG EC tablet Take 2 tablets at 3 pm the day before your procedure. If your procedure is before 11 am, take 2 additional tablets at 11 pm. If your procedure is after 11 am, take 2 additional tablets at 6 am. For additional instructions refer to your colonoscopy prep instructions. (Patient not taking: Reported on 1/31/2023)     polyethylene glycol (GOLYTELY) 236 g suspension The night before the exam at 6 pm drink an 8-ounce glass every 15 minutes until the jug is half empty. If you arrive before 11 AM: Drink the other half of the Golytely jug at 11 PM night before procedure. If you arrive after 11 AM: Drink the other half of the Golytely jug at 6 AM day of procedure. For additional instructions refer to your colonoscopy prep instructions. (Patient not taking: Reported on 1/31/2023)     ustekinumab (STELARA) 90 MG/ML Inject 1 ml ( 90 mg) subcutaneous every 8 weeks. (Patient not taking: Reported on 1/31/2023)     Current Facility-Administered Medications   Medication     lidocaine 1% with EPINEPHrine 1:100,000 injection 3 mL     Past Medical History:   Diagnosis Date     Aortic dissection (H)     distal, thin.  stable/chronic on MRCP 3/2022     Benign essential hypertension      Cerebral infarction (H)      Chronic atrial fibrillation (H)      Crohn's colitis (H)      Crohn's disease of large intestine (H) 11/22/2021     Esophageal reflux      ESRD (end stage renal disease) on dialysis (H)      History of basal cell carcinoma      Hypertension      Mixed hyperlipidemia      NSTEMI (non-ST elevated myocardial infarction) (H)      Past Surgical History:   Procedure Laterality Date     ABDOMEN SURGERY      x 6.  colon resections for  crohn's disease     APPENDECTOMY       CHOLECYSTECTOMY       COLONOSCOPY N/A 2021    Procedure: COLONOSCOPY, WITH POLYPECTOMY AND BIOPSY;  Surgeon: Eric Preston MD;  Location:  GI     COLONOSCOPY N/A 2022    Procedure: COLONOSCOPY, WITH POLYPECTOMY AND BIOPSY;  Surgeon: Willian Kee MD;  Location:  GI     CV CORONARY ANGIOGRAM N/A 2021    Procedure: CV CORONARY ANGIOGRAM;  Surgeon: Judson Ybarra MD;  Location:  [facility]ATH LAB     ESOPHAGOSCOPY, GASTROSCOPY, DUODENOSCOPY (EGD), COMBINED N/A 2021    Procedure: ESOPHAGOGASTRODUODENOSCOPY, WITH FINE NEEDLE ASPIRATION BIOPSY, WITH ENDOSCOPIC ULTRASOUND GUIDANCE;  Surgeon: Eric Preston MD;  Location:  GI     IR DIALYSIS FISTULOGRAM LEFT  2022     IR DIALYSIS FISTULOGRAM LEFT  2023     LAPAROTOMY, LYSIS ADHESIONS, COMBINED N/A 2022    Procedure: Laparotomy, extensive lysis adhesions, combined;  Surgeon: Sarath Smith MD;  Location:  OR     PANCREATECTOMY PARTIAL N/A 2022    Procedure: Open Subtotal Pancreatectomy, intra-op ultrasound;  Surgeon: Sarath Smith MD;  Location:  OR     VASCULAR SURGERY      dialysis access- left upper     Allergies   Allergen Reactions     Lisinopril Anaphylaxis and Other (See Comments)     Shortness of breath     Social History     Socioeconomic History     Marital status:      Spouse name: Not on file     Number of children: 1     Years of education: Not on file     Highest education level: Not on file   Occupational History     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 Not Asked   Social History Narrative     Not on file     Social Determinants of Health     Financial Resource Strain: Not on file   Food Insecurity: Not on file   Transportation Needs: Not on file   Physical Activity: Not on file   Stress: Not on file   Social Connections: Not on file   Intimate Partner Violence: Not on file   Housing Stability: Not on file     Family History   Problem Relation Age of Onset     Breast Cancer Mother      Coronary Artery Disease Father      Hypertension Brother      Anesthesia Reaction No family hx of      Thrombosis No family hx of           REVIEW OF SYSTEMS:  General: negative, fever, chills, night sweats  Skin: negative, acne, rash and scaling  Eyes: negative, double vision, eye pain and photophobia  Ears/Nose/Throat: negative, nasal congestion and purulent rhinorrhea  Respiratory: No dyspnea on exertion, No cough, No hemoptysis and negative  Cardiovascular: negative, palpitations, tachycardia, irregular heart beat, chest pain, exertional chest pain or pressure, paroxysmal nocturnal dyspnea, dyspnea on exertion and orthopnea       OBJECTIVE:  Blood pressure (!) 179/84, pulse 96, height 1.754 m (5' 9.06\"), weight 82.1 kg (181 lb 1.6 oz), SpO2 100 %.  General Appearance: alert and no distress  Head: Normocephalic. No masses, lesions, tenderness or abnormalities  Eyes: conjuctiva clear, PERRL, EOM intact  Ears: External ears normal. Canals clear. TM's normal.  Nose: Nares normal  Mouth: normal  Neck: Supple, no cervical adenopathy, no thyromegaly  Lungs: clear to auscultation  Cardiac: A. fib.  No murmur.       ASSESSMENT/PLAN:  Patient here for renal transplant evaluation.  Currently patient is on hemodialysis for the past 3 years.  As per patient he is not very active but do have occasional shortness of breath but no other cardiac symptoms.  Cardiac risk factors are hypertension, hyperlipidemia and " current smoking.  No diabetes or premature coronary artery disease in family.  Patient with prior history of atrial fibrillation but not on anticoagulation currently.  EKG reviewed normal sinus rhythm.  LVH by voltage.  Prior echocardiogram showed normal biventricular function.  Mild to moderate mitral insufficiency otherwise unremarkable.  Because of prior history of NSTEMI with peak troponin of 77 patient had a coronary angiogram on 5/25/2021 which is shown below.                Coronary angiogram showed normal coronary arteries except 30% proximal RCA stenosis.  Patient had a recent coronary angiogram showing nonflow limiting lesions no additional testing is needed at this time.  But will need a repeat echocardiogram to assess mitral regurgitation as well as possible stress test in future.\    Patient may be placed on the waiting list as he do not have any potential donors available.  Will need follow-up in 1 year with a repeat echocardiogram.  Total visit duration 60 minutes.  This included face-to-face interview, physical exam, chart review, review of Care Everywhere, EKGs, echocardiogram coronary angiogram and documentation.

## 2023-02-02 ENCOUNTER — HOSPITAL ENCOUNTER (OUTPATIENT)
Dept: ULTRASOUND IMAGING | Facility: CLINIC | Age: 63
Discharge: HOME | End: 2023-02-02
Attending: SURGERY
Payer: COMMERCIAL

## 2023-02-02 ENCOUNTER — OFFICE VISIT (OUTPATIENT)
Dept: OTHER | Facility: CLINIC | Age: 63
End: 2023-02-02
Attending: SURGERY
Payer: COMMERCIAL

## 2023-02-02 VITALS — SYSTOLIC BLOOD PRESSURE: 155 MMHG | HEART RATE: 88 BPM | DIASTOLIC BLOOD PRESSURE: 73 MMHG

## 2023-02-02 DIAGNOSIS — N18.6 ESRD (END STAGE RENAL DISEASE) ON DIALYSIS (H): Primary | ICD-10-CM

## 2023-02-02 DIAGNOSIS — Z99.2 ESRD (END STAGE RENAL DISEASE) ON DIALYSIS (H): ICD-10-CM

## 2023-02-02 DIAGNOSIS — N18.6 ESRD (END STAGE RENAL DISEASE) ON DIALYSIS (H): ICD-10-CM

## 2023-02-02 DIAGNOSIS — Z01.818 PREOP TESTING: ICD-10-CM

## 2023-02-02 DIAGNOSIS — I77.0 AVF (ARTERIOVENOUS FISTULA) (H): ICD-10-CM

## 2023-02-02 DIAGNOSIS — Z99.2 ESRD (END STAGE RENAL DISEASE) ON DIALYSIS (H): Primary | ICD-10-CM

## 2023-02-02 DIAGNOSIS — I82.B12 OCCLUSION OF LEFT SUBCLAVIAN VEIN (H): ICD-10-CM

## 2023-02-02 PROCEDURE — 93970 EXTREMITY STUDY: CPT

## 2023-02-02 PROCEDURE — 99203 OFFICE O/P NEW LOW 30 MIN: CPT | Performed by: SURGERY

## 2023-02-02 PROCEDURE — G0463 HOSPITAL OUTPT CLINIC VISIT: HCPCS | Mod: 25 | Performed by: SURGERY

## 2023-02-02 NOTE — PROGRESS NOTES
[facility]        Patient is here for a consult to discuss AV fistula.     Pt is currently taking no meds that would impact our treatment plan.    BP (!) 155/73 (BP Location: Right arm, Patient Position: Chair, Cuff Size: Adult Regular)   Pulse 88     The provider has been notified that the patient has no concerns.     Questions patient would like addressed today are: N/A.    Refills are needed: N/A    Has homecare services and agency name:  Kayla Miguel MA

## 2023-02-02 NOTE — NURSING NOTE
Patient Education    Procedure: Left upper arm fistula outflow revision from cephalic vein to jugular vein with 10mm thin-walled ringed PTFE graft  Diagnosis: AVF with left subclavian vein occlusion  Anticoagulation Instruction: None  Pre-Operative Physical Exam: You need to have a pre-op physical exam within 30 days of your procedure. Your procedure may be cancelled if you do not have a current History and Physical. Call your PCP's office to schedule.  Allergies:  Updated in Epic  Bowel Prep: n/a  NPO for solid 8 hours prior to arrival time.   NPO for clear liquids 2 hours prior to arrival time.   Post Procedure Education: [facility] post-procedure fact sheet reviewed with patient.    Showering instructions reviewed: Yes    Learner(s):patient  Method: Listening, Reading  Barriers to Learning:No Barrier  Outcome: Patient did verbalize understanding of above education.    Tarsha Dias, BSN, RN-[facility] [place]

## 2023-02-06 ENCOUNTER — TELEPHONE (OUTPATIENT)
Dept: OTHER | Facility: CLINIC | Age: 63
End: 2023-02-06
Payer: COMMERCIAL

## 2023-02-07 NOTE — TELEPHONE ENCOUNTER
Spoke with patient and informed him on his surgery date of 2/14/23 @ 2:15pm with check-in time 12:15PM.

## 2023-02-07 NOTE — TELEPHONE ENCOUNTER
Patient left VM that there are no dates that would not work.    He would like to schedule soon as his arm is getting bigger and bigger and is starting to hurt.    He wants to confirm that it would be an overnight stay and he would be more comfortable with that as it is going into his neck.

## 2023-02-13 ENCOUNTER — TELEPHONE (OUTPATIENT)
Dept: OTHER | Facility: CLINIC | Age: 63
End: 2023-02-13

## 2023-02-13 NOTE — TELEPHONE ENCOUNTER
[place] [facility]    I spoke with Tacos Dominguez this afternoon.  We planned an outflow revision of his left upper arm fistula with recurrent central venous stenosis using graft to the internal jugular vein.  This was performed under general anesthetic.    He will be admitted overnight.  He just finished his dialysis today.  Very likely he may actually skip his dialysis on Wednesday which she is done in the past with no issues.    He had no questions about the procedure.  N.p.o. after midnight.      Mo Gramajo MD

## 2023-02-13 NOTE — PROGRESS NOTES
PTA medications updated by Medication Scribe prior to surgery via phone call with patient (last doses completed by Nurse)     Medication history sources: Patient, Surescripts, H&P and Patient's home med list  In the past week, patient estimated taking medication this percent of the time: Greater than 90%  Adherence assessment: N/A Not Observed    Significant changes made to the medication list:  None      Additional medication history information:   None    Medication reconciliation completed by provider prior to medication history? No    Time spent in this activity: 30 minutes    The information provided in this note is only as accurate as the sources available at the time of update(s)    Prior to Admission medications    Medication Sig Last Dose Taking? Auth Provider Long Term End Date   amLODIPine (NORVASC) 10 MG tablet Take 10 mg by mouth daily  at am Yes Reported, Patient No    calcitRIOL (ROCALTROL) 0.5 MCG capsule Take 1.5 mcg by mouth daily  at am Yes Reported, Patient Yes    calcium acetate (CALPHRON) 667 MG TABS tablet Take 2,668 mg by mouth 3 times daily 2/13/2023 at am Yes Reported, Patient     carvedilol (COREG) 12.5 MG tablet Take 12.5 mg by mouth 2 times daily  at am Yes Reported, Patient Yes 1/30/24   folic acid (FOLVITE) 1 MG tablet Take 1 mg by mouth every morning   at am Yes Reported, Patient     lidocaine-prilocaine (EMLA) 2.5-2.5 % external cream three times a week Monday, Wednesday, Friday 2/13/2023 at am Yes Reported, Patient Yes    multivitamin RENAL (MULTIVITAMIN RENAL) 1 MG capsule Take 1 capsule by mouth every morning  at am Yes Reported, Patient     omeprazole (PRILOSEC) 20 MG DR capsule Take 20 mg by mouth every morning   at am Yes Reported, Patient     simvastatin (ZOCOR) 20 MG tablet Take 20 mg by mouth every morning   at am Yes Reported, Patient Yes    ustekinumab (STELARA) 90 MG/ML Inject 1 ml ( 90 mg) subcutaneous every 4 weeks. January 2023 at Unknown Yes Betty Rubio MD        Medication history completed by:    Ketan Sullivan CPhT  [facility]

## 2023-02-14 ENCOUNTER — ANESTHESIA (OUTPATIENT)
Dept: SURGERY | Facility: CLINIC | Age: 63
DRG: 252 | End: 2023-02-14
Payer: COMMERCIAL

## 2023-02-14 ENCOUNTER — APPOINTMENT (OUTPATIENT)
Dept: SURGERY | Facility: PHYSICIAN GROUP | Age: 63
End: 2023-02-14
Payer: COMMERCIAL

## 2023-02-14 ENCOUNTER — HOSPITAL ENCOUNTER (INPATIENT)
Facility: CLINIC | Age: 63
LOS: 1 days | Discharge: HOME | DRG: 252 | End: 2023-02-15
Attending: SURGERY | Admitting: SURGERY
Payer: COMMERCIAL

## 2023-02-14 ENCOUNTER — ANESTHESIA EVENT (OUTPATIENT)
Dept: SURGERY | Facility: CLINIC | Age: 63
DRG: 252 | End: 2023-02-14
Payer: COMMERCIAL

## 2023-02-14 DIAGNOSIS — D49.2 NEOPLASM OF UNSPECIFIED BEHAVIOR OF BONE, SOFT TISSUE, AND SKIN: ICD-10-CM

## 2023-02-14 PROBLEM — Z98.890 STATUS POST REPAIR OF ARTERIOVENOUS FISTULA: Status: ACTIVE | Noted: 2023-02-14

## 2023-02-14 PROBLEM — Z86.79 STATUS POST REPAIR OF ARTERIOVENOUS FISTULA: Status: ACTIVE | Noted: 2023-02-14

## 2023-02-14 LAB
ABO/RH(D): NORMAL
ANION GAP SERPL CALCULATED.3IONS-SCNC: 13 MMOL/L (ref 7–15)
ANTIBODY SCREEN: NEGATIVE
BUN SERPL-MCNC: 26.2 MG/DL (ref 8–23)
CALCIUM SERPL-MCNC: 9.8 MG/DL (ref 8.8–10.2)
CHLORIDE SERPL-SCNC: 96 MMOL/L (ref 98–107)
CREAT SERPL-MCNC: 7.63 MG/DL (ref 0.67–1.17)
DEPRECATED HCO3 PLAS-SCNC: 31 MMOL/L (ref 22–29)
GFR SERPL CREATININE-BSD FRML MDRD: 7 ML/MIN/1.73M2
GLUCOSE SERPL-MCNC: 76 MG/DL (ref 70–99)
HGB BLD-MCNC: 7.8 G/DL (ref 13.3–17.7)
PLATELET # BLD AUTO: 134 10E3/UL (ref 150–450)
POTASSIUM SERPL-SCNC: 4.8 MMOL/L (ref 3.4–5.3)
SODIUM SERPL-SCNC: 140 MMOL/L (ref 136–145)
SPECIMEN EXPIRATION DATE: NORMAL

## 2023-02-14 PROCEDURE — 120N000001 HC R&B MED SURG/OB

## 2023-02-14 PROCEDURE — 250N000011 HC RX IP 250 OP 636: Performed by: SURGERY

## 2023-02-14 PROCEDURE — 258N000003 HC RX IP 258 OP 636: Performed by: SURGERY

## 2023-02-14 PROCEDURE — 258N000003 HC RX IP 258 OP 636: Performed by: ANESTHESIOLOGY

## 2023-02-14 PROCEDURE — 85018 HEMOGLOBIN: CPT | Performed by: ANESTHESIOLOGY

## 2023-02-14 PROCEDURE — 250N000011 HC RX IP 250 OP 636: Performed by: REGISTERED NURSE

## 2023-02-14 PROCEDURE — 05WY0JZ REVISION OF SYNTHETIC SUBSTITUTE IN UPPER VEIN, OPEN APPROACH: ICD-10-PCS | Performed by: SURGERY

## 2023-02-14 PROCEDURE — 710N000009 HC RECOVERY PHASE 1, LEVEL 1, PER MIN: Performed by: SURGERY

## 2023-02-14 PROCEDURE — 272N000001 HC OR GENERAL SUPPLY STERILE: Performed by: SURGERY

## 2023-02-14 PROCEDURE — 36415 COLL VENOUS BLD VENIPUNCTURE: CPT | Performed by: ANESTHESIOLOGY

## 2023-02-14 PROCEDURE — 93005 ELECTROCARDIOGRAM TRACING: CPT

## 2023-02-14 PROCEDURE — 258N000003 HC RX IP 258 OP 636: Performed by: REGISTERED NURSE

## 2023-02-14 PROCEDURE — 360N000076 HC SURGERY LEVEL 3, PER MIN: Performed by: SURGERY

## 2023-02-14 PROCEDURE — C1768 GRAFT, VASCULAR: HCPCS | Performed by: SURGERY

## 2023-02-14 PROCEDURE — 250N000025 HC SEVOFLURANE, PER MIN: Performed by: SURGERY

## 2023-02-14 PROCEDURE — 250N000009 HC RX 250: Performed by: SURGERY

## 2023-02-14 PROCEDURE — 80048 BASIC METABOLIC PNL TOTAL CA: CPT | Performed by: SURGERY

## 2023-02-14 PROCEDURE — 85049 AUTOMATED PLATELET COUNT: CPT | Performed by: SURGERY

## 2023-02-14 PROCEDURE — 86850 RBC ANTIBODY SCREEN: CPT | Performed by: ANESTHESIOLOGY

## 2023-02-14 PROCEDURE — 36832 AV FISTULA REVISION OPEN: CPT | Mod: LT | Performed by: SURGERY

## 2023-02-14 PROCEDURE — 250N000011 HC RX IP 250 OP 636: Performed by: ANESTHESIOLOGY

## 2023-02-14 PROCEDURE — 999N000141 HC STATISTIC PRE-PROCEDURE NURSING ASSESSMENT: Performed by: SURGERY

## 2023-02-14 PROCEDURE — 250N000009 HC RX 250: Performed by: REGISTERED NURSE

## 2023-02-14 PROCEDURE — 250N000013 HC RX MED GY IP 250 OP 250 PS 637: Performed by: STUDENT IN AN ORGANIZED HEALTH CARE EDUCATION/TRAINING PROGRAM

## 2023-02-14 PROCEDURE — 76998 US GUIDE INTRAOP: CPT | Mod: 26 | Performed by: SURGERY

## 2023-02-14 PROCEDURE — 250N000011 HC RX IP 250 OP 636: Performed by: STUDENT IN AN ORGANIZED HEALTH CARE EDUCATION/TRAINING PROGRAM

## 2023-02-14 PROCEDURE — 86901 BLOOD TYPING SEROLOGIC RH(D): CPT | Performed by: ANESTHESIOLOGY

## 2023-02-14 PROCEDURE — 250N000011 HC RX IP 250 OP 636: Performed by: NURSE ANESTHETIST, CERTIFIED REGISTERED

## 2023-02-14 PROCEDURE — 93010 ELECTROCARDIOGRAM REPORT: CPT | Performed by: INTERNAL MEDICINE

## 2023-02-14 PROCEDURE — 370N000017 HC ANESTHESIA TECHNICAL FEE, PER MIN: Performed by: SURGERY

## 2023-02-14 PROCEDURE — 36415 COLL VENOUS BLD VENIPUNCTURE: CPT | Performed by: SURGERY

## 2023-02-14 DEVICE — GORE-TEX VASCULAR GRAFT TW RR 10MMX70CM 70CM RINGS
Type: IMPLANTABLE DEVICE | Site: ARM | Status: FUNCTIONAL
Brand: GORE-TEX   VASCULAR GRAFT

## 2023-02-14 RX ORDER — ACETAMINOPHEN 325 MG/1
650 TABLET ORAL EVERY 4 HOURS PRN
Status: DISCONTINUED | OUTPATIENT
Start: 2023-02-17 | End: 2023-02-15 | Stop reason: HOSPADM

## 2023-02-14 RX ORDER — NALOXONE HYDROCHLORIDE 0.4 MG/ML
0.2 INJECTION, SOLUTION INTRAMUSCULAR; INTRAVENOUS; SUBCUTANEOUS
Status: DISCONTINUED | OUTPATIENT
Start: 2023-02-14 | End: 2023-02-15 | Stop reason: HOSPADM

## 2023-02-14 RX ORDER — ONDANSETRON 4 MG/1
4 TABLET, ORALLY DISINTEGRATING ORAL EVERY 30 MIN PRN
Status: DISCONTINUED | OUTPATIENT
Start: 2023-02-14 | End: 2023-02-14 | Stop reason: HOSPADM

## 2023-02-14 RX ORDER — SIMVASTATIN 20 MG
20 TABLET ORAL EVERY MORNING
Status: DISCONTINUED | OUTPATIENT
Start: 2023-02-15 | End: 2023-02-15 | Stop reason: HOSPADM

## 2023-02-14 RX ORDER — ONDANSETRON 2 MG/ML
INJECTION INTRAMUSCULAR; INTRAVENOUS PRN
Status: DISCONTINUED | OUTPATIENT
Start: 2023-02-14 | End: 2023-02-14

## 2023-02-14 RX ORDER — HEPARIN SODIUM 1000 [USP'U]/ML
INJECTION, SOLUTION INTRAVENOUS; SUBCUTANEOUS PRN
Status: DISCONTINUED | OUTPATIENT
Start: 2023-02-14 | End: 2023-02-14 | Stop reason: HOSPADM

## 2023-02-14 RX ORDER — POLYETHYLENE GLYCOL 3350 17 G/17G
17 POWDER, FOR SOLUTION ORAL DAILY
Status: DISCONTINUED | OUTPATIENT
Start: 2023-02-15 | End: 2023-02-15 | Stop reason: HOSPADM

## 2023-02-14 RX ORDER — HYDROMORPHONE HCL IN WATER/PF 6 MG/30 ML
0.2 PATIENT CONTROLLED ANALGESIA SYRINGE INTRAVENOUS EVERY 5 MIN PRN
Status: DISCONTINUED | OUTPATIENT
Start: 2023-02-14 | End: 2023-02-14 | Stop reason: HOSPADM

## 2023-02-14 RX ORDER — SODIUM CHLORIDE 9 MG/ML
INJECTION, SOLUTION INTRAVENOUS CONTINUOUS
Status: DISCONTINUED | OUTPATIENT
Start: 2023-02-14 | End: 2023-02-14 | Stop reason: HOSPADM

## 2023-02-14 RX ORDER — FENTANYL CITRATE 0.05 MG/ML
25 INJECTION, SOLUTION INTRAMUSCULAR; INTRAVENOUS EVERY 5 MIN PRN
Status: DISCONTINUED | OUTPATIENT
Start: 2023-02-14 | End: 2023-02-14 | Stop reason: HOSPADM

## 2023-02-14 RX ORDER — CARVEDILOL 6.25 MG/1
12.5 TABLET ORAL 2 TIMES DAILY
Status: DISCONTINUED | OUTPATIENT
Start: 2023-02-14 | End: 2023-02-15 | Stop reason: HOSPADM

## 2023-02-14 RX ORDER — ASPIRIN 81 MG/1
81 TABLET ORAL DAILY
Status: DISCONTINUED | OUTPATIENT
Start: 2023-02-14 | End: 2023-02-15 | Stop reason: HOSPADM

## 2023-02-14 RX ORDER — HEPARIN SODIUM 1000 [USP'U]/ML
INJECTION, SOLUTION INTRAVENOUS; SUBCUTANEOUS PRN
Status: DISCONTINUED | OUTPATIENT
Start: 2023-02-14 | End: 2023-02-14

## 2023-02-14 RX ORDER — OXYCODONE HYDROCHLORIDE 5 MG/1
10 TABLET ORAL EVERY 4 HOURS PRN
Status: DISCONTINUED | OUTPATIENT
Start: 2023-02-14 | End: 2023-02-15 | Stop reason: HOSPADM

## 2023-02-14 RX ORDER — NALOXONE HYDROCHLORIDE 0.4 MG/ML
0.4 INJECTION, SOLUTION INTRAMUSCULAR; INTRAVENOUS; SUBCUTANEOUS
Status: DISCONTINUED | OUTPATIENT
Start: 2023-02-14 | End: 2023-02-15 | Stop reason: HOSPADM

## 2023-02-14 RX ORDER — AMOXICILLIN 250 MG
1 CAPSULE ORAL 2 TIMES DAILY
Status: DISCONTINUED | OUTPATIENT
Start: 2023-02-14 | End: 2023-02-15 | Stop reason: HOSPADM

## 2023-02-14 RX ORDER — HYDRALAZINE HYDROCHLORIDE 20 MG/ML
2.5-5 INJECTION INTRAMUSCULAR; INTRAVENOUS
Status: DISCONTINUED | OUTPATIENT
Start: 2023-02-14 | End: 2023-02-14 | Stop reason: HOSPADM

## 2023-02-14 RX ORDER — LABETALOL HYDROCHLORIDE 5 MG/ML
5 INJECTION, SOLUTION INTRAVENOUS
Status: DISCONTINUED | OUTPATIENT
Start: 2023-02-14 | End: 2023-02-14 | Stop reason: HOSPADM

## 2023-02-14 RX ORDER — HYDROMORPHONE HCL IN WATER/PF 6 MG/30 ML
0.2 PATIENT CONTROLLED ANALGESIA SYRINGE INTRAVENOUS
Status: DISCONTINUED | OUTPATIENT
Start: 2023-02-14 | End: 2023-02-15 | Stop reason: HOSPADM

## 2023-02-14 RX ORDER — OXYCODONE HYDROCHLORIDE 5 MG/1
5 TABLET ORAL EVERY 4 HOURS PRN
Status: DISCONTINUED | OUTPATIENT
Start: 2023-02-14 | End: 2023-02-14 | Stop reason: HOSPADM

## 2023-02-14 RX ORDER — AMLODIPINE BESYLATE 10 MG/1
10 TABLET ORAL DAILY
Status: DISCONTINUED | OUTPATIENT
Start: 2023-02-15 | End: 2023-02-15 | Stop reason: HOSPADM

## 2023-02-14 RX ORDER — FENTANYL CITRATE 0.05 MG/ML
50 INJECTION, SOLUTION INTRAMUSCULAR; INTRAVENOUS EVERY 5 MIN PRN
Status: DISCONTINUED | OUTPATIENT
Start: 2023-02-14 | End: 2023-02-14 | Stop reason: HOSPADM

## 2023-02-14 RX ORDER — CALCIUM ACETATE 667 MG/1
2668 CAPSULE ORAL 3 TIMES DAILY
Status: DISCONTINUED | OUTPATIENT
Start: 2023-02-14 | End: 2023-02-15 | Stop reason: HOSPADM

## 2023-02-14 RX ORDER — FENTANYL CITRATE 50 UG/ML
INJECTION, SOLUTION INTRAMUSCULAR; INTRAVENOUS PRN
Status: DISCONTINUED | OUTPATIENT
Start: 2023-02-14 | End: 2023-02-14

## 2023-02-14 RX ORDER — CEFAZOLIN SODIUM/WATER 2 G/20 ML
2 SYRINGE (ML) INTRAVENOUS
Status: COMPLETED | OUTPATIENT
Start: 2023-02-14 | End: 2023-02-14

## 2023-02-14 RX ORDER — CALCITRIOL 0.5 UG/1
1.5 CAPSULE, LIQUID FILLED ORAL DAILY
Status: DISCONTINUED | OUTPATIENT
Start: 2023-02-15 | End: 2023-02-15 | Stop reason: HOSPADM

## 2023-02-14 RX ORDER — FOLIC ACID 1 MG/1
1 TABLET ORAL EVERY MORNING
Status: DISCONTINUED | OUTPATIENT
Start: 2023-02-15 | End: 2023-02-15 | Stop reason: HOSPADM

## 2023-02-14 RX ORDER — ACETAMINOPHEN 325 MG/1
975 TABLET ORAL EVERY 8 HOURS
Status: DISCONTINUED | OUTPATIENT
Start: 2023-02-14 | End: 2023-02-15 | Stop reason: HOSPADM

## 2023-02-14 RX ORDER — ONDANSETRON 4 MG/1
4 TABLET, ORALLY DISINTEGRATING ORAL EVERY 6 HOURS PRN
Status: DISCONTINUED | OUTPATIENT
Start: 2023-02-14 | End: 2023-02-15 | Stop reason: HOSPADM

## 2023-02-14 RX ORDER — LIDOCAINE 40 MG/G
CREAM TOPICAL
Status: DISCONTINUED | OUTPATIENT
Start: 2023-02-14 | End: 2023-02-15 | Stop reason: HOSPADM

## 2023-02-14 RX ORDER — SODIUM CHLORIDE, SODIUM LACTATE, POTASSIUM CHLORIDE, CALCIUM CHLORIDE 600; 310; 30; 20 MG/100ML; MG/100ML; MG/100ML; MG/100ML
INJECTION, SOLUTION INTRAVENOUS CONTINUOUS
Status: DISCONTINUED | OUTPATIENT
Start: 2023-02-14 | End: 2023-02-14

## 2023-02-14 RX ORDER — LIDOCAINE HYDROCHLORIDE 20 MG/ML
INJECTION, SOLUTION INFILTRATION; PERINEURAL PRN
Status: DISCONTINUED | OUTPATIENT
Start: 2023-02-14 | End: 2023-02-14

## 2023-02-14 RX ORDER — HYDROMORPHONE HCL IN WATER/PF 6 MG/30 ML
0.4 PATIENT CONTROLLED ANALGESIA SYRINGE INTRAVENOUS
Status: DISCONTINUED | OUTPATIENT
Start: 2023-02-14 | End: 2023-02-15 | Stop reason: HOSPADM

## 2023-02-14 RX ORDER — PROPOFOL 10 MG/ML
INJECTION, EMULSION INTRAVENOUS PRN
Status: DISCONTINUED | OUTPATIENT
Start: 2023-02-14 | End: 2023-02-14

## 2023-02-14 RX ORDER — BISACODYL 10 MG
10 SUPPOSITORY, RECTAL RECTAL DAILY PRN
Status: DISCONTINUED | OUTPATIENT
Start: 2023-02-14 | End: 2023-02-15 | Stop reason: HOSPADM

## 2023-02-14 RX ORDER — HYDROMORPHONE HCL IN WATER/PF 6 MG/30 ML
0.4 PATIENT CONTROLLED ANALGESIA SYRINGE INTRAVENOUS EVERY 5 MIN PRN
Status: DISCONTINUED | OUTPATIENT
Start: 2023-02-14 | End: 2023-02-14 | Stop reason: HOSPADM

## 2023-02-14 RX ORDER — OXYCODONE HYDROCHLORIDE 5 MG/1
5 TABLET ORAL EVERY 4 HOURS PRN
Status: DISCONTINUED | OUTPATIENT
Start: 2023-02-14 | End: 2023-02-15 | Stop reason: HOSPADM

## 2023-02-14 RX ORDER — HEPARIN SODIUM 5000 [USP'U]/.5ML
5000 INJECTION, SOLUTION INTRAVENOUS; SUBCUTANEOUS EVERY 8 HOURS
Status: DISCONTINUED | OUTPATIENT
Start: 2023-02-15 | End: 2023-02-15 | Stop reason: HOSPADM

## 2023-02-14 RX ORDER — ONDANSETRON 2 MG/ML
4 INJECTION INTRAMUSCULAR; INTRAVENOUS EVERY 30 MIN PRN
Status: DISCONTINUED | OUTPATIENT
Start: 2023-02-14 | End: 2023-02-14 | Stop reason: HOSPADM

## 2023-02-14 RX ORDER — OXYCODONE HYDROCHLORIDE 5 MG/1
10 TABLET ORAL EVERY 4 HOURS PRN
Status: DISCONTINUED | OUTPATIENT
Start: 2023-02-14 | End: 2023-02-14 | Stop reason: HOSPADM

## 2023-02-14 RX ORDER — BUPIVACAINE HYDROCHLORIDE 5 MG/ML
INJECTION, SOLUTION PERINEURAL PRN
Status: DISCONTINUED | OUTPATIENT
Start: 2023-02-14 | End: 2023-02-14 | Stop reason: HOSPADM

## 2023-02-14 RX ORDER — ONDANSETRON 2 MG/ML
4 INJECTION INTRAMUSCULAR; INTRAVENOUS EVERY 6 HOURS PRN
Status: DISCONTINUED | OUTPATIENT
Start: 2023-02-14 | End: 2023-02-15 | Stop reason: HOSPADM

## 2023-02-14 RX ADMIN — FENTANYL CITRATE 50 MCG: 50 INJECTION, SOLUTION INTRAMUSCULAR; INTRAVENOUS at 15:36

## 2023-02-14 RX ADMIN — FENTANYL CITRATE 50 MCG: 50 INJECTION, SOLUTION INTRAMUSCULAR; INTRAVENOUS at 14:36

## 2023-02-14 RX ADMIN — OXYCODONE HYDROCHLORIDE 5 MG: 5 TABLET ORAL at 18:42

## 2023-02-14 RX ADMIN — HYDROMORPHONE HYDROCHLORIDE 0.5 MG: 1 INJECTION, SOLUTION INTRAMUSCULAR; INTRAVENOUS; SUBCUTANEOUS at 16:17

## 2023-02-14 RX ADMIN — FENTANYL CITRATE 50 MCG: 50 INJECTION, SOLUTION INTRAMUSCULAR; INTRAVENOUS at 16:47

## 2023-02-14 RX ADMIN — ONDANSETRON 4 MG: 2 INJECTION INTRAMUSCULAR; INTRAVENOUS at 14:10

## 2023-02-14 RX ADMIN — HEPARIN SODIUM 3000 UNITS: 1000 INJECTION INTRAVENOUS; SUBCUTANEOUS at 14:47

## 2023-02-14 RX ADMIN — FENTANYL CITRATE 50 MCG: 50 INJECTION, SOLUTION INTRAMUSCULAR; INTRAVENOUS at 14:25

## 2023-02-14 RX ADMIN — PHENYLEPHRINE HYDROCHLORIDE 0.5 MCG/KG/MIN: 10 INJECTION INTRAVENOUS at 14:13

## 2023-02-14 RX ADMIN — HYDROMORPHONE HYDROCHLORIDE 0.4 MG: 0.2 INJECTION, SOLUTION INTRAMUSCULAR; INTRAVENOUS; SUBCUTANEOUS at 20:16

## 2023-02-14 RX ADMIN — Medication 2 G: at 14:01

## 2023-02-14 RX ADMIN — MIDAZOLAM 2 MG: 1 INJECTION INTRAMUSCULAR; INTRAVENOUS at 13:54

## 2023-02-14 RX ADMIN — FENTANYL CITRATE 50 MCG: 50 INJECTION, SOLUTION INTRAMUSCULAR; INTRAVENOUS at 14:00

## 2023-02-14 RX ADMIN — SODIUM CHLORIDE: 9 INJECTION, SOLUTION INTRAVENOUS at 13:54

## 2023-02-14 RX ADMIN — SODIUM CHLORIDE: 9 INJECTION, SOLUTION INTRAVENOUS at 12:55

## 2023-02-14 RX ADMIN — HYDROMORPHONE HYDROCHLORIDE 0.4 MG: 0.2 INJECTION, SOLUTION INTRAMUSCULAR; INTRAVENOUS; SUBCUTANEOUS at 22:16

## 2023-02-14 RX ADMIN — LIDOCAINE HYDROCHLORIDE 80 MG: 20 INJECTION, SOLUTION INFILTRATION; PERINEURAL at 14:00

## 2023-02-14 RX ADMIN — HYDROMORPHONE HYDROCHLORIDE 0.4 MG: 0.2 INJECTION, SOLUTION INTRAMUSCULAR; INTRAVENOUS; SUBCUTANEOUS at 17:02

## 2023-02-14 RX ADMIN — PROPOFOL 160 MG: 10 INJECTION, EMULSION INTRAVENOUS at 14:00

## 2023-02-14 RX ADMIN — CARVEDILOL 12.5 MG: 6.25 TABLET, FILM COATED ORAL at 20:15

## 2023-02-14 RX ADMIN — ASPIRIN 81 MG: 81 TABLET, COATED ORAL at 20:16

## 2023-02-14 RX ADMIN — CALCIUM ACETATE 2668 MG: 667 CAPSULE ORAL at 20:15

## 2023-02-14 RX ADMIN — FENTANYL CITRATE 50 MCG: 50 INJECTION, SOLUTION INTRAMUSCULAR; INTRAVENOUS at 16:37

## 2023-02-14 ASSESSMENT — ACTIVITIES OF DAILY LIVING (ADL)
ADLS_ACUITY_SCORE: 35
ADLS_ACUITY_SCORE: 33

## 2023-02-14 ASSESSMENT — ENCOUNTER SYMPTOMS
DYSRHYTHMIAS: 1
SEIZURES: 0

## 2023-02-14 ASSESSMENT — COPD QUESTIONNAIRES: COPD: 0

## 2023-02-14 ASSESSMENT — LIFESTYLE VARIABLES: TOBACCO_USE: 0

## 2023-02-14 NOTE — ANESTHESIA PREPROCEDURE EVALUATION
Anesthesia Pre-Procedure Evaluation    Patient: Tacos Dominguez   MRN: [id number] : 1960        Procedure : Procedure(s):  LEFT UPPER ARM FISTULA OUTFLOW REVISION FROM CEPHALIC VEIN TO JUGULAR VEIN WITH 10mm THIN-WALLED RINGED POLYTETRAFLUEROETHYLINE          Past Medical History:   Diagnosis Date     Aortic dissection (H)     distal, thin.  stable/chronic on MRCP 3/2022     Benign essential hypertension      Cerebral infarction (H)      Chronic atrial fibrillation (H)      Crohn's colitis (H)      Crohn's disease of large intestine (H) 2021     Esophageal reflux      ESRD (end stage renal disease) on dialysis (H)      History of basal cell carcinoma      Hypertension      Mixed hyperlipidemia      NSTEMI (non-ST elevated myocardial infarction) (H)       Past Surgical History:   Procedure Laterality Date     ABDOMEN SURGERY      x 6.  colon resections for crohn's disease     APPENDECTOMY       CHOLECYSTECTOMY       COLONOSCOPY N/A 2021    Procedure: COLONOSCOPY, WITH POLYPECTOMY AND BIOPSY;  Surgeon: Eric Preston MD;  Location:  GI     COLONOSCOPY N/A 2022    Procedure: COLONOSCOPY, WITH POLYPECTOMY AND BIOPSY;  Surgeon: Willian Kee MD;  Location:  GI     CV CORONARY ANGIOGRAM N/A 2021    Procedure: CV CORONARY ANGIOGRAM;  Surgeon: Judson Ybarra MD;  Location:  [facility] CARDI[facility] CATH LAB     ESOPHAGOSCOPY, GASTROSCOPY, DUODENOSCOPY (EGD), COMBINED N/A 2021    Procedure: ESOPHAGOGASTRODUODENOSCOPY, WITH FINE NEEDLE ASPIRATION BIOPSY, WITH ENDOSCOPIC ULTRASOUND GUIDANCE;  Surgeon: Eric Preston MD;  Location:  GI     IR DIALYSIS FISTULOGRAM LEFT  2022     IR DIALYSIS FISTULOGRAM LEFT  2023     LAPAROTOMY, LYSIS ADHESIONS, COMBINED N/A 2022    Procedure: Laparotomy, extensive lysis adhesions, combined;  Surgeon: Sarath Smith MD;  [facility]     PANCREATECTOMY PARTIAL N/A 2022    Procedure: Open Subtotal  Pancreatectomy, intra-op ultrasound;  Surgeon: Sarath Smith MD;  [facility]     VASCULAR SURGERY      dialysis access- left upper      Allergies   Allergen Reactions     Lisinopril Anaphylaxis and Other (See Comments)     Shortness of breath      Social History     Tobacco Use     Smoking status: Every Day     Packs/day: 0.25     Years: 50.00     Pack years: 12.50     Types: Cigarettes     Last attempt to quit: 10/19/2020     Years since quittin.3     Smokeless tobacco: Never   Substance Use Topics     Alcohol use: Yes     Comment: rare      Wt Readings from Last 1 Encounters:   23 82.1 kg (181 lb 1.6 oz)        Anesthesia Evaluation            ROS/MED HX  ENT/Pulmonary:    (-) tobacco use, asthma, COPD and sleep apnea   Neurologic:     (+) CVA,  (-) no seizures   Cardiovascular:     (+) Dyslipidemia hypertension--CAD (Minimal, nonobstructive) -past MI --dysrhythmias, a-fib, Previous cardiac testing   Echo: Date:  Results:  Global and regional left ventricular function is normal with an EF of 60-65%.  Right ventricular function, chamber size, wall motion, and thickness are  normal.  Mild to moderate mitral insufficiency is present.  The inferior vena cava was normal in size with preserved respiratory  variability.  Stress Test: Date: Results:    ECG Reviewed: Date:  Results:  NSR, LVH criteria, bifascicular block  Cath: Date: Results:   (-) CHF   METS/Exercise Tolerance:     Hematologic:       Musculoskeletal:       GI/Hepatic:     (+) GERD, Inflammatory bowel disease (Crohn's, short gut syndrome),  (-) liver disease   Renal/Genitourinary:     (+) renal disease, type: ESRD,     Endo:    (-) Type I DM and Type II DM   Psychiatric/Substance Use:       Infectious Disease:       Malignancy:   (+) Malignancy, History of GI.    Other:            Physical Exam    Airway        Mallampati: II   TM distance: > 3 FB   Neck ROM: full   Mouth opening: > 3 cm    Respiratory Devices and Support          Dental       (+) Minor Abnormalities - some fillings, tiny chips      Cardiovascular          Rhythm and rate: regular     Pulmonary           breath sounds clear to auscultation           OUTSIDE LABS:  CBC:   Lab Results   Component Value Date    WBC 4.3 11/18/2022    WBC 8.2 07/30/2022    HGB 11.1 (L) 11/18/2022    HGB 11.0 (L) 07/30/2022    HCT 32.8 (L) 11/18/2022    HCT 33.2 (L) 07/30/2022    PLT 88 (L) 11/18/2022     (L) 07/30/2022     BMP:   Lab Results   Component Value Date     01/13/2023     11/18/2022    POTASSIUM 4.0 01/13/2023    POTASSIUM 4.1 11/18/2022    CHLORIDE 98 01/13/2023    CHLORIDE 97 (L) 11/18/2022    CO2 28 01/13/2023    CO2 29 11/18/2022    BUN 36 (H) 01/13/2023    BUN 12.0 11/18/2022    CR 9.99 (H) 01/13/2023    CR 5.34 (H) 11/18/2022    GLC 76 01/13/2023     (H) 11/18/2022     COAGS:   Lab Results   Component Value Date    PTT 32 03/17/2022    INR 1.30 (H) 03/17/2022     POC: No results found for: BGM, HCG, HCGS  HEPATIC:   Lab Results   Component Value Date    ALBUMIN 4.6 11/18/2022    PROTTOTAL 7.5 11/18/2022    ALT 23 11/18/2022    AST 33 11/18/2022    ALKPHOS 65 11/18/2022    BILITOTAL 0.8 11/18/2022     OTHER:   Lab Results   Component Value Date    PH 7.36 03/17/2022    LACT 0.6 (L) 03/19/2022    KARY 10.0 01/13/2023    PHOS 4.7 (H) 03/21/2022    MAG 1.2 (L) 03/21/2022    LIPASE 115 (H) 09/27/2020    AMYLASE 34 03/18/2022    CRP 5.6 05/07/2008    SED 44 (H) 11/18/2022       Anesthesia Plan    ASA Status:  3      Anesthesia Type: General.     - Airway: LMA   Induction: Intravenous, Propofol.   Maintenance: Balanced.        Consents    Anesthesia Plan(s) and associated risks, benefits, and realistic alternatives discussed. Questions answered and patient/representative(s) expressed understanding.    - Discussed:     - Discussed with:  Patient         Postoperative Care    Pain management: Multi-modal analgesia.   PONV prophylaxis: Ondansetron (or other  5HT-3)     Comments:                Manuelito Ramey MD

## 2023-02-14 NOTE — ANESTHESIA CARE TRANSFER NOTE
Patient: Tacos Dominguez    Procedure: Procedure(s):  LEFT UPPER ARM FISTULA OUTFLOW REVISION FROM CEPHALIC VEIN TO JUGULAR VEIN WITH 10mm THIN-WALLED RINGED POLYTETRAFLUEROETHYLINE       Diagnosis: AVF (arteriovenous fistula) (H) [I77.0]  Occlusion of left subclavian vein (H) [I82.B12]  Diagnosis Additional Information: No value filed.    Anesthesia Type:   General     Note:    Oropharynx: oropharynx clear of all foreign objects  Level of Consciousness: awake  Oxygen Supplementation: face mask    Independent Airway: airway patency satisfactory and stable  Dentition: dentition unchanged  Vital Signs Stable: post-procedure vital signs reviewed and stable  Report to RN Given: handoff report given  Patient transferred to: PACU    Handoff Report: Identifed the Patient, Identified the Reponsible Provider, Reviewed the pertinent medical history, Discussed the surgical course, Reviewed Intra-OP anesthesia mangement and issues during anesthesia, Set expectations for post-procedure period and Allowed opportunity for questions and acknowledgement of understanding      Vitals:  Vitals Value Taken Time   BP     Temp     Pulse     Resp     SpO2         Electronically Signed By: JANNET Miller CRNA  February 14, 2023  4:21 PM

## 2023-02-14 NOTE — BRIEF OP NOTE
[facility]    Brief Operative Note    Pre-operative diagnosis: AVF (arteriovenous fistula) (H) [I77.0]  Occlusion of left subclavian vein (H) [I82.B12]  Post-operative diagnosis Same as pre-operative diagnosis    Procedure: Procedure(s):  LEFT UPPER ARM FISTULA OUTFLOW REVISION FROM CEPHALIC VEIN TO JUGULAR VEIN WITH 10mm THIN-WALLED RINGED POLYTETRAFLUEROETHYLINE  Surgeon: Surgeon(s) and Role:     * Mo Gramajo MD - Primary     * Easton Augustine MD - Assisting  Anesthesia: General   Estimated Blood Loss: 10 mL from 2/14/2023  1:54 PM to 2/14/2023  4:24 PM      Drains: None  Specimens: * No specimens in log *  Findings:  End-to-side proximal and distal anastomses with 10 mm ringed PTFE   Complications: None.  Implants:   Implant Name Type Inv. Item Serial No.  Lot No. LRB No. Used Action   GORE-EFE VASCULAR GRAFT Graft  [id number] W.LSP[employer]ERE &   Left 1 Implanted

## 2023-02-14 NOTE — ANESTHESIA PROCEDURE NOTES
Airway         Procedure Start/Stop Times: 2/14/2023 2:02 PM  Staff -        Anesthesiologist:  Manuelito Ramey MD       CRNA: Harish Davila APRN CRNA       Performed By: CRNAIndications and Patient Condition       Indications for airway management: airway protection       Induction type:intravenous       Mask difficulty assessment: 0 - not attempted    Final Airway Details       Final airway type: supraglottic airway    Supraglottic Airway Details        Type: LMA       Brand: Ambu [employer]ura[employer]ain       LMA size: 5       Cuff Pressure (cm H2O): 25    Post intubation assessment        Placement verified by: capnometry, equal breath sounds and chest rise        Number of attempts at approach: 1       Number of other approaches attempted: 0       Secured with: pink tape       Ease of procedure: easy       Dentition: Unchanged and Intact    Medication(s) Administered   Medication Administration Time: 2/14/2023 2:02 PM

## 2023-02-14 NOTE — OP NOTE
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    OPERATIVE PROCEDURE:  Left infraclavicular cephalic vein fistula to left internal jugular outflow revision with 10 mm thin-walled ringed Propaten PTFE bypass graft.    SURGEON:  Mo Gramajo MD    FIRST ASSISTANT:  Easton Augustine MD (Vascular Fellow).    ANESTHESIA:  General.    MEDICATIONS:  Ancef 2 grams IV.    INDICATIONS FOR PROCEDURE:  A 63-year-old patient who has a left upper arm brachial to cephalic fistula.  This was dilated very nicely.  Unfortunately, he has developed the outflow occlusion of the subclavian vein that could not be corrected by Interventional Radiology.  He has developed relatively severe swelling of his left arm second of this.  The fistula otherwise continues to function well, but we felt that we would like to salvage the fistula.  By prior studies, he had a widely patent internal jugular and innominate vein.  We felt an outflow revision to the jugular vein was indicated to help keep the fistula functioning and help the arm swelling.  He comes to the operating room today under informed consent.    DESCRIPTION OF PROCEDURE:  The patient was brought to the Operating Room and induced under general anesthesia.  LMA was placed.  Calf pneumatic compression boots were used and a pillow was placed under his knees.  With duplex ultrasound, we identified a well-dilated internal jugular vein and the well-dilated fistula to the level of the clavicle, measuring over 10 mm in diameter.  The fistula was easily palpable in the infraclavicular level.  Left arm was placed on an arm board next to the table and the right arm was tucked.  Roll towel was placed under the shoulder.  Left  neck and chest area were prepped and draped.  Timeout was called and the sites were identified.    VASCULAR EXPOSURE:  A 15 blade scalpel was used to make an incision over the cephalic vein fistula just lateral to the clavicle.  Dissection was carried down to the deltopectoral groove.  The fistula was identified, had a strong pulse and was well arterialized, measuring at least 10 mm in diameter.  This was encircled proximally and distally with Silastic vessel loops with no side branches being noted.  As we occluded the fistula with a vessel loop, Doppler revealed very limited outflow since the branches were in the clavicular area and we wanted to preserve the residual outflow tract.    We then made a vertical incision between the heads of the sternocleidomastoid muscle.  Dissection was carried down to the platysmas.  We retracted the medial and lateral heads of the sternocleidomastoid muscle.  This allowed us to easily identify the internal jugular vein.  This was dissected free, avoiding the ansa cervicalis/phrenic/vagus nerves.  We did divide the omohyoid muscle proximally, so we could have better exposure and improve our angles for outflow revision.  Proximal and distal Silastic vessel loops were placed.    We then created a tunnel with the aid of a large vascular clamp from the infraclavicular incision anterior to the clavicle down to the neck incision going under the lateral head of the sternocleidomastoid muscle.  This was well dilated digitally.    OUTFLOW REVISION:  3000 units intravenous heparin were given.  We selected a 10 mm Propaten thin-walled ringed PTFE graft.  This was trimmed obliquely just beyond the rings.  Vessel loops were tightened around the internal jugular vein.  An 11 blade was used to make a venotomy on the anterolateral aspect of the vein.  This was extended with a 4.5 mm punch to make an opening approximately 13 mm in length.  A spatulated PTFE graft was sewn in an end-to-side fashion  with running 6-0 Prolene suture and loupe magnification.  We placed a small vascular clamp on the PTFE graft and released the vessel loops.  A small amount of needle hole bleeding stopped with Surgicel and time.  The ringed graft was then brought in our subcutaneous tunnel under the clavicular head of the sternocleidomastoid muscle and anterior to the clavicle in a very gentle curve.  Vessel loops were then tightened around the cephalic vein fistula.  An 11 mm venotomy was made and the punch was again used to widely open this.  The vein was arterialized, as expected from the longstanding fistula.  The rings were removed appropriately and the graft was spatulated and end-to-side anastomosis was created with a running 6-0 Prolene suture.  Vessel loops were sequentially loosened and we removed the clamp on the graft going into the internal jugular vein.  We had excellent pulse within the PTFE graft.  Excellent high flow Doppler signals were appreciated within the graft and internal jugular vein.    We checked our angles.  We had a very gentle curve going down to the internal jugular vein with no tension.  Small amount of Surgicel was placed on the anastomosis and eventually removed.  The deltopectoral fascia was approximated with interrupted 3-0 Vicryl.  The platysma muscle was approximately with interrupted 3-0 Vicryl in the neck incision.  Skin was closed with 5-0 Monocryl in a subcuticular fashion.  Wounds were infiltrated with 0.5% Marcaine for post-analgesia.  Surgical adhesive was applied to both incisions.  A Spandage mesh compression dressing was then applied to his arm to help resolve the edema.    The patient tolerated the procedure well.    ESTIMATED BLOOD LOSS:  Less than 5 mL.    COMPLICATIONS:  None.    Mo Gramajo MD        D: 2023   T: 2023   MT: [id number]    Name:     KRAIG CHAUDHARI  MRN:      [id number]        Account:        [id number]   :      1960           Procedure  Date: 02/14/2023     Document: [id number]    cc:  Mo Gramajo MD

## 2023-02-15 VITALS
SYSTOLIC BLOOD PRESSURE: 156 MMHG | WEIGHT: 181 LBS | RESPIRATION RATE: 18 BRPM | HEART RATE: 86 BPM | OXYGEN SATURATION: 95 % | TEMPERATURE: 97.8 F | DIASTOLIC BLOOD PRESSURE: 76 MMHG | BODY MASS INDEX: 25.34 KG/M2 | HEIGHT: 71 IN

## 2023-02-15 LAB — GLUCOSE BLDC GLUCOMTR-MCNC: 108 MG/DL (ref 70–99)

## 2023-02-15 PROCEDURE — 250N000013 HC RX MED GY IP 250 OP 250 PS 637: Performed by: STUDENT IN AN ORGANIZED HEALTH CARE EDUCATION/TRAINING PROGRAM

## 2023-02-15 PROCEDURE — 250N000011 HC RX IP 250 OP 636: Performed by: STUDENT IN AN ORGANIZED HEALTH CARE EDUCATION/TRAINING PROGRAM

## 2023-02-15 PROCEDURE — 99024 POSTOP FOLLOW-UP VISIT: CPT

## 2023-02-15 RX ADMIN — ASPIRIN 81 MG: 81 TABLET, COATED ORAL at 07:44

## 2023-02-15 RX ADMIN — HEPARIN SODIUM 5000 UNITS: 5000 INJECTION, SOLUTION INTRAVENOUS; SUBCUTANEOUS at 06:49

## 2023-02-15 RX ADMIN — CALCITRIOL 1.5 MCG: 0.5 CAPSULE ORAL at 07:43

## 2023-02-15 RX ADMIN — HYDROMORPHONE HYDROCHLORIDE 0.4 MG: 0.2 INJECTION, SOLUTION INTRAMUSCULAR; INTRAVENOUS; SUBCUTANEOUS at 03:26

## 2023-02-15 RX ADMIN — AMLODIPINE BESYLATE 10 MG: 10 TABLET ORAL at 07:44

## 2023-02-15 RX ADMIN — OMEPRAZOLE 20 MG: 20 CAPSULE, DELAYED RELEASE ORAL at 06:48

## 2023-02-15 RX ADMIN — ACETAMINOPHEN 975 MG: 325 TABLET, FILM COATED ORAL at 06:48

## 2023-02-15 RX ADMIN — Medication 1 CAPSULE: at 07:43

## 2023-02-15 RX ADMIN — CALCIUM ACETATE 2668 MG: 667 CAPSULE ORAL at 07:44

## 2023-02-15 RX ADMIN — SIMVASTATIN 20 MG: 20 TABLET, FILM COATED ORAL at 07:44

## 2023-02-15 RX ADMIN — HYDROMORPHONE HYDROCHLORIDE 0.4 MG: 0.2 INJECTION, SOLUTION INTRAMUSCULAR; INTRAVENOUS; SUBCUTANEOUS at 00:47

## 2023-02-15 RX ADMIN — CARVEDILOL 12.5 MG: 6.25 TABLET, FILM COATED ORAL at 07:44

## 2023-02-15 RX ADMIN — OXYCODONE HYDROCHLORIDE 10 MG: 5 TABLET ORAL at 02:07

## 2023-02-15 RX ADMIN — FOLIC ACID 1 MG: 1 TABLET ORAL at 07:43

## 2023-02-15 ASSESSMENT — ACTIVITIES OF DAILY LIVING (ADL)
ADLS_ACUITY_SCORE: 35

## 2023-02-15 NOTE — DISCHARGE SUMMARY
Vascular Surgery Discharge Summary    NAME: Tacos Dominguez   MRN: [id number]   : 1960     DATE OF ADMISSION: 2023     PRE/POSTOPERATIVE DIAGNOSES:  Occlusion of left subclavian vein  PROCEDURES PERFORMED, 2023: LEFT UPPER ARM FISTULA OUTFLOW REVISION FROM CEPHALIC VEIN TO JUGULAR VEIN WITH 10mm THIN-WALLED RINGED POLYTETRAFLUEROETHYLINE    INTRAOPERATIVE FINDINGS: None     POSTOPERATIVE COMPLICATIONS: None     DATE OF DISCHARGE: 2/15/2023     HOSPITAL COURSE: Tacos Dominguez is a 63 year old male who on 2023 underwent the above-named procedures. He tolerated the procedure well and postoperatively was transferred to the general post-surgical unit.  The remainder of his course was essentiallly uncomplicated.  Prior to discharge, his pain was controlled well with acetaminophen. He was able to perform ADLs and ambulate independently without difficulty, and had full return of bowel and bladder function.  On 2/15/2023, he was discharged to home in stable condition.    DISCHARGE INSTRUCTIONS:  Copy from AVS    FOLLOW UP APPOINTMENTS:   Copy from AVS    DISCHARGE MEDICATIONS:     Review of your medicines      CONTINUE these medicines which have NOT CHANGED      Dose / Directions   amLODIPine 10 MG tablet  Commonly known as: NORVASC      Dose: 10 mg  Take 10 mg by mouth daily  Refills: 0     calcitRIOL 0.5 MCG capsule  Commonly known as: ROCALTROL      Dose: 1.5 mcg  Take 1.5 mcg by mouth daily  Refills: 0     calcium acetate 667 MG Tabs tablet  Commonly known as: CALPHRON      Dose: 2,668 mg  Take 2,668 mg by mouth 3 times daily  Refills: 0     carvedilol 12.5 MG tablet  Commonly known as: COREG      Dose: 12.5 mg  Take 12.5 mg by mouth 2 times daily  Refills: 0     folic acid 1 MG tablet  Commonly known as: FOLVITE      Dose: 1 mg  Take 1 mg by mouth every morning  Refills: 0     lidocaine-prilocaine 2.5-2.5 % external cream  Commonly known as: EMLA      three times a week Prior to dialysis  site  access on Monday, Wednesday, Friday  Refills: 0     multivitamin RENAL 1 MG capsule  Generic drug: multivitamin RENAL      Dose: 1 capsule  Take 1 capsule by mouth every morning  Refills: 0     omeprazole 20 MG DR capsule  Commonly known as: priLOSEC      Dose: 20 mg  Take 20 mg by mouth every morning  Refills: 0     simvastatin 20 MG tablet  Commonly known as: ZOCOR      Dose: 20 mg  Take 20 mg by mouth every morning  Refills: 0     Stelara 90 MG/ML  Used for: Crohn's disease of large intestine with complication (H)  Generic drug: ustekinumab      Inject 1 ml ( 90 mg) subcutaneous every 4 weeks.  Quantity: 1 mL  Refills: 3          STAFF: Agree with above.  Video follow-up in 2 weeks.  Graft duplex in 3 months.  His nephrology team is following his chronic anemia (less than 5 mL blood loss during surgery).    Mo Gramajo MD

## 2023-02-15 NOTE — PLAN OF CARE
Goal Outcome Evaluation:      Plan of Care Reviewed With: patient    Overall Patient Progress: no changeOverall Patient Progress: no change    Shift: [id number]    POD 1 from a fistula revision from cephalic to jugular vein. A&O x4. CMS WDL. VSS. Incisions ESTRELLITA, +2-3 edema in LUE. Up ad jil, independently. Tolerated clear liquids overnight. C/o moderate to severe pain, decreased with IV dilaudid. Pt states oxycodone does not really help his pain, RN encouraged multimodal pain management. Plan is to discharge home today pending pain control.

## 2023-02-15 NOTE — ANESTHESIA POSTPROCEDURE EVALUATION
Patient: Tacos Dominguez    Procedure: Procedure(s):  LEFT UPPER ARM FISTULA OUTFLOW REVISION FROM CEPHALIC VEIN TO JUGULAR VEIN WITH 10mm THIN-WALLED RINGED POLYTETRAFLUEROETHYLINE       Anesthesia Type:  General    Note:  Disposition: Inpatient   Postop Pain Control: Uneventful            Sign Out: Well controlled pain   PONV: No   Neuro/Psych: Uneventful            Sign Out: Acceptable/Baseline neuro status   Airway/Respiratory: Uneventful            Sign Out: Acceptable/Baseline resp. status   CV/Hemodynamics: Uneventful            Sign Out: Acceptable CV status   Other NRE: NONE   DID A NON-ROUTINE EVENT OCCUR? No           Last vitals:  Vitals Value Taken Time   /69 02/14/23 1730   Temp 36.5  C (97.7  F) 02/14/23 1740   Pulse 82 02/14/23 1740   Resp 11 02/14/23 1740   SpO2 97 % 02/14/23 1739   Vitals shown include unvalidated device data.    Electronically Signed By: Virgil Horton MD  February 14, 2023  7:35 PM

## 2023-02-15 NOTE — PLAN OF CARE
Goal Outcome Evaluation:    2/14/23 9447-2997  Pt A&Ox4. BP elevated, other VSS, O2 100% RA. Tolerating Regular diet. S/p Lt arm fistula revision, positive bruit/thrill. LUE +2-3 edema, elevated on pillows. Up independently, steady, calls appropriately. LUE pain managed with oxycodone and IV dilaudid. PIV Rt hand saline locked. RN will continue to monitor.                          contact guard

## 2023-02-15 NOTE — PROGRESS NOTES
"VASCULAR SURGERY PROGRESS NOTE    Subjective:  Patient sitting up resting comfortably, eating breakfast.    Objective:  Intake/Output Summary (Last 24 hours) at 2/15/2023 0742  Last data filed at 2/15/2023 0326  Gross per 24 hour   Intake 1993 ml   Output 10 ml   Net 1983 ml     PHYSICAL EXAM:  BP (!) 156/76 (BP Location: Right arm)   Pulse 86   Temp 97.8  F (36.6  C) (Oral)   Resp 18   Ht 1.803 m (5' 11\")   Wt 82.1 kg (181 lb)   SpO2 95%   BMI 25.24 kg/m    Alert and oriented x4  VSS-on RA  Incisions clean/dry/intact  Edema noted in left arm, however patient reports improved   Palpable thrill noted  CMS intact    ASSESSMENT:  Mr. Scar Dominguez is a 63 year old male POD 1 fistula revision cephalic to jugular vein    PLAN:  -discharge home today, patient states he wants to dialysis either tomorrow or for sure Friday, follow-up arranged  -current pain regimen    Anisha Marcum NP  VASCULAR SURGERY     STAFF: Looks great this morning.  Dressed and ready to go home.  Will miss his dialysis run today but reinitiate this on Friday.  Left arm swelling already better with outflow revision.    Chronic anemia likely related to his renal failure.  Is on supplements with his dialysis and nephrology is aware of this.    We will take baby aspirin.  Video follow-up with me in approximately 2 weeks.    Graft duplex looking at outflow revision in 3 months.      Mo Gramajo MD                   "

## 2023-02-15 NOTE — PROGRESS NOTES
Patient discharged at 8:06 AM to Discharged to home  IV was discontinued. Pain at time of discharge was 2/10. Belongings returned to patient.  Discharge instructions and medications reviewed with patient.  Patient verbalized understanding and all questions were answered.  Prescriptions given to patient.  At time of discharge, patient condition was stable and left the unit gen surg escorted by transport NA. Pt will be driving himself home. RN requested someone pick pt up since he is less than 24 hours post op, pt declined, MD aware.

## 2023-02-16 DIAGNOSIS — Z99.2 ESRD (END STAGE RENAL DISEASE) ON DIALYSIS (H): Primary | ICD-10-CM

## 2023-02-16 DIAGNOSIS — I77.0 AVF (ARTERIOVENOUS FISTULA) (H): ICD-10-CM

## 2023-02-16 DIAGNOSIS — N18.6 ESRD (END STAGE RENAL DISEASE) ON DIALYSIS (H): Primary | ICD-10-CM

## 2023-02-16 LAB
ATRIAL RATE - MUSE: 82 BPM
DIASTOLIC BLOOD PRESSURE - MUSE: NORMAL MMHG
INTERPRETATION ECG - MUSE: NORMAL
P AXIS - MUSE: 70 DEGREES
PR INTERVAL - MUSE: 144 MS
QRS DURATION - MUSE: 126 MS
QT - MUSE: 418 MS
QTC - MUSE: 488 MS
R AXIS - MUSE: -50 DEGREES
SYSTOLIC BLOOD PRESSURE - MUSE: NORMAL MMHG
T AXIS - MUSE: 55 DEGREES
VENTRICULAR RATE- MUSE: 82 BPM

## 2023-02-16 RX ORDER — OXYCODONE HCL 10 MG/1
10 TABLET, FILM COATED, EXTENDED RELEASE ORAL EVERY 12 HOURS
Qty: 6 TABLET | Refills: 0 | Status: SHIPPED | OUTPATIENT
Start: 2023-02-16 | End: 2023-03-02

## 2023-02-17 ENCOUNTER — OFFICE VISIT (OUTPATIENT)
Dept: FAMILY MEDICINE | Facility: CLINIC | Age: 63
End: 2023-02-17
Payer: COMMERCIAL

## 2023-02-17 DIAGNOSIS — L28.1 PRURIGO NODULARIS: ICD-10-CM

## 2023-02-17 DIAGNOSIS — L85.3 XEROSIS CUTIS: ICD-10-CM

## 2023-02-17 DIAGNOSIS — L29.9 ITCHING: ICD-10-CM

## 2023-02-17 DIAGNOSIS — L82.1 SK (SEBORRHEIC KERATOSIS): Primary | ICD-10-CM

## 2023-02-17 PROCEDURE — 99203 OFFICE O/P NEW LOW 30 MIN: CPT | Performed by: DERMATOLOGY

## 2023-02-17 RX ORDER — CLOBETASOL PROPIONATE 0.5 MG/G
OINTMENT TOPICAL 2 TIMES DAILY
Qty: 60 G | Refills: 3 | Status: SHIPPED | OUTPATIENT
Start: 2023-02-17 | End: 2023-01-01

## 2023-02-17 ASSESSMENT — PAIN SCALES - GENERAL: PAINLEVEL: NO PAIN (0)

## 2023-02-17 NOTE — LETTER
2/17/2023         RE: Tacos Dominguez  [address]  [facility] [address]        Dear Colleague,    Thank you for referring your patient, Tacos Dominguez, to the [facility]. Please see a copy of my visit note below.    [facility] Dermatology Clinic Note - EP    Encounter Date: Feb 17, 2023    Dermatology Problem List:  #. Transplant candidate (renal) - on dialysis    ____________________________________________    Assessment & Plan:     #. Lesions of concern: L shoulder, L lower back, crown of scalp - all seborrheic keratoses   - reassurance provided    #. Benign melanocytic nevi of the trunk  #. Seborrheic keratoses  - reassurance provided; no lesions concerning for malignancy  - photoprotection (regular use of SPF30+ broad spectrum sunscreen and sun protective clothing) recommended  - ABCDE of melanoma discussed    #. Xerosis cutis  - dry skin care techniques discussed, see AVS    #. Prurigo nodularis  - related to xerosis, possibly with contribution from renal failure   - recommend excluding possibility that it is all related to xerosis, then discussed phototherapy as highly effective and safe if still itching versus adjustments to dialysis  - start clobetasol 0.05% ointment BID (under occlusion if helpful)    Procedures Performed:   None    Follow-up: 6-12 months    Don Khan MD  Dermatology/Dermatopathology Staff Physician  , Department of Dermatology    ____________________________________________    CC: Skin Check (Brownish spot on left shoulder and lower back)      HPI:  Mr. Tacos Dominguez is a(n) extremely pleasant 63 year old male who presents today as a new patient for FBSE given transplant history. Notes lesions of concern on L shoulder, L lower back, crown of scalp.    The patient denies any painful, bleeding, or nonhealing lesions, or any new or changing moles.    Does note dry skin and has been rubbing and picking enough that small  nodules have started to form. Up for using a medication to help.    Patient is otherwise feeling well, without additional skin concerns.     Labs Reviewed:  N/A    Physical Exam:  Vitals: There were no vitals taken for this visit.  SKIN: Total skin excluding the undergarment areas was performed. The exam included the head/face, neck, both arms, chest, back, abdomen, both legs, digits and/or nails.   - Extensive xerosis with scattered violaceous firm nodules at sites of picking  - Recent surgical scars and extensive associated ecchymoses, L neck and chest  - Lesions of concern: L shoulder, L lower back, crown of scalp - waxy verrucoid papules  - Multiple regular brown pigmented macules and papules are identified on the trunk and extremities.   - There are waxy stuck on tan to brown papules on the trunk and extremities.  - No other lesions of concern on areas examined.     Medications:  Current Outpatient Medications   Medication     amLODIPine (NORVASC) 10 MG tablet     calcitRIOL (ROCALTROL) 0.5 MCG capsule     calcium acetate (CALPHRON) 667 MG TABS tablet     carvedilol (COREG) 12.5 MG tablet     clobetasol (TEMOVATE) 0.05 % external ointment     folic acid (FOLVITE) 1 MG tablet     lidocaine-prilocaine (EMLA) 2.5-2.5 % external cream     multivitamin RENAL (MULTIVITAMIN RENAL) 1 MG capsule     omeprazole (PRILOSEC) 20 MG DR capsule     oxyCODONE (OXYCONTIN) 10 MG 12 hr tablet     simvastatin (ZOCOR) 20 MG tablet     ustekinumab (STELARA) 90 MG/ML     Current Facility-Administered Medications   Medication     lidocaine 1% with EPINEPHrine 1:100,000 injection 3 mL      Past Medical History:   Patient Active Problem List   Diagnosis     Acquired cyst of kidney     ESRD (end stage renal disease) (H)     Organ transplant candidate     NSTEMI (non-ST elevated myocardial infarction) (H)     Atrial fibrillation (H)     Benign essential hypertension     Crohn's disease of large intestine (H)     IPMN (intraductal papillary  mucinous neoplasm)     Status post repair of arteriovenous fistula     Past Medical History:   Diagnosis Date     Aortic dissection (H)     distal, thin.  stable/chronic on MRCP 3/2022     Benign essential hypertension      Cerebral infarction (H)      Chronic atrial fibrillation (H)      Crohn's colitis (H)      Crohn's disease of large intestine (H) 11/22/2021     Esophageal reflux      ESRD (end stage renal disease) on dialysis (H)      History of basal cell carcinoma      Hypertension      Mixed hyperlipidemia      NSTEMI (non-ST elevated myocardial infarction) (H)        CC No referring provider defined for this encounter. on close of this encounter.    This note has been created using voice recognition software; while it has been reviewed, some errors may persist.       Again, thank you for allowing me to participate in the care of your patient.        Sincerely,        Don Khan MD

## 2023-02-20 NOTE — PROGRESS NOTES
[facility] Dermatology Clinic Note - EP    Encounter Date: Feb 17, 2023    Dermatology Problem List:  #. Transplant candidate (renal) - on dialysis    ____________________________________________    Assessment & Plan:     #. Lesions of concern: L shoulder, L lower back, crown of scalp - all seborrheic keratoses   - reassurance provided    #. Benign melanocytic nevi of the trunk  #. Seborrheic keratoses  - reassurance provided; no lesions concerning for malignancy  - photoprotection (regular use of SPF30+ broad spectrum sunscreen and sun protective clothing) recommended  - ABCDE of melanoma discussed    #. Xerosis cutis  - dry skin care techniques discussed, see AVS    #. Prurigo nodularis  - related to xerosis, possibly with contribution from renal failure   - recommend excluding possibility that it is all related to xerosis, then discussed phototherapy as highly effective and safe if still itching versus adjustments to dialysis  - start clobetasol 0.05% ointment BID (under occlusion if helpful)    Procedures Performed:   None    Follow-up: 6-12 months    Don Khan MD  Dermatology/Dermatopathology Staff Physician  , Department of Dermatology    ____________________________________________    CC: Skin Check (Brownish spot on left shoulder and lower back)      HPI:  Mr. Tacos Dominguez is a(n) extremely pleasant 63 year old male who presents today as a new patient for FBSE given transplant history. Notes lesions of concern on L shoulder, L lower back, crown of scalp.    The patient denies any painful, bleeding, or nonhealing lesions, or any new or changing moles.    Does note dry skin and has been rubbing and picking enough that small nodules have started to form. Up for using a medication to help.    Patient is otherwise feeling well, without additional skin concerns.     Labs Reviewed:  N/A    Physical Exam:  Vitals: There were no vitals taken for this visit.  SKIN: Total skin excluding the  undergarment areas was performed. The exam included the head/face, neck, both arms, chest, back, abdomen, both legs, digits and/or nails.   - Extensive xerosis with scattered violaceous firm nodules at sites of picking  - Recent surgical scars and extensive associated ecchymoses, L neck and chest  - Lesions of concern: L shoulder, L lower back, crown of scalp - waxy verrucoid papules  - Multiple regular brown pigmented macules and papules are identified on the trunk and extremities.   - There are waxy stuck on tan to brown papules on the trunk and extremities.  - No other lesions of concern on areas examined.     Medications:  Current Outpatient Medications   Medication     amLODIPine (NORVASC) 10 MG tablet     calcitRIOL (ROCALTROL) 0.5 MCG capsule     calcium acetate (CALPHRON) 667 MG TABS tablet     carvedilol (COREG) 12.5 MG tablet     clobetasol (TEMOVATE) 0.05 % external ointment     folic acid (FOLVITE) 1 MG tablet     lidocaine-prilocaine (EMLA) 2.5-2.5 % external cream     multivitamin RENAL (MULTIVITAMIN RENAL) 1 MG capsule     omeprazole (PRILOSEC) 20 MG DR capsule     oxyCODONE (OXYCONTIN) 10 MG 12 hr tablet     simvastatin (ZOCOR) 20 MG tablet     ustekinumab (STELARA) 90 MG/ML     Current Facility-Administered Medications   Medication     lidocaine 1% with EPINEPHrine 1:100,000 injection 3 mL      Past Medical History:   Patient Active Problem List   Diagnosis     Acquired cyst of kidney     ESRD (end stage renal disease) (H)     Organ transplant candidate     NSTEMI (non-ST elevated myocardial infarction) (H)     Atrial fibrillation (H)     Benign essential hypertension     Crohn's disease of large intestine (H)     IPMN (intraductal papillary mucinous neoplasm)     Status post repair of arteriovenous fistula     Past Medical History:   Diagnosis Date     Aortic dissection (H)     distal, thin.  stable/chronic on MRCP 3/2022     Benign essential hypertension      Cerebral infarction (H)      Chronic  atrial fibrillation (H)      Crohn's colitis (H)      Crohn's disease of large intestine (H) 11/22/2021     Esophageal reflux      ESRD (end stage renal disease) on dialysis (H)      History of basal cell carcinoma      Hypertension      Mixed hyperlipidemia      NSTEMI (non-ST elevated myocardial infarction) (H)        CC No referring provider defined for this encounter. on close of this encounter.    This note has been created using voice recognition software; while it has been reviewed, some errors may persist.

## 2023-02-28 ENCOUNTER — TELEPHONE (OUTPATIENT)
Dept: NEPHROLOGY | Facility: CLINIC | Age: 63
End: 2023-02-28

## 2023-02-28 NOTE — PROGRESS NOTES
[facility]    Tacos Dominguez returns for follow-up.  He has a left upper arm brachial to cephalic fistula with recurrent arm swelling due to subclavian vein occlusion that could not be recannulated by interventional radiology.  Fistula itself is widely dilated and has been functioning well.    Outflow revision with a 10 mm thin-walled ringed propaten PTFE bypass graft from the infraclavicular subclavian vein to the internal jugular vein performed 2/14/2023.  Significant improvement with his swelling following the procedure.  Fistula has been functioning well.    Phone-Visit Details    Type of service:  Phone Visit     Originating Location (pt. Location): Home    Distant Location (provider location):  On-site  Platform used for Visit: Phone      TELEPHONE FOLLOW-UP: He reports that he is doing quite well.  The arm swelling is essentially completely resolved.  He did have some mild bruising around the surgical incisions which also is completely resolved.  Fistula is working very well and much easier to access due to the resolution of the arm swelling.    No notice occasionally in both of his forearms at night some hypersensitivity and tingling.  They did try some oxycodone and this was not helpful.  This should not be related to the surgery since we are well away from the brachial plexus, etc.-- this is improving per patient.      We will plan a duplex ultrasound of the outflow revision here in the clinic in 6 to 8 weeks with an office visit at that time.    5 minutes with the patient on the phone today being completed at 0830 hrs.    Mo Gramajo MD   This note was created using Dragon voice recognition software which may result in transcription errors.

## 2023-02-28 NOTE — TELEPHONE ENCOUNTER
[facility] Call Center    Phone Message    May a detailed message be left on voicemail: yes     Reason for Call: Other: Patient called and spoke with writer, he would like to change his appointment 3/2 to video. Please review and call patient. Thank you      Action Taken: Message routed to:  [facility] ([facility] Neph    Travel Screening: Not Applicable

## 2023-03-02 ENCOUNTER — TELEPHONE (OUTPATIENT)
Dept: OTHER | Facility: CLINIC | Age: 63
End: 2023-03-02

## 2023-03-02 ENCOUNTER — VIRTUAL VISIT (OUTPATIENT)
Dept: NEPHROLOGY | Facility: CLINIC | Age: 63
End: 2023-03-02
Attending: NURSE PRACTITIONER
Payer: COMMERCIAL

## 2023-03-02 ENCOUNTER — VIRTUAL VISIT (OUTPATIENT)
Dept: OTHER | Facility: CLINIC | Age: 63
End: 2023-03-02
Attending: SURGERY
Payer: COMMERCIAL

## 2023-03-02 DIAGNOSIS — Z85.828 HISTORY OF BASAL CELL CARCINOMA: ICD-10-CM

## 2023-03-02 DIAGNOSIS — I71.02 AORTIC DISSECTION, ABDOMINAL (H): ICD-10-CM

## 2023-03-02 DIAGNOSIS — F17.200 CURRENT SMOKER: ICD-10-CM

## 2023-03-02 DIAGNOSIS — Z76.82 ORGAN TRANSPLANT CANDIDATE: Primary | ICD-10-CM

## 2023-03-02 DIAGNOSIS — I77.0 ARTERIOVENOUS FISTULA (H): Primary | ICD-10-CM

## 2023-03-02 DIAGNOSIS — I82.B12 SUBCLAVIAN VEIN OCCLUSION, LEFT (H): ICD-10-CM

## 2023-03-02 PROCEDURE — 99024 POSTOP FOLLOW-UP VISIT: CPT | Performed by: SURGERY

## 2023-03-02 PROCEDURE — 99204 OFFICE O/P NEW MOD 45 MIN: CPT | Mod: TEL | Performed by: NURSE PRACTITIONER

## 2023-03-02 PROCEDURE — G0463 HOSPITAL OUTPT CLINIC VISIT: HCPCS | Mod: PN,TEL | Performed by: NURSE PRACTITIONER

## 2023-03-02 NOTE — TELEPHONE ENCOUNTER
Per 3/2/23 visit with Dr. Gramajo, pt needs the following in approximately 6-8 weeks:      AVF US     In clinic visit with Dr. Gramajo to discuss results    Routing to scheduling to contact patient to coordinate above.    Appt note in order comments.    Tarsha Dias, BSN, RN-[facility] [place]

## 2023-03-02 NOTE — LETTER
3/2/2023       RE: Tacos Dominguez  [address]  [facility] [address]     Dear Colleague,    Thank you for referring your patient, Tacos Dominguez, to the [facility] [place] [facility]. Please see a copy of my visit note below.      Phone call duration:  13 minutes    TRANSPLANT NEPHROLOGY WAITLIST VISIT    Assessment and Plan:  # Kidney Transplant Wait List Evaluation: Patient is a good candidate overall. Patient is still in evaluation phase. Recommend transplant surgery visit as it has been 2 years since he was seen and this visit was limited by phone. Also need aortoiliac US given findings of chronic distal aortic dissection on 3/2022 MRCP.     # ESKD from unknown etiology: on HD since 10/2020, and may benefit from a kidney transplant.     # Cardiac Risk:    - PAF (not on AC)   - 3/2021 ECHO with normal LVEF ~ 60-65%, mild to mod mitral insufficiency.   - 5/2021 coronary angiogram with minimal non-obstructive coronary artery disease     # Chronic distal aortic dissection (3/2022 MRCP): will get aortoiliac US.     # Crohn's disease: dx age 16, s/p multiple bowel resections with minimal bowel remaining. His last EGD in 2020 showed some active disease, however, biopsies were negative. Managing well with ustekinumab injections every 4 weeks.     # S/p partial pancreatectomy for IPMN (3/17/22)     Path:   -Intraductal papillary mucinous neoplasm (IPMN), with multifocal high-grade dysplasia  -Tumor size: 7.2 cm in greatest dimension  -Resection margin free of high-grade dysplasia (low-grade mucinous epithelium present at the resection margin)  -No evidence of invasive malignancy  -Nineteen lymph nodes with no evidence of metastatic carcinoma (0/19)    # Left renal cyst: Hemorrhagic/proteinaceous and stable appearing in 10/2022 CT compared to 2020. Urology recommended a 1 year follow up US.     # Current smoker: trying to quit.  Encouraged cessation.     # Nonmelanoma skin cancer: needs derm if not seen in the past year.     # Health Maintenance: Colonoscopy: Up to date and Dental: Not up to date     Discussed the risks and benefits of a transplant, including the risk of surgery and immunosuppression medications.      Patient presently appears to be enough of an acceptable kidney transplant recipient candidate to have any potential kidney donors start the evaluation process.  Patient s overall re-evaluation may require further discussion in the Transplant Program s multidisciplinary selection committee for a final recommendation on the patient s suitability for transplant.     Reason for Visit:  Tacos Dominguez is a 63 year old male with ESKD from unknown etiology, who presents for kidney transplant wait list evaluation.     Date of Initial Transplant Evaluation:  3/2021        Current Transplant Phase: Evaluation: Active  Official UNOS Listing Date:   Blood Type: A        cPRA: 23%       Date of cPRA: 3/2021   Transplant Coordinator: Barbara Vasquez Transplant Office phone number [phone]     Previous Medical Issues:  # cardiology: cath done   # TIA: carotid US <50% stenosis in 4/2021.   # Left renal mass: see above      History of Present Illness:   Tacos Dominguez is a 63-year-old male who presents for wait list evaluation with history of ESKD from unknown etiology, on HD since 3/2021, PAF,  distal aortic dissection, tobacco use, crohn's disease (s/p right crystal (1975) and further resection with colostomy dn take down in 2006), TIAs, renal cyst and IPMN s/p partial pancreatectomy (path benign).             Interim Events:     S/p partial pancreatectomy for IPMN (3/17/22)     Path:   -Intraductal papillary mucinous neoplasm (IPMN), with multifocal high-grade dysplasia  -Tumor size: 7.2 cm in greatest dimension  -Resection margin free of high-grade dysplasia (low-grade mucinous epithelium present at the resection margin)  -No evidence  of invasive malignancy  -Nineteen lymph nodes with no evidence of metastatic carcinoma (0/19)         Kidney Disease:        Kidney Disease Dx: Unknown etiology        On Dialysis: Yes, Date initiated: 10/2020 and Dialysis Type: [facility] HD;       Primary Nephrologist: Dr. Scherer , blood pressures controlled, SBPs 150-170 mmHg, still making urine.            Cardiac/Vascular Disease History:    3/2021 ECHO with normal LVEF ~ 60-65%, mild to mod mitral insufficiency. Minimal non-obstructive coronary artery disease in 5/2021 coronary angiogram.              Arrhythmia:  Yes; PAF (not on AC)       Pulmonary Hypertension: No        Valvular Disease: No       Other: None       New Cardiac/Vascular Events: No         Functional Capacity/Frailty:        Not participating in any exercise, but has no limitations with walking. Able to walk 2+ miles OK and do a flight of stairs without chest pains or shortness of breath.     Allergy Testing Questions:   Medication that caused a reaction Other drugs:  Lisinopril - anaphylaxsis   Antibiotics used that didn't give an allergic reaction?  None    COVID Vaccination Up To Date: Yes           Other Pertinent Transplant Surgical Issues:  Recent Blood Transfusion: No  Previous Abdominal Transplant: No  Bladder Dysfunction: No  Chronic/Recurrent Infections: No  Chronic Anticoagulation: No  Jehovah s Witness: No       Active Problem List:   Patient Active Problem List   Diagnosis     Acquired cyst of kidney     ESRD (end stage renal disease) (H)     Organ transplant candidate     NSTEMI (non-ST elevated myocardial infarction) (H)     Atrial fibrillation (H)     Benign essential hypertension     Crohn's disease of large intestine (H)     IPMN (intraductal papillary mucinous neoplasm)     Status post repair of arteriovenous fistula       Personal History:  Current smoker     Allergies:  Allergies   Allergen Reactions     Lisinopril Anaphylaxis and Other (See Comments)     Shortness of  breath        Medications:  Current Outpatient Medications   Medication Sig     amLODIPine (NORVASC) 10 MG tablet Take 10 mg by mouth daily     calcitRIOL (ROCALTROL) 0.5 MCG capsule Take 1.5 mcg by mouth daily     calcium acetate (CALPHRON) 667 MG TABS tablet Take 2,668 mg by mouth 3 times daily     carvedilol (COREG) 12.5 MG tablet Take 12.5 mg by mouth 2 times daily     clobetasol (TEMOVATE) 0.05 % external ointment Apply topically 2 times daily     folic acid (FOLVITE) 1 MG tablet Take 1 mg by mouth every morning      lidocaine-prilocaine (EMLA) 2.5-2.5 % external cream three times a week Prior to dialysis  site access on Monday, Wednesday, Friday     multivitamin RENAL (MULTIVITAMIN RENAL) 1 MG capsule Take 1 capsule by mouth every morning     omeprazole (PRILOSEC) 20 MG DR capsule Take 20 mg by mouth every morning      simvastatin (ZOCOR) 20 MG tablet Take 20 mg by mouth every morning      ustekinumab (STELARA) 90 MG/ML Inject 1 ml ( 90 mg) subcutaneous every 4 weeks.     Current Facility-Administered Medications   Medication     lidocaine 1% with EPINEPHrine 1:100,000 injection 3 mL        Vitals:  There were no vitals taken for this visit.             Again, thank you for allowing me to participate in the care of your patient.      Sincerely,    JANNET Saba CNP

## 2023-03-02 NOTE — PROGRESS NOTES
Scar is a 63 year old who is being evaluated via a billable telephone visit.      What phone number would you like to be contacted at? [phone]  How would you like to obtain your AVS? Too    Distant Location (provider location):  On-site  Phone call duration:  13 minutes    TRANSPLANT NEPHROLOGY WAITLIST VISIT    Assessment and Plan:  # Kidney Transplant Wait List Evaluation: Patient is a good candidate overall. Patient is still in evaluation phase. Recommend transplant surgery visit as it has been 2 years since he was seen and this visit was limited by phone. Also need aortoiliac US given findings of chronic distal aortic dissection on 3/2022 MRCP.     # ESKD from unknown etiology: on HD since 10/2020, and may benefit from a kidney transplant.     # Cardiac Risk:    - PAF (not on AC)   - 3/2021 ECHO with normal LVEF ~ 60-65%, mild to mod mitral insufficiency.   - 5/2021 coronary angiogram with minimal non-obstructive coronary artery disease     # Chronic distal aortic dissection (3/2022 MRCP): will get aortoiliac US.     # Crohn's disease: dx age 16, s/p multiple bowel resections with minimal bowel remaining. His last EGD in 2020 showed some active disease, however, biopsies were negative. Managing well with ustekinumab injections every 4 weeks.     # S/p partial pancreatectomy for IPMN (3/17/22)     Path:   -Intraductal papillary mucinous neoplasm (IPMN), with multifocal high-grade dysplasia  -Tumor size: 7.2 cm in greatest dimension  -Resection margin free of high-grade dysplasia (low-grade mucinous epithelium present at the resection margin)  -No evidence of invasive malignancy  -Nineteen lymph nodes with no evidence of metastatic carcinoma (0/19)    # Left renal cyst: Hemorrhagic/proteinaceous and stable appearing in 10/2022 CT compared to 2020. Urology recommended a 1 year follow up US.     # Current smoker: trying to quit. Encouraged cessation.     # Nonmelanoma skin cancer: needs derm if not seen in  the past year.     # Health Maintenance: Colonoscopy: Up to date and Dental: Not up to date     Discussed the risks and benefits of a transplant, including the risk of surgery and immunosuppression medications.      Patient presently appears to be enough of an acceptable kidney transplant recipient candidate to have any potential kidney donors start the evaluation process.  Patient s overall re-evaluation may require further discussion in the Transplant Program s multidisciplinary selection committee for a final recommendation on the patient s suitability for transplant.     Reason for Visit:  Tacos Dominguez is a 63 year old male with ESKD from unknown etiology, who presents for kidney transplant wait list evaluation.     Date of Initial Transplant Evaluation:  3/2021        Current Transplant Phase: Evaluation: Active  Official UNOS Listing Date:   Blood Type: A        cPRA: 23%       Date of cPRA: 3/2021   Transplant Coordinator: Barbara Vasquez Transplant Office phone number [phone]     Previous Medical Issues:  # cardiology: cath done   # TIA: carotid US <50% stenosis in 4/2021.   # Left renal mass: see above      History of Present Illness:   Tacos Dominguez is a 63-year-old male who presents for wait list evaluation with history of ESKD from unknown etiology, on HD since 3/2021, PAF,  distal aortic dissection, tobacco use, crohn's disease (s/p right crystal (1975) and further resection with colostomy dn take down in 2006), TIAs, renal cyst and IPMN s/p partial pancreatectomy (path benign).             Interim Events:     S/p partial pancreatectomy for IPMN (3/17/22)     Path:   -Intraductal papillary mucinous neoplasm (IPMN), with multifocal high-grade dysplasia  -Tumor size: 7.2 cm in greatest dimension  -Resection margin free of high-grade dysplasia (low-grade mucinous epithelium present at the resection margin)  -No evidence of invasive malignancy  -Nineteen lymph nodes with no evidence of metastatic  carcinoma (0/19)         Kidney Disease:        Kidney Disease Dx: Unknown etiology        On Dialysis: Yes, Date initiated: 10/2020 and [facility];       Primary Nephrologist: Dr. Scherer , blood pressures controlled, SBPs 150-170 mmHg, still making urine.            Cardiac/Vascular Disease History:    3/2021 ECHO with normal LVEF ~ 60-65%, mild to mod mitral insufficiency. Minimal non-obstructive coronary artery disease in 5/2021 coronary angiogram.              Arrhythmia:  Yes; PAF (not on AC)       Pulmonary Hypertension: No        Valvular Disease: No       Other: None       New Cardiac/Vascular Events: No         Functional Capacity/Frailty:        Not participating in any exercise, but has no limitations with walking. Able to walk 2+ miles OK and do a flight of stairs without chest pains or shortness of breath.     Allergy Testing Questions:   Medication that caused a reaction Other drugs:  Lisinopril - anaphylaxsis   Antibiotics used that didn't give an allergic reaction?  None    COVID Vaccination Up To Date: Yes           Other Pertinent Transplant Surgical Issues:  Recent Blood Transfusion: No  Previous Abdominal Transplant: No  Bladder Dysfunction: No  Chronic/Recurrent Infections: No  Chronic Anticoagulation: No  Jehovah s Witness: No       Active Problem List:   Patient Active Problem List   Diagnosis     Acquired cyst of kidney     ESRD (end stage renal disease) (H)     Organ transplant candidate     NSTEMI (non-ST elevated myocardial infarction) (H)     Atrial fibrillation (H)     Benign essential hypertension     Crohn's disease of large intestine (H)     IPMN (intraductal papillary mucinous neoplasm)     Status post repair of arteriovenous fistula       Personal History:  Current smoker     Allergies:  Allergies   Allergen Reactions     Lisinopril Anaphylaxis and Other (See Comments)     Shortness of breath        Medications:  Current Outpatient Medications   Medication Sig      amLODIPine (NORVASC) 10 MG tablet Take 10 mg by mouth daily     calcitRIOL (ROCALTROL) 0.5 MCG capsule Take 1.5 mcg by mouth daily     calcium acetate (CALPHRON) 667 MG TABS tablet Take 2,668 mg by mouth 3 times daily     carvedilol (COREG) 12.5 MG tablet Take 12.5 mg by mouth 2 times daily     clobetasol (TEMOVATE) 0.05 % external ointment Apply topically 2 times daily     folic acid (FOLVITE) 1 MG tablet Take 1 mg by mouth every morning      lidocaine-prilocaine (EMLA) 2.5-2.5 % external cream three times a week Prior to dialysis  site access on Monday, Wednesday, Friday     multivitamin RENAL (MULTIVITAMIN RENAL) 1 MG capsule Take 1 capsule by mouth every morning     omeprazole (PRILOSEC) 20 MG DR capsule Take 20 mg by mouth every morning      simvastatin (ZOCOR) 20 MG tablet Take 20 mg by mouth every morning      ustekinumab (STELARA) 90 MG/ML Inject 1 ml ( 90 mg) subcutaneous every 4 weeks.     Current Facility-Administered Medications   Medication     lidocaine 1% with EPINEPHrine 1:100,000 injection 3 mL        Vitals:  There were no vitals taken for this visit.

## 2023-03-03 PROBLEM — Z85.828 HISTORY OF BASAL CELL CARCINOMA: Status: ACTIVE | Noted: 2023-03-03

## 2023-03-03 PROBLEM — I25.10 CAD (CORONARY ARTERY DISEASE): Status: ACTIVE | Noted: 2023-03-03

## 2023-03-03 PROBLEM — F17.200 CURRENT SMOKER: Status: ACTIVE | Noted: 2023-03-03

## 2023-03-03 PROBLEM — I21.4 NSTEMI (NON-ST ELEVATED MYOCARDIAL INFARCTION) (H): Status: RESOLVED | Noted: 2021-04-26 | Resolved: 2023-03-03

## 2023-03-03 PROBLEM — I71.02 AORTIC DISSECTION, ABDOMINAL (H): Status: ACTIVE | Noted: 2023-03-03

## 2023-03-13 ENCOUNTER — TELEPHONE (OUTPATIENT)
Dept: GASTROENTEROLOGY | Facility: CLINIC | Age: 63
End: 2023-03-13

## 2023-03-13 NOTE — LETTER
2023    To:   [facility] Appeals  [address] [address]  [place] [address]    RE:   Tacos Dominguez  [address]  [place] [address]  : 1960  Policy #: [id number]  Case/Reference: [id number]    To Whom It May Concern,    Below is the clinical summary pertinent to the appeal of ustekinumab (STELARA) 90 MG/ML Inject 1 ml ( 90 mg) subcutaneous every 4 weeks. We understand that you are denying our request because the plan doesn't cover the requested frequency.    Background   Diagnosis: Crohn's disease of large intestine with complication (H) (K50.119)  Current IBD medications:   - ustekinumab (STELARA) 90 MG/ML  started on 22     Previous IBD Therapies:  Sulfasalazine in the 1970s  Remicade around early s. Was  On this for at least 2 years. Stopped due to remission.   Azathioprine ~    Clinical disease assessment on current medications:    Mr. Dominguez is a 62 year old here with long-standing Crohn's disease s/p multiple bowel resections with minimal remaining bowel. Fortunately, he is not requiring TPN and stable in weight. He started on Stelara about 1 year ago and is feeling well, although he still has 4-5 BMs per day (not sure if this is related to his recent subtotal pancreatectomy in March, however). Also, he does still smoke about 1/4 ppd which is counterproductive to Crohn's healing; he is trying to quit using patches.     Objective measures of inflammation:    - Flex-sig/colonoscopy:    - Preparation of the colon was poor. This exam was                             not adequate for colorectal cancer or polyps                             screening.                             - Perianal skin tags found on perianal exam.                             - A single ulcer in the small bowel 20 cm from the                             ileocolonic anastomosis. Biopsied. This is                             presumably the same ulcer that was seen before.                              This was smaller (now 5mm) with surrounding                             granular mucosa that may represent healing. Bipsies                             taken.                             - Congested mucosa in the distal small bowel.                             Biopsied. Otherwise remainder of ileum normal                             - Patent end-to-end ileo-colonic anastomosis,                             characterized by healthy appearing mucosa.                             - Diverticulosis vs old fisulas in the distal                             rectum.                             - Excoriated mucosa at the anus and in the distal                             rectum. Biopsied.                             - The examination was otherwise normal on direct                             and retroflexion views.                             - Simple Endoscopic Score for Crohn's Disease: 5,                             mucosal inflammatory changes secondary to Crohn's                             disease with ileitis.                             Overall exam is similar to previous but improved                             with decreased size of chronic small bowel ulcer -                             now only 5mm. Remainder of small bowel really looks                             almost normal apart from some patchy mild erythema                             and congestion. Given partial improvement IBD                             clinic may consider increasing Stelara to q 6 weeks                             or q 4 weeks. Would also consider colorectal on date 12/07/2022    -   CRP Inflammation   Date Value Ref Range Status   05/07/2008 5.6 0.0 - 8.0 mg/L Final          Rationale for requested therapy  Mr. Dominguez has persistent endoscopic evidence of disease per his most recent colonoscopy in December. His SES-CD score is 5, consistent with active disease. He also has symptoms of active disease, including frequent, loose  bowel movements. He needs dose optimization of his Stelara.     How we will measure success:   Based on the above, we will measure success defined as an improvement of both symptoms and inflammatory parameters, specifically serology and endoscopy over a 6 month timeframe. Should we not achieve these measures we will pursue a different line of therapy.    Duration  12 months    Summary  In summary, ustekinumab (STELARA) 90 MG/ML Inject 1 ml ( 90 mg) subcutaneous every 4 weeks is medically necessary for this patient s medical condition. Please call my office at [phone] if I can provide you with any additional information to approve my request. I look forward to receiving your timely response and approval of this request.     Sincerely,    Betty Rubio MD  Associate Professor of Medicine  Division of Gastroenterology, Hepatology and Nutrition  [facility]

## 2023-03-13 NOTE — TELEPHONE ENCOUNTER
PA Needed    Medication: Stelara.  QTY/DS:1 for 28  NEW INS:no  Insurance Company:  [facility] [employer]  Pharmacy Filling the Rx:  [facility] :  Per ins it will need an new qty limit PA they are unaware why the last two claims went thru  Date of last fill: 2023

## 2023-03-13 NOTE — TELEPHONE ENCOUNTER
PA Initiation    Medication: Stelara. initiated  Insurance Company: [employer] ([facility]) - Phone [phone] Fax [phone]  Pharmacy Filling the Rx:    Filling Pharmacy Phone:    Filling Pharmacy Fax:    Start Date: 3/13/2023    [id number]

## 2023-03-16 NOTE — TELEPHONE ENCOUNTER
Future Appointments   Date Time Provider Department Center   4/13/2023  8:00 AM [id number]   4/13/2023  9:00 AM Mo Gramajo MD [facility]

## 2023-03-20 NOTE — TELEPHONE ENCOUNTER
** I have started a LMN and routed to Dr Rubio and Jayshree. Once the letter is complete I will start the appeal **        PRIOR AUTHORIZATION DENIED    Medication: Stelara 28 DAY DOSING. denied    Denial Date: 3/20/2023    Denial Rational:        Appeal Information:

## 2023-03-30 NOTE — TELEPHONE ENCOUNTER
Medication Appeal Initiation    We have initiated an appeal for the requested medication:  Medication: Stelara 28 DAY DOSING. Denied appealing  Appeal Start Date:  3/30/2023  Insurance Company: [employer] ([facility]) - Phone [phone] Fax [phone]  Comments:  Faxed appeal letter

## 2023-04-05 NOTE — TELEPHONE ENCOUNTER
I called [phone] to check status on the appeal and it is still in process ref# [id number]. Can take up to 30 days

## 2023-04-11 NOTE — PROGRESS NOTES
[facility]\    Tacos Dominguez returns for vascular follow-up.  On chronic hemodialysis via left upper arm brachial to cephalic fistula.  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  2/14/2023 outflow revision with a 10 mm thin-walled ringed propatent PTFE bypass graft from the infraclavicular subclavian into the internal jugular vein with significant improvement in swelling.  Doing well on follow-up by telephone on 3/2/2023.  Reports complete resolution of swelling and the fistula has been working well.  However, did complain of bilateral arm tingling of uncertain etiology.      3/30/2023 abdominal aortic and venous duplex.  No aortic/iliac aneurysms nor stenosis despite elevated calcified common iliac arteries.  Normal venous exam.    PMH: Medications reviewed in epic.  No recent changes.  Still with occasional smoking     Dialysis at the [facility] unit.  Fistula has been working very well with no issues at all.  Swelling has essentially completely resolved in his left arm.    Exam: Alert and appropriate.  Normal affect.     Blood pressure 161/65 right arm.  Pulse 80   Chest =clear   Cardiovascular= regular rate   Extremities= strong pulse within left upper arm fistula.    Chronic mild aneurysmal dilatation midportion with overlying skin fine.    Minimal arm swelling    Well-healed clavicular neck incision    Duplex today reveals everything is widely patent.  The PTFE interposition graft measures at least 8.5 mm in diameter and more importantly widely patent anastomosis to the internal jugular vein.  We do see a stenosis just beyond the brachial artery anastomosis but this affects less than 50% of the diameter.  Unclear whether this is chronic even after reviewed prior fistulogram since they were concentrating on the outflow subclavian vein at that time.  This does explain the elevated velocity at that level.   Excellent outflow volume of 907 mL/min.          IMPRESSION: #1.  Widely patent left upper arm brachial to cephalic fistula with outflow revision to internal jugular vein.  No problems at all with dialysis.  Excellent flow rates.  Recheck duplex in approximately 4 months.     #2.  Suspect chronic stenosis of fistula just beyond brachial anastomosis at the elbow region.  With the high flow rates this is not a clinical concern and actually with his markedly decreased outflow resistance this is acting almost like a banding of the fistula to make sure he does not have such high flow outflow that could lead to congestive heart failure.  Discussed with patient.    20 minutes with patient today including chart review.  We will see him again in 4 months.    Mo Gramajo MD   This note was created using Dragon voice recognition software which may result in transcription errors.    CC: [facility]

## 2023-04-13 NOTE — TELEPHONE ENCOUNTER
"Called patient for symptom assessment relating to labs. He is still taking Stelara Q8w as we appeal the quantity limit PA for Q4w dosing. He has no symptoms of active disease. He reports \"feeling great\" from a GI standpoint.     His only symptom he is currently experiencing is fatigue. Likely related to Hgb 7.9. He reports he gets \"some sort of iron\" with his dialysis runs. He uses [facility] in [place], [place]. His nephrologist is Dr. Scherer at [facility] in [place].      "

## 2023-04-13 NOTE — PROGRESS NOTES
[facility]        Patient is here for a  follow up.     Pt is currently taking Statin.    BP (!) 161/65 (BP Location: Right arm, Patient Position: Chair, Cuff Size: Adult Regular)   Pulse 80     The provider has been notified that the patient has no concerns.     Questions patient would like addressed today are: N/A.    Refills are needed: N/A    Has homecare services and agency name:  Kayla Miguel MA

## 2023-04-13 NOTE — TELEPHONE ENCOUNTER
----- Message from Betty Rubio MD sent at 4/11/2023  4:37 PM CDT -----  FYI.   Can you please follow up with him to see how he's doing? If active disease, may need budesonide/IV iron.    -E

## 2023-04-14 NOTE — TELEPHONE ENCOUNTER
I called [phone] to check status on the appeal and it is still in process ref# [id number] due by 4/17

## 2023-04-17 NOTE — TELEPHONE ENCOUNTER
I called [phone] to check status on the appeal. Per rep.  Appeal has been approved.  Approval is being faxed over.  Test claim does pay, continue to fill at [facility]    Prior Authorization Approval    Authorization Effective Date:    Authorization Expiration Date:    Medication: Stelara 28 DAY DOSING. Denied appealing  Approved Dose/Quantity: 1 per 28  Reference #: [id number]   Insurance Company: [employer] ([facility]) - Phone [phone] Fax [phone]  Expected CoPay: 0.00      CoPay Card Available:      Foundation Assistance Needed:    Which Pharmacy is filling the prescription (Not needed for infusion/clinic administered):    Pharmacy Notified:  Yes   Patient Notified:  yes

## 2023-04-20 NOTE — TELEPHONE ENCOUNTER
Called Dr. Scherer's office at [facility] in [place]. Spoke with his . She will pass a message to Dr. Scherer for Dr. Rubio's request of 2 units of blood with the patient's next dialysis run at [facility].    If he is agreeable, he will write orders and send them directly to [facility]. Provided my direct call back number for a finalized plan or any questions.

## 2023-05-01 NOTE — LETTER
Tacos Dominguez  [address]  [place] [address]                May 4, 2023      Dear Dental Provider,     You may or may not know that the above patient in your care is in evaluation for an organ transplant. In order to be a candidate for transplant, our center requests that the patient provide a letter of clearance from their dentist stating that they are free from disease or infections. At your earliest convenience, please fax this information to [phone]. Your help is greatly appreciated.    Sincerely,     Barbara Vasquez RN BSN CCTN  Pre-Kidney/Pancreas Transplant Coordinator  [facility]  [facility]

## 2023-05-02 NOTE — TELEPHONE ENCOUNTER
Received a return call from Dr. Scherer's . He states he and his Nephrology team are managing the patient's anemia in other manners. Also confirmed [facility] does not administer blood transfusions.

## 2023-05-02 NOTE — TELEPHONE ENCOUNTER
Called patient to discuss the plan. He recently spoke with Dr. Scherer and was told the logistics of receiving blood with dialysis are too complicated. He is agreeable to our GI team ordered blood and coordinating the transfusions. The patient confirmed he does not have issues with fluid overload and would be amenable to receiving the transfusions outside of his dialysis schedule.      Patient remains symptomatic of anemia with severe lethargy. Will plan for 2 units of blood. Dr. Rubio will call to consent the patient.

## 2023-05-04 NOTE — TELEPHONE ENCOUNTER
Discussed Scar's next steps after his nephrology visit. He should see a surgeon and update PRA and PSA labs. He completed iliac US as requested which will be reviewed. Discussed updating dental clearance as well- letter to dentist sent via [employer].

## 2023-05-05 PROBLEM — D64.9 ANEMIA: Status: ACTIVE | Noted: 2023-01-01

## 2023-05-05 NOTE — TELEPHONE ENCOUNTER
Therapy plan initiated for 2 units of PRBCs. Dr. Rubio completed the telephone blood transfusion consent. Message sent to [facility] for help with coordinating a type & screen prior to transfusion.

## 2023-05-09 NOTE — TELEPHONE ENCOUNTER
Per [facility], appointment available for transfusions tomorrow @ 0700. Patient would need to complete Type & Screen today.    Called patient. He has dialysis tomorrow at that time and is unable to move the appointment due to availability at [facility]. Patient is on the waitlist at [facility] for the next available appointment.

## 2023-05-10 NOTE — PROGRESS NOTES
Infusion Nursing Note:  Jonnie Snyder presents today for prbc transfusion.    Patient seen by provider today: No   present during visit today: Not Applicable.    Note:   Pt received 2 units pRBC, each unit over an hour. Pt's systolic between 165-175/systolic. Dr Betty Rubio paged; she states she's ok with those numbers. Pt didn't take his bp meds this morning because he ran out, is picking up from pharmacy today.      Intravenous Access:  Peripheral IV placed.    Treatment Conditions:  Not Applicable.      Post Infusion Assessment:  Patient tolerated infusion without incident.  Blood return noted pre and post infusion.  Site patent and intact, free from redness, edema or discomfort.  No evidence of extravasations.  Access discontinued per protocol.       Discharge Plan:   Discharge instructions reviewed with: Patient.  Patient and/or family verbalized understanding of discharge instructions and all questions answered.  Patient discharged in stable condition accompanied by: self.  Departure Mode: Ambulatory.      Maribell Schuster RN

## 2023-05-10 NOTE — NURSING NOTE
Chief Complaint   Patient presents with     Blood Transfusion     Scheduled transfusion appointment for RBC. Hx ESRD     Angélica Schuster RN

## 2023-05-24 NOTE — TELEPHONE ENCOUNTER
ustekinumab (STELARA) 90 MG/ML  Last Written Prescription Date:  1/2/23  Last Fill Quantity: 1 ml ,   # refills: 3  Last Office Visit: 11/23/22  Future Office visit:  5/24/23    CBC RESULTS: Recent Labs   Lab Test 04/07/23  1222   WBC 7.4   RBC 2.22*   HGB 7.9*   HCT 24.2*   *   MCH 35.6*   MCHC 32.6   RDW 14.2   *     CRP Inflammation   Date Value Ref Range Status   04/07/2023 26.70 (H) <5.00 mg/L Final     Sed Rate   Date Value Ref Range Status   05/31/2007 36 (H) 0 - 15 mm/h Final     Erythrocyte Sedimentation Rate   Date Value Ref Range Status   04/07/2023 104 (H) 0 - 20 mm/hr Final       Creatinine   Date Value Ref Range Status   04/07/2023 4.37 (H) 0.67 - 1.17 mg/dL Final   05/25/2021 10.60 (H) 0.66 - 1.25 mg/dL Final   ]    Liver Function Studies -   Recent Labs   Lab Test 04/07/23  1222   PROTTOTAL 6.8   ALBUMIN 3.8   BILITOTAL 0.5   ALKPHOS 68   AST 18   ALT 16       Routing refill request to provider for review/approval because:  Abn labs, dialysis> has appt today 5/24/23

## 2023-05-24 NOTE — PATIENT INSTRUCTIONS
PLAN:  ---Continue Stelara every 8 weeks  ------------BW every 3 months. Will follow up on CRP then or sooner.   ---Referral for colonoscopy for disease activity assessment and screening   ---Saw Jayshree Jordan, PharmD 11/2022   ---Stop smoking   ---Continue to avoid NSAIDs

## 2023-05-24 NOTE — LETTER
5/24/2023         RE: Tacos Dominguez  [address]  [facility] [address]        Dear Colleague,    Thank you for referring your patient, Tacos Dominguez, to the [facility] [place]. Please see a copy of my visit note below.    Tacos Dominguez is a 63 year old male who is being evaluated via a billable video visit.        CD follow up    PATIENT: Tacos Dominguez    MRN: [id number]    Date of Birth 1960    Tel: There are no phone numbers on file.    PCP: Antonio Madrigal     HPI: Mr. Dominguez is a 61 year old male here to establish care for Crohn's disease.    In 1975, underwent an emergency appendectomy and was dx with CD at that time, underwent R crystal. Had multiple surgeries after then, 76, 77, 78, including LOAs and a cholecystectomy. Reports having 9 feet of bowel left, in total. Did well until 4/2006, at which time he had intermittent flares and underwent resection with a colostomy bag that was reversed in 11/2006.  At that time, he required TPN for weight loss.  Had a peristomal hernia that was not corrected in an effort to avoid further bowel loss. Previously saw Dr. Finnegan, 2013/2014.     Currently, has 4-5 BMs per day, loose. No blood. Occasional urgency based on diet. Weight has been stable around 75-80 kg.     Last cscope was in 10/2020 that showed ulcers in the TI and at the anastomosis.   Does have ESRD in the setting of CLARISA from afib and aortic dissection in April 2021.  Skin cancer (BCC) was removed on forearm 2 years.     Quit smoking in October 2020. Smoked about 1/2-3/4 ppd x 50 years.     Noteworthy diet history- well balanced    HBI  General well-being 0 = very well  Abdominal pain 0 = none  Number of liquid stools per day 4-5   Abdominal mass cannot assess virtually  Current Complications arthralgias (knees)    Constitutional symptoms:  Fever NO  Weight loss NO    Other GI symptoms present REFLUX SYMPTOMS - acid taste in mouth  Takes  omeprazole 20 mg before breakfast      Lockhart Classification  AGE AT DIAGNOSIS: A1 below 16 y  CURRENT DISEASE LOCATION: jejunum  L4 upper GI: YES possibly, given chronic gastritis seen on EGD in 10/2020  DISEASE BEHAVIOR (since disease onset): B2: stricturing  Perianal disease: NO    Total number of IBD surgeries (except perianal): multiple    Remaining bowel: 9 feet, per patient    Current IBD Medications:  none    Past IBD Medications:   Sulfasalazine in the 1970s  Remicade around early 2000s. Was  On this for at least 2 years. Stopped due to remission.   Azathioprine ~2014    Interval history, 11/16/21 (virtual)  Continues to feel well. Unchanged HBI from above.     Icscope in July showed active disease (below).     Cholestyramine was not very helpful for loose stools.   Smokes up to 2 cigs per day. Has tried Chantix and gum in the past. Will get OTC patches.     Met with Jayshree Jordan, Pharm D on 11/10 with recs for the following     -- COVID booster  -- pneumovax-23 Received 12/2020 per Brayan.  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 Smith on 3/2022. 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    Continues on Stelara; last injection was last Tuesday. Every 8 weeks.     Smoking; down to a 1/4 pack per day. Using patches. Trying to quit.      HBI  General well-being 0 = very well  Abdominal pain 0 = none  Number of liquid stools per day 4-5   Abdominal mass cannot assess virtually  Current Complications arthralgias resolved    Interval history, 5/2023 (virtual)  Continues on Stelara every 8 weeks.     Hgb 9.6 on 5/22. Does report more energy since transfusion.   Continues to try to quit smoking. Smoking 1/4 ppd.     HBI  General well-being 0 = very well  Abdominal pain 0 = none  Number of liquid stools per day 5 in total (vary between soft and  loose)    Abdominal mass cannot assess virtually  Current Complications arthralgias in the setting of dialysis (swelling in ankles from fluid overload)    Past Medical History:   Diagnosis Date    Aortic dissection (H)     distal, thin.  stable/chronic on MRCP 3/2022    Benign essential hypertension     CAD (coronary artery disease)     Cerebral infarction (H)     Crohn's colitis (H)     Crohn's disease of large intestine (H) 11/22/2021    Current smoker     Esophageal reflux     ESRD (end stage renal disease) on dialysis (H)     History of basal cell carcinoma     Hypertension     Mixed hyperlipidemia     NSTEMI (non-ST elevated myocardial infarction) (H)     PAF (paroxysmal atrial fibrillation) (H)         Past Surgical History:   Procedure Laterality Date    ABDOMEN SURGERY      x 6.  colon resections for crohn's disease    APPENDECTOMY      CHOLECYSTECTOMY      COLONOSCOPY N/A 07/20/2021    Procedure: COLONOSCOPY, WITH POLYPECTOMY AND BIOPSY;  Surgeon: Eric Preston MD;  Location:  GI    COLONOSCOPY N/A 12/7/2022    Procedure: COLONOSCOPY, WITH POLYPECTOMY AND BIOPSY;  Surgeon: Willian Kee MD;  Location:  GI    CV CORONARY ANGIOGRAM N/A 05/25/2021    Procedure: CV CORONARY ANGIOGRAM;  Surgeon: Judson Ybarra MD;  Location:  HE[facility] CATH LAB    ESOPHAGOSCOPY, GASTROSCOPY, DUODENOSCOPY (EGD), COMBINED N/A 07/20/2021    Procedure: ESOPHAGOGASTRODUODENOSCOPY, WITH FINE NEEDLE ASPIRATION BIOPSY, WITH ENDOSCOPIC ULTRASOUND GUIDANCE;  Surgeon: Eric Preston MD;  Location:  GI    IR DIALYSIS FISTULOGRAM LEFT  12/01/2022    IR DIALYSIS FISTULOGRAM LEFT  1/13/2023    LAPAROTOMY, LYSIS ADHESIONS, COMBINED N/A 03/17/2022    Procedure: Laparotomy, extensive lysis adhesions, combined;  Surgeon: Sarath Smith MD;  [facility]    PANCREATECTOMY PARTIAL N/A 03/17/2022    Procedure: Open Subtotal Pancreatectomy, intra-op ultrasound;  Surgeon: Sarath Smith MD;  [facility]    REVISION FISTULA ARTERIOVENOUS UPPER EXTREMITY Left 2023    Procedure: LEFT UPPER ARM FISTULA OUTFLOW REVISION FROM CEPHALIC VEIN TO JUGULAR VEIN WITH 10mm THIN-WALLED RINGED POLYTETRAFLUEROETHYLINE;  Surgeon: Mo Gramajo MD;  Location:  OR    VASCULAR SURGERY      dialysis access- left upper       Social History     Tobacco Use    Smoking status: Every Day     Packs/day: 0.25     Years: 50.00     Pack years: 12.50     Types: Cigarettes     Last attempt to quit: 10/19/2020     Years since quittin.5    Smokeless tobacco: Never   Vaping Use    Vaping status: Not on file   Substance Use Topics    Alcohol use: Yes     Comment: rare       Family History   Problem Relation Age of Onset    Breast Cancer Mother     Coronary Artery Disease Father     Hypertension Brother     Anesthesia Reaction No family hx of     Thrombosis No family hx of        Allergies   Allergen Reactions    Lisinopril Anaphylaxis and Other (See Comments)     Shortness of breath        Outpatient Encounter Medications as of 2023   Medication Sig Dispense Refill    amLODIPine (NORVASC) 10 MG tablet Take 10 mg by mouth daily      calcitRIOL (ROCALTROL) 0.5 MCG capsule Take 1.5 mcg by mouth daily      calcium acetate (CALPHRON) 667 MG TABS tablet Take 2,668 mg by mouth 3 times daily      carvedilol (COREG) 12.5 MG tablet Take 12.5 mg by mouth 2 times daily      folic acid (FOLVITE) 1 MG tablet Take 1 mg by mouth every morning       lidocaine-prilocaine (EMLA) 2.5-2.5 % external cream three times a week Prior to dialysis  site access on Monday, Wednesday, Friday      multivitamin RENAL (MULTIVITAMIN RENAL) 1 MG capsule Take 1 capsule by mouth every morning      omeprazole (PRILOSEC) 20 MG DR capsule Take 20 mg by mouth every morning       simvastatin (ZOCOR) 20 MG tablet Take 20 mg by mouth every morning       ustekinumab (STELARA) 90 MG/ML Inject 1 ml ( 90 mg) subcutaneous every 4 weeks. 1 mL 3      Facility-Administered Encounter Medications as of 5/24/2023   Medication Dose Route Frequency Provider Last Rate Last Admin    lidocaine 1% with EPINEPHrine 1:100,000 injection 3 mL  3 mL Intradermal Once Don Khan MD          NSAID  No     Review of Systems  Complete 10 System ROS performed. All are negative except as documented below, in the HPI, or in patient questionnaire from today's visit.    1) Constitutional: No fevers, chills, night sweats or malaise, weight loss or gain  2) Skin: No rash  3) Pulmonary: No wheeze, SOB, cough, sputum or hemoptysis  4) Cardiovascular: No Chest pain or palpitations  5) Genitourinary: No blood in urine or dysuria  6) Endocrine: No increased sweating, hunger, thirst or thyroid problems  7) Hematologic: No bruising and easy bleeding  8) Musculoskeletal: no new pain in joints or limitation in ROM  9) Neurologic: No dizziness, paresthesias or weakness or falls  10) Psychiatric:  not depressed/anxious, no sleep problems    PHYSICAL EXAM  General appearance  Healthy appearing adult, in no acute distress     Eyes  Sclera anicteric  Pupils round and reactive to light     Ears, nose, mouth and throat  No obvious external lesions of ears and nose  Hearing intact     Neck  Symmetric  No obvious external lesions     Respiratory  Normal respiration, no use of accessory muscles      MSK  Gait normal     Skin  No rashes or jaundice      Psychiatric  Oriented to person, place and time  Appropriate mood and affect.       DATA:  Reviewed in detail past documentation, medications and prior workup available in electronic health records or through outside records.    PERTINENT STUDIES:  Most recent CBC:  WBC   Date Value Ref Range Status   05/25/2021 7.2 4.0 - 11.0 10e9/L Final     WBC Count   Date Value Ref Range Status   04/07/2023 7.4 4.0 - 11.0 10e3/uL Final   ]  Hemoglobin   Date Value Ref Range Status   04/07/2023 7.9 (L) 13.3 - 17.7 g/dL Final   05/25/2021 10.6 (L) 13.3 - 17.7  g/dL Final   ]   Platelet Count   Date Value Ref Range Status   04/07/2023 117 (L) 150 - 450 10e3/uL Final   05/25/2021 224 150 - 450 10e9/L Final       Most recent coag:  INR   Date Value Ref Range Status   03/17/2022 1.30 (H) 0.85 - 1.15 Final   05/25/2021 1.33 (H) 0.86 - 1.14 Final       Most recent hepatic panel:  AST   Date Value Ref Range Status   04/07/2023 18 10 - 50 U/L Final   03/25/2021 16 0 - 45 U/L Final     ALT   Date Value Ref Range Status   04/07/2023 16 10 - 50 U/L Final   03/25/2021 22 0 - 70 U/L Final     Bilirubin Conjugated   Date Value Ref Range Status   11/13/2008 0.0 0.0 - 0.3 mg/dL Final      Bilirubin Total   Date Value Ref Range Status   04/07/2023 0.5 <=1.2 mg/dL Final   03/25/2021 0.5 0.2 - 1.3 mg/dL Final     Albumin   Date Value Ref Range Status   04/07/2023 3.8 3.5 - 5.2 g/dL Final   07/30/2022 3.7 3.4 - 5.0 g/dL Final   03/25/2021 3.4 3.4 - 5.0 g/dL Final     Alkaline Phosphatase   Date Value Ref Range Status   04/07/2023 68 40 - 129 U/L Final   03/25/2021 91 40 - 150 U/L Final       Most recent creatinine:  Creatinine   Date Value Ref Range Status   04/07/2023 4.37 (H) 0.67 - 1.17 mg/dL Final   05/25/2021 10.60 (H) 0.66 - 1.25 mg/dL Final     Endoscopy:     12/2022: icscope  Impression:               - Preparation of the colon was poor. This exam was                             not adequate for colorectal cancer or polyps                             screening.                             - Perianal skin tags found on perianal exam.                             - A single ulcer in the small bowel 20 cm from the                             ileocolonic anastomosis. Biopsied. This is                             presumably the same ulcer that was seen before.                             This was smaller (now 5mm) with surrounding                             granular mucosa that may represent healing. Bipsies                             taken.                             - Congested mucosa  in the distal small bowel.                             Biopsied. Otherwise remainder of ileum normal                             - Patent end-to-end ileo-colonic anastomosis,                             characterized by healthy appearing mucosa.                             - Diverticulosis vs old fisulas in the distal                             rectum.                             - Excoriated mucosa at the anus and in the distal                             rectum. Biopsied.                             - The examination was otherwise normal on direct                             and retroflexion views.                             - Simple Endoscopic Score for Crohn's Disease: 5,                             mucosal inflammatory changes secondary to Crohn's                             disease with ileitis.                             Overall exam is similar to previous but improved                             with decreased size of chronic small bowel ulcer -                             now only 5mm. Remainder of small bowel really looks                             almost normal apart from some patchy mild erythema                             and congestion. Given partial improvement IBD                             clinic may consider increasing Stelara to q 6 weeks                             or q 4 weeks. Would also consider colorectal                             evaluation for anoscopy.     PATH:  A.  Ileum, ulcer: Biopsy:  - Chronic active ileitis with architectural disarray, pyloric gland metaplasia, and active inflammation with ulceration and granulation tissue  - Negative for granulomas and dysplasia  - Immunostain for CMV is negative     B.  Ileum: Biopsy:  - Mild chronic active ileitis with architectural disarray, pyloric gland metaplasia, and active inflammation  - Negative for granulomas and dysplasia  - Immunostain for CMV is negative     C.  Rectum: Biopsy:  - Benign anorectal mucosa with quiescent  colitis  - No active colitis  - Negative for granulomas and dysplasia    7/2021:   - Normal perirectal exam.                        - Apparenty end-end ileocolonic anastomosis at                        approximately 70 cm (likely distal transverse colon).                        - 15 mm ulcer in the terminal ileum. Additional                        cobblestoning in this region. Biopsies obtained.                        - Unusual diverticulae in the distal 5-10 cm of the                        rectum without ulceration. Perhaps related to past                        fistulous disease.     PATH:  A. TERMINAL ILEUM, BIOPSY:  Severe ileitis with ulceration and granulation tissue; consistent with severe chronic (Crohn) ileitis; negative for dysplasia; report of CMV immunohistochemistry to follow     B. TERMINAL ILEUM, LABELED BIOPSY OF ULCER:  Chronic active ileitis with granulomas; consistent with mildly active Crohn; no ulceration or dysplasia identified    10/2020:  EGD: chronic gastritis. Bx neg for HP.  Cscope: ulcers in small intestine and at anastomosis. Bx normal.    Imaging:  CT a/p:    IMPRESSION:   1. Marked dilation of the main pancreatic duct up to 2.8 cm in the  pancreatic tail with associated parenchymal atrophy, raising concern  for main duct type IPMN, though sequela of prior pancreatitis could  have a similar appearance. Follow-up GI consultation as directed  below.  2. Atheromatous changes of the infrarenal aorta and iliac arteries.  Iliac arteries demonstrating a medial and posterior predominance of  calcification, greater on the right.   3. Multiple simple and hemorrhagic/proteinaceous cysts arising from  the atrophic kidneys; no evidence of abnormal enhancement to suggest  neoplasm.  4. Incidentally noted sigmoid pneumatosis without abnormal  enhancement, wall thickening, or associated portal venous gas. This is  likely benign idiopathic pneumatosis in the asymptomatic patient,  though recommend  correlation with clinical exam and lactate levels as  bowel ischemia can have similar findings.  5. Slightly increased bowel containing right anterolateral abdominal  wall hernia.    IMPRESSION:    Mr. Dominguez is a 63 year old here with long-standing Crohn's disease s/p multiple bowel resections with minimal remaining bowel. Fortunately, he is not requiring TPN and stable in weight. He started on Stelara and is feeling well, having 5 BMs per day at baseline. Also, he does still smoke about 1/4 ppd which is counterproductive to Crohn's healing; he is trying to quit using patches.     Scar also has ESRD of unknown etiology, on HD. He is working towards getting on the transplant list.    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 Smith on 3/2022. 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    # Active Crohn's disease, dx age 16, s/p multiple bowel resections, stable, on Stelara every 8 weeks     PLAN:  ---Continue Stelara every 8 weeks  ------------BW every 3 months. Will follow up on CRP then or sooner.   ---Referral for colonoscopy for disease activity assessment and screening   ---Saw Jayshree Jordan, PharmD 11/2022   ---Stop smoking   ---Continue to avoid NSAIDs    RTC 6 months with IBD [facility]      40 minutes spent on the date of the encounter performing chart review, history and exam, documentation and further activities as noted above.          Again, thank you for allowing me to participate in the care of your patient.      Sincerely,    Betty Rubio MD

## 2023-05-24 NOTE — PROGRESS NOTES
Tacos Dominguez is a 63 year old male who is being evaluated via a billable video visit.      Virtual Visit Details    Type of service:  Video Visit     Originating Location (pt. Location): Home    Distant Location (provider location):  Off-site  Platform used for Video Visit: Allen       CD follow up    PATIENT: Tacos Dominguez    MRN: [id number]    Date of Birth 1960    Tel: There are no phone numbers on file.    PCP: Antonio Madrigal     HPI: Mr. Dominguez is a 61 year old male here to establish care for Crohn's disease.    In 1975, underwent an emergency appendectomy and was dx with CD at that time, underwent R crystal. Had multiple surgeries after then, 76, 77, 78, including LOAs and a cholecystectomy. Reports having 9 feet of bowel left, in total. Did well until 4/2006, at which time he had intermittent flares and underwent resection with a colostomy bag that was reversed in 11/2006.  At that time, he required TPN for weight loss.  Had a peristomal hernia that was not corrected in an effort to avoid further bowel loss. Previously saw Dr. Finnegan, 2013/2014.     Currently, has 4-5 BMs per day, loose. No blood. Occasional urgency based on diet. Weight has been stable around 75-80 kg.     Last cscope was in 10/2020 that showed ulcers in the TI and at the anastomosis.   Does have ESRD in the setting of CLARISA from afib and aortic dissection in April 2021.  Skin cancer (BCC) was removed on forearm 2 years.     Quit smoking in October 2020. Smoked about 1/2-3/4 ppd x 50 years.     Noteworthy diet history- well balanced    HBI  General well-being 0 = very well  Abdominal pain 0 = none  Number of liquid stools per day 4-5   Abdominal mass cannot assess virtually  Current Complications arthralgias (knees)    Constitutional symptoms:  Fever NO  Weight loss NO    Other GI symptoms present REFLUX SYMPTOMS - acid taste in mouth  Takes omeprazole 20 mg before breakfast      Yamil Classification  AGE AT DIAGNOSIS: A1  below 16 y  CURRENT DISEASE LOCATION: jejunum  L4 upper GI: YES possibly, given chronic gastritis seen on EGD in 10/2020  DISEASE BEHAVIOR (since disease onset): B2: stricturing  Perianal disease: NO    Total number of IBD surgeries (except perianal): multiple    Remaining bowel: 9 feet, per patient    Current IBD Medications:  none    Past IBD Medications:   Sulfasalazine in the 1970s  Remicade around early 2000s. Was  On this for at least 2 years. Stopped due to remission.   Azathioprine ~2014    Interval history, 11/16/21 (virtual)  Continues to feel well. Unchanged HBI from above.     Icscope in July showed active disease (below).     Cholestyramine was not very helpful for loose stools.   Smokes up to 2 cigs per day. Has tried Chantix and gum in the past. Will get OTC patches.     Met with Jayshree Jordan, Pharm D on 11/10 with recs for the following     -- COVID booster  -- pneumovax-23 Received 12/2020 per Brayan.  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 Smith on 3/2022. 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    Continues on Stelara; last injection was last Tuesday. Every 8 weeks.     Smoking; down to a 1/4 pack per day. Using patches. Trying to quit.      HBI  General well-being 0 = very well  Abdominal pain 0 = none  Number of liquid stools per day 4-5   Abdominal mass cannot assess virtually  Current Complications arthralgias resolved    Interval history, 5/2023 (virtual)  Continues on Stelara every 8 weeks.     Hgb 9.6 on 5/22. Does report more energy since transfusion.   Continues to try to quit smoking. Smoking 1/4 ppd.     HBI  General well-being 0 = very well  Abdominal pain 0 = none  Number of liquid stools per day 5 in total (vary between soft and loose)    Abdominal mass cannot assess virtually  Current Complications arthralgias in  the setting of dialysis (swelling in ankles from fluid overload)    Past Medical History:   Diagnosis Date     Aortic dissection (H)     distal, thin.  stable/chronic on MRCP 3/2022     Benign essential hypertension      CAD (coronary artery disease)      Cerebral infarction (H)      Crohn's colitis (H)      Crohn's disease of large intestine (H) 11/22/2021     Current smoker      Esophageal reflux      ESRD (end stage renal disease) on dialysis (H)      History of basal cell carcinoma      Hypertension      Mixed hyperlipidemia      NSTEMI (non-ST elevated myocardial infarction) (H)      PAF (paroxysmal atrial fibrillation) (H)         Past Surgical History:   Procedure Laterality Date     ABDOMEN SURGERY      x 6.  colon resections for crohn's disease     APPENDECTOMY       CHOLECYSTECTOMY       COLONOSCOPY N/A 07/20/2021    Procedure: COLONOSCOPY, WITH POLYPECTOMY AND BIOPSY;  Surgeon: Eric Preston MD;  Location:  GI     COLONOSCOPY N/A 12/7/2022    Procedure: COLONOSCOPY, WITH POLYPECTOMY AND BIOPSY;  Surgeon: Willian Kee MD;  Location:  GI     CV CORONARY ANGIOGRAM N/A 05/25/2021    Procedure: CV CORONARY ANGIOGRAM;  Surgeon: Judson Ybarra MD;  Location:  [facility]     ESOPHAGOSCOPY, GASTROSCOPY, DUODENOSCOPY (EGD), COMBINED N/A 07/20/2021    Procedure: ESOPHAGOGASTRODUODENOSCOPY, WITH FINE NEEDLE ASPIRATION BIOPSY, WITH ENDOSCOPIC ULTRASOUND GUIDANCE;  Surgeon: Eric Preston MD;  Location:  GI     IR DIALYSIS FISTULOGRAM LEFT  12/01/2022     IR DIALYSIS FISTULOGRAM LEFT  1/13/2023     LAPAROTOMY, LYSIS ADHESIONS, COMBINED N/A 03/17/2022    Procedure: Laparotomy, extensive lysis adhesions, combined;  Surgeon: Sarath Smith MD;  Location:  OR     PANCREATECTOMY PARTIAL N/A 03/17/2022    Procedure: Open Subtotal Pancreatectomy, intra-op ultrasound;  Surgeon: Sarath Smith MD;  Location:  OR     REVISION FISTULA ARTERIOVENOUS UPPER EXTREMITY Left  2023    Procedure: LEFT UPPER ARM FISTULA OUTFLOW REVISION FROM CEPHALIC VEIN TO JUGULAR VEIN WITH 10mm THIN-WALLED RINGED POLYTETRAFLUEROETHYLINE;  Surgeon: Mo Gramajo MD;  Location: [facility]     VASCULAR SURGERY      dialysis access- left upper       Social History     Tobacco Use     Smoking status: Every Day     Packs/day: 0.25     Years: 50.00     Pack years: 12.50     Types: Cigarettes     Last attempt to quit: 10/19/2020     Years since quittin.5     Smokeless tobacco: Never   Vaping Use     Vaping status: Not on file   Substance Use Topics     Alcohol use: Yes     Comment: rare       Family History   Problem Relation Age of Onset     Breast Cancer Mother      Coronary Artery Disease Father      Hypertension Brother      Anesthesia Reaction No family hx of      Thrombosis No family hx of        Allergies   Allergen Reactions     Lisinopril Anaphylaxis and Other (See Comments)     Shortness of breath        Outpatient Encounter Medications as of 2023   Medication Sig Dispense Refill     amLODIPine (NORVASC) 10 MG tablet Take 10 mg by mouth daily       calcitRIOL (ROCALTROL) 0.5 MCG capsule Take 1.5 mcg by mouth daily       calcium acetate (CALPHRON) 667 MG TABS tablet Take 2,668 mg by mouth 3 times daily       carvedilol (COREG) 12.5 MG tablet Take 12.5 mg by mouth 2 times daily       folic acid (FOLVITE) 1 MG tablet Take 1 mg by mouth every morning        lidocaine-prilocaine (EMLA) 2.5-2.5 % external cream three times a week Prior to dialysis  site access on Monday, Wednesday, Friday       multivitamin RENAL (MULTIVITAMIN RENAL) 1 MG capsule Take 1 capsule by mouth every morning       omeprazole (PRILOSEC) 20 MG DR capsule Take 20 mg by mouth every morning        simvastatin (ZOCOR) 20 MG tablet Take 20 mg by mouth every morning        ustekinumab (STELARA) 90 MG/ML Inject 1 ml ( 90 mg) subcutaneous every 4 weeks. 1 mL 3     Facility-Administered Encounter Medications as of 2023    Medication Dose Route Frequency Provider Last Rate Last Admin     lidocaine 1% with EPINEPHrine 1:100,000 injection 3 mL  3 mL Intradermal Once Don Khan MD          NSAID  No     Review of Systems  Complete 10 System ROS performed. All are negative except as documented below, in the HPI, or in patient questionnaire from today's visit.    1) Constitutional: No fevers, chills, night sweats or malaise, weight loss or gain  2) Skin: No rash  3) Pulmonary: No wheeze, SOB, cough, sputum or hemoptysis  4) Cardiovascular: No Chest pain or palpitations  5) Genitourinary: No blood in urine or dysuria  6) Endocrine: No increased sweating, hunger, thirst or thyroid problems  7) Hematologic: No bruising and easy bleeding  8) Musculoskeletal: no new pain in joints or limitation in ROM  9) Neurologic: No dizziness, paresthesias or weakness or falls  10) Psychiatric:  not depressed/anxious, no sleep problems    PHYSICAL EXAM  General appearance  Healthy appearing adult, in no acute distress     Eyes  Sclera anicteric  Pupils round and reactive to light     Ears, nose, mouth and throat  No obvious external lesions of ears and nose  Hearing intact     Neck  Symmetric  No obvious external lesions     Respiratory  Normal respiration, no use of accessory muscles      MSK  Gait normal     Skin  No rashes or jaundice      Psychiatric  Oriented to person, place and time  Appropriate mood and affect.       DATA:  Reviewed in detail past documentation, medications and prior workup available in electronic health records or through outside records.    PERTINENT STUDIES:  Most recent CBC:  WBC   Date Value Ref Range Status   05/25/2021 7.2 4.0 - 11.0 10e9/L Final     WBC Count   Date Value Ref Range Status   04/07/2023 7.4 4.0 - 11.0 10e3/uL Final   ]  Hemoglobin   Date Value Ref Range Status   04/07/2023 7.9 (L) 13.3 - 17.7 g/dL Final   05/25/2021 10.6 (L) 13.3 - 17.7 g/dL Final   ]   Platelet Count   Date Value Ref Range Status    04/07/2023 117 (L) 150 - 450 10e3/uL Final   05/25/2021 224 150 - 450 10e9/L Final       Most recent coag:  INR   Date Value Ref Range Status   03/17/2022 1.30 (H) 0.85 - 1.15 Final   05/25/2021 1.33 (H) 0.86 - 1.14 Final       Most recent hepatic panel:  AST   Date Value Ref Range Status   04/07/2023 18 10 - 50 U/L Final   03/25/2021 16 0 - 45 U/L Final     ALT   Date Value Ref Range Status   04/07/2023 16 10 - 50 U/L Final   03/25/2021 22 0 - 70 U/L Final     Bilirubin Conjugated   Date Value Ref Range Status   11/13/2008 0.0 0.0 - 0.3 mg/dL Final      Bilirubin Total   Date Value Ref Range Status   04/07/2023 0.5 <=1.2 mg/dL Final   03/25/2021 0.5 0.2 - 1.3 mg/dL Final     Albumin   Date Value Ref Range Status   04/07/2023 3.8 3.5 - 5.2 g/dL Final   07/30/2022 3.7 3.4 - 5.0 g/dL Final   03/25/2021 3.4 3.4 - 5.0 g/dL Final     Alkaline Phosphatase   Date Value Ref Range Status   04/07/2023 68 40 - 129 U/L Final   03/25/2021 91 40 - 150 U/L Final       Most recent creatinine:  Creatinine   Date Value Ref Range Status   04/07/2023 4.37 (H) 0.67 - 1.17 mg/dL Final   05/25/2021 10.60 (H) 0.66 - 1.25 mg/dL Final     Endoscopy:     12/2022: icscope  Impression:               - Preparation of the colon was poor. This exam was                             not adequate for colorectal cancer or polyps                             screening.                             - Perianal skin tags found on perianal exam.                             - A single ulcer in the small bowel 20 cm from the                             ileocolonic anastomosis. Biopsied. This is                             presumably the same ulcer that was seen before.                             This was smaller (now 5mm) with surrounding                             granular mucosa that may represent healing. Bipsies                             taken.                             - Congested mucosa in the distal small bowel.                              Biopsied. Otherwise remainder of ileum normal                             - Patent end-to-end ileo-colonic anastomosis,                             characterized by healthy appearing mucosa.                             - Diverticulosis vs old fisulas in the distal                             rectum.                             - Excoriated mucosa at the anus and in the distal                             rectum. Biopsied.                             - The examination was otherwise normal on direct                             and retroflexion views.                             - Simple Endoscopic Score for Crohn's Disease: 5,                             mucosal inflammatory changes secondary to Crohn's                             disease with ileitis.                             Overall exam is similar to previous but improved                             with decreased size of chronic small bowel ulcer -                             now only 5mm. Remainder of small bowel really looks                             almost normal apart from some patchy mild erythema                             and congestion. Given partial improvement IBD                             clinic may consider increasing Stelara to q 6 weeks                             or q 4 weeks. Would also consider colorectal                             evaluation for anoscopy.     PATH:  A.  Ileum, ulcer: Biopsy:  - Chronic active ileitis with architectural disarray, pyloric gland metaplasia, and active inflammation with ulceration and granulation tissue  - Negative for granulomas and dysplasia  - Immunostain for CMV is negative     B.  Ileum: Biopsy:  - Mild chronic active ileitis with architectural disarray, pyloric gland metaplasia, and active inflammation  - Negative for granulomas and dysplasia  - Immunostain for CMV is negative     C.  Rectum: Biopsy:  - Benign anorectal mucosa with quiescent colitis  - No active colitis  - Negative for granulomas and  dysplasia    7/2021:   - Normal perirectal exam.                        - Apparenty end-end ileocolonic anastomosis at                        approximately 70 cm (likely distal transverse colon).                        - 15 mm ulcer in the terminal ileum. Additional                        cobblestoning in this region. Biopsies obtained.                        - Unusual diverticulae in the distal 5-10 cm of the                        rectum without ulceration. Perhaps related to past                        fistulous disease.     PATH:  A. TERMINAL ILEUM, BIOPSY:  Severe ileitis with ulceration and granulation tissue; consistent with severe chronic (Crohn) ileitis; negative for dysplasia; report of CMV immunohistochemistry to follow     B. TERMINAL ILEUM, LABELED BIOPSY OF ULCER:  Chronic active ileitis with granulomas; consistent with mildly active Crohn; no ulceration or dysplasia identified    10/2020:  EGD: chronic gastritis. Bx neg for HP.  Cscope: ulcers in small intestine and at anastomosis. Bx normal.    Imaging:  CT a/p:    IMPRESSION:   1. Marked dilation of the main pancreatic duct up to 2.8 cm in the  pancreatic tail with associated parenchymal atrophy, raising concern  for main duct type IPMN, though sequela of prior pancreatitis could  have a similar appearance. Follow-up GI consultation as directed  below.  2. Atheromatous changes of the infrarenal aorta and iliac arteries.  Iliac arteries demonstrating a medial and posterior predominance of  calcification, greater on the right.   3. Multiple simple and hemorrhagic/proteinaceous cysts arising from  the atrophic kidneys; no evidence of abnormal enhancement to suggest  neoplasm.  4. Incidentally noted sigmoid pneumatosis without abnormal  enhancement, wall thickening, or associated portal venous gas. This is  likely benign idiopathic pneumatosis in the asymptomatic patient,  though recommend correlation with clinical exam and lactate levels as  bowel  ischemia can have similar findings.  5. Slightly increased bowel containing right anterolateral abdominal  wall hernia.    IMPRESSION:    Mr. Dominguez is a 63 year old here with long-standing Crohn's disease s/p multiple bowel resections with minimal remaining bowel. Fortunately, he is not requiring TPN and stable in weight. He started on Stelara and is feeling well, having 5 BMs per day at baseline. Also, he does still smoke about 1/4 ppd which is counterproductive to Crohn's healing; he is trying to quit using patches.     Scar also has ESRD of unknown etiology, on HD. He is working towards getting on the transplant list.    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 Smith on 3/2022. 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    # Active Crohn's disease, dx age 16, s/p multiple bowel resections, stable, on Stelara every 8 weeks     PLAN:  ---Continue Stelara every 8 weeks  ------------BW every 3 months. Will follow up on CRP then or sooner.   ---Referral for colonoscopy for disease activity assessment and screening   ---Saw Jayshree Jordan, PharmD 11/2022   ---Stop smoking   ---Continue to avoid NSAIDs    RTC 6 months with IBD [facility]    Betty Rubio MD   of Medicine  Division of Gastroenterology, Hepatology and Nutrition  [facility]    40 minutes spent on the date of the encounter performing chart review, history and exam, documentation and further activities as noted above.

## 2023-06-26 NOTE — PROGRESS NOTES
Transplant Surgery Consult Note     Medical record number: [id number]  YOB: 1960,   Consult requested by Dr García  for evaluation of kidney transplant candidacy.    Assessment and Recommendations:Mr. Dominguez appears to be a good candidate for kidney transplantation and has a good understanding of the risks and benefits of this approach to the management of renal failure. The following issues should be addressed prior to finalizing his transplant candidacy:   62 yo male on dialysis since Oct 2020  ESRD with unknown etiology   History of Crohn's - previous ex lap, previous ostomy (reversed; presence of a RIGHT lower quadrant hernia - no plan to repair per patient); on stelara   History of pancreatic cyst - IPMN (s/p open subtotal pancreatectomy 3/2022; upper midline and L subcostal incision; path - IPMN with mutifocal high grade dysplasia, 7.2cm; resection margin with low grade present; negative LN); pending MRCP this 2023  Smoking: long history of smoking; started at 15 years. No oxygen requirement.  Frailty: NONE; no symptoms of claudication     - NEED to plan for LEFT side placement (LEFT vessels on CT 10/2022 looked appropriate; recent US with triphasic flow but small artery; palpable LEFT femoral; avoid RIGHT side due to the hernia and vessel disease)  - STRONGLY RECOMMEND for living donor kidney (given vessel diseases). Recommend to find living donors.   - Recommend for Cardiac clearance (LHC about 2 years ago without any remarkable findings; but given history of smoking and dialysis, there is an increase cardiac risk)   - Recommend for CT chest and PFT (given his long term of smoking)   - Repeat HLA and PRA (given history of blood transfusion   - Recommend to follow up with MRCP for IMPN monitor     Feliberto Christy Mims MD   Transplant Surgery Fellow       I have reviewed history, examined patient and discussed plan with the fellow/resident/KHADRA.  I concur with the findings in this note.     Risks  of the surgical procedure including but not limited to the rare risk of mortality discussed in detail. Patient verbalized good understanding and had several pertinent questions which were answered satisfactorily.     Immunosuppressive regimen, management and long term risks discussed in detail.     Total time: 30 min  Counseling time: 15 min              ---------------------------------------------------------------------------------------------------    HPI: Mr. Dominguez has End stage renal failure due to hypertension. The patient is non-diabetic.       The patient is on dialysis. JESUSITA IBRAHIM  Has potential kidney donors:  NO     History of Crohn's - previous ex lap, previous ostomy (reversed; presence of a hernia); on stelara   History of pancreatic cyst - IPMN (s/p open subtotal pancreatectomy 3/2022; upper midline and L subcostal incision; path - IPMN with mutifocal high grade dysplasia, 7.2cm; resection margin with low grade present; negative LN); pending MRCP this 2023  Smoking: long history of smoking; started at 15 years. No oxygen requirement.  Denies any claudication        The patient has the following pertinent history:       No    Yes  Dialysis:    []      [x] via:       Blood Transfusion                  []      [x]  Number of units:   Most recently:  Pregnancy:    [x]      [] Number:       Previous Transplant:  [x]      [] Details:    Cancer    [x]      [] Comment:   Kidney stones   [x]      [] Comment:      Recurrent infections  [x]      []  Type:                  Bladder dysfunction  [x]      [] Cause:    Claudication   [x]      [] Distance:    Previous Amputation  [x]      [] Cause:     Chronic anticoagulation  [x]      [] Indication:   [facility]  [x]      []      Past Medical History:   Diagnosis Date     Aortic dissection (H)     distal, thin.  stable/chronic on MRCP 3/2022     Benign essential hypertension      CAD (coronary artery disease)      Cerebral infarction (H)      Crohn's colitis  (H)      Crohn's disease of large intestine (H) 11/22/2021     Current smoker      Esophageal reflux      ESRD (end stage renal disease) on dialysis (H)      History of basal cell carcinoma      Hypertension      Mixed hyperlipidemia      NSTEMI (non-ST elevated myocardial infarction) (H)      PAF (paroxysmal atrial fibrillation) (H)      Past Surgical History:   Procedure Laterality Date     ABDOMEN SURGERY      x 6.  colon resections for crohn's disease     APPENDECTOMY       CHOLECYSTECTOMY       COLONOSCOPY N/A 07/20/2021    Procedure: COLONOSCOPY, WITH POLYPECTOMY AND BIOPSY;  Surgeon: Eric Preston MD;  Location:  GI     COLONOSCOPY N/A 12/7/2022    Procedure: COLONOSCOPY, WITH POLYPECTOMY AND BIOPSY;  Surgeon: Willian Kee MD;  Location:  GI     CV CORONARY ANGIOGRAM N/A 05/25/2021    Procedure: CV CORONARY ANGIOGRAM;  Surgeon: Judson Ybarra MD;  Location:  [facility]     ESOPHAGOSCOPY, GASTROSCOPY, DUODENOSCOPY (EGD), COMBINED N/A 07/20/2021    Procedure: ESOPHAGOGASTRODUODENOSCOPY, WITH FINE NEEDLE ASPIRATION BIOPSY, WITH ENDOSCOPIC ULTRASOUND GUIDANCE;  Surgeon: Eric Preston MD;  Location:  GI     IR DIALYSIS FISTULOGRAM LEFT  12/01/2022     IR DIALYSIS FISTULOGRAM LEFT  1/13/2023     LAPAROTOMY, LYSIS ADHESIONS, COMBINED N/A 03/17/2022    Procedure: Laparotomy, extensive lysis adhesions, combined;  Surgeon: Sarath Smith MD;  Location:  OR     PANCREATECTOMY PARTIAL N/A 03/17/2022    Procedure: Open Subtotal Pancreatectomy, intra-op ultrasound;  Surgeon: Sarath Smith MD;  Location:  OR     REVISION FISTULA ARTERIOVENOUS UPPER EXTREMITY Left 2/14/2023    Procedure: LEFT UPPER ARM FISTULA OUTFLOW REVISION FROM CEPHALIC VEIN TO JUGULAR VEIN WITH 10mm THIN-WALLED RINGED POLYTETRAFLUEROETHYLINE;  Surgeon: Mo Gramajo MD;  Location:  OR     VASCULAR SURGERY      dialysis access- left upper     Family History   Problem Relation Age  of Onset     Breast Cancer Mother      Coronary Artery Disease Father      Hypertension Brother      Anesthesia Reaction No family hx of      Thrombosis No family hx of      Social History     Socioeconomic History     Marital status:      Spouse name: Not on file     Number of children: 1     Years of education: Not on file     Highest education level: Not on file   Occupational History     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 Not Asked   Social History Narrative     Not on file     Social Determinants of Health     Financial Resource Strain: Not on file   Food Insecurity: Not on file   Transportation Needs: Not on file   Physical Activity: Not on file   Stress: Not on file   Social Connections: Not on file   Intimate Partner Violence: Not on file   Housing Stability: Not on file       ROS:   CONSTITUTIONAL:  No fevers or chills  EYES: negative for icterus  ENT:  negative for hearing loss, tinnitus and sore throat  RESPIRATORY:  negative for cough, sputum, dyspnea  CARDIOVASCULAR:  negative for chest pain Fatigue  GASTROINTESTINAL:  negative for nausea, vomiting, diarrhea or constipation  GENITOURINARY:  negative for incontinence, dysuria, bladder emptying problems  HEME:  No easy bruising  INTEGUMENT:  negative for rash and pruritus  NEURO:  Negative for headache, seizure disorder  Allergies:   Allergies   Allergen Reactions     Lisinopril Anaphylaxis and Other (See Comments)     Shortness of breath     Medications:  Prescription Medications as of 2023       Rx  Number Disp Refills Start End Last Dispensed Date Next Fill Date Owning Pharmacy    amLODIPine (NORVASC) 10 MG tablet    11/21/2022        Sig: Take 10 mg by mouth daily    Class: Historical    Route: Oral    calcitRIOL (ROCALTROL) 0.5 MCG capsule    1/30/2023        Sig: Take 1.5 mcg by mouth daily    Class: Historical    Route: Oral    calcium acetate (CALPHRON) 667 MG TABS tablet    12/3/2021        Sig: Take 2,668 mg by mouth 3 times daily    Class: Historical    Route: Oral    carvedilol (COREG) 12.5 MG tablet    1/30/2023 1/30/2024       Sig: Take 12.5 mg by mouth 2 times daily    Class: Historical    Route: Oral    folic acid (FOLVITE) 1 MG tablet    2/19/2022        Sig: Take 1 mg by mouth every morning     Class: Historical    Route: Oral    lidocaine-prilocaine (EMLA) 2.5-2.5 % external cream    2/14/2022        Sig: three times a week Prior to dialysis  site access on Monday, Wednesday, Friday    Class: Historical    multivitamin RENAL (MULTIVITAMIN RENAL) 1 MG capsule    2/1/2022        Sig: Take 1 capsule by mouth every morning    Class: Historical    Route: Oral    omeprazole (PRILOSEC) 20 MG DR capsule    10/29/2020        Sig: Take 20 mg by mouth every morning     Class: Historical    Route: Oral    simvastatin (ZOCOR) 20 MG tablet    3/10/2021        Sig: Take 20 mg by mouth every morning     Class: Historical    Route: Oral    ustekinumab (STELARA) 90 MG/ML  1 mL 3 5/26/2023    [facility] - [place], [place] - [address] [place] [address] Sophie     Sig: Inject 1 ml ( 90 mg) subcutaneous every 4 weeks.    Class: E-Prescribe      Clinic-Administered Medications as of 6/26/2023       Dose Frequency Start End    lidocaine 1% with EPINEPHrine 1:100,000 injection 3 mL 3 mL ONCE 8/19/2022     Route: Intradermal        Exam:     BP (!) 169/78   Pulse 100   Wt 76.2 kg (167 lb 15.9 oz)   SpO2 93%   BMI 23.43 kg/m    Appearance: in no apparent distress.   Skin: normal  Eyes:  no redness or discharge.   Sclera anicteric  Head and Neck: Normal, no rashes or jaundice  Respiratory: easy respirations, no audible wheezing.  Abdomen: flat, Surgical scars consistent with history , large hernia on the right side from previous ostomy  Psychiatric: Normal mood and affect  Ext:    - R femoral: NOT palp    - L femoral: very palp     Diagnostics:   No results found for this or any previous visit (from the past 672 hour(s)).  [facility] cPRA   Date Value Ref Range Status   03/25/2021 22  Final

## 2023-06-26 NOTE — LETTER
6/26/2023         RE: Tacos Dominguez  [address]  [facility] [address]        Dear Colleague,    Thank you for referring your patient, Tacos Dominguez, to the [facility]. Please see a copy of my visit note below.      Transplant Surgery Consult Note     Medical record number: [id number]  YOB: 1960,   Consult requested by Dr García  for evaluation of kidney transplant candidacy.    Assessment and Recommendations:Mr. Dominguez appears to be a good candidate for kidney transplantation and has a good understanding of the risks and benefits of this approach to the management of renal failure. The following issues should be addressed prior to finalizing his transplant candidacy:   60 yo male on dialysis since Oct 2020  ESRD with unknown etiology   History of Crohn's - previous ex lap, previous ostomy (reversed; presence of a RIGHT lower quadrant hernia - no plan to repair per patient); on stelara   History of pancreatic cyst - IPMN (s/p open subtotal pancreatectomy 3/2022; upper midline and L subcostal incision; path - IPMN with mutifocal high grade dysplasia, 7.2cm; resection margin with low grade present; negative LN); pending MRCP this 2023  Smoking: long history of smoking; started at 15 years. No oxygen requirement.  Frailty: NONE; no symptoms of claudication     - NEED to plan for LEFT side placement (LEFT vessels on CT 10/2022 looked appropriate; recent US with triphasic flow but small artery; palpable LEFT femoral; avoid RIGHT side due to the hernia and vessel disease)  - STRONGLY RECOMMEND for living donor kidney (given vessel diseases). Recommend to find living donors.   - Recommend for Cardiac clearance (LHC about 2 years ago without any remarkable findings; but given history of smoking and dialysis, there is an increase cardiac risk)   - Recommend for CT chest and PFT (given his long term of smoking)   - Repeat HLA and PRA (given history of blood transfusion    - Recommend to follow up with MRCP for IMPN monitor     Feliberto Christy Mims MD   Transplant Surgery Fellow       I have reviewed history, examined patient and discussed plan with the fellow/resident/KHADRA.  I concur with the findings in this note.     Risks of the surgical procedure including but not limited to the rare risk of mortality discussed in detail. Patient verbalized good understanding and had several pertinent questions which were answered satisfactorily.     Immunosuppressive regimen, management and long term risks discussed in detail.     Total time: 30 min  Counseling time: 15 min              ---------------------------------------------------------------------------------------------------    HPI: Mr. Dominguez has End stage renal failure due to hypertension. The patient is non-diabetic.       The patient is on dialysis. JESUSITA IBRAHIM  Has potential kidney donors:  NO     History of Crohn's - previous ex lap, previous ostomy (reversed; presence of a hernia); on stelara   History of pancreatic cyst - IPMN (s/p open subtotal pancreatectomy 3/2022; upper midline and L subcostal incision; path - IPMN with mutifocal high grade dysplasia, 7.2cm; resection margin with low grade present; negative LN); pending MRCP this 2023  Smoking: long history of smoking; started at 15 years. No oxygen requirement.  Denies any claudication        The patient has the following pertinent history:       No    Yes  Dialysis:    []      [x] via:       Blood Transfusion                  []      [x]  Number of units:   Most recently:  Pregnancy:    [x]      [] Number:       Previous Transplant:  [x]      [] Details:    Cancer    [x]      [] Comment:   Kidney stones   [x]      [] Comment:      Recurrent infections  [x]      []  Type:                  Bladder dysfunction  [x]      [] Cause:    Claudication   [x]      [] Distance:    Previous Amputation  [x]      [] Cause:     Chronic anticoagulation  [x]      [] Indication:   [facility]  [x]      []      Past Medical History:   Diagnosis Date    Aortic dissection (H)     distal, thin.  stable/chronic on MRCP 3/2022    Benign essential hypertension     CAD (coronary artery disease)     Cerebral infarction (H)     Crohn's colitis (H)     Crohn's disease of large intestine (H) 11/22/2021    Current smoker     Esophageal reflux     ESRD (end stage renal disease) on dialysis (H)     History of basal cell carcinoma     Hypertension     Mixed hyperlipidemia     NSTEMI (non-ST elevated myocardial infarction) (H)     PAF (paroxysmal atrial fibrillation) (H)      Past Surgical History:   Procedure Laterality Date    ABDOMEN SURGERY      x 6.  colon resections for crohn's disease    APPENDECTOMY      CHOLECYSTECTOMY      COLONOSCOPY N/A 07/20/2021    Procedure: COLONOSCOPY, WITH POLYPECTOMY AND BIOPSY;  Surgeon: Eric Preston MD;  Location:  GI    COLONOSCOPY N/A 12/7/2022    Procedure: COLONOSCOPY, WITH POLYPECTOMY AND BIOPSY;  Surgeon: Willian Kee MD;  Location:  GI    CV CORONARY ANGIOGRAM N/A 05/25/2021    Procedure: CV CORONARY ANGIOGRAM;  Surgeon: Judson Ybarra MD;  Location:  [facility]    ESOPHAGOSCOPY, GASTROSCOPY, DUODENOSCOPY (EGD), COMBINED N/A 07/20/2021    Procedure: ESOPHAGOGASTRODUODENOSCOPY, WITH FINE NEEDLE ASPIRATION BIOPSY, WITH ENDOSCOPIC ULTRASOUND GUIDANCE;  Surgeon: Eric Preston MD;  Location:  GI    IR DIALYSIS FISTULOGRAM LEFT  12/01/2022    IR DIALYSIS FISTULOGRAM LEFT  1/13/2023    LAPAROTOMY, LYSIS ADHESIONS, COMBINED N/A 03/17/2022    Procedure: Laparotomy, extensive lysis adhesions, combined;  Surgeon: Sarath Smith MD;  Location:  OR    PANCREATECTOMY PARTIAL N/A 03/17/2022    Procedure: Open Subtotal Pancreatectomy, intra-op ultrasound;  Surgeon: Sarath Smith MD;  [facility]    REVISION FISTULA ARTERIOVENOUS UPPER EXTREMITY Left 2/14/2023    Procedure: LEFT UPPER ARM FISTULA OUTFLOW REVISION FROM  CEPHALIC VEIN TO JUGULAR VEIN WITH 10mm THIN-WALLED RINGED POLYTETRAFLUEROETHYLINE;  Surgeon: Mo Gramajo MD;  Location: [facility]    VASCULAR SURGERY      dialysis access- left upper     Family History   Problem Relation Age of Onset    Breast Cancer Mother     Coronary Artery Disease Father     Hypertension Brother     Anesthesia Reaction No family hx of     Thrombosis No family hx of      Social History     Socioeconomic History    Marital status:      Spouse name: Not on file    Number of children: 1    Years of education: Not on file    Highest education level: Not on file   Occupational History    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 Not Asked   Social History Narrative    Not on file     Social Determinants of Health     Financial Resource Strain: Not on file   Food Insecurity: Not on file   Transportation Needs: Not on file   Physical Activity: Not on file   Stress: Not on file   Social Connections: Not on file   Intimate Partner Violence: Not on file   Housing Stability: Not on file       ROS:   CONSTITUTIONAL:  No fevers or chills  EYES: negative for icterus  ENT:  negative for hearing loss, tinnitus and sore throat  RESPIRATORY:  negative for cough, sputum, dyspnea  CARDIOVASCULAR:  negative for chest pain Fatigue  GASTROINTESTINAL:  negative for nausea, vomiting, diarrhea or constipation  GENITOURINARY:  negative for incontinence, dysuria, bladder emptying problems  HEME:  No easy bruising  INTEGUMENT:  negative for rash and pruritus  NEURO:  Negative for  headache, seizure disorder  Allergies:   Allergies   Allergen Reactions    Lisinopril Anaphylaxis and Other (See Comments)     Shortness of breath     Medications:  Prescription Medications as of 6/26/2023         Rx Number Disp Refills Start End Last Dispensed Date Next Fill Date Owning Pharmacy    amLODIPine (NORVASC) 10 MG tablet    11/21/2022        Sig: Take 10 mg by mouth daily    Class: Historical    Route: Oral    calcitRIOL (ROCALTROL) 0.5 MCG capsule    1/30/2023        Sig: Take 1.5 mcg by mouth daily    Class: Historical    Route: Oral    calcium acetate (CALPHRON) 667 MG TABS tablet    12/3/2021        Sig: Take 2,668 mg by mouth 3 times daily    Class: Historical    Route: Oral    carvedilol (COREG) 12.5 MG tablet    1/30/2023 1/30/2024       Sig: Take 12.5 mg by mouth 2 times daily    Class: Historical    Route: Oral    folic acid (FOLVITE) 1 MG tablet    2/19/2022        Sig: Take 1 mg by mouth every morning     Class: Historical    Route: Oral    lidocaine-prilocaine (EMLA) 2.5-2.5 % external cream    2/14/2022        Sig: three times a week Prior to dialysis  site access on Monday, Wednesday, Friday    Class: Historical    multivitamin RENAL (MULTIVITAMIN RENAL) 1 MG capsule    2/1/2022        Sig: Take 1 capsule by mouth every morning    Class: Historical    Route: Oral    omeprazole (PRILOSEC) 20 MG DR capsule    10/29/2020        Sig: Take 20 mg by mouth every morning     Class: Historical    Route: Oral    simvastatin (ZOCOR) 20 MG tablet    3/10/2021        Sig: Take 20 mg by mouth every morning     Class: Historical    Route: Oral    ustekinumab (STELARA) 90 MG/ML  1 mL 3 5/26/2023    [facility] - [place], [place] - [address]    Sig: Inject 1 ml ( 90 mg) subcutaneous every 4 weeks.    Class: E-Prescribe          Clinic-Administered Medications as of 6/26/2023         Dose Frequency Start End    lidocaine 1% with EPINEPHrine 1:100,000 injection 3 mL 3 mL ONCE  8/19/2022     Route: Intradermal          Exam:     BP (!) 169/78   Pulse 100   Wt 76.2 kg (167 lb 15.9 oz)   SpO2 93%   BMI 23.43 kg/m    Appearance: in no apparent distress.   Skin: normal  Eyes:  no redness or discharge.  Sclera anicteric  Head and Neck: Normal, no rashes or jaundice  Respiratory: easy respirations, no audible wheezing.  Abdomen: flat, Surgical scars consistent with history , large hernia on the right side from previous ostomy  Psychiatric: Normal mood and affect  Ext:    - R femoral: NOT palp    - L femoral: very palp     Diagnostics:   No results found for this or any previous visit (from the past 672 hour(s)).  [facility] cPRA   Date Value Ref Range Status   03/25/2021 22  Final           Again, thank you for allowing me to participate in the care of your patient.        Sincerely,        David Stein MD

## 2023-07-13 NOTE — TELEPHONE ENCOUNTER
[facility] Call Center    Phone Message    May a detailed message be left on voicemail: yes     Reason for Call: Other: Patient called and has he doens't have any ride to the colonoscopy procedure, he needs to have the order changed to an IN PATIENT colonocopsy order, instead of an out patient.     Action Taken: Message routed to:  [facility]    Travel Screening: Not Applicable

## 2023-07-13 NOTE — TELEPHONE ENCOUNTER
"Endoscopy Scheduling Screen    Have you had a positive Covid test in the last 14 days?  No    Are you active on MyChart?   Yes    What insurance is in the chart?  Other:  [facility]    Ordering/Referring Provider: Betty Rubio MD    (If ordering provider performs procedure, schedule with ordering provider unless otherwise instructed. )    BMI: Estimated body mass index is 23.43 kg/m  as calculated from the following:    Height as of 2/14/23: 1.803 m (5' 11\").    Weight as of 6/26/23: 76.2 kg (167 lb 15.9 oz).     Sedation Ordered  moderate sedation.   If patient BMI > 50 do not schedule in ASC.    Are you taking any prescription medications for pain?   No    Are you taking methadone or Suboxone?  No    Do you have a history of malignant hyperthermia or adverse reaction to anesthesia?  No    (Females) Are you currently pregnant?   No     Have you been diagnosed or told you have pulmonary hypertension?   No    Do you have an LVAD?  No    Have you been told you have moderate to severe sleep apnea?  No    Have you been told you have COPD, asthma, or any other lung disease?  No    Do you have any heart conditions?  No     Have you ever had or are you awaiting a heart or lung transplant?   No    Have you had a stroke or transient ischemic attack (TIA aka \"mini stroke\" in the last 6 months?   No    Have you been diagnosed with or been told you have cirrhosis of the liver?   No    Are you currently on dialysis?   Yes (Hospital Only)    Do you need assistance transferring?   No    BMI: Estimated body mass index is 23.43 kg/m  as calculated from the following:    Height as of 2/14/23: 1.803 m (5' 11\").    Weight as of 6/26/23: 76.2 kg (167 lb 15.9 oz).     Is patients BMI > 40 and scheduling location [facility]?  No    Do you take the medication Phentermine, Ozempic or Wegovy?  No    Do you take the medication Naltrexone?  No    Do you take blood thinners?  No      Prep   Are you currently on dialysis or do you have chronic kidney " disease?  Yes (Golytely Prep)    Do you have a diagnosis of diabetes?  No    Do you have a diagnosis of cystic fibrosis (CF)?  No    On a regular basis do you go 3 -5 days between bowel movements?  No    BMI > 40?  No    Preferred Pharmacy:    [facility] - [place], [place] - [address]  [address]  [facility] [address]  Phone: [phone] Fax: [phone]      Final Scheduling Details   Colonoscopy prep sent?  Standard Golytely    Procedure scheduled  Colonoscopy    **PT WILL [place] BACK FOR IN-PATIENT CARE -- NO /POST PROCEDURE SUPERVISION.       Patient Reminders:    You will receive a call from a Nurse to review instructions and health history.  This assessment must be completed prior to your procedure.  Failure to complete the Nurse assessment may result in the procedure being cancelled.       On the day of your procedure, please designate an adult(s) who can drive you home stay with you for the next 24 hours. The medicines used in the exam will make you sleepy. You will not be able to drive.       You cannot take public transportation, ride share services, or non-medical taxi service without a responsible caregiver.  Medical transport services are allowed with the requirement that a responsible caregiver will receive you at your destination.  We require that drivers and caregivers are confirmed prior to your procedure.

## 2023-07-17 NOTE — TELEPHONE ENCOUNTER
Called patient to explain inpatient admission for an elective colonoscopy is not a viable option. Offered to connect with social work to coordinate a Medicab to and from the procedure. Patient would need someone to stay with him for 6 hours after moderate sedation. He states he does not have someone who could be with him post-procedure.    Will notify Dr. Rubio of barriers to completing colonoscopy.

## 2023-07-31 NOTE — LETTER
7/31/2023         RE: Tacos Dominguez  [address]  [place] [address]        Dear Colleague,    Thank you for referring your patient, Tacos Dominguez, to the [facility]. Please see a copy of my visit note below.    [facility] Dermatology Note  Encounter Date: Jul 31, 2023  Office Visit      Dermatology Problem List:  # Transplant candidate (renal) - on dialysis   # Hx NMSC  - BCC - L proximal forearm - s/p excision 11/4/22  ____________________________________________    Assessment & Plan:  # Hx BCC - L forearm, NERD  - Sunscreen: Apply 20 minutes prior to going outdoors and reapply every two hours, when wet or sweating. We recommend using an SPF 30 or higher, and to use one that is water resistant.     - Advised to monitor for changing, non-healing, bleeding, painful, changing, or otherwise symptomatic lesions  - Continue bi- annual skin exams     # Benign melanocytic nevi of the trunk  # Seborrheic keratoses  - reassurance provided; no lesions concerning for malignancy  - photoprotection (regular use of SPF30+ broad spectrum sunscreen and sun protective clothing) recommended  - ABCDE of melanoma discussed     # Xerosis cutis  - dry skin care techniques discussed, see AVS  - discussed relationship to dialysis and kidney malfunction     # Prurigo nodularis  - related to xerosis, possibly with contribution from renal failure   discussed phototherapy as highly effective and safe, patient defers for now as he is on list for kidney transplant  - start clobetasol 0.05% ointment BID (under occlusion if helpful)    # Seborrheic keratosis, inflamed. Vertex scalp x1  - Cryotherapy performed today, see procedure note below.    Procedures Performed:   - Cryotherapy procedure note, location(s): vertex scalp. After verbal consent and discussion of risks and benefits including, but not limited to, dyspigmentation/scar, blister, and pain, 1 lesion(s) was(were) treated  with 1-2 mm freeze border for 1-2 cycles with liquid nitrogen. Post cryotherapy instructions were provided.     Follow-up: 6 month(s) in-person, or earlier for new or changing lesions    Staff:     All risks, benefits and alternatives were discussed with patient.  Patient is in agreement and understands the assessment and plan.  All questions were answered.    Mary Lou Caro PA-C, MPAS  [facility]: Phone: [phone], Fax: [phone]  [facility]: Phone: [phone],  Fax: [phone]  [facility]: Phone: [phone], Fax: [phone]  ____________________________________________    CC: Skin Check (6 month FBSC)      HPI:  Mr. Tacos Dominguez is a 63 year old male who presents today as a return patient for FBSE. Hx of BCC on the forearm. He notes a very irritated spot on the head he would like treated. Also noted itching on back, but this has been tolerable lately with use of baby oil. Planning on kidney transplant hopefully soon. Last seen by Dr. Khan on 2/17/23.    Patient is otherwise feeling well, without additional concerns.    Labs:  none    Physical Exam:  Vitals: There were no vitals taken for this visit.  SKIN: Full skin, which includes the head/face, both arms, chest, back, abdomen,both legs, genitalia and/or groin buttocks, digits and/or nails, was examined.   - Perez's skin type II, <100 nevi - patient has jaundice appearance  - There are dome shaped bright red papules on the trunk.   - Multiple regular brown pigmented macules and papules are identified on the trunk and extremities.   - Scattered brown macules on sun exposed areas.  - There are waxy stuck on tan to brown papules on the trunk.   - There is a tan to brown waxy stuck on papule with surrounding erythema on the vertex scalp x1 .   - There is no erythema, telangectasias, nodularity, or pigmentation on the L forearm.    - skin is dry on exam, luis back and forearms  - No other lesions of concern on areas examined.     Medications:  Current Outpatient Medications   Medication     amLODIPine (NORVASC) 10 MG tablet     calcium acetate (CALPHRON) 667 MG TABS tablet     carvedilol (COREG) 12.5 MG tablet     folic acid (FOLVITE) 1 MG tablet     lidocaine-prilocaine (EMLA) 2.5-2.5 % external cream     multivitamin RENAL (MULTIVITAMIN RENAL) 1 MG capsule     omeprazole (PRILOSEC) 20 MG DR capsule     simvastatin (ZOCOR) 20 MG tablet     ustekinumab (STELARA) 90 MG/ML     calcitRIOL (ROCALTROL) 0.5 MCG capsule     Current Facility-Administered Medications   Medication     lidocaine 1% with EPINEPHrine 1:100,000 injection 3 mL      Past Medical/Surgical History:   Patient Active Problem List   Diagnosis     Acquired cyst of kidney     ESRD (end stage renal disease) (H)     Organ transplant candidate     PAF (paroxysmal atrial fibrillation) (H)     Benign essential hypertension     Crohn's disease of large intestine (H)     IPMN (intraductal papillary mucinous neoplasm)     Status post repair of arteriovenous fistula     Aortic dissection, abdominal (H)     Current smoker     History of basal cell carcinoma     CAD (coronary artery disease)     Anemia     Past Medical History:   Diagnosis Date     Aortic dissection (H)     distal, thin.  stable/chronic on MRCP 3/2022     Benign essential hypertension      CAD (coronary artery disease)      Cerebral infarction (H)      Crohn's colitis (H)      Crohn's disease of large intestine (H) 11/22/2021     Current smoker      Esophageal reflux      ESRD (end stage renal disease) on dialysis (H)      History of basal cell carcinoma      Hypertension      Mixed hyperlipidemia      NSTEMI (non-ST elevated myocardial infarction) (H)      PAF (paroxysmal atrial fibrillation) (H)                         Again, thank you for allowing me to participate in the care of your patient.         Sincerely,        Mary Lou Caro PA-C

## 2023-07-31 NOTE — PATIENT INSTRUCTIONS
Patient Education       Proper skin care from [facility]:    -Eliminate harsh soaps as they strip the natural oils from the skin, often resulting in dry itchy skin ( i.e. Dial, Zest, Green[place] Spring)  -Use mild soaps such as Cetaphil or Dove Sensitive Skin in the shower. You do not need to use soap on arms, legs, and trunk every time you shower unless visibly soiled.   -Avoid hot or cold showers.  -After showering, lightly dry off and apply moisturizing within 2-3 minutes. This will help trap moisture in the skin.   -Aggressive use of a moisturizer at least 1-2 times a day to the entire body (including -Vanicream, Cetaphil, Aquaphor or Cerave) and moisturize hands after every washing.  -We recommend using moisturizers that come in a tub that needs to be scooped out, not a pump. This has more of an oil base. It will hold moisture in your skin much better than a water base moisturizer. The above recommended are non-pore clogging.      Wear a sunscreen with at least SPF 30 on your face, ears, neck and V of the chest daily. Wear sunscreen on other areas of the body if those areas are exposed to the sun throughout the day. Sunscreens can contain physical and/or chemical blockers. Physical blockers are less likely to clog pores, these include zinc oxide and titanium dioxide. Reapply every two hour and after swimming.     Sunscreen examples: https://www.ewg.org/sunscreen/    UV radiation  UVA radiation remains constant throughout the day and throughout the year. It is a longer wavelength than UVB and therefore penetrates deeper into the skin leading to immediate and delayed tanning, photoaging, and skin cancer. 70-80% of UVA and UVB radiation occurs between the hours of 10am-2pm.  UVB radiation  UVB radiation causes the most harmful effects and is more significant during the summer months. However, snow and ice can reflect UVB radiation leading to skin damage during the winter months as well. UVB radiation is  responsible for tanning, burning, inflammation, delayed erythema (pinkness), pigmentation (brown spots), and skin cancer.     I recommend self monthly full body exams and yearly full body exams with a dermatology provider. If you develop a new or changing lesion please follow up for examination. Most skin cancers are pink and scaly or pink and pearly. However, we do see blue/brown/black skin cancers.  Consider the ABCDEs of melanoma when giving yourself your monthly full body exam ( don't forget the groin, buttocks, feet, toes, etc). A-asymmetry, B-borders, C-color, D-diameter, E-elevation or evolving. If you see any of these changes please follow up in clinic. If you cannot see your back I recommend purchasing a hand held mirror to use with a larger wall mirror.       Checking for Skin Cancer  You can find cancer early by checking your skin each month. There are 3 kinds of skin cancer. They are melanoma, basal cell carcinoma, and squamous cell carcinoma. Doing monthly skin checks is the best way to find new marks or skin changes. Follow the instructions below for checking your skin.   The ABCDEs of checking moles for melanoma   Check your moles or growths for signs of melanoma using ABCDE:   Asymmetry: the sides of the mole or growth don t match  Border: the edges are ragged, notched, or blurred  Color: the color within the mole or growth varies  Diameter: the mole or growth is larger than 6 mm (size of a pencil eraser)  Evolving: the size, shape, or color of the mole or growth is changing (evolving is not shown in the images below)    Checking for other types of skin cancer  Basal cell carcinoma or squamous cell carcinoma have symptoms such as:     A spot or mole that looks different from all other marks on your skin  Changes in how an area feels, such as itching, tenderness, or pain  Changes in the skin's surface, such as oozing, bleeding, or scaliness  A sore that does not heal  New swelling or redness beyond  the border of a mole    Who s at risk?  Anyone can get skin cancer. But you are at greater risk if you have:   Fair skin, light-colored hair, or light-colored eyes  Many moles or abnormal moles on your skin  A history of sunburns from sunlight or tanning beds  A family history of skin cancer  A history of exposure to radiation or chemicals  A weakened immune system  If you have had skin cancer in the past, you are at risk for recurring skin cancer.   How to check your skin  Do your monthly skin checkups in front of a full-length mirror. Check all parts of your body, including your:   Head (ears, face, neck, and scalp)  Torso (front, back, and sides)  Arms (tops, undersides, upper, and lower armpits)  Hands (palms, backs, and fingers, including under the nails)  Buttocks and genitals  Legs (front, back, and sides)  Feet (tops, soles, toes, including under the nails, and between toes)  If you have a lot of moles, take digital photos of them each month. Make sure to take photos both up close and from a distance. These can help you see if any moles change over time.   Most skin changes are not cancer. But if you see any changes in your skin, call your doctor right away. Only he or she can diagnose a problem. If you have skin cancer, seeing your doctor can be the first step toward getting the treatment that could save your life.   [employer] last reviewed this educational content on 4/1/2019 2000-2020 [employer]. [address] [place] [address], [place] [address]. All rights reserved. This information is not intended as a substitute for professional medical care. Always follow your healthcare professional's instructions.       When should I call my doctor?  If you are worsening or not improving, please, contact us or seek urgent care as noted below.     Who should I call with questions (adults)?  [facility] (adult and pediatric): [phone]  [facility]  [place] (adult): [phone]  [facility] ([place], [place], [place] and [place]) [phone]  For urgent needs outside of business hours call the [facility] at [phone] and ask for the dermatology resident on call to be paged  If this is a medical emergency and you are unable to reach an ER, Call 911      If you need a prescription refill, please contact your pharmacy. Refills are approved or denied by our Physicians during normal business hours, Monday through Fridays  Per office policy, refills will not be granted if you have not been seen within the past year (or sooner depending on your child's condition)

## 2023-07-31 NOTE — PROGRESS NOTES
[facility] Dermatology Note  Encounter Date: Jul 31, 2023  Office Visit      Dermatology Problem List:  # Transplant candidate (renal) - on dialysis   # Hx NMSC  - BCC - L proximal forearm - s/p excision 11/4/22  ____________________________________________    Assessment & Plan:  # Hx BCC - L forearm, NERD  - Sunscreen: Apply 20 minutes prior to going outdoors and reapply every two hours, when wet or sweating. We recommend using an SPF 30 or higher, and to use one that is water resistant.     - Advised to monitor for changing, non-healing, bleeding, painful, changing, or otherwise symptomatic lesions  - Continue bi- annual skin exams     # Benign melanocytic nevi of the trunk  # Seborrheic keratoses  - reassurance provided; no lesions concerning for malignancy  - photoprotection (regular use of SPF30+ broad spectrum sunscreen and sun protective clothing) recommended  - ABCDE of melanoma discussed     # Xerosis cutis  - dry skin care techniques discussed, see AVS  - discussed relationship to dialysis and kidney malfunction     # Prurigo nodularis  - related to xerosis, possibly with contribution from renal failure   discussed phototherapy as highly effective and safe, patient defers for now as he is on list for kidney transplant  - start clobetasol 0.05% ointment BID (under occlusion if helpful)    # Seborrheic keratosis, inflamed. Vertex scalp x1  - Cryotherapy performed today, see procedure note below.    Procedures Performed:   - Cryotherapy procedure note, location(s): vertex scalp. After verbal consent and discussion of risks and benefits including, but not limited to, dyspigmentation/scar, blister, and pain, 1 lesion(s) was(were) treated with 1-2 mm freeze border for 1-2 cycles with liquid nitrogen. Post cryotherapy instructions were provided.     Follow-up: 6 month(s) in-person, or earlier for new or changing lesions    Staff:     All risks, benefits and alternatives were discussed with  patient.  Patient is in agreement and understands the assessment and plan.  All questions were answered.    Mary Lou Caro PA-C, MPAS  [facility] [place]: Phone: [phone], Fax: [phone]  [facility]: Phone: [phone],  Fax: [phone]  [facility]: Phone: [phone], Fax: [phone]  ____________________________________________    CC: Skin Check (6 month FBSC)      HPI:  Mr. Tacos Dominguez is a 63 year old male who presents today as a return patient for FBSE. Hx of BCC on the forearm. He notes a very irritated spot on the head he would like treated. Also noted itching on back, but this has been tolerable lately with use of baby oil. Planning on kidney transplant hopefully soon. Last seen by Dr. Khan on 2/17/23.    Patient is otherwise feeling well, without additional concerns.    Labs:  none    Physical Exam:  Vitals: There were no vitals taken for this visit.  SKIN: Full skin, which includes the head/face, both arms, chest, back, abdomen,both legs, genitalia and/or groin buttocks, digits and/or nails, was examined.   - Perez's skin type II, <100 nevi - patient has jaundice appearance  - There are dome shaped bright red papules on the trunk.   - Multiple regular brown pigmented macules and papules are identified on the trunk and extremities.   - Scattered brown macules on sun exposed areas.  - There are waxy stuck on tan to brown papules on the trunk.   - There is a tan to brown waxy stuck on papule with surrounding erythema on the vertex scalp x1 .   - There is no erythema, telangectasias, nodularity, or pigmentation on the L forearm.   - skin is dry on exam, luis back and forearms  - No other lesions of concern on areas examined.     Medications:  Current Outpatient Medications   Medication    amLODIPine (NORVASC) 10 MG tablet    calcium acetate (CALPHRON) 667 MG TABS tablet     carvedilol (COREG) 12.5 MG tablet    folic acid (FOLVITE) 1 MG tablet    lidocaine-prilocaine (EMLA) 2.5-2.5 % external cream    multivitamin RENAL (MULTIVITAMIN RENAL) 1 MG capsule    omeprazole (PRILOSEC) 20 MG DR capsule    simvastatin (ZOCOR) 20 MG tablet    ustekinumab (STELARA) 90 MG/ML    calcitRIOL (ROCALTROL) 0.5 MCG capsule     Current Facility-Administered Medications   Medication    lidocaine 1% with EPINEPHrine 1:100,000 injection 3 mL      Past Medical/Surgical History:   Patient Active Problem List   Diagnosis    Acquired cyst of kidney    ESRD (end stage renal disease) (H)    Organ transplant candidate    PAF (paroxysmal atrial fibrillation) (H)    Benign essential hypertension    Crohn's disease of large intestine (H)    IPMN (intraductal papillary mucinous neoplasm)    Status post repair of arteriovenous fistula    Aortic dissection, abdominal (H)    Current smoker    History of basal cell carcinoma    CAD (coronary artery disease)    Anemia     Past Medical History:   Diagnosis Date    Aortic dissection (H)     distal, thin.  stable/chronic on MRCP 3/2022    Benign essential hypertension     CAD (coronary artery disease)     Cerebral infarction (H)     Crohn's colitis (H)     Crohn's disease of large intestine (H) 11/22/2021    Current smoker     Esophageal reflux     ESRD (end stage renal disease) on dialysis (H)     History of basal cell carcinoma     Hypertension     Mixed hyperlipidemia     NSTEMI (non-ST elevated myocardial infarction) (H)     PAF (paroxysmal atrial fibrillation) (H)

## 2023-08-09 NOTE — TELEPHONE ENCOUNTER
Received notification from lab of critical hemoglobin 7.2. Patient had dialysis this morning and was told his hemoglobin was 11.0.    Reported results to Dr. Willian Kee. Plan for iron studies, ferritin, B12, and folate. Consider iron infusions. Symptoms are stable; will hold off on blood transfusion at this time. Plan to recheck hemoglobin next week.

## 2023-08-21 NOTE — PROGRESS NOTES
[place] VASCULAR [facility]    Tacos Dominguez returns for follow-up.  He is on chronic hemodialysis via left upper arm brachial to cephalic fistula.  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  Outflow revision 2/14/2023 with a 10 mm thin-walled ringed propatent PTFE graft from the infraclavicular subclavian vein into the internal jugular vein.  Functioning very well on follow-up 4/13/2023.  We did notice a chronic stenosis of the fistula just beyond the brachial arterial anastomosis at the elbow with higher flow rates but no clinical concern with outflow volume of 907 ml/min.    We recommended a 4-month follow-up today.  Dialysis is at the [facility] unit in [facility].  Dialysis has been going very well with no elevated venous pressures.  Will occasionally have some needle hole bleeding when he removes the dressings but would be better if he moisten the dressings.  This is a relatively uncommon issue.    8/9/2023 laboratory: K= 3.5 SCr= 3.61 Hgb= 7.2    Exam: Alert and appropriate.   Blood pressure 143/76 right arm.  Pulse 85.   Chest= clear  Cardiovascular= regular rate    Left upper arm fistula with 2 areas of mild aneurysmal dilatation with overlying normal thickness skin.  Somewhat firmer pulse as expected from stenosis.  Junction of the fistula to PTFE graft easily palpable in shoulder region.    Fistula/graft duplex performed.  Good function of graft with outflow volume= 1519 mL/min.  There is a stenosis at the beginning of the PTFE stent near the shoulder with a diameter down to 3.4 mm (previously 8.5 mm) and elevated velocity.        IMPRESSION: Some recurrent stenosis at the fistula/PTFE junction.  However, still with excellent function and outflow volume.  Would not recommend any intervention at this time but if he notices increased venous pressures or more prolonged needle holding would recommend surgical vein patch to the stenotic area  which I think would be a better solution than attempts at angioplasty.    Otherwise, follow-up fistula/graft duplex in 4 months.  20 minutes with patient today.    Mo Gramajo MD   This note was created using Dragon voice recognition software which may result in transcription errors.

## 2023-08-24 NOTE — PROGRESS NOTES
[facility]        Patient is here for a  follow up.      Pt is currently taking Statin.    BP (!) 143/76 (BP Location: Right arm, Patient Position: Chair, Cuff Size: Adult Regular)   Pulse 85     The provider has been notified that the patient has no concerns.     Questions patient would like addressed today are: N/A.    Refills are needed: N/A    Has homecare services and agency name:  Kayla Miguel MA

## 2023-09-06 NOTE — TELEPHONE ENCOUNTER
ustekinumab (STELARA) 90 MG/ML       Sig: Inject 1 ml ( 90 mg) subcutaneous every 4 weeks.   Last Written Prescription Date:  5-26-23  Last Fill Quantity: 1 ml,   # refills: 3  Last Office Visit: 5-24-23  Future Office visit:  11-28-23    Last clinic note  PLAN:  ---Continue Stelara every 8 weeks    Sed Rate   Date Value Ref Range Status   05/31/2007 36 (H) 0 - 15 mm/h Final     Erythrocyte Sedimentation Rate   Date Value Ref Range Status   08/09/2023 81 (H) 0 - 20 mm/hr Final      CRP Inflammation   Date Value Ref Range Status   08/09/2023 7.10 (H) <5.00 mg/L Final      CBC RESULTS:   Recent Labs   Lab Test 08/09/23  0935   WBC 6.2   RBC 2.03*   HGB 7.2*   HCT 23.0*   *   MCH 35.5*   MCHC 31.3*   RDW 18.0*   *     Liver Function Studies -   Recent Labs   Lab Test 08/09/23  0935   PROTTOTAL 6.8   ALBUMIN 4.0   BILITOTAL 0.8   ALKPHOS 71   AST 26   ALT 13       Routing refill request to provider for review/approval because:  Requested/current directions do not match last clinic note               ( every 4 weeks/every8 weeks)

## 2023-09-08 NOTE — TELEPHONE ENCOUNTER
[facility] Call Center    Phone Message    May a detailed message be left on voicemail: yes     Reason for Call: Other: Dayan from [place] [facility] called in regards to pt's Andrea. Dayan is looking for a proactive refill. Please send to fax .       Action Taken: Other: csc gi    Travel Screening: Not Applicable

## 2023-09-11 NOTE — TELEPHONE ENCOUNTER
Last clinic visit: 5/24/23  Next clinic visit: 11/28/23  Last set of labs: 8/9/23  MTM: 11/30/22    Single-dose refill sent to patient's pharmacy for Stelara Q4w. MTM referral placed for further refills. Patient is up to date on clinic visits and blood work.

## 2023-09-19 NOTE — TELEPHONE ENCOUNTER
Image Review Meeting    ATTENDEES: Dr. Castellanos, Nella Kenny, Jayshree Ruiz, Asya Matos    IMAGES REVIEWED: Aorto/Iliac US 3/30/23    DECISION: US approved for transplant, will need follow up CT Abdomen/Pelvis in 2-3 months.     INCIDENTALS: No

## 2023-09-20 NOTE — COMMITTEE REVIEW
Abdominal Committee Review Note     Evaluation Date: 3/25/2021  Committee Review Date: 9/20/2023    Organ being evaluated for: Kidney    Transplant Phase: Evaluation  Transplant Status: Active    Transplant Coordinator: Anne Marie Kulkarni  Transplant Surgeon:  Dr. Denia Carey    Referring Physician: Provider Not In System    Primary Diagnosis: Other, Specify - unknown etiology  Secondary Diagnosis:     Committee Review Members:  Nephrology Karen Mccormack PA-C, Carlos Gan, APRN CNP, Xavier Carrizales MD, Mc Mcgregor MD   Nutrition Purvi Arora, BEATRICE   Pharmacist Antonio Huertas, [facility]    - Clinical Jaydadarius Engel, [facility], Amelia Hwang, [facility]   Transplant SHAHNAZ SALAZAR, RN, Priyanka Perez, JR, Claudine Gonzalez, RN, Asya Matos, RN, Amelia Minor, RN, Christina Andino, NP, Christina Botello, RN, Nathalia Alexander, RN, Sandy Sands, RN   Transplant Surgery Denia Carey MD, MD       Transplant Eligibility: Irreversible chronic kidney disease treated w/dialysis or expected need for dialysis    Committee Review Decision: Approved    Relative Contraindications: None    Absolute Contraindications:     Committee Chair Denia Carey MD verbally attested to the committee's decision.    Committee Discussion Details: Reviewed pt's medical status and evaluation results to date with multidisciplinary committee.    Recommended the following evaluation items:  -Find living donor, approved for living donor only  -PFT's  -low dose chest CT    Patient should have live donors register now to initiate donor evaluation: Yes    Committee determined that patient is a Fair candidate for Kidney     Listing plan: Will not list at this time as patient is on dialysis      Patient will be called and summary letter will be sent.

## 2023-09-21 NOTE — TELEPHONE ENCOUNTER
Called patient to discuss selection committee meeting from 9/20/23. Patient approved for living donor only, and will need low dose chest CT and PFT's if living donor is found. Patient verbalized understanding and is in agreement with the plan, has providers contact information and will call when he is able to find a donor to schedule the rest of evaluation. Letter will be sent.

## 2023-09-21 NOTE — LETTER
09/21/23        Tacos Dominguez  [address]  [place] [address]        Dear Tacos,    Thank you for continuing to work on your evaluation in consideration of kidney transplantation. Your pre-transplant evaluation results were reviewed at our Multidisciplinary Selection Committee on 9/20/23 The committee approved you for living donor only and when you are able to find a donor, please call me to complete the following items :    Pulmonary Function Test- due to smoking history  Lose dose chest CT - due to smoking history    For any questions, please contact the Transplant Office at [phone].      Sincerely,    JR Kenney   [phone]  Solid Organ Transplant  [facility], [facility]

## 2023-10-02 NOTE — TELEPHONE ENCOUNTER
ustekinumab (STELARA) 90 MG/ML        Inject 1 ml ( 90 mg) subcutaneous every 4 weeks.   Last Written Prescription Date:  911-23  Last Fill Quantity: 1 ml,   # refills: 0  Last Office Visit: 5-24-23  Future Office visit:  11-28-23    Last clinic note:PLAN:  ---Continue Stelara every 8 weeks        Sed Rate   Date Value Ref Range Status   05/31/2007 36 (H) 0 - 15 mm/h Final     Erythrocyte Sedimentation Rate   Date Value Ref Range Status   08/09/2023 81 (H) 0 - 20 mm/hr Final        CRP Inflammation   Date Value Ref Range Status   08/09/2023 7.10 (H) <5.00 mg/L Final      CBC RESULTS:   Recent Labs   Lab Test 08/09/23  0935   WBC 6.2   RBC 2.03*   HGB 7.2*   HCT 23.0*   *   MCH 35.5*   MCHC 31.3*   RDW 18.0*   *       Liver Function Studies -   Recent Labs   Lab Test 08/09/23  0935   PROTTOTAL 6.8   ALBUMIN 4.0   BILITOTAL 0.8   ALKPHOS 71   AST 26   ALT 13       Routing refill request to provider for review/approval because:  Rx: 4 weeks,  clinic note 8 weeks.  Last rx limited quantity, 1 ml:0Rf

## 2023-10-03 NOTE — TELEPHONE ENCOUNTER
Last set of labs: 8/9/23  Last clinic visit: 5/24/23  Next clinic visit: 11/28/23  Upcoming MTM:  10/12/23    Stelara refilled for one-month supply. MTM scheduled for 10/12/23 and will manage further refills.

## 2023-10-12 NOTE — Clinical Note
10/12/2023         RE: Tacos Dominguez  [address]  [place] [address]        Dear Colleague,    Thank you for referring your patient, Tacos Dominguez, to the [facility]. Please see a copy of my visit note below.    Medication Therapy Management (MTM) Encounter    ASSESSMENT:                            Medication Adherence/Access: {adherencechoices:920606}    ***:   ***    PLAN:                            Jayshree to connect with RNCC team to discuss scope barriers.    Follow-up: 6 months for MTM    SUBJECTIVE/OBJECTIVE:                          Scar Dominguez is a 63 year old male called for a follow-up visit from 11/30/2022.       Reason for visit: IBD health maintenance     Allergies/ADRs: Reviewed in chart  Tobacco: He reports that he has been smoking cigarettes. He has a 12.50 pack-year smoking history. He has never used smokeless tobacco.Nicotine/Tobacco Cessation Plan:   Information offered: Patient not interested at this time He is working on it.  Alcohol: rarely    Medication Adherence/Access: no issues reported    Crohn's:  Stelara 90 mg every 4 weeks     Notes his Stelara has been going well, no difficulty with obtaining or injecting the medication. States he is not sure if Stelara is doing anything. He is unable to get a scope since he does not have a , and reports they will no longer let him do this as an inpatient.    Last provider visit: 5/24/2023 with Dr. Rubio  Next provider visit: 11/28/2023 with Darek Romero PA-C  Last labs completed: 8/9/2023  Lab frequency: every 3 months   - standing labs available until 8/2023  Next labs due: November 2023    Last IBD Health Maintenance Review: 11/2022  PDC:    IBD Health Care Maintenance:     Vaccinations:  All patients on biologics should avoid live vaccines.    -- Influenza (every year) declined   -- TdaP (every 10 years) 6/14/2023  -- RSV declined   -- Pneumococcal Pneumonia               Prevnar-13: 1/11/2022,  6/16/2021 per [facility], Prevnar-7 noted 10/31/2000,               Pneumovax-23: 11/10/1994, [facility] records note unspecific pneumonia vaccine 12/2020,   -- COVID-19 1/27/21, 2/24/21, 11/29/2021, declined again  -- Yearly assessment for latent Tb (verbal screening and exam, PPD or QuantiFERON-Tb testing)       negative in 2023 per patient      One time confirmation of immunity or serologies:  -- Hepatitis A (serologies or immunizations) potentially during service  -- Hepatitis B (serologies or immunizations) 6/9/2014 and 7/8/2014, [facility] scanned records indicate non-immune 2020, non-immune 3/25/21 -- re-vaccinated 12/7/21, 2/16/2022, 6/15/2022  -- Varicella/Zoster 1/11/2022 and 3/14/2022 (Shingrix)  -- Meningococcal meningitis (all patients at risk for meningitis)-- Menveo 12/7/2021, Menactra 2/16/2022, Bexsero 12/7/2021, 1/11/2022     Due to the immunosuppression in this patient, I would not advise administration of live vaccines such as varicella/VZV, intranasal influenza, MMR, or yellow fever vaccine (if traveling).     Pre-Biologic Screening:  -- Hep B Surface Antibody serologies indicate immunity -- was re-vaccinated  -- Hep B Surface Antigen non-reactive 3/2021  -- Hep B Core Antibody non-reactive 3/2021  -- Hep C Antibody non-reactive 3/2021    Bone mineral density screening   -- Recommend all patients supplement with calcium and vitamin D  -- Given prior steroid use recommend DEXA if not already done    Cancer Screening:  Colon cancer screening:  Given ***, recommend patient undergo regular dysplasia surveillance   Next dysplasia screening is recommended ***.  -- notes this is difficult for him to do because he doesn't have a  and lives far away    Skin cancer screening: Annual visual exam of skin by dermatologist since patient is immunocompromised    PHQ-2 Score:         10/12/2023    11:15 AM 5/24/2023     1:53 PM   PHQ-2 ( 1999 Pfizer)   Q1: Little interest or pleasure in doing  things 0 0   Q2: Feeling down, depressed or hopeless 0 0   PHQ-2 Score 0 0   PHQ-2 Total Score (12-17 Years)- Positive if 3 or more points; Administer PHQ-A if positive 0 0      Misc:  -- Avoid tobacco use  -- Avoid NSAIDs as there is potentially a 25% chance of causing an IBD flare    ESRD:   Amlodipine 10 mg daily   Carvedilol 12.5 mg twice daily   Folic acid 1 gm daily    Notes he feels low at 130 mmHg, so he usually keeps his blood pressure in the range of 130-140 mmHg systolic. Notes that he hasn't seen the 170s in a long time. On dialysis, which he gets at [facility]. States he was just added to the living donor transplant list.    BP Readings from Last 3 Encounters:   08/24/23 (!) 143/76   06/26/23 (!) 169/78   05/10/23 (!) 175/70     ----------------  {SARI?:250797}    I spent 30 minutes with this patient today. All changes were made via collaborative practice agreement with Betty Rubio. A copy of the visit note was provided to the patient's provider(s).    A summary of these recommendations was sent via [employer].    Jayshree Jordan PharmD, BCACP  MTM Pharmacist   [facility]   Phone: [phone]    Telemedicine Visit Details  Type of service:  Telephone visit  Start Time:  11:02 AM  End Time: 11:33 AM       Medication Therapy Recommendations  No medication therapy recommendations to display       Again, thank you for allowing me to participate in the care of your patient.        Sincerely,        Jayshree Jordan [facility]

## 2023-10-12 NOTE — PROGRESS NOTES
Medication Therapy Management (MTM) Encounter    ASSESSMENT:                            Medication Adherence/Access: No issues identified    Crohn's: Scar would benefit from continuing on Stelara every four weeks for now. Colonoscopy would be highly useful in assessing benefit of dose escalation. I will discuss with our RNCC team if there is anything we can do to help coordinate this procedure given the barriers he reported. Would recommend RSV, COVID booster, and influenza vaccine - however, he declines interest in these today.    ESRD: Unchanged. Follows closely with Brayan.    PLAN:                            Jayshree to connect with RNCC team to discuss scope barriers.  Stelara refill placed.    Follow-up: 6 months for MTM    SUBJECTIVE/OBJECTIVE:                          Scar Dominguez is a 63 year old male called for a follow-up visit from 11/30/2022.       Reason for visit: IBD health maintenance on Stelara    Allergies/ADRs: Reviewed in chart  Tobacco: He reports that he has been smoking cigarettes. He has a 12.50 pack-year smoking history. He has never used smokeless tobacco.Nicotine/Tobacco Cessation Plan:   Information offered: Patient not interested at this time He is working on it.  Alcohol: rarely    Medication Adherence/Access: no issues reported    Crohn's:  Stelara 90 mg every 4 weeks   Omeprazole 20 mg daily     Notes his Stelara has been going well, no difficulty with obtaining or injecting the medication. States he is not sure if Stelara is doing anything. He is unable to get a scope since he does not have a , and reports they will no longer let him do this as an inpatient. We reviewed health maintenance as noted below. Declines interest in a majority of the vaccines.    Last provider visit: 5/24/2023 with Dr. Rubio  Next provider visit: 11/28/2023 with Darek Romero PA-C  Last labs completed: 8/9/2023  Lab frequency: every 3 months   - standing labs available until 8/2024  Next labs due:  November 2023    Last IBD Health Maintenance Review: 11/2022  PDC: 100% (Stelara)    IBD Health Care Maintenance:     Vaccinations:  All patients on biologics should avoid live vaccines.    -- Influenza (every year) declined   -- TdaP (every 10 years) 6/14/2023  -- RSV declined   -- Pneumococcal Pneumonia               Prevnar-13: 1/11/2022, 6/16/2021 per [facility], Prevnar-7 noted 10/31/2000,               Pneumovax-23: 11/10/1994, [facility] records note unspecific pneumonia vaccine 12/2020,   -- COVID-19 1/27/21, 2/24/21, 11/29/2021, declined again  -- Yearly assessment for latent Tb (verbal screening and exam, PPD or QuantiFERON-Tb testing)       negative in 2023 per patient      One time confirmation of immunity or serologies:  -- Hepatitis A (serologies or immunizations) potentially during service  -- Hepatitis B (serologies or immunizations) 6/9/2014 and 7/8/2014, [facility] scanned records indicate non-immune 2020, non-immune 3/25/21 -- re-vaccinated 12/7/21, 2/16/2022, 6/15/2022  -- Varicella/Zoster 1/11/2022 and 3/14/2022 (Shingrix)  -- Meningococcal meningitis (all patients at risk for meningitis)-- Menveo 12/7/2021, Menactra 2/16/2022, Bexsero 12/7/2021, 1/11/2022     Due to the immunosuppression in this patient, I would not advise administration of live vaccines such as varicella/VZV, intranasal influenza, MMR, or yellow fever vaccine (if traveling).     Pre-Biologic Screening:  -- Hep B Surface Antibody serologies indicate immunity -- was re-vaccinated  -- Hep B Surface Antigen non-reactive 3/2021  -- Hep B Core Antibody non-reactive 3/2021  -- Hep C Antibody non-reactive 3/2021    Bone mineral density screening   -- Recommend all patients supplement with calcium and vitamin D  -- Given prior steroid use recommend DEXA if not already done    Cancer Screening:  Colon cancer screening:  Due for disease activity assessment and screening.  -- notes this is difficult for him to do because he  doesn't have a  and lives far away    Skin cancer screening: Annual visual exam of skin by dermatologist since patient is immunocompromised    PHQ-2 Score:         10/12/2023    11:15 AM 5/24/2023     1:53 PM   PHQ-2 ( 1999 Pfizer)   Q1: Little interest or pleasure in doing things 0 0   Q2: Feeling down, depressed or hopeless 0 0   PHQ-2 Score 0 0   PHQ-2 Total Score (12-17 Years)- Positive if 3 or more points; Administer PHQ-A if positive 0 0      Misc:  -- Avoid tobacco use  -- Avoid NSAIDs as there is potentially a 25% chance of causing an IBD flare    ESRD:   Amlodipine 10 mg daily   Carvedilol 12.5 mg twice daily   Folic acid 1 mg daily  EMLA cream prior to dialysis access   Renal-vyte daily  Calcium acetate 2,668 mg three times daily     Notes he feels low at 130 mmHg, so he usually keeps his blood pressure in the range of 130-140 mmHg systolic. Notes that he hasn't seen the 170s in a long time. On dialysis, which he gets at [facility]. They follow his BPs closely. States he was just added to the living donor transplant list.    BP Readings from Last 3 Encounters:   08/24/23 (!) 143/76   06/26/23 (!) 169/78   05/10/23 (!) 175/70     ----------------    I spent 30 minutes with this patient today. All changes were made via collaborative practice agreement with Betty Ruboi. A copy of the visit note was provided to the patient's provider(s).    A summary of these recommendations was sent via [employer].    Jayshree LintonD, BCACP  MTM Pharmacist   [facility]   Phone: [phone]    Telemedicine Visit Details  Type of service:  Telephone visit  Start Time:  11:02 AM  End Time: 11:33 AM       Medication Therapy Recommendations  No medication therapy recommendations to display

## 2023-10-16 NOTE — TELEPHONE ENCOUNTER
Called patient to schedule PFT's and low dose chest CT to complete evaluation. Patient scheduled for MRI next week at [facility], will attempt to schedule these tests to finish evaluation. Patient verbalized good understanding and in good agreement with the plan.

## 2023-10-19 NOTE — PATIENT INSTRUCTIONS
"Recommendations from today's MTM visit:                                                      Jayshree to connect with RNCC team to discuss scope barriers.  Stelara refill placed.    Follow-up: 6 months for MTM    It was great speaking with you today.  I value your experience and would be very thankful for your time in providing feedback in our clinic survey. In the next few days, you may receive an email or text message from [facility] with a link to a survey related to your  clinical pharmacist.\"     To schedule another MTM appointment, please call the clinic directly or you may call the MTM scheduling line at [phone] or toll-free at [phone].     My Clinical Pharmacist's contact information:                                                      Please feel free to contact me with any questions or concerns you have.      Jayshree LintonD, BCACP  MTM Pharmacist   [facility]astroenter[facility]   Phone: [phone]    "

## 2023-10-24 NOTE — DISCHARGE INSTRUCTIONS
MRI Contrast Discharge Instructions    The IV contrast you received today will pass out of your body in your  urine. This will happen in the next 24 hours. You will not feel this process.  Your urine will not change color.    Drink at least 4 extra glasses of water or juice today (unless your doctor  has restricted your fluids). This reduces the stress on your kidneys.  You may take your regular medicines.    If you are on dialysis: It is best to have dialysis today.    If you have a reaction: Most reactions happen right away. If you have  any new symptoms after leaving the hospital (such as hives or swelling),  call your hospital at the correct number below. Or call your family doctor.  If you have breathing distress or wheezing, call 911.    Special instructions: ***    I have read and understand the above information.    Signature:______________________________________ Date:___________    Staff:__________________________________________ Date:___________     Time:__________    [facility] Departments:    ___[place]: [phone]  ___[facility]: [phone]  ___[place]: [phone] ___[facility]: [phone]  ___[facility]: [phone]  ___[facility]: [phone]  ___[phone]  ___[facility]: [phone]  ___[phone]

## 2023-10-25 NOTE — PROGRESS NOTES
Called PT and confirmed Appointment.    Called to remind patient of their upcoming appointment with our GI clinic, on 10/30/23 at 10:40 AM with Dr. Dewayne Preston. This appointment is scheduled as a video visit. You will receive a call approximately 30 minutes prior to check you in, you must be in [place] for this visit., if your appointment is virtual (video or telephone) you need to be in [place] for the visit. To reschedule or cancel patient to call [phone].      SK

## 2023-10-30 NOTE — TELEPHONE ENCOUNTER
Patient needs to be rescheduled for their virtual visit due to Reason for Reschedule: Patient Request    Appointment mode: Video    Provider: Eric Preston, VF

## 2023-11-20 NOTE — TELEPHONE ENCOUNTER
PA Initiation    Medication: USTEKINUMAB 90 MG/ML [facility]  Insurance Company: [employer] ([facility]) - Phone [phone] Fax [phone]  Pharmacy Filling the Rx: [facility] - [place], [place] - [address]  Filling Pharmacy Phone:    Filling Pharmacy Fax:    Start Date: 11/20/2023  [id number]

## 2023-11-21 NOTE — TELEPHONE ENCOUNTER
Prior Authorization Not Needed per Insurance    Medication: USTEKINUMAB 90 MG/ML [facility]  Insurance Company: [employer] ([facility]) - Phone [phone] Fax [phone]  Expected CoPay: $    Pharmacy Filling the Rx: [facility] - [place], [place] - [address]  Pharmacy Notified:    Patient Notified:

## 2023-11-28 NOTE — PROGRESS NOTES
Tacos Dominguez is a 63 year old male who is being evaluated via a billable video visit.    Virtual Visit Details    Type of service:  Video Visit     Originating Location (pt. Location): Home    Distant Location (provider location):  Off-site  Platform used for Video Visit: Allen    CD follow up    PATIENT: Tacos Dominguez    MRN: [id number]    Date of Birth 1960    Tel: There are no phone numbers on file.    PCP: Antonio Madrigal     HPI: Mr. Dominguez is a 61 year old male here to establish care for Crohn's disease.    In 1975, underwent an emergency appendectomy and was dx with CD at that time, underwent R crystal. Had multiple surgeries after then, 76, 77, 78, including LOAs and a cholecystectomy. Reports having 9 feet of bowel left, in total. Did well until 4/2006, at which time he had intermittent flares and underwent resection with a colostomy bag that was reversed in 11/2006.  At that time, he required TPN for weight loss.  Had a peristomal hernia that was not corrected in an effort to avoid further bowel loss. Previously saw Dr. Finnegan, 2013/2014.     Currently, has 4-5 BMs per day, loose. No blood. Occasional urgency based on diet. Weight has been stable around 75-80 kg.     Last cscope was in 10/2020 that showed ulcers in the TI and at the anastomosis.   Does have ESRD in the setting of CLARISA from afib and aortic dissection in April 2021.  Skin cancer (BCC) was removed on forearm 2 years.     Quit smoking in October 2020. Smoked about 1/2-3/4 ppd x 50 years.     Noteworthy diet history- well balanced    HBI  General well-being 0 = very well  Abdominal pain 0 = none  Number of liquid stools per day 4-5   Abdominal mass cannot assess virtually  Current Complications arthralgias (knees)    Constitutional symptoms:  Fever NO  Weight loss NO    Other GI symptoms present REFLUX SYMPTOMS - acid taste in mouth  Takes omeprazole 20 mg before breakfast      [place] Classification  AGE AT DIAGNOSIS: A1  below 16 y  CURRENT DISEASE LOCATION: jejunum  L4 upper GI: YES possibly, given chronic gastritis seen on EGD in 10/2020  DISEASE BEHAVIOR (since disease onset): B2: stricturing  Perianal disease: NO    Total number of IBD surgeries (except perianal): multiple    Remaining bowel: 9 feet, per patient    Current IBD Medications:  none    Past IBD Medications:   Sulfasalazine in the 1970s  Remicade around early 2000s. Was  On this for at least 2 years. Stopped due to remission.   Azathioprine ~2014    Interval history, 11/16/21 (virtual)  Continues to feel well. Unchanged HBI from above.     Icscope in July showed active disease (below).     Cholestyramine was not very helpful for loose stools.   Smokes up to 2 cigs per day. Has tried Chantix and gum in the past. Will get OTC patches.     Met with Jayshree Jordan, Pharm D on 11/10 with recs for the following     -- COVID booster  -- pneumovax-23 Received 12/2020 per Brayan.  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 Smith on 3/2022. 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    Continues on Stelara; last injection was last Tuesday. Every 8 weeks.     Smoking; down to a 1/4 pack per day. Using patches. Trying to quit.      HBI  General well-being 0 = very well  Abdominal pain 0 = none  Number of liquid stools per day 4-5   Abdominal mass cannot assess virtually  Current Complications arthralgias resolved    Interval history, 5/2023 (virtual)  Continues on Stelara.   Hgb 9.6 on 5/22. Does report more energy since transfusion.   Continues to try to quit smoking. Smoking 1/4 ppd.     HBI  General well-being 0 = very well  Abdominal pain 0 = none  Number of liquid stools per day 5 in total (vary between soft and loose)    Abdominal mass cannot assess virtually  Current Complications arthralgias in the setting of  dialysis (swelling in ankles from fluid overload)    Interval hx 11/2023  Recent hosptialization due to SOB, found to have hypoxic respiratory failure and flash pulmonary edema.  He was admitted to the ICU for hemodialysis, which allowed for resolution of respiratory symptoms.   Currently having 5 stools per day, consistency ranges from oatmeal to liquid. No blood in the stool.  No urgency or accidents. No abdominal pain, nausea or vomiting.  No joint pain or skin concerns.   He continues with stelara q4 weeks with good compliance, (has been on q4 dosing since Spring 2023).       Past Medical History:   Diagnosis Date    Aortic dissection (H)     distal, thin.  stable/chronic on MRCP 3/2022    Benign essential hypertension     CAD (coronary artery disease)     Cerebral infarction (H)     Crohn's colitis (H)     Crohn's disease of large intestine (H) 11/22/2021    Current smoker     Esophageal reflux     ESRD (end stage renal disease) on dialysis (H)     History of basal cell carcinoma     Hypertension     Mixed hyperlipidemia     NSTEMI (non-ST elevated myocardial infarction) (H)     PAF (paroxysmal atrial fibrillation) (H)         Past Surgical History:   Procedure Laterality Date    ABDOMEN SURGERY      x 6.  colon resections for crohn's disease    APPENDECTOMY      CHOLECYSTECTOMY      COLONOSCOPY N/A 07/20/2021    Procedure: COLONOSCOPY, WITH POLYPECTOMY AND BIOPSY;  Surgeon: Eric Preston MD;  Location:  GI    COLONOSCOPY N/A 12/7/2022    Procedure: COLONOSCOPY, WITH POLYPECTOMY AND BIOPSY;  Surgeon: Willian Kee MD;  Location:  GI    CV CORONARY ANGIOGRAM N/A 05/25/2021    Procedure: CV CORONARY ANGIOGRAM;  Surgeon: Judson Ybarra MD;  Location:  HEART CARDIAC CATH LAB    ESOPHAGOSCOPY, GASTROSCOPY, DUODENOSCOPY (EGD), COMBINED N/A 07/20/2021    Procedure: ESOPHAGOGASTRODUODENOSCOPY, WITH FINE NEEDLE ASPIRATION BIOPSY, WITH ENDOSCOPIC ULTRASOUND GUIDANCE;  Surgeon: Eric Preston  MD Dewayne;  [facility]    IR DIALYSIS FISTULOGRAM LEFT  12/01/2022    IR DIALYSIS FISTULOGRAM LEFT  1/13/2023    LAPAROTOMY, LYSIS ADHESIONS, COMBINED N/A 03/17/2022    Procedure: Laparotomy, extensive lysis adhesions, combined;  Surgeon: Sarath Smith MD;  [facility]    PANCREATECTOMY PARTIAL N/A 03/17/2022    Procedure: Open Subtotal Pancreatectomy, intra-op ultrasound;  Surgeon: Sarath Smith MD;  [facility]    REVISION FISTULA ARTERIOVENOUS UPPER EXTREMITY Left 2/14/2023    Procedure: LEFT UPPER ARM FISTULA OUTFLOW REVISION FROM CEPHALIC VEIN TO JUGULAR VEIN WITH 10mm THIN-WALLED RINGED POLYTETRAFLUEROETHYLINE;  Surgeon: Mo Gramajo MD;  Location: [facility]    VASCULAR SURGERY      dialysis access- left upper       Social History     Tobacco Use    Smoking status: Every Day     Packs/day: 0.25     Years: 50.00     Additional pack years: 0.00     Total pack years: 12.50     Types: Cigarettes     Last attempt to quit: 10/19/2020     Years since quitting: 3.1    Smokeless tobacco: Never   Substance Use Topics    Alcohol use: Yes     Comment: rare       Family History   Problem Relation Age of Onset    Breast Cancer Mother     Coronary Artery Disease Father     Hypertension Brother     Anesthesia Reaction No family hx of     Thrombosis No family hx of        Allergies   Allergen Reactions    Lisinopril Anaphylaxis and Other (See Comments)     Shortness of breath        Outpatient Encounter Medications as of 11/28/2023   Medication Sig Dispense Refill    amLODIPine (NORVASC) 10 MG tablet Take 10 mg by mouth daily      calcium acetate (CALPHRON) 667 MG TABS tablet Take 2,668 mg by mouth 3 times daily      carvedilol (COREG) 12.5 MG tablet Take 12.5 mg by mouth 2 times daily      folic acid (FOLVITE) 1 MG tablet Take 1 mg by mouth every morning       lidocaine-prilocaine (EMLA) 2.5-2.5 % external cream three times a week Prior to dialysis  site access on Monday, Wednesday, Friday       multivitamin RENAL (MULTIVITAMIN RENAL) 1 MG capsule Take 1 capsule by mouth every morning      omeprazole (PRILOSEC) 20 MG DR capsule Take 20 mg by mouth every morning       simvastatin (ZOCOR) 20 MG tablet Take 20 mg by mouth every morning       ustekinumab (STELARA) 90 MG/ML Inject 1 ml ( 90 mg) subcutaneous every 4 weeks. 1 mL 5     Facility-Administered Encounter Medications as of 11/28/2023   Medication Dose Route Frequency Provider Last Rate Last Admin    lidocaine 1% with EPINEPHrine 1:100,000 injection 3 mL  3 mL Intradermal Once Don Khan MD          NSAID  No     Review of Systems  Complete 10 System ROS performed. All are negative except as documented below, in the HPI, or in patient questionnaire from today's visit.    1) Constitutional: No fevers, chills, night sweats or malaise, weight loss or gain  2) Skin: No rash  3) Pulmonary: No wheeze, SOB, cough, sputum or hemoptysis  4) Cardiovascular: No Chest pain or palpitations  5) Genitourinary: No blood in urine or dysuria  6) Endocrine: No increased sweating, hunger, thirst or thyroid problems  7) Hematologic: No bruising and easy bleeding  8) Musculoskeletal: no new pain in joints or limitation in ROM  9) Neurologic: No dizziness, paresthesias or weakness or falls  10) Psychiatric:  not depressed/anxious, no sleep problems    PHYSICAL EXAM  General appearance  Healthy appearing adult, in no acute distress     Eyes  Sclera anicteric  Pupils round and reactive to light     Ears, nose, mouth and throat  No obvious external lesions of ears and nose  Hearing intact     Neck  Symmetric  No obvious external lesions     Respiratory  Normal respiration, no use of accessory muscles      MSK  Gait normal     Skin  No rashes or jaundice      Psychiatric  Oriented to person, place and time  Appropriate mood and affect.       DATA:  Reviewed in detail past documentation, medications and prior workup available in electronic health records or through outside  records.    PERTINENT STUDIES:  Most recent CBC:  WBC   Date Value Ref Range Status   05/25/2021 7.2 4.0 - 11.0 10e9/L Final     WBC Count   Date Value Ref Range Status   10/24/2023 4.2 4.0 - 11.0 10e3/uL Final     Comment:     This is a corrected result. Previous result was 4.0 10e3/uL on 10/24/2023 at  8:51 AM CDT   ]  Hemoglobin   Date Value Ref Range Status   10/24/2023 10.5 (L) 13.3 - 17.7 g/dL Final   05/25/2021 10.6 (L) 13.3 - 17.7 g/dL Final   ]   Platelet Count   Date Value Ref Range Status   10/24/2023 63 (L) 150 - 450 10e3/uL Final     Comment:     This is a corrected result. Previous result was 66 10e3/uL on 10/24/2023 at  8:51 AM CDT   05/25/2021 224 150 - 450 10e9/L Final       Most recent coag:  INR   Date Value Ref Range Status   03/17/2022 1.30 (H) 0.85 - 1.15 Final   05/25/2021 1.33 (H) 0.86 - 1.14 Final       Most recent hepatic panel:  AST   Date Value Ref Range Status   10/24/2023 22 0 - 45 U/L Final     Comment:     Reference intervals for this test were updated on 6/12/2023 to more accurately reflect our healthy population. There may be differences in the flagging of prior results with similar values performed with this method. Interpretation of those prior results can be made in the context of the updated reference intervals.   03/25/2021 16 0 - 45 U/L Final     ALT   Date Value Ref Range Status   10/24/2023 14 0 - 70 U/L Final     Comment:     Reference intervals for this test were updated on 6/12/2023 to more accurately reflect our healthy population. There may be differences in the flagging of prior results with similar values performed with this method. Interpretation of those prior results can be made in the context of the updated reference intervals.     03/25/2021 22 0 - 70 U/L Final     Bilirubin Conjugated   Date Value Ref Range Status   11/13/2008 0.0 0.0 - 0.3 mg/dL Final      Bilirubin Total   Date Value Ref Range Status   10/24/2023 1.1 <=1.2 mg/dL Final   03/25/2021 0.5 0.2 -  1.3 mg/dL Final     Albumin   Date Value Ref Range Status   10/24/2023 4.1 3.5 - 5.2 g/dL Final   07/30/2022 3.7 3.4 - 5.0 g/dL Final   03/25/2021 3.4 3.4 - 5.0 g/dL Final     Alkaline Phosphatase   Date Value Ref Range Status   10/24/2023 87 40 - 129 U/L Final   03/25/2021 91 40 - 150 U/L Final       Most recent creatinine:  Creatinine   Date Value Ref Range Status   10/24/2023 7.66 (H) 0.67 - 1.17 mg/dL Final   05/25/2021 10.60 (H) 0.66 - 1.25 mg/dL Final     Endoscopy:     12/2022: icscope  Impression:               - Preparation of the colon was poor. This exam was                             not adequate for colorectal cancer or polyps                             screening.                             - Perianal skin tags found on perianal exam.                             - A single ulcer in the small bowel 20 cm from the                             ileocolonic anastomosis. Biopsied. This is                             presumably the same ulcer that was seen before.                             This was smaller (now 5mm) with surrounding                             granular mucosa that may represent healing. Bipsies                             taken.                             - Congested mucosa in the distal small bowel.                             Biopsied. Otherwise remainder of ileum normal                             - Patent end-to-end ileo-colonic anastomosis,                             characterized by healthy appearing mucosa.                             - Diverticulosis vs old fisulas in the distal                             rectum.                             - Excoriated mucosa at the anus and in the distal                             rectum. Biopsied.                             - The examination was otherwise normal on direct                             and retroflexion views.                             - Simple Endoscopic Score for Crohn's Disease: 5,                             mucosal  inflammatory changes secondary to Crohn's                             disease with ileitis.                             Overall exam is similar to previous but improved                             with decreased size of chronic small bowel ulcer -                             now only 5mm. Remainder of small bowel really looks                             almost normal apart from some patchy mild erythema                             and congestion. Given partial improvement IBD                             clinic may consider increasing Stelara to q 6 weeks                             or q 4 weeks. Would also consider colorectal                             evaluation for anoscopy.     PATH:  A.  Ileum, ulcer: Biopsy:  - Chronic active ileitis with architectural disarray, pyloric gland metaplasia, and active inflammation with ulceration and granulation tissue  - Negative for granulomas and dysplasia  - Immunostain for CMV is negative     B.  Ileum: Biopsy:  - Mild chronic active ileitis with architectural disarray, pyloric gland metaplasia, and active inflammation  - Negative for granulomas and dysplasia  - Immunostain for CMV is negative     C.  Rectum: Biopsy:  - Benign anorectal mucosa with quiescent colitis  - No active colitis  - Negative for granulomas and dysplasia    7/2021:   - Normal perirectal exam.                        - Apparenty end-end ileocolonic anastomosis at                        approximately 70 cm (likely distal transverse colon).                        - 15 mm ulcer in the terminal ileum. Additional                        cobblestoning in this region. Biopsies obtained.                        - Unusual diverticulae in the distal 5-10 cm of the                        rectum without ulceration. Perhaps related to past                        fistulous disease.     PATH:  A. TERMINAL ILEUM, BIOPSY:  Severe ileitis with ulceration and granulation tissue; consistent with severe chronic (Crohn)  ileitis; negative for dysplasia; report of CMV immunohistochemistry to follow     B. TERMINAL ILEUM, LABELED BIOPSY OF ULCER:  Chronic active ileitis with granulomas; consistent with mildly active Crohn; no ulceration or dysplasia identified    10/2020:  EGD: chronic gastritis. Bx neg for HP.  Cscope: ulcers in small intestine and at anastomosis. Bx normal.    Imaging:  CT a/p:    IMPRESSION:   1. Marked dilation of the main pancreatic duct up to 2.8 cm in the  pancreatic tail with associated parenchymal atrophy, raising concern  for main duct type IPMN, though sequela of prior pancreatitis could  have a similar appearance. Follow-up GI consultation as directed  below.  2. Atheromatous changes of the infrarenal aorta and iliac arteries.  Iliac arteries demonstrating a medial and posterior predominance of  calcification, greater on the right.   3. Multiple simple and hemorrhagic/proteinaceous cysts arising from  the atrophic kidneys; no evidence of abnormal enhancement to suggest  neoplasm.  4. Incidentally noted sigmoid pneumatosis without abnormal  enhancement, wall thickening, or associated portal venous gas. This is  likely benign idiopathic pneumatosis in the asymptomatic patient,  though recommend correlation with clinical exam and lactate levels as  bowel ischemia can have similar findings.  5. Slightly increased bowel containing right anterolateral abdominal  wall hernia.    IMPRESSION:  Mr. Dominguez is a 63 year old here with long-standing Crohn's disease s/p multiple bowel resections with minimal remaining bowel. Fortunately, he is not requiring TPN and stable in weight. He started on Stelara and is feeling well, having 5 BMs per day at baseline. Also, he does still smoke about 1/4 ppd which is counterproductive to Crohn's healing; he is trying to quit using patches.     Scar also has ESRD of unknown etiology, on HD. He is working towards getting on the transplant list.    Scar underwent open subtotal  pancreatectomy for main duct IPMN by Dr. Smith on 3/2022. 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    # Active Crohn's disease, dx age 16, s/p multiple bowel resections, stable, on Stelara every 4 weeks     PLAN:  ---Continue Stelara every 4 weeks  ------------BW every 3 months.   ---Refuses future colonoscopies unless he is admitted (consider MRE? Will consult with Dr. Rubio)   ---Stop smoking   ---Continue to avoid NSAIDs    RTC 6 months       Darek Romero PA-C  Division of Gastroenterology, Hepatology and Nutrition  [facility]

## 2023-11-28 NOTE — PATIENT INSTRUCTIONS
It was a pleasure taking care of you today.  I've included a brief summary of our discussion and care plan from today's visit below.  Please review this information with your primary care provider.  ______________________________________________________________________    My recommendations are summarized as follows:    -- Continue stelara every 4 weeks  -- Labs every 3 months  -- Next endoscopic assessment: MRE? Will confirm once I hear back from Dr. Rubio.  -- Patient with IBD we recommend supplementation vitamin D 1000 units daily and calcium 500 mg twice daily.  -- Vaccines/immunizations to be updated: Flu, tetanus, pneumonia  -- No NSAIDs (ibuprofen, or anything containing ibuprofen)    For additional resources about inflammatory bowel disease visit http://www.crohnscolitisfoundation.org/    To learn more about Diet and Nutrition in the setting of IBD, check out some of these resources:  https://www.crohnscolitisfoundation.org/diet-and-nutrition/what-should-i-eat  https://www.nimbal.org/  https://ntforibd.org/      Return to GI Clinic in 6 months to review your progress.    ______________________________________________________________________    How do I schedule labs, imaging studies, or procedures that were ordered in clinic today?     Labs: To schedule lab appointment at the [facility], use my chart or call [phone]. If you have a [facility] lab closer to home where you are regularly seen you can give them a call.     Procedures: If a colonoscopy, upper endoscopy, breath test, esophageal manometry, or pH impedence was ordered today, our endoscopy team will call you to schedule this. If you have not heard from our endoscopy team within a week, please call [phone] to schedule.     Imaging Studies: If you were scheduled for a CT scan, X-ray, MRI, ultrasound, HIDA scan or other imaging study, please call [phone] to have this scheduled.     Referral: If a referral to another  specialty was ordered, expect a phone call or follow instructions above. If you have not heard from anyone regarding your referral in a week, please call our clinic to check the status.     Who do I call with any questions after my visit?  Please be in touch if there are any further questions that arise following today's visit.  There are multiple ways to contact your gastroenterology care team.      During business hours, you may reach a Gastroenterology nurse at [phone]    To schedule or reschedule an appointment, please call [phone].     You can always send a secure message through [employer].  [employer] messages are answered by your nurse or doctor typically within 24 hours.  Please allow extra time on weekends and holidays.      For urgent/emergent questions after business hours, you may reach the on-call GI Fellow by contacting the [facility]  at [phone].     How will I get the results of any tests ordered?    You will receive all of your results.  If you have signed up for [employer], any tests ordered at your visit will be available to you after your physician reviews them.  Typically this takes 1-2 weeks.  If there are urgent results that require a change in your care plan, your physician or nurse will call you to discuss the next steps.      What is [employer]?  [employer] is a secure way for you to access all of your healthcare records from the [facility].  It is a web based computer program, so you can sign on to it from any location.  It also allows you to send secure messages to your care team.  I recommend signing up for [employer] access if you have not already done so and are comfortable with using a computer.         Sincerely,    Darek Romero PA-C  [facility]  Division of Gastroenterology

## 2023-11-28 NOTE — NURSING NOTE
Is the patient currently in the state of [place]? YES    Visit mode:VIDEO    If the visit is dropped, the patient can be reconnected by: VIDEO VISIT: Text to cell phone:   Telephone Information:   Mobile [phone]       Will anyone else be joining the visit? NO  (If patient encounters technical issues they should call [phone]    How would you like to obtain your AVS? MyChart    Are changes needed to the allergy or medication list? No    Reason for visit: Video Visit (Recheck IBD)    Gabriella DIALLO

## 2023-11-28 NOTE — LETTER
11/28/2023         RE: Tacos Dominguez  [address]  [facility] [address]        Dear Colleague,    Thank you for referring your patient, Tacos Dominguez, to the [facility] [place]. Please see a copy of my visit note below.    Tacos Dominguez is a 63 year old male who is being evaluated via a billable video visit.      CD follow up    PATIENT: Tacos Dominguez    MRN: [id number]    Date of Birth 1960    Tel: There are no phone numbers on file.    PCP: Antonio Madrigal     HPI: Mr. Dominguez is a 61 year old male here to establish care for Crohn's disease.    In 1975, underwent an emergency appendectomy and was dx with CD at that time, underwent R crystal. Had multiple surgeries after then, 76, 77, 78, including LOAs and a cholecystectomy. Reports having 9 feet of bowel left, in total. Did well until 4/2006, at which time he had intermittent flares and underwent resection with a colostomy bag that was reversed in 11/2006.  At that time, he required TPN for weight loss.  Had a peristomal hernia that was not corrected in an effort to avoid further bowel loss. Previously saw Dr. Finnegan, 2013/2014.     Currently, has 4-5 BMs per day, loose. No blood. Occasional urgency based on diet. Weight has been stable around 75-80 kg.     Last cscope was in 10/2020 that showed ulcers in the TI and at the anastomosis.   Does have ESRD in the setting of CLARISA from afib and aortic dissection in April 2021.  Skin cancer (BCC) was removed on forearm 2 years.     Quit smoking in October 2020. Smoked about 1/2-3/4 ppd x 50 years.     Noteworthy diet history- well balanced    HBI  General well-being 0 = very well  Abdominal pain 0 = none  Number of liquid stools per day 4-5   Abdominal mass cannot assess virtually  Current Complications arthralgias (knees)    Constitutional symptoms:  Fever NO  Weight loss NO    Other GI symptoms present REFLUX SYMPTOMS - acid taste in mouth  Takes  omeprazole 20 mg before breakfast      Burket Classification  AGE AT DIAGNOSIS: A1 below 16 y  CURRENT DISEASE LOCATION: jejunum  L4 upper GI: YES possibly, given chronic gastritis seen on EGD in 10/2020  DISEASE BEHAVIOR (since disease onset): B2: stricturing  Perianal disease: NO    Total number of IBD surgeries (except perianal): multiple    Remaining bowel: 9 feet, per patient    Current IBD Medications:  none    Past IBD Medications:   Sulfasalazine in the 1970s  Remicade around early 2000s. Was  On this for at least 2 years. Stopped due to remission.   Azathioprine ~2014    Interval history, 11/16/21 (virtual)  Continues to feel well. Unchanged HBI from above.     Icscope in July showed active disease (below).     Cholestyramine was not very helpful for loose stools.   Smokes up to 2 cigs per day. Has tried Chantix and gum in the past. Will get OTC patches.     Met with Jayshree Jordan, Pharm D on 11/10 with recs for the following     -- COVID booster  -- pneumovax-23 Received 12/2020 per Brayan.  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 Smith on 3/2022. 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    Continues on Stelara; last injection was last Tuesday. Every 8 weeks.     Smoking; down to a 1/4 pack per day. Using patches. Trying to quit.      HBI  General well-being 0 = very well  Abdominal pain 0 = none  Number of liquid stools per day 4-5   Abdominal mass cannot assess virtually  Current Complications arthralgias resolved    Interval history, 5/2023 (virtual)  Continues on Stelara.   Hgb 9.6 on 5/22. Does report more energy since transfusion.   Continues to try to quit smoking. Smoking 1/4 ppd.     HBI  General well-being 0 = very well  Abdominal pain 0 = none  Number of liquid stools per day 5 in total (vary between soft and loose)    Abdominal  mass cannot assess virtually  Current Complications arthralgias in the setting of dialysis (swelling in ankles from fluid overload)    Interval hx 11/2023  Recent hosptialization due to SOB, found to have hypoxic respiratory failure and flash pulmonary edema.  He was admitted to the ICU for hemodialysis, which allowed for resolution of respiratory symptoms.   Currently having 5 stools per day, consistency ranges from oatmeal to liquid. No blood in the stool.  No urgency or accidents. No abdominal pain, nausea or vomiting.  No joint pain or skin concerns.   He continues with stelara q4 weeks with good compliance, (has been on q4 dosing since Spring 2023).       Past Medical History:   Diagnosis Date    Aortic dissection (H)     distal, thin.  stable/chronic on MRCP 3/2022    Benign essential hypertension     CAD (coronary artery disease)     Cerebral infarction (H)     Crohn's colitis (H)     Crohn's disease of large intestine (H) 11/22/2021    Current smoker     Esophageal reflux     ESRD (end stage renal disease) on dialysis (H)     History of basal cell carcinoma     Hypertension     Mixed hyperlipidemia     NSTEMI (non-ST elevated myocardial infarction) (H)     PAF (paroxysmal atrial fibrillation) (H)         Past Surgical History:   Procedure Laterality Date    ABDOMEN SURGERY      x 6.  colon resections for crohn's disease    APPENDECTOMY      CHOLECYSTECTOMY      COLONOSCOPY N/A 07/20/2021    Procedure: COLONOSCOPY, WITH POLYPECTOMY AND BIOPSY;  Surgeon: Eric Preston MD;  Location:  GI    COLONOSCOPY N/A 12/7/2022    Procedure: COLONOSCOPY, WITH POLYPECTOMY AND BIOPSY;  Surgeon: Willian Kee MD;  Location:  GI    CV CORONARY ANGIOGRAM N/A 05/25/2021    Procedure: CV CORONARY ANGIOGRAM;  Surgeon: Judson Ybarra MD;  Location:  HEART CARDIAC CATH LAB    ESOPHAGOSCOPY, GASTROSCOPY, DUODENOSCOPY (EGD), COMBINED N/A 07/20/2021    Procedure: ESOPHAGOGASTRODUODENOSCOPY, WITH FINE NEEDLE  ASPIRATION BIOPSY, WITH ENDOSCOPIC ULTRASOUND GUIDANCE;  Surgeon: Eric Preston MD;  [facility]    IR DIALYSIS FISTULOGRAM LEFT  12/01/2022    IR DIALYSIS FISTULOGRAM LEFT  1/13/2023    LAPAROTOMY, LYSIS ADHESIONS, COMBINED N/A 03/17/2022    Procedure: Laparotomy, extensive lysis adhesions, combined;  Surgeon: Sarath Smith MD;  [facility]    PANCREATECTOMY PARTIAL N/A 03/17/2022    Procedure: Open Subtotal Pancreatectomy, intra-op ultrasound;  Surgeon: Sarath Smith MD;  [facility]    REVISION FISTULA ARTERIOVENOUS UPPER EXTREMITY Left 2/14/2023    Procedure: LEFT UPPER ARM FISTULA OUTFLOW REVISION FROM CEPHALIC VEIN TO JUGULAR VEIN WITH 10mm THIN-WALLED RINGED POLYTETRAFLUEROETHYLINE;  Surgeon: Mo Gramajo MD;  Location: [facility]    VASCULAR SURGERY      dialysis access- left upper       Social History     Tobacco Use    Smoking status: Every Day     Packs/day: 0.25     Years: 50.00     Additional pack years: 0.00     Total pack years: 12.50     Types: Cigarettes     Last attempt to quit: 10/19/2020     Years since quitting: 3.1    Smokeless tobacco: Never   Substance Use Topics    Alcohol use: Yes     Comment: rare       Family History   Problem Relation Age of Onset    Breast Cancer Mother     Coronary Artery Disease Father     Hypertension Brother     Anesthesia Reaction No family hx of     Thrombosis No family hx of        Allergies   Allergen Reactions    Lisinopril Anaphylaxis and Other (See Comments)     Shortness of breath        Outpatient Encounter Medications as of 11/28/2023   Medication Sig Dispense Refill    amLODIPine (NORVASC) 10 MG tablet Take 10 mg by mouth daily      calcium acetate (CALPHRON) 667 MG TABS tablet Take 2,668 mg by mouth 3 times daily      carvedilol (COREG) 12.5 MG tablet Take 12.5 mg by mouth 2 times daily      folic acid (FOLVITE) 1 MG tablet Take 1 mg by mouth every morning       lidocaine-prilocaine (EMLA) 2.5-2.5 % external cream  three times a week Prior to dialysis  site access on Monday, Wednesday, Friday      multivitamin RENAL (MULTIVITAMIN RENAL) 1 MG capsule Take 1 capsule by mouth every morning      omeprazole (PRILOSEC) 20 MG DR capsule Take 20 mg by mouth every morning       simvastatin (ZOCOR) 20 MG tablet Take 20 mg by mouth every morning       ustekinumab (STELARA) 90 MG/ML Inject 1 ml ( 90 mg) subcutaneous every 4 weeks. 1 mL 5     Facility-Administered Encounter Medications as of 11/28/2023   Medication Dose Route Frequency Provider Last Rate Last Admin    lidocaine 1% with EPINEPHrine 1:100,000 injection 3 mL  3 mL Intradermal Once Don Khan MD          NSAID  No     Review of Systems  Complete 10 System ROS performed. All are negative except as documented below, in the HPI, or in patient questionnaire from today's visit.    1) Constitutional: No fevers, chills, night sweats or malaise, weight loss or gain  2) Skin: No rash  3) Pulmonary: No wheeze, SOB, cough, sputum or hemoptysis  4) Cardiovascular: No Chest pain or palpitations  5) Genitourinary: No blood in urine or dysuria  6) Endocrine: No increased sweating, hunger, thirst or thyroid problems  7) Hematologic: No bruising and easy bleeding  8) Musculoskeletal: no new pain in joints or limitation in ROM  9) Neurologic: No dizziness, paresthesias or weakness or falls  10) Psychiatric:  not depressed/anxious, no sleep problems    PHYSICAL EXAM  General appearance  Healthy appearing adult, in no acute distress     Eyes  Sclera anicteric  Pupils round and reactive to light     Ears, nose, mouth and throat  No obvious external lesions of ears and nose  Hearing intact     Neck  Symmetric  No obvious external lesions     Respiratory  Normal respiration, no use of accessory muscles      MSK  Gait normal     Skin  No rashes or jaundice      Psychiatric  Oriented to person, place and time  Appropriate mood and affect.       DATA:  Reviewed in detail past documentation,  medications and prior workup available in electronic health records or through outside records.    PERTINENT STUDIES:  Most recent CBC:  WBC   Date Value Ref Range Status   05/25/2021 7.2 4.0 - 11.0 10e9/L Final     WBC Count   Date Value Ref Range Status   10/24/2023 4.2 4.0 - 11.0 10e3/uL Final     Comment:     This is a corrected result. Previous result was 4.0 10e3/uL on 10/24/2023 at  8:51 AM CDT   ]  Hemoglobin   Date Value Ref Range Status   10/24/2023 10.5 (L) 13.3 - 17.7 g/dL Final   05/25/2021 10.6 (L) 13.3 - 17.7 g/dL Final   ]   Platelet Count   Date Value Ref Range Status   10/24/2023 63 (L) 150 - 450 10e3/uL Final     Comment:     This is a corrected result. Previous result was 66 10e3/uL on 10/24/2023 at  8:51 AM CDT   05/25/2021 224 150 - 450 10e9/L Final       Most recent coag:  INR   Date Value Ref Range Status   03/17/2022 1.30 (H) 0.85 - 1.15 Final   05/25/2021 1.33 (H) 0.86 - 1.14 Final       Most recent hepatic panel:  AST   Date Value Ref Range Status   10/24/2023 22 0 - 45 U/L Final     Comment:     Reference intervals for this test were updated on 6/12/2023 to more accurately reflect our healthy population. There may be differences in the flagging of prior results with similar values performed with this method. Interpretation of those prior results can be made in the context of the updated reference intervals.   03/25/2021 16 0 - 45 U/L Final     ALT   Date Value Ref Range Status   10/24/2023 14 0 - 70 U/L Final     Comment:     Reference intervals for this test were updated on 6/12/2023 to more accurately reflect our healthy population. There may be differences in the flagging of prior results with similar values performed with this method. Interpretation of those prior results can be made in the context of the updated reference intervals.     03/25/2021 22 0 - 70 U/L Final     Bilirubin Conjugated   Date Value Ref Range Status   11/13/2008 0.0 0.0 - 0.3 mg/dL Final      Bilirubin Total    Date Value Ref Range Status   10/24/2023 1.1 <=1.2 mg/dL Final   03/25/2021 0.5 0.2 - 1.3 mg/dL Final     Albumin   Date Value Ref Range Status   10/24/2023 4.1 3.5 - 5.2 g/dL Final   07/30/2022 3.7 3.4 - 5.0 g/dL Final   03/25/2021 3.4 3.4 - 5.0 g/dL Final     Alkaline Phosphatase   Date Value Ref Range Status   10/24/2023 87 40 - 129 U/L Final   03/25/2021 91 40 - 150 U/L Final       Most recent creatinine:  Creatinine   Date Value Ref Range Status   10/24/2023 7.66 (H) 0.67 - 1.17 mg/dL Final   05/25/2021 10.60 (H) 0.66 - 1.25 mg/dL Final     Endoscopy:     12/2022: icscope  Impression:               - Preparation of the colon was poor. This exam was                             not adequate for colorectal cancer or polyps                             screening.                             - Perianal skin tags found on perianal exam.                             - A single ulcer in the small bowel 20 cm from the                             ileocolonic anastomosis. Biopsied. This is                             presumably the same ulcer that was seen before.                             This was smaller (now 5mm) with surrounding                             granular mucosa that may represent healing. Bipsies                             taken.                             - Congested mucosa in the distal small bowel.                             Biopsied. Otherwise remainder of ileum normal                             - Patent end-to-end ileo-colonic anastomosis,                             characterized by healthy appearing mucosa.                             - Diverticulosis vs old fisulas in the distal                             rectum.                             - Excoriated mucosa at the anus and in the distal                             rectum. Biopsied.                             - The examination was otherwise normal on direct                             and retroflexion views.                             -  Simple Endoscopic Score for Crohn's Disease: 5,                             mucosal inflammatory changes secondary to Crohn's                             disease with ileitis.                             Overall exam is similar to previous but improved                             with decreased size of chronic small bowel ulcer -                             now only 5mm. Remainder of small bowel really looks                             almost normal apart from some patchy mild erythema                             and congestion. Given partial improvement IBD                             clinic may consider increasing Stelara to q 6 weeks                             or q 4 weeks. Would also consider colorectal                             evaluation for anoscopy.     PATH:  A.  Ileum, ulcer: Biopsy:  - Chronic active ileitis with architectural disarray, pyloric gland metaplasia, and active inflammation with ulceration and granulation tissue  - Negative for granulomas and dysplasia  - Immunostain for CMV is negative     B.  Ileum: Biopsy:  - Mild chronic active ileitis with architectural disarray, pyloric gland metaplasia, and active inflammation  - Negative for granulomas and dysplasia  - Immunostain for CMV is negative     C.  Rectum: Biopsy:  - Benign anorectal mucosa with quiescent colitis  - No active colitis  - Negative for granulomas and dysplasia    7/2021:   - Normal perirectal exam.                        - Apparenty end-end ileocolonic anastomosis at                        approximately 70 cm (likely distal transverse colon).                        - 15 mm ulcer in the terminal ileum. Additional                        cobblestoning in this region. Biopsies obtained.                        - Unusual diverticulae in the distal 5-10 cm of the                        rectum without ulceration. Perhaps related to past                        fistulous disease.     PATH:  A. TERMINAL ILEUM, BIOPSY:  Severe ileitis  with ulceration and granulation tissue; consistent with severe chronic (Crohn) ileitis; negative for dysplasia; report of CMV immunohistochemistry to follow     B. TERMINAL ILEUM, LABELED BIOPSY OF ULCER:  Chronic active ileitis with granulomas; consistent with mildly active Crohn; no ulceration or dysplasia identified    10/2020:  EGD: chronic gastritis. Bx neg for HP.  Cscope: ulcers in small intestine and at anastomosis. Bx normal.    Imaging:  CT a/p:    IMPRESSION:   1. Marked dilation of the main pancreatic duct up to 2.8 cm in the  pancreatic tail with associated parenchymal atrophy, raising concern  for main duct type IPMN, though sequela of prior pancreatitis could  have a similar appearance. Follow-up GI consultation as directed  below.  2. Atheromatous changes of the infrarenal aorta and iliac arteries.  Iliac arteries demonstrating a medial and posterior predominance of  calcification, greater on the right.   3. Multiple simple and hemorrhagic/proteinaceous cysts arising from  the atrophic kidneys; no evidence of abnormal enhancement to suggest  neoplasm.  4. Incidentally noted sigmoid pneumatosis without abnormal  enhancement, wall thickening, or associated portal venous gas. This is  likely benign idiopathic pneumatosis in the asymptomatic patient,  though recommend correlation with clinical exam and lactate levels as  bowel ischemia can have similar findings.  5. Slightly increased bowel containing right anterolateral abdominal  wall hernia.    IMPRESSION:  Mr. Dominguez is a 63 year old here with long-standing Crohn's disease s/p multiple bowel resections with minimal remaining bowel. Fortunately, he is not requiring TPN and stable in weight. He started on Stelara and is feeling well, having 5 BMs per day at baseline. Also, he does still smoke about 1/4 ppd which is counterproductive to Crohn's healing; he is trying to quit using patches.     Scar also has ESRD of unknown etiology, on HD. He is working  towards getting on the transplant list.    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 Smith on 3/2022. 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    # Active Crohn's disease, dx age 16, s/p multiple bowel resections, stable, on Stelara every 4 weeks     PLAN:  ---Continue Stelara every 4 weeks  ------------BW every 3 months.   ---Refuses future colonoscopies unless he is admitted (consider MRE? Will consult with Dr. Rubio)   ---Stop smoking   ---Continue to avoid NSAIDs    RTC 6 months           Again, thank you for allowing me to participate in the care of your patient.      Sincerely,    Darek Romero PA-C

## 2023-11-29 NOTE — TELEPHONE ENCOUNTER
Spoke with patient and scheduled the 6 mo follow-up order per Darek Romero. They are scheduled for a video visit on 5/28/24.

## 2023-12-17 NOTE — PROGRESS NOTES
Virtual Visit Details    Type of service:  Video Visit   Start time 9.20 AM  Stop time 9.38 AM  Originating Location (pt. Location): Home  Distant Location (provider location):  Off-site  Platform used for Video Visit: [place]    [facility]  GASTROENTEROLOGY PROGRESS NOTE  Tacos Dominguez [id number]     SUBJECTIVE:  61 yo male being seen for surveillance of IPMN post distal pancreatectomy 3/2022 with pathology showed IPMN with high grade dysplasia.    Last seen 11/1/21.     PMH significant for Crohn's with multiple bowel surgeries being followed by Dr. Rubio. CKD since an aortic dissection 4/2021 now on HD. Basal cell. Htn.    Had incidentally dilated pancreatic duct with cystic change in the pancreatic tail incidentally identified on a non-contrast CT 6/15/21.     EUS by myself 7/20/21 showed progressive fusiform dilation of the main pancreatic duct towards the left of midline with the duct measuring up to 13 mm in diameter. Needle aspiration returned thick viscous/gelatinous mucus. This was too viscous to perform fluid analysis. Cytology was bland but only a small amount of fluid was obtained. There were no solid mural nodules. The exam also suggested changes of chronic pancreatitis however there were no obstructing lesions.     I recommended distal pancreatectomy, and this was performed 3/17/22.   Pathology showed:  A(1). BODY AND TAIL OF PANCREAS, SUBTOTAL PANCREATECTOMY:  -Intraductal papillary mucinous neoplasm (IPMN), with multifocal high-grade dysplasia  -Tumor size: 7.2 cm in greatest dimension  -Resection margin free of high-grade dysplasia (low-grade mucinous epithelium present at the resection margin)  -No evidence of invasive malignancy  -Nineteen lymph nodes with no evidence of metastatic carcinoma (0/19)    MRI was performed prior to this visit. This showed:  IMPRESSION:   1. Stable surgical changes of distal pancreatectomy. No suspicious findings in the pancreatectomy bed.  2. Interval  hemorrhage into and enlargement of an exophytic hemorrhagic/proteinaceous cyst arising from the lower pole of the left kidney.  3. Continued hepatic and splenic iron deposition.  4. Stable anterior right abdominal wall hernia containing loops of small bowel.    Currently, doing well. Denies abdominal pain. Has history of bowel resection (about 10 feet left of small bowel), having at least 5 bowel movements a day.   Has right epistaxis which he went to ED and got anterior nasal packing. Weight has been stable, 70-75 lbs for the past 3 years. Eating protein bar during dialysis. Doing regular hemodialysis.    OBJECTIVE:  VS: There were no vitals taken for this visit.   GEN: A&Ox3, NAD, comfortable    REVIEW OF LABORATORY, PATHOLOGY AND IMAGING RESULTS:    IMPRESSION:  Tacos Dominguez is a 63 year old male with history of distal pancreatectomy for main duct IPMN in the pancreatic tail. Pathology confirmed high-grade dysplasia without malignancy. Margins negative for HGD.    Now seen for annual post-op MRI surveillance. This is stable. It appears that this was performed with contrast despite his history of dialysis.  Has chronic diarrhea which has been stable for the past 10 years per patient which could be from post bowel resections, Crohn's disease, or also could be from distal pancreatectomy. He has not been checked for fecal elastase which we will obtain this. In the setting of diarrhea/loose stool, could also has falsely low result as well.    RECOMMENDATIONS:  - Check fecal elastase  - Repeat MRI abdomen without contrast in 1 year (ESRD).    It was a pleasure to participate in the care of this patient; please contact us with any further questions.  A total of 40 minutes was spent on the day of the visit, >50% of which was counseling regarding the above delineated issues. The remainder was review of records and imaging as well as documentation and coordination of care.    Patient is discussed and seen with   Mohan.  Geovanny Cotter MD  Gastroenterology/Hepatology Fellow    Physician Attestation   I, Eric Preston MD, saw this patient and agree with the findings and plan of care as documented in the note.      Items personally reviewed/procedural attestation: imaging and agree with the interpretation documented in the note.    Eric Preston MD

## 2023-12-18 NOTE — NURSING NOTE
Is the patient currently in the state of [place]? YES    Visit mode:VIDEO    If the visit is dropped, the patient can be reconnected by: VIDEO VISIT: Text to cell phone:   Telephone Information:   Mobile [phone]       Will anyone else be joining the visit? NO  (If patient encounters technical issues they should call [phone]    How would you like to obtain your AVS? MyChart    Are changes needed to the allergy or medication list? Pt stated no changes to allergies and medications flagged for removal are Amlopidine and Carvedilol      Reason for visit: SYLVIA Leonardo LPN

## 2023-12-18 NOTE — LETTER
12/18/2023         RE: Tacos Dominguez  [address]  [facility] [address]        Dear Colleague,    Thank you for referring your patient, Tacos Dominguez, to the [facility]. Please see a copy of my visit note below.      [facility]  GASTROENTEROLOGY PROGRESS NOTE  Tacos Dominguez [id number]     SUBJECTIVE:  59 yo male being seen for surveillance of IPMN post distal pancreatectomy 3/2022 with pathology showed IPMN with high grade dysplasia.    Last seen 11/1/21.     PMH significant for Crohn's with multiple bowel surgeries being followed by Dr. Rubio. CKD since an aortic dissection 4/2021 now on HD. Basal cell. Htn.    Had incidentally dilated pancreatic duct with cystic change in the pancreatic tail incidentally identified on a non-contrast CT 6/15/21.     EUS by myself 7/20/21 showed progressive fusiform dilation of the main pancreatic duct towards the left of midline with the duct measuring up to 13 mm in diameter. Needle aspiration returned thick viscous/gelatinous mucus. This was too viscous to perform fluid analysis. Cytology was bland but only a small amount of fluid was obtained. There were no solid mural nodules. The exam also suggested changes of chronic pancreatitis however there were no obstructing lesions.     I recommended distal pancreatectomy, and this was performed 3/17/22.   Pathology showed:  A(1). BODY AND TAIL OF PANCREAS, SUBTOTAL PANCREATECTOMY:  -Intraductal papillary mucinous neoplasm (IPMN), with multifocal high-grade dysplasia  -Tumor size: 7.2 cm in greatest dimension  -Resection margin free of high-grade dysplasia (low-grade mucinous epithelium present at the resection margin)  -No evidence of invasive malignancy  -Nineteen lymph nodes with no evidence of metastatic carcinoma (0/19)    MRI was performed prior to this visit. This showed:  IMPRESSION:   1. Stable surgical changes of distal pancreatectomy. No suspicious findings in the  pancreatectomy bed.  2. Interval hemorrhage into and enlargement of an exophytic hemorrhagic/proteinaceous cyst arising from the lower pole of the left kidney.  3. Continued hepatic and splenic iron deposition.  4. Stable anterior right abdominal wall hernia containing loops of small bowel.    Currently, doing well. Denies abdominal pain. Has history of bowel resection (about 10 feet left of small bowel), having at least 5 bowel movements a day.   Has right epistaxis which he went to ED and got anterior nasal packing. Weight has been stable, 70-75 lbs for the past 3 years. Eating protein bar during dialysis. Doing regular hemodialysis.    OBJECTIVE:  VS: There were no vitals taken for this visit.   GEN: A&Ox3, NAD, comfortable    REVIEW OF LABORATORY, PATHOLOGY AND IMAGING RESULTS:    IMPRESSION:  Tacos Dominguez is a 63 year old male with history of distal pancreatectomy for main duct IPMN in the pancreatic tail. Pathology confirmed high-grade dysplasia without malignancy. Margins negative for HGD.    Now seen for annual post-op MRI surveillance. This is stable. It appears that this was performed with contrast despite his history of dialysis.  Has chronic diarrhea which has been stable for the past 10 years per patient which could be from post bowel resections, Crohn's disease, or also could be from distal pancreatectomy. He has not been checked for fecal elastase which we will obtain this. In the setting of diarrhea/loose stool, could also has falsely low result as well.    RECOMMENDATIONS:  - Check fecal elastase  - Repeat MRI abdomen without contrast in 1 year (ESRD).    It was a pleasure to participate in the care of this patient; please contact us with any further questions.  A total of 40 minutes was spent on the day of the visit, >50% of which was counseling regarding the above delineated issues. The remainder was review of records and imaging as well as documentation and coordination of care.    Patient is  discussed and seen with Dr. Preston.  Geovanny Cotter MD  Gastroenterology/Hepatology Fellow    Physician Attestation   I, Eric Preston MD, saw this patient and agree with the findings and plan of care as documented in the note.      Items personally reviewed/procedural attestation: imaging and agree with the interpretation documented in the note.            Again, thank you for allowing me to participate in the care of your patient.      Sincerely,    Eric Preston MD

## 2023-12-18 NOTE — PATIENT INSTRUCTIONS
You will find a brief summary of your discussion and care plan from today's visit below.  Dr Preston has outlined the following steps after your recent clinic visit:    RECOMMENDATIONS:  - Check fecal elastase  - Repeat MRI abdomen without contrast in 1 year (ESRD).    Please call with any questions or concerns regarding your clinic visit today.     It is a pleasure being involved in your health care.     Contacts post-consultation depending on your need:     Schedule Clinic Appointments                        [phone], option 1    Kailyn Santoyo RN Care Coordinator           [phone]     Josemanuel Cannon, [place]                           [phone]     GI Procedure Scheduling                               [phone], option 2     For urgent/emergent questions after business hours, you may reach the on-call GI Fellow by contacting the [facility]  at [phone].    How to I schedule a follow-up visit?  If you did not schedule a follow-up visit today, please call [phone] option #1 to schedule a follow-up office visit.       How do I schedule labs, imaging studies, or procedures that were ordered in clinic today?      Labs: To schedule lab appointment at the [facility], use my chart or call [phone]. If you have a [facility] lab closer to home where you are regularly seen you can give them a call.      Procedures: If a colonoscopy, upper endoscopy, breath test, esophageal manometry, or pH impedence was ordered today, our endoscopy team will call you to schedule this. If you have not heard from our endoscopy team within a week, please call [phone] to schedule.      Imaging Studies: If you were scheduled for a CT scan, X-ray, MRI, ultrasound, HIDA scan or other imaging study, please call [phone] to have this scheduled.      Referral: If a referral to another specialty was ordered, expect a phone call or follow instructions above. If you have not heard  from anyone regarding your referral in a week, please call our clinic to check the status.     I recommend signing up for [employer] access if you have not already done so and are comfortable with using a computer.  This allows for online access to your lab results and also helps you communicate efficiently with the clinic should any questions arise in your care.         Declines

## 2023-12-18 NOTE — TELEPHONE ENCOUNTER
Kailyn:    Please arrange for MRI abdomen WITHOUT CONTRAST in 1 yr. Ind - IPMN surveillance.    Clinic visit 1 week after. Can be virtual.    SIXTO Preston MD  Professor of Medicine  Division of Gastroenterology, Hepatology and Nutrition  [facility]

## 2023-12-26 NOTE — LETTER
12/26/2023      RE: Tacos Dominguez  [address]  [facility] [address]       Dear Colleague,    Thank you for the opportunity to participate in the care of your patient, Tacos Dominguez, at the [facility] [place] at . Please see a copy of my visit note below.       SUBJECTIVE:  Tacos Dominguez is a 63 year old male who presents for evaluation to maintain renal transplant waitlist status.    Past medical history is significant for  Crohn's dz, HTN, paroxysmal  atrial fibrillation and CKD on HD (started 10/2020).  He had an NSTEMI in 10/2020 without coronary angiogram (trop 77) and had inferior hypokinesis noted on TTE in 2021 patient had a coronary angiogram which was completely normal with no vascular disease in the inferior wall territory.  Patient known to have normal LV function.  Currently patient is having some shortness of breath.  At clinic he was short of breath.  He is unable to walk more than a block.  No PND or other heart failure symptoms.  Because of the holiday schedule he was dialyzed on Sunday.  Patient Active Problem List    Diagnosis Date Noted     Anemia 05/05/2023     Priority: Medium     Aortic dissection, abdominal (H) 03/03/2023     Priority: Medium     Current smoker 03/03/2023     Priority: Medium     History of basal cell carcinoma 03/03/2023     Priority: Medium     CAD (coronary artery disease) 03/03/2023     Priority: Medium     Status post repair of arteriovenous fistula 02/14/2023     Priority: Medium     IPMN (intraductal papillary mucinous neoplasm) 03/17/2022     Priority: Medium     Crohn's disease of large intestine (H) 11/22/2021     Priority: Medium     ESRD (end stage renal disease) (H) 04/26/2021     Priority: Medium     Added automatically from request for surgery [id number]       Organ transplant candidate 04/26/2021     Priority: Medium     Added automatically from request for surgery [id number]        PAF (paroxysmal atrial fibrillation) (H) 04/26/2021     Priority: Medium     Benign essential hypertension 04/26/2021     Priority: Medium     Acquired cyst of kidney 04/23/2021     Priority: Medium    .  Current Outpatient Medications   Medication Sig     calcium acetate (CALPHRON) 667 MG TABS tablet Take 2,668 mg by mouth 3 times daily     folic acid (FOLVITE) 1 MG tablet Take 1 mg by mouth every morning      lidocaine-prilocaine (EMLA) 2.5-2.5 % external cream three times a week Prior to dialysis  site access on Monday, Wednesday, Friday     multivitamin RENAL (MULTIVITAMIN RENAL) 1 MG capsule Take 1 capsule by mouth every morning     omeprazole (PRILOSEC) 20 MG DR capsule Take 20 mg by mouth every morning      simvastatin (ZOCOR) 20 MG tablet Take 20 mg by mouth every morning      ustekinumab (STELARA) 90 MG/ML Inject 1 ml ( 90 mg) subcutaneous every 4 weeks.     amLODIPine (NORVASC) 10 MG tablet Take 10 mg by mouth daily     carvedilol (COREG) 12.5 MG tablet Take 12.5 mg by mouth 2 times daily     Current Facility-Administered Medications   Medication     lidocaine 1% with EPINEPHrine 1:100,000 injection 3 mL     Past Medical History:   Diagnosis Date     Aortic dissection (H)     distal, thin.  stable/chronic on MRCP 3/2022     Benign essential hypertension      CAD (coronary artery disease)      Cerebral infarction (H)      Crohn's colitis (H)      Crohn's disease of large intestine (H) 11/22/2021     Current smoker      Esophageal reflux      ESRD (end stage renal disease) on dialysis (H)      History of basal cell carcinoma      Hypertension      Mixed hyperlipidemia      NSTEMI (non-ST elevated myocardial infarction) (H)      PAF (paroxysmal atrial fibrillation) (H)      Past Surgical History:   Procedure Laterality Date     ABDOMEN SURGERY      x 6.  colon resections for crohn's disease     APPENDECTOMY       CHOLECYSTECTOMY       COLONOSCOPY N/A 07/20/2021    Procedure: COLONOSCOPY, WITH POLYPECTOMY  AND BIOPSY;  Surgeon: Eric Preston MD;  Location:  GI     COLONOSCOPY N/A 12/7/2022    Procedure: COLONOSCOPY, WITH POLYPECTOMY AND BIOPSY;  Surgeon: Willian Kee MD;  Location:  GI     CV CORONARY ANGIOGRAM N/A 05/25/2021    Procedure: CV CORONARY ANGIOGRAM;  Surgeon: Judson Ybarra MD;  Location:  HEART CARDIAC CATH LAB     ESOPHAGOSCOPY, GASTROSCOPY, DUODENOSCOPY (EGD), COMBINED N/A 07/20/2021    Procedure: ESOPHAGOGASTRODUODENOSCOPY, WITH FINE NEEDLE ASPIRATION BIOPSY, WITH ENDOSCOPIC ULTRASOUND GUIDANCE;  Surgeon: Eric Preston MD;  [facility]     IR DIALYSIS FISTULOGRAM LEFT  12/01/2022     IR DIALYSIS FISTULOGRAM LEFT  1/13/2023     LAPAROTOMY, LYSIS ADHESIONS, COMBINED N/A 03/17/2022    Procedure: Laparotomy, extensive lysis adhesions, combined;  Surgeon: Sarath Smith MD;  [facility]     PANCREATECTOMY PARTIAL N/A 03/17/2022    Procedure: Open Subtotal Pancreatectomy, intra-op ultrasound;  Surgeon: Sarath Smith MD;  [facility]     REVISION FISTULA ARTERIOVENOUS UPPER EXTREMITY Left 2/14/2023    Procedure: LEFT UPPER ARM FISTULA OUTFLOW REVISION FROM CEPHALIC VEIN TO JUGULAR VEIN WITH 10mm THIN-WALLED RINGED POLYTETRAFLUEROETHYLINE;  Surgeon: Mo Gramajo MD;  Location:  OR     VASCULAR SURGERY      dialysis access- left upper     Allergies   Allergen Reactions     Lisinopril Anaphylaxis and Other (See Comments)     Shortness of breath     Social History     Socioeconomic History     Marital status:      Spouse name: Not on file     Number of children: 1     Years of education: Not on file     Highest education level: Not on file   Occupational History     Occupation: [employer] ,    Tobacco Use     Smoking status: Every Day     Packs/day: 0.25     Years: 50.00     Additional pack years: 0.00     Total pack years: 12.50     Types: Cigarettes     Last attempt to quit: 10/19/2020     Years since quitting:  3.1     Smokeless tobacco: Never   Substance and Sexual Activity     Alcohol use: Yes     Comment: rare     Drug use: Not Currently     Sexual activity: Not on file   Other Topics Concern     Parent/sibling w/ CABG, MI or angioplasty before 65F 55M? Not Asked   Social History Narrative     Not on file     Social Determinants of Health     Financial Resource Strain: Not on file   Food Insecurity: Not on file   Transportation Needs: Not on file   Physical Activity: Not on file   Stress: Not on file   Social Connections: Not on file   Interpersonal Safety: Not on file   Housing Stability: Not on file     Family History   Problem Relation Age of Onset     Breast Cancer Mother      Coronary Artery Disease Father      Hypertension Brother      Anesthesia Reaction No family hx of      Thrombosis No family hx of           REVIEW OF SYSTEMS:  General: negative, fever, chills, night sweats  Skin: negative, acne, rash, and scaling  Eyes: negative, double vision, eye pain, and photophobia  Ears/Nose/Throat: negative, nasal congestion, and purulent rhinorrhea  Respiratory: No cough, No hemoptysis, and negative  Cardiovascular: negative, palpitations, tachycardia, irregular heart beat, and paroxysmal nocturnal dyspnea       OBJECTIVE:  Blood pressure 126/63, pulse 101, weight 78.8 kg (173 lb 12.8 oz), SpO2 98%.  General Appearance: alert and no distress  Head: Normocephalic. No masses, lesions, tenderness or abnormalities  Eyes: conjuctiva clear, PERRL, EOM intact  Ears: External ears normal. Canals clear. TM's normal.  Nose: Nares normal  Mouth: normal  Neck: Supple, no cervical adenopathy, no thyromegaly  Lungs: clear to auscultation  Cardiac: regular rate and rhythm, normal S1 and S2, PSM/ESM.       ASSESSMENT/PLAN:  Patient here for evaluation to maintain renal transplant waitlist status.  Patient with Crohn's disease and complications related to this leading to CKD.  He is on dialysis for about 4 years.  Currently patient is  not very active but he reports shortness of breath.  He is unable to walk more than 1 block.  No PND or other heart failure symptoms.  Because of the holiday schedule he was dialyzed on Sunday.  He does have a history of NSTEMI with inferior wall motion abnormality in the past with normal LV function.  Patient had a normal coronary angiogram in 2021 showing no significant coronary artery disease supplying the inferior wall territory.  Today's echocardiogram reviewed and the results were discussed with patient.  Severe mitral annular calcification with the severe MR.  Mean gradient 12 mmHg possibly partially related to MR.  To have moderate aortic stenosis with a peak velocity of 3.3 m/s.  Moderate to severe tricuspid insufficiency is present.  Pulmonary artery systolic pressure is 65 mmHg plus RA pressure.  Patient is not exactly predialysis to have this hemodynamic values.  As he is feeling more and more progressively short of breath he did not need further evaluation of valvular heart disease.  Will plan for a GUERO and will plan further after this test.  Plan was discussed with patient.  Total visit duration 30 minutes.   this included face-to-face interview, physical exam, chart review, review of echocardiogram and documentation.      Please do not hesitate to contact me if you have any questions/concerns.     Sincerely,     JEN Oliver MD

## 2023-12-26 NOTE — PATIENT INSTRUCTIONS
"Complete a GUERO (transesophageal echocardiogram)    Follow these instructions:    1. Report to the [facility] waiting room in the main hospital     2. DO NOT EAT OR DRINK ANYTHING FOR 6 HOURS PRIOR TO ARRIVAL.     3. The morning of your procedure you may take your scheduled medications with a SIP of water.     4. You will receive medication that makes you sleepy; you will need a  and someone to stay with you for 6 hours following this procedure.  You should not make any legal decisions for 24 hours following discharge.     What is a transesophageal echocardiogram (GUERO)?  A transesophageal echocardiogram (GUERO) uses echocardiography to assess the structure and function of the heart. During the procedure, a transducer (like a microphone) sends out ultrasonic sound waves. When the transducer is placed at certain locations and angles, the ultrasonic sound waves move through the skin and other body tissues to the heart tissues, where the waves bounce or \"echo\" off of the heart structures. The transducer picks up the reflected waves and sends them to a computer. The computer displays the echoes as images of the heart walls and valves.    A traditional echocardiogram is done by putting the transducer on the surface of the chest. This is called a transthoracic echocardiogram. A transesophageal echocardiogram is done by inserting a probe with a transducer down the esophagus. This provides a clearer image of the heart because the sound waves do not have to pass through skin, muscle, or bone tissue. The GUERO probe is much closer to the heart since the esophagus and heart are right next to each other.    Afterwards, you may resume your usual diet and activities.  "

## 2023-12-26 NOTE — NURSING NOTE
Chief Complaint   Patient presents with    Follow Up     [facility] 1 year follow up w/ echo prior     Vitals were taken and medications reconciled.    Ziggy Lewis, EMT  8:55 AM

## 2023-12-26 NOTE — PROGRESS NOTES
SUBJECTIVE:  Tacos Dominguez is a 63 year old male who presents for evaluation to maintain renal transplant waitlist status.    Past medical history is significant for  Crohn's dz, HTN, paroxysmal  atrial fibrillation and CKD on HD (started 10/2020).  He had an NSTEMI in 10/2020 without coronary angiogram (trop 77) and had inferior hypokinesis noted on TTE in 2021 patient had a coronary angiogram which was completely normal with no vascular disease in the inferior wall territory.  Patient known to have normal LV function.  Currently patient is having some shortness of breath.  At clinic he was short of breath.  He is unable to walk more than a block.  No PND or other heart failure symptoms.  Because of the holiday schedule he was dialyzed on Sunday.  Patient Active Problem List    Diagnosis Date Noted    Anemia 05/05/2023     Priority: Medium    Aortic dissection, abdominal (H) 03/03/2023     Priority: Medium    Current smoker 03/03/2023     Priority: Medium    History of basal cell carcinoma 03/03/2023     Priority: Medium    CAD (coronary artery disease) 03/03/2023     Priority: Medium    Status post repair of arteriovenous fistula 02/14/2023     Priority: Medium    IPMN (intraductal papillary mucinous neoplasm) 03/17/2022     Priority: Medium    Crohn's disease of large intestine (H) 11/22/2021     Priority: Medium    ESRD (end stage renal disease) (H) 04/26/2021     Priority: Medium     Added automatically from request for surgery [id number]      Organ transplant candidate 04/26/2021     Priority: Medium     Added automatically from request for surgery [id number]      PAF (paroxysmal atrial fibrillation) (H) 04/26/2021     Priority: Medium    Benign essential hypertension 04/26/2021     Priority: Medium    Acquired cyst of kidney 04/23/2021     Priority: Medium    .  Current Outpatient Medications   Medication Sig    calcium acetate (CALPHRON) 667 MG TABS tablet Take 2,668 mg by mouth 3 times daily    folic acid  (FOLVITE) 1 MG tablet Take 1 mg by mouth every morning     lidocaine-prilocaine (EMLA) 2.5-2.5 % external cream three times a week Prior to dialysis  site access on Monday, Wednesday, Friday    multivitamin RENAL (MULTIVITAMIN RENAL) 1 MG capsule Take 1 capsule by mouth every morning    omeprazole (PRILOSEC) 20 MG DR capsule Take 20 mg by mouth every morning     simvastatin (ZOCOR) 20 MG tablet Take 20 mg by mouth every morning     ustekinumab (STELARA) 90 MG/ML Inject 1 ml ( 90 mg) subcutaneous every 4 weeks.    amLODIPine (NORVASC) 10 MG tablet Take 10 mg by mouth daily    carvedilol (COREG) 12.5 MG tablet Take 12.5 mg by mouth 2 times daily     Current Facility-Administered Medications   Medication    lidocaine 1% with EPINEPHrine 1:100,000 injection 3 mL     Past Medical History:   Diagnosis Date    Aortic dissection (H)     distal, thin.  stable/chronic on MRCP 3/2022    Benign essential hypertension     CAD (coronary artery disease)     Cerebral infarction (H)     Crohn's colitis (H)     Crohn's disease of large intestine (H) 11/22/2021    Current smoker     Esophageal reflux     ESRD (end stage renal disease) on dialysis (H)     History of basal cell carcinoma     Hypertension     Mixed hyperlipidemia     NSTEMI (non-ST elevated myocardial infarction) (H)     PAF (paroxysmal atrial fibrillation) (H)      Past Surgical History:   Procedure Laterality Date    ABDOMEN SURGERY      x 6.  colon resections for crohn's disease    APPENDECTOMY      CHOLECYSTECTOMY      COLONOSCOPY N/A 07/20/2021    Procedure: COLONOSCOPY, WITH POLYPECTOMY AND BIOPSY;  Surgeon: Eric Preston MD;  Location:  GI    COLONOSCOPY N/A 12/7/2022    Procedure: COLONOSCOPY, WITH POLYPECTOMY AND BIOPSY;  Surgeon: Willian Kee MD;  Location:  GI    CV CORONARY ANGIOGRAM N/A 05/25/2021    Procedure: CV CORONARY ANGIOGRAM;  Surgeon: Judson Ybarra MD;  Location:  [facility]    ESOPHAGOSCOPY, GASTROSCOPY,  DUODENOSCOPY (EGD), COMBINED N/A 07/20/2021    Procedure: ESOPHAGOGASTRODUODENOSCOPY, WITH FINE NEEDLE ASPIRATION BIOPSY, WITH ENDOSCOPIC ULTRASOUND GUIDANCE;  Surgeon: Eric Preston MD;  [facility]    IR DIALYSIS FISTULOGRAM LEFT  12/01/2022    IR DIALYSIS FISTULOGRAM LEFT  1/13/2023    LAPAROTOMY, LYSIS ADHESIONS, COMBINED N/A 03/17/2022    Procedure: Laparotomy, extensive lysis adhesions, combined;  Surgeon: Sarath Smith MD;  [facility]    PANCREATECTOMY PARTIAL N/A 03/17/2022    Procedure: Open Subtotal Pancreatectomy, intra-op ultrasound;  Surgeon: Sarath Smith MD;  [facility]    REVISION FISTULA ARTERIOVENOUS UPPER EXTREMITY Left 2/14/2023    Procedure: LEFT UPPER ARM FISTULA OUTFLOW REVISION FROM CEPHALIC VEIN TO JUGULAR VEIN WITH 10mm THIN-WALLED RINGED POLYTETRAFLUEROETHYLINE;  Surgeon: Mo Gramajo MD;  Location: [facility]    VASCULAR SURGERY      dialysis access- left upper     Allergies   Allergen Reactions    Lisinopril Anaphylaxis and Other (See Comments)     Shortness of breath     Social History     Socioeconomic History    Marital status:      Spouse name: Not on file    Number of children: 1    Years of education: Not on file    Highest education level: Not on file   Occupational History    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 Not Asked   Social History Narrative    Not on file     Social Determinants of Health     Financial Resource Strain: Not on file   Food Insecurity: Not on  file   Transportation Needs: Not on file   Physical Activity: Not on file   Stress: Not on file   Social Connections: Not on file   Interpersonal Safety: Not on file   Housing Stability: Not on file     Family History   Problem Relation Age of Onset    Breast Cancer Mother     Coronary Artery Disease Father     Hypertension Brother     Anesthesia Reaction No family hx of     Thrombosis No family hx of           REVIEW OF SYSTEMS:  General: negative, fever, chills, night sweats  Skin: negative, acne, rash, and scaling  Eyes: negative, double vision, eye pain, and photophobia  Ears/Nose/Throat: negative, nasal congestion, and purulent rhinorrhea  Respiratory: No cough, No hemoptysis, and negative  Cardiovascular: negative, palpitations, tachycardia, irregular heart beat, and paroxysmal nocturnal dyspnea       OBJECTIVE:  Blood pressure 126/63, pulse 101, weight 78.8 kg (173 lb 12.8 oz), SpO2 98%.  General Appearance: alert and no distress  Head: Normocephalic. No masses, lesions, tenderness or abnormalities  Eyes: conjuctiva clear, PERRL, EOM intact  Ears: External ears normal. Canals clear. TM's normal.  Nose: Nares normal  Mouth: normal  Neck: Supple, no cervical adenopathy, no thyromegaly  Lungs: clear to auscultation  Cardiac: regular rate and rhythm, normal S1 and S2, PSM/ESM.       ASSESSMENT/PLAN:  Patient here for evaluation to maintain renal transplant waitlist status.  Patient with Crohn's disease and complications related to this leading to CKD.  He is on dialysis for about 4 years.  Currently patient is not very active but he reports shortness of breath.  He is unable to walk more than 1 block.  No PND or other heart failure symptoms.  Because of the holiday schedule he was dialyzed on Sunday.  He does have a history of NSTEMI with inferior wall motion abnormality in the past with normal LV function.  Patient had a normal coronary angiogram in 2021 showing no significant coronary artery disease supplying  the inferior wall territory.  Today's echocardiogram reviewed and the results were discussed with patient.  Severe mitral annular calcification with the severe MR.  Mean gradient 12 mmHg possibly partially related to MR.  To have moderate aortic stenosis with a peak velocity of 3.3 m/s.  Moderate to severe tricuspid insufficiency is present.  Pulmonary artery systolic pressure is 65 mmHg plus RA pressure.  Patient is not exactly predialysis to have this hemodynamic values.  As he is feeling more and more progressively short of breath he did not need further evaluation of valvular heart disease.  Will plan for a GUERO and will plan further after this test.  Plan was discussed with patient.  Total visit duration 30 minutes.   this included face-to-face interview, physical exam, chart review, review of echocardiogram and documentation.

## 2024-01-01 ENCOUNTER — TELEPHONE (OUTPATIENT)
Dept: MEDSURG UNIT | Facility: CLINIC | Age: 64
End: 2024-01-01
Payer: COMMERCIAL

## 2024-01-01 ENCOUNTER — TELEPHONE (OUTPATIENT)
Dept: GASTROENTEROLOGY | Facility: CLINIC | Age: 64
End: 2024-01-01

## 2024-01-01 ENCOUNTER — TELEPHONE (OUTPATIENT)
Dept: SURGERY | Facility: CLINIC | Age: 64
End: 2024-01-01

## 2024-01-01 ENCOUNTER — APPOINTMENT (OUTPATIENT)
Dept: CT IMAGING | Facility: CLINIC | Age: 64
DRG: 393 | End: 2024-01-01
Attending: HOSPITALIST
Payer: COMMERCIAL

## 2024-01-01 ENCOUNTER — VIRTUAL VISIT (OUTPATIENT)
Dept: GASTROENTEROLOGY | Facility: CLINIC | Age: 64
End: 2024-01-01
Payer: COMMERCIAL

## 2024-01-01 ENCOUNTER — HOSPITAL ENCOUNTER (OUTPATIENT)
Facility: CLINIC | Age: 64
Discharge: HOME | End: 2024-01-11
Attending: INTERNAL MEDICINE | Admitting: INTERNAL MEDICINE
Payer: COMMERCIAL

## 2024-01-01 ENCOUNTER — ANESTHESIA EVENT (OUTPATIENT)
Dept: SURGERY | Facility: CLINIC | Age: 64
End: 2024-01-01
Payer: COMMERCIAL

## 2024-01-01 ENCOUNTER — APPOINTMENT (OUTPATIENT)
Dept: CT IMAGING | Facility: CLINIC | Age: 64
DRG: 283 | End: 2024-01-01
Payer: COMMERCIAL

## 2024-01-01 ENCOUNTER — LAB (OUTPATIENT)
Dept: LAB | Facility: CLINIC | Age: 64
End: 2024-01-01
Payer: COMMERCIAL

## 2024-01-01 ENCOUNTER — HOSPITAL ENCOUNTER (INPATIENT)
Facility: CLINIC | Age: 64
Setting detail: SURGERY ADMIT
End: 2024-01-01
Attending: SURGERY | Admitting: SURGERY
Payer: COMMERCIAL

## 2024-01-01 ENCOUNTER — TELEPHONE (OUTPATIENT)
Dept: CARDIOLOGY | Facility: CLINIC | Age: 64
End: 2024-01-01
Payer: COMMERCIAL

## 2024-01-01 ENCOUNTER — HOSPITAL ENCOUNTER (OUTPATIENT)
Dept: GENERAL RADIOLOGY | Facility: CLINIC | Age: 64
Discharge: HOME | End: 2024-02-15
Attending: SURGERY | Admitting: INTERNAL MEDICINE
Payer: COMMERCIAL

## 2024-01-01 ENCOUNTER — TELEPHONE (OUTPATIENT)
Dept: CARDIOLOGY | Facility: CLINIC | Age: 64
End: 2024-01-01

## 2024-01-01 ENCOUNTER — APPOINTMENT (OUTPATIENT)
Dept: MEDSURG UNIT | Facility: CLINIC | Age: 64
End: 2024-01-01
Attending: INTERNAL MEDICINE
Payer: COMMERCIAL

## 2024-01-01 ENCOUNTER — OFFICE VISIT (OUTPATIENT)
Dept: CARDIOLOGY | Facility: CLINIC | Age: 64
End: 2024-01-01
Attending: SURGERY
Payer: COMMERCIAL

## 2024-01-01 ENCOUNTER — MYC MEDICAL ADVICE (OUTPATIENT)
Dept: CARDIOLOGY | Facility: CLINIC | Age: 64
End: 2024-01-01

## 2024-01-01 ENCOUNTER — APPOINTMENT (OUTPATIENT)
Dept: SPEECH THERAPY | Facility: CLINIC | Age: 64
DRG: 283 | End: 2024-01-01
Attending: PHYSICIAN ASSISTANT
Payer: COMMERCIAL

## 2024-01-01 ENCOUNTER — HOSPITAL ENCOUNTER (OUTPATIENT)
Facility: CLINIC | Age: 64
Discharge: HOME | End: 2024-03-18
Admitting: RADIOLOGY
Payer: COMMERCIAL

## 2024-01-01 ENCOUNTER — INFUSION THERAPY VISIT (OUTPATIENT)
Dept: INFUSION THERAPY | Facility: CLINIC | Age: 64
End: 2024-01-01
Attending: INTERNAL MEDICINE
Payer: COMMERCIAL

## 2024-01-01 ENCOUNTER — APPOINTMENT (OUTPATIENT)
Dept: ULTRASOUND IMAGING | Facility: CLINIC | Age: 64
DRG: 280 | End: 2024-01-01
Attending: INTERNAL MEDICINE
Payer: COMMERCIAL

## 2024-01-01 ENCOUNTER — PRE VISIT (OUTPATIENT)
Dept: SURGERY | Facility: CLINIC | Age: 64
End: 2024-01-01

## 2024-01-01 ENCOUNTER — VIRTUAL VISIT (OUTPATIENT)
Dept: SURGERY | Facility: CLINIC | Age: 64
End: 2024-01-01
Payer: COMMERCIAL

## 2024-01-01 ENCOUNTER — HOSPITAL ENCOUNTER (INPATIENT)
Facility: CLINIC | Age: 64
LOS: 4 days | DRG: 283 | End: 2024-03-27
Attending: EMERGENCY MEDICINE | Admitting: INTERNAL MEDICINE
Payer: COMMERCIAL

## 2024-01-01 ENCOUNTER — HOSPITAL ENCOUNTER (EMERGENCY)
Facility: CLINIC | Age: 64
Discharge: HOME | End: 2024-02-21
Attending: EMERGENCY MEDICINE | Admitting: EMERGENCY MEDICINE
Payer: COMMERCIAL

## 2024-01-01 ENCOUNTER — ANESTHESIA (OUTPATIENT)
Dept: SURGERY | Facility: CLINIC | Age: 64
End: 2024-01-01
Payer: COMMERCIAL

## 2024-01-01 ENCOUNTER — DOCUMENTATION ONLY (OUTPATIENT)
Dept: MULTI SPECIALTY CLINIC | Facility: CLINIC | Age: 64
End: 2024-01-01

## 2024-01-01 ENCOUNTER — APPOINTMENT (OUTPATIENT)
Dept: CT IMAGING | Facility: CLINIC | Age: 64
DRG: 283 | End: 2024-01-01
Attending: PHYSICIAN ASSISTANT
Payer: COMMERCIAL

## 2024-01-01 ENCOUNTER — TRANSFERRED RECORDS (OUTPATIENT)
Dept: HEALTH INFORMATION MANAGEMENT | Facility: CLINIC | Age: 64
End: 2024-01-01
Payer: COMMERCIAL

## 2024-01-01 ENCOUNTER — ANESTHESIA EVENT (OUTPATIENT)
Dept: SURGERY | Facility: CLINIC | Age: 64
DRG: 393 | End: 2024-01-01
Payer: COMMERCIAL

## 2024-01-01 ENCOUNTER — APPOINTMENT (OUTPATIENT)
Dept: CARDIOLOGY | Facility: CLINIC | Age: 64
DRG: 283 | End: 2024-01-01
Payer: COMMERCIAL

## 2024-01-01 ENCOUNTER — HOSPITAL ENCOUNTER (OUTPATIENT)
Dept: MRI IMAGING | Facility: CLINIC | Age: 64
Discharge: HOME | End: 2024-03-18
Attending: INTERNAL MEDICINE
Payer: COMMERCIAL

## 2024-01-01 ENCOUNTER — HOSPITAL ENCOUNTER (INPATIENT)
Facility: CLINIC | Age: 64
LOS: 5 days | Discharge: HOME | DRG: 393 | End: 2024-03-01
Attending: EMERGENCY MEDICINE | Admitting: HOSPITALIST
Payer: COMMERCIAL

## 2024-01-01 ENCOUNTER — APPOINTMENT (OUTPATIENT)
Dept: ULTRASOUND IMAGING | Facility: CLINIC | Age: 64
DRG: 283 | End: 2024-01-01
Attending: PHYSICIAN ASSISTANT
Payer: COMMERCIAL

## 2024-01-01 ENCOUNTER — MYC MEDICAL ADVICE (OUTPATIENT)
Dept: GASTROENTEROLOGY | Facility: CLINIC | Age: 64
End: 2024-01-01

## 2024-01-01 ENCOUNTER — ANESTHESIA (OUTPATIENT)
Dept: SURGERY | Facility: CLINIC | Age: 64
DRG: 393 | End: 2024-01-01
Payer: COMMERCIAL

## 2024-01-01 ENCOUNTER — APPOINTMENT (OUTPATIENT)
Dept: LAB | Facility: CLINIC | Age: 64
End: 2024-01-01
Attending: INTERNAL MEDICINE
Payer: COMMERCIAL

## 2024-01-01 ENCOUNTER — HOSPITAL ENCOUNTER (OUTPATIENT)
Facility: CLINIC | Age: 64
Discharge: HOME | End: 2024-02-15
Attending: INTERNAL MEDICINE | Admitting: INTERNAL MEDICINE
Payer: COMMERCIAL

## 2024-01-01 ENCOUNTER — HOSPITAL ENCOUNTER (INPATIENT)
Facility: CLINIC | Age: 64
LOS: 3 days | Discharge: HOME | DRG: 280 | End: 2024-03-17
Attending: INTERNAL MEDICINE | Admitting: STUDENT IN AN ORGANIZED HEALTH CARE EDUCATION/TRAINING PROGRAM
Payer: COMMERCIAL

## 2024-01-01 ENCOUNTER — TELEPHONE (OUTPATIENT)
Dept: NURSING | Facility: CLINIC | Age: 64
End: 2024-01-01

## 2024-01-01 ENCOUNTER — DOCUMENTATION ONLY (OUTPATIENT)
Dept: FAMILY MEDICINE | Facility: CLINIC | Age: 64
End: 2024-01-01
Payer: COMMERCIAL

## 2024-01-01 ENCOUNTER — PREP FOR PROCEDURE (OUTPATIENT)
Dept: CARDIOLOGY | Facility: CLINIC | Age: 64
End: 2024-01-01
Payer: COMMERCIAL

## 2024-01-01 ENCOUNTER — TELEPHONE (OUTPATIENT)
Dept: GASTROENTEROLOGY | Facility: CLINIC | Age: 64
End: 2024-01-01
Payer: COMMERCIAL

## 2024-01-01 ENCOUNTER — APPOINTMENT (OUTPATIENT)
Dept: GENERAL RADIOLOGY | Facility: CLINIC | Age: 64
DRG: 283 | End: 2024-01-01
Attending: PHYSICIAN ASSISTANT
Payer: COMMERCIAL

## 2024-01-01 ENCOUNTER — HOSPITAL ENCOUNTER (OUTPATIENT)
Dept: CARDIOLOGY | Facility: CLINIC | Age: 64
Discharge: HOME | End: 2024-01-11
Attending: INTERNAL MEDICINE | Admitting: INTERNAL MEDICINE
Payer: COMMERCIAL

## 2024-01-01 ENCOUNTER — APPOINTMENT (OUTPATIENT)
Dept: CARDIOLOGY | Facility: CLINIC | Age: 64
DRG: 283 | End: 2024-01-01
Attending: PHYSICIAN ASSISTANT
Payer: COMMERCIAL

## 2024-01-01 VITALS
RESPIRATION RATE: 16 BRPM | OXYGEN SATURATION: 95 % | HEART RATE: 99 BPM | SYSTOLIC BLOOD PRESSURE: 122 MMHG | DIASTOLIC BLOOD PRESSURE: 65 MMHG

## 2024-01-01 VITALS
WEIGHT: 155.5 LBS | DIASTOLIC BLOOD PRESSURE: 68 MMHG | TEMPERATURE: 97.8 F | OXYGEN SATURATION: 82 % | BODY MASS INDEX: 21.77 KG/M2 | RESPIRATION RATE: 18 BRPM | HEART RATE: 92 BPM | SYSTOLIC BLOOD PRESSURE: 138 MMHG | HEIGHT: 71 IN

## 2024-01-01 VITALS
DIASTOLIC BLOOD PRESSURE: 72 MMHG | HEART RATE: 103 BPM | SYSTOLIC BLOOD PRESSURE: 113 MMHG | OXYGEN SATURATION: 98 % | TEMPERATURE: 97.5 F | WEIGHT: 166.45 LBS | HEIGHT: 71 IN | BODY MASS INDEX: 23.3 KG/M2 | RESPIRATION RATE: 16 BRPM

## 2024-01-01 VITALS
TEMPERATURE: 97.5 F | SYSTOLIC BLOOD PRESSURE: 94 MMHG | DIASTOLIC BLOOD PRESSURE: 72 MMHG | BODY MASS INDEX: 22.51 KG/M2 | OXYGEN SATURATION: 99 % | RESPIRATION RATE: 12 BRPM | HEART RATE: 96 BPM | WEIGHT: 161.38 LBS

## 2024-01-01 VITALS
WEIGHT: 155.42 LBS | DIASTOLIC BLOOD PRESSURE: 50 MMHG | SYSTOLIC BLOOD PRESSURE: 108 MMHG | BODY MASS INDEX: 21.68 KG/M2

## 2024-01-01 VITALS
RESPIRATION RATE: 16 BRPM | OXYGEN SATURATION: 100 % | DIASTOLIC BLOOD PRESSURE: 72 MMHG | HEART RATE: 99 BPM | SYSTOLIC BLOOD PRESSURE: 140 MMHG | TEMPERATURE: 97.5 F

## 2024-01-01 VITALS
RESPIRATION RATE: 18 BRPM | DIASTOLIC BLOOD PRESSURE: 90 MMHG | TEMPERATURE: 97.9 F | HEART RATE: 90 BPM | SYSTOLIC BLOOD PRESSURE: 129 MMHG | OXYGEN SATURATION: 99 %

## 2024-01-01 VITALS
HEART RATE: 98 BPM | TEMPERATURE: 97.1 F | OXYGEN SATURATION: 100 % | RESPIRATION RATE: 18 BRPM | WEIGHT: 163.8 LBS | BODY MASS INDEX: 22.93 KG/M2 | HEIGHT: 71 IN | SYSTOLIC BLOOD PRESSURE: 99 MMHG | DIASTOLIC BLOOD PRESSURE: 65 MMHG

## 2024-01-01 VITALS — HEIGHT: 71 IN | BODY MASS INDEX: 21.76 KG/M2 | WEIGHT: 155.42 LBS

## 2024-01-01 VITALS
HEIGHT: 71 IN | DIASTOLIC BLOOD PRESSURE: 63 MMHG | OXYGEN SATURATION: 98 % | WEIGHT: 167.33 LBS | BODY MASS INDEX: 23.43 KG/M2 | TEMPERATURE: 97.4 F | SYSTOLIC BLOOD PRESSURE: 91 MMHG

## 2024-01-01 VITALS
SYSTOLIC BLOOD PRESSURE: 135 MMHG | DIASTOLIC BLOOD PRESSURE: 68 MMHG | BODY MASS INDEX: 22.26 KG/M2 | HEART RATE: 112 BPM | OXYGEN SATURATION: 98 % | WEIGHT: 159.6 LBS

## 2024-01-01 DIAGNOSIS — Z01.818 PREOP EXAMINATION: ICD-10-CM

## 2024-01-01 DIAGNOSIS — I25.10 CARDIOVASCULAR DISEASE: ICD-10-CM

## 2024-01-01 DIAGNOSIS — I49.3 CONTRACTION, PREMATURE VENTRICULAR: ICD-10-CM

## 2024-01-01 DIAGNOSIS — I48.0 PAF (PAROXYSMAL ATRIAL FIBRILLATION) (H): ICD-10-CM

## 2024-01-01 DIAGNOSIS — Z98.890 S/P CORONARY ANGIOGRAM: ICD-10-CM

## 2024-01-01 DIAGNOSIS — D49.0 IPMN (INTRADUCTAL PAPILLARY MUCINOUS NEOPLASM): ICD-10-CM

## 2024-01-01 DIAGNOSIS — K50.818 CROHN'S DISEASE OF BOTH SMALL AND LARGE INTESTINE WITH OTHER COMPLICATION (H): ICD-10-CM

## 2024-01-01 DIAGNOSIS — Z01.818 PRE-TRANSPLANT EVALUATION FOR KIDNEY TRANSPLANT: ICD-10-CM

## 2024-01-01 DIAGNOSIS — K50.012 CROHN'S DISEASE OF SMALL INTESTINE WITH INTESTINAL OBSTRUCTION (H): Primary | ICD-10-CM

## 2024-01-01 DIAGNOSIS — I05.9 MITRAL VALVE DISORDER: Primary | ICD-10-CM

## 2024-01-01 DIAGNOSIS — I05.9 MITRAL VALVE DISEASE: ICD-10-CM

## 2024-01-01 DIAGNOSIS — N18.5 CHRONIC KIDNEY DISEASE, STAGE V (H): ICD-10-CM

## 2024-01-01 DIAGNOSIS — K63.89 PNEUMATOSIS COLI: Primary | ICD-10-CM

## 2024-01-01 DIAGNOSIS — I05.9 MITRAL VALVE DISORDER: ICD-10-CM

## 2024-01-01 DIAGNOSIS — D64.9 ANEMIA, UNSPECIFIED TYPE: ICD-10-CM

## 2024-01-01 DIAGNOSIS — Z95.1 S/P CABG (CORONARY ARTERY BYPASS GRAFT): Primary | ICD-10-CM

## 2024-01-01 DIAGNOSIS — N18.6 END STAGE RENAL DISEASE (H): ICD-10-CM

## 2024-01-01 DIAGNOSIS — K50.818 CROHN'S DISEASE OF BOTH SMALL AND LARGE INTESTINE WITH OTHER COMPLICATION (H): Primary | ICD-10-CM

## 2024-01-01 DIAGNOSIS — D50.0 IRON DEFICIENCY ANEMIA DUE TO CHRONIC BLOOD LOSS: ICD-10-CM

## 2024-01-01 DIAGNOSIS — Z01.818 PRE-OP EVALUATION: Primary | ICD-10-CM

## 2024-01-01 DIAGNOSIS — D64.9 ACUTE ON CHRONIC ANEMIA: ICD-10-CM

## 2024-01-01 DIAGNOSIS — I35.9 AORTIC VALVE DISORDER: ICD-10-CM

## 2024-01-01 DIAGNOSIS — D53.9 MACROCYTIC ANEMIA: ICD-10-CM

## 2024-01-01 DIAGNOSIS — I10 ESSENTIAL HYPERTENSION: ICD-10-CM

## 2024-01-01 DIAGNOSIS — I35.9 AORTIC VALVE DISORDER: Primary | ICD-10-CM

## 2024-01-01 DIAGNOSIS — K50.119 CROHN'S DISEASE OF LARGE INTESTINE WITH COMPLICATION (H): ICD-10-CM

## 2024-01-01 DIAGNOSIS — Z99.2 DEPENDENCE ON RENAL DIALYSIS (H): ICD-10-CM

## 2024-01-01 DIAGNOSIS — D50.0 IRON DEFICIENCY ANEMIA DUE TO CHRONIC BLOOD LOSS: Primary | ICD-10-CM

## 2024-01-01 DIAGNOSIS — I35.0 AORTIC VALVE STENOSIS, ETIOLOGY OF CARDIAC VALVE DISEASE UNSPECIFIED: ICD-10-CM

## 2024-01-01 DIAGNOSIS — K50.10 CROHN'S DISEASE OF LARGE INTESTINE (H): ICD-10-CM

## 2024-01-01 DIAGNOSIS — I07.9 TRICUSPID VALVE DISORDER: ICD-10-CM

## 2024-01-01 DIAGNOSIS — N18.6 END STAGE RENAL DISEASE (H): Primary | ICD-10-CM

## 2024-01-01 DIAGNOSIS — Z01.818 PREOP EXAMINATION: Primary | ICD-10-CM

## 2024-01-01 DIAGNOSIS — R73.9 HYPERGLYCEMIA: ICD-10-CM

## 2024-01-01 DIAGNOSIS — D62 ANEMIA DUE TO BLOOD LOSS, ACUTE: ICD-10-CM

## 2024-01-01 DIAGNOSIS — I45.10 RIGHT BUNDLE BRANCH BLOCK: ICD-10-CM

## 2024-01-01 LAB
ABO/RH(D): NORMAL
ACANTHOCYTES BLD QL SMEAR: ABNORMAL
ACANTHOCYTES BLD QL SMEAR: NORMAL
ACANTHOCYTES BLD QL SMEAR: SLIGHT
ADV 40+41 DNA STL QL NAA+NON-PROBE: NEGATIVE
ADV 40+41 DNA STL QL NAA+NON-PROBE: NEGATIVE
ALBUMIN SERPL BCG-MCNC: 2.1 G/DL (ref 3.5–5.2)
ALBUMIN SERPL BCG-MCNC: 2.5 G/DL (ref 3.5–5.2)
ALBUMIN SERPL BCG-MCNC: 2.7 G/DL (ref 3.5–5.2)
ALBUMIN SERPL BCG-MCNC: 2.9 G/DL (ref 3.5–5.2)
ALBUMIN SERPL BCG-MCNC: 3 G/DL (ref 3.5–5.2)
ALBUMIN SERPL BCG-MCNC: 3.1 G/DL (ref 3.5–5.2)
ALBUMIN SERPL BCG-MCNC: 3.1 G/DL (ref 3.5–5.2)
ALBUMIN SERPL BCG-MCNC: 3.2 G/DL (ref 3.5–5.2)
ALBUMIN SERPL BCG-MCNC: 3.2 G/DL (ref 3.5–5.2)
ALLEN'S TEST: ABNORMAL
ALP SERPL-CCNC: 100 U/L (ref 40–150)
ALP SERPL-CCNC: 108 U/L (ref 40–150)
ALP SERPL-CCNC: 109 U/L (ref 40–150)
ALP SERPL-CCNC: 119 U/L (ref 40–150)
ALP SERPL-CCNC: 142 U/L (ref 40–150)
ALP SERPL-CCNC: 73 U/L (ref 40–150)
ALP SERPL-CCNC: 95 U/L (ref 40–150)
ALP SERPL-CCNC: 96 U/L (ref 40–150)
ALP SERPL-CCNC: 96 U/L (ref 40–150)
ALT SERPL W P-5'-P-CCNC: 10 U/L (ref 0–70)
ALT SERPL W P-5'-P-CCNC: 18 U/L (ref 0–70)
ALT SERPL W P-5'-P-CCNC: 18 U/L (ref 0–70)
ALT SERPL W P-5'-P-CCNC: 21 U/L (ref 0–70)
ALT SERPL W P-5'-P-CCNC: 21 U/L (ref 0–70)
ALT SERPL W P-5'-P-CCNC: 22 U/L (ref 0–70)
ALT SERPL W P-5'-P-CCNC: 26 U/L (ref 0–70)
ALT SERPL W P-5'-P-CCNC: 27 U/L (ref 0–70)
ALT SERPL W P-5'-P-CCNC: 32 U/L (ref 0–70)
ANION GAP SERPL CALCULATED.3IONS-SCNC: 11 MMOL/L (ref 7–15)
ANION GAP SERPL CALCULATED.3IONS-SCNC: 12 MMOL/L (ref 7–15)
ANION GAP SERPL CALCULATED.3IONS-SCNC: 13 MMOL/L (ref 7–15)
ANION GAP SERPL CALCULATED.3IONS-SCNC: 14 MMOL/L (ref 7–15)
ANION GAP SERPL CALCULATED.3IONS-SCNC: 17 MMOL/L (ref 7–15)
ANION GAP SERPL CALCULATED.3IONS-SCNC: 17 MMOL/L (ref 7–15)
ANION GAP SERPL CALCULATED.3IONS-SCNC: 19 MMOL/L (ref 7–15)
ANION GAP SERPL CALCULATED.3IONS-SCNC: 19 MMOL/L (ref 7–15)
ANION GAP SERPL CALCULATED.3IONS-SCNC: 8 MMOL/L (ref 7–15)
ANTIBODY SCREEN: NEGATIVE
APTT PPP: 26 SECONDS (ref 22–38)
APTT PPP: 28 SECONDS (ref 22–38)
APTT PPP: 29 SECONDS (ref 22–38)
APTT PPP: 30 SECONDS (ref 22–38)
APTT PPP: 31 SECONDS (ref 22–38)
APTT PPP: 33 SECONDS (ref 22–38)
APTT PPP: 33 SECONDS (ref 22–38)
APTT PPP: 35 SECONDS (ref 22–38)
AST SERPL W P-5'-P-CCNC: 14 U/L (ref 0–45)
AST SERPL W P-5'-P-CCNC: 15 U/L (ref 0–45)
AST SERPL W P-5'-P-CCNC: 20 U/L (ref 0–45)
AST SERPL W P-5'-P-CCNC: 20 U/L (ref 0–45)
AST SERPL W P-5'-P-CCNC: 21 U/L (ref 0–45)
AST SERPL W P-5'-P-CCNC: 27 U/L (ref 0–45)
AST SERPL W P-5'-P-CCNC: 32 U/L (ref 0–45)
AST SERPL W P-5'-P-CCNC: 36 U/L (ref 0–45)
AST SERPL W P-5'-P-CCNC: 36 U/L (ref 0–45)
ASTRO TYP 1-8 RNA STL QL NAA+NON-PROBE: NEGATIVE
ASTRO TYP 1-8 RNA STL QL NAA+NON-PROBE: NEGATIVE
ATRIAL RATE - MUSE: 70 BPM
ATRIAL RATE - MUSE: 88 BPM
ATRIAL RATE - MUSE: 88 BPM
ATRIAL RATE - MUSE: 89 BPM
ATRIAL RATE - MUSE: 96 BPM
ATRIAL RATE - MUSE: 97 BPM
ATRIAL RATE - MUSE: 98 BPM
AUER BODIES BLD QL SMEAR: ABNORMAL
AUER BODIES BLD QL SMEAR: NORMAL
B-OH-BUTYR SERPL-SCNC: <0.18 MMOL/L
B-OH-BUTYR SERPL-SCNC: <0.18 MMOL/L
BACTERIA BLD CULT: NO GROWTH
BACTERIA BLD CULT: NO GROWTH
BASE EXCESS BLDA CALC-SCNC: -0.2 MMOL/L (ref -3–3)
BASE EXCESS BLDA CALC-SCNC: -0.9 MMOL/L (ref -3–3)
BASE EXCESS BLDA CALC-SCNC: -1.6 MMOL/L (ref -3–3)
BASE EXCESS BLDA CALC-SCNC: -12.4 MMOL/L (ref -3–3)
BASE EXCESS BLDA CALC-SCNC: -2.1 MMOL/L (ref -3–3)
BASE EXCESS BLDA CALC-SCNC: -4.3 MMOL/L (ref -3–3)
BASE EXCESS BLDV CALC-SCNC: -3.4 MMOL/L (ref -3–3)
BASE EXCESS BLDV CALC-SCNC: 1.2 MMOL/L (ref -3–3)
BASO STIPL BLD QL SMEAR: ABNORMAL
BASO STIPL BLD QL SMEAR: NORMAL
BASOPHILS # BLD AUTO: 0 10E3/UL (ref 0–0.2)
BASOPHILS # BLD AUTO: ABNORMAL 10*3/UL
BASOPHILS # BLD MANUAL: 0 10E3/UL (ref 0–0.2)
BASOPHILS NFR BLD AUTO: 0 %
BASOPHILS NFR BLD AUTO: ABNORMAL %
BASOPHILS NFR BLD MANUAL: 0 %
BILIRUB DIRECT SERPL-MCNC: 0.35 MG/DL (ref 0–0.3)
BILIRUB DIRECT SERPL-MCNC: 0.4 MG/DL (ref 0–0.3)
BILIRUB SERPL-MCNC: 0.6 MG/DL
BILIRUB SERPL-MCNC: 0.7 MG/DL
BILIRUB SERPL-MCNC: 0.8 MG/DL
BILIRUB SERPL-MCNC: 0.8 MG/DL
BILIRUB SERPL-MCNC: 0.9 MG/DL
BILIRUB SERPL-MCNC: 0.9 MG/DL
BILIRUB SERPL-MCNC: 1.1 MG/DL
BILIRUB SERPL-MCNC: 1.5 MG/DL
BILIRUB SERPL-MCNC: 2.3 MG/DL
BITE CELLS BLD QL SMEAR: ABNORMAL
BITE CELLS BLD QL SMEAR: NORMAL
BLD PROD TYP BPU: NORMAL
BLISTER CELLS BLD QL SMEAR: ABNORMAL
BLISTER CELLS BLD QL SMEAR: NORMAL
BLOOD COMPONENT TYPE: NORMAL
BUN SERPL-MCNC: 18.7 MG/DL (ref 8–23)
BUN SERPL-MCNC: 20.9 MG/DL (ref 8–23)
BUN SERPL-MCNC: 23.2 MG/DL (ref 8–23)
BUN SERPL-MCNC: 23.9 MG/DL (ref 8–23)
BUN SERPL-MCNC: 26.1 MG/DL (ref 8–23)
BUN SERPL-MCNC: 27.6 MG/DL (ref 8–23)
BUN SERPL-MCNC: 28.7 MG/DL (ref 8–23)
BUN SERPL-MCNC: 29.8 MG/DL (ref 8–23)
BUN SERPL-MCNC: 34.5 MG/DL (ref 8–23)
BUN SERPL-MCNC: 37.8 MG/DL (ref 8–23)
BUN SERPL-MCNC: 37.8 MG/DL (ref 8–23)
BUN SERPL-MCNC: 43.3 MG/DL (ref 8–23)
BUN SERPL-MCNC: 52.8 MG/DL (ref 8–23)
BUN SERPL-MCNC: 54.9 MG/DL (ref 8–23)
BUN SERPL-MCNC: 62.4 MG/DL (ref 8–23)
BURR CELLS BLD QL SMEAR: ABNORMAL
BURR CELLS BLD QL SMEAR: NORMAL
C CAYETANENSIS DNA STL QL NAA+NON-PROBE: NEGATIVE
C CAYETANENSIS DNA STL QL NAA+NON-PROBE: NEGATIVE
C DIFF TOX B STL QL: NEGATIVE
C DIFF TOX B STL QL: NEGATIVE
CA-I BLD-MCNC: 4.6 MG/DL (ref 4.4–5.2)
CA-I BLD-MCNC: 4.7 MG/DL (ref 4.4–5.2)
CA-I BLD-MCNC: 4.8 MG/DL (ref 4.4–5.2)
CALCIUM SERPL-MCNC: 7.7 MG/DL (ref 8.8–10.2)
CALCIUM SERPL-MCNC: 8.1 MG/DL (ref 8.8–10.2)
CALCIUM SERPL-MCNC: 8.2 MG/DL (ref 8.8–10.2)
CALCIUM SERPL-MCNC: 8.2 MG/DL (ref 8.8–10.2)
CALCIUM SERPL-MCNC: 8.3 MG/DL (ref 8.8–10.2)
CALCIUM SERPL-MCNC: 8.3 MG/DL (ref 8.8–10.2)
CALCIUM SERPL-MCNC: 8.4 MG/DL (ref 8.8–10.2)
CALCIUM SERPL-MCNC: 8.4 MG/DL (ref 8.8–10.2)
CALCIUM SERPL-MCNC: 8.6 MG/DL (ref 8.8–10.2)
CALCIUM SERPL-MCNC: 8.9 MG/DL (ref 8.8–10.2)
CALCIUM SERPL-MCNC: 9 MG/DL (ref 8.8–10.2)
CALCIUM SERPL-MCNC: 9 MG/DL (ref 8.8–10.2)
CALCIUM SERPL-MCNC: 9.1 MG/DL (ref 8.8–10.2)
CALPROTECTIN STL-MCNT: 75.5 MG/KG (ref 0–49.9)
CAMPYLOBACTER DNA SPEC NAA+PROBE: NEGATIVE
CAMPYLOBACTER DNA SPEC NAA+PROBE: NEGATIVE
CF REDUC 60M P MA LENFR BLD TEG: 0.1 % (ref 0–15)
CFT BLD TEG: 1.3 MINUTE (ref 1–3)
CHLORIDE SERPL-SCNC: 100 MMOL/L (ref 98–107)
CHLORIDE SERPL-SCNC: 100 MMOL/L (ref 98–107)
CHLORIDE SERPL-SCNC: 101 MMOL/L (ref 98–107)
CHLORIDE SERPL-SCNC: 101 MMOL/L (ref 98–107)
CHLORIDE SERPL-SCNC: 104 MMOL/L (ref 98–107)
CHLORIDE SERPL-SCNC: 92 MMOL/L (ref 98–107)
CHLORIDE SERPL-SCNC: 92 MMOL/L (ref 98–107)
CHLORIDE SERPL-SCNC: 93 MMOL/L (ref 98–107)
CHLORIDE SERPL-SCNC: 94 MMOL/L (ref 98–107)
CHLORIDE SERPL-SCNC: 95 MMOL/L (ref 98–107)
CHLORIDE SERPL-SCNC: 96 MMOL/L (ref 98–107)
CHLORIDE SERPL-SCNC: 98 MMOL/L (ref 98–107)
CHLORIDE SERPL-SCNC: 99 MMOL/L (ref 98–107)
CHOLEST SERPL-MCNC: 60 MG/DL
CHOLEST SERPL-MCNC: 72 MG/DL
CI (COAGULATION INDEX)(Z) NON NATIVE: 3 (ref -3–3)
CLOT ANGLE BLD TEG: 70.6 DEGREES (ref 53–72)
CLOT INIT BLD TEG: 3.3 MINUTE (ref 5–10)
CLOT LYSIS 30M P MA LENFR BLD TEG: 0 % (ref 0–8)
CLOT STRENGTH BLD TEG: 9.7 KD/SC (ref 4.5–11)
CODING SYSTEM: NORMAL
COHGB MFR BLD: 95.3 % (ref 96–97)
COHGB MFR BLD: 96 % (ref 96–97)
COHGB MFR BLD: 96.4 % (ref 96–97)
COHGB MFR BLD: 96.6 % (ref 96–97)
COHGB MFR BLD: 97.9 % (ref 96–97)
COHGB MFR BLD: 99.9 % (ref 96–97)
COLONOSCOPY: NORMAL
CREAT SERPL-MCNC: 2.83 MG/DL (ref 0.67–1.17)
CREAT SERPL-MCNC: 2.98 MG/DL (ref 0.67–1.17)
CREAT SERPL-MCNC: 3.53 MG/DL (ref 0.67–1.17)
CREAT SERPL-MCNC: 3.93 MG/DL (ref 0.67–1.17)
CREAT SERPL-MCNC: 3.93 MG/DL (ref 0.67–1.17)
CREAT SERPL-MCNC: 4.49 MG/DL (ref 0.67–1.17)
CREAT SERPL-MCNC: 4.58 MG/DL (ref 0.67–1.17)
CREAT SERPL-MCNC: 4.99 MG/DL (ref 0.67–1.17)
CREAT SERPL-MCNC: 5.37 MG/DL (ref 0.67–1.17)
CREAT SERPL-MCNC: 5.71 MG/DL (ref 0.67–1.17)
CREAT SERPL-MCNC: 5.88 MG/DL (ref 0.67–1.17)
CREAT SERPL-MCNC: 6.26 MG/DL (ref 0.67–1.17)
CREAT SERPL-MCNC: 6.36 MG/DL (ref 0.67–1.17)
CREAT SERPL-MCNC: 7.69 MG/DL (ref 0.67–1.17)
CREAT SERPL-MCNC: 8.45 MG/DL (ref 0.67–1.17)
CROSSMATCH: NORMAL
CRP SERPL-MCNC: 118 MG/L
CRP SERPL-MCNC: 34.6 MG/L
CRP SERPL-MCNC: 4.65 MG/L
CRP SERPL-MCNC: 68.8 MG/L
CRP SERPL-MCNC: 78.6 MG/L
CRP SERPL-MCNC: 8.25 MG/L
CRP SERPL-MCNC: 99.3 MG/L
CRYPTOSP DNA STL QL NAA+NON-PROBE: NEGATIVE
CRYPTOSP DNA STL QL NAA+NON-PROBE: NEGATIVE
D DIMER PPP FEU-MCNC: 1.79 UG/ML FEU (ref 0–0.5)
DACRYOCYTES BLD QL SMEAR: ABNORMAL
DACRYOCYTES BLD QL SMEAR: NORMAL
DEPRECATED HCO3 PLAS-SCNC: 19 MMOL/L (ref 22–29)
DEPRECATED HCO3 PLAS-SCNC: 20 MMOL/L (ref 22–29)
DEPRECATED HCO3 PLAS-SCNC: 21 MMOL/L (ref 22–29)
DEPRECATED HCO3 PLAS-SCNC: 22 MMOL/L (ref 22–29)
DEPRECATED HCO3 PLAS-SCNC: 24 MMOL/L (ref 22–29)
DEPRECATED HCO3 PLAS-SCNC: 26 MMOL/L (ref 22–29)
DIASTOLIC BLOOD PRESSURE - MUSE: NORMAL MMHG
E COLI O157 DNA STL QL NAA+NON-PROBE: NORMAL
E COLI O157 DNA STL QL NAA+NON-PROBE: NORMAL
E HISTOLYT DNA STL QL NAA+NON-PROBE: NEGATIVE
E HISTOLYT DNA STL QL NAA+NON-PROBE: NEGATIVE
EAEC ASTA GENE ISLT QL NAA+PROBE: NEGATIVE
EAEC ASTA GENE ISLT QL NAA+PROBE: NEGATIVE
EC STX1+STX2 GENES STL QL NAA+NON-PROBE: NEGATIVE
EC STX1+STX2 GENES STL QL NAA+NON-PROBE: NEGATIVE
EGFRCR SERPLBLD CKD-EPI 2021: 10 ML/MIN/1.73M2
EGFRCR SERPLBLD CKD-EPI 2021: 10 ML/MIN/1.73M2
EGFRCR SERPLBLD CKD-EPI 2021: 11 ML/MIN/1.73M2
EGFRCR SERPLBLD CKD-EPI 2021: 12 ML/MIN/1.73M2
EGFRCR SERPLBLD CKD-EPI 2021: 14 ML/MIN/1.73M2
EGFRCR SERPLBLD CKD-EPI 2021: 14 ML/MIN/1.73M2
EGFRCR SERPLBLD CKD-EPI 2021: 16 ML/MIN/1.73M2
EGFRCR SERPLBLD CKD-EPI 2021: 16 ML/MIN/1.73M2
EGFRCR SERPLBLD CKD-EPI 2021: 19 ML/MIN/1.73M2
EGFRCR SERPLBLD CKD-EPI 2021: 23 ML/MIN/1.73M2
EGFRCR SERPLBLD CKD-EPI 2021: 24 ML/MIN/1.73M2
EGFRCR SERPLBLD CKD-EPI 2021: 6 ML/MIN/1.73M2
EGFRCR SERPLBLD CKD-EPI 2021: 7 ML/MIN/1.73M2
EGFRCR SERPLBLD CKD-EPI 2021: 9 ML/MIN/1.73M2
EGFRCR SERPLBLD CKD-EPI 2021: 9 ML/MIN/1.73M2
ELASTASE PANC STL-MCNT: 291 UG/G
ELLIPTOCYTES BLD QL SMEAR: ABNORMAL
ELLIPTOCYTES BLD QL SMEAR: NORMAL
ELLIPTOCYTES BLD QL SMEAR: SLIGHT
EOSINOPHIL # BLD AUTO: 0 10E3/UL (ref 0–0.7)
EOSINOPHIL # BLD AUTO: ABNORMAL 10*3/UL
EOSINOPHIL # BLD MANUAL: 0.1 10E3/UL (ref 0–0.7)
EOSINOPHIL # BLD MANUAL: 0.1 10E3/UL (ref 0–0.7)
EOSINOPHIL # BLD MANUAL: 0.2 10E3/UL (ref 0–0.7)
EOSINOPHIL NFR BLD AUTO: 0 %
EOSINOPHIL NFR BLD AUTO: ABNORMAL %
EOSINOPHIL NFR BLD MANUAL: 1 %
EOSINOPHIL NFR BLD MANUAL: 1 %
EOSINOPHIL NFR BLD MANUAL: 4 %
EPEC EAE GENE STL QL NAA+NON-PROBE: NEGATIVE
EPEC EAE GENE STL QL NAA+NON-PROBE: NEGATIVE
ERYTHROCYTE [DISTWIDTH] IN BLOOD BY AUTOMATED COUNT: 19.2 % (ref 10–15)
ERYTHROCYTE [DISTWIDTH] IN BLOOD BY AUTOMATED COUNT: 19.9 % (ref 10–15)
ERYTHROCYTE [DISTWIDTH] IN BLOOD BY AUTOMATED COUNT: 20 % (ref 10–15)
ERYTHROCYTE [DISTWIDTH] IN BLOOD BY AUTOMATED COUNT: 20.6 % (ref 10–15)
ERYTHROCYTE [DISTWIDTH] IN BLOOD BY AUTOMATED COUNT: 21.1 % (ref 10–15)
ERYTHROCYTE [DISTWIDTH] IN BLOOD BY AUTOMATED COUNT: 21.1 % (ref 10–15)
ERYTHROCYTE [DISTWIDTH] IN BLOOD BY AUTOMATED COUNT: 21.4 % (ref 10–15)
ERYTHROCYTE [DISTWIDTH] IN BLOOD BY AUTOMATED COUNT: 21.7 % (ref 10–15)
ERYTHROCYTE [DISTWIDTH] IN BLOOD BY AUTOMATED COUNT: 21.7 % (ref 10–15)
ERYTHROCYTE [DISTWIDTH] IN BLOOD BY AUTOMATED COUNT: 22.3 % (ref 10–15)
ERYTHROCYTE [DISTWIDTH] IN BLOOD BY AUTOMATED COUNT: 22.3 % (ref 10–15)
ERYTHROCYTE [DISTWIDTH] IN BLOOD BY AUTOMATED COUNT: 22.5 % (ref 10–15)
ERYTHROCYTE [DISTWIDTH] IN BLOOD BY AUTOMATED COUNT: 22.8 % (ref 10–15)
ERYTHROCYTE [DISTWIDTH] IN BLOOD BY AUTOMATED COUNT: 23 % (ref 10–15)
ERYTHROCYTE [DISTWIDTH] IN BLOOD BY AUTOMATED COUNT: 25.1 % (ref 10–15)
ERYTHROCYTE [DISTWIDTH] IN BLOOD BY AUTOMATED COUNT: 25.2 % (ref 10–15)
ERYTHROCYTE [DISTWIDTH] IN BLOOD BY AUTOMATED COUNT: 25.4 % (ref 10–15)
ERYTHROCYTE [DISTWIDTH] IN BLOOD BY AUTOMATED COUNT: 25.4 % (ref 10–15)
ERYTHROCYTE [DISTWIDTH] IN BLOOD BY AUTOMATED COUNT: 25.9 % (ref 10–15)
ERYTHROCYTE [DISTWIDTH] IN BLOOD BY AUTOMATED COUNT: 26 % (ref 10–15)
ERYTHROCYTE [DISTWIDTH] IN BLOOD BY AUTOMATED COUNT: 26.2 % (ref 10–15)
ERYTHROCYTE [DISTWIDTH] IN BLOOD BY AUTOMATED COUNT: 27.5 % (ref 10–15)
ERYTHROCYTE [DISTWIDTH] IN BLOOD BY AUTOMATED COUNT: 27.5 % (ref 10–15)
ERYTHROCYTE [DISTWIDTH] IN BLOOD BY AUTOMATED COUNT: 27.7 % (ref 10–15)
ERYTHROCYTE [DISTWIDTH] IN BLOOD BY AUTOMATED COUNT: 27.9 % (ref 10–15)
ERYTHROCYTE [DISTWIDTH] IN BLOOD BY AUTOMATED COUNT: 28.2 % (ref 10–15)
ERYTHROCYTE [DISTWIDTH] IN BLOOD BY AUTOMATED COUNT: 28.6 % (ref 10–15)
ERYTHROCYTE [SEDIMENTATION RATE] IN BLOOD BY WESTERGREN METHOD: 59 MM/HR (ref 0–20)
ERYTHROCYTE [SEDIMENTATION RATE] IN BLOOD BY WESTERGREN METHOD: 64 MM/HR (ref 0–20)
ERYTHROCYTE [SEDIMENTATION RATE] IN BLOOD BY WESTERGREN METHOD: 70 MM/HR (ref 0–20)
ETEC LTA+ST1A+ST1B TOX ST NAA+NON-PROBE: NEGATIVE
ETEC LTA+ST1A+ST1B TOX ST NAA+NON-PROBE: NEGATIVE
FACT VII ACT/NOR PPP: 35 % (ref 50–129)
FERRITIN SERPL-MCNC: 813 NG/ML (ref 31–409)
FIBRINOGEN PPP-MCNC: 319 MG/DL (ref 170–490)
FIBRINOGEN PPP-MCNC: 327 MG/DL (ref 170–490)
FIBRINOGEN PPP-MCNC: 331 MG/DL (ref 170–490)
FIBRINOGEN PPP-MCNC: 339 MG/DL (ref 170–490)
FIBRINOGEN PPP-MCNC: 367 MG/DL (ref 170–490)
FIBRINOGEN PPP-MCNC: 473 MG/DL (ref 170–490)
FOLATE SERPL-MCNC: >40 NG/ML (ref 4.6–34.8)
FRAGMENTS BLD QL SMEAR: ABNORMAL
FRAGMENTS BLD QL SMEAR: NORMAL
FRAGMENTS BLD QL SMEAR: SLIGHT
FRAGMENTS BLD QL SMEAR: SLIGHT
G LAMBLIA DNA STL QL NAA+NON-PROBE: NEGATIVE
G LAMBLIA DNA STL QL NAA+NON-PROBE: NEGATIVE
GAMMA INTERFERON BACKGROUND BLD IA-ACNC: 0.01 IU/ML
GLUCOSE BLDC GLUCOMTR-MCNC: 105 MG/DL (ref 70–99)
GLUCOSE BLDC GLUCOMTR-MCNC: 107 MG/DL (ref 70–99)
GLUCOSE BLDC GLUCOMTR-MCNC: 108 MG/DL (ref 70–99)
GLUCOSE BLDC GLUCOMTR-MCNC: 108 MG/DL (ref 70–99)
GLUCOSE BLDC GLUCOMTR-MCNC: 112 MG/DL (ref 70–99)
GLUCOSE BLDC GLUCOMTR-MCNC: 112 MG/DL (ref 70–99)
GLUCOSE BLDC GLUCOMTR-MCNC: 121 MG/DL (ref 70–99)
GLUCOSE BLDC GLUCOMTR-MCNC: 123 MG/DL (ref 70–99)
GLUCOSE BLDC GLUCOMTR-MCNC: 125 MG/DL (ref 70–99)
GLUCOSE BLDC GLUCOMTR-MCNC: 131 MG/DL (ref 70–99)
GLUCOSE BLDC GLUCOMTR-MCNC: 138 MG/DL (ref 70–99)
GLUCOSE BLDC GLUCOMTR-MCNC: 140 MG/DL (ref 70–99)
GLUCOSE BLDC GLUCOMTR-MCNC: 140 MG/DL (ref 70–99)
GLUCOSE BLDC GLUCOMTR-MCNC: 145 MG/DL (ref 70–99)
GLUCOSE BLDC GLUCOMTR-MCNC: 147 MG/DL (ref 70–99)
GLUCOSE BLDC GLUCOMTR-MCNC: 147 MG/DL (ref 70–99)
GLUCOSE BLDC GLUCOMTR-MCNC: 148 MG/DL (ref 70–99)
GLUCOSE BLDC GLUCOMTR-MCNC: 154 MG/DL (ref 70–99)
GLUCOSE BLDC GLUCOMTR-MCNC: 158 MG/DL (ref 70–99)
GLUCOSE BLDC GLUCOMTR-MCNC: 162 MG/DL (ref 70–99)
GLUCOSE BLDC GLUCOMTR-MCNC: 165 MG/DL (ref 70–99)
GLUCOSE BLDC GLUCOMTR-MCNC: 168 MG/DL (ref 70–99)
GLUCOSE BLDC GLUCOMTR-MCNC: 170 MG/DL (ref 70–99)
GLUCOSE BLDC GLUCOMTR-MCNC: 170 MG/DL (ref 70–99)
GLUCOSE BLDC GLUCOMTR-MCNC: 171 MG/DL (ref 70–99)
GLUCOSE BLDC GLUCOMTR-MCNC: 172 MG/DL (ref 70–99)
GLUCOSE BLDC GLUCOMTR-MCNC: 173 MG/DL (ref 70–99)
GLUCOSE BLDC GLUCOMTR-MCNC: 176 MG/DL (ref 70–99)
GLUCOSE BLDC GLUCOMTR-MCNC: 177 MG/DL (ref 70–99)
GLUCOSE BLDC GLUCOMTR-MCNC: 185 MG/DL (ref 70–99)
GLUCOSE BLDC GLUCOMTR-MCNC: 188 MG/DL (ref 70–99)
GLUCOSE BLDC GLUCOMTR-MCNC: 189 MG/DL (ref 70–99)
GLUCOSE BLDC GLUCOMTR-MCNC: 189 MG/DL (ref 70–99)
GLUCOSE BLDC GLUCOMTR-MCNC: 192 MG/DL (ref 70–99)
GLUCOSE BLDC GLUCOMTR-MCNC: 197 MG/DL (ref 70–99)
GLUCOSE BLDC GLUCOMTR-MCNC: 215 MG/DL (ref 70–99)
GLUCOSE BLDC GLUCOMTR-MCNC: 218 MG/DL (ref 70–99)
GLUCOSE BLDC GLUCOMTR-MCNC: 233 MG/DL (ref 70–99)
GLUCOSE BLDC GLUCOMTR-MCNC: 237 MG/DL (ref 70–99)
GLUCOSE BLDC GLUCOMTR-MCNC: 244 MG/DL (ref 70–99)
GLUCOSE BLDC GLUCOMTR-MCNC: 244 MG/DL (ref 70–99)
GLUCOSE BLDC GLUCOMTR-MCNC: 265 MG/DL (ref 70–99)
GLUCOSE BLDC GLUCOMTR-MCNC: 270 MG/DL (ref 70–99)
GLUCOSE BLDC GLUCOMTR-MCNC: 292 MG/DL (ref 70–99)
GLUCOSE BLDC GLUCOMTR-MCNC: 318 MG/DL (ref 70–99)
GLUCOSE BLDC GLUCOMTR-MCNC: 383 MG/DL (ref 70–99)
GLUCOSE BLDC GLUCOMTR-MCNC: 499 MG/DL (ref 70–99)
GLUCOSE BLDC GLUCOMTR-MCNC: 55 MG/DL (ref 70–99)
GLUCOSE BLDC GLUCOMTR-MCNC: 592 MG/DL (ref 70–99)
GLUCOSE BLDC GLUCOMTR-MCNC: 66 MG/DL (ref 70–99)
GLUCOSE BLDC GLUCOMTR-MCNC: 68 MG/DL (ref 70–99)
GLUCOSE BLDC GLUCOMTR-MCNC: 72 MG/DL (ref 70–99)
GLUCOSE BLDC GLUCOMTR-MCNC: 72 MG/DL (ref 70–99)
GLUCOSE BLDC GLUCOMTR-MCNC: 86 MG/DL (ref 70–99)
GLUCOSE BLDC GLUCOMTR-MCNC: 88 MG/DL (ref 70–99)
GLUCOSE BLDC GLUCOMTR-MCNC: 89 MG/DL (ref 70–99)
GLUCOSE BLDC GLUCOMTR-MCNC: 92 MG/DL (ref 70–99)
GLUCOSE BLDC GLUCOMTR-MCNC: >600 MG/DL (ref 70–99)
GLUCOSE SERPL-MCNC: 108 MG/DL (ref 70–99)
GLUCOSE SERPL-MCNC: 111 MG/DL (ref 70–99)
GLUCOSE SERPL-MCNC: 121 MG/DL (ref 70–99)
GLUCOSE SERPL-MCNC: 129 MG/DL (ref 70–99)
GLUCOSE SERPL-MCNC: 142 MG/DL (ref 70–99)
GLUCOSE SERPL-MCNC: 159 MG/DL (ref 70–99)
GLUCOSE SERPL-MCNC: 162 MG/DL (ref 70–99)
GLUCOSE SERPL-MCNC: 169 MG/DL (ref 70–99)
GLUCOSE SERPL-MCNC: 179 MG/DL (ref 70–99)
GLUCOSE SERPL-MCNC: 181 MG/DL (ref 70–99)
GLUCOSE SERPL-MCNC: 181 MG/DL (ref 70–99)
GLUCOSE SERPL-MCNC: 256 MG/DL (ref 70–99)
GLUCOSE SERPL-MCNC: 326 MG/DL (ref 70–99)
GLUCOSE SERPL-MCNC: 795 MG/DL (ref 70–99)
GLUCOSE SERPL-MCNC: 796 MG/DL (ref 70–99)
GLUCOSE SERPL-MCNC: 822 MG/DL (ref 70–99)
GLUCOSE SERPL-MCNC: 831 MG/DL (ref 70–99)
GLUCOSE SERPL-MCNC: 95 MG/DL (ref 70–99)
GLUCOSE SERPL-MCNC: 95 MG/DL (ref 70–99)
HBA1C MFR BLD: 5.8 %
HBA1C MFR BLD: 6.1 % (ref 0–5.6)
HBA1C MFR BLD: 6.4 %
HBV SURFACE AB SERPL IA-ACNC: 522 M[IU]/ML
HBV SURFACE AB SERPL IA-ACNC: REACTIVE M[IU]/ML
HBV SURFACE AG SERPL QL IA: NONREACTIVE
HBV SURFACE AG SERPL QL IA: NONREACTIVE
HCO3 BLD-SCNC: 11 MMOL/L (ref 21–28)
HCO3 BLD-SCNC: 21 MMOL/L (ref 21–28)
HCO3 BLD-SCNC: 23 MMOL/L (ref 21–28)
HCO3 BLD-SCNC: 24 MMOL/L (ref 21–28)
HCO3 BLD-SCNC: 24 MMOL/L (ref 21–28)
HCO3 BLD-SCNC: 25 MMOL/L (ref 21–28)
HCO3 BLDV-SCNC: 23 MMOL/L (ref 21–28)
HCO3 BLDV-SCNC: 27 MMOL/L (ref 21–28)
HCT VFR BLD AUTO: 17.3 % (ref 40–53)
HCT VFR BLD AUTO: 17.5 % (ref 40–53)
HCT VFR BLD AUTO: 18.8 % (ref 40–53)
HCT VFR BLD AUTO: 19.1 % (ref 40–53)
HCT VFR BLD AUTO: 21 % (ref 40–53)
HCT VFR BLD AUTO: 21.3 % (ref 40–53)
HCT VFR BLD AUTO: 21.4 % (ref 40–53)
HCT VFR BLD AUTO: 21.5 % (ref 40–53)
HCT VFR BLD AUTO: 21.9 % (ref 40–53)
HCT VFR BLD AUTO: 22.3 % (ref 40–53)
HCT VFR BLD AUTO: 22.6 % (ref 40–53)
HCT VFR BLD AUTO: 22.7 % (ref 40–53)
HCT VFR BLD AUTO: 22.7 % (ref 40–53)
HCT VFR BLD AUTO: 23.2 % (ref 40–53)
HCT VFR BLD AUTO: 23.2 % (ref 40–53)
HCT VFR BLD AUTO: 23.7 % (ref 40–53)
HCT VFR BLD AUTO: 24 % (ref 40–53)
HCT VFR BLD AUTO: 24.5 % (ref 40–53)
HCT VFR BLD AUTO: 24.6 % (ref 40–53)
HCT VFR BLD AUTO: 24.7 % (ref 40–53)
HCT VFR BLD AUTO: 24.7 % (ref 40–53)
HCT VFR BLD AUTO: 25.3 % (ref 40–53)
HCT VFR BLD AUTO: 26.2 % (ref 40–53)
HCT VFR BLD AUTO: 27 % (ref 40–53)
HCT VFR BLD AUTO: 27.6 % (ref 40–53)
HCT VFR BLD AUTO: 29.8 % (ref 40–53)
HCT VFR BLD AUTO: 30.8 % (ref 40–53)
HDLC SERPL-MCNC: 43 MG/DL
HDLC SERPL-MCNC: 45 MG/DL
HEMOCCULT STL QL: POSITIVE
HGB BLD-MCNC: 5.4 G/DL (ref 13.3–17.7)
HGB BLD-MCNC: 5.5 G/DL (ref 13.3–17.7)
HGB BLD-MCNC: 5.6 G/DL (ref 13.3–17.7)
HGB BLD-MCNC: 5.7 G/DL (ref 13.3–17.7)
HGB BLD-MCNC: 5.8 G/DL (ref 13.3–17.7)
HGB BLD-MCNC: 5.8 G/DL (ref 13.3–17.7)
HGB BLD-MCNC: 6.4 G/DL (ref 13.3–17.7)
HGB BLD-MCNC: 6.5 G/DL (ref 13.3–17.7)
HGB BLD-MCNC: 6.6 G/DL (ref 13.3–17.7)
HGB BLD-MCNC: 6.7 G/DL (ref 13.3–17.7)
HGB BLD-MCNC: 6.8 G/DL (ref 13.3–17.7)
HGB BLD-MCNC: 6.9 G/DL (ref 13.3–17.7)
HGB BLD-MCNC: 7 G/DL (ref 13.3–17.7)
HGB BLD-MCNC: 7.1 G/DL (ref 13.3–17.7)
HGB BLD-MCNC: 7.1 G/DL (ref 13.3–17.7)
HGB BLD-MCNC: 7.3 G/DL (ref 13.3–17.7)
HGB BLD-MCNC: 7.3 G/DL (ref 13.3–17.7)
HGB BLD-MCNC: 7.5 G/DL (ref 13.3–17.7)
HGB BLD-MCNC: 7.5 G/DL (ref 13.3–17.7)
HGB BLD-MCNC: 7.6 G/DL (ref 13.3–17.7)
HGB BLD-MCNC: 7.7 G/DL (ref 13.3–17.7)
HGB BLD-MCNC: 7.8 G/DL (ref 13.3–17.7)
HGB BLD-MCNC: 7.9 G/DL (ref 13.3–17.7)
HGB BLD-MCNC: 8 G/DL (ref 13.3–17.7)
HGB BLD-MCNC: 8.1 G/DL (ref 13.3–17.7)
HGB BLD-MCNC: 8.2 G/DL (ref 13.3–17.7)
HGB BLD-MCNC: 8.2 G/DL (ref 13.3–17.7)
HGB BLD-MCNC: 8.3 G/DL (ref 13.3–17.7)
HGB BLD-MCNC: 8.6 G/DL (ref 13.3–17.7)
HGB BLD-MCNC: 8.7 G/DL (ref 13.3–17.7)
HGB BLD-MCNC: 9.1 G/DL (ref 13.3–17.7)
HGB BLD-MCNC: 9.4 G/DL (ref 13.3–17.7)
HGB BLD-MCNC: 9.9 G/DL (ref 13.3–17.7)
HGB C CRYSTALS: ABNORMAL
HGB C CRYSTALS: NORMAL
HOLD SPECIMEN: NORMAL
HOWELL-JOLLY BOD BLD QL SMEAR: ABNORMAL
HOWELL-JOLLY BOD BLD QL SMEAR: NORMAL
IMM GRANULOCYTES # BLD: 0.1 10E3/UL
IMM GRANULOCYTES # BLD: ABNORMAL 10*3/UL
IMM GRANULOCYTES NFR BLD: 2 %
IMM GRANULOCYTES NFR BLD: ABNORMAL %
INR PPP: 1.2 (ref 0.85–1.15)
INR PPP: 1.41 (ref 0.85–1.15)
INR PPP: 1.46 (ref 0.85–1.15)
INR PPP: 1.58 (ref 0.85–1.15)
INR PPP: 1.59 (ref 0.85–1.15)
INR PPP: 1.65 (ref 0.85–1.15)
INR PPP: 1.74 (ref 0.85–1.15)
INR PPP: 1.96 (ref 0.85–1.15)
INR PPP: 1.97 (ref 0.85–1.15)
INR PPP: 2.1 (ref 0.85–1.15)
INR PPP: 2.14 (ref 0.85–1.15)
INTERPRETATION ECG - MUSE: NORMAL
IRON BINDING CAPACITY (ROCHE): 236 UG/DL (ref 240–430)
IRON BINDING CAPACITY (ROCHE): 288 UG/DL (ref 240–430)
IRON SATN MFR SERPL: 19 % (ref 15–46)
IRON SATN MFR SERPL: 26 % (ref 15–46)
IRON SERPL-MCNC: 45 UG/DL (ref 61–157)
IRON SERPL-MCNC: 75 UG/DL (ref 61–157)
ISSUE DATE AND TIME: NORMAL
LACTATE SERPL-SCNC: 1.2 MMOL/L (ref 0.7–2)
LACTATE SERPL-SCNC: 1.3 MMOL/L (ref 0.7–2)
LACTATE SERPL-SCNC: 1.3 MMOL/L (ref 0.7–2)
LACTATE SERPL-SCNC: 1.4 MMOL/L (ref 0.7–2)
LACTATE SERPL-SCNC: 1.4 MMOL/L (ref 0.7–2)
LACTATE SERPL-SCNC: 1.5 MMOL/L (ref 0.7–2)
LACTATE SERPL-SCNC: 1.6 MMOL/L (ref 0.7–2)
LACTATE SERPL-SCNC: 1.7 MMOL/L (ref 0.7–2)
LACTATE SERPL-SCNC: 2.1 MMOL/L (ref 0.7–2)
LACTATE SERPL-SCNC: 2.2 MMOL/L (ref 0.7–2)
LACTATE SERPL-SCNC: 2.4 MMOL/L (ref 0.7–2)
LACTATE SERPL-SCNC: 2.5 MMOL/L (ref 0.7–2)
LACTATE SERPL-SCNC: 3 MMOL/L (ref 0.7–2)
LACTATE SERPL-SCNC: 3 MMOL/L (ref 0.7–2)
LACTATE SERPL-SCNC: 3.5 MMOL/L (ref 0.7–2)
LACTATE SERPL-SCNC: 4.6 MMOL/L (ref 0.7–2)
LACTATE SERPL-SCNC: 4.8 MMOL/L (ref 0.7–2)
LDLC SERPL CALC-MCNC: 5 MG/DL
LDLC SERPL CALC-MCNC: <4 MG/DL
LOCATION OF TASK: NORMAL
LVEF ECHO: NORMAL
LYMPHOCYTES # BLD AUTO: 0.1 10E3/UL (ref 0.8–5.3)
LYMPHOCYTES # BLD AUTO: ABNORMAL 10*3/UL
LYMPHOCYTES # BLD MANUAL: 0.1 10E3/UL (ref 0.8–5.3)
LYMPHOCYTES # BLD MANUAL: 0.7 10E3/UL (ref 0.8–5.3)
LYMPHOCYTES # BLD MANUAL: 0.7 10E3/UL (ref 0.8–5.3)
LYMPHOCYTES NFR BLD AUTO: 2 %
LYMPHOCYTES NFR BLD AUTO: ABNORMAL %
LYMPHOCYTES NFR BLD MANUAL: 1 %
LYMPHOCYTES NFR BLD MANUAL: 13 %
LYMPHOCYTES NFR BLD MANUAL: 9 %
M TB IFN-G BLD-IMP: ABNORMAL
M TB IFN-G CD4+ BCKGRND COR BLD-ACNC: 0.46 IU/ML
MAGNESIUM SERPL-MCNC: 1 MG/DL (ref 1.7–2.3)
MAGNESIUM SERPL-MCNC: 1.2 MG/DL (ref 1.7–2.3)
MAGNESIUM SERPL-MCNC: 1.2 MG/DL (ref 1.7–2.3)
MAGNESIUM SERPL-MCNC: 1.3 MG/DL (ref 1.7–2.3)
MAGNESIUM SERPL-MCNC: 1.4 MG/DL (ref 1.7–2.3)
MAGNESIUM SERPL-MCNC: 1.5 MG/DL (ref 1.7–2.3)
MAGNESIUM SERPL-MCNC: 1.5 MG/DL (ref 1.7–2.3)
MAGNESIUM SERPL-MCNC: 1.6 MG/DL (ref 1.7–2.3)
MAGNESIUM SERPL-MCNC: 1.8 MG/DL (ref 1.7–2.3)
MAGNESIUM SERPL-MCNC: 1.8 MG/DL (ref 1.7–2.3)
MAGNESIUM SERPL-MCNC: 1.9 MG/DL (ref 1.7–2.3)
MAGNESIUM SERPL-MCNC: 1.9 MG/DL (ref 1.7–2.3)
MAGNESIUM SERPL-MCNC: 2.3 MG/DL (ref 1.7–2.3)
MCF BLD TEG: 66 MM (ref 50–70)
MCH RBC QN AUTO: 30.1 PG (ref 26.5–33)
MCH RBC QN AUTO: 30.2 PG (ref 26.5–33)
MCH RBC QN AUTO: 31.1 PG (ref 26.5–33)
MCH RBC QN AUTO: 31.3 PG (ref 26.5–33)
MCH RBC QN AUTO: 31.5 PG (ref 26.5–33)
MCH RBC QN AUTO: 32.8 PG (ref 26.5–33)
MCH RBC QN AUTO: 33.3 PG (ref 26.5–33)
MCH RBC QN AUTO: 33.5 PG (ref 26.5–33)
MCH RBC QN AUTO: 33.6 PG (ref 26.5–33)
MCH RBC QN AUTO: 34.1 PG (ref 26.5–33)
MCH RBC QN AUTO: 34.1 PG (ref 26.5–33)
MCH RBC QN AUTO: 34.2 PG (ref 26.5–33)
MCH RBC QN AUTO: 34.3 PG (ref 26.5–33)
MCH RBC QN AUTO: 34.3 PG (ref 26.5–33)
MCH RBC QN AUTO: 34.4 PG (ref 26.5–33)
MCH RBC QN AUTO: 34.7 PG (ref 26.5–33)
MCH RBC QN AUTO: 34.9 PG (ref 26.5–33)
MCH RBC QN AUTO: 34.9 PG (ref 26.5–33)
MCH RBC QN AUTO: 35 PG (ref 26.5–33)
MCH RBC QN AUTO: 35.1 PG (ref 26.5–33)
MCH RBC QN AUTO: 35.2 PG (ref 26.5–33)
MCH RBC QN AUTO: 35.5 PG (ref 26.5–33)
MCH RBC QN AUTO: 35.5 PG (ref 26.5–33)
MCH RBC QN AUTO: 36.5 PG (ref 26.5–33)
MCH RBC QN AUTO: 37.1 PG (ref 26.5–33)
MCH RBC QN AUTO: 37.3 PG (ref 26.5–33)
MCH RBC QN AUTO: 38.6 PG (ref 26.5–33)
MCHC RBC AUTO-ENTMCNC: 29.8 G/DL (ref 31.5–36.5)
MCHC RBC AUTO-ENTMCNC: 29.8 G/DL (ref 31.5–36.5)
MCHC RBC AUTO-ENTMCNC: 29.9 G/DL (ref 31.5–36.5)
MCHC RBC AUTO-ENTMCNC: 30.1 G/DL (ref 31.5–36.5)
MCHC RBC AUTO-ENTMCNC: 30.4 G/DL (ref 31.5–36.5)
MCHC RBC AUTO-ENTMCNC: 30.5 G/DL (ref 31.5–36.5)
MCHC RBC AUTO-ENTMCNC: 30.7 G/DL (ref 31.5–36.5)
MCHC RBC AUTO-ENTMCNC: 31.2 G/DL (ref 31.5–36.5)
MCHC RBC AUTO-ENTMCNC: 31.3 G/DL (ref 31.5–36.5)
MCHC RBC AUTO-ENTMCNC: 31.4 G/DL (ref 31.5–36.5)
MCHC RBC AUTO-ENTMCNC: 31.5 G/DL (ref 31.5–36.5)
MCHC RBC AUTO-ENTMCNC: 31.5 G/DL (ref 31.5–36.5)
MCHC RBC AUTO-ENTMCNC: 31.8 G/DL (ref 31.5–36.5)
MCHC RBC AUTO-ENTMCNC: 32.1 G/DL (ref 31.5–36.5)
MCHC RBC AUTO-ENTMCNC: 32.1 G/DL (ref 31.5–36.5)
MCHC RBC AUTO-ENTMCNC: 32.3 G/DL (ref 31.5–36.5)
MCHC RBC AUTO-ENTMCNC: 32.4 G/DL (ref 31.5–36.5)
MCHC RBC AUTO-ENTMCNC: 32.5 G/DL (ref 31.5–36.5)
MCHC RBC AUTO-ENTMCNC: 32.8 G/DL (ref 31.5–36.5)
MCHC RBC AUTO-ENTMCNC: 32.9 G/DL (ref 31.5–36.5)
MCHC RBC AUTO-ENTMCNC: 33 G/DL (ref 31.5–36.5)
MCHC RBC AUTO-ENTMCNC: 33.5 G/DL (ref 31.5–36.5)
MCHC RBC AUTO-ENTMCNC: 33.6 G/DL (ref 31.5–36.5)
MCHC RBC AUTO-ENTMCNC: 33.9 G/DL (ref 31.5–36.5)
MCHC RBC AUTO-ENTMCNC: 33.9 G/DL (ref 31.5–36.5)
MCV RBC AUTO: 103 FL (ref 78–100)
MCV RBC AUTO: 104 FL (ref 78–100)
MCV RBC AUTO: 104 FL (ref 78–100)
MCV RBC AUTO: 105 FL (ref 78–100)
MCV RBC AUTO: 106 FL (ref 78–100)
MCV RBC AUTO: 107 FL (ref 78–100)
MCV RBC AUTO: 108 FL (ref 78–100)
MCV RBC AUTO: 109 FL (ref 78–100)
MCV RBC AUTO: 109 FL (ref 78–100)
MCV RBC AUTO: 110 FL (ref 78–100)
MCV RBC AUTO: 112 FL (ref 78–100)
MCV RBC AUTO: 117 FL (ref 78–100)
MCV RBC AUTO: 118 FL (ref 78–100)
MCV RBC AUTO: 118 FL (ref 78–100)
MCV RBC AUTO: 121 FL (ref 78–100)
MCV RBC AUTO: 123 FL (ref 78–100)
MCV RBC AUTO: 129 FL (ref 78–100)
MCV RBC AUTO: 92 FL (ref 78–100)
MCV RBC AUTO: 92 FL (ref 78–100)
MCV RBC AUTO: 93 FL (ref 78–100)
MCV RBC AUTO: 93 FL (ref 78–100)
MCV RBC AUTO: 97 FL (ref 78–100)
METAMYELOCYTES # BLD MANUAL: 0.1 10E3/UL
METAMYELOCYTES NFR BLD MANUAL: 1 %
MITOGEN IGNF BCKGRD COR BLD-ACNC: -0.01 IU/ML
MITOGEN IGNF BCKGRD COR BLD-ACNC: -0.01 IU/ML
MONOCYTES # BLD AUTO: 0.2 10E3/UL (ref 0–1.3)
MONOCYTES # BLD AUTO: ABNORMAL 10*3/UL
MONOCYTES # BLD MANUAL: 0.3 10E3/UL (ref 0–1.3)
MONOCYTES # BLD MANUAL: 0.3 10E3/UL (ref 0–1.3)
MONOCYTES # BLD MANUAL: 0.7 10E3/UL (ref 0–1.3)
MONOCYTES NFR BLD AUTO: 3 %
MONOCYTES NFR BLD AUTO: ABNORMAL %
MONOCYTES NFR BLD MANUAL: 2 %
MONOCYTES NFR BLD MANUAL: 5 %
MONOCYTES NFR BLD MANUAL: 9 %
NEUTROPHILS # BLD AUTO: 5.5 10E3/UL (ref 1.6–8.3)
NEUTROPHILS # BLD AUTO: ABNORMAL 10*3/UL
NEUTROPHILS # BLD MANUAL: 12.5 10E3/UL (ref 1.6–8.3)
NEUTROPHILS # BLD MANUAL: 4.4 10E3/UL (ref 1.6–8.3)
NEUTROPHILS # BLD MANUAL: 5.9 10E3/UL (ref 1.6–8.3)
NEUTROPHILS NFR BLD AUTO: 93 %
NEUTROPHILS NFR BLD AUTO: ABNORMAL %
NEUTROPHILS NFR BLD MANUAL: 78 %
NEUTROPHILS NFR BLD MANUAL: 80 %
NEUTROPHILS NFR BLD MANUAL: 96 %
NEUTS HYPERSEG BLD QL SMEAR: ABNORMAL
NEUTS HYPERSEG BLD QL SMEAR: NORMAL
NONHDLC SERPL-MCNC: 15 MG/DL
NONHDLC SERPL-MCNC: 29 MG/DL
NOROVIRUS GI+II RNA STL QL NAA+NON-PROBE: NEGATIVE
NOROVIRUS GI+II RNA STL QL NAA+NON-PROBE: NEGATIVE
NRBC # BLD AUTO: 0 10E3/UL
NRBC # BLD AUTO: 0.1 10E3/UL
NRBC BLD AUTO-RTO: 0 /100
NRBC BLD AUTO-RTO: 0 /100
NRBC BLD AUTO-RTO: 1 /100
NRBC BLD AUTO-RTO: 1 /100
NT-PROBNP SERPL-MCNC: ABNORMAL PG/ML (ref 0–900)
O2/TOTAL GAS SETTING VFR VENT: 2 %
O2/TOTAL GAS SETTING VFR VENT: 21 %
OSMOLALITY SERPL: 318 MMOL/KG (ref 280–301)
OXYHGB MFR BLDV: 35 % (ref 70–75)
OXYHGB MFR BLDV: 49 % (ref 70–75)
P AXIS - MUSE: 71 DEGREES
P AXIS - MUSE: 74 DEGREES
P AXIS - MUSE: 75 DEGREES
P AXIS - MUSE: 77 DEGREES
P AXIS - MUSE: 79 DEGREES
P AXIS - MUSE: 83 DEGREES
P AXIS - MUSE: 86 DEGREES
P SHIGELLOIDES DNA STL QL NAA+NON-PROBE: NEGATIVE
P SHIGELLOIDES DNA STL QL NAA+NON-PROBE: NEGATIVE
PATH REPORT.COMMENTS IMP SPEC: NORMAL
PATH REPORT.FINAL DX SPEC: NORMAL
PATH REPORT.GROSS SPEC: NORMAL
PATH REPORT.MICROSCOPIC SPEC OTHER STN: NORMAL
PATH REPORT.RELEVANT HX SPEC: NORMAL
PCO2 BLD: 19 MM HG (ref 35–45)
PCO2 BLD: 35 MM HG (ref 35–45)
PCO2 BLD: 40 MM HG (ref 35–45)
PCO2 BLD: 40 MM HG (ref 35–45)
PCO2 BLD: 43 MM HG (ref 35–45)
PCO2 BLD: 43 MM HG (ref 35–45)
PCO2 BLDV: 50 MM HG (ref 40–50)
PCO2 BLDV: 50 MM HG (ref 40–50)
PEEP: 5 CM H2O
PEEP: 5 CM H2O
PF4 HEPARIN CMPLX AB SER QL: NEGATIVE
PH BLD: 7.35 [PH] (ref 7.35–7.45)
PH BLD: 7.37 [PH] (ref 7.35–7.45)
PH BLD: 7.39 [PH] (ref 7.35–7.45)
PH BLDV: 7.28 [PH] (ref 7.32–7.43)
PH BLDV: 7.35 [PH] (ref 7.32–7.43)
PHOSPHATE SERPL-MCNC: 3.2 MG/DL (ref 2.5–4.5)
PHOSPHATE SERPL-MCNC: 3.6 MG/DL (ref 2.5–4.5)
PHOSPHATE SERPL-MCNC: 3.9 MG/DL (ref 2.5–4.5)
PHOSPHATE SERPL-MCNC: 6.5 MG/DL (ref 2.5–4.5)
PHOSPHATE SERPL-MCNC: 6.7 MG/DL (ref 2.5–4.5)
PHOTO IMAGE: NORMAL
PLAT MORPH BLD: ABNORMAL
PLAT MORPH BLD: NORMAL
PLATELET # BLD AUTO: 109 10E3/UL (ref 150–450)
PLATELET # BLD AUTO: 110 10E3/UL (ref 150–450)
PLATELET # BLD AUTO: 112 10E3/UL (ref 150–450)
PLATELET # BLD AUTO: 125 10E3/UL (ref 150–450)
PLATELET # BLD AUTO: 126 10E3/UL (ref 150–450)
PLATELET # BLD AUTO: 41 10E3/UL (ref 150–450)
PLATELET # BLD AUTO: 44 10E3/UL (ref 150–450)
PLATELET # BLD AUTO: 44 10E3/UL (ref 150–450)
PLATELET # BLD AUTO: 49 10E3/UL (ref 150–450)
PLATELET # BLD AUTO: 52 10E3/UL (ref 150–450)
PLATELET # BLD AUTO: 59 10E3/UL (ref 150–450)
PLATELET # BLD AUTO: 59 10E3/UL (ref 150–450)
PLATELET # BLD AUTO: 61 10E3/UL (ref 150–450)
PLATELET # BLD AUTO: 62 10E3/UL (ref 150–450)
PLATELET # BLD AUTO: 63 10E3/UL (ref 150–450)
PLATELET # BLD AUTO: 66 10E3/UL (ref 150–450)
PLATELET # BLD AUTO: 67 10E3/UL (ref 150–450)
PLATELET # BLD AUTO: 77 10E3/UL (ref 150–450)
PLATELET # BLD AUTO: 79 10E3/UL (ref 150–450)
PLATELET # BLD AUTO: 81 10E3/UL (ref 150–450)
PLATELET # BLD AUTO: 82 10E3/UL (ref 150–450)
PLATELET # BLD AUTO: 88 10E3/UL (ref 150–450)
PLATELET # BLD AUTO: 95 10E3/UL (ref 150–450)
PO2 BLD: 118 MM HG (ref 80–105)
PO2 BLD: 66 MM HG (ref 80–105)
PO2 BLD: 67 MM HG (ref 80–105)
PO2 BLD: 73 MM HG (ref 80–105)
PO2 BLD: 76 MM HG (ref 80–105)
PO2 BLD: 90 MM HG (ref 80–105)
PO2 BLDV: 27 MM HG (ref 25–47)
PO2 BLDV: 32 MM HG (ref 25–47)
POLYCHROMASIA BLD QL SMEAR: ABNORMAL
POLYCHROMASIA BLD QL SMEAR: ABNORMAL
POLYCHROMASIA BLD QL SMEAR: NORMAL
POLYCHROMASIA BLD QL SMEAR: SLIGHT
POTASSIUM SERPL-SCNC: 3.3 MMOL/L (ref 3.4–5.3)
POTASSIUM SERPL-SCNC: 3.4 MMOL/L (ref 3.4–5.3)
POTASSIUM SERPL-SCNC: 3.6 MMOL/L (ref 3.4–5.3)
POTASSIUM SERPL-SCNC: 3.6 MMOL/L (ref 3.4–5.3)
POTASSIUM SERPL-SCNC: 3.7 MMOL/L (ref 3.4–5.3)
POTASSIUM SERPL-SCNC: 3.7 MMOL/L (ref 3.4–5.3)
POTASSIUM SERPL-SCNC: 3.8 MMOL/L (ref 3.4–5.3)
POTASSIUM SERPL-SCNC: 4.1 MMOL/L (ref 3.4–5.3)
POTASSIUM SERPL-SCNC: 4.2 MMOL/L (ref 3.4–5.3)
POTASSIUM SERPL-SCNC: 4.3 MMOL/L (ref 3.4–5.3)
POTASSIUM SERPL-SCNC: 4.5 MMOL/L (ref 3.4–5.3)
POTASSIUM SERPL-SCNC: 4.5 MMOL/L (ref 3.4–5.3)
POTASSIUM SERPL-SCNC: 4.6 MMOL/L (ref 3.4–5.3)
POTASSIUM SERPL-SCNC: 4.7 MMOL/L (ref 3.4–5.3)
POTASSIUM SERPL-SCNC: 5.1 MMOL/L (ref 3.4–5.3)
POTASSIUM SERPL-SCNC: 5.1 MMOL/L (ref 3.4–5.3)
PR INTERVAL - MUSE: 138 MS
PR INTERVAL - MUSE: 138 MS
PR INTERVAL - MUSE: 140 MS
PR INTERVAL - MUSE: 142 MS
PR INTERVAL - MUSE: 146 MS
PR INTERVAL - MUSE: 152 MS
PR INTERVAL - MUSE: 178 MS
PREALB SERPL-MCNC: 14.6 MG/DL (ref 20–40)
PREALB SERPL-MCNC: 17.6 MG/DL (ref 20–40)
PROCALCITONIN SERPL IA-MCNC: 1.9 NG/ML
PROT PATTERN SERPL IFE-IMP: NORMAL
PROT SERPL-MCNC: 3.6 G/DL (ref 6.4–8.3)
PROT SERPL-MCNC: 4 G/DL (ref 6.4–8.3)
PROT SERPL-MCNC: 4.4 G/DL (ref 6.4–8.3)
PROT SERPL-MCNC: 4.5 G/DL (ref 6.4–8.3)
PROT SERPL-MCNC: 4.6 G/DL (ref 6.4–8.3)
PROT SERPL-MCNC: 5.5 G/DL (ref 6.4–8.3)
PROT SERPL-MCNC: 6.1 G/DL (ref 6.4–8.3)
PROTHROMBIN TIME: 22.8 SECONDS
PROTOCOL CUTOFF: NORMAL
PT IMM NP PPP: 14.7 SECONDS
QRS DURATION - MUSE: 120 MS
QRS DURATION - MUSE: 130 MS
QRS DURATION - MUSE: 132 MS
QRS DURATION - MUSE: 136 MS
QRS DURATION - MUSE: 136 MS
QRS DURATION - MUSE: 138 MS
QRS DURATION - MUSE: 142 MS
QT - MUSE: 374 MS
QT - MUSE: 384 MS
QT - MUSE: 390 MS
QT - MUSE: 400 MS
QT - MUSE: 426 MS
QT - MUSE: 436 MS
QT - MUSE: 454 MS
QTC - MUSE: 477 MS
QTC - MUSE: 484 MS
QTC - MUSE: 487 MS
QTC - MUSE: 490 MS
QTC - MUSE: 492 MS
QTC - MUSE: 518 MS
QTC - MUSE: 527 MS
QUANTIFERON MITOGEN: 0.47 IU/ML
QUANTIFERON NIL TUBE: 0.01 IU/ML
QUANTIFERON TB1 TUBE: 0 IU/ML
QUANTIFERON TB2 TUBE: 0
R AXIS - MUSE: -53 DEGREES
R AXIS - MUSE: -57 DEGREES
R AXIS - MUSE: -58 DEGREES
R AXIS - MUSE: -58 DEGREES
R AXIS - MUSE: -59 DEGREES
R AXIS - MUSE: -62 DEGREES
R AXIS - MUSE: -70 DEGREES
RADIOLOGIST FLAGS: ABNORMAL
RBC # BLD AUTO: 1.59 10E6/UL (ref 4.4–5.9)
RBC # BLD AUTO: 1.59 10E6/UL (ref 4.4–5.9)
RBC # BLD AUTO: 1.63 10E6/UL (ref 4.4–5.9)
RBC # BLD AUTO: 1.64 10E6/UL (ref 4.4–5.9)
RBC # BLD AUTO: 1.66 10E6/UL (ref 4.4–5.9)
RBC # BLD AUTO: 1.85 10E6/UL (ref 4.4–5.9)
RBC # BLD AUTO: 1.86 10E6/UL (ref 4.4–5.9)
RBC # BLD AUTO: 1.9 10E6/UL (ref 4.4–5.9)
RBC # BLD AUTO: 1.95 10E6/UL (ref 4.4–5.9)
RBC # BLD AUTO: 2.07 10E6/UL (ref 4.4–5.9)
RBC # BLD AUTO: 2.09 10E6/UL (ref 4.4–5.9)
RBC # BLD AUTO: 2.13 10E6/UL (ref 4.4–5.9)
RBC # BLD AUTO: 2.16 10E6/UL (ref 4.4–5.9)
RBC # BLD AUTO: 2.17 10E6/UL (ref 4.4–5.9)
RBC # BLD AUTO: 2.18 10E6/UL (ref 4.4–5.9)
RBC # BLD AUTO: 2.22 10E6/UL (ref 4.4–5.9)
RBC # BLD AUTO: 2.24 10E6/UL (ref 4.4–5.9)
RBC # BLD AUTO: 2.25 10E6/UL (ref 4.4–5.9)
RBC # BLD AUTO: 2.37 10E6/UL (ref 4.4–5.9)
RBC # BLD AUTO: 2.5 10E6/UL (ref 4.4–5.9)
RBC # BLD AUTO: 2.65 10E6/UL (ref 4.4–5.9)
RBC # BLD AUTO: 2.67 10E6/UL (ref 4.4–5.9)
RBC # BLD AUTO: 2.67 10E6/UL (ref 4.4–5.9)
RBC # BLD AUTO: 2.69 10E6/UL (ref 4.4–5.9)
RBC # BLD AUTO: 2.75 10E6/UL (ref 4.4–5.9)
RBC # BLD AUTO: 2.76 10E6/UL (ref 4.4–5.9)
RBC # BLD AUTO: 2.95 10E6/UL (ref 4.4–5.9)
RBC AGGLUT BLD QL: ABNORMAL
RBC AGGLUT BLD QL: NORMAL
RBC MORPH BLD: ABNORMAL
RBC MORPH BLD: NORMAL
RETICS # AUTO: 0.15 10E6/UL (ref 0.03–0.1)
RETICS/RBC NFR AUTO: 10 % (ref 0.5–2)
ROULEAUX BLD QL SMEAR: ABNORMAL
ROULEAUX BLD QL SMEAR: NORMAL
RVA RNA STL QL NAA+NON-PROBE: NEGATIVE
RVA RNA STL QL NAA+NON-PROBE: NEGATIVE
SA 1 CELL: NORMAL
SA 1 TEST METHOD: NORMAL
SA 2 CELL: NORMAL
SA 2 TEST METHOD: NORMAL
SA1 HI RISK ABY: NORMAL
SA1 MOD RISK ABY: NORMAL
SA2 HI RISK ABY: NORMAL
SA2 MOD RISK ABY: NORMAL
SALMONELLA SP RPOD STL QL NAA+PROBE: NEGATIVE
SALMONELLA SP RPOD STL QL NAA+PROBE: NEGATIVE
SAO2 % BLDA: 91 % (ref 92–100)
SAO2 % BLDA: 91 % (ref 92–100)
SAO2 % BLDA: 92 % (ref 92–100)
SAO2 % BLDA: 92 % (ref 92–100)
SAO2 % BLDA: 94 % (ref 92–100)
SAO2 % BLDA: 96 % (ref 92–100)
SAO2 % BLDV: 36.3 % (ref 70–75)
SAO2 % BLDV: 51.1 % (ref 70–75)
SAPO I+II+IV+V RNA STL QL NAA+NON-PROBE: NEGATIVE
SAPO I+II+IV+V RNA STL QL NAA+NON-PROBE: NEGATIVE
SHIGELLA SP+EIEC IPAH ST NAA+NON-PROBE: NEGATIVE
SHIGELLA SP+EIEC IPAH ST NAA+NON-PROBE: NEGATIVE
SICKLE CELLS BLD QL SMEAR: ABNORMAL
SICKLE CELLS BLD QL SMEAR: NORMAL
SMUDGE CELLS BLD QL SMEAR: ABNORMAL
SMUDGE CELLS BLD QL SMEAR: NORMAL
SODIUM SERPL-SCNC: 127 MMOL/L (ref 135–145)
SODIUM SERPL-SCNC: 130 MMOL/L (ref 135–145)
SODIUM SERPL-SCNC: 130 MMOL/L (ref 135–145)
SODIUM SERPL-SCNC: 131 MMOL/L (ref 135–145)
SODIUM SERPL-SCNC: 133 MMOL/L (ref 135–145)
SODIUM SERPL-SCNC: 133 MMOL/L (ref 135–145)
SODIUM SERPL-SCNC: 135 MMOL/L (ref 135–145)
SODIUM SERPL-SCNC: 136 MMOL/L (ref 135–145)
SODIUM SERPL-SCNC: 136 MMOL/L (ref 135–145)
SODIUM SERPL-SCNC: 138 MMOL/L (ref 135–145)
SODIUM SERPL-SCNC: 138 MMOL/L (ref 135–145)
SODIUM SERPL-SCNC: 139 MMOL/L (ref 135–145)
SODIUM SERPL-SCNC: 140 MMOL/L (ref 135–145)
SPECIMEN EXPIRATION DATE: NORMAL
SPHEROCYTES BLD QL SMEAR: ABNORMAL
SPHEROCYTES BLD QL SMEAR: NORMAL
STOMATOCYTES BLD QL SMEAR: ABNORMAL
STOMATOCYTES BLD QL SMEAR: NORMAL
SYSTOLIC BLOOD PRESSURE - MUSE: NORMAL MMHG
T AXIS - MUSE: 71 DEGREES
T AXIS - MUSE: 72 DEGREES
T AXIS - MUSE: 77 DEGREES
T AXIS - MUSE: 78 DEGREES
T AXIS - MUSE: 82 DEGREES
T AXIS - MUSE: 86 DEGREES
T AXIS - MUSE: 91 DEGREES
TARGETS BLD QL SMEAR: ABNORMAL
TARGETS BLD QL SMEAR: NORMAL
TARGETS BLD QL SMEAR: SLIGHT
TOXIC GRANULES BLD QL SMEAR: ABNORMAL
TOXIC GRANULES BLD QL SMEAR: NORMAL
TRIGL SERPL-MCNC: 130 MG/DL
TRIGL SERPL-MCNC: 48 MG/DL
TROPONIN T SERPL HS-MCNC: 174 NG/L
TROPONIN T SERPL HS-MCNC: 206 NG/L
TROPONIN T SERPL HS-MCNC: 389 NG/L
TROPONIN T SERPL HS-MCNC: 415 NG/L
UNACCEPTABLE ANTIGENS: NORMAL
UNIT ABO/RH: NORMAL
UNIT NUMBER: NORMAL
UNIT STATUS: NORMAL
UNIT TYPE ISBT: 5100
UNIT TYPE ISBT: 600
UNIT TYPE ISBT: 6200
UNOS CPRA: 23
UPPER GI ENDOSCOPY: NORMAL
V CHOLERAE DNA SPEC QL NAA+PROBE: NEGATIVE
V CHOLERAE DNA SPEC QL NAA+PROBE: NEGATIVE
VARIANT LYMPHS BLD QL SMEAR: ABNORMAL
VARIANT LYMPHS BLD QL SMEAR: NORMAL
VENTRICULAR RATE- MUSE: 70 BPM
VENTRICULAR RATE- MUSE: 88 BPM
VENTRICULAR RATE- MUSE: 88 BPM
VENTRICULAR RATE- MUSE: 89 BPM
VENTRICULAR RATE- MUSE: 96 BPM
VENTRICULAR RATE- MUSE: 97 BPM
VENTRICULAR RATE- MUSE: 98 BPM
VIBRIO DNA SPEC NAA+PROBE: NEGATIVE
VIBRIO DNA SPEC NAA+PROBE: NEGATIVE
VIT B12 SERPL-MCNC: 431 PG/ML (ref 232–1245)
WBC # BLD AUTO: 10.1 10E3/UL (ref 4–11)
WBC # BLD AUTO: 11.2 10E3/UL (ref 4–11)
WBC # BLD AUTO: 11.3 10E3/UL (ref 4–11)
WBC # BLD AUTO: 11.8 10E3/UL (ref 4–11)
WBC # BLD AUTO: 12 10E3/UL (ref 4–11)
WBC # BLD AUTO: 13 10E3/UL (ref 4–11)
WBC # BLD AUTO: 14.3 10E3/UL (ref 4–11)
WBC # BLD AUTO: 17.2 10E3/UL (ref 4–11)
WBC # BLD AUTO: 17.8 10E3/UL (ref 4–11)
WBC # BLD AUTO: 19.1 10E3/UL (ref 4–11)
WBC # BLD AUTO: 19.5 10E3/UL (ref 4–11)
WBC # BLD AUTO: 20.4 10E3/UL (ref 4–11)
WBC # BLD AUTO: 5 10E3/UL (ref 4–11)
WBC # BLD AUTO: 5.2 10E3/UL (ref 4–11)
WBC # BLD AUTO: 5.7 10E3/UL (ref 4–11)
WBC # BLD AUTO: 5.7 10E3/UL (ref 4–11)
WBC # BLD AUTO: 5.9 10E3/UL (ref 4–11)
WBC # BLD AUTO: 6.5 10E3/UL (ref 4–11)
WBC # BLD AUTO: 7.4 10E3/UL (ref 4–11)
WBC # BLD AUTO: 7.7 10E3/UL (ref 4–11)
WBC # BLD AUTO: 8 10E3/UL (ref 4–11)
WBC # BLD AUTO: 8.4 10E3/UL (ref 4–11)
WBC # BLD AUTO: 8.8 10E3/UL (ref 4–11)
WBC # BLD AUTO: 8.8 10E3/UL (ref 4–11)
WBC # BLD AUTO: 9 10E3/UL (ref 4–11)
WBC # BLD AUTO: 9.1 10E3/UL (ref 4–11)
WBC # BLD AUTO: 9.8 10E3/UL (ref 4–11)
Y ENTEROCOL DNA STL QL NAA+PROBE: NEGATIVE
Y ENTEROCOL DNA STL QL NAA+PROBE: NEGATIVE
ZZZSA 1  COMMENTS: NORMAL
ZZZSA 2 COMMENTS: NORMAL

## 2024-01-01 PROCEDURE — 85652 RBC SED RATE AUTOMATED: CPT | Performed by: EMERGENCY MEDICINE

## 2024-01-01 PROCEDURE — 85018 HEMOGLOBIN: CPT | Performed by: INTERNAL MEDICINE

## 2024-01-01 PROCEDURE — 93005 ELECTROCARDIOGRAM TRACING: CPT

## 2024-01-01 PROCEDURE — 83735 ASSAY OF MAGNESIUM: CPT

## 2024-01-01 PROCEDURE — 90937 HEMODIALYSIS REPEATED EVAL: CPT

## 2024-01-01 PROCEDURE — 86334 IMMUNOFIX E-PHORESIS SERUM: CPT | Performed by: PATHOLOGY

## 2024-01-01 PROCEDURE — 99285 EMERGENCY DEPT VISIT HI MDM: CPT | Mod: 25 | Performed by: EMERGENCY MEDICINE

## 2024-01-01 PROCEDURE — 85027 COMPLETE CBC AUTOMATED: CPT | Performed by: INTERNAL MEDICINE

## 2024-01-01 PROCEDURE — 258N000003 HC RX IP 258 OP 636: Performed by: STUDENT IN AN ORGANIZED HEALTH CARE EDUCATION/TRAINING PROGRAM

## 2024-01-01 PROCEDURE — 71275 CT ANGIOGRAPHY CHEST: CPT

## 2024-01-01 PROCEDURE — 88305 TISSUE EXAM BY PATHOLOGIST: CPT | Mod: TC | Performed by: INTERNAL MEDICINE

## 2024-01-01 PROCEDURE — 250N000012 HC RX MED GY IP 250 OP 636 PS 637

## 2024-01-01 PROCEDURE — 710N000012 HC RECOVERY PHASE 2, PER MINUTE

## 2024-01-01 PROCEDURE — 99292 CRITICAL CARE ADDL 30 MIN: CPT | Mod: FS | Performed by: PHYSICIAN ASSISTANT

## 2024-01-01 PROCEDURE — 86923 COMPATIBILITY TEST ELECTRIC: CPT | Performed by: INTERNAL MEDICINE

## 2024-01-01 PROCEDURE — 92526 ORAL FUNCTION THERAPY: CPT | Mod: GN

## 2024-01-01 PROCEDURE — 250N000011 HC RX IP 250 OP 636: Performed by: ANESTHESIOLOGY

## 2024-01-01 PROCEDURE — 85379 FIBRIN DEGRADATION QUANT: CPT | Performed by: STUDENT IN AN ORGANIZED HEALTH CARE EDUCATION/TRAINING PROGRAM

## 2024-01-01 PROCEDURE — 36415 COLL VENOUS BLD VENIPUNCTURE: CPT | Performed by: INTERNAL MEDICINE

## 2024-01-01 PROCEDURE — 999N000128 HC STATISTIC PERIPHERAL IV START W/O US GUIDANCE

## 2024-01-01 PROCEDURE — 84484 ASSAY OF TROPONIN QUANT: CPT | Performed by: INTERNAL MEDICINE

## 2024-01-01 PROCEDURE — 36415 COLL VENOUS BLD VENIPUNCTURE: CPT | Performed by: HOSPITALIST

## 2024-01-01 PROCEDURE — 90935 HEMODIALYSIS ONE EVALUATION: CPT | Performed by: INTERNAL MEDICINE

## 2024-01-01 PROCEDURE — 85610 PROTHROMBIN TIME: CPT

## 2024-01-01 PROCEDURE — 250N000011 HC RX IP 250 OP 636: Performed by: EMERGENCY MEDICINE

## 2024-01-01 PROCEDURE — 85730 THROMBOPLASTIN TIME PARTIAL: CPT

## 2024-01-01 PROCEDURE — 84100 ASSAY OF PHOSPHORUS: CPT | Performed by: STUDENT IN AN ORGANIZED HEALTH CARE EDUCATION/TRAINING PROGRAM

## 2024-01-01 PROCEDURE — 250N000011 HC RX IP 250 OP 636: Performed by: INTERNAL MEDICINE

## 2024-01-01 PROCEDURE — P9037 PLATE PHERES LEUKOREDU IRRAD: HCPCS

## 2024-01-01 PROCEDURE — 258N000003 HC RX IP 258 OP 636: Performed by: INTERNAL MEDICINE

## 2024-01-01 PROCEDURE — 83036 HEMOGLOBIN GLYCOSYLATED A1C: CPT

## 2024-01-01 PROCEDURE — 84145 PROCALCITONIN (PCT): CPT | Performed by: PHYSICIAN ASSISTANT

## 2024-01-01 PROCEDURE — 87340 HEPATITIS B SURFACE AG IA: CPT | Performed by: INTERNAL MEDICINE

## 2024-01-01 PROCEDURE — 250N000012 HC RX MED GY IP 250 OP 636 PS 637: Performed by: PHYSICIAN ASSISTANT

## 2024-01-01 PROCEDURE — C9113 INJ PANTOPRAZOLE SODIUM, VIA: HCPCS | Performed by: HOSPITALIST

## 2024-01-01 PROCEDURE — 86923 COMPATIBILITY TEST ELECTRIC: CPT | Performed by: PHYSICIAN ASSISTANT

## 2024-01-01 PROCEDURE — 250N000013 HC RX MED GY IP 250 OP 250 PS 637: Performed by: HOSPITALIST

## 2024-01-01 PROCEDURE — 250N000011 HC RX IP 250 OP 636: Performed by: STUDENT IN AN ORGANIZED HEALTH CARE EDUCATION/TRAINING PROGRAM

## 2024-01-01 PROCEDURE — 85007 BL SMEAR W/DIFF WBC COUNT: CPT | Performed by: PHYSICIAN ASSISTANT

## 2024-01-01 PROCEDURE — 86900 BLOOD TYPING SEROLOGIC ABO: CPT | Performed by: INTERNAL MEDICINE

## 2024-01-01 PROCEDURE — 70450 CT HEAD/BRAIN W/O DYE: CPT | Mod: 77

## 2024-01-01 PROCEDURE — 3E043XZ INTRODUCTION OF VASOPRESSOR INTO CENTRAL VEIN, PERCUTANEOUS APPROACH: ICD-10-PCS | Performed by: STUDENT IN AN ORGANIZED HEALTH CARE EDUCATION/TRAINING PROGRAM

## 2024-01-01 PROCEDURE — 85027 COMPLETE CBC AUTOMATED: CPT | Performed by: PHYSICIAN ASSISTANT

## 2024-01-01 PROCEDURE — 93306 TTE W/DOPPLER COMPLETE: CPT

## 2024-01-01 PROCEDURE — 85018 HEMOGLOBIN: CPT | Performed by: HOSPITALIST

## 2024-01-01 PROCEDURE — P9016 RBC LEUKOCYTES REDUCED: HCPCS | Performed by: INTERNAL MEDICINE

## 2024-01-01 PROCEDURE — 272N000001 HC OR GENERAL SUPPLY STERILE: Performed by: INTERNAL MEDICINE

## 2024-01-01 PROCEDURE — 3E0H8GC INTRODUCTION OF OTHER THERAPEUTIC SUBSTANCE INTO LOWER GI, VIA NATURAL OR ARTIFICIAL OPENING ENDOSCOPIC: ICD-10-PCS | Performed by: INTERNAL MEDICINE

## 2024-01-01 PROCEDURE — 85652 RBC SED RATE AUTOMATED: CPT

## 2024-01-01 PROCEDURE — 86923 COMPATIBILITY TEST ELECTRIC: CPT | Performed by: PATHOLOGY

## 2024-01-01 PROCEDURE — 36569 INSJ PICC 5 YR+ W/O IMAGING: CPT

## 2024-01-01 PROCEDURE — 99207 PR APP CREDIT; MD BILLING SHARED VISIT: CPT

## 2024-01-01 PROCEDURE — 36415 COLL VENOUS BLD VENIPUNCTURE: CPT | Performed by: SURGERY

## 2024-01-01 PROCEDURE — 85027 COMPLETE CBC AUTOMATED: CPT | Performed by: SURGERY

## 2024-01-01 PROCEDURE — 250N000011 HC RX IP 250 OP 636

## 2024-01-01 PROCEDURE — 99239 HOSP IP/OBS DSCHRG MGMT >30: CPT | Performed by: HOSPITALIST

## 2024-01-01 PROCEDURE — 99232 SBSQ HOSP IP/OBS MODERATE 35: CPT | Performed by: NURSE PRACTITIONER

## 2024-01-01 PROCEDURE — 92610 EVALUATE SWALLOWING FUNCTION: CPT | Mod: GN

## 2024-01-01 PROCEDURE — 83735 ASSAY OF MAGNESIUM: CPT | Performed by: STUDENT IN AN ORGANIZED HEALTH CARE EDUCATION/TRAINING PROGRAM

## 2024-01-01 PROCEDURE — 99232 SBSQ HOSP IP/OBS MODERATE 35: CPT | Performed by: INTERNAL MEDICINE

## 2024-01-01 PROCEDURE — 5A1D70Z PERFORMANCE OF URINARY FILTRATION, INTERMITTENT, LESS THAN 6 HOURS PER DAY: ICD-10-PCS | Performed by: INTERNAL MEDICINE

## 2024-01-01 PROCEDURE — 87507 IADNA-DNA/RNA PROBE TQ 12-25: CPT

## 2024-01-01 PROCEDURE — 36415 COLL VENOUS BLD VENIPUNCTURE: CPT | Performed by: STUDENT IN AN ORGANIZED HEALTH CARE EDUCATION/TRAINING PROGRAM

## 2024-01-01 PROCEDURE — 85025 COMPLETE CBC W/AUTO DIFF WBC: CPT | Performed by: PATHOLOGY

## 2024-01-01 PROCEDURE — 250N000011 HC RX IP 250 OP 636: Performed by: PHYSICIAN ASSISTANT

## 2024-01-01 PROCEDURE — 83605 ASSAY OF LACTIC ACID: CPT

## 2024-01-01 PROCEDURE — 250N000012 HC RX MED GY IP 250 OP 636 PS 637: Performed by: STUDENT IN AN ORGANIZED HEALTH CARE EDUCATION/TRAINING PROGRAM

## 2024-01-01 PROCEDURE — 250N000011 HC RX IP 250 OP 636: Performed by: HOSPITALIST

## 2024-01-01 PROCEDURE — 84075 ASSAY ALKALINE PHOSPHATASE: CPT | Performed by: PATHOLOGY

## 2024-01-01 PROCEDURE — 84484 ASSAY OF TROPONIN QUANT: CPT | Performed by: STUDENT IN AN ORGANIZED HEALTH CARE EDUCATION/TRAINING PROGRAM

## 2024-01-01 PROCEDURE — 36415 COLL VENOUS BLD VENIPUNCTURE: CPT | Performed by: PHYSICIAN ASSISTANT

## 2024-01-01 PROCEDURE — 87493 C DIFF AMPLIFIED PROBE: CPT | Mod: 59

## 2024-01-01 PROCEDURE — 83735 ASSAY OF MAGNESIUM: CPT | Performed by: INTERNAL MEDICINE

## 2024-01-01 PROCEDURE — 85384 FIBRINOGEN ACTIVITY: CPT

## 2024-01-01 PROCEDURE — 85610 PROTHROMBIN TIME: CPT | Performed by: STUDENT IN AN ORGANIZED HEALTH CARE EDUCATION/TRAINING PROGRAM

## 2024-01-01 PROCEDURE — 250N000013 HC RX MED GY IP 250 OP 250 PS 637: Performed by: INTERNAL MEDICINE

## 2024-01-01 PROCEDURE — 93312 ECHO TRANSESOPHAGEAL: CPT | Mod: 26 | Performed by: INTERNAL MEDICINE

## 2024-01-01 PROCEDURE — 370N000017 HC ANESTHESIA TECHNICAL FEE, PER MIN

## 2024-01-01 PROCEDURE — 83605 ASSAY OF LACTIC ACID: CPT | Performed by: PHYSICIAN ASSISTANT

## 2024-01-01 PROCEDURE — 85396 CLOTTING ASSAY WHOLE BLOOD: CPT

## 2024-01-01 PROCEDURE — C9113 INJ PANTOPRAZOLE SODIUM, VIA: HCPCS | Performed by: PHYSICIAN ASSISTANT

## 2024-01-01 PROCEDURE — 250N000009 HC RX 250: Performed by: INTERNAL MEDICINE

## 2024-01-01 PROCEDURE — P9016 RBC LEUKOCYTES REDUCED: HCPCS | Performed by: EMERGENCY MEDICINE

## 2024-01-01 PROCEDURE — 86923 COMPATIBILITY TEST ELECTRIC: CPT | Performed by: HOSPITALIST

## 2024-01-01 PROCEDURE — 86850 RBC ANTIBODY SCREEN: CPT

## 2024-01-01 PROCEDURE — 120N000001 HC R&B MED SURG/OB

## 2024-01-01 PROCEDURE — 999N000054 HC STATISTIC EKG NON-CHARGEABLE

## 2024-01-01 PROCEDURE — 0W3P8ZZ CONTROL BLEEDING IN GASTROINTESTINAL TRACT, VIA NATURAL OR ARTIFICIAL OPENING ENDOSCOPIC: ICD-10-PCS | Performed by: INTERNAL MEDICINE

## 2024-01-01 PROCEDURE — 86901 BLOOD TYPING SEROLOGIC RH(D): CPT

## 2024-01-01 PROCEDURE — 86833 HLA CLASS II HIGH DEFIN QUAL: CPT

## 2024-01-01 PROCEDURE — 93971 EXTREMITY STUDY: CPT | Mod: 26 | Performed by: RADIOLOGY

## 2024-01-01 PROCEDURE — 99291 CRITICAL CARE FIRST HOUR: CPT | Mod: GC | Performed by: PSYCHIATRY & NEUROLOGY

## 2024-01-01 PROCEDURE — 999N000142 HC STATISTIC PROCEDURE PREP ONLY

## 2024-01-01 PROCEDURE — 85014 HEMATOCRIT: CPT

## 2024-01-01 PROCEDURE — A9585 GADOBUTROL INJECTION: HCPCS | Performed by: INTERNAL MEDICINE

## 2024-01-01 PROCEDURE — 84100 ASSAY OF PHOSPHORUS: CPT

## 2024-01-01 PROCEDURE — 80048 BASIC METABOLIC PNL TOTAL CA: CPT | Performed by: INTERNAL MEDICINE

## 2024-01-01 PROCEDURE — 76376 3D RENDER W/INTRP POSTPROCES: CPT

## 2024-01-01 PROCEDURE — 83735 ASSAY OF MAGNESIUM: CPT | Performed by: PATHOLOGY

## 2024-01-01 PROCEDURE — 250N000009 HC RX 250: Performed by: ANESTHESIOLOGY

## 2024-01-01 PROCEDURE — 80053 COMPREHEN METABOLIC PANEL: CPT

## 2024-01-01 PROCEDURE — 120N000003 HC R&B IMCU UMMC

## 2024-01-01 PROCEDURE — 250N000009 HC RX 250: Performed by: EMERGENCY MEDICINE

## 2024-01-01 PROCEDURE — 85027 COMPLETE CBC AUTOMATED: CPT | Performed by: EMERGENCY MEDICINE

## 2024-01-01 PROCEDURE — 80061 LIPID PANEL: CPT | Performed by: PHYSICIAN ASSISTANT

## 2024-01-01 PROCEDURE — C1751 CATH, INF, PER/CENT/MIDLINE: HCPCS | Performed by: INTERNAL MEDICINE

## 2024-01-01 PROCEDURE — 85730 THROMBOPLASTIN TIME PARTIAL: CPT | Performed by: SURGERY

## 2024-01-01 PROCEDURE — 99232 SBSQ HOSP IP/OBS MODERATE 35: CPT

## 2024-01-01 PROCEDURE — P9016 RBC LEUKOCYTES REDUCED: HCPCS | Performed by: HOSPITALIST

## 2024-01-01 PROCEDURE — 93456 R HRT CORONARY ARTERY ANGIO: CPT | Mod: 26 | Performed by: INTERNAL MEDICINE

## 2024-01-01 PROCEDURE — 36415 COLL VENOUS BLD VENIPUNCTURE: CPT | Performed by: EMERGENCY MEDICINE

## 2024-01-01 PROCEDURE — C1887 CATHETER, GUIDING: HCPCS | Performed by: INTERNAL MEDICINE

## 2024-01-01 PROCEDURE — 74183 MRI ABD W/O CNTR FLWD CNTR: CPT

## 2024-01-01 PROCEDURE — 80069 RENAL FUNCTION PANEL: CPT | Performed by: PATHOLOGY

## 2024-01-01 PROCEDURE — 80048 BASIC METABOLIC PNL TOTAL CA: CPT | Performed by: EMERGENCY MEDICINE

## 2024-01-01 PROCEDURE — 258N000003 HC RX IP 258 OP 636: Performed by: ANESTHESIOLOGY

## 2024-01-01 PROCEDURE — 74174 CTA ABD&PLVS W/CONTRAST: CPT | Mod: 26 | Performed by: RADIOLOGY

## 2024-01-01 PROCEDURE — 99207 PR APP CREDIT; MD BILLING SHARED VISIT: CPT | Performed by: PHYSICIAN ASSISTANT

## 2024-01-01 PROCEDURE — 82010 KETONE BODYS QUAN: CPT | Performed by: EMERGENCY MEDICINE

## 2024-01-01 PROCEDURE — 2Y41X5Z PACKING OF NASAL REGION USING PACKING MATERIAL: ICD-10-PCS | Performed by: EMERGENCY MEDICINE

## 2024-01-01 PROCEDURE — 86900 BLOOD TYPING SEROLOGIC ABO: CPT | Performed by: STUDENT IN AN ORGANIZED HEALTH CARE EDUCATION/TRAINING PROGRAM

## 2024-01-01 PROCEDURE — 999N000111 HC STATISTIC OT IP EVAL DEFER: Performed by: OCCUPATIONAL THERAPIST

## 2024-01-01 PROCEDURE — 82010 KETONE BODYS QUAN: CPT | Performed by: PHYSICIAN ASSISTANT

## 2024-01-01 PROCEDURE — 93456 R HRT CORONARY ARTERY ANGIO: CPT | Performed by: INTERNAL MEDICINE

## 2024-01-01 PROCEDURE — 93306 TTE W/DOPPLER COMPLETE: CPT | Mod: 26 | Performed by: STUDENT IN AN ORGANIZED HEALTH CARE EDUCATION/TRAINING PROGRAM

## 2024-01-01 PROCEDURE — C1894 INTRO/SHEATH, NON-LASER: HCPCS | Performed by: INTERNAL MEDICINE

## 2024-01-01 PROCEDURE — 86900 BLOOD TYPING SEROLOGIC ABO: CPT

## 2024-01-01 PROCEDURE — 36415 COLL VENOUS BLD VENIPUNCTURE: CPT

## 2024-01-01 PROCEDURE — 99222 1ST HOSP IP/OBS MODERATE 55: CPT | Performed by: INTERNAL MEDICINE

## 2024-01-01 PROCEDURE — 200N000002 HC R&B ICU UMMC

## 2024-01-01 PROCEDURE — 82962 GLUCOSE BLOOD TEST: CPT

## 2024-01-01 PROCEDURE — 99233 SBSQ HOSP IP/OBS HIGH 50: CPT | Mod: FS | Performed by: INTERNAL MEDICINE

## 2024-01-01 PROCEDURE — 70498 CT ANGIOGRAPHY NECK: CPT | Mod: 26 | Performed by: STUDENT IN AN ORGANIZED HEALTH CARE EDUCATION/TRAINING PROGRAM

## 2024-01-01 PROCEDURE — 99152 MOD SED SAME PHYS/QHP 5/>YRS: CPT | Performed by: INTERNAL MEDICINE

## 2024-01-01 PROCEDURE — 85610 PROTHROMBIN TIME: CPT | Performed by: EMERGENCY MEDICINE

## 2024-01-01 PROCEDURE — 82805 BLOOD GASES W/O2 SATURATION: CPT

## 2024-01-01 PROCEDURE — 86900 BLOOD TYPING SEROLOGIC ABO: CPT | Performed by: HOSPITALIST

## 2024-01-01 PROCEDURE — 250N000009 HC RX 250: Performed by: PHYSICIAN ASSISTANT

## 2024-01-01 PROCEDURE — 250N000011 HC RX IP 250 OP 636: Mod: JZ

## 2024-01-01 PROCEDURE — 87040 BLOOD CULTURE FOR BACTERIA: CPT | Performed by: STUDENT IN AN ORGANIZED HEALTH CARE EDUCATION/TRAINING PROGRAM

## 2024-01-01 PROCEDURE — 99153 MOD SED SAME PHYS/QHP EA: CPT | Performed by: INTERNAL MEDICINE

## 2024-01-01 PROCEDURE — 84132 ASSAY OF SERUM POTASSIUM: CPT | Performed by: STUDENT IN AN ORGANIZED HEALTH CARE EDUCATION/TRAINING PROGRAM

## 2024-01-01 PROCEDURE — 84460 ALANINE AMINO (ALT) (SGPT): CPT | Performed by: PATHOLOGY

## 2024-01-01 PROCEDURE — 999N000176 HC STATISTIC STROKE CODE W/O ACCESS

## 2024-01-01 PROCEDURE — 85027 COMPLETE CBC AUTOMATED: CPT

## 2024-01-01 PROCEDURE — 99233 SBSQ HOSP IP/OBS HIGH 50: CPT | Performed by: PHYSICIAN ASSISTANT

## 2024-01-01 PROCEDURE — 258N000001 HC RX 258: Performed by: EMERGENCY MEDICINE

## 2024-01-01 PROCEDURE — 93325 DOPPLER ECHO COLOR FLOW MAPG: CPT | Mod: 26 | Performed by: INTERNAL MEDICINE

## 2024-01-01 PROCEDURE — 83036 HEMOGLOBIN GLYCOSYLATED A1C: CPT | Performed by: SURGERY

## 2024-01-01 PROCEDURE — 99223 1ST HOSP IP/OBS HIGH 75: CPT | Performed by: STUDENT IN AN ORGANIZED HEALTH CARE EDUCATION/TRAINING PROGRAM

## 2024-01-01 PROCEDURE — 86334 IMMUNOFIX E-PHORESIS SERUM: CPT | Mod: 26 | Performed by: PATHOLOGY

## 2024-01-01 PROCEDURE — C9113 INJ PANTOPRAZOLE SODIUM, VIA: HCPCS | Performed by: STUDENT IN AN ORGANIZED HEALTH CARE EDUCATION/TRAINING PROGRAM

## 2024-01-01 PROCEDURE — P9016 RBC LEUKOCYTES REDUCED: HCPCS | Performed by: STUDENT IN AN ORGANIZED HEALTH CARE EDUCATION/TRAINING PROGRAM

## 2024-01-01 PROCEDURE — 272N000452 HC KIT SHRLOCK 5FR POWER PICC TRIPLE LUMEN

## 2024-01-01 PROCEDURE — 86140 C-REACTIVE PROTEIN: CPT | Performed by: PHYSICIAN ASSISTANT

## 2024-01-01 PROCEDURE — 70450 CT HEAD/BRAIN W/O DYE: CPT

## 2024-01-01 PROCEDURE — P9016 RBC LEUKOCYTES REDUCED: HCPCS | Performed by: PHYSICIAN ASSISTANT

## 2024-01-01 PROCEDURE — 90935 HEMODIALYSIS ONE EVALUATION: CPT

## 2024-01-01 PROCEDURE — 85384 FIBRINOGEN ACTIVITY: CPT | Performed by: STUDENT IN AN ORGANIZED HEALTH CARE EDUCATION/TRAINING PROGRAM

## 2024-01-01 PROCEDURE — 82805 BLOOD GASES W/O2 SATURATION: CPT | Performed by: PHYSICIAN ASSISTANT

## 2024-01-01 PROCEDURE — 70496 CT ANGIOGRAPHY HEAD: CPT

## 2024-01-01 PROCEDURE — 120N000013 HC R&B IMCU

## 2024-01-01 PROCEDURE — 87493 C DIFF AMPLIFIED PROBE: CPT | Performed by: PHYSICIAN ASSISTANT

## 2024-01-01 PROCEDURE — 84132 ASSAY OF SERUM POTASSIUM: CPT | Performed by: INTERNAL MEDICINE

## 2024-01-01 PROCEDURE — 87507 IADNA-DNA/RNA PROBE TQ 12-25: CPT | Performed by: PHYSICIAN ASSISTANT

## 2024-01-01 PROCEDURE — 86923 COMPATIBILITY TEST ELECTRIC: CPT | Performed by: EMERGENCY MEDICINE

## 2024-01-01 PROCEDURE — 250N000013 HC RX MED GY IP 250 OP 250 PS 637: Performed by: STUDENT IN AN ORGANIZED HEALTH CARE EDUCATION/TRAINING PROGRAM

## 2024-01-01 PROCEDURE — 99207 PR APP CREDIT; MD BILLING SHARED VISIT: CPT | Performed by: STUDENT IN AN ORGANIZED HEALTH CARE EDUCATION/TRAINING PROGRAM

## 2024-01-01 PROCEDURE — G0463 HOSPITAL OUTPT CLINIC VISIT: HCPCS | Mod: 25

## 2024-01-01 PROCEDURE — 85018 HEMOGLOBIN: CPT

## 2024-01-01 PROCEDURE — 93005 ELECTROCARDIOGRAM TRACING: CPT | Performed by: EMERGENCY MEDICINE

## 2024-01-01 PROCEDURE — 99207 ARTERIAL LINE PLACEMENT: CPT | Performed by: STUDENT IN AN ORGANIZED HEALTH CARE EDUCATION/TRAINING PROGRAM

## 2024-01-01 PROCEDURE — 84450 TRANSFERASE (AST) (SGOT): CPT | Performed by: PATHOLOGY

## 2024-01-01 PROCEDURE — 96365 THER/PROPH/DIAG IV INF INIT: CPT | Performed by: EMERGENCY MEDICINE

## 2024-01-01 PROCEDURE — 93010 ELECTROCARDIOGRAM REPORT: CPT | Performed by: EMERGENCY MEDICINE

## 2024-01-01 PROCEDURE — 83540 ASSAY OF IRON: CPT | Performed by: STUDENT IN AN ORGANIZED HEALTH CARE EDUCATION/TRAINING PROGRAM

## 2024-01-01 PROCEDURE — 85027 COMPLETE CBC AUTOMATED: CPT | Performed by: STUDENT IN AN ORGANIZED HEALTH CARE EDUCATION/TRAINING PROGRAM

## 2024-01-01 PROCEDURE — 99239 HOSP IP/OBS DSCHRG MGMT >30: CPT | Performed by: INTERNAL MEDICINE

## 2024-01-01 PROCEDURE — 83605 ASSAY OF LACTIC ACID: CPT | Performed by: INTERNAL MEDICINE

## 2024-01-01 PROCEDURE — 70496 CT ANGIOGRAPHY HEAD: CPT | Mod: 26 | Performed by: STUDENT IN AN ORGANIZED HEALTH CARE EDUCATION/TRAINING PROGRAM

## 2024-01-01 PROCEDURE — 99000 SPECIMEN HANDLING OFFICE-LAB: CPT | Performed by: PATHOLOGY

## 2024-01-01 PROCEDURE — 84134 ASSAY OF PREALBUMIN: CPT

## 2024-01-01 PROCEDURE — 86706 HEP B SURFACE ANTIBODY: CPT | Performed by: INTERNAL MEDICINE

## 2024-01-01 PROCEDURE — 999N000134 HC STATISTIC PP CARE STAGE 3

## 2024-01-01 PROCEDURE — 99232 SBSQ HOSP IP/OBS MODERATE 35: CPT | Performed by: HOSPITALIST

## 2024-01-01 PROCEDURE — 80048 BASIC METABOLIC PNL TOTAL CA: CPT | Performed by: PHYSICIAN ASSISTANT

## 2024-01-01 PROCEDURE — 258N000003 HC RX IP 258 OP 636: Mod: JZ | Performed by: ANESTHESIOLOGY

## 2024-01-01 PROCEDURE — 82653 EL-1 FECAL QUANTITATIVE: CPT

## 2024-01-01 PROCEDURE — 80061 LIPID PANEL: CPT | Performed by: STUDENT IN AN ORGANIZED HEALTH CARE EDUCATION/TRAINING PROGRAM

## 2024-01-01 PROCEDURE — 83880 ASSAY OF NATRIURETIC PEPTIDE: CPT | Performed by: PHYSICIAN ASSISTANT

## 2024-01-01 PROCEDURE — 85018 HEMOGLOBIN: CPT | Performed by: EMERGENCY MEDICINE

## 2024-01-01 PROCEDURE — 86900 BLOOD TYPING SEROLOGIC ABO: CPT | Performed by: SURGERY

## 2024-01-01 PROCEDURE — 99204 OFFICE O/P NEW MOD 45 MIN: CPT | Performed by: SURGERY

## 2024-01-01 PROCEDURE — 99223 1ST HOSP IP/OBS HIGH 75: CPT | Performed by: PHYSICIAN ASSISTANT

## 2024-01-01 PROCEDURE — 258N000001 HC RX 258: Performed by: PHYSICIAN ASSISTANT

## 2024-01-01 PROCEDURE — 999N000147 HC STATISTIC PT IP EVAL DEFER

## 2024-01-01 PROCEDURE — 80053 COMPREHEN METABOLIC PANEL: CPT | Performed by: SURGERY

## 2024-01-01 PROCEDURE — 85007 BL SMEAR W/DIFF WBC COUNT: CPT

## 2024-01-01 PROCEDURE — 99291 CRITICAL CARE FIRST HOUR: CPT | Mod: 25 | Performed by: INTERNAL MEDICINE

## 2024-01-01 PROCEDURE — 250N000011 HC RX IP 250 OP 636: Mod: JZ | Performed by: INTERNAL MEDICINE

## 2024-01-01 PROCEDURE — 999N000040 HC STATISTIC CONSULT NO CHARGE VASC ACCESS

## 2024-01-01 PROCEDURE — 83605 ASSAY OF LACTIC ACID: CPT | Performed by: STUDENT IN AN ORGANIZED HEALTH CARE EDUCATION/TRAINING PROGRAM

## 2024-01-01 PROCEDURE — 76376 3D RENDER W/INTRP POSTPROCES: CPT | Mod: 26 | Performed by: INTERNAL MEDICINE

## 2024-01-01 PROCEDURE — 85027 COMPLETE CBC AUTOMATED: CPT | Performed by: HOSPITALIST

## 2024-01-01 PROCEDURE — P9059 PLASMA, FRZ BETWEEN 8-24HOUR: HCPCS | Performed by: PHYSICIAN ASSISTANT

## 2024-01-01 PROCEDURE — 82947 ASSAY GLUCOSE BLOOD QUANT: CPT | Performed by: INTERNAL MEDICINE

## 2024-01-01 PROCEDURE — 85730 THROMBOPLASTIN TIME PARTIAL: CPT | Performed by: EMERGENCY MEDICINE

## 2024-01-01 PROCEDURE — 83036 HEMOGLOBIN GLYCOSYLATED A1C: CPT | Performed by: STUDENT IN AN ORGANIZED HEALTH CARE EDUCATION/TRAINING PROGRAM

## 2024-01-01 PROCEDURE — 99291 CRITICAL CARE FIRST HOUR: CPT | Mod: GC | Performed by: INTERNAL MEDICINE

## 2024-01-01 PROCEDURE — 80048 BASIC METABOLIC PNL TOTAL CA: CPT | Performed by: HOSPITALIST

## 2024-01-01 PROCEDURE — 93010 ELECTROCARDIOGRAM REPORT: CPT | Performed by: INTERNAL MEDICINE

## 2024-01-01 PROCEDURE — 250N000013 HC RX MED GY IP 250 OP 250 PS 637: Performed by: PHYSICIAN ASSISTANT

## 2024-01-01 PROCEDURE — 258N000003 HC RX IP 258 OP 636: Performed by: PHYSICIAN ASSISTANT

## 2024-01-01 PROCEDURE — 360N000076 HC SURGERY LEVEL 3, PER MIN: Performed by: INTERNAL MEDICINE

## 2024-01-01 PROCEDURE — 99232 SBSQ HOSP IP/OBS MODERATE 35: CPT | Performed by: PHYSICIAN ASSISTANT

## 2024-01-01 PROCEDURE — 80053 COMPREHEN METABOLIC PANEL: CPT | Performed by: EMERGENCY MEDICINE

## 2024-01-01 PROCEDURE — 85652 RBC SED RATE AUTOMATED: CPT | Performed by: STUDENT IN AN ORGANIZED HEALTH CARE EDUCATION/TRAINING PROGRAM

## 2024-01-01 PROCEDURE — 370N000017 HC ANESTHESIA TECHNICAL FEE, PER MIN: Performed by: INTERNAL MEDICINE

## 2024-01-01 PROCEDURE — 84484 ASSAY OF TROPONIN QUANT: CPT | Performed by: PHYSICIAN ASSISTANT

## 2024-01-01 PROCEDURE — 82330 ASSAY OF CALCIUM: CPT

## 2024-01-01 PROCEDURE — 87040 BLOOD CULTURE FOR BACTERIA: CPT | Performed by: PHYSICIAN ASSISTANT

## 2024-01-01 PROCEDURE — 82247 BILIRUBIN TOTAL: CPT | Performed by: PATHOLOGY

## 2024-01-01 PROCEDURE — 258N000003 HC RX IP 258 OP 636

## 2024-01-01 PROCEDURE — 85014 HEMATOCRIT: CPT | Performed by: PHYSICIAN ASSISTANT

## 2024-01-01 PROCEDURE — 999N000154 HC STATISTIC RADIOLOGY XRAY, US, CT, MAR, NM

## 2024-01-01 PROCEDURE — 88305 TISSUE EXAM BY PATHOLOGIST: CPT | Mod: 26 | Performed by: PATHOLOGY

## 2024-01-01 PROCEDURE — 82728 ASSAY OF FERRITIN: CPT

## 2024-01-01 PROCEDURE — 99417 PROLNG OP E/M EACH 15 MIN: CPT | Mod: 95 | Performed by: PHYSICIAN ASSISTANT

## 2024-01-01 PROCEDURE — 99207 PR NO BILLABLE SERVICE THIS VISIT: CPT | Performed by: PHYSICIAN ASSISTANT

## 2024-01-01 PROCEDURE — 360N000075 HC SURGERY LEVEL 2, PER MIN

## 2024-01-01 PROCEDURE — 99223 1ST HOSP IP/OBS HIGH 75: CPT | Performed by: HOSPITALIST

## 2024-01-01 PROCEDURE — 71045 X-RAY EXAM CHEST 1 VIEW: CPT

## 2024-01-01 PROCEDURE — 84134 ASSAY OF PREALBUMIN: CPT | Performed by: SURGERY

## 2024-01-01 PROCEDURE — 85018 HEMOGLOBIN: CPT | Performed by: PHYSICIAN ASSISTANT

## 2024-01-01 PROCEDURE — 96375 TX/PRO/DX INJ NEW DRUG ADDON: CPT | Performed by: EMERGENCY MEDICINE

## 2024-01-01 PROCEDURE — 99254 IP/OBS CNSLTJ NEW/EST MOD 60: CPT | Mod: 25 | Performed by: INTERNAL MEDICINE

## 2024-01-01 PROCEDURE — 99291 CRITICAL CARE FIRST HOUR: CPT | Mod: FS | Performed by: INTERNAL MEDICINE

## 2024-01-01 PROCEDURE — 99215 OFFICE O/P EST HI 40 MIN: CPT | Mod: 95 | Performed by: PHYSICIAN ASSISTANT

## 2024-01-01 PROCEDURE — 93971 EXTREMITY STUDY: CPT | Mod: RT

## 2024-01-01 PROCEDURE — 83540 ASSAY OF IRON: CPT

## 2024-01-01 PROCEDURE — 99233 SBSQ HOSP IP/OBS HIGH 50: CPT | Mod: 25 | Performed by: PHYSICIAN ASSISTANT

## 2024-01-01 PROCEDURE — 83735 ASSAY OF MAGNESIUM: CPT | Performed by: PHYSICIAN ASSISTANT

## 2024-01-01 PROCEDURE — 86022 PLATELET ANTIBODIES: CPT | Performed by: INTERNAL MEDICINE

## 2024-01-01 PROCEDURE — 86140 C-REACTIVE PROTEIN: CPT | Performed by: EMERGENCY MEDICINE

## 2024-01-01 PROCEDURE — 82248 BILIRUBIN DIRECT: CPT | Performed by: HOSPITALIST

## 2024-01-01 PROCEDURE — 71275 CT ANGIOGRAPHY CHEST: CPT | Mod: 26 | Performed by: RADIOLOGY

## 2024-01-01 PROCEDURE — 71046 X-RAY EXAM CHEST 2 VIEWS: CPT | Mod: 26 | Performed by: RADIOLOGY

## 2024-01-01 PROCEDURE — 82607 VITAMIN B-12: CPT | Performed by: EMERGENCY MEDICINE

## 2024-01-01 PROCEDURE — P9045 ALBUMIN (HUMAN), 5%, 250 ML: HCPCS | Mod: JZ | Performed by: INTERNAL MEDICINE

## 2024-01-01 PROCEDURE — 99207 PR NO BILLABLE SERVICE THIS VISIT: CPT | Performed by: INTERNAL MEDICINE

## 2024-01-01 PROCEDURE — 82272 OCCULT BLD FECES 1-3 TESTS: CPT | Performed by: HOSPITALIST

## 2024-01-01 PROCEDURE — 82947 ASSAY GLUCOSE BLOOD QUANT: CPT | Performed by: PHYSICIAN ASSISTANT

## 2024-01-01 PROCEDURE — 71045 X-RAY EXAM CHEST 1 VIEW: CPT | Mod: 26 | Performed by: RADIOLOGY

## 2024-01-01 PROCEDURE — 80053 COMPREHEN METABOLIC PANEL: CPT | Performed by: STUDENT IN AN ORGANIZED HEALTH CARE EDUCATION/TRAINING PROGRAM

## 2024-01-01 PROCEDURE — 71046 X-RAY EXAM CHEST 2 VIEWS: CPT

## 2024-01-01 PROCEDURE — 99291 CRITICAL CARE FIRST HOUR: CPT | Mod: FS | Performed by: PHYSICIAN ASSISTANT

## 2024-01-01 PROCEDURE — 710N000012 HC RECOVERY PHASE 2, PER MINUTE: Performed by: INTERNAL MEDICINE

## 2024-01-01 PROCEDURE — 999N000141 HC STATISTIC PRE-PROCEDURE NURSING ASSESSMENT

## 2024-01-01 PROCEDURE — 74177 CT ABD & PELVIS W/CONTRAST: CPT

## 2024-01-01 PROCEDURE — 82310 ASSAY OF CALCIUM: CPT | Performed by: PHYSICIAN ASSISTANT

## 2024-01-01 PROCEDURE — 83550 IRON BINDING TEST: CPT

## 2024-01-01 PROCEDURE — 86900 BLOOD TYPING SEROLOGIC ABO: CPT | Performed by: EMERGENCY MEDICINE

## 2024-01-01 PROCEDURE — 99223 1ST HOSP IP/OBS HIGH 75: CPT | Performed by: INTERNAL MEDICINE

## 2024-01-01 PROCEDURE — 83550 IRON BINDING TEST: CPT | Performed by: STUDENT IN AN ORGANIZED HEALTH CARE EDUCATION/TRAINING PROGRAM

## 2024-01-01 PROCEDURE — 99292 CRITICAL CARE ADDL 30 MIN: CPT | Performed by: PSYCHIATRY & NEUROLOGY

## 2024-01-01 PROCEDURE — 36430 TRANSFUSION BLD/BLD COMPNT: CPT

## 2024-01-01 PROCEDURE — 99418 PROLNG IP/OBS E/M EA 15 MIN: CPT

## 2024-01-01 PROCEDURE — 82746 ASSAY OF FOLIC ACID SERUM: CPT | Performed by: EMERGENCY MEDICINE

## 2024-01-01 PROCEDURE — 70496 CT ANGIOGRAPHY HEAD: CPT | Mod: 77

## 2024-01-01 PROCEDURE — 99292 CRITICAL CARE ADDL 30 MIN: CPT | Mod: 25 | Performed by: INTERNAL MEDICINE

## 2024-01-01 PROCEDURE — 86832 HLA CLASS I HIGH DEFIN QUAL: CPT

## 2024-01-01 PROCEDURE — 85007 BL SMEAR W/DIFF WBC COUNT: CPT | Performed by: EMERGENCY MEDICINE

## 2024-01-01 PROCEDURE — 250N000009 HC RX 250: Performed by: STUDENT IN AN ORGANIZED HEALTH CARE EDUCATION/TRAINING PROGRAM

## 2024-01-01 PROCEDURE — 86481 TB AG RESPONSE T-CELL SUSP: CPT | Performed by: INTERNAL MEDICINE

## 2024-01-01 PROCEDURE — 99285 EMERGENCY DEPT VISIT HI MDM: CPT | Mod: 25

## 2024-01-01 PROCEDURE — 86140 C-REACTIVE PROTEIN: CPT

## 2024-01-01 PROCEDURE — 83993 ASSAY FOR CALPROTECTIN FECAL: CPT

## 2024-01-01 PROCEDURE — 82248 BILIRUBIN DIRECT: CPT | Performed by: PATHOLOGY

## 2024-01-01 PROCEDURE — 93970 EXTREMITY STUDY: CPT

## 2024-01-01 PROCEDURE — 85045 AUTOMATED RETICULOCYTE COUNT: CPT | Performed by: EMERGENCY MEDICINE

## 2024-01-01 PROCEDURE — 85230 CLOT FACTOR VII PROCONVERTIN: CPT | Performed by: INTERNAL MEDICINE

## 2024-01-01 PROCEDURE — 84155 ASSAY OF PROTEIN SERUM: CPT | Performed by: PATHOLOGY

## 2024-01-01 PROCEDURE — 86140 C-REACTIVE PROTEIN: CPT | Performed by: PATHOLOGY

## 2024-01-01 PROCEDURE — 255N000002 HC RX 255 OP 636: Performed by: INTERNAL MEDICINE

## 2024-01-01 PROCEDURE — 85384 FIBRINOGEN ACTIVITY: CPT | Performed by: PHYSICIAN ASSISTANT

## 2024-01-01 PROCEDURE — 999N000141 HC STATISTIC PRE-PROCEDURE NURSING ASSESSMENT: Performed by: INTERNAL MEDICINE

## 2024-01-01 PROCEDURE — 99215 OFFICE O/P EST HI 40 MIN: CPT | Mod: 95 | Performed by: NURSE PRACTITIONER

## 2024-01-01 PROCEDURE — 99215 OFFICE O/P EST HI 40 MIN: CPT | Mod: 95 | Performed by: INTERNAL MEDICINE

## 2024-01-01 PROCEDURE — 85610 PROTHROMBIN TIME: CPT | Performed by: PHYSICIAN ASSISTANT

## 2024-01-01 PROCEDURE — 99152 MOD SED SAME PHYS/QHP 5/>YRS: CPT | Mod: GC | Performed by: INTERNAL MEDICINE

## 2024-01-01 PROCEDURE — 85730 THROMBOPLASTIN TIME PARTIAL: CPT | Performed by: STUDENT IN AN ORGANIZED HEALTH CARE EDUCATION/TRAINING PROGRAM

## 2024-01-01 PROCEDURE — 86923 COMPATIBILITY TEST ELECTRIC: CPT | Performed by: STUDENT IN AN ORGANIZED HEALTH CARE EDUCATION/TRAINING PROGRAM

## 2024-01-01 PROCEDURE — 272N000473 HC KIT, VPS RHYTHM STYLET

## 2024-01-01 PROCEDURE — 0DB78ZX EXCISION OF STOMACH, PYLORUS, VIA NATURAL OR ARTIFICIAL OPENING ENDOSCOPIC, DIAGNOSTIC: ICD-10-PCS | Performed by: INTERNAL MEDICINE

## 2024-01-01 PROCEDURE — 99417 PROLNG OP E/M EACH 15 MIN: CPT | Mod: 95 | Performed by: NURSE PRACTITIONER

## 2024-01-01 PROCEDURE — 86900 BLOOD TYPING SEROLOGIC ABO: CPT | Performed by: PHYSICIAN ASSISTANT

## 2024-01-01 PROCEDURE — 84100 ASSAY OF PHOSPHORUS: CPT | Performed by: PHYSICIAN ASSISTANT

## 2024-01-01 PROCEDURE — 999N000248 HC STATISTIC IV INSERT WITH US BY RN

## 2024-01-01 PROCEDURE — 85610 PROTHROMBIN TIME: CPT | Performed by: SURGERY

## 2024-01-01 PROCEDURE — 93320 DOPPLER ECHO COMPLETE: CPT | Mod: 26 | Performed by: INTERNAL MEDICINE

## 2024-01-01 PROCEDURE — 74174 CTA ABD&PLVS W/CONTRAST: CPT

## 2024-01-01 PROCEDURE — 99255 IP/OBS CONSLTJ NEW/EST HI 80: CPT

## 2024-01-01 PROCEDURE — 99207 PR APP CREDIT; MD BILLING SHARED VISIT: CPT | Performed by: INTERNAL MEDICINE

## 2024-01-01 PROCEDURE — 99233 SBSQ HOSP IP/OBS HIGH 50: CPT | Performed by: INTERNAL MEDICINE

## 2024-01-01 PROCEDURE — 36556 INSERT NON-TUNNEL CV CATH: CPT | Mod: GC | Performed by: INTERNAL MEDICINE

## 2024-01-01 PROCEDURE — 93325 DOPPLER ECHO COLOR FLOW MAPG: CPT

## 2024-01-01 PROCEDURE — 83930 ASSAY OF BLOOD OSMOLALITY: CPT | Performed by: EMERGENCY MEDICINE

## 2024-01-01 PROCEDURE — 93306 TTE W/DOPPLER COMPLETE: CPT | Mod: 26 | Performed by: INTERNAL MEDICINE

## 2024-01-01 PROCEDURE — 99213 OFFICE O/P EST LOW 20 MIN: CPT | Performed by: SURGERY

## 2024-01-01 PROCEDURE — 36415 COLL VENOUS BLD VENIPUNCTURE: CPT | Performed by: PATHOLOGY

## 2024-01-01 RX ORDER — SIMVASTATIN 20 MG
20 TABLET ORAL EVERY MORNING
Status: DISCONTINUED | OUTPATIENT
Start: 2024-01-01 | End: 2024-03-28 | Stop reason: HOSPADM

## 2024-01-01 RX ORDER — HYDROMORPHONE HYDROCHLORIDE 1 MG/ML
0.3 INJECTION, SOLUTION INTRAMUSCULAR; INTRAVENOUS; SUBCUTANEOUS ONCE
Status: DISCONTINUED | OUTPATIENT
Start: 2024-01-01 | End: 2024-01-01

## 2024-01-01 RX ORDER — BUDESONIDE 3 MG/1
9 CAPSULE, COATED PELLETS ORAL EVERY MORNING
Status: DISCONTINUED | OUTPATIENT
Start: 2024-01-01 | End: 2024-03-28 | Stop reason: HOSPADM

## 2024-01-01 RX ORDER — HEPARIN SODIUM (PORCINE) LOCK FLUSH IV SOLN 100 UNIT/ML 100 UNIT/ML
5 SOLUTION INTRAVENOUS
Status: CANCELLED | OUTPATIENT
Start: 2024-01-01

## 2024-01-01 RX ORDER — PROPOFOL 10 MG/ML
INJECTION, EMULSION INTRAVENOUS PRN
Status: DISCONTINUED | OUTPATIENT
Start: 2024-01-01 | End: 2024-01-01

## 2024-01-01 RX ORDER — ALBUMIN (HUMAN) 12.5 G/50ML
50 SOLUTION INTRAVENOUS
Status: DISCONTINUED | OUTPATIENT
Start: 2024-01-01 | End: 2024-01-01

## 2024-01-01 RX ORDER — LORAZEPAM 0.5 MG/1
0.5 TABLET ORAL EVERY 4 HOURS PRN
Status: DISCONTINUED | OUTPATIENT
Start: 2024-01-01 | End: 2024-01-01

## 2024-01-01 RX ORDER — FENTANYL CITRATE 50 UG/ML
25 INJECTION, SOLUTION INTRAMUSCULAR; INTRAVENOUS
Status: DISCONTINUED | OUTPATIENT
Start: 2024-01-01 | End: 2024-01-01 | Stop reason: HOSPADM

## 2024-01-01 RX ORDER — LIDOCAINE 40 MG/G
CREAM TOPICAL
Status: DISCONTINUED | OUTPATIENT
Start: 2024-01-01 | End: 2024-01-01

## 2024-01-01 RX ORDER — HEPARIN SODIUM,PORCINE 10 UNIT/ML
5-20 VIAL (ML) INTRAVENOUS DAILY PRN
Status: CANCELLED | OUTPATIENT
Start: 2024-01-01

## 2024-01-01 RX ORDER — HYDROMORPHONE HYDROCHLORIDE 1 MG/ML
0.3 INJECTION, SOLUTION INTRAMUSCULAR; INTRAVENOUS; SUBCUTANEOUS ONCE
Status: COMPLETED | OUTPATIENT
Start: 2024-01-01 | End: 2024-01-01

## 2024-01-01 RX ORDER — PANTOPRAZOLE SODIUM 40 MG/1
40 TABLET, DELAYED RELEASE ORAL
Status: DISCONTINUED | OUTPATIENT
Start: 2024-01-01 | End: 2024-01-01 | Stop reason: HOSPADM

## 2024-01-01 RX ORDER — ONDANSETRON 2 MG/ML
INJECTION INTRAMUSCULAR; INTRAVENOUS PRN
Status: DISCONTINUED | OUTPATIENT
Start: 2024-01-01 | End: 2024-01-01

## 2024-01-01 RX ORDER — AMOXICILLIN 250 MG
1 CAPSULE ORAL 2 TIMES DAILY PRN
Status: DISCONTINUED | OUTPATIENT
Start: 2024-01-01 | End: 2024-01-01 | Stop reason: HOSPADM

## 2024-01-01 RX ORDER — CALCIUM CARBONATE 500 MG/1
1000 TABLET, CHEWABLE ORAL 4 TIMES DAILY PRN
Status: DISCONTINUED | OUTPATIENT
Start: 2024-01-01 | End: 2024-01-01 | Stop reason: HOSPADM

## 2024-01-01 RX ORDER — IOPAMIDOL 755 MG/ML
90 INJECTION, SOLUTION INTRAVASCULAR ONCE
Status: COMPLETED | OUTPATIENT
Start: 2024-01-01 | End: 2024-01-01

## 2024-01-01 RX ORDER — ALBUTEROL SULFATE 90 UG/1
1-2 AEROSOL, METERED RESPIRATORY (INHALATION)
Status: CANCELLED
Start: 2024-01-01

## 2024-01-01 RX ORDER — HEPARIN SODIUM 200 [USP'U]/100ML
1 INJECTION, SOLUTION INTRAVENOUS CONTINUOUS PRN
Status: DISCONTINUED | OUTPATIENT
Start: 2024-01-01 | End: 2024-01-01

## 2024-01-01 RX ORDER — TRANEXAMIC ACID 100 MG/ML
INJECTION, SOLUTION INTRAVENOUS
Status: DISPENSED
Start: 2024-01-01 | End: 2024-01-01

## 2024-01-01 RX ORDER — DEXTROSE MONOHYDRATE 25 G/50ML
25-50 INJECTION, SOLUTION INTRAVENOUS
Status: DISCONTINUED | OUTPATIENT
Start: 2024-01-01 | End: 2024-01-01

## 2024-01-01 RX ORDER — OXYCODONE HYDROCHLORIDE 5 MG/1
5 TABLET ORAL
Status: DISCONTINUED | OUTPATIENT
Start: 2024-01-01 | End: 2024-01-01 | Stop reason: HOSPADM

## 2024-01-01 RX ORDER — ONDANSETRON 4 MG/1
4 TABLET, ORALLY DISINTEGRATING ORAL EVERY 6 HOURS PRN
Status: DISCONTINUED | OUTPATIENT
Start: 2024-01-01 | End: 2024-01-01

## 2024-01-01 RX ORDER — MEPERIDINE HYDROCHLORIDE 25 MG/ML
25 INJECTION INTRAMUSCULAR; INTRAVENOUS; SUBCUTANEOUS EVERY 30 MIN PRN
Status: CANCELLED | OUTPATIENT
Start: 2024-01-01

## 2024-01-01 RX ORDER — DEXTROSE MONOHYDRATE 100 MG/ML
INJECTION, SOLUTION INTRAVENOUS CONTINUOUS PRN
Status: DISCONTINUED | OUTPATIENT
Start: 2024-01-01 | End: 2024-01-01

## 2024-01-01 RX ORDER — HYDROMORPHONE HYDROCHLORIDE 1 MG/ML
0.3 INJECTION, SOLUTION INTRAMUSCULAR; INTRAVENOUS; SUBCUTANEOUS
Status: DISCONTINUED | OUTPATIENT
Start: 2024-01-01 | End: 2024-01-01 | Stop reason: HOSPADM

## 2024-01-01 RX ORDER — ATROPINE SULFATE 0.1 MG/ML
0.5 INJECTION INTRAVENOUS
Status: DISCONTINUED | OUTPATIENT
Start: 2024-01-01 | End: 2024-01-01 | Stop reason: HOSPADM

## 2024-01-01 RX ORDER — FENTANYL CITRATE 50 UG/ML
INJECTION, SOLUTION INTRAMUSCULAR; INTRAVENOUS PRN
Status: DISCONTINUED | OUTPATIENT
Start: 2024-01-01 | End: 2024-01-01

## 2024-01-01 RX ORDER — LIDOCAINE HYDROCHLORIDE 20 MG/ML
INJECTION, SOLUTION INFILTRATION; PERINEURAL PRN
Status: DISCONTINUED | OUTPATIENT
Start: 2024-01-01 | End: 2024-01-01

## 2024-01-01 RX ORDER — LORAZEPAM 2 MG/ML
1 INJECTION INTRAMUSCULAR
Status: COMPLETED | OUTPATIENT
Start: 2024-01-01 | End: 2024-01-01

## 2024-01-01 RX ORDER — SODIUM CHLORIDE 9 MG/ML
INJECTION, SOLUTION INTRAVENOUS CONTINUOUS
Status: CANCELLED | OUTPATIENT
Start: 2024-01-01

## 2024-01-01 RX ORDER — HYDROXYZINE HYDROCHLORIDE 10 MG/1
10 TABLET, FILM COATED ORAL ONCE
Status: COMPLETED | OUTPATIENT
Start: 2024-01-01 | End: 2024-01-01

## 2024-01-01 RX ORDER — ONDANSETRON 2 MG/ML
4 INJECTION INTRAMUSCULAR; INTRAVENOUS
Status: DISCONTINUED | OUTPATIENT
Start: 2024-01-01 | End: 2024-01-01 | Stop reason: HOSPADM

## 2024-01-01 RX ORDER — FOLIC ACID 1 MG/1
1 TABLET ORAL EVERY MORNING
Status: DISCONTINUED | OUTPATIENT
Start: 2024-01-01 | End: 2024-03-28 | Stop reason: HOSPADM

## 2024-01-01 RX ORDER — QUETIAPINE FUMARATE 25 MG/1
25 TABLET, FILM COATED ORAL
Status: DISCONTINUED | OUTPATIENT
Start: 2024-01-01 | End: 2024-01-01 | Stop reason: HOSPADM

## 2024-01-01 RX ORDER — IOPAMIDOL 755 MG/ML
96 INJECTION, SOLUTION INTRAVASCULAR ONCE
Status: COMPLETED | OUTPATIENT
Start: 2024-01-01 | End: 2024-01-01

## 2024-01-01 RX ORDER — PIPERACILLIN SODIUM, TAZOBACTAM SODIUM 4; .5 G/20ML; G/20ML
4.5 INJECTION, POWDER, LYOPHILIZED, FOR SOLUTION INTRAVENOUS ONCE
Status: COMPLETED | OUTPATIENT
Start: 2024-01-01 | End: 2024-01-01

## 2024-01-01 RX ORDER — LIDOCAINE 40 MG/G
CREAM TOPICAL
Status: DISCONTINUED | OUTPATIENT
Start: 2024-01-01 | End: 2024-03-28 | Stop reason: HOSPADM

## 2024-01-01 RX ORDER — IOPAMIDOL 755 MG/ML
INJECTION, SOLUTION INTRAVASCULAR
Status: DISCONTINUED | OUTPATIENT
Start: 2024-01-01 | End: 2024-01-01 | Stop reason: HOSPADM

## 2024-01-01 RX ORDER — IOPAMIDOL 755 MG/ML
67 INJECTION, SOLUTION INTRAVASCULAR ONCE
Status: COMPLETED | OUTPATIENT
Start: 2024-01-01 | End: 2024-01-01

## 2024-01-01 RX ORDER — CITALOPRAM HYDROBROMIDE 20 MG/1
20 TABLET ORAL DAILY
Status: DISCONTINUED | OUTPATIENT
Start: 2024-01-01 | End: 2024-01-01

## 2024-01-01 RX ORDER — LIDOCAINE 40 MG/G
CREAM TOPICAL
Status: DISCONTINUED | OUTPATIENT
Start: 2024-01-01 | End: 2024-01-01 | Stop reason: HOSPADM

## 2024-01-01 RX ORDER — MAGNESIUM SULFATE HEPTAHYDRATE 40 MG/ML
2 INJECTION, SOLUTION INTRAVENOUS ONCE
Status: COMPLETED | OUTPATIENT
Start: 2024-01-01 | End: 2024-01-01

## 2024-01-01 RX ORDER — DIPHENHYDRAMINE HYDROCHLORIDE 50 MG/ML
50 INJECTION INTRAMUSCULAR; INTRAVENOUS
Status: CANCELLED
Start: 2024-01-01

## 2024-01-01 RX ORDER — EPINEPHRINE 1 MG/ML
0.3 INJECTION, SOLUTION INTRAMUSCULAR; SUBCUTANEOUS EVERY 5 MIN PRN
Status: CANCELLED | OUTPATIENT
Start: 2024-01-01

## 2024-01-01 RX ORDER — MIRTAZAPINE 7.5 MG/1
7.5 TABLET, FILM COATED ORAL AT BEDTIME
Status: DISCONTINUED | OUTPATIENT
Start: 2024-01-01 | End: 2024-03-28 | Stop reason: HOSPADM

## 2024-01-01 RX ORDER — CALCIUM ACETATE 667 MG/1
2668 CAPSULE ORAL
Status: DISCONTINUED | OUTPATIENT
Start: 2024-01-01 | End: 2024-01-01 | Stop reason: HOSPADM

## 2024-01-01 RX ORDER — BUDESONIDE 3 MG/1
9 CAPSULE, COATED PELLETS ORAL EVERY MORNING
Status: DISCONTINUED | OUTPATIENT
Start: 2024-01-01 | End: 2024-01-01 | Stop reason: HOSPADM

## 2024-01-01 RX ORDER — NALOXONE HYDROCHLORIDE 0.4 MG/ML
0.4 INJECTION, SOLUTION INTRAMUSCULAR; INTRAVENOUS; SUBCUTANEOUS
Status: DISCONTINUED | OUTPATIENT
Start: 2024-01-01 | End: 2024-01-01 | Stop reason: HOSPADM

## 2024-01-01 RX ORDER — CYCLOBENZAPRINE HCL 5 MG
5 TABLET ORAL EVERY 8 HOURS PRN
Status: COMPLETED | OUTPATIENT
Start: 2024-01-01 | End: 2024-01-01

## 2024-01-01 RX ORDER — NICOTINE POLACRILEX 4 MG
15-30 LOZENGE BUCCAL
Status: DISCONTINUED | OUTPATIENT
Start: 2024-01-01 | End: 2024-01-01

## 2024-01-01 RX ORDER — HEPARIN SODIUM 1000 [USP'U]/ML
INJECTION, SOLUTION INTRAVENOUS; SUBCUTANEOUS
Status: DISCONTINUED | OUTPATIENT
Start: 2024-01-01 | End: 2024-01-01 | Stop reason: HOSPADM

## 2024-01-01 RX ORDER — EPINEPHRINE 1 MG/ML
0.3 INJECTION, SOLUTION, CONCENTRATE INTRAVENOUS EVERY 5 MIN PRN
Status: CANCELLED | OUTPATIENT
Start: 2024-01-01

## 2024-01-01 RX ORDER — CITALOPRAM HYDROBROMIDE 20 MG/1
20 TABLET ORAL DAILY PRN
COMMUNITY
Start: 2023-01-01

## 2024-01-01 RX ORDER — METRONIDAZOLE 500 MG/1
500 TABLET ORAL 3 TIMES DAILY
Qty: 87 TABLET | Refills: 0 | Status: SHIPPED | OUTPATIENT
Start: 2024-01-01 | End: 2024-04-15

## 2024-01-01 RX ORDER — GABAPENTIN 100 MG/1
100 CAPSULE ORAL
Status: CANCELLED | OUTPATIENT
Start: 2024-01-01

## 2024-01-01 RX ORDER — PREDNISONE 20 MG/1
40 TABLET ORAL DAILY
Qty: 42 TABLET | Refills: 0 | Status: ON HOLD | OUTPATIENT
Start: 2024-01-01 | End: 2024-01-01

## 2024-01-01 RX ORDER — ONDANSETRON 2 MG/ML
4 INJECTION INTRAMUSCULAR; INTRAVENOUS EVERY 6 HOURS PRN
Status: DISCONTINUED | OUTPATIENT
Start: 2024-01-01 | End: 2024-01-01 | Stop reason: HOSPADM

## 2024-01-01 RX ORDER — HYDRALAZINE HYDROCHLORIDE 20 MG/ML
10 INJECTION INTRAMUSCULAR; INTRAVENOUS EVERY 4 HOURS PRN
Status: DISCONTINUED | OUTPATIENT
Start: 2024-01-01 | End: 2024-01-01 | Stop reason: HOSPADM

## 2024-01-01 RX ORDER — METRONIDAZOLE 500 MG/1
500 TABLET ORAL 3 TIMES DAILY
Status: DISCONTINUED | OUTPATIENT
Start: 2024-01-01 | End: 2024-01-01 | Stop reason: HOSPADM

## 2024-01-01 RX ORDER — CEFEPIME HYDROCHLORIDE 1 G/1
1 INJECTION, POWDER, FOR SOLUTION INTRAMUSCULAR; INTRAVENOUS EVERY 24 HOURS
Status: DISCONTINUED | OUTPATIENT
Start: 2024-01-01 | End: 2024-01-01

## 2024-01-01 RX ORDER — PROPOFOL 10 MG/ML
INJECTION, EMULSION INTRAVENOUS CONTINUOUS PRN
Status: DISCONTINUED | OUTPATIENT
Start: 2024-01-01 | End: 2024-01-01

## 2024-01-01 RX ORDER — PROCHLORPERAZINE MALEATE 10 MG
10 TABLET ORAL EVERY 6 HOURS PRN
Status: DISCONTINUED | OUTPATIENT
Start: 2024-01-01 | End: 2024-01-01 | Stop reason: HOSPADM

## 2024-01-01 RX ORDER — B COMPLEX, C NO.20/FOLIC ACID 1 MG
1 CAPSULE ORAL EVERY MORNING
Status: DISCONTINUED | OUTPATIENT
Start: 2024-01-01 | End: 2024-01-01 | Stop reason: HOSPADM

## 2024-01-01 RX ORDER — PREDNISONE 20 MG/1
40 TABLET ORAL DAILY
Status: DISCONTINUED | OUTPATIENT
Start: 2024-01-01 | End: 2024-03-28 | Stop reason: HOSPADM

## 2024-01-01 RX ORDER — LORAZEPAM 0.5 MG/1
0.5 TABLET ORAL EVERY 4 HOURS PRN
Status: DISCONTINUED | OUTPATIENT
Start: 2024-01-01 | End: 2024-01-01 | Stop reason: HOSPADM

## 2024-01-01 RX ORDER — NALOXONE HYDROCHLORIDE 0.4 MG/ML
0.2 INJECTION, SOLUTION INTRAMUSCULAR; INTRAVENOUS; SUBCUTANEOUS
Status: DISCONTINUED | OUTPATIENT
Start: 2024-01-01 | End: 2024-01-01 | Stop reason: HOSPADM

## 2024-01-01 RX ORDER — LIDOCAINE 40 MG/G
CREAM TOPICAL
Status: CANCELLED | OUTPATIENT
Start: 2024-01-01

## 2024-01-01 RX ORDER — ACETAMINOPHEN 325 MG/1
650 TABLET ORAL EVERY 4 HOURS PRN
Status: DISCONTINUED | OUTPATIENT
Start: 2024-01-01 | End: 2024-01-01 | Stop reason: HOSPADM

## 2024-01-01 RX ORDER — PIPERACILLIN SODIUM, TAZOBACTAM SODIUM 2; .25 G/10ML; G/10ML
2.25 INJECTION, POWDER, LYOPHILIZED, FOR SOLUTION INTRAVENOUS EVERY 6 HOURS
Status: DISCONTINUED | OUTPATIENT
Start: 2024-01-01 | End: 2024-03-28 | Stop reason: HOSPADM

## 2024-01-01 RX ORDER — CHLORHEXIDINE GLUCONATE ORAL RINSE 1.2 MG/ML
10 SOLUTION DENTAL ONCE
Status: CANCELLED | OUTPATIENT
Start: 2024-01-01 | End: 2024-01-01

## 2024-01-01 RX ORDER — POTASSIUM CHLORIDE 20MEQ/15ML
20 LIQUID (ML) ORAL ONCE
Status: COMPLETED | OUTPATIENT
Start: 2024-01-01 | End: 2024-01-01

## 2024-01-01 RX ORDER — OXYCODONE HCL 20 MG/ML
10 CONCENTRATE, ORAL ORAL ONCE
Status: COMPLETED | OUTPATIENT
Start: 2024-01-01 | End: 2024-01-01

## 2024-01-01 RX ORDER — PREDNISONE 10 MG/1
40 TABLET ORAL DAILY
Qty: 120 TABLET | Refills: 0 | Status: SHIPPED | OUTPATIENT
Start: 2024-01-01 | End: 2024-04-20

## 2024-01-01 RX ORDER — CEFTRIAXONE 1 G/1
1 INJECTION, POWDER, FOR SOLUTION INTRAMUSCULAR; INTRAVENOUS EVERY 24 HOURS
Status: DISCONTINUED | OUTPATIENT
Start: 2024-01-01 | End: 2024-01-01

## 2024-01-01 RX ORDER — B COMPLEX, C NO.20/FOLIC ACID 1 MG
1 CAPSULE ORAL DAILY
Status: DISCONTINUED | OUTPATIENT
Start: 2024-01-01 | End: 2024-01-01

## 2024-01-01 RX ORDER — OXYCODONE HYDROCHLORIDE 5 MG/1
5 TABLET ORAL EVERY 4 HOURS PRN
Status: DISCONTINUED | OUTPATIENT
Start: 2024-01-01 | End: 2024-01-01 | Stop reason: HOSPADM

## 2024-01-01 RX ORDER — DEXAMETHASONE SODIUM PHOSPHATE 4 MG/ML
INJECTION, SOLUTION INTRA-ARTICULAR; INTRALESIONAL; INTRAMUSCULAR; INTRAVENOUS; SOFT TISSUE PRN
Status: DISCONTINUED | OUTPATIENT
Start: 2024-01-01 | End: 2024-01-01

## 2024-01-01 RX ORDER — GADOBUTROL 604.72 MG/ML
7 INJECTION INTRAVENOUS ONCE
Status: COMPLETED | OUTPATIENT
Start: 2024-01-01 | End: 2024-01-01

## 2024-01-01 RX ORDER — METHYLPREDNISOLONE SODIUM SUCCINATE 125 MG/2ML
125 INJECTION, POWDER, LYOPHILIZED, FOR SOLUTION INTRAMUSCULAR; INTRAVENOUS
Status: CANCELLED
Start: 2024-01-01

## 2024-01-01 RX ORDER — LORAZEPAM 0.5 MG/1
0.5 TABLET ORAL 2 TIMES DAILY PRN
Status: DISCONTINUED | OUTPATIENT
Start: 2024-01-01 | End: 2024-01-01 | Stop reason: HOSPADM

## 2024-01-01 RX ORDER — SODIUM CHLORIDE, SODIUM LACTATE, POTASSIUM CHLORIDE, CALCIUM CHLORIDE 600; 310; 30; 20 MG/100ML; MG/100ML; MG/100ML; MG/100ML
INJECTION, SOLUTION INTRAVENOUS CONTINUOUS PRN
Status: DISCONTINUED | OUTPATIENT
Start: 2024-01-01 | End: 2024-01-01

## 2024-01-01 RX ORDER — B COMPLEX, C NO.20/FOLIC ACID 1 MG
1 CAPSULE ORAL EVERY MORNING
Status: DISCONTINUED | OUTPATIENT
Start: 2024-01-01 | End: 2024-03-28 | Stop reason: HOSPADM

## 2024-01-01 RX ORDER — ACETAMINOPHEN 325 MG/1
975 TABLET ORAL ONCE
Status: CANCELLED | OUTPATIENT
Start: 2024-01-01 | End: 2024-01-01

## 2024-01-01 RX ORDER — NICOTINE 21 MG/24HR
1 PATCH, TRANSDERMAL 24 HOURS TRANSDERMAL DAILY
Status: DISCONTINUED | OUTPATIENT
Start: 2024-01-01 | End: 2024-03-28 | Stop reason: HOSPADM

## 2024-01-01 RX ORDER — METRONIDAZOLE 500 MG/1
500 TABLET ORAL 3 TIMES DAILY
Status: DISCONTINUED | OUTPATIENT
Start: 2024-01-01 | End: 2024-03-28 | Stop reason: HOSPADM

## 2024-01-01 RX ORDER — HYDROMORPHONE HYDROCHLORIDE 2 MG/1
2 TABLET ORAL ONCE
Status: COMPLETED | OUTPATIENT
Start: 2024-01-01 | End: 2024-01-01

## 2024-01-01 RX ORDER — DEXTROSE MONOHYDRATE 25 G/50ML
25-50 INJECTION, SOLUTION INTRAVENOUS
Status: DISCONTINUED | OUTPATIENT
Start: 2024-01-01 | End: 2024-03-28 | Stop reason: HOSPADM

## 2024-01-01 RX ORDER — HEPARIN SODIUM,PORCINE 10 UNIT/ML
5-20 VIAL (ML) INTRAVENOUS
Status: DISCONTINUED | OUTPATIENT
Start: 2024-01-01 | End: 2024-03-28 | Stop reason: HOSPADM

## 2024-01-01 RX ORDER — ACETAMINOPHEN 325 MG/1
650 TABLET ORAL ONCE
Status: CANCELLED
Start: 2024-01-01 | End: 2024-01-01

## 2024-01-01 RX ORDER — ASPIRIN 325 MG
325 TABLET ORAL ONCE
Status: CANCELLED | OUTPATIENT
Start: 2024-01-01 | End: 2024-01-01

## 2024-01-01 RX ORDER — ONDANSETRON 4 MG/1
4 TABLET, ORALLY DISINTEGRATING ORAL EVERY 6 HOURS PRN
Status: DISCONTINUED | OUTPATIENT
Start: 2024-01-01 | End: 2024-01-01 | Stop reason: HOSPADM

## 2024-01-01 RX ORDER — TEMAZEPAM 7.5 MG/1
7.5 CAPSULE ORAL
Status: COMPLETED | OUTPATIENT
Start: 2024-01-01 | End: 2024-01-01

## 2024-01-01 RX ORDER — NOREPINEPHRINE BITARTRATE 0.06 MG/ML
.01-.6 INJECTION, SOLUTION INTRAVENOUS CONTINUOUS
Status: DISCONTINUED | OUTPATIENT
Start: 2024-01-01 | End: 2024-03-28 | Stop reason: HOSPADM

## 2024-01-01 RX ORDER — SODIUM CHLORIDE 9 MG/ML
INJECTION, SOLUTION INTRAVENOUS CONTINUOUS PRN
Status: DISCONTINUED | OUTPATIENT
Start: 2024-01-01 | End: 2024-01-01

## 2024-01-01 RX ORDER — AMOXICILLIN 250 MG
2 CAPSULE ORAL 2 TIMES DAILY PRN
Status: DISCONTINUED | OUTPATIENT
Start: 2024-01-01 | End: 2024-01-01 | Stop reason: HOSPADM

## 2024-01-01 RX ORDER — BUDESONIDE 3 MG/1
9 CAPSULE, COATED PELLETS ORAL EVERY MORNING
Qty: 90 CAPSULE | Refills: 0 | Status: SHIPPED | OUTPATIENT
Start: 2024-01-01

## 2024-01-01 RX ORDER — FENTANYL CITRATE 50 UG/ML
INJECTION, SOLUTION INTRAMUSCULAR; INTRAVENOUS
Status: DISCONTINUED | OUTPATIENT
Start: 2024-01-01 | End: 2024-01-01 | Stop reason: HOSPADM

## 2024-01-01 RX ORDER — METHYLPREDNISOLONE SODIUM SUCCINATE 40 MG/ML
16 INJECTION, POWDER, LYOPHILIZED, FOR SOLUTION INTRAMUSCULAR; INTRAVENOUS EVERY 24 HOURS
Status: DISCONTINUED | OUTPATIENT
Start: 2024-01-01 | End: 2024-03-28 | Stop reason: HOSPADM

## 2024-01-01 RX ORDER — MAGNESIUM SULFATE 1 G/100ML
1 INJECTION INTRAVENOUS ONCE
Status: COMPLETED | OUTPATIENT
Start: 2024-01-01 | End: 2024-01-01

## 2024-01-01 RX ORDER — OXYMETAZOLINE HYDROCHLORIDE 0.05 G/100ML
2 SPRAY NASAL 2 TIMES DAILY PRN
Status: DISCONTINUED | OUTPATIENT
Start: 2024-01-01 | End: 2024-01-01 | Stop reason: HOSPADM

## 2024-01-01 RX ORDER — NITROGLYCERIN 5 MG/ML
VIAL (ML) INTRAVENOUS
Status: DISCONTINUED | OUTPATIENT
Start: 2024-01-01 | End: 2024-01-01 | Stop reason: HOSPADM

## 2024-01-01 RX ORDER — ONDANSETRON 2 MG/ML
4 INJECTION INTRAMUSCULAR; INTRAVENOUS EVERY 6 HOURS PRN
Status: DISCONTINUED | OUTPATIENT
Start: 2024-01-01 | End: 2024-01-01

## 2024-01-01 RX ORDER — SODIUM CHLORIDE, SODIUM LACTATE, POTASSIUM CHLORIDE, CALCIUM CHLORIDE 600; 310; 30; 20 MG/100ML; MG/100ML; MG/100ML; MG/100ML
INJECTION, SOLUTION INTRAVENOUS CONTINUOUS
Status: CANCELLED | OUTPATIENT
Start: 2024-01-01

## 2024-01-01 RX ORDER — SIMVASTATIN 20 MG
20 TABLET ORAL EVERY MORNING
Status: DISCONTINUED | OUTPATIENT
Start: 2024-01-01 | End: 2024-01-01 | Stop reason: HOSPADM

## 2024-01-01 RX ORDER — ASPIRIN 325 MG
325 TABLET ORAL ONCE
Status: COMPLETED | OUTPATIENT
Start: 2024-01-01 | End: 2024-01-01

## 2024-01-01 RX ORDER — ACETAMINOPHEN 650 MG/1
650 SUPPOSITORY RECTAL EVERY 4 HOURS PRN
Status: DISCONTINUED | OUTPATIENT
Start: 2024-01-01 | End: 2024-01-01 | Stop reason: HOSPADM

## 2024-01-01 RX ORDER — CITALOPRAM HYDROBROMIDE 20 MG/1
20 TABLET ORAL EVERY MORNING
Status: DISCONTINUED | OUTPATIENT
Start: 2024-01-01 | End: 2024-01-01 | Stop reason: HOSPADM

## 2024-01-01 RX ORDER — CITALOPRAM HYDROBROMIDE 20 MG/1
20 TABLET ORAL DAILY PRN
Status: DISCONTINUED | OUTPATIENT
Start: 2024-01-01 | End: 2024-01-01

## 2024-01-01 RX ORDER — FOLIC ACID 1 MG/1
1 TABLET ORAL EVERY MORNING
Status: DISCONTINUED | OUTPATIENT
Start: 2024-01-01 | End: 2024-01-01 | Stop reason: HOSPADM

## 2024-01-01 RX ORDER — HEPARIN SODIUM,PORCINE 10 UNIT/ML
5-20 VIAL (ML) INTRAVENOUS EVERY 24 HOURS
Status: DISCONTINUED | OUTPATIENT
Start: 2024-01-01 | End: 2024-03-28 | Stop reason: HOSPADM

## 2024-01-01 RX ORDER — ALBUMIN (HUMAN) 12.5 G/50ML
50 SOLUTION INTRAVENOUS
Status: DISCONTINUED | OUTPATIENT
Start: 2024-01-01 | End: 2024-01-01 | Stop reason: HOSPADM

## 2024-01-01 RX ORDER — PANTOPRAZOLE SODIUM 40 MG/1
40 TABLET, DELAYED RELEASE ORAL EVERY MORNING
Status: DISCONTINUED | OUTPATIENT
Start: 2024-01-01 | End: 2024-01-01

## 2024-01-01 RX ORDER — LORAZEPAM 2 MG/ML
1 INJECTION INTRAMUSCULAR EVERY 6 HOURS PRN
Status: DISCONTINUED | OUTPATIENT
Start: 2024-01-01 | End: 2024-01-01

## 2024-01-01 RX ORDER — ALBUTEROL SULFATE 0.83 MG/ML
2.5 SOLUTION RESPIRATORY (INHALATION)
Status: CANCELLED | OUTPATIENT
Start: 2024-01-01

## 2024-01-01 RX ORDER — PROCHLORPERAZINE 25 MG
25 SUPPOSITORY, RECTAL RECTAL EVERY 12 HOURS PRN
Status: DISCONTINUED | OUTPATIENT
Start: 2024-01-01 | End: 2024-01-01 | Stop reason: HOSPADM

## 2024-01-01 RX ORDER — POTASSIUM CHLORIDE 1500 MG/1
40 TABLET, EXTENDED RELEASE ORAL
Status: CANCELLED | OUTPATIENT
Start: 2024-01-01

## 2024-01-01 RX ORDER — IOPAMIDOL 755 MG/ML
500 INJECTION, SOLUTION INTRAVASCULAR ONCE
Status: COMPLETED | OUTPATIENT
Start: 2024-01-01 | End: 2024-01-01

## 2024-01-01 RX ORDER — OXYCODONE HYDROCHLORIDE 5 MG/1
10 TABLET ORAL
Status: DISCONTINUED | OUTPATIENT
Start: 2024-01-01 | End: 2024-01-01 | Stop reason: HOSPADM

## 2024-01-01 RX ORDER — NALOXONE HYDROCHLORIDE 0.4 MG/ML
0.2 INJECTION, SOLUTION INTRAMUSCULAR; INTRAVENOUS; SUBCUTANEOUS
Status: DISCONTINUED | OUTPATIENT
Start: 2024-01-01 | End: 2024-01-01

## 2024-01-01 RX ORDER — AMOXICILLIN 250 MG
1 CAPSULE ORAL 2 TIMES DAILY PRN
Status: DISCONTINUED | OUTPATIENT
Start: 2024-01-01 | End: 2024-01-01

## 2024-01-01 RX ORDER — AMOXICILLIN 250 MG
2 CAPSULE ORAL 2 TIMES DAILY PRN
Status: DISCONTINUED | OUTPATIENT
Start: 2024-01-01 | End: 2024-01-01

## 2024-01-01 RX ORDER — DEXMEDETOMIDINE HYDROCHLORIDE 4 UG/ML
0.2-1.2 INJECTION, SOLUTION INTRAVENOUS CONTINUOUS
Status: CANCELLED | OUTPATIENT
Start: 2024-01-01

## 2024-01-01 RX ORDER — FOLIC ACID 1 MG/1
1 TABLET ORAL AT BEDTIME
Status: DISCONTINUED | OUTPATIENT
Start: 2024-01-01 | End: 2024-01-01 | Stop reason: HOSPADM

## 2024-01-01 RX ORDER — CITALOPRAM HYDROBROMIDE 20 MG/1
20 TABLET ORAL DAILY PRN
Status: DISCONTINUED | OUTPATIENT
Start: 2024-01-01 | End: 2024-01-01 | Stop reason: HOSPADM

## 2024-01-01 RX ORDER — FLUMAZENIL 0.1 MG/ML
0.2 INJECTION, SOLUTION INTRAVENOUS
Status: DISCONTINUED | OUTPATIENT
Start: 2024-01-01 | End: 2024-01-01 | Stop reason: HOSPADM

## 2024-01-01 RX ORDER — TRANEXAMIC ACID 10 MG/ML
1 INJECTION, SOLUTION INTRAVENOUS ONCE
Status: COMPLETED | OUTPATIENT
Start: 2024-01-01 | End: 2024-01-01

## 2024-01-01 RX ORDER — ONDANSETRON 4 MG/1
4 TABLET, ORALLY DISINTEGRATING ORAL EVERY 30 MIN PRN
Status: DISCONTINUED | OUTPATIENT
Start: 2024-01-01 | End: 2024-01-01 | Stop reason: HOSPADM

## 2024-01-01 RX ORDER — ASPIRIN 81 MG/1
243 TABLET, CHEWABLE ORAL ONCE
Status: CANCELLED | OUTPATIENT
Start: 2024-01-01

## 2024-01-01 RX ORDER — B COMPLEX, C NO.20/FOLIC ACID 1 MG
1 CAPSULE ORAL AT BEDTIME
Status: DISCONTINUED | OUTPATIENT
Start: 2024-01-01 | End: 2024-01-01 | Stop reason: HOSPADM

## 2024-01-01 RX ORDER — NALOXONE HYDROCHLORIDE 0.4 MG/ML
0.4 INJECTION, SOLUTION INTRAMUSCULAR; INTRAVENOUS; SUBCUTANEOUS
Status: DISCONTINUED | OUTPATIENT
Start: 2024-01-01 | End: 2024-01-01

## 2024-01-01 RX ORDER — LORAZEPAM 2 MG/ML
0.5 INJECTION INTRAMUSCULAR ONCE
Status: COMPLETED | OUTPATIENT
Start: 2024-01-01 | End: 2024-01-01

## 2024-01-01 RX ORDER — ONDANSETRON 2 MG/ML
4 INJECTION INTRAMUSCULAR; INTRAVENOUS EVERY 30 MIN PRN
Status: DISCONTINUED | OUTPATIENT
Start: 2024-01-01 | End: 2024-01-01 | Stop reason: HOSPADM

## 2024-01-01 RX ORDER — FENTANYL CITRATE 50 UG/ML
25-50 INJECTION, SOLUTION INTRAMUSCULAR; INTRAVENOUS EVERY 5 MIN PRN
Status: DISCONTINUED | OUTPATIENT
Start: 2024-01-01 | End: 2024-01-01

## 2024-01-01 RX ORDER — NALOXONE HYDROCHLORIDE 0.4 MG/ML
0.1 INJECTION, SOLUTION INTRAMUSCULAR; INTRAVENOUS; SUBCUTANEOUS
Status: DISCONTINUED | OUTPATIENT
Start: 2024-01-01 | End: 2024-01-01 | Stop reason: HOSPADM

## 2024-01-01 RX ORDER — POTASSIUM CHLORIDE 750 MG/1
40 TABLET, EXTENDED RELEASE ORAL
Status: DISCONTINUED | OUTPATIENT
Start: 2024-01-01 | End: 2024-01-01 | Stop reason: HOSPADM

## 2024-01-01 RX ORDER — OXYCODONE HYDROCHLORIDE 10 MG/1
10 TABLET ORAL
Status: DISCONTINUED | OUTPATIENT
Start: 2024-01-01 | End: 2024-01-01 | Stop reason: HOSPADM

## 2024-01-01 RX ORDER — PREDNISONE 5 MG/1
10 TABLET ORAL 4 TIMES DAILY
Status: DISCONTINUED | OUTPATIENT
Start: 2024-01-01 | End: 2024-01-01

## 2024-01-01 RX ORDER — OXYCODONE HYDROCHLORIDE 10 MG/1
10 TABLET ORAL EVERY 4 HOURS PRN
Status: DISCONTINUED | OUTPATIENT
Start: 2024-01-01 | End: 2024-01-01 | Stop reason: HOSPADM

## 2024-01-01 RX ORDER — PIPERACILLIN SODIUM, TAZOBACTAM SODIUM 2; .25 G/10ML; G/10ML
2.25 INJECTION, POWDER, LYOPHILIZED, FOR SOLUTION INTRAVENOUS EVERY 8 HOURS
Status: DISCONTINUED | OUTPATIENT
Start: 2024-01-01 | End: 2024-01-01

## 2024-01-01 RX ORDER — DIPHENHYDRAMINE HCL 25 MG
25 CAPSULE ORAL ONCE
Status: CANCELLED
Start: 2024-01-01 | End: 2024-01-01

## 2024-01-01 RX ORDER — CEFAZOLIN SODIUM 2 G/50ML
2 SOLUTION INTRAVENOUS
Status: CANCELLED | OUTPATIENT
Start: 2024-01-01

## 2024-01-01 RX ORDER — TRAZODONE HYDROCHLORIDE 50 MG/1
50 TABLET, FILM COATED ORAL
Status: DISCONTINUED | OUTPATIENT
Start: 2024-01-01 | End: 2024-01-01 | Stop reason: HOSPADM

## 2024-01-01 RX ORDER — CEFAZOLIN SODIUM 2 G/50ML
2 SOLUTION INTRAVENOUS SEE ADMIN INSTRUCTIONS
Status: CANCELLED | OUTPATIENT
Start: 2024-01-01

## 2024-01-01 RX ORDER — POTASSIUM CHLORIDE 1500 MG/1
20 TABLET, EXTENDED RELEASE ORAL
Status: CANCELLED | OUTPATIENT
Start: 2024-01-01

## 2024-01-01 RX ORDER — HYDROMORPHONE HYDROCHLORIDE 2 MG/1
2 TABLET ORAL EVERY 4 HOURS PRN
Status: DISCONTINUED | OUTPATIENT
Start: 2024-01-01 | End: 2024-01-01 | Stop reason: HOSPADM

## 2024-01-01 RX ORDER — CALCIUM CARBONATE 500 MG/1
1000 TABLET, CHEWABLE ORAL 4 TIMES DAILY PRN
Status: DISCONTINUED | OUTPATIENT
Start: 2024-01-01 | End: 2024-01-01

## 2024-01-01 RX ORDER — SODIUM CHLORIDE 9 MG/ML
INJECTION, SOLUTION INTRAVENOUS CONTINUOUS
Status: DISCONTINUED | OUTPATIENT
Start: 2024-01-01 | End: 2024-01-01 | Stop reason: HOSPADM

## 2024-01-01 RX ORDER — FAMOTIDINE 20 MG/1
20 TABLET, FILM COATED ORAL
Status: CANCELLED | OUTPATIENT
Start: 2024-01-01

## 2024-01-01 RX ORDER — EPINEPHRINE 0.1 MG/ML
INJECTION INTRAVENOUS PRN
Status: DISCONTINUED | OUTPATIENT
Start: 2024-01-01 | End: 2024-01-01 | Stop reason: HOSPADM

## 2024-01-01 RX ORDER — METHYLPREDNISOLONE SODIUM SUCCINATE 125 MG/2ML
32 INJECTION, POWDER, LYOPHILIZED, FOR SOLUTION INTRAMUSCULAR; INTRAVENOUS ONCE
Status: CANCELLED | OUTPATIENT
Start: 2024-01-01 | End: 2024-01-01

## 2024-01-01 RX ORDER — LORAZEPAM 0.5 MG/1
0.5 TABLET ORAL EVERY 8 HOURS PRN
Status: DISCONTINUED | OUTPATIENT
Start: 2024-01-01 | End: 2024-01-01

## 2024-01-01 RX ORDER — FLUMAZENIL 0.1 MG/ML
0.2 INJECTION, SOLUTION INTRAVENOUS
Status: DISCONTINUED | OUTPATIENT
Start: 2024-01-01 | End: 2024-01-01

## 2024-01-01 RX ORDER — POTASSIUM CHLORIDE 750 MG/1
20 TABLET, EXTENDED RELEASE ORAL
Status: DISCONTINUED | OUTPATIENT
Start: 2024-01-01 | End: 2024-01-01 | Stop reason: HOSPADM

## 2024-01-01 RX ORDER — CALCIUM ACETATE 667 MG/1
2668 CAPSULE ORAL
Status: DISCONTINUED | OUTPATIENT
Start: 2024-01-01 | End: 2024-03-28 | Stop reason: HOSPADM

## 2024-01-01 RX ORDER — MAGNESIUM SULFATE HEPTAHYDRATE 40 MG/ML
4 INJECTION, SOLUTION INTRAVENOUS ONCE
Status: COMPLETED | OUTPATIENT
Start: 2024-01-01 | End: 2024-01-01

## 2024-01-01 RX ORDER — METHYLPREDNISOLONE SODIUM SUCCINATE 40 MG/ML
20 INJECTION, POWDER, LYOPHILIZED, FOR SOLUTION INTRAMUSCULAR; INTRAVENOUS EVERY 8 HOURS
Status: DISCONTINUED | OUTPATIENT
Start: 2024-01-01 | End: 2024-01-01 | Stop reason: HOSPADM

## 2024-01-01 RX ORDER — TRAZODONE HYDROCHLORIDE 50 MG/1
50 TABLET, FILM COATED ORAL
Status: DISCONTINUED | OUTPATIENT
Start: 2024-01-01 | End: 2024-03-28 | Stop reason: HOSPADM

## 2024-01-01 RX ORDER — NICARDIPINE HYDROCHLORIDE 2.5 MG/ML
INJECTION INTRAVENOUS
Status: DISCONTINUED | OUTPATIENT
Start: 2024-01-01 | End: 2024-01-01 | Stop reason: HOSPADM

## 2024-01-01 RX ORDER — NICOTINE POLACRILEX 4 MG
15-30 LOZENGE BUCCAL
Status: DISCONTINUED | OUTPATIENT
Start: 2024-01-01 | End: 2024-03-28 | Stop reason: HOSPADM

## 2024-01-01 RX ORDER — LORATADINE 10 MG/1
10 TABLET ORAL DAILY
Status: CANCELLED
Start: 2024-01-01

## 2024-01-01 RX ORDER — TETRAHYDROZOLINE HCL 0.05 %
1 DROPS OPHTHALMIC (EYE) 4 TIMES DAILY PRN
Status: DISCONTINUED | OUTPATIENT
Start: 2024-01-01 | End: 2024-03-28 | Stop reason: HOSPADM

## 2024-01-01 RX ORDER — CALCIUM ACETATE 667 MG/1
2668 CAPSULE ORAL EVERY MORNING
Status: DISCONTINUED | OUTPATIENT
Start: 2024-01-01 | End: 2024-01-01 | Stop reason: HOSPADM

## 2024-01-01 RX ORDER — EPHEDRINE SULFATE 50 MG/ML
INJECTION, SOLUTION INTRAMUSCULAR; INTRAVENOUS; SUBCUTANEOUS PRN
Status: DISCONTINUED | OUTPATIENT
Start: 2024-01-01 | End: 2024-01-01

## 2024-01-01 RX ORDER — METHYLPREDNISOLONE SODIUM SUCCINATE 40 MG/ML
32 INJECTION, POWDER, LYOPHILIZED, FOR SOLUTION INTRAMUSCULAR; INTRAVENOUS EVERY 12 HOURS
Status: DISCONTINUED | OUTPATIENT
Start: 2024-01-01 | End: 2024-03-28 | Stop reason: HOSPADM

## 2024-01-01 RX ORDER — TRANEXAMIC ACID 10 MG/ML
125 INJECTION, SOLUTION INTRAVENOUS CONTINUOUS
Status: DISPENSED | OUTPATIENT
Start: 2024-01-01 | End: 2024-01-01

## 2024-01-01 RX ORDER — PREDNISONE 20 MG/1
40 TABLET ORAL ONCE
Status: COMPLETED | OUTPATIENT
Start: 2024-01-01 | End: 2024-01-01

## 2024-01-01 RX ORDER — LORAZEPAM 0.5 MG/1
0.5 TABLET ORAL ONCE
Status: COMPLETED | OUTPATIENT
Start: 2024-01-01 | End: 2024-01-01

## 2024-01-01 RX ORDER — ASPIRIN 81 MG/1
243 TABLET, CHEWABLE ORAL ONCE
Status: COMPLETED | OUTPATIENT
Start: 2024-01-01 | End: 2024-01-01

## 2024-01-01 RX ADMIN — METRONIDAZOLE 500 MG: 500 TABLET ORAL at 20:48

## 2024-01-01 RX ADMIN — PROPOFOL 50 MG: 10 INJECTION, EMULSION INTRAVENOUS at 13:08

## 2024-01-01 RX ADMIN — TRANEXAMIC ACID 1 G: 10 INJECTION, SOLUTION INTRAVENOUS at 17:49

## 2024-01-01 RX ADMIN — OXYCODONE HYDROCHLORIDE 10 MG: 100 SOLUTION ORAL at 22:51

## 2024-01-01 RX ADMIN — PANTOPRAZOLE SODIUM 40 MG: 40 INJECTION, POWDER, FOR SOLUTION INTRAVENOUS at 08:10

## 2024-01-01 RX ADMIN — PANTOPRAZOLE SODIUM 40 MG: 40 INJECTION, POWDER, FOR SOLUTION INTRAVENOUS at 09:14

## 2024-01-01 RX ADMIN — Medication 2 SPRAY: at 21:06

## 2024-01-01 RX ADMIN — LORAZEPAM 0.5 MG: 0.5 TABLET ORAL at 16:07

## 2024-01-01 RX ADMIN — TRANEXAMIC ACID 125 MG/HR: 10 INJECTION, SOLUTION INTRAVENOUS at 18:11

## 2024-01-01 RX ADMIN — PROPOFOL 20 MG: 10 INJECTION, EMULSION INTRAVENOUS at 13:35

## 2024-01-01 RX ADMIN — NOREPINEPHRINE BITARTRATE 6.4 MCG: 1 INJECTION, SOLUTION, CONCENTRATE INTRAVENOUS at 13:31

## 2024-01-01 RX ADMIN — HYDROMORPHONE HYDROCHLORIDE 0.3 MG: 1 INJECTION, SOLUTION INTRAMUSCULAR; INTRAVENOUS; SUBCUTANEOUS at 12:17

## 2024-01-01 RX ADMIN — METRONIDAZOLE 500 MG: 500 TABLET ORAL at 22:59

## 2024-01-01 RX ADMIN — SIMVASTATIN 20 MG: 20 TABLET, FILM COATED ORAL at 12:33

## 2024-01-01 RX ADMIN — PIPERACILLIN SODIUM AND TAZOBACTAM SODIUM 2.25 G: 2; .25 INJECTION, POWDER, LYOPHILIZED, FOR SOLUTION INTRAVENOUS at 10:16

## 2024-01-01 RX ADMIN — IOPAMIDOL 96 ML: 755 INJECTION, SOLUTION INTRAVENOUS at 13:15

## 2024-01-01 RX ADMIN — CALCIUM ACETATE 2668 MG: 667 CAPSULE ORAL at 12:43

## 2024-01-01 RX ADMIN — ONDANSETRON 4 MG: 2 INJECTION INTRAMUSCULAR; INTRAVENOUS at 14:19

## 2024-01-01 RX ADMIN — INSULIN ASPART 1 UNITS: 100 INJECTION, SOLUTION INTRAVENOUS; SUBCUTANEOUS at 20:06

## 2024-01-01 RX ADMIN — METHYLPREDNISOLONE SODIUM SUCCINATE 20 MG: 40 INJECTION, POWDER, FOR SOLUTION INTRAMUSCULAR; INTRAVENOUS at 15:39

## 2024-01-01 RX ADMIN — HYDROMORPHONE HYDROCHLORIDE 2 MG: 2 TABLET ORAL at 02:15

## 2024-01-01 RX ADMIN — FOLIC ACID 1 MG: 1 TABLET ORAL at 21:07

## 2024-01-01 RX ADMIN — NICOTINE 1 PATCH: 21 PATCH, EXTENDED RELEASE TRANSDERMAL at 23:23

## 2024-01-01 RX ADMIN — INSULIN ASPART 8 UNITS: 100 INJECTION, SOLUTION INTRAVENOUS; SUBCUTANEOUS at 22:56

## 2024-01-01 RX ADMIN — BUDESONIDE 9 MG: 3 CAPSULE ORAL at 09:50

## 2024-01-01 RX ADMIN — PIPERACILLIN SODIUM AND TAZOBACTAM SODIUM 2.25 G: 2; .25 INJECTION, POWDER, LYOPHILIZED, FOR SOLUTION INTRAVENOUS at 22:22

## 2024-01-01 RX ADMIN — MAGNESIUM SULFATE HEPTAHYDRATE 2 G: 40 INJECTION, SOLUTION INTRAVENOUS at 17:29

## 2024-01-01 RX ADMIN — POTASSIUM CHLORIDE 20 MEQ: 20 SOLUTION ORAL at 01:16

## 2024-01-01 RX ADMIN — METHYLPREDNISOLONE SODIUM SUCCINATE 20 MG: 40 INJECTION, POWDER, FOR SOLUTION INTRAMUSCULAR; INTRAVENOUS at 18:08

## 2024-01-01 RX ADMIN — TEMAZEPAM 7.5 MG: 7.5 CAPSULE ORAL at 00:39

## 2024-01-01 RX ADMIN — INSULIN ASPART 3 UNITS: 100 INJECTION, SOLUTION INTRAVENOUS; SUBCUTANEOUS at 09:05

## 2024-01-01 RX ADMIN — PROPOFOL 10 MG: 10 INJECTION, EMULSION INTRAVENOUS at 14:05

## 2024-01-01 RX ADMIN — CEFEPIME HYDROCHLORIDE 1 G: 1 INJECTION, POWDER, FOR SOLUTION INTRAMUSCULAR; INTRAVENOUS at 16:00

## 2024-01-01 RX ADMIN — PHENYLEPHRINE HYDROCHLORIDE 100 MCG: 10 INJECTION INTRAVENOUS at 13:42

## 2024-01-01 RX ADMIN — CALCIUM ACETATE 2668 MG: 667 CAPSULE ORAL at 18:08

## 2024-01-01 RX ADMIN — SODIUM CHLORIDE, PRESERVATIVE FREE 100 ML: 5 INJECTION INTRAVENOUS at 00:09

## 2024-01-01 RX ADMIN — PIPERACILLIN SODIUM AND TAZOBACTAM SODIUM 2.25 G: 2; .25 INJECTION, POWDER, LYOPHILIZED, FOR SOLUTION INTRAVENOUS at 03:57

## 2024-01-01 RX ADMIN — CALCIUM ACETATE 2668 MG: 667 CAPSULE ORAL at 12:00

## 2024-01-01 RX ADMIN — LORAZEPAM 0.5 MG: 0.5 TABLET ORAL at 09:51

## 2024-01-01 RX ADMIN — BUDESONIDE 9 MG: 3 CAPSULE, COATED PELLETS ORAL at 10:21

## 2024-01-01 RX ADMIN — LORAZEPAM 1 MG: 2 INJECTION INTRAMUSCULAR; INTRAVENOUS at 09:22

## 2024-01-01 RX ADMIN — PANTOPRAZOLE SODIUM 40 MG: 40 TABLET, DELAYED RELEASE ORAL at 09:07

## 2024-01-01 RX ADMIN — PANTOPRAZOLE SODIUM 40 MG: 40 INJECTION, POWDER, FOR SOLUTION INTRAVENOUS at 20:31

## 2024-01-01 RX ADMIN — METRONIDAZOLE 500 MG: 500 TABLET ORAL at 10:22

## 2024-01-01 RX ADMIN — Medication 1 CAPSULE: at 21:45

## 2024-01-01 RX ADMIN — GLUCAGON 0.5 MG: 1 INJECTION, POWDER, LYOPHILIZED, FOR SOLUTION INTRAMUSCULAR; INTRAVENOUS at 16:29

## 2024-01-01 RX ADMIN — HYDROMORPHONE HYDROCHLORIDE 0.3 MG: 1 INJECTION, SOLUTION INTRAMUSCULAR; INTRAVENOUS; SUBCUTANEOUS at 02:51

## 2024-01-01 RX ADMIN — NICOTINE 1 PATCH: 21 PATCH, EXTENDED RELEASE TRANSDERMAL at 22:58

## 2024-01-01 RX ADMIN — Medication 5 MG: at 20:15

## 2024-01-01 RX ADMIN — PROPOFOL 150 MCG/KG/MIN: 10 INJECTION, EMULSION INTRAVENOUS at 13:08

## 2024-01-01 RX ADMIN — ONDANSETRON 4 MG: 2 INJECTION INTRAMUSCULAR; INTRAVENOUS at 13:36

## 2024-01-01 RX ADMIN — ALBUMIN HUMAN 250 ML: 0.05 INJECTION, SOLUTION INTRAVENOUS at 08:21

## 2024-01-01 RX ADMIN — HYDROMORPHONE HYDROCHLORIDE 2 MG: 2 TABLET ORAL at 02:02

## 2024-01-01 RX ADMIN — METHYLPREDNISOLONE SODIUM SUCCINATE 20 MG: 40 INJECTION, POWDER, FOR SOLUTION INTRAMUSCULAR; INTRAVENOUS at 00:23

## 2024-01-01 RX ADMIN — METHYLPREDNISOLONE SODIUM SUCCINATE 20 MG: 40 INJECTION, POWDER, FOR SOLUTION INTRAMUSCULAR; INTRAVENOUS at 23:19

## 2024-01-01 RX ADMIN — PANTOPRAZOLE SODIUM 40 MG: 40 TABLET, DELAYED RELEASE ORAL at 09:53

## 2024-01-01 RX ADMIN — MIDAZOLAM 2 MG: 1 INJECTION INTRAMUSCULAR; INTRAVENOUS at 13:29

## 2024-01-01 RX ADMIN — HYDROMORPHONE HYDROCHLORIDE 0.3 MG: 1 INJECTION, SOLUTION INTRAMUSCULAR; INTRAVENOUS; SUBCUTANEOUS at 08:47

## 2024-01-01 RX ADMIN — LORAZEPAM 0.5 MG: 0.5 TABLET ORAL at 02:23

## 2024-01-01 RX ADMIN — PANTOPRAZOLE SODIUM 40 MG: 40 INJECTION, POWDER, FOR SOLUTION INTRAVENOUS at 08:06

## 2024-01-01 RX ADMIN — SODIUM CHLORIDE 250 ML: 9 INJECTION, SOLUTION INTRAVENOUS at 14:17

## 2024-01-01 RX ADMIN — METHYLPREDNISOLONE SODIUM SUCCINATE 20 MG: 40 INJECTION, POWDER, FOR SOLUTION INTRAMUSCULAR; INTRAVENOUS at 16:43

## 2024-01-01 RX ADMIN — MIRTAZAPINE 7.5 MG: 7.5 TABLET, FILM COATED ORAL at 22:25

## 2024-01-01 RX ADMIN — DEXTROSE MONOHYDRATE 1000 ML: 100 INJECTION, SOLUTION INTRAVENOUS at 06:25

## 2024-01-01 RX ADMIN — HYDROMORPHONE HYDROCHLORIDE 2 MG: 2 TABLET ORAL at 21:38

## 2024-01-01 RX ADMIN — INSULIN ASPART 1 UNITS: 100 INJECTION, SOLUTION INTRAVENOUS; SUBCUTANEOUS at 12:35

## 2024-01-01 RX ADMIN — HYDROMORPHONE HYDROCHLORIDE 0.3 MG: 1 INJECTION, SOLUTION INTRAMUSCULAR; INTRAVENOUS; SUBCUTANEOUS at 22:19

## 2024-01-01 RX ADMIN — SODIUM CHLORIDE: 9 INJECTION, SOLUTION INTRAVENOUS at 13:03

## 2024-01-01 RX ADMIN — INSULIN ASPART 1 UNITS: 100 INJECTION, SOLUTION INTRAVENOUS; SUBCUTANEOUS at 00:45

## 2024-01-01 RX ADMIN — NOREPINEPHRINE BITARTRATE 6.4 MCG: 1 INJECTION, SOLUTION, CONCENTRATE INTRAVENOUS at 13:25

## 2024-01-01 RX ADMIN — Medication 1 CAPSULE: at 10:21

## 2024-01-01 RX ADMIN — PANTOPRAZOLE SODIUM 40 MG: 40 TABLET, DELAYED RELEASE ORAL at 21:24

## 2024-01-01 RX ADMIN — TRAZODONE HYDROCHLORIDE 50 MG: 50 TABLET ORAL at 22:38

## 2024-01-01 RX ADMIN — MAGNESIUM SULFATE HEPTAHYDRATE 2 G: 2 INJECTION, SOLUTION INTRAVENOUS at 23:13

## 2024-01-01 RX ADMIN — PANTOPRAZOLE SODIUM 40 MG: 40 INJECTION, POWDER, FOR SOLUTION INTRAVENOUS at 21:01

## 2024-01-01 RX ADMIN — BUDESONIDE 9 MG: 3 CAPSULE, COATED PELLETS ORAL at 15:11

## 2024-01-01 RX ADMIN — IOPAMIDOL 90 ML: 755 INJECTION, SOLUTION INTRAVENOUS at 00:09

## 2024-01-01 RX ADMIN — METHYLPREDNISOLONE SODIUM SUCCINATE 32 MG: 40 INJECTION, POWDER, FOR SOLUTION INTRAMUSCULAR; INTRAVENOUS at 12:22

## 2024-01-01 RX ADMIN — LIDOCAINE HYDROCHLORIDE 40 MG: 20 INJECTION, SOLUTION INFILTRATION; PERINEURAL at 13:31

## 2024-01-01 RX ADMIN — VANCOMYCIN HYDROCHLORIDE 1750 MG: 1 INJECTION, POWDER, LYOPHILIZED, FOR SOLUTION INTRAVENOUS at 18:20

## 2024-01-01 RX ADMIN — CITALOPRAM HYDROBROMIDE 20 MG: 20 TABLET ORAL at 08:49

## 2024-01-01 RX ADMIN — Medication 5 MG: at 13:41

## 2024-01-01 RX ADMIN — HYDROMORPHONE HYDROCHLORIDE 0.3 MG: 1 INJECTION, SOLUTION INTRAMUSCULAR; INTRAVENOUS; SUBCUTANEOUS at 20:48

## 2024-01-01 RX ADMIN — CALCIUM ACETATE 2668 MG: 667 CAPSULE ORAL at 09:06

## 2024-01-01 RX ADMIN — NICOTINE 1 PATCH: 21 PATCH, EXTENDED RELEASE TRANSDERMAL at 09:03

## 2024-01-01 RX ADMIN — HYDROMORPHONE HYDROCHLORIDE 2 MG: 2 TABLET ORAL at 05:25

## 2024-01-01 RX ADMIN — CALCIUM ACETATE 2668 MG: 667 CAPSULE ORAL at 11:47

## 2024-01-01 RX ADMIN — CALCIUM ACETATE 2668 MG: 667 CAPSULE ORAL at 13:34

## 2024-01-01 RX ADMIN — FOLIC ACID 1 MG: 1 TABLET ORAL at 12:43

## 2024-01-01 RX ADMIN — HYDROMORPHONE HYDROCHLORIDE 2 MG: 2 TABLET ORAL at 06:23

## 2024-01-01 RX ADMIN — CITALOPRAM HYDROBROMIDE 20 MG: 20 TABLET ORAL at 23:55

## 2024-01-01 RX ADMIN — Medication 5 MG: at 21:41

## 2024-01-01 RX ADMIN — MAGNESIUM SULFATE IN WATER 4 G: 40 INJECTION, SOLUTION INTRAVENOUS at 09:07

## 2024-01-01 RX ADMIN — SODIUM CHLORIDE 300 ML: 9 INJECTION, SOLUTION INTRAVENOUS at 14:15

## 2024-01-01 RX ADMIN — PROPOFOL 20 MG: 10 INJECTION, EMULSION INTRAVENOUS at 13:31

## 2024-01-01 RX ADMIN — LORAZEPAM 0.5 MG: 2 INJECTION, SOLUTION INTRAMUSCULAR; INTRAVENOUS at 14:58

## 2024-01-01 RX ADMIN — BUDESONIDE 9 MG: 3 CAPSULE ORAL at 09:07

## 2024-01-01 RX ADMIN — PROPOFOL 10 MG: 10 INJECTION, EMULSION INTRAVENOUS at 14:17

## 2024-01-01 RX ADMIN — LORAZEPAM 0.5 MG: 0.5 TABLET ORAL at 20:07

## 2024-01-01 RX ADMIN — CALCIUM ACETATE 2668 MG: 667 CAPSULE ORAL at 08:04

## 2024-01-01 RX ADMIN — PHENYLEPHRINE HYDROCHLORIDE 300 MCG: 10 INJECTION INTRAVENOUS at 13:47

## 2024-01-01 RX ADMIN — CITALOPRAM HYDROBROMIDE 20 MG: 20 TABLET ORAL at 08:03

## 2024-01-01 RX ADMIN — NICOTINE 1 PATCH: 21 PATCH, EXTENDED RELEASE TRANSDERMAL at 09:49

## 2024-01-01 RX ADMIN — SODIUM CHLORIDE 300 ML: 9 INJECTION, SOLUTION INTRAVENOUS at 13:01

## 2024-01-01 RX ADMIN — SIMVASTATIN 20 MG: 20 TABLET, FILM COATED ORAL at 10:21

## 2024-01-01 RX ADMIN — PANTOPRAZOLE SODIUM 40 MG: 40 INJECTION, POWDER, FOR SOLUTION INTRAVENOUS at 20:07

## 2024-01-01 RX ADMIN — INFLIXIMAB 700 MG: 100 INJECTION, POWDER, LYOPHILIZED, FOR SOLUTION INTRAVENOUS at 19:03

## 2024-01-01 RX ADMIN — Medication 1 CAPSULE: at 12:44

## 2024-01-01 RX ADMIN — SODIUM CHLORIDE 250 ML: 9 INJECTION, SOLUTION INTRAVENOUS at 16:45

## 2024-01-01 RX ADMIN — LORAZEPAM 1 MG: 2 INJECTION INTRAMUSCULAR; INTRAVENOUS at 21:22

## 2024-01-01 RX ADMIN — HYDROXYZINE HYDROCHLORIDE 10 MG: 10 TABLET ORAL at 22:16

## 2024-01-01 RX ADMIN — CITALOPRAM HYDROBROMIDE 20 MG: 20 TABLET ORAL at 14:34

## 2024-01-01 RX ADMIN — SODIUM CHLORIDE 250 ML: 9 INJECTION, SOLUTION INTRAVENOUS at 09:12

## 2024-01-01 RX ADMIN — QUETIAPINE FUMARATE 25 MG: 25 TABLET ORAL at 01:00

## 2024-01-01 RX ADMIN — SODIUM CHLORIDE 250 ML: 9 INJECTION, SOLUTION INTRAVENOUS at 08:28

## 2024-01-01 RX ADMIN — SIMVASTATIN 20 MG: 20 TABLET, FILM COATED ORAL at 15:11

## 2024-01-01 RX ADMIN — LORAZEPAM 0.5 MG: 0.5 TABLET ORAL at 19:53

## 2024-01-01 RX ADMIN — SODIUM CHLORIDE, PRESERVATIVE FREE 90 ML: 5 INJECTION INTRAVENOUS at 06:55

## 2024-01-01 RX ADMIN — LORAZEPAM 0.5 MG: 0.5 TABLET ORAL at 16:25

## 2024-01-01 RX ADMIN — CALCIUM ACETATE 2668 MG: 667 CAPSULE ORAL at 09:50

## 2024-01-01 RX ADMIN — LORAZEPAM 1 MG: 2 INJECTION, SOLUTION INTRAMUSCULAR; INTRAVENOUS at 01:51

## 2024-01-01 RX ADMIN — PANTOPRAZOLE SODIUM 40 MG: 40 INJECTION, POWDER, FOR SOLUTION INTRAVENOUS at 21:45

## 2024-01-01 RX ADMIN — FOLIC ACID 1 MG: 1 TABLET ORAL at 12:31

## 2024-01-01 RX ADMIN — PANTOPRAZOLE SODIUM 40 MG: 40 INJECTION, POWDER, FOR SOLUTION INTRAVENOUS at 21:07

## 2024-01-01 RX ADMIN — PROPOFOL 10 MG: 10 INJECTION, EMULSION INTRAVENOUS at 14:13

## 2024-01-01 RX ADMIN — CEFTRIAXONE SODIUM 1 G: 1 INJECTION, POWDER, FOR SOLUTION INTRAMUSCULAR; INTRAVENOUS at 20:05

## 2024-01-01 RX ADMIN — Medication 2 SPRAY: at 20:16

## 2024-01-01 RX ADMIN — QUETIAPINE FUMARATE 25 MG: 25 TABLET ORAL at 20:15

## 2024-01-01 RX ADMIN — HYDROMORPHONE HYDROCHLORIDE 0.3 MG: 1 INJECTION, SOLUTION INTRAMUSCULAR; INTRAVENOUS; SUBCUTANEOUS at 09:51

## 2024-01-01 RX ADMIN — SIMVASTATIN 20 MG: 20 TABLET, FILM COATED ORAL at 09:53

## 2024-01-01 RX ADMIN — IRON SUCROSE 100 MG: 20 INJECTION, SOLUTION INTRAVENOUS at 10:05

## 2024-01-01 RX ADMIN — SIMVASTATIN 20 MG: 20 TABLET, FILM COATED ORAL at 11:48

## 2024-01-01 RX ADMIN — METRONIDAZOLE 500 MG: 500 TABLET ORAL at 16:06

## 2024-01-01 RX ADMIN — CITALOPRAM HYDROBROMIDE 20 MG: 20 TABLET ORAL at 08:18

## 2024-01-01 RX ADMIN — CALCIUM ACETATE 2668 MG: 667 CAPSULE ORAL at 08:18

## 2024-01-01 RX ADMIN — INSULIN ASPART 3 UNITS: 100 INJECTION, SOLUTION INTRAVENOUS; SUBCUTANEOUS at 00:46

## 2024-01-01 RX ADMIN — HYDROMORPHONE HYDROCHLORIDE 0.3 MG: 1 INJECTION, SOLUTION INTRAMUSCULAR; INTRAVENOUS; SUBCUTANEOUS at 01:40

## 2024-01-01 RX ADMIN — CALCIUM ACETATE 2668 MG: 667 CAPSULE ORAL at 20:47

## 2024-01-01 RX ADMIN — PROPOFOL 10 MG: 10 INJECTION, EMULSION INTRAVENOUS at 14:20

## 2024-01-01 RX ADMIN — HYDROMORPHONE HYDROCHLORIDE 2 MG: 2 TABLET ORAL at 11:20

## 2024-01-01 RX ADMIN — MICAFUNGIN SODIUM 100 MG: 50 INJECTION, POWDER, LYOPHILIZED, FOR SOLUTION INTRAVENOUS at 18:18

## 2024-01-01 RX ADMIN — INSULIN GLARGINE 10 UNITS: 100 INJECTION, SOLUTION SUBCUTANEOUS at 12:04

## 2024-01-01 RX ADMIN — INSULIN ASPART 1 UNITS: 100 INJECTION, SOLUTION INTRAVENOUS; SUBCUTANEOUS at 04:57

## 2024-01-01 RX ADMIN — CYCLOBENZAPRINE HYDROCHLORIDE 5 MG: 5 TABLET, FILM COATED ORAL at 00:29

## 2024-01-01 RX ADMIN — SODIUM CHLORIDE 300 ML: 9 INJECTION, SOLUTION INTRAVENOUS at 08:28

## 2024-01-01 RX ADMIN — FOLIC ACID 1 MG: 1 TABLET ORAL at 09:07

## 2024-01-01 RX ADMIN — SIMVASTATIN 20 MG: 20 TABLET, FILM COATED ORAL at 12:44

## 2024-01-01 RX ADMIN — SODIUM CHLORIDE, POTASSIUM CHLORIDE, SODIUM LACTATE AND CALCIUM CHLORIDE: 600; 310; 30; 20 INJECTION, SOLUTION INTRAVENOUS at 13:29

## 2024-01-01 RX ADMIN — PIPERACILLIN AND TAZOBACTAM 2.25 G: 2; .25 INJECTION, POWDER, FOR SOLUTION INTRAVENOUS at 00:34

## 2024-01-01 RX ADMIN — METRONIDAZOLE 500 MG: 500 TABLET ORAL at 18:08

## 2024-01-01 RX ADMIN — Medication: at 15:29

## 2024-01-01 RX ADMIN — CALCIUM ACETATE 2668 MG: 667 CAPSULE ORAL at 23:00

## 2024-01-01 RX ADMIN — HYDROMORPHONE HYDROCHLORIDE 0.3 MG: 1 INJECTION, SOLUTION INTRAMUSCULAR; INTRAVENOUS; SUBCUTANEOUS at 00:23

## 2024-01-01 RX ADMIN — LIDOCAINE HYDROCHLORIDE 30 MG: 20 INJECTION, SOLUTION INFILTRATION; PERINEURAL at 13:08

## 2024-01-01 RX ADMIN — IOPAMIDOL 67 ML: 755 INJECTION, SOLUTION INTRAVENOUS at 14:28

## 2024-01-01 RX ADMIN — DEXTROSE MONOHYDRATE 1000 ML: 100 INJECTION, SOLUTION INTRAVENOUS at 22:51

## 2024-01-01 RX ADMIN — IOPAMIDOL 78 ML: 755 INJECTION, SOLUTION INTRAVENOUS at 17:16

## 2024-01-01 RX ADMIN — INSULIN HUMAN 5.5 UNITS/HR: 1 INJECTION, SOLUTION INTRAVENOUS at 13:22

## 2024-01-01 RX ADMIN — METHYLPREDNISOLONE SODIUM SUCCINATE 20 MG: 40 INJECTION, POWDER, FOR SOLUTION INTRAMUSCULAR; INTRAVENOUS at 11:49

## 2024-01-01 RX ADMIN — PIPERACILLIN AND TAZOBACTAM 2.25 G: 2; .25 INJECTION, POWDER, FOR SOLUTION INTRAVENOUS at 09:04

## 2024-01-01 RX ADMIN — INSULIN HUMAN 7 UNITS: 100 INJECTION, SUSPENSION SUBCUTANEOUS at 10:16

## 2024-01-01 RX ADMIN — NICOTINE 1 PATCH: 7 PATCH, EXTENDED RELEASE TRANSDERMAL at 09:04

## 2024-01-01 RX ADMIN — METRONIDAZOLE 500 MG: 500 TABLET ORAL at 09:53

## 2024-01-01 RX ADMIN — PANTOPRAZOLE SODIUM 40 MG: 40 INJECTION, POWDER, FOR SOLUTION INTRAVENOUS at 09:04

## 2024-01-01 RX ADMIN — Medication: at 13:01

## 2024-01-01 RX ADMIN — PIPERACILLIN AND TAZOBACTAM 2.25 G: 2; .25 INJECTION, POWDER, FOR SOLUTION INTRAVENOUS at 16:08

## 2024-01-01 RX ADMIN — METHYLPREDNISOLONE SODIUM SUCCINATE 20 MG: 40 INJECTION, POWDER, FOR SOLUTION INTRAMUSCULAR; INTRAVENOUS at 00:58

## 2024-01-01 RX ADMIN — Medication 1 CAPSULE: at 09:06

## 2024-01-01 RX ADMIN — Medication: at 08:29

## 2024-01-01 RX ADMIN — METRONIDAZOLE 500 MG: 500 TABLET ORAL at 22:51

## 2024-01-01 RX ADMIN — NOREPINEPHRINE BITARTRATE 0.1 MCG/KG/MIN: 0.06 INJECTION, SOLUTION INTRAVENOUS at 12:03

## 2024-01-01 RX ADMIN — MAGNESIUM SULFATE HEPTAHYDRATE 2 G: 2 INJECTION, SOLUTION INTRAVENOUS at 17:00

## 2024-01-01 RX ADMIN — PREDNISONE 40 MG: 20 TABLET ORAL at 10:11

## 2024-01-01 RX ADMIN — METRONIDAZOLE 500 MG: 500 TABLET ORAL at 16:37

## 2024-01-01 RX ADMIN — MIRTAZAPINE 7.5 MG: 7.5 TABLET, FILM COATED ORAL at 23:19

## 2024-01-01 RX ADMIN — SODIUM CHLORIDE 250 ML: 9 INJECTION, SOLUTION INTRAVENOUS at 11:25

## 2024-01-01 RX ADMIN — PREDNISONE 10 MG: 5 TABLET ORAL at 22:59

## 2024-01-01 RX ADMIN — CYCLOBENZAPRINE HYDROCHLORIDE 5 MG: 5 TABLET, FILM COATED ORAL at 09:00

## 2024-01-01 RX ADMIN — Medication 1 CAPSULE: at 12:31

## 2024-01-01 RX ADMIN — INSULIN ASPART 1 UNITS: 100 INJECTION, SOLUTION INTRAVENOUS; SUBCUTANEOUS at 12:52

## 2024-01-01 RX ADMIN — NICOTINE 1 PATCH: 7 PATCH, EXTENDED RELEASE TRANSDERMAL at 12:43

## 2024-01-01 RX ADMIN — GLUCAGON 0.5 MG: 1 INJECTION, POWDER, LYOPHILIZED, FOR SOLUTION INTRAMUSCULAR; INTRAVENOUS at 16:46

## 2024-01-01 RX ADMIN — Medication 10 MG: at 13:36

## 2024-01-01 RX ADMIN — NOREPINEPHRINE BITARTRATE 6.4 MCG: 1 INJECTION, SOLUTION, CONCENTRATE INTRAVENOUS at 13:53

## 2024-01-01 RX ADMIN — METHYLPREDNISOLONE SODIUM SUCCINATE 20 MG: 40 INJECTION, POWDER, FOR SOLUTION INTRAMUSCULAR; INTRAVENOUS at 08:51

## 2024-01-01 RX ADMIN — PANTOPRAZOLE SODIUM 40 MG: 40 TABLET, DELAYED RELEASE ORAL at 06:29

## 2024-01-01 RX ADMIN — CITALOPRAM HYDROBROMIDE 20 MG: 20 TABLET ORAL at 14:38

## 2024-01-01 RX ADMIN — INSULIN ASPART 1 UNITS: 100 INJECTION, SOLUTION INTRAVENOUS; SUBCUTANEOUS at 04:30

## 2024-01-01 RX ADMIN — PANTOPRAZOLE SODIUM 40 MG: 40 INJECTION, POWDER, FOR SOLUTION INTRAVENOUS at 08:50

## 2024-01-01 RX ADMIN — FOLIC ACID 1 MG: 1 TABLET ORAL at 10:22

## 2024-01-01 RX ADMIN — FOLIC ACID 1 MG: 1 TABLET ORAL at 09:53

## 2024-01-01 RX ADMIN — SODIUM CHLORIDE 300 ML: 9 INJECTION, SOLUTION INTRAVENOUS at 16:45

## 2024-01-01 RX ADMIN — METHYLPREDNISOLONE SODIUM SUCCINATE 16 MG: 40 INJECTION, POWDER, FOR SOLUTION INTRAMUSCULAR; INTRAVENOUS at 11:28

## 2024-01-01 RX ADMIN — PROPOFOL 125 MCG/KG/MIN: 10 INJECTION, EMULSION INTRAVENOUS at 13:31

## 2024-01-01 RX ADMIN — LORAZEPAM 1 MG: 2 INJECTION INTRAMUSCULAR; INTRAVENOUS at 14:48

## 2024-01-01 RX ADMIN — PHYTONADIONE 10 MG: 10 INJECTION, EMULSION INTRAMUSCULAR; INTRAVENOUS; SUBCUTANEOUS at 08:08

## 2024-01-01 RX ADMIN — SODIUM CHLORIDE 250 ML: 9 INJECTION, SOLUTION INTRAVENOUS at 13:01

## 2024-01-01 RX ADMIN — CALCIUM ACETATE 2668 MG: 667 CAPSULE ORAL at 21:24

## 2024-01-01 RX ADMIN — PIPERACILLIN SODIUM AND TAZOBACTAM SODIUM 4.5 G: 4; .5 INJECTION, POWDER, LYOPHILIZED, FOR SOLUTION INTRAVENOUS at 04:01

## 2024-01-01 RX ADMIN — Medication 10 MG: at 12:44

## 2024-01-01 RX ADMIN — PIPERACILLIN SODIUM AND TAZOBACTAM SODIUM 2.25 G: 2; .25 INJECTION, POWDER, LYOPHILIZED, FOR SOLUTION INTRAVENOUS at 16:20

## 2024-01-01 RX ADMIN — FOLIC ACID 1 MG: 1 TABLET ORAL at 21:45

## 2024-01-01 RX ADMIN — CALCIUM ACETATE 2668 MG: 667 CAPSULE ORAL at 08:05

## 2024-01-01 RX ADMIN — PREDNISONE 40 MG: 20 TABLET ORAL at 09:07

## 2024-01-01 RX ADMIN — LORAZEPAM 0.5 MG: 0.5 TABLET ORAL at 06:29

## 2024-01-01 RX ADMIN — PANTOPRAZOLE SODIUM 40 MG: 40 INJECTION, POWDER, FOR SOLUTION INTRAVENOUS at 11:48

## 2024-01-01 RX ADMIN — SIMVASTATIN 20 MG: 20 TABLET, FILM COATED ORAL at 08:05

## 2024-01-01 RX ADMIN — CITALOPRAM HYDROBROMIDE 20 MG: 20 TABLET ORAL at 08:05

## 2024-01-01 RX ADMIN — Medication 1 CAPSULE: at 15:11

## 2024-01-01 RX ADMIN — DEXTROSE AND SODIUM CHLORIDE 1000 ML: 5; 450 INJECTION, SOLUTION INTRAVENOUS at 19:58

## 2024-01-01 RX ADMIN — METHYLPREDNISOLONE SODIUM SUCCINATE 20 MG: 40 INJECTION, POWDER, FOR SOLUTION INTRAMUSCULAR; INTRAVENOUS at 08:08

## 2024-01-01 RX ADMIN — NICOTINE 1 PATCH: 7 PATCH, EXTENDED RELEASE TRANSDERMAL at 12:31

## 2024-01-01 RX ADMIN — SODIUM CHLORIDE, PRESERVATIVE FREE 82 ML: 5 INJECTION INTRAVENOUS at 13:15

## 2024-01-01 RX ADMIN — VASOPRESSIN 2.4 UNITS/HR: 20 INJECTION, SOLUTION INTRAMUSCULAR; SUBCUTANEOUS at 17:17

## 2024-01-01 RX ADMIN — BUDESONIDE 9 MG: 3 CAPSULE, COATED PELLETS ORAL at 12:43

## 2024-01-01 RX ADMIN — POLYETHYLENE GLYCOL 3350, SODIUM SULFATE ANHYDROUS, SODIUM BICARBONATE, SODIUM CHLORIDE, POTASSIUM CHLORIDE 4000 ML: 236; 22.74; 6.74; 5.86; 2.97 POWDER, FOR SOLUTION ORAL at 17:36

## 2024-01-01 RX ADMIN — SIMVASTATIN 20 MG: 20 TABLET, FILM COATED ORAL at 09:06

## 2024-01-01 RX ADMIN — QUETIAPINE FUMARATE 25 MG: 25 TABLET ORAL at 21:41

## 2024-01-01 RX ADMIN — CALCIUM ACETATE 2668 MG: 667 CAPSULE ORAL at 08:48

## 2024-01-01 RX ADMIN — CALCIUM ACETATE 2668 MG: 667 CAPSULE ORAL at 10:21

## 2024-01-01 RX ADMIN — METRONIDAZOLE 500 MG: 500 TABLET ORAL at 09:07

## 2024-01-01 RX ADMIN — BUDESONIDE 9 MG: 3 CAPSULE ORAL at 08:18

## 2024-01-01 RX ADMIN — IOPAMIDOL 67 ML: 755 INJECTION, SOLUTION INTRAVENOUS at 06:54

## 2024-01-01 RX ADMIN — HYDROMORPHONE HYDROCHLORIDE 2 MG: 2 TABLET ORAL at 16:06

## 2024-01-01 RX ADMIN — PIPERACILLIN AND TAZOBACTAM 2.25 G: 2; .25 INJECTION, POWDER, FOR SOLUTION INTRAVENOUS at 12:20

## 2024-01-01 RX ADMIN — MIDAZOLAM 2 MG: 1 INJECTION INTRAMUSCULAR; INTRAVENOUS at 13:05

## 2024-01-01 RX ADMIN — LORAZEPAM 0.5 MG: 0.5 TABLET ORAL at 18:39

## 2024-01-01 RX ADMIN — HYDROMORPHONE HYDROCHLORIDE 0.3 MG: 1 INJECTION, SOLUTION INTRAMUSCULAR; INTRAVENOUS; SUBCUTANEOUS at 06:49

## 2024-01-01 RX ADMIN — PANTOPRAZOLE SODIUM 40 MG: 40 INJECTION, POWDER, FOR SOLUTION INTRAVENOUS at 08:19

## 2024-01-01 RX ADMIN — QUETIAPINE FUMARATE 25 MG: 25 TABLET ORAL at 02:57

## 2024-01-01 RX ADMIN — SODIUM CHLORIDE 300 ML: 9 INJECTION, SOLUTION INTRAVENOUS at 11:26

## 2024-01-01 RX ADMIN — INSULIN HUMAN 7 UNITS: 100 INJECTION, SUSPENSION SUBCUTANEOUS at 07:58

## 2024-01-01 RX ADMIN — BUDESONIDE 9 MG: 3 CAPSULE, COATED PELLETS ORAL at 12:32

## 2024-01-01 RX ADMIN — DEXAMETHASONE SODIUM PHOSPHATE 4 MG: 4 INJECTION, SOLUTION INTRA-ARTICULAR; INTRALESIONAL; INTRAMUSCULAR; INTRAVENOUS; SOFT TISSUE at 13:36

## 2024-01-01 RX ADMIN — Medication 1 CAPSULE: at 09:53

## 2024-01-01 RX ADMIN — Medication 10 MG: at 14:05

## 2024-01-01 RX ADMIN — Medication 1 CAPSULE: at 21:07

## 2024-01-01 RX ADMIN — CITALOPRAM HYDROBROMIDE 20 MG: 20 TABLET ORAL at 11:48

## 2024-01-01 RX ADMIN — HYDROMORPHONE HYDROCHLORIDE 0.3 MG: 1 INJECTION, SOLUTION INTRAMUSCULAR; INTRAVENOUS; SUBCUTANEOUS at 14:19

## 2024-01-01 RX ADMIN — PANTOPRAZOLE SODIUM 40 MG: 40 INJECTION, POWDER, FOR SOLUTION INTRAVENOUS at 20:48

## 2024-01-01 RX ADMIN — BUDESONIDE 9 MG: 3 CAPSULE ORAL at 08:04

## 2024-01-01 RX ADMIN — PHYTONADIONE 10 MG: 10 INJECTION, EMULSION INTRAMUSCULAR; INTRAVENOUS; SUBCUTANEOUS at 12:25

## 2024-01-01 RX ADMIN — FOLIC ACID 1 MG: 1 TABLET ORAL at 15:11

## 2024-01-01 RX ADMIN — INSULIN GLARGINE 10 UNITS: 100 INJECTION, SOLUTION SUBCUTANEOUS at 13:39

## 2024-01-01 RX ADMIN — PANTOPRAZOLE SODIUM 40 MG: 40 INJECTION, POWDER, FOR SOLUTION INTRAVENOUS at 20:15

## 2024-01-01 RX ADMIN — LORAZEPAM 0.5 MG: 0.5 TABLET ORAL at 02:57

## 2024-01-01 RX ADMIN — CITALOPRAM HYDROBROMIDE 20 MG: 20 TABLET ORAL at 09:51

## 2024-01-01 RX ADMIN — INSULIN HUMAN 1.5 UNITS/HR: 1 INJECTION, SOLUTION INTRAVENOUS at 21:44

## 2024-01-01 RX ADMIN — FENTANYL CITRATE 50 MCG: 50 INJECTION INTRAMUSCULAR; INTRAVENOUS at 14:02

## 2024-01-01 RX ADMIN — METRONIDAZOLE 500 MG: 500 TABLET ORAL at 21:24

## 2024-01-01 RX ADMIN — NICOTINE 1 PATCH: 7 PATCH, EXTENDED RELEASE TRANSDERMAL at 10:22

## 2024-01-01 RX ADMIN — NICOTINE 1 PATCH: 21 PATCH, EXTENDED RELEASE TRANSDERMAL at 10:55

## 2024-01-01 RX ADMIN — PANTOPRAZOLE SODIUM 40 MG: 40 INJECTION, POWDER, FOR SOLUTION INTRAVENOUS at 22:26

## 2024-01-01 RX ADMIN — PROPOFOL 30 MG: 10 INJECTION, EMULSION INTRAVENOUS at 13:20

## 2024-01-01 RX ADMIN — Medication: at 11:26

## 2024-01-01 RX ADMIN — PIPERACILLIN SODIUM AND TAZOBACTAM SODIUM 2.25 G: 2; .25 INJECTION, POWDER, LYOPHILIZED, FOR SOLUTION INTRAVENOUS at 15:22

## 2024-01-01 RX ADMIN — GADOBUTROL 7 ML: 604.72 INJECTION INTRAVENOUS at 15:52

## 2024-01-01 RX ADMIN — DEXTROSE MONOHYDRATE 1000 ML: 100 INJECTION, SOLUTION INTRAVENOUS at 02:38

## 2024-01-01 RX ADMIN — DEXTROSE MONOHYDRATE 25 ML: 25 INJECTION, SOLUTION INTRAVENOUS at 03:44

## 2024-01-01 ASSESSMENT — ACTIVITIES OF DAILY LIVING (ADL)
ADLS_ACUITY_SCORE: 25
ADLS_ACUITY_SCORE: 38
ADLS_ACUITY_SCORE: 31
ADLS_ACUITY_SCORE: 25
ADLS_ACUITY_SCORE: 47
ADLS_ACUITY_SCORE: 25
ADLS_ACUITY_SCORE: 40
ADLS_ACUITY_SCORE: 31
ADLS_ACUITY_SCORE: 30
ADLS_ACUITY_SCORE: 37
ADLS_ACUITY_SCORE: 45
ADLS_ACUITY_SCORE: 25
ADLS_ACUITY_SCORE: 37
ADLS_ACUITY_SCORE: 38
ADLS_ACUITY_SCORE: 37
ADLS_ACUITY_SCORE: 45
ADLS_ACUITY_SCORE: 33
ADLS_ACUITY_SCORE: 39
ADLS_ACUITY_SCORE: 25
ADLS_ACUITY_SCORE: 38
ADLS_ACUITY_SCORE: 25
ADLS_ACUITY_SCORE: 47
ADLS_ACUITY_SCORE: 25
ADLS_ACUITY_SCORE: 25
ADLS_ACUITY_SCORE: 30
ADLS_ACUITY_SCORE: 31
ADLS_ACUITY_SCORE: 25
ADLS_ACUITY_SCORE: 38
ADLS_ACUITY_SCORE: 38
ADLS_ACUITY_SCORE: 28
ADLS_ACUITY_SCORE: 30
ADLS_ACUITY_SCORE: 40
ADLS_ACUITY_SCORE: 33
ADLS_ACUITY_SCORE: 33
ADLS_ACUITY_SCORE: 25
ADLS_ACUITY_SCORE: 45
ADLS_ACUITY_SCORE: 38
ADLS_ACUITY_SCORE: 47
ADLS_ACUITY_SCORE: 25
ADLS_ACUITY_SCORE: 25
ADLS_ACUITY_SCORE: 31
ADLS_ACUITY_SCORE: 32
ADLS_ACUITY_SCORE: 29
ADLS_ACUITY_SCORE: 38
ADLS_ACUITY_SCORE: 33
ADLS_ACUITY_SCORE: 47
ADLS_ACUITY_SCORE: 25
ADLS_ACUITY_SCORE: 25
ADLS_ACUITY_SCORE: 47
ADLS_ACUITY_SCORE: 38
ADLS_ACUITY_SCORE: 32
ADLS_ACUITY_SCORE: 34
ADLS_ACUITY_SCORE: 30
ADLS_ACUITY_SCORE: 33
ADLS_ACUITY_SCORE: 38
ADLS_ACUITY_SCORE: 38
ADLS_ACUITY_SCORE: 25
ADLS_ACUITY_SCORE: 25
ADLS_ACUITY_SCORE: 28
ADLS_ACUITY_SCORE: 25
ADLS_ACUITY_SCORE: 38
ADLS_ACUITY_SCORE: 47
ADLS_ACUITY_SCORE: 33
ADLS_ACUITY_SCORE: 25
ADLS_ACUITY_SCORE: 30
ADLS_ACUITY_SCORE: 25
ADLS_ACUITY_SCORE: 38
ADLS_ACUITY_SCORE: 47
ADLS_ACUITY_SCORE: 33
ADLS_ACUITY_SCORE: 27
ADLS_ACUITY_SCORE: 25
ADLS_ACUITY_SCORE: 40
ADLS_ACUITY_SCORE: 27
ADLS_ACUITY_SCORE: 38
ADLS_ACUITY_SCORE: 25
ADLS_ACUITY_SCORE: 33
ADLS_ACUITY_SCORE: 47
ADLS_ACUITY_SCORE: 30
ADLS_ACUITY_SCORE: 34
ADLS_ACUITY_SCORE: 25
ADLS_ACUITY_SCORE: 39
ADLS_ACUITY_SCORE: 25
ADLS_ACUITY_SCORE: 33
ADLS_ACUITY_SCORE: 25
ADLS_ACUITY_SCORE: 25
ADLS_ACUITY_SCORE: 33
ADLS_ACUITY_SCORE: 31
ADLS_ACUITY_SCORE: 38
ADLS_ACUITY_SCORE: 40
ADLS_ACUITY_SCORE: 38
ADLS_ACUITY_SCORE: 25
ADLS_ACUITY_SCORE: 38
ADLS_ACUITY_SCORE: 45
ADLS_ACUITY_SCORE: 25
ADLS_ACUITY_SCORE: 33
ADLS_ACUITY_SCORE: 47
ADLS_ACUITY_SCORE: 33
ADLS_ACUITY_SCORE: 25
ADLS_ACUITY_SCORE: 33
ADLS_ACUITY_SCORE: 25
ADLS_ACUITY_SCORE: 38
ADLS_ACUITY_SCORE: 25
ADLS_ACUITY_SCORE: 38
ADLS_ACUITY_SCORE: 25
ADLS_ACUITY_SCORE: 33
ADLS_ACUITY_SCORE: 33
ADLS_ACUITY_SCORE: 25
ADLS_ACUITY_SCORE: 37
ADLS_ACUITY_SCORE: 27
ADLS_ACUITY_SCORE: 38
ADLS_ACUITY_SCORE: 25
ADLS_ACUITY_SCORE: 38
ADLS_ACUITY_SCORE: 25
ADLS_ACUITY_SCORE: 38
ADLS_ACUITY_SCORE: 25
ADLS_ACUITY_SCORE: 33
ADLS_ACUITY_SCORE: 34
ADLS_ACUITY_SCORE: 39
ADLS_ACUITY_SCORE: 25
ADLS_ACUITY_SCORE: 25
ADLS_ACUITY_SCORE: 33
ADLS_ACUITY_SCORE: 47
ADLS_ACUITY_SCORE: 34
ADLS_ACUITY_SCORE: 25
ADLS_ACUITY_SCORE: 25
ADLS_ACUITY_SCORE: 37
ADLS_ACUITY_SCORE: 37
ADLS_ACUITY_SCORE: 41
ADLS_ACUITY_SCORE: 32
ADLS_ACUITY_SCORE: 31
ADLS_ACUITY_SCORE: 45
ADLS_ACUITY_SCORE: 25
ADLS_ACUITY_SCORE: 31
ADLS_ACUITY_SCORE: 39
ADLS_ACUITY_SCORE: 25
ADLS_ACUITY_SCORE: 33
ADLS_ACUITY_SCORE: 38
ADLS_ACUITY_SCORE: 25
ADLS_ACUITY_SCORE: 34
ADLS_ACUITY_SCORE: 31
ADLS_ACUITY_SCORE: 38
ADLS_ACUITY_SCORE: 47
ADLS_ACUITY_SCORE: 30
ADLS_ACUITY_SCORE: 25
ADLS_ACUITY_SCORE: 38
ADLS_ACUITY_SCORE: 33
ADLS_ACUITY_SCORE: 34
ADLS_ACUITY_SCORE: 25
ADLS_ACUITY_SCORE: 33
ADLS_ACUITY_SCORE: 47
ADLS_ACUITY_SCORE: 38
ADLS_ACUITY_SCORE: 32
ADLS_ACUITY_SCORE: 25
ADLS_ACUITY_SCORE: 34
ADLS_ACUITY_SCORE: 45
ADLS_ACUITY_SCORE: 38
ADLS_ACUITY_SCORE: 38
ADLS_ACUITY_SCORE: 33
ADLS_ACUITY_SCORE: 38
ADLS_ACUITY_SCORE: 38
ADLS_ACUITY_SCORE: 25
ADLS_ACUITY_SCORE: 38
ADLS_ACUITY_SCORE: 28
ADLS_ACUITY_SCORE: 25
ADLS_ACUITY_SCORE: 34
ADLS_ACUITY_SCORE: 34
ADLS_ACUITY_SCORE: 25
ADLS_ACUITY_SCORE: 37
ADLS_ACUITY_SCORE: 25
ADLS_ACUITY_SCORE: 39
ADLS_ACUITY_SCORE: 25
ADLS_ACUITY_SCORE: 33
ADLS_ACUITY_SCORE: 47
ADLS_ACUITY_SCORE: 47
ADLS_ACUITY_SCORE: 33
ADLS_ACUITY_SCORE: 38
ADLS_ACUITY_SCORE: 25
ADLS_ACUITY_SCORE: 33
ADLS_ACUITY_SCORE: 33
ADLS_ACUITY_SCORE: 39
ADLS_ACUITY_SCORE: 33
ADLS_ACUITY_SCORE: 25
ADLS_ACUITY_SCORE: 33
ADLS_ACUITY_SCORE: 38
ADLS_ACUITY_SCORE: 25
ADLS_ACUITY_SCORE: 38
ADLS_ACUITY_SCORE: 40
ADLS_ACUITY_SCORE: 33
ADLS_ACUITY_SCORE: 32
ADLS_ACUITY_SCORE: 37
ADLS_ACUITY_SCORE: 31
ADLS_ACUITY_SCORE: 34
ADLS_ACUITY_SCORE: 47
ADLS_ACUITY_SCORE: 40
ADLS_ACUITY_SCORE: 25
ADLS_ACUITY_SCORE: 47
ADLS_ACUITY_SCORE: 25
ADLS_ACUITY_SCORE: 34
ADLS_ACUITY_SCORE: 25
ADLS_ACUITY_SCORE: 29
ADLS_ACUITY_SCORE: 47
ADLS_ACUITY_SCORE: 37
ADLS_ACUITY_SCORE: 33
ADLS_ACUITY_SCORE: 33
ADLS_ACUITY_SCORE: 38
ADLS_ACUITY_SCORE: 33
ADLS_ACUITY_SCORE: 25
ADLS_ACUITY_SCORE: 25
ADLS_ACUITY_SCORE: 38
ADLS_ACUITY_SCORE: 31
ADLS_ACUITY_SCORE: 25
ADLS_ACUITY_SCORE: 34
ADLS_ACUITY_SCORE: 25
ADLS_ACUITY_SCORE: 25
ADLS_ACUITY_SCORE: 30
DEPENDENT_IADLS:: INDEPENDENT
ADLS_ACUITY_SCORE: 47
ADLS_ACUITY_SCORE: 32
ADLS_ACUITY_SCORE: 34
ADLS_ACUITY_SCORE: 27
ADLS_ACUITY_SCORE: 38
ADLS_ACUITY_SCORE: 25
ADLS_ACUITY_SCORE: 30
ADLS_ACUITY_SCORE: 25
ADLS_ACUITY_SCORE: 47
ADLS_ACUITY_SCORE: 34
ADLS_ACUITY_SCORE: 38
ADLS_ACUITY_SCORE: 38
ADLS_ACUITY_SCORE: 31
ADLS_ACUITY_SCORE: 30
ADLS_ACUITY_SCORE: 38
ADLS_ACUITY_SCORE: 32
ADLS_ACUITY_SCORE: 25
ADLS_ACUITY_SCORE: 38
ADLS_ACUITY_SCORE: 38
ADLS_ACUITY_SCORE: 37
ADLS_ACUITY_SCORE: 40
ADLS_ACUITY_SCORE: 25
ADLS_ACUITY_SCORE: 38
ADLS_ACUITY_SCORE: 47
ADLS_ACUITY_SCORE: 47
ADLS_ACUITY_SCORE: 38
ADLS_ACUITY_SCORE: 25
ADLS_ACUITY_SCORE: 33
ADLS_ACUITY_SCORE: 37
ADLS_ACUITY_SCORE: 25
ADLS_ACUITY_SCORE: 39
ADLS_ACUITY_SCORE: 30
ADLS_ACUITY_SCORE: 25
ADLS_ACUITY_SCORE: 39
ADLS_ACUITY_SCORE: 41
ADLS_ACUITY_SCORE: 25
ADLS_ACUITY_SCORE: 25
ADLS_ACUITY_SCORE: 37
ADLS_ACUITY_SCORE: 38
ADLS_ACUITY_SCORE: 38
ADLS_ACUITY_SCORE: 30
ADLS_ACUITY_SCORE: 38
ADLS_ACUITY_SCORE: 47
ADLS_ACUITY_SCORE: 38
ADLS_ACUITY_SCORE: 30
ADLS_ACUITY_SCORE: 33
ADLS_ACUITY_SCORE: 30
ADLS_ACUITY_SCORE: 25
ADLS_ACUITY_SCORE: 25
ADLS_ACUITY_SCORE: 38
ADLS_ACUITY_SCORE: 33
ADLS_ACUITY_SCORE: 37
ADLS_ACUITY_SCORE: 30
ADLS_ACUITY_SCORE: 34
ADLS_ACUITY_SCORE: 25
ADLS_ACUITY_SCORE: 33
ADLS_ACUITY_SCORE: 33
ADLS_ACUITY_SCORE: 25
ADLS_ACUITY_SCORE: 32
ADLS_ACUITY_SCORE: 38
ADLS_ACUITY_SCORE: 38
ADLS_ACUITY_SCORE: 37
ADLS_ACUITY_SCORE: 39
ADLS_ACUITY_SCORE: 25
ADLS_ACUITY_SCORE: 25
ADLS_ACUITY_SCORE: 30
ADLS_ACUITY_SCORE: 25
ADLS_ACUITY_SCORE: 25
ADLS_ACUITY_SCORE: 32
ADLS_ACUITY_SCORE: 38
ADLS_ACUITY_SCORE: 30
ADLS_ACUITY_SCORE: 40
ADLS_ACUITY_SCORE: 47
ADLS_ACUITY_SCORE: 25
ADLS_ACUITY_SCORE: 34
ADLS_ACUITY_SCORE: 30
ADLS_ACUITY_SCORE: 33
ADLS_ACUITY_SCORE: 31
ADLS_ACUITY_SCORE: 38
ADLS_ACUITY_SCORE: 33
DEPENDENT_IADLS:: INDEPENDENT
ADLS_ACUITY_SCORE: 33
ADLS_ACUITY_SCORE: 33
ADLS_ACUITY_SCORE: 25
ADLS_ACUITY_SCORE: 34
ADLS_ACUITY_SCORE: 25
ADLS_ACUITY_SCORE: 32
ADLS_ACUITY_SCORE: 38
ADLS_ACUITY_SCORE: 30
ADLS_ACUITY_SCORE: 30
ADLS_ACUITY_SCORE: 31
ADLS_ACUITY_SCORE: 39
ADLS_ACUITY_SCORE: 34
ADLS_ACUITY_SCORE: 38

## 2024-01-01 ASSESSMENT — COLUMBIA-SUICIDE SEVERITY RATING SCALE - C-SSRS

## 2024-01-01 ASSESSMENT — ENCOUNTER SYMPTOMS
ORTHOPNEA: 0
DYSRHYTHMIAS: 1
DYSRHYTHMIAS: 1
SEIZURES: 0
ORTHOPNEA: 0
DYSRHYTHMIAS: 1

## 2024-01-01 ASSESSMENT — PAIN SCALES - GENERAL
PAINLEVEL: NO PAIN (0)

## 2024-01-01 ASSESSMENT — LIFESTYLE VARIABLES
TOBACCO_USE: 1

## 2024-01-11 NOTE — ANESTHESIA CARE TRANSFER NOTE
Patient: Tacos Dominguez    Procedure: Procedure(s):  ECHOCARDIOGRAM, TRANSESOPHAGEAL, WITH ANESTHESIA       Diagnosis: Mitral stenosis [I05.0]  Diagnosis Additional Information: No value filed.    Anesthesia Type:   MAC     Note:    Oropharynx: oropharynx clear of all foreign objects and spontaneously breathing  Level of Consciousness: awake  Oxygen Supplementation: room air    Independent Airway: airway patency satisfactory and stable  Dentition: dentition unchanged  Vital Signs Stable: post-procedure vital signs reviewed and stable  Report to RN Given: handoff report given  Patient transferred to: Phase II    Handoff Report: Identifed the Patient, Identified the Reponsible Provider, Reviewed the pertinent medical history, Discussed the surgical course, Reviewed Intra-OP anesthesia mangement and issues during anesthesia, Set expectations for post-procedure period and Allowed opportunity for questions and acknowledgement of understanding      Vitals:  Vitals Value Taken Time   /72 01/11/24 1441   Temp     Pulse 89 01/11/24 1441   Resp     SpO2 98 % 01/11/24 1445   Vitals shown include unfiled device data.    Electronically Signed By: JANNET Prajapati CRNA  January 11, 2024  2:48 PM

## 2024-01-11 NOTE — ANESTHESIA POSTPROCEDURE EVALUATION
Patient: Tacos Dominguez    Procedure: Procedure(s):  ECHOCARDIOGRAM, TRANSESOPHAGEAL, WITH ANESTHESIA       Anesthesia Type:  MAC    Note:  Disposition: Outpatient   Postop Pain Control: Uneventful            Sign Out: Well controlled pain   PONV: No   Neuro/Psych: Uneventful            Sign Out: Acceptable/Baseline neuro status   Airway/Respiratory: Uneventful            Sign Out: Acceptable/Baseline resp. status   CV/Hemodynamics: Uneventful            Sign Out: Acceptable CV status; No obvious hypovolemia; No obvious fluid overload   Other NRE: NONE   DID A NON-ROUTINE EVENT OCCUR? No           Last vitals:  Vitals Value Taken Time   /72 01/11/24 1445   Temp 36.4  C (97.5  F) 01/11/24 1445   Pulse 89 01/11/24 1441   Resp 16 01/11/24 1445   SpO2 98 % 01/11/24 1445   Vitals shown include unfiled device data.    Electronically Signed By: Ricky Saleh MD  January 11, 2024  3:20 PM

## 2024-01-16 NOTE — ANESTHESIA PREPROCEDURE EVALUATION
Anesthesia Pre-Procedure Evaluation    Patient: Tacos Dominguez   MRN: [id number] : 1960        Procedure : Procedure(s):  ECHOCARDIOGRAM, TRANSESOPHAGEAL, WITH ANESTHESIA          Past Medical History:   Diagnosis Date    Aortic dissection (H)     distal, thin.  stable/chronic on MRCP 3/2022    Benign essential hypertension     CAD (coronary artery disease)     Cerebral infarction (H)     Crohn's colitis (H)     Crohn's disease of large intestine (H) 2021    Current smoker     Esophageal reflux     ESRD (end stage renal disease) on dialysis (H)     History of basal cell carcinoma     Hypertension     Mixed hyperlipidemia     NSTEMI (non-ST elevated myocardial infarction) (H)     PAF (paroxysmal atrial fibrillation) (H)       Past Surgical History:   Procedure Laterality Date    ABDOMEN SURGERY      x 6.  colon resections for crohn's disease    APPENDECTOMY      CHOLECYSTECTOMY      COLONOSCOPY N/A 2021    Procedure: COLONOSCOPY, WITH POLYPECTOMY AND BIOPSY;  Surgeon: Eric Preston MD;  Location:  GI    COLONOSCOPY N/A 2022    Procedure: COLONOSCOPY, WITH POLYPECTOMY AND BIOPSY;  Surgeon: Willian Kee MD;  Location:  GI    CV CORONARY ANGIOGRAM N/A 2021    Procedure: CV CORONARY ANGIOGRAM;  Surgeon: Judson Ybarra MD;  Location:  [facility]    ESOPHAGOSCOPY, GASTROSCOPY, DUODENOSCOPY (EGD), COMBINED N/A 2021    Procedure: ESOPHAGOGASTRODUODENOSCOPY, WITH FINE NEEDLE ASPIRATION BIOPSY, WITH ENDOSCOPIC ULTRASOUND GUIDANCE;  Surgeon: Eric Preston MD;  Location:  GI    IR DIALYSIS FISTULOGRAM LEFT  2022    IR DIALYSIS FISTULOGRAM LEFT  2023    LAPAROTOMY, LYSIS ADHESIONS, COMBINED N/A 2022    Procedure: Laparotomy, extensive lysis adhesions, combined;  Surgeon: Sarath Smith MD;  [facility]    PANCREATECTOMY PARTIAL N/A 2022    Procedure: Open Subtotal Pancreatectomy, intra-op ultrasound;  Surgeon:  "Sarath Smith MD;  [facility]    REVISION FISTULA ARTERIOVENOUS UPPER EXTREMITY Left 2/14/2023    Procedure: LEFT UPPER ARM FISTULA OUTFLOW REVISION FROM CEPHALIC VEIN TO JUGULAR VEIN WITH 10mm THIN-WALLED RINGED POLYTETRAFLUEROETHYLINE;  Surgeon: Mo Gramajo MD;  Location: [facility]    TRANSESOPHAGEAL ECHOCARDIOGRAM INTRAOPERATIVE N/A 1/11/2024    Procedure: ECHOCARDIOGRAM, TRANSESOPHAGEAL, WITH ANESTHESIA;  Surgeon: GENERIC ANESTHESIA PROVIDER;  [facility]    VASCULAR SURGERY      dialysis access- left upper      Allergies   Allergen Reactions    Lisinopril Anaphylaxis and Other (See Comments)     Shortness of breath      Social History     Tobacco Use    Smoking status: Every Day     Packs/day: 0.25     Years: 50.00     Additional pack years: 0.00     Total pack years: 12.50     Types: Cigarettes     Last attempt to quit: 10/19/2020     Years since quitting: 3.2    Smokeless tobacco: Never   Substance Use Topics    Alcohol use: Yes     Comment: rare      Wt Readings from Last 1 Encounters:   01/11/24 75.5 kg (166 lb 7.2 oz)              OUTSIDE LABS:  CBC:   Lab Results   Component Value Date    WBC 4.2 10/24/2023    WBC 6.2 08/09/2023    HGB 10.5 (L) 10/24/2023    HGB 7.2 (LL) 08/09/2023    HCT 33.2 (L) 10/24/2023    HCT 23.0 (L) 08/09/2023    PLT 63 (L) 10/24/2023     (L) 08/09/2023     BMP:   Lab Results   Component Value Date     10/24/2023     08/09/2023    POTASSIUM 4.7 10/24/2023    POTASSIUM 3.5 08/09/2023    CHLORIDE 94 (L) 10/24/2023    CHLORIDE 95 (L) 08/09/2023    CO2 27 10/24/2023    CO2 31 (H) 08/09/2023    BUN 35.2 (H) 10/24/2023    BUN 12.6 08/09/2023    CR 7.66 (H) 10/24/2023    CR 3.61 (H) 08/09/2023    GLC 82 10/24/2023     (H) 08/09/2023     COAGS:   Lab Results   Component Value Date    PTT 32 03/17/2022    INR 1.30 (H) 03/17/2022     POC: No results found for: \"BGM\", \"HCG\", \"HCGS\"  HEPATIC:   Lab Results   Component Value Date    ALBUMIN 4.1 " 10/24/2023    PROTTOTAL 7.1 10/24/2023    ALT 14 10/24/2023    AST 22 10/24/2023    ALKPHOS 87 10/24/2023    BILITOTAL 1.1 10/24/2023     OTHER:   Lab Results   Component Value Date    PH 7.36 03/17/2022    LACT 0.6 (L) 03/19/2022    KARY 9.8 10/24/2023    PHOS 4.7 (H) 03/21/2022    MAG 1.2 (L) 03/21/2022    LIPASE 115 (H) 09/27/2020    AMYLASE 34 03/18/2022    CRP 5.6 05/07/2008    SED 65 (H) 10/24/2023       Anesthesia Plan    ASA Status:  3       Anesthesia Type: MAC.              Consents            Postoperative Care            Comments:               Christina Rosario MD    I have reviewed the pertinent notes and labs in the chart from the past 30 days and (re)examined the patient.  Any updates or changes from those notes are reflected in this note.

## 2024-01-29 NOTE — TELEPHONE ENCOUNTER
Spoke with pt and scheduled to see Dr Romero. Pt aware of time and location and agreed to the plan

## 2024-01-29 NOTE — TELEPHONE ENCOUNTER
Message sent to  to schedule pt with Dr. Romero in clinic.    ----- Message from Veda Romero MD sent at 1/27/2024 11:37 AM CST -----  Absolutely--I will let my office know to set up a clinic visit for next week.    Thanks    Veda  ----- Message -----  From: JEN Oliver MD  Sent: 1/27/2024  11:13 AM CST  To: Veda Romero MD    Hi,Will you be able to see this patient foe an MVR.Symptomatic MR due to MAC. CKD on HD. Thanks. ARNAV.

## 2024-02-01 NOTE — PATIENT INSTRUCTIONS
You were seen today in the [facility]    Pearl will call you to schedule surgery.    We will call you to schedule these preop tests, needed prior to surgery:    PAC  Labs  EKG  Chest xray  Angiogram  Dental clearance    Please call JR Espinoza surgery coordinator with any questions.  Thank you.    Olga Dias RNCC  Cardiothoracic Surgery  Phone [phone]  Fax [phone]

## 2024-02-01 NOTE — LETTER
Cardiothoracic Surgery          This patient is scheduled to undergo a heart valve repair or replacement.    Please have the dentist sign the attached form and fax or e-mail it to the Cardiothoracic Surgery office prior to their surgery date.    Primary fax number:  [facility] ([facility]) [phone]    E-mail:   [facility] ([facility]): [email or web address]  (Olga)      Please contact Olga Dias RN at [phone].      Thank you,  Cardiothoracic Surgery Office                               PATIENT:   KRAIG CHAUDHARI    :   1960       DATE OF DENTAL VISIT: __________________                                                    ___________________________   was seen by me today for dental clearance prior to heart valve surgery.     ______  No additional treatment is needed prior to surgery.    ______  Further treatment is needed and will be completed prior to surgery date.    ______  Surgery date may need to be rescheduled due to this condition     _________________________________________________________.    Dr. _______________________    Phone _______________________.      Clinic name ____________________________________________________      Address_______________________________________________________

## 2024-02-01 NOTE — NURSING NOTE
Chief Complaint   Patient presents with    New Patient     New CV surgery     Vitals were taken and medications reconciled.    Ziggy Lewis, EMT  1:47 PM

## 2024-02-01 NOTE — LETTER
2/1/2024      RE: Tacos Dominguez  [address]  [facility] [address]       Dear Colleague,    Thank you for the opportunity to participate in the care of your patient, Tacos Dominguez, at the [facility] [place] at [facility]. Please see a copy of my visit note below.    Cardiovascular and Thoracic Surgery Clinic Consultation    Tacos Dominguez MRN# [id number]   YOB: 1960 Age: 64 year old        Reason for consult: I was asked by Dr. Garcia to evaluate this patient for possible mitral valve replacement for severe MR, moderate MS with MAC.           Assessment and Plan:   Mr. Dominguez is a very pleasant 65yo M with ESRD on hemodialysis who was referred to me for possible mitral valve replacement for severe symptomatic mitral valve regurgitation with significant MAC. He also has severe functional TR and moderate aortic stenosis and a history of paroxysmal atrial fibrillation.    My recommendation is a mechanical MVR, AVR, tricuspid valve repair vs. replacement with a bioprosthetic valve, bilateral PVI and BRIAN exclusion. He will need a preoperative LHC and RHC. I explained to him the diagnosis in detail, and the reason for recommending the proposed operation. I also went over the risks and benefits of the operation, as well as the possible complications associated with the surgery. These complications include, but are not strictly limited to, infection, bleeding, stroke, postop MI, postop arrhythmias, pulmonary and renal complications. I think he is an overall acceptable surgical candidate, and I quoted an operative mortality risk of around 5% based on the available information. We will await for the LHC/RHC results. He also understands the need for lifelong Coumadin postoperatively for mechanical prosthetic valves.    It was a pleasure to meet Mr. Dominguez today, and thank you very much for this referral.       Attestation:  Veda HARGROVE  MD Nick           Chief Complaint:   Shortness of breath: with minimal exertion.             History of Present Illness:   Mr. Dominguez is a very pleasant 63yo M with ESRD on hemodialysis via a left upper extremity AV fistula and Crohn's disease. He also has a chronic stable infrarenal focal abdominal aortic dissection. He also has a history of paroxysmal atrial fibrillation. He is being worked up for kidney transplantation. He was found to have severe mitral valve regurgitation with significant MAC, along with severe TR and moderate aortic stenosis as well. He was referred to me by Dr. Garcia for a surgical evaluation.                Past Medical History:     Past Medical History:   Diagnosis Date    Aortic dissection (H)     distal, thin.  stable/chronic on MRCP 3/2022    Benign essential hypertension     CAD (coronary artery disease)     Cerebral infarction (H)     Crohn's colitis (H)     Crohn's disease of large intestine (H) 11/22/2021    Current smoker     Esophageal reflux     ESRD (end stage renal disease) on dialysis (H)     History of basal cell carcinoma     Hypertension     Mixed hyperlipidemia     NSTEMI (non-ST elevated myocardial infarction) (H)     PAF (paroxysmal atrial fibrillation) (H)              Past Surgical History:     Past Surgical History:   Procedure Laterality Date    ABDOMEN SURGERY      x 6.  colon resections for crohn's disease    APPENDECTOMY      CHOLECYSTECTOMY      COLONOSCOPY N/A 07/20/2021    Procedure: COLONOSCOPY, WITH POLYPECTOMY AND BIOPSY;  Surgeon: Eric Perston MD;  Location:  GI    COLONOSCOPY N/A 12/7/2022    Procedure: COLONOSCOPY, WITH POLYPECTOMY AND BIOPSY;  Surgeon: Willian Kee MD;  Location:  GI    CV CORONARY ANGIOGRAM N/A 05/25/2021    Procedure: CV CORONARY ANGIOGRAM;  Surgeon: Judson Ybarra MD;  Location:  HEART CARDIAC CATH LAB    ESOPHAGOSCOPY, GASTROSCOPY, DUODENOSCOPY (EGD), COMBINED N/A 07/20/2021    Procedure:  ESOPHAGOGASTRODUODENOSCOPY, WITH FINE NEEDLE ASPIRATION BIOPSY, WITH ENDOSCOPIC ULTRASOUND GUIDANCE;  Surgeon: Eric Preston MD;  [facility]    IR DIALYSIS FISTULOGRAM LEFT  12/01/2022    IR DIALYSIS FISTULOGRAM LEFT  1/13/2023    LAPAROTOMY, LYSIS ADHESIONS, COMBINED N/A 03/17/2022    Procedure: Laparotomy, extensive lysis adhesions, combined;  Surgeon: Sarath Smith MD;  [facility]    PANCREATECTOMY PARTIAL N/A 03/17/2022    Procedure: Open Subtotal Pancreatectomy, intra-op ultrasound;  Surgeon: Sarath Smith MD;  [facility]    REVISION FISTULA ARTERIOVENOUS UPPER EXTREMITY Left 2/14/2023    Procedure: LEFT UPPER ARM FISTULA OUTFLOW REVISION FROM CEPHALIC VEIN TO JUGULAR VEIN WITH 10mm THIN-WALLED RINGED POLYTETRAFLUEROETHYLINE;  Surgeon: Mo Gramajo MD;  Location: [facility]    TRANSESOPHAGEAL ECHOCARDIOGRAM INTRAOPERATIVE N/A 1/11/2024    Procedure: ECHOCARDIOGRAM, TRANSESOPHAGEAL, WITH ANESTHESIA;  Surgeon: GENERIC ANESTHESIA PROVIDER;  [facility]    VASCULAR SURGERY      dialysis access- left upper               Social History:     Social History     Tobacco Use    Smoking status: Every Day     Packs/day: 0.25     Years: 50.00     Additional pack years: 0.00     Total pack years: 12.50     Types: Cigarettes     Last attempt to quit: 10/19/2020     Years since quitting: 3.2    Smokeless tobacco: Never   Substance Use Topics    Alcohol use: Yes     Comment: rare             Family History:     Family History   Problem Relation Age of Onset    Breast Cancer Mother     Coronary Artery Disease Father     Hypertension Brother     Anesthesia Reaction No family hx of     Thrombosis No family hx of              Immunizations:     Immunization History   Administered Date(s) Administered    COVID-19 Monovalent 18+ (Moderna) 01/27/2021, 02/24/2021, 11/29/2021    Flu 65+ Years 10/15/2001, 12/20/2005, 10/31/2007, 09/09/2009, 09/24/2010, 09/21/2011, 09/20/2012    Flu, Unspecified  10/25/2020, 10/27/2021, 12/29/2021, 09/05/2022, 10/31/2022    HIB(PRP-OMP)(PedvaxHIB) 12/07/2021    Hepatitis B, Adult 12/07/2021, 02/16/2022, 06/15/2022    Influenza (intradermal) 10/25/2020    Influenza Vaccine, 6+MO IM (QUADRIVALENT W/PRESERVATIVES) 09/25/2013    Meningococcal ACWY (Menactra ) 02/16/2022    Meningococcal ACWY (Menveo ) 12/07/2021    Meningococcal B (Bexsero ) 12/07/2021, 01/11/2022    Pneumo Conj 13-V (2010&after) 01/11/2022    Pneumococcal 23 valent 03/14/2022    Pneumococcal, Unspecified 11/10/1994, 12/07/2020    Zoster recombinant adjuvanted (SHINGRIX) 01/11/2022, 03/14/2022             Allergies:     Allergies   Allergen Reactions    Lisinopril Anaphylaxis and Other (See Comments)     Shortness of breath             Medications:     Current Outpatient Medications   Medication Sig    budesonide (ENTOCORT EC) 3 MG EC capsule Take 3 capsules (9 mg) by mouth every morning for 30 days    calcium acetate (CALPHRON) 667 MG TABS tablet Take 2,668 mg by mouth 3 times daily    folic acid (FOLVITE) 1 MG tablet Take 1 mg by mouth every morning     lidocaine-prilocaine (EMLA) 2.5-2.5 % external cream three times a week Prior to dialysis  site access on Monday, Wednesday, Friday    multivitamin RENAL (MULTIVITAMIN RENAL) 1 MG capsule Take 1 capsule by mouth every morning    omeprazole (PRILOSEC) 20 MG DR capsule Take 20 mg by mouth every morning     simvastatin (ZOCOR) 20 MG tablet Take 20 mg by mouth every morning     ustekinumab (STELARA) 90 MG/ML Inject 1 ml ( 90 mg) subcutaneous every 4 weeks.    amLODIPine (NORVASC) 10 MG tablet Take 10 mg by mouth daily    carvedilol (COREG) 12.5 MG tablet Take 12.5 mg by mouth 2 times daily     Current Facility-Administered Medications   Medication    lidocaine 1% with EPINEPHrine 1:100,000 injection 3 mL             Review of Systems:   The 10 point Review of Systems is negative other than noted in the HPI           Physical Examination:   AVSS    Gen: appears  well, in no acute distress  CV: RRR, normal S1 and S2, grade 3/6 systolic murmur at the apex  Resp: CTAB  Abd: soft, NT/ND  Ext: no edema or cyanosis  Neuro: no focal deficits            Data:     Lab Results   Component Value Date    WBC 7.4 01/20/2024    HGB 8.3 (L) 01/20/2024    HCT 27.0 (L) 01/20/2024     (L) 01/20/2024     01/20/2024    POTASSIUM 3.8 01/20/2024    CHLORIDE 101 01/20/2024    CO2 26 01/20/2024    BUN 26.1 (H) 01/20/2024    CR 5.88 (H) 01/20/2024     (H) 01/20/2024    SED 64 (H) 01/20/2024    AST 36 01/20/2024    ALT 26 01/20/2024    ALKPHOS 119 01/20/2024    BILITOTAL 0.6 01/20/2024    INR 1.30 (H) 03/17/2022              GUERO 1/11/24:  Interpretation Summary  Global and regional left ventricular function is normal with an EF of 55-60%.  Global right ventricular function is mildly reduced.  Severe mitral insufficiency is present (ERO 0.53 cm2 and MR volume 75 ml).  Cause of MR is severe mitral annular calcification.  Mitral valve gradient is increased, 9mmHg at 94bpm, but is likely driven by  increased volume from MR. The mitral valve area is 2.0 cm^2 by planimetry.  Moderate aortic stenosis.  The mean gradient across the aortic valve is 21 mmHg.  The peak aortic velocity is 3.2 m/sec.  AoV area by plannimetry 1.13 cm2  Moderate pulmonary hypertension. Right ventricular systolic pressure is 64mmHg  above the right atrial pressure.  No pericardial effusion.  ______________________________________________________________________________  Procedure  Transesophageal Echocardiogram with color and spectral Doppler performed. 3D  image acquisition, reconstruction, and real-time interpretation was performed.  I was present during GUERO probe placement by the Fellow. I personally viewed  the imaging and agree with the interpretation and report as documented by the  Fellow. Procedure location Operating Room. The procedure was performed in the  Operating Room. Informed consent for  Transesophegeal echo obtained. GUERO Probe  #63 was used during the procedure. Sedation, endotracheal intubation, and  mechanical ventilation were initiated prior to the GUERO and were monitored by  anesthesia. I determined this patient to be an appropriate candidate for the  planned sedation and procedure and have reassessed the patient immediately  prior to sedation and procedure. Total sedation time: 35 minutes of continuous  bedside 1:1 monitoring. The Transducer was inserted without difficulty . The  patient tolerated the procedure well. Complications None. The patient's rhythm  is normal sinus.     Left Ventricle  Global and regional left ventricular function is normal with an EF of 55-60%.     Right Ventricle  The right ventricle is normal size. Global right ventricular function is  mildly reduced.     Atria  Visually enlarged left atrium. The left atrial appendage is normal. It is free  of spontaneous echo contrast and thrombus. The left atrial appendage Doppler  velocities are normal. The atrial septum is intact as assessed by color  Doppler .     Mitral Valve  Severe mitral annular calcification is present. Severe mitral insufficiency is  present. ERO 0.53 cm2 and MR volume 75 ml  Cause of MR is severe mitral annular calcification. The mean mitral valve  gradient is 9.1 mmHg. Mild mitral stenosis is present. The mitral valve area  is 2.0 cm^2 planimetry.     Aortic Valve  Mild aortic insufficiency is present. Moderate aortic stenosis is present. AoV  area by plannimetry 1.13 cm2. The mean gradient across the aortic valve is21  mmHg. The peak aortic velocity is 3.2 m/sec.     Tricuspid Valve  The tricuspid valve is normal. Moderate tricuspid insufficiency is present.  Right ventricular systolic pressure is 64mmHg above the right atrial pressure.  Moderate pulmonary hypertension is present.     Pulmonic Valve  The pulmonic valve is normal.     Vessels  The LLPV Doppler shows systolic blunting . The LUPV Doppler  shows systolic  blunting . The RLPV Doppler shows systolic blunting . The right upper  pulmonary vein cannot be assessed.     Pericardium  No pericardial effusion is present.     Compared to Previous Study  This study was compared with the study from 2026 . There is severe MR  and moderate AS.     ______________________________________________________________________________  Doppler Measurements & Calculations  MV max P.9 mmHg  MV mean P.1 mmHg  MV V2 VTI: 44.1 cm  MR PISA: 6.6 cm2  MR ERO: 0.46 cm2  MR volume: 65.2 ml     ______________________________________________________________________________  Report approved by: Brain Harrison 2024 04:09 PM         TTE 23:  Interpretation Summary  Global and regional left ventricular function is normal with an EF of 55-60%.  Global right ventricular function is mildly reduced.  Severe left atrial enlargement is present.  Moderate to severe mitral insufficiency is present.  Moderate mitral stenosis is present.  The mean gradient across the mitral valve is 12 mmHg, HR 96 bpm.  Moderate aortic stenosis is present.  The peak aortic velocity is 3.3 m/sec.  The right ventricular systolic pressure is 64mmHg above the right atrial  pressure.  Pulmonary hypertension is present.  IVC diameter and respiratory changes fall into an intermediate range  suggesting an RA pressure of 8 mmHg.  ______________________________________________________________________________  Left Ventricle  Global and regional left ventricular function is normal with an EF of 55-60%.  Left ventricular diastolic function is not assessable.     Right Ventricle  Global right ventricular function is mildly reduced.     Atria  Severe left atrial enlargement is present.     Mitral Valve  Mitral valve sclerosis is present. Moderate to severe mitral annular  calcification is present. Moderate to severe mitral insufficiency is present.  The etiology of the mitral stenosis is mitral  annular calcification . Moderate  mitral stenosis is present. The mean gradient across the mitral valve is 12  mmHg.     Aortic Valve  Mild aortic insufficiency is present. Moderate aortic stenosis is present. The  aortic valve area is 1.2 cm^2, by the continuity equation. The peak aortic  velocity is 3.3 m/sec.     Tricuspid Valve  Moderate to severe tricuspid insufficiency is present. The right ventricular  systolic pressure is 64mmHg above the right atrial pressure. Pulmonary  hypertension is present.     Pulmonic Valve  Mild to moderate pulmonic insufficiency is present.     Vessels  The aorta root is normal. The thoracic aorta is normal. IVC diameter and  respiratory changes fall into an intermediate range suggesting an RA pressure  of 8 mmHg.     Pericardium  No pericardial effusion is present.     ______________________________________________________________________________  MMode/2D Measurements & Calculations  IVSd: 1.1 cm  LVIDd: 5.7 cm  LVIDs: 3.5 cm  LVPWd: 1.1 cm  FS: 37.7 %     LV mass(C)d: 251.3 grams  LV mass(C)dI: 130.3 grams/m2  Ao root diam: 3.1 cm  asc Aorta Diam: 3.2 cm  LVOT diam: 2.0 cm  LVOT area: 3.2 cm2  Ao root diam index Ht(cm/m): 1.7  Ao root diam index BSA (cm/m2): 1.6  Asc Ao diam index BSA (cm/m2): 1.6  Asc Ao diam index Ht(cm/m): 1.8  LA Volume (BP): 102.0 ml  LA Volume Index (BP): 52.8 ml/m2     RWT: 0.37  TAPSE: 1.5 cm     Doppler Measurements & Calculations  MV E max maddy: 247.0 cm/sec  MV A max maddy: 174.0 cm/sec  MV E/A: 1.4  MV max P.5 mmHg  MV mean P.2 mmHg  MV V2 VTI: 54.1 cm  MVA(VTI): 1.1 cm2  MV dec time: 0.21 sec  Ao V2 max: 345.8 cm/sec  Ao max P.8 mmHg  Ao V2 mean: 204.9 cm/sec  Ao mean P.2 mmHg  Ao V2 VTI: 51.9 cm  LAURA(I,D): 1.2 cm2  LAURA(V,D): 1.2 cm2  LV V1 max P.8 mmHg  LV V1 max: 130.0 cm/sec  LV V1 VTI: 19.4 cm  MR PISA: 7.9 cm2  MR ERO: 0.48 cm2  MR volume: 69.9 ml  SV(LVOT): 61.4 ml  SI(LVOT): 31.8 ml/m2  PA acc time: 0.05 sec  TR max maddy:  403.0 cm/sec  TR max P.0 mmHg  AV Josué Ratio (DI): 0.38  LAURA Index (cm2/m2): 0.61  E/E' av.2     Lateral E/e': 29.9  Medial E/e': 44.5  RV S Josué: 9.7 cm/sec     ______________________________________________________________________________  Report approved by: MD Fausto Rosado 2023 09:02 AM             Please do not hesitate to contact me if you have any questions/concerns.     Sincerely,     Veda Romero MD

## 2024-02-02 NOTE — PROGRESS NOTES
Cardiovascular and Thoracic Surgery Clinic Consultation    Tacos Dominguez MRN# [id number]   YOB: 1960 Age: 64 year old        Reason for consult: I was asked by Dr. Garcia to evaluate this patient for possible mitral valve replacement for severe MR, moderate MS with MAC.           Assessment and Plan:   Mr. Dominguez is a very pleasant 63yo M with ESRD on hemodialysis who was referred to me for possible mitral valve replacement for severe symptomatic mitral valve regurgitation with significant MAC. He also has severe functional TR and moderate aortic stenosis and a history of paroxysmal atrial fibrillation.    My recommendation is a mechanical MVR, AVR, tricuspid valve repair vs. replacement with a bioprosthetic valve, bilateral PVI and BRIAN exclusion. He will need a preoperative LHC and RHC. I explained to him the diagnosis in detail, and the reason for recommending the proposed operation. I also went over the risks and benefits of the operation, as well as the possible complications associated with the surgery. These complications include, but are not strictly limited to, infection, bleeding, stroke, postop MI, postop arrhythmias, pulmonary and renal complications. I think he is an overall acceptable surgical candidate, and I quoted an operative mortality risk of around 5% based on the available information. We will await for the LHC/RHC results. He also understands the need for lifelong Coumadin postoperatively for mechanical prosthetic valves.    It was a pleasure to meet Mr. Dominguez today, and thank you very much for this referral.       Attestation:  Veda Romero MD           Chief Complaint:   Shortness of breath: with minimal exertion.             History of Present Illness:   Mr. Dominguez is a very pleasant 63yo M with ESRD on hemodialysis via a left upper extremity AV fistula and Crohn's disease. He also has a chronic stable infrarenal focal abdominal aortic dissection. He also has a history  of paroxysmal atrial fibrillation. He is being worked up for kidney transplantation. He was found to have severe mitral valve regurgitation with significant MAC, along with severe TR and moderate aortic stenosis as well. He was referred to me by Dr. Garcia for a surgical evaluation.                Past Medical History:     Past Medical History:   Diagnosis Date    Aortic dissection (H)     distal, thin.  stable/chronic on MRCP 3/2022    Benign essential hypertension     CAD (coronary artery disease)     Cerebral infarction (H)     Crohn's colitis (H)     Crohn's disease of large intestine (H) 11/22/2021    Current smoker     Esophageal reflux     ESRD (end stage renal disease) on dialysis (H)     History of basal cell carcinoma     Hypertension     Mixed hyperlipidemia     NSTEMI (non-ST elevated myocardial infarction) (H)     PAF (paroxysmal atrial fibrillation) (H)              Past Surgical History:     Past Surgical History:   Procedure Laterality Date    ABDOMEN SURGERY      x 6.  colon resections for crohn's disease    APPENDECTOMY      CHOLECYSTECTOMY      COLONOSCOPY N/A 07/20/2021    Procedure: COLONOSCOPY, WITH POLYPECTOMY AND BIOPSY;  Surgeon: Eric Preston MD;  Location:  GI    COLONOSCOPY N/A 12/7/2022    Procedure: COLONOSCOPY, WITH POLYPECTOMY AND BIOPSY;  Surgeon: Willian Kee MD;  Location:  GI    CV CORONARY ANGIOGRAM N/A 05/25/2021    Procedure: CV CORONARY ANGIOGRAM;  Surgeon: Judson Ybarra MD;  Location:  [facility] CATH LAB    ESOPHAGOSCOPY, GASTROSCOPY, DUODENOSCOPY (EGD), COMBINED N/A 07/20/2021    Procedure: ESOPHAGOGASTRODUODENOSCOPY, WITH FINE NEEDLE ASPIRATION BIOPSY, WITH ENDOSCOPIC ULTRASOUND GUIDANCE;  Surgeon: Eric Preston MD;  Location:  GI    IR DIALYSIS FISTULOGRAM LEFT  12/01/2022    IR DIALYSIS FISTULOGRAM LEFT  1/13/2023    LAPAROTOMY, LYSIS ADHESIONS, COMBINED N/A 03/17/2022    Procedure: Laparotomy, extensive lysis adhesions,  combined;  Surgeon: Sarath Smith MD;  [facility]    PANCREATECTOMY PARTIAL N/A 03/17/2022    Procedure: Open Subtotal Pancreatectomy, intra-op ultrasound;  Surgeon: Sarath Smith MD;  [facility]    REVISION FISTULA ARTERIOVENOUS UPPER EXTREMITY Left 2/14/2023    Procedure: LEFT UPPER ARM FISTULA OUTFLOW REVISION FROM CEPHALIC VEIN TO JUGULAR VEIN WITH 10mm THIN-WALLED RINGED POLYTETRAFLUEROETHYLINE;  Surgeon: Mo Gramajo MD;  Location: [facility]    TRANSESOPHAGEAL ECHOCARDIOGRAM INTRAOPERATIVE N/A 1/11/2024    Procedure: ECHOCARDIOGRAM, TRANSESOPHAGEAL, WITH ANESTHESIA;  Surgeon: GENERIC ANESTHESIA PROVIDER;  [facility]    VASCULAR SURGERY      dialysis access- left upper               Social History:     Social History     Tobacco Use    Smoking status: Every Day     Packs/day: 0.25     Years: 50.00     Additional pack years: 0.00     Total pack years: 12.50     Types: Cigarettes     Last attempt to quit: 10/19/2020     Years since quitting: 3.2    Smokeless tobacco: Never   Substance Use Topics    Alcohol use: Yes     Comment: rare             Family History:     Family History   Problem Relation Age of Onset    Breast Cancer Mother     Coronary Artery Disease Father     Hypertension Brother     Anesthesia Reaction No family hx of     Thrombosis No family hx of              Immunizations:     Immunization History   Administered Date(s) Administered    COVID-19 Monovalent 18+ (Moderna) 01/27/2021, 02/24/2021, 11/29/2021    Flu 65+ Years 10/15/2001, 12/20/2005, 10/31/2007, 09/09/2009, 09/24/2010, 09/21/2011, 09/20/2012    Flu, Unspecified 10/25/2020, 10/27/2021, 12/29/2021, 09/05/2022, 10/31/2022    HIB(PRP-OMP)(PedvaxHIB) 12/07/2021    Hepatitis B, Adult 12/07/2021, 02/16/2022, 06/15/2022    Influenza (intradermal) 10/25/2020    Influenza Vaccine, 6+MO IM (QUADRIVALENT W/PRESERVATIVES) 09/25/2013    Meningococcal ACWY (Menactra ) 02/16/2022    Meningococcal ACWY (Menveo )  12/07/2021    Meningococcal B (Bexsero ) 12/07/2021, 01/11/2022    Pneumo Conj 13-V (2010&after) 01/11/2022    Pneumococcal 23 valent 03/14/2022    Pneumococcal, Unspecified 11/10/1994, 12/07/2020    Zoster recombinant adjuvanted (SHINGRIX) 01/11/2022, 03/14/2022             Allergies:     Allergies   Allergen Reactions    Lisinopril Anaphylaxis and Other (See Comments)     Shortness of breath             Medications:     Current Outpatient Medications   Medication Sig    budesonide (ENTOCORT EC) 3 MG EC capsule Take 3 capsules (9 mg) by mouth every morning for 30 days    calcium acetate (CALPHRON) 667 MG TABS tablet Take 2,668 mg by mouth 3 times daily    folic acid (FOLVITE) 1 MG tablet Take 1 mg by mouth every morning     lidocaine-prilocaine (EMLA) 2.5-2.5 % external cream three times a week Prior to dialysis  site access on Monday, Wednesday, Friday    multivitamin RENAL (MULTIVITAMIN RENAL) 1 MG capsule Take 1 capsule by mouth every morning    omeprazole (PRILOSEC) 20 MG DR capsule Take 20 mg by mouth every morning     simvastatin (ZOCOR) 20 MG tablet Take 20 mg by mouth every morning     ustekinumab (STELARA) 90 MG/ML Inject 1 ml ( 90 mg) subcutaneous every 4 weeks.    amLODIPine (NORVASC) 10 MG tablet Take 10 mg by mouth daily    carvedilol (COREG) 12.5 MG tablet Take 12.5 mg by mouth 2 times daily     Current Facility-Administered Medications   Medication    lidocaine 1% with EPINEPHrine 1:100,000 injection 3 mL             Review of Systems:   The 10 point Review of Systems is negative other than noted in the HPI           Physical Examination:   AVSS    Gen: appears well, in no acute distress  CV: RRR, normal S1 and S2, grade 3/6 systolic murmur at the apex  Resp: CTAB  Abd: soft, NT/ND  Ext: no edema or cyanosis  Neuro: no focal deficits            Data:     Lab Results   Component Value Date    WBC 7.4 01/20/2024    HGB 8.3 (L) 01/20/2024    HCT 27.0 (L) 01/20/2024     (L) 01/20/2024      01/20/2024    POTASSIUM 3.8 01/20/2024    CHLORIDE 101 01/20/2024    CO2 26 01/20/2024    BUN 26.1 (H) 01/20/2024    CR 5.88 (H) 01/20/2024     (H) 01/20/2024    SED 64 (H) 01/20/2024    AST 36 01/20/2024    ALT 26 01/20/2024    ALKPHOS 119 01/20/2024    BILITOTAL 0.6 01/20/2024    INR 1.30 (H) 03/17/2022              GUERO 1/11/24:  Interpretation Summary  Global and regional left ventricular function is normal with an EF of 55-60%.  Global right ventricular function is mildly reduced.  Severe mitral insufficiency is present (ERO 0.53 cm2 and MR volume 75 ml).  Cause of MR is severe mitral annular calcification.  Mitral valve gradient is increased, 9mmHg at 94bpm, but is likely driven by  increased volume from MR. The mitral valve area is 2.0 cm^2 by planimetry.  Moderate aortic stenosis.  The mean gradient across the aortic valve is 21 mmHg.  The peak aortic velocity is 3.2 m/sec.  AoV area by plannimetry 1.13 cm2  Moderate pulmonary hypertension. Right ventricular systolic pressure is 64mmHg  above the right atrial pressure.  No pericardial effusion.  ______________________________________________________________________________  Procedure  Transesophageal Echocardiogram with color and spectral Doppler performed. 3D  image acquisition, reconstruction, and real-time interpretation was performed.  I was present during GUERO probe placement by the Fellow. I personally viewed  the imaging and agree with the interpretation and report as documented by the  Fellow. Procedure location Operating Room. The procedure was performed in the  Operating Room. Informed consent for Transesophegeal echo obtained. GUERO Probe  #[id number] was used during the procedure. Sedation, endotracheal intubation, and  mechanical ventilation were initiated prior to the GUERO and were monitored by  anesthesia. I determined this patient to be an appropriate candidate for the  planned sedation and procedure and have reassessed the patient  immediately  prior to sedation and procedure. Total sedation time: 35 minutes of continuous  bedside 1:1 monitoring. The Transducer was inserted without difficulty . The  patient tolerated the procedure well. Complications None. The patient's rhythm  is normal sinus.     Left Ventricle  Global and regional left ventricular function is normal with an EF of 55-60%.     Right Ventricle  The right ventricle is normal size. Global right ventricular function is  mildly reduced.     Atria  Visually enlarged left atrium. The left atrial appendage is normal. It is free  of spontaneous echo contrast and thrombus. The left atrial appendage Doppler  velocities are normal. The atrial septum is intact as assessed by color  Doppler .     Mitral Valve  Severe mitral annular calcification is present. Severe mitral insufficiency is  present. ERO 0.53 cm2 and MR volume 75 ml  Cause of MR is severe mitral annular calcification. The mean mitral valve  gradient is 9.1 mmHg. Mild mitral stenosis is present. The mitral valve area  is 2.0 cm^2 planimetry.     Aortic Valve  Mild aortic insufficiency is present. Moderate aortic stenosis is present. AoV  area by plannimetry 1.13 cm2. The mean gradient across the aortic valve is21  mmHg. The peak aortic velocity is 3.2 m/sec.     Tricuspid Valve  The tricuspid valve is normal. Moderate tricuspid insufficiency is present.  Right ventricular systolic pressure is 64mmHg above the right atrial pressure.  Moderate pulmonary hypertension is present.     Pulmonic Valve  The pulmonic valve is normal.     Vessels  The LLPV Doppler shows systolic blunting . The LUPV Doppler shows systolic  blunting . The RLPV Doppler shows systolic blunting . The right upper  pulmonary vein cannot be assessed.     Pericardium  No pericardial effusion is present.     Compared to Previous Study  This study was compared with the study from 12/26/2026 . There is severe MR  and moderate AS.      ______________________________________________________________________________  Doppler Measurements & Calculations  MV max P.9 mmHg  MV mean P.1 mmHg  MV V2 VTI: 44.1 cm  MR PISA: 6.6 cm2  MR ERO: 0.46 cm2  MR volume: 65.2 ml     ______________________________________________________________________________  Report approved by: Brain Harrison 2024 04:09 PM         TTE 23:  Interpretation Summary  Global and regional left ventricular function is normal with an EF of 55-60%.  Global right ventricular function is mildly reduced.  Severe left atrial enlargement is present.  Moderate to severe mitral insufficiency is present.  Moderate mitral stenosis is present.  The mean gradient across the mitral valve is 12 mmHg, HR 96 bpm.  Moderate aortic stenosis is present.  The peak aortic velocity is 3.3 m/sec.  The right ventricular systolic pressure is 64mmHg above the right atrial  pressure.  Pulmonary hypertension is present.  IVC diameter and respiratory changes fall into an intermediate range  suggesting an RA pressure of 8 mmHg.  ______________________________________________________________________________  Left Ventricle  Global and regional left ventricular function is normal with an EF of 55-60%.  Left ventricular diastolic function is not assessable.     Right Ventricle  Global right ventricular function is mildly reduced.     Atria  Severe left atrial enlargement is present.     Mitral Valve  Mitral valve sclerosis is present. Moderate to severe mitral annular  calcification is present. Moderate to severe mitral insufficiency is present.  The etiology of the mitral stenosis is mitral annular calcification . Moderate  mitral stenosis is present. The mean gradient across the mitral valve is 12  mmHg.     Aortic Valve  Mild aortic insufficiency is present. Moderate aortic stenosis is present. The  aortic valve area is 1.2 cm^2, by the continuity equation. The peak aortic  velocity is  3.3 m/sec.     Tricuspid Valve  Moderate to severe tricuspid insufficiency is present. The right ventricular  systolic pressure is 64mmHg above the right atrial pressure. Pulmonary  hypertension is present.     Pulmonic Valve  Mild to moderate pulmonic insufficiency is present.     Vessels  The aorta root is normal. The thoracic aorta is normal. IVC diameter and  respiratory changes fall into an intermediate range suggesting an RA pressure  of 8 mmHg.     Pericardium  No pericardial effusion is present.     ______________________________________________________________________________  MMode/2D Measurements & Calculations  IVSd: 1.1 cm  LVIDd: 5.7 cm  LVIDs: 3.5 cm  LVPWd: 1.1 cm  FS: 37.7 %     LV mass(C)d: 251.3 grams  LV mass(C)dI: 130.3 grams/m2  Ao root diam: 3.1 cm  asc Aorta Diam: 3.2 cm  LVOT diam: 2.0 cm  LVOT area: 3.2 cm2  Ao root diam index Ht(cm/m): 1.7  Ao root diam index BSA (cm/m2): 1.6  Asc Ao diam index BSA (cm/m2): 1.6  Asc Ao diam index Ht(cm/m): 1.8  LA Volume (BP): 102.0 ml  LA Volume Index (BP): 52.8 ml/m2     RWT: 0.37  TAPSE: 1.5 cm     Doppler Measurements & Calculations  MV E max josué: 247.0 cm/sec  MV A max josué: 174.0 cm/sec  MV E/A: 1.4  MV max P.5 mmHg  MV mean P.2 mmHg  MV V2 VTI: 54.1 cm  MVA(VTI): 1.1 cm2  MV dec time: 0.21 sec  Ao V2 max: 345.8 cm/sec  Ao max P.8 mmHg  Ao V2 mean: 204.9 cm/sec  Ao mean P.2 mmHg  Ao V2 VTI: 51.9 cm  LAURA(I,D): 1.2 cm2  LAURA(V,D): 1.2 cm2  LV V1 max P.8 mmHg  LV V1 max: 130.0 cm/sec  LV V1 VTI: 19.4 cm  MR PISA: 7.9 cm2  MR ERO: 0.48 cm2  MR volume: 69.9 ml  SV(LVOT): 61.4 ml  SI(LVOT): 31.8 ml/m2  PA acc time: 0.05 sec  TR max josué: 403.0 cm/sec  TR max P.0 mmHg  AV Josué Ratio (DI): 0.38  LAURA Index (cm2/m2): 0.61  E/E' av.2     Lateral E/e': 29.9  Medial E/e': 44.5  RV S Josué: 9.7 cm/sec     ______________________________________________________________________________  Report approved by: MD Fausto Hinojosa  Alonzo 12/26/2023 09:02 AM

## 2024-02-02 NOTE — LETTER
CARDIOTHORACIC SURGERY INSTRUCTIONS    Your Heart Surgery is scheduled with Dr. Romero on Tuesday, March 5.  Please arrive at 5:30 AM for your surgery scheduled at 8:00 AM.    Report to the information desk in the front lobby of the hospital at [facility], [address], [place] [address]. When you walk in the main entrance of the hospital, it is directly in front of you. Ask for an escort or the  can help direct you. You will then be escorted or directed to  on the third floor for your surgery preparation.  You have been pre-registered.     Hospital visiting hours are 8:00 AM to 8:30 PM.  You may have up to 4 visitors at a time. No visitors under the age of 5 are allowed. Please do not have visitors at the hospital if they are feeling ill.  There is  parking available from 5:15 AM - 8 PM M-F in front of hospital or parking in [facility] which can be validated at front information desk. There is a walking tunnel to the hospital.    Significant others will be shown where to wait on the third floor surgery waiting room until your surgery is over.  Family/friends will be called from the OR with updates 1-2 times during the surgery.  After the surgery is completed the surgeon will speak to your family members or friends that are in the waiting area.  Your family will then be directed to  [phone]) the Cardiac Intensive Care Unit waiting room on the fourth floor to wait until you are ready to be seen.  It will be approximately 30 minutes before anyone can go into the ICU once they arrive in the waiting area.  You will most likely still be asleep when they are allowed in your room.      You will spend approximately 5 - 7 days in the hospital depending on how quickly you recover.       INSTRUCTIONS PRIOR TO SURGERY    DENTAL CLEARANCE:  If you are undergoing HEART VALVE SURGERY, and have not seen a dentist on a regular basis, (every 6 months) we will need a brief  note from your dentist stating that your teeth have been examined and show no sign of infection or abscess.  Enclosed with this letter is a form your dentist can complete and fax back to the office.  Please fax to [phone].      MEDICATIONS:  ASPIRIN is okay to take on the day of your surgery if you are having bypass surgery. Do not take your aspirin on your day of surgery if you are scheduled for a different surgery (valve or aortic surgery). If you do not take aspirin, do not start aspirin unless instructed otherwise. Take your other medications as you normally would until the day of surgery unless instructed otherwise.      IF you are taking a blood thinner, (Coumadin, Warfarin, Plavix, Pradaxa, Effient, Brilinta, Pletal, Eliquis, Xarelto or other antiplatelet),  please inform your surgery team as soon as possible as these medications may need to be stopped for several days (3-7) prior your surgery.        STOP TAKING these medications:  STOP ALL ANTIINFLAMMATORY MEDICATIONS 7 days prior to surgery: (Ibuprofen, Aleve, Advil, Celebrex, Votaren, Ketoprofen, and Naproxen).    Stop ALL SUPPLEMENTS 7 days prior to your surgery. This includes stopping Co-Q 10, vitamin E and all fish oil supplements.   Please check the labels on any OTC eye vitamins you may be taking.  They often contain vitamin E and should be stopped 7 days prior to surgery.    Hold seven (7) days prior for once weekly injectable doses [semaglutide (Ozempic, Wegovy), dulaglutide  (Trulicity), exenatide ER (Bydureon), tirzepatide (Mounjaro)]   Hold the day before and day of for once daily injectable GLP-1 agonists [exenatide (Byetta), liraglutide (Saxenda, Victoza)]   Hold seven (7) days for oral semaglutide (Rybelsus)    MEDICATIONS THE MORNING OF SURGERY:  Take your CARVEDILOL the morning of your surgery with a sip of clear liquid.  Please tell your anesthesiologist what medication you took and at what time.     DO NOT EAT ANY SOLID FOODS AFTER  MIDNIGHT or the morning of your surgery. NO MILK, MILK PRODUCTS, SMOOTHIES 8 HOURS PRIOR TO SURGERY.      DO NOT EAT ANYTHING, however you may have any clear liquids; black coffee (sugar okay), clear tea, water, sprite, ginger ale, apple juice, Gatorade or any clear liquid up to two hours before your surgery time. (No beti tea) No milk, or milk products, no smoothies or juice with pulp.      HISTORY AND PHYSICAL:  LABS and IMAGING  All blood work, tests, and procedures must be within 30 days of your surgery date.  You do NOT need to fast for the blood work.      Your schedule for February 15 is as follows:    The following are scheduled at [facility], [address], [place] [address], check in at the [facility] waiting room on the first floor.    06:45 AM blood work   07:05 AM chest xray  07:30 AM check in for your ANGIOGRAM (see separate instructions)    (1:00 PM Dermatology appointment - this will need to be rescheduled due to your angiogram that day)    February 20 at 12:45 PM - VIRTUAL Pre-operative Assessment Center (PAC) visit    BELONGINGS  Do not bring personal belongings such as jewelry, money, valuables, toiletries or medications to the hospital the morning of your surgery.   Please remove all jewelry, including body piercings. Cautery instruments are used during surgery that may pose a risk of burns if a body piercing is left in during surgery.     Do bring a photo ID and insurance card.  A copy of your health care directives if you have one.  Bring a bag including your phone and  or something to keep you connected to your family/friends.  Things you may want to pack or have brought to you later are slippers/shoes with a sole that are easy to take on/off and comfy pants to wear while you are up walking after surgery. Glasses and hearing aids (bring cases).  You cannot wear contact lenses during the surgery.  Inhaler(s) and eye drops if you use them, tell the staff about them when you  arrive. Bring your CPAP machine or breathing device, if you use them.  If you have a pacemaker or ICD (cardiac defibrillator) bring the ID card.  If you have an implanted stimulator, bring the remote control.  If you are a legal guardian bring a copy of the certified (court-stamped) guardianship papers.      Call Bryanna our  with any questions or concerns about scheduling. She can be reached by phone at [phone] between 8 AM and 4:30 PM Monday through Friday. Our answering service will  calls outside of those business hours.   If you have questions about your medications, test results or have a change in your health status call your surgery coordinator, JR Espinoza during regular business hours.     Call your surgery coordinator JR Espinoza or the [facility] number ([phone]) if you develop any of the following symptoms: fever, cough, shortness of breath, sore throat, runny or stuffy nose, muscle or body aches, headaches, fatigue, vomiting or diarrhea.     COVID-19 TESTING - this is no longer required prior to surgery.  If you develop any COVID-like symptoms, you will be required to complete a COVID PCR test within 4 days of your surgery date.      On weekends or after 4:30 PM, please call [phone] and ask the  to page the Cardiovascular Surgery staff on call.     PLEASE NOTE, there are times elective surgery (like yours) needs to be rescheduled due to emergent or transplant surgeries. Your surgery may also be postponed or cancelled if there are no ICU beds available in the hospital.  If that should happen, your surgery will be rescheduled as quickly as possible. Our surgery team appreciates your understanding.      Please call me with any questions.  Thank you,  Olga Dias RN  Cardiothoracic Surgery Coordinator  [phone]  Direct Phone  [phone]  Fax

## 2024-02-02 NOTE — TELEPHONE ENCOUNTER
Message sent to patient via [employer] about updated plan for surgery:    Mitral and aortic valve replacements (mechanical), tricuspid valve repair/replace, pulmonary vein isolation, left atrial appendage ligation.    Scheduled on 3/5 per .    ----- Message from Veda Romero MD sent at 2/1/2024  8:59 PM CST -----  Moris Willingham,    Just realized that we also need to add a tricuspid valve repair/replacement in addition to MVR, AVR, PVI and BRIAN to him!    Irasema Mccollum

## 2024-02-02 NOTE — LETTER
CARDIOTHORACIC SURGERY INSTRUCTIONS    Your Heart Surgery is scheduled with Dr. Romero on Tuesday, March 5.  Please arrive at 5:30 AM for your surgery scheduled at 8:00 AM.    Report to the information desk in the front lobby of the hospital at [facility], [address], [place] [address]. When you walk in the main entrance of the hospital, it is directly in front of you. Ask for an escort or the  can help direct you. You will then be escorted or directed to  on the third floor for your surgery preparation.  You have been pre-registered.     Hospital visiting hours are 8:00 AM to 8:30 PM.  You may have up to 4 visitors at a time. No visitors under the age of 5 are allowed. Please do not have visitors at the hospital if they are feeling ill.  There is  parking available from 5:15 AM - 8 PM M-F in front of hospital or parking in [facility] which can be validated at front information desk. There is a walking tunnel to the hospital.    Significant others will be shown where to wait on the third floor surgery waiting room until your surgery is over.  Family/friends will be called from the OR with updates 1-2 times during the surgery.  After the surgery is completed the surgeon will speak to your family members or friends that are in the waiting area.  Your family will then be directed to  [phone]) the Cardiac Intensive Care Unit waiting room on the fourth floor to wait until you are ready to be seen.  It will be approximately 30 minutes before anyone can go into the ICU once they arrive in the waiting area.  You will most likely still be asleep when they are allowed in your room.      You will spend approximately 5 - 7 days in the hospital depending on how quickly you recover.       INSTRUCTIONS PRIOR TO SURGERY    DENTAL CLEARANCE:  If you are undergoing HEART VALVE SURGERY, and have not seen a dentist on a regular basis, (every 6 months) we will need a brief  note from your dentist stating that your teeth have been examined and show no sign of infection or abscess.  Enclosed with this letter is a form your dentist can complete and fax back to the office.  Please fax to [phone].      MEDICATIONS:  ASPIRIN is okay to take on the day of your surgery if you are having bypass surgery. Do not take your aspirin on your day of surgery if you are scheduled for a different surgery (valve or aortic surgery). If you do not take aspirin, do not start aspirin unless instructed otherwise. Take your other medications as you normally would until the day of surgery unless instructed otherwise.      IF you are taking a blood thinner, (Coumadin, Warfarin, Plavix, Pradaxa, Effient, Brilinta, Pletal, Eliquis, Xarelto or other antiplatelet),  please inform your surgery team as soon as possible as these medications may need to be stopped for several days (3-7) prior your surgery.     Please HOLD your STELARA prior to surgery.   Do not take any doses of this until 4 weeks AFTER your surgery.     STOP TAKING these medications:  STOP ALL ANTIINFLAMMATORY MEDICATIONS 7 days prior to surgery: (Ibuprofen, Aleve, Advil, Celebrex, Votaren, Ketoprofen, and Naproxen).    Stop ALL SUPPLEMENTS 7 days prior to your surgery. This includes stopping Co-Q 10, vitamin E and all fish oil supplements.   Please check the labels on any OTC eye vitamins you may be taking.  They often contain vitamin E and should be stopped 7 days prior to surgery.    Hold seven (7) days prior for once weekly injectable doses [semaglutide (Ozempic, Wegovy), dulaglutide  (Trulicity), exenatide ER (Bydureon), tirzepatide (Mounjaro)]   Hold the day before and day of for once daily injectable GLP-1 agonists [exenatide (Byetta), liraglutide (Saxenda, Victoza)]   Hold seven (7) days for oral semaglutide (Rybelsus)    MEDICATIONS THE MORNING OF SURGERY:  Take your CARVEDILOL the morning of your surgery with a sip of clear liquid.   Please tell your anesthesiologist what medication you took and at what time.     DO NOT EAT ANY SOLID FOODS AFTER MIDNIGHT or the morning of your surgery. NO MILK, MILK PRODUCTS, SMOOTHIES 8 HOURS PRIOR TO SURGERY.      DO NOT EAT ANYTHING, however you may have any clear liquids; black coffee (sugar okay), clear tea, water, sprite, ginger ale, apple juice, Gatorade or any clear liquid up to two hours before your surgery time. (No beti tea) No milk, or milk products, no smoothies or juice with pulp.      HISTORY AND PHYSICAL:  LABS and IMAGING  All blood work, tests, and procedures must be within 30 days of your surgery date.  You do NOT need to fast for the blood work.      Your schedule for February 15 is as follows:    The following are scheduled at [facility], [address], [place] [address], check in at the [facility] waiting room on the first floor.    06:45 AM blood work   07:05 AM chest xray  07:30 AM check in for your ANGIOGRAM (see separate instructions)    (1:00 PM Dermatology appointment - this will need to be rescheduled due to your angiogram that day)    February 20 at 12:45 PM - VIRTUAL Pre-operative Assessment Center (PAC) visit    BELONGINGS  Do not bring personal belongings such as jewelry, money, valuables, toiletries or medications to the hospital the morning of your surgery.   Please remove all jewelry, including body piercings. Cautery instruments are used during surgery that may pose a risk of burns if a body piercing is left in during surgery.     Do bring a photo ID and insurance card.  A copy of your health care directives if you have one.  Bring a bag including your phone and  or something to keep you connected to your family/friends.  Things you may want to pack or have brought to you later are slippers/shoes with a sole that are easy to take on/off and comfy pants to wear while you are up walking after surgery. Glasses and hearing aids (bring cases).  You cannot wear  contact lenses during the surgery.  Inhaler(s) and eye drops if you use them, tell the staff about them when you arrive. Bring your CPAP machine or breathing device, if you use them.  If you have a pacemaker or ICD (cardiac defibrillator) bring the ID card.  If you have an implanted stimulator, bring the remote control.  If you are a legal guardian bring a copy of the certified (court-stamped) guardianship papers.      Call Bryanna our  with any questions or concerns about scheduling. She can be reached by phone at [phone] between 8 AM and 4:30 PM Monday through Friday. Our answering service will  calls outside of those business hours.   If you have questions about your medications, test results or have a change in your health status call your surgery coordinator, JR Espinoza during regular business hours.     Call your surgery coordinator JR Espinoza or the afterhours number ([phone]) if you develop any of the following symptoms: fever, cough, shortness of breath, sore throat, runny or stuffy nose, muscle or body aches, headaches, fatigue, vomiting or diarrhea.     COVID-19 TESTING - this is no longer required prior to surgery.  If you develop any COVID-like symptoms, you will be required to complete a COVID PCR test within 4 days of your surgery date.      On weekends or after 4:30 PM, please call [phone] and ask the  to page the Cardiovascular Surgery staff on call.     PLEASE NOTE, there are times elective surgery (like yours) needs to be rescheduled due to emergent or transplant surgeries. Your surgery may also be postponed or cancelled if there are no ICU beds available in the hospital.  If that should happen, your surgery will be rescheduled as quickly as possible. Our surgery team appreciates your understanding.      Please call me with any questions.  Thank you,  Olga Dias RN  Cardiothoracic Surgery Coordinator  [phone]  Direct Phone  [phone]  Fax

## 2024-02-02 NOTE — TELEPHONE ENCOUNTER
Per task, pt needs to schedule surgery with Dr. Romero. Talked with pt and scheduled surgery for 3/5. Will call if anything changes

## 2024-02-02 NOTE — LETTER
Pre-procedure instructions - Coronary Angiogram  Patient Education    Your arrival time is 06:45 AM on Thursday, February 15.  Location is [address] Waiting Room  Please plan on being at the hospital all day.  At any time, emergencies and/or urgent cases may come up which could delay the start of your procedure.    Pre-procedure instructions - Coronary Angiogram  Shower in the evening before or the morning of the procedure  No solid food for 8 hours prior and nothing to drink 2 hours prior to arrival time  You can take your morning medications (except for diabetic and blood thinners) with sips of water.  Take 325 mg of Asprin 24 hours prior to the procedure and the morning of procedure.   You will need to arrange a ride to drop you off and pick you up, as you will be unable to drive home.  Prior to discharge you may be required to lay flat for approximately 2-4 hours in the recovery unit to ensure proper clotting of the artery.              Diabetic Medication Instructions  Hold oral diabetic medication in morning of your procedure and for 48 hours after IV contrast is given  Typical instructions for insulin diabetic medication holding are below. However, please reach out to your Primary Care Provider or Endocrinologist for specific instructions  DO NOT take any oral diabetic medication, short-acting diabetes medications/insulin, humalog or regular insulin the morning of your test  Take   dose of long-acting insulin (Lantus, Levemir) the day of your test  Remember to bring your glucometer and insulin with you to take after your test if needed              Anticoagulation Medication Instructions   NA     Hold seven (7) days prior for once weekly injectable doses [semaglutide (Ozempic, Wegovy), dulaglutide  (Trulicity), exenatide ER (Bydureon), tirzepatide (Mounjaro)]   Hold the day before and day of for once daily  injectable GLP-1 agonists [exenatide (Byetta), liraglutide (Saxenda,  Victoza)]   Hold seven (7) days for oral semaglutide (Rybelsus)      JR Dias, RN   [facility]  Cardiovascular Surgery  [phone]

## 2024-02-02 NOTE — LETTER
CARDIOTHORACIC SURGERY INSTRUCTIONS    Your Heart Surgery is scheduled with Dr. Romero on Tuesday, March 5.  Please arrive at 5:30 AM for your surgery scheduled at 8:00 AM.    Report to the information desk in the front lobby of the hospital at [facility], [address], [place] [address]. When you walk in the main entrance of the hospital, it is directly in front of you. Ask for an escort or the  can help direct you. You will then be escorted or directed to  on the third floor for your surgery preparation.  You have been pre-registered.     Hospital visiting hours are 8:00 AM to 8:30 PM.  You may have up to 4 visitors at a time. No visitors under the age of 5 are allowed. Please do not have visitors at the hospital if they are feeling ill.  There is  parking available from 5:15 AM - 8 PM M-F in front of hospital or parking in [facility] which can be validated at front information desk. There is a walking tunnel to the hospital.    Significant others will be shown where to wait on the third floor surgery waiting room until your surgery is over.  Family/friends will be called from the OR with updates 1-2 times during the surgery.  After the surgery is completed the surgeon will speak to your family members or friends that are in the waiting area.  Your family will then be directed to  ([phone]) the Cardiac Intensive Care Unit waiting room on the fourth floor to wait until you are ready to be seen.  It will be approximately 30 minutes before anyone can go into the ICU once they arrive in the waiting area.  You will most likely still be asleep when they are allowed in your room.      You will spend approximately 5 - 7 days in the hospital depending on how quickly you recover.       INSTRUCTIONS PRIOR TO SURGERY    DENTAL CLEARANCE:  If you are undergoing HEART VALVE SURGERY, and have not seen a dentist on a regular basis, (every 6 months) we will need a brief  note from your dentist stating that your teeth have been examined and show no sign of infection or abscess.  Enclosed with this letter is a form your dentist can complete and fax back to the office.  Please fax to [phone].      MEDICATIONS:  ASPIRIN is okay to take on the day of your surgery if you are having bypass surgery. Do not take your aspirin on your day of surgery if you are scheduled for a different surgery (valve or aortic surgery). If you do not take aspirin, do not start aspirin unless instructed otherwise. Take your other medications as you normally would until the day of surgery unless instructed otherwise.      IF you are taking a blood thinner, (Coumadin, Warfarin, Plavix, Pradaxa, Effient, Brilinta, Pletal, Eliquis, Xarelto or other antiplatelet),  please inform your surgery team as soon as possible as these medications may need to be stopped for several days (3-7) prior your surgery.        STOP TAKING these medications:  STOP ALL ANTIINFLAMMATORY MEDICATIONS 7 days prior to surgery: (Ibuprofen, Aleve, Advil, Celebrex, Votaren, Ketoprofen, and Naproxen).    Stop ALL SUPPLEMENTS 7 days prior to your surgery. This includes stopping Co-Q 10, vitamin E and all fish oil supplements.   Please check the labels on any OTC eye vitamins you may be taking.  They often contain vitamin E and should be stopped 7 days prior to surgery.    Hold seven (7) days prior for once weekly injectable doses [semaglutide (Ozempic, Wegovy), dulaglutide  (Trulicity), exenatide ER (Bydureon), tirzepatide (Mounjaro)]   Hold the day before and day of for once daily injectable GLP-1 agonists [exenatide (Byetta), liraglutide (Saxenda, Victoza)]   Hold seven (7) days for oral semaglutide (Rybelsus)    MEDICATIONS THE MORNING OF SURGERY:  Take your CARVEDILOL the morning of your surgery with a sip of clear liquid.  Please tell your anesthesiologist what medication you took and at what time.     DO NOT EAT ANY SOLID FOODS AFTER  MIDNIGHT or the morning of your surgery. NO MILK, MILK PRODUCTS, SMOOTHIES 8 HOURS PRIOR TO SURGERY.      DO NOT EAT ANYTHING, however you may have any clear liquids; black coffee (sugar okay), clear tea, water, sprite, ginger ale, apple juice, Gatorade or any clear liquid up to two hours before your surgery time. (No beti tea) No milk, or milk products, no smoothies or juice with pulp.      HISTORY AND PHYSICAL:  LABS and IMAGING  All blood work, tests, and procedures must be within 30 days of your surgery date.  You do NOT need to fast for the blood work.      Your schedule for February 15 is as follows:    The following are scheduled at [facility], [address], [place] [address], check in at the [facility] waiting room on the first floor.    06:45 AM blood work   07:05 AM chest xray  07:30 AM check in for your ANGIOGRAM (see separate instructions)    (1:00 PM Dermatology appointment - this will need to be rescheduled due to your angiogram that day)    February 20 at 12:45 PM - VIRTUAL Pre-operative Assessment Center (PAC) visit    BELONGINGS  Do not bring personal belongings such as jewelry, money, valuables, toiletries or medications to the hospital the morning of your surgery.   Please remove all jewelry, including body piercings. Cautery instruments are used during surgery that may pose a risk of burns if a body piercing is left in during surgery.     Do bring a photo ID and insurance card.  A copy of your health care directives if you have one.  Bring a bag including your phone and  or something to keep you connected to your family/friends.  Things you may want to pack or have brought to you later are slippers/shoes with a sole that are easy to take on/off and comfy pants to wear while you are up walking after surgery. Glasses and hearing aids (bring cases).  You cannot wear contact lenses during the surgery.  Inhaler(s) and eye drops if you use them, tell the staff about them when you  arrive. Bring your CPAP machine or breathing device, if you use them.  If you have a pacemaker or ICD (cardiac defibrillator) bring the ID card.  If you have an implanted stimulator, bring the remote control.  If you are a legal guardian bring a copy of the certified (court-stamped) guardianship papers.      Call Bryanna our  with any questions or concerns about scheduling. She can be reached by phone at [phone] between 8 AM and 4:30 PM Monday through Friday. Our answering service will  calls outside of those business hours.   If you have questions about your medications, test results or have a change in your health status call your surgery coordinator, JR Espinoza during regular business hours.     Call your surgery coordinator JR Espinoza or the [facility] ([phone]) if you develop any of the following symptoms: fever, cough, shortness of breath, sore throat, runny or stuffy nose, muscle or body aches, headaches, fatigue, vomiting or diarrhea.     COVID-19 TESTING - this is no longer required prior to surgery.  If you develop any COVID-like symptoms, you will be required to complete a COVID PCR test within 4 days of your surgery date.      On weekends or after 4:30 PM, please call [phone] and ask the  to page the Cardiovascular Surgery staff on call.     PLEASE NOTE, there are times elective surgery (like yours) needs to be rescheduled due to emergent or transplant surgeries. Your surgery may also be postponed or cancelled if there are no ICU beds available in the hospital.  If that should happen, your surgery will be rescheduled as quickly as possible. Our surgery team appreciates your understanding.      Please call me with any questions.  Thank you,  Olga Dias RN  Cardiothoracic Surgery Coordinator  [phone]  Direct Phone  [phone]  Fax

## 2024-02-02 NOTE — TELEPHONE ENCOUNTER
Cath Lab Case Request/Order    Location: [address] [address] [facility] Waiting Room    Procedure: Left and Right Heart Cath    Procedure Date: 2/15/24    Patient Arrival Time: 645    Procedure Time: 2nd case to follow     Ordering Provider:  Veda Romero    Performing Cardiologist:  ANIL    Inpatient Bed Needed:  No     Communicated Patient Instructions:     NPO, nothing to eat 8 hours and drink 2 hours before arrival time: Yes     , need to arrange a ride home - unable to drive post- procedure: Yes     Adult at home, need a responsible adult to stay with patient 24 hours post- procedure: YES    Appointment was scheduled: Over the phone    Patient expressed understanding of above instructions and denied further questions at this time.    Bryanna Morse

## 2024-02-12 NOTE — TELEPHONE ENCOUNTER
----- Message from Veda Romero MD sent at 2/9/2024  3:36 PM CST -----  Regarding: RE: Hold Stelara for AVR/MVR 3/5  Thanks Sonia Osuna plan for holding 4 weeks preop and 4 weeks postop.    Veda  ----- Message -----  From: Sonia Infante RN  Sent: 2/9/2024  10:00 AM CST  To: Veda Romero MD  Subject: Hold Stelara for AVR/MVR 3/5                     Hi Dr. Romero,    This patient follows with Dr. Rubio in the GI clinic for management of his Crohn's disease. He takes Stelara 90mg every 4 weeks. We typically recommend holding this immunosuppressive medication before and after an invasive procedure. His last dose was around 2/1/24. I will instruct him to hold his next injection so he does not dose right before his heart surgery on 3/5/24. We typically recommend holding for another two weeks after surgery, but ultimately defer to you. What would you be comfortable with for his post-op dose after surgery?    Thank you,  Sonia Infante  RN GI Care Coordinator   Phone: [phone]

## 2024-02-13 NOTE — TELEPHONE ENCOUNTER
Pt is being seen at [facility] in [place], [place] for his dental appointment on 2/20.    Will fax over dental letter to clinic. Review angiogram instructions, no questions at this time.

## 2024-02-13 NOTE — TELEPHONE ENCOUNTER
Left  for pt reviewing the following instructions.    ----- Message from Olga Dias RN sent at 2/2/2024 10:39 AM CST -----  Review CATH instructions for 2/15    Cath Lab Case Request/Order     Location: [id number] [place] [address] Waiting Room     Procedure: Left and Right Heart Cath, coronary angiogram     Procedure Date: 2/15/24 Thursday     Patient Arrival Time: 6:45 AM     Procedure Time: 2nd case to follow      Ordering Provider:  Veda Romero     Performing Cardiologist:  IRMA     Inpatient Bed Needed: NO     Communicated Patient Instructions:     NPO, nothing to eat 8 hours and drink 2 hours before arrival time: Yes     , need to arrange a ride home - unable to drive post- procedure: Yes     Adult at home, need a responsible adult to stay with patient 24 hours post- procedure: YES     Appointment was scheduled: Over the phone, left  for pt requesting call back to CV RN, contact info provided.

## 2024-02-15 PROBLEM — I35.9 AORTIC VALVE DISORDER: Status: ACTIVE | Noted: 2024-01-01

## 2024-02-15 PROBLEM — I05.9 MITRAL VALVE DISEASE: Status: ACTIVE | Noted: 2024-01-01

## 2024-02-15 PROBLEM — I07.9 TRICUSPID VALVE DISORDER: Status: ACTIVE | Noted: 2024-01-01

## 2024-02-15 PROBLEM — I48.0 PAF (PAROXYSMAL ATRIAL FIBRILLATION) (H): Status: ACTIVE | Noted: 2021-04-26

## 2024-02-15 PROBLEM — Z98.890 STATUS POST CORONARY ANGIOGRAM: Status: ACTIVE | Noted: 2024-01-01

## 2024-02-15 PROBLEM — I05.9 MITRAL VALVE DISORDER: Status: ACTIVE | Noted: 2024-01-01

## 2024-02-15 NOTE — PROGRESS NOTES
Prep complete for Angiogram/Left Heart Cath. Hgb 6.9 Provider Notified. Patients friend Emanuel will transport him home post procedure.

## 2024-02-15 NOTE — Clinical Note
RCA Cine(s)  injected and visualized utilizing power injector system. Doxycycline Pregnancy And Lactation Text: This medication is Pregnancy Category D and not consider safe during pregnancy. It is also excreted in breast milk but is considered safe for shorter treatment courses.

## 2024-02-15 NOTE — DISCHARGE INSTRUCTIONS
Going Home after an Angioplasty or Stent Placement (Cardiac)      After you go home:  Have an adult stay with you for 24 hours.  Drink plenty of fluids.  You may eat your normal diet, unless your doctor tells you otherwise.  For 24 hours:  - Relax and take it easy.  - Do NOT smoke.  - Do NOT make any important or legal decisions.  - Do NOT drive or operate machines at home or at work.  - Do NOT drink alcohol.    Remove the Band-Aid after 24 hours. If there is minor oozing, apply another Band-aid and remove it after 12 hours.  For 2 days, do NOT have sex or do any heavy exercise.  Do NOT take a bath, or use a hot tub or pool for at least 3 days. You may shower.    Care of wrist or arm site  It is normal to have soreness at the puncture site and mild tingling in your hand for up to 3 days.  For 2 days, do not use your hand or arm to support your weight (such as rising from a chair) or bend your wrist (such as lifting a garage door).  For 2 days, do not lift more than 5 pounds or exercise your arm (tennis, golf or bowling).    If you start bleeding from the site in your arm:  Sit down and press firmly on the site with your fingers for 10 minutes. Call your doctor as soon as you can.  If the bleeding stops, sit still and keep your wrist straight for 2 hours.    Call your doctor if:  You have a large or growing hard lump around the site.    The site is red, swollen, hot or tender.  Blood or fluid is draining from the site.  You have chills or a fever greater than 101 F (38 C).  Your leg or arm turns bluish, feels numb or cool.  You have hives, a rash or unusual itching.  Call 911 right away if you have:   Bleeding that does not stop.   Heavy bleeding.  [facility] [place]:  [phone] (7 days a week)      [facility]                        Interventional Cardiology  Discharge Instructions   Post Right Heart Cath and/or Heart Biopsy      AFTER YOU GO HOME:  DO drink  plenty of fluids  DO resume your regular diet and medications unless otherwise instructed by your Primary Physician  Do Not scrub the procedure site vigorously  No lotion or powder to the puncture site for 3 days    CALL YOUR PRIMARY PHYSICIAN IF: You may resume all normal activity.  Monitor neck site for bleeding, swelling, or voice changes. If you notice bleeding or swelling immediately apply pressure to the site and call number below to speak with Cardiology Fellow.  If you experience any changes in your breathing you should call your doctor immediately or come to the closest Emergency Department.  Do not drive yourself.    ADDITIONAL INSTRUCTIONS: Medications: You are to resume all home medications including anticoagulation therapy unless otherwise advised by your primary cardiologist or nurse coordinator.    Follow Up: Per your primary cardiology team    If you have any questions or concerns regarding your procedure site please call [phone] at anytime and ask for Cardiology Fellow on call.  They are available 24 hours a day.  You may also contact the Cardiology Clinic after hours number at [phone].                                                       Telephone Numbers [phone] Monday-Friday 8:00 am to 4:30 pm    [phone] After 4:30 pm Monday-Friday, Weekends & Holidays  Ask for Interventional Cardiologist on call. Someone is on call 24 hours/day   [facility] toll free number [phone] Monday-Friday 8:00 am to 4:30 pm   [facility] Emergency Dept [phone]

## 2024-02-15 NOTE — PROGRESS NOTES
D/I/A: Pt roomed on 3C in bay [address].  Arrived via litter and accompanied by staff RN and friend. On/Off: On monitor.  VSSA.  Rhythm upon arrival SR on monitor.  Denies pain or sob.  Reviewed activity restrictions and when to notify RN, ie-changes to breathing or increased chest pressure or chest pain.  CCL access:  R radial site CDI with no hematoma. TR band in place.   P: Continue to monitor status.  Discharge to home once meeting criteria.

## 2024-02-16 NOTE — TELEPHONE ENCOUNTER
Pt states the cath site is clean and dry, not reddened. States he is not rubbing the site and will not do any lifting greater than 5 pounds.    Pt noted he is anemic, hgb was 6.9 and he did not receive a transfusion after his angiogram, as he stated they told him he would. Message sent to PAC provider as pt has his visit on 2/20, to make sure to address his anemia.    All questions/concerns addressed.

## 2024-02-20 NOTE — TELEPHONE ENCOUNTER
Updated Scar that his lab appointment is today, 2/20 at 2:30 pm at the [facility].  Scar expressed understanding.  Bri Ling RN

## 2024-02-20 NOTE — PROGRESS NOTES
Scar is a 64 year old who is being evaluated via a billable video visit.      How would you like to obtain your AVS? MyChart              Subjective   Scar is a 64 year old, presenting for the following health issues:  Pre-Op Exam (/)            CHRIS Martínez LPN

## 2024-02-20 NOTE — PATIENT INSTRUCTIONS
Preparing for Your Surgery      Name:  Tacos Dominguez   MRN:  [id number]   :  1960   Today's Date:  2024       Arriving for surgery:  Surgery date:  3/5/24  Arrival time:  6.00AM    Please come to:     Please come to:      [facility] [address]  [address] [place]  [address]      The [facility] Patient /Visitor Ramp is located at [address]. Patients and visitors who self-park will receive the reduced hospital parking rate. If the Patient /Visitor Ramp is full, please follow the signs to the  parking located at the main hospital entrance.     parking is available ( 24 hours/ 7 days a week)    Discounted parking pass options are available for patients and visitors. They can be purchased at the [employer] desk at the main hospital entrance.    -    Stop at the security desk and they will direct surgery patients to the 3rd floor Surgery Waiting Room. [phone]     -  If you are in need of directions, wheelchair or escort please stop at the Information/security desk in the lobby.       What can I eat or drink?  -  You may eat and drink normally up to 8 hours prior to arrival time. (Until 10.00PM)  -  You may have clear liquids until 2 hours prior to arrival time. (Until 4.00AM)    Examples of clear liquids:  Water  Clear broth  Juices (apple, white grape, white cranberry  and cider) without pulp  Noncarbonated, powder based beverages  (lemonade and David-Aid)  Sodas (Sprite, 7-Up, ginger ale and seltzer)  Coffee or tea (without milk or cream)  Gatorade    -  No Alcohol or cannabis products for at least 24 hours before surgery.     Which medicines can I take?    Hold Multivitamins for 10 days before surgery.  Hold Supplements for 10 days before surgery. (Folic Acid)  Hold Ibuprofen (Advil, Motrin) for 10 day(s) before surgery--unless otherwise directed by surgeon.  Hold Naproxen (Aleve) for 10 days before  surgery.    Hold Stelara before your surgery. Last dose is 3/1/24.    -  DO NOT take these medications the day of surgery:  Calcium, Folic Acid     -  PLEASE TAKE these medications the day of surgery:  Simvastatin (Zocor), Omeprazole (Prilosec).    How do I prepare myself?  - Please take 2 showers (one the night prior to surgery and one the morning of surgery) using Scrubcare or Hibiclens soap.    Use this soap only from the neck to your toes.     Leave the soap on your skin for one minute--then rinse thoroughly.      You may use your own shampoo and conditioner. No other hair products.   - Please remove all jewelry and body piercings.  - No lotions, deodorants or fragrance.  - No makeup or fingernail polish.   - Bring your ID and insurance card.    -If you use a CPAP machine, please bring the CPAP machine, tubing, and mask to hospital.    -If you have a Deep Brain Stimulator, Spinal Cord Stimulator, or any Neuro Stimulator device---you must bring the remote control to the hospital.      ALL PATIENTS GOING HOME THE SAME DAY OF SURGERY ARE REQUIRED TO HAVE A RESPONSIBLE ADULT TO DRIVE AND BE IN ATTENDANCE WITH THEM FOR 24 HOURS FOLLOWING SURGERY.    Covid testing policy as of 12/06/2022  Your surgeon will notify and schedule you for a COVID test if one is needed before surgery--please direct any questions or COVID symptoms to your surgeon      Questions or Concerns:    - For any questions regarding the day of surgery or your hospital stay, please contact the Pre Admission Nursing Office at [phone].       - If you have health changes between today and your surgery, please call your surgeon.       - For questions after surgery, please call your surgeons office.           Current Visitor Guidelines    You may have 2 visitors in the pre op area.    Visiting hours: 8 a.m. to 8:30 p.m.    Patients confirmed or suspected to have symptoms of COVID 19 or flu:     No visitors allowed for adult patients.   Children (under  age 18) can have 1 named visitor.     People who are sick or showing symptoms of COVID 19 or flu:    Are not allowed to visit patients--we can only make exceptions in special situations.       Please follow these guidelines for your visit:          Please maintain social distance          Masking is optional--however at times you may be asked to wear a mask for the safety of yourself and others     Clean your hands with alcohol hand . Do this when you arrive at and leave the building and patient room,    And again after you touch your mask or anything in the room.     Go directly to and from the room you are visiting.     Stay in the patient s room during your visit. Limit going to other places in the hospital as much as possible     Leave bags and jackets at home or in the car.     For everyone s health, please don t come and go during your visit. That includes for smoking   during your visit.

## 2024-02-20 NOTE — H&P
Pre-Operative H & P     CC:  Preoperative exam to assess for increased cardiopulmonary risk while undergoing surgery and anesthesia.    Date of Encounter: 2/20/2024  Primary Care Physician:  Good Pham     Reason for visit:   Encounter Diagnoses   Name Primary?    Preop examination Yes    Anemia, unspecified type     Aortic valve stenosis, etiology of cardiac valve disease unspecified        HPI  Tacos Dominguez is a 64 year old male who presents for pre-operative H & P in preparation for  Procedure Information       Case: [id number] Date/Time: 03/05/24 0830    Procedures:       Repeat STERNOTOMY WITH AORTIC AND MITRAL VALVE REPLACEMENT (Heart)      Repeat STERNOTOMY WITH TRICUSPID VALVE REPAIR POSSIBLE REPLACEMENT (Heart)      WITH pulmonary vein isolation, and left atrial appendage ligation Transesophageal Echocardiogram (GUERO) AND ALL ASSOCIATED PROCEDURES (Heart)    Anesthesia type: General    Diagnosis:       Aortic valve disorder [I35.9]      Mitral valve disease [I05.9]      PAF (paroxysmal atrial fibrillation) (H) [I48.0]    Pre-op diagnosis:       Aortic valve disorder [I35.9]      Mitral valve disease [I05.9]      PAF (paroxysmal atrial fibrillation) (H) [I48.0]    Location:  OR [address] [place] [address] OR    Providers: Veda Romero MD            Tacos Dominguez is a 64 year old male with non-obstructive mild CAD, history of hypertension, pulmonary hypertension, anemia, thrombocytopenia, ESRD with unknown etiology, GERD, crohn's disease, anxiety and history of BCC that has atrial fibrillation, AV stenosis, MV stenosis and tricuspid insufficiency.  He was referred to Dr. Romero for cardiac surgery consideration.  The above listed procedure has been recommended.    History is obtained from the patient and chart review    Hx of abnormal bleeding or anti-platelet use: none      Past Medical History  Past Medical History:   Diagnosis Date    Aortic dissection (H)     distal, thin.  stable/chronic on MRCP  3/2022    Benign essential hypertension     CAD (coronary artery disease)     Cerebral infarction (H)     Crohn's colitis (H)     Crohn's disease of large intestine (H) 11/22/2021    Current smoker     Esophageal reflux     ESRD (end stage renal disease) on dialysis (H)     History of basal cell carcinoma     Hypertension     Mixed hyperlipidemia     NSTEMI (non-ST elevated myocardial infarction) (H)     PAF (paroxysmal atrial fibrillation) (H)        Past Surgical History  Past Surgical History:   Procedure Laterality Date    ABDOMEN SURGERY      x 6.  colon resections for crohn's disease    APPENDECTOMY      CHOLECYSTECTOMY      COLONOSCOPY N/A 07/20/2021    Procedure: COLONOSCOPY, WITH POLYPECTOMY AND BIOPSY;  Surgeon: Eric Preston MD;  Location:  GI    COLONOSCOPY N/A 12/7/2022    Procedure: COLONOSCOPY, WITH POLYPECTOMY AND BIOPSY;  Surgeon: Willian Kee MD;  Location:  GI    CV CORONARY ANGIOGRAM N/A 05/25/2021    Procedure: CV CORONARY ANGIOGRAM;  Surgeon: Judson Ybarra MD;  Location:  HEART CARDIAC CATH LAB    CV CORONARY ANGIOGRAM N/A 2/15/2024    Procedure: Coronary Angiogram;  Surgeon: Tico Finn MD;  Location:  HEART CARDIAC CATH LAB    CV LEFT HEART CATH N/A 2/15/2024    Procedure: Left Heart Catheterization;  Surgeon: Tico Finn MD;  Location:  HEART CARDIAC CATH LAB    CV RIGHT HEART CATH MEASUREMENTS RECORDED N/A 2/15/2024    Procedure: Right Heart Catheterization;  Surgeon: Tico Finn MD;  Location:  HEART CARDIAC CATH LAB    ESOPHAGOSCOPY, GASTROSCOPY, DUODENOSCOPY (EGD), COMBINED N/A 07/20/2021    Procedure: ESOPHAGOGASTRODUODENOSCOPY, WITH FINE NEEDLE ASPIRATION BIOPSY, WITH ENDOSCOPIC ULTRASOUND GUIDANCE;  Surgeon: Eric Preston MD;  Location:  GI    IR DIALYSIS FISTULOGRAM LEFT  12/01/2022    IR DIALYSIS FISTULOGRAM LEFT  1/13/2023    LAPAROTOMY, LYSIS ADHESIONS, COMBINED N/A 03/17/2022    Procedure: Laparotomy,  extensive lysis adhesions, combined;  Surgeon: Sarath Smith MD;  [facility]    PANCREATECTOMY PARTIAL N/A 03/17/2022    Procedure: Open Subtotal Pancreatectomy, intra-op ultrasound;  Surgeon: Sarath Smith MD;  [facility]    REVISION FISTULA ARTERIOVENOUS UPPER EXTREMITY Left 2/14/2023    Procedure: LEFT UPPER ARM FISTULA OUTFLOW REVISION FROM CEPHALIC VEIN TO JUGULAR VEIN WITH 10mm THIN-WALLED RINGED POLYTETRAFLUEROETHYLINE;  Surgeon: Mo Gramajo MD;  Location: [facility]    TRANSESOPHAGEAL ECHOCARDIOGRAM INTRAOPERATIVE N/A 1/11/2024    Procedure: ECHOCARDIOGRAM, TRANSESOPHAGEAL, WITH ANESTHESIA;  Surgeon: GENERIC ANESTHESIA PROVIDER;  [facility]    VASCULAR SURGERY      dialysis access- left upper       Prior to Admission Medications  Current Outpatient Medications   Medication Sig Dispense Refill    budesonide (ENTOCORT EC) 3 MG EC capsule Take 3 capsules (9 mg) by mouth every morning for 30 days 90 capsule 0    calcium acetate (CALPHRON) 667 MG TABS tablet Take 2,668 mg by mouth every morning      citalopram (CELEXA) 20 MG tablet Take 20 mg by mouth every morning      folic acid (FOLVITE) 1 MG tablet Take 1 mg by mouth every morning       multivitamin RENAL (MULTIVITAMIN RENAL) 1 MG capsule Take 1 capsule by mouth every morning      omeprazole (PRILOSEC) 20 MG DR capsule Take 20 mg by mouth every morning       simvastatin (ZOCOR) 20 MG tablet Take 20 mg by mouth every morning       ustekinumab (STELARA) 90 MG/ML Inject 1 ml ( 90 mg) subcutaneous every 4 weeks. (Patient taking differently: 90 mg every 28 days Inject 1 ml ( 90 mg) subcutaneous every 4 weeks. 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 Not Asked   Social History Narrative    Not on file     Social Determinants of Health     Financial Resource Strain: Not on file   Food Insecurity: Not on file   Transportation Needs: Not on file   Physical Activity: Not on file   Stress: Not on file   Social Connections: Not on file   Interpersonal Safety: Not on file   Housing Stability: Not on file       Family History  Family History   Problem Relation Age of Onset    Breast Cancer Mother     Coronary Artery Disease Father     Hypertension Brother     Anesthesia Reaction No family hx of     Thrombosis No family hx of        Review of Systems  The complete review of systems is negative other than noted in the HPI or here.   Anesthesia Evaluation   Pt has had prior anesthetic. Type: General and MAC.    No history of anesthetic complications       ROS/MED HX  ENT/Pulmonary:     (+)     MICHAEL risk factors,  hypertension,         tobacco use, Current use,                    (-) recent URI   Neurologic:  - neg neurologic ROS   (+)          CVA (possible TIA or CVA in 2021 -- never officially diagnosed, no residual symptoms),  without deficits,                    Cardiovascular: Comment: Distal aortic dissection    (+) Dyslipidemia hypertension- -  CAD (non-obstructive CAD) -  "past MI - -                        dysrhythmias, a-fib,  valvular problems/murmurs  AV and MV stenosis, tricuspid insufficiency.    Previous cardiac testing   Echo: Date: 1/2024 Results:    Interpretation Summary  Global and regional left ventricular function is normal with an EF of 55-60%.  Global right ventricular function is mildly reduced.  Severe mitral insufficiency is present (ERO 0.53 cm2 and MR volume 75 ml).  Cause of MR is severe mitral annular calcification.  Mitral valve gradient is increased, 9mmHg at 94bpm, but is likely driven by  increased volume from MR. The mitral valve area is 2.0 cm^2 by planimetry.  Moderate aortic stenosis.  The mean gradient across the aortic valve is 21 mmHg.  The peak aortic velocity is 3.2 m/sec.  AoV area by plannimetry 1.13 cm2  Moderate pulmonary hypertension. Right ventricular systolic pressure is 64mmHg  above the right atrial pressure.  No pericardial effusion.    Stress Test:  Date: Results:    ECG Reviewed:  Date: 2/15/24 Results:    Sinus rhythm  Right bundle branch block  Left anterior fascicular block  ** Bifascicular block **  Left ventricular hypertrophy with repolarization abnormality ( R in aVL , Romhilt-Stoll )  Abnormal ECG  When compared with ECG of 14-FEB-2023 12:32,  Minimal criteria for Septal infarct are no longer Present  T wave inversion now evident in Anterior leads      Cath:  Date: 2/15/24 Results:    Conclusion          Left sided filling pressures are severely elevated.     Moderately elevated pulmonary artery hypertension.     Normal cardiac output level.     Prox RCA lesion is 30% stenosed.     2nd Mrg lesion is 30% stenosed.     Right sided filling pressures are moderately elevated.     Right heart cath showed marked volume overload (L>R), and a large \"v wave in the PCW tracing c/w significant MR. PVR was 2.6 hybrid units.     (-) taking anticoagulants/antiplatelets, COHEN and orthopnea/PND   METS/Exercise Tolerance: >4 METS Comment: " Occasionally walks for exercise and goes to car shows in the summer which require 3 hours of walking.     Hematologic: Comments: Chronic thrombocytopenia    (+)      anemia, history of blood transfusion, no previous transfusion reaction,        Musculoskeletal:  - neg musculoskeletal ROS     GI/Hepatic: Comment: Crohn's disease- well controlled on Stelara and budesonide    (+) GERD, Asymptomatic on medication,                  Renal/Genitourinary: Comment: LUE AV fistula    (+) renal disease, type: ESRD, Pt requires dialysis, type: Hemodialysis,          Endo: Comment: S/p partial pancreatectomy for IPMN    (+)            Chronic steroid usage for  Date most recently used: budesonide.        Psychiatric/Substance Use:     (+) psychiatric history anxiety (occasional panic attacks)       Infectious Disease:  - neg infectious disease ROS     Malignancy:   (+) Malignancy, History of Skin.Skin CA Remission status post Surgery.      Other:  - neg other ROS          Virtual visit -  No vitals were obtained    Physical Exam  Constitutional: Awake, alert, cooperative, no apparent distress, and appears stated age.  Eyes: Pupils equal  HENT: Normocephalic  Respiratory: non labored breathing   Neurologic: Awake, alert, oriented to name, place and time.   Neuropsychiatric: Calm, cooperative. Normal affect.      Prior Labs/Diagnostic Studies   All labs and imaging personally reviewed     EKG/ stress test - if available please see in ROS above           Latest Ref Rng & Units 10/24/2023    11:35 AM   PFT   FVC L 3.41    FEV1 L 1.16    FVC% % 79    FEV1% % 34             Component      Latest Ref Rng 2/20/2024  2:26 PM   Sodium      135 - 145 mmol/L 138    Potassium      3.4 - 5.3 mmol/L 4.3    Carbon Dioxide (CO2)      22 - 29 mmol/L 26    Anion Gap      7 - 15 mmol/L 14    Urea Nitrogen      8.0 - 23.0 mg/dL 27.6 (H)    Creatinine      0.67 - 1.17 mg/dL 6.26 (H)    GFR Estimate      >60 mL/min/1.73m2 9 (L)    Calcium      8.8 -  10.2 mg/dL 8.3 (L)    Chloride      98 - 107 mmol/L 98    Glucose      70 - 99 mg/dL 159 (H)    Alkaline Phosphatase      40 - 150 U/L 100    AST      0 - 45 U/L 32    ALT      0 - 70 U/L 21    Protein Total      6.4 - 8.3 g/dL 5.5 (L)    Albumin      3.5 - 5.2 g/dL 3.2 (L)    Bilirubin Total      <=1.2 mg/dL 0.9    WBC      4.0 - 11.0 10e3/uL 8.4    RBC Count      4.40 - 5.90 10e6/uL 1.66 (L)    Hemoglobin      13.3 - 17.7 g/dL 6.4 (LL)    Hematocrit      40.0 - 53.0 % 21.4 (L)    MCV      78 - 100 fL 129 (H)    MCH      26.5 - 33.0 pg 38.6 (H)    MCHC      31.5 - 36.5 g/dL 29.9 (L)    RDW      10.0 - 15.0 % 19.9 (H)    Platelet Count      150 - 450 10e3/uL 126 (L)    Iron      61 - 157 ug/dL 75    Iron Binding Capacity      240 - 430 ug/dL 288    Iron Sat Index      15 - 46 % 26       Legend:  (H) High  (L) Low  (LL) Low Panic  The patient's records and results personally reviewed by this provider.     Outside records reviewed from: Care Everywhere      Assessment    Tacos Dominguez is a 64 year old male seen as a PAC referral for risk assessment and optimization for anesthesia.    Plan/Recommendations  Pt will be optimized for the proposed procedure.  See below for details on the assessment, risk, and preoperative recommendations    NEUROLOGY  - History of CVA (possible - see ROS)    -Post Op delirium risk factors:  High co-morbid index    ENT  - No current airway concerns.  Will need to be reassessed day of surgery.  Mallampati: Unable to assess  TM: Unable to assess    CARDIAC  - following with cardiology/cardiac surgery for atrial fibrillation, AV stenosis, MV stenosis, pulmonary hypertension, tricuspid insufficiency and mild CAD.  - cardiac testing as above.  - not currently anticoagulated.  - was on hypertension meds, but were discontinued due to hypotension at dialysis.  - surgery planned as above for A-fib and valve disorders.          PULMONARY    MICHAEL Medium Risk            Total Score: 3    MICHAEL:  "Hypertension    MICHAEL: Over 50 ys old    MICHAEL: Male      - Denies asthma or inhaler use  - Tobacco History    History   Smoking Status    Every Day    Packs/day: 0.25    Years: 50.00    Types: Cigarettes   Smokeless Tobacco    Never       GI  - GERD is well controlled with omeprazole.  - on budesonide and Stelara for crohn's disease management.  Reports he has been told to hold his 3/1/24 dose of Stelara in preparation for surgery.  Last dose was 2/1/24.    PONV Low Risk  Total Score: 1           1 AN PONV: Intended Post Op Opioids        /RENAL  - ESRD with unknown etiology.  On dialysis MWF via LUE AV fistula.  - repeat BMP ordered today.    ENDOCRINE    - BMI: Estimated body mass index is 22.51 kg/m  as calculated from the following:    Height as of 1/11/24: 1.803 m (5' 11\").    Weight as of 2/15/24: 73.2 kg (161 lb 6 oz).  Healthy Weight (BMI 18.5-24.9)  - No history of Diabetes Mellitus    -s/p prior partial pancreatectomy for IPMN    HEME  VTE Low Risk 0.5%            Total Score: 3    VTE: Greater than 59 yrs old    VTE: Male      - No history of abnormal bleeding or antiplatelet use.  - chronic thrombocytopenia.    - Chronic anemia.  - hgb noted to be 6.9 on 2/15/24 when drawn for his cardiac cath procedure.  No treatment that day post cath- patient discharged home.  Reports he was told to go to the emergency room over the weekend if he became symptomatic, but notes that he has been down to 6.6 in the past and even then wasn't symptomatic.  Continues to note that he is asymptomatic, but does appear pale during virtual visit.  He reports that he went to dialysis yesterday, but no labs were drawn.  He was given an iron infusion while there though.  - patient agreeable to going to lab today after this visit for lab recheck.    Recommend perioperative use of blood conservation techniques intraoperatively and close monitoring for postoperative bleeding.  A type and screen has been ordered for this " patient    **Addendum (2050) - labs reviewed.  Hgb now lower at 6.4.  Called patient and encourage him to present to [facility] emergency room for treatment.  Patient agreeable and will go now.  Will follow up with patient tomorrow. **    **Addendum (2/23/24) - call placed to patient to request that he go back to lab next week for CBC recheck.  No answer- message left.  Order placed.  Will also staff message. RYAN Romero PA-C () to notify of the recent hgb and transfusion.**      **Addendum (2/26/24) - repeat hgb yesterday 5.7.  Patient now admitted.  Dr. Ponce and RYAN Romero PA-C notified.  Message also sent to Dr. Romero and SIXTO Elkins RN. **      MSK  Patient IS Frail            Total Score: 3    Frailty: Weight loss 10 lbs or greater    Frailty: Slower walking speed    Frailty: Decrease in strength      Due to the patient's risk related to frailty, post-op cardiac rehab would be recommended.     PSYCH  - continue celexa without interruption.        Different anesthesia methods/types have been discussed with the patient, but they are aware that the final plan will be decided by the assigned anesthesia provider on the date of service.      The patient is not yet optimized for their procedure due to severe anemia.  See addendums above.     AVS with information on surgery time/arrival time, meds and NPO status given by nursing staff. No further diagnostic testing indicated.    Please refer to the physical examination documented by the anesthesiologist in the anesthesia record on the day of surgery.    Video-Visit Details    Type of service:  Video Visit    Provider received verbal consent for a Video Visit from the patient? Yes     Originating Location (pt. Location): Home    Distant Location (provider location):  Off-site  Mode of Communication:  Video Conference via Dash    On the day of service:     Prep time: 15 minutes  Visit time: 21 minutes  Documentation/coordination time: 22  minutes  ------------------------------------------  Total time: 58 minutes      JANNET Badillo CNP  Preoperative Assessment Center  [facility]  [facility]  Phone: [phone]  Fax: [phone]

## 2024-02-21 NOTE — ED PROVIDER NOTES
History     Chief Complaint:  Abnormal Labs       HPI   Tacos Dominguez is a 64 year old male with past medical history of end-stage renal disease, paroxysmal atrial fibrillation, Crohn's disease, chronic anemia, and hypertension who presents emergency department with anemia that was found on outside lab.  Patient reports that he has chronic anemia that usually ranges between 9 and 10.  Occasionally will require transfusions.  Chronic anemia secondary to his Crohn's disease.  Hemoglobin at outside clinic was 6.4.  He is otherwise been feeling well.  Denies any chest pain or shortness of breath.  Notes a little bit of blood when he wipes and wonders if he has steroids but denies any black stools or hematochezia.    Reviewed outpatient blood work from yesterday in which the patient had a hemoglobin of 6.4.  Also reviewed iron studies and heme differential.    Medications:    budesonide (ENTOCORT EC) 3 MG EC capsule  calcium acetate (CALPHRON) 667 MG TABS tablet  citalopram (CELEXA) 20 MG tablet  folic acid (FOLVITE) 1 MG tablet  lidocaine-prilocaine (EMLA) 2.5-2.5 % external cream  multivitamin RENAL (MULTIVITAMIN RENAL) 1 MG capsule  omeprazole (PRILOSEC) 20 MG DR capsule  simvastatin (ZOCOR) 20 MG tablet  ustekinumab (STELARA) 90 MG/ML        Past Medical History:    Past Medical History:   Diagnosis Date    Aortic dissection (H)     Benign essential hypertension     CAD (coronary artery disease)     Cerebral infarction (H)     Crohn's colitis (H)     Crohn's disease of large intestine (H) 11/22/2021    Current smoker     Esophageal reflux     ESRD (end stage renal disease) on dialysis (H)     History of basal cell carcinoma     Hypertension     Mixed hyperlipidemia     NSTEMI (non-ST elevated myocardial infarction) (H)     PAF (paroxysmal atrial fibrillation) (H)        Past Surgical History:    Past Surgical History:   Procedure Laterality Date    ABDOMEN SURGERY      x 6.  colon resections for crohn's disease     APPENDECTOMY      CHOLECYSTECTOMY      COLONOSCOPY N/A 07/20/2021    Procedure: COLONOSCOPY, WITH POLYPECTOMY AND BIOPSY;  Surgeon: Eric Preston MD;  [facility]    COLONOSCOPY N/A 12/7/2022    Procedure: COLONOSCOPY, WITH POLYPECTOMY AND BIOPSY;  Surgeon: Willian Kee MD;  Location:  GI    CV CORONARY ANGIOGRAM N/A 05/25/2021    Procedure: CV CORONARY ANGIOGRAM;  Surgeon: Judson Ybarra MD;  Location:  HEART CARDIAC CATH LAB    CV CORONARY ANGIOGRAM N/A 2/15/2024    Procedure: Coronary Angiogram;  Surgeon: Tico Finn MD;  Location:  HEART CARDIAC CATH LAB    CV LEFT HEART CATH N/A 2/15/2024    Procedure: Left Heart Catheterization;  Surgeon: Tico Finn MD;  Location:  HEART CARDIAC CATH LAB    CV RIGHT HEART CATH MEASUREMENTS RECORDED N/A 2/15/2024    Procedure: Right Heart Catheterization;  Surgeon: Tico Finn MD;  Location:  HEART CARDIAC CATH LAB    ESOPHAGOSCOPY, GASTROSCOPY, DUODENOSCOPY (EGD), COMBINED N/A 07/20/2021    Procedure: ESOPHAGOGASTRODUODENOSCOPY, WITH FINE NEEDLE ASPIRATION BIOPSY, WITH ENDOSCOPIC ULTRASOUND GUIDANCE;  Surgeon: Eric Preston MD;  [facility]    IR DIALYSIS FISTULOGRAM LEFT  12/01/2022    IR DIALYSIS FISTULOGRAM LEFT  1/13/2023    LAPAROTOMY, LYSIS ADHESIONS, COMBINED N/A 03/17/2022    Procedure: Laparotomy, extensive lysis adhesions, combined;  Surgeon: Sarath Smith MD;  [facility]    PANCREATECTOMY PARTIAL N/A 03/17/2022    Procedure: Open Subtotal Pancreatectomy, intra-op ultrasound;  Surgeon: Sarath Smith MD;  [facility]    REVISION FISTULA ARTERIOVENOUS UPPER EXTREMITY Left 2/14/2023    Procedure: LEFT UPPER ARM FISTULA OUTFLOW REVISION FROM CEPHALIC VEIN TO JUGULAR VEIN WITH 10mm THIN-WALLED RINGED POLYTETRAFLUEROETHYLINE;  Surgeon: Mo Gramajo MD;  Location:  OR    TRANSESOPHAGEAL ECHOCARDIOGRAM INTRAOPERATIVE N/A 1/11/2024    Procedure:  ECHOCARDIOGRAM, TRANSESOPHAGEAL, WITH ANESTHESIA;  Surgeon: GENERIC ANESTHESIA PROVIDER;  [facility]    VASCULAR SURGERY      dialysis access- left upper        Physical Exam   Patient Vitals for the past 24 hrs:   BP Temp Pulse Resp SpO2   02/21/24 0600 (!) 129/90 -- 90 -- 99 %   02/21/24 0545 (!) 136/93 97.9  F (36.6  C) 90 18 --   02/21/24 0415 (!) 153/83 -- 105 25 95 %   02/21/24 0358 125/63 97.8  F (36.6  C) 100 18 --   02/21/24 0338 125/63 -- 90 27 100 %   02/21/24 0155 128/79 -- 104 15 100 %   02/21/24 0130 117/79 97.9  F (36.6  C) 99 20 --   02/21/24 0117 120/54 97.8  F (36.6  C) 98 20 --   02/20/24 2214 121/54 98.9  F (37.2  C) 98 18 100 %        Physical Exam  General: Patient is awake, alert  Head: The scalp, face, and head appear normal  Eyes: The pupils are equal, round, and reactive to light. Conjunctivae and sclerae are normal  ENT: External acoustic canals are normal. The oropharynx is normal without erythema. Uvula is in the midline  Neck: Normal range of motion.   CV: Regular rate and rhythm.   Resp: Lungs are clear without wheezes or rales. No respiratory distress.   GI: Abdomen is soft, no rigidity, guarding, or rebound. No distension. No tenderness to palpation in any quadrant.     MS: Normal tone. Joints grossly normal without effusions. No asymmetric leg swelling, calf or thigh tenderness.    Skin: No rash or lesions noted. Normal capillary refill noted  Neuro: Speech is normal and fluent. Face is symmetric. Moving all extremities.   Psych:  Normal affect.  Appropriate interactions.    Emergency Department Course       Laboratory:  Labs Ordered and Resulted from Time of ED Arrival to Time of ED Departure   HEMOGLOBIN - Abnormal       Result Value    Hemoglobin 5.8 (*)    CRP INFLAMMATION - Abnormal    CRP Inflammation 8.25 (*)    ERYTHROCYTE SEDIMENTATION RATE AUTO - Abnormal    Erythrocyte Sedimentation Rate 70 (*)    RETICULOCYTE COUNT - Abnormal    % Reticulocyte 10.0 (*)     Absolute  Reticulocyte 0.154 (*)    HEMOGLOBIN - Abnormal    Hemoglobin 8.6 (*)    VITAMIN B12   FOLATE   PREPARE RED BLOOD CELLS (UNIT)    Blood Component Type Red Blood Cells      Product Code A3637V25      Unit Status Transfused      Unit Number [id number]      CROSSMATCH Compatible      CODING SYSTEM AYMH217      ISSUE DATE AND TIME 20240221010900      UNIT ABO/RH A+      UNIT TYPE ISBT 6200     PREPARE RED BLOOD CELLS (UNIT)    Blood Component Type Red Blood Cells      Product Code H8739S18      Unit Status Returned      Unit Number [id number]      CROSSMATCH Compatible      CODING SYSTEM WGQF381      ISSUE DATE AND TIME 20240221012400      UNIT ABO/RH A+      UNIT TYPE ISBT 6200     PREPARE RED BLOOD CELLS (UNIT)    Blood Component Type Red Blood Cells      Product Code B2297K03      Unit Status Transfused      Unit Number [id number]      CROSSMATCH Compatible      CODING SYSTEM HDAA150      ISSUE DATE AND TIME 80488040095248      UNIT ABO/RH A+      UNIT TYPE ISBT 6200     PREPARE RED BLOOD CELLS (UNIT)   TRANSFUSE RED BLOOD CELLS (UNIT)   TRANSFUSE RED BLOOD CELLS (UNIT)   TRANSFUSE RED BLOOD CELLS (UNIT)        Emergency Department Course & Assessments:    Case was discussed with Dr. Art of the hospitalist team.    Disposition:  The patient was discharged.     Impression & Plan      Medical Decision Making:  Patient is a 64-year-old gentleman with past medical history of end-stage renal disease on dialysis, Crohn's disease and chronic anemia who presents to the emergency department with anemia found at outside lab. Reviewed outpatient blood work from yesterday in which the patient had a hemoglobin of 6.4.  Also reviewed iron studies and heme differential.  It appears that he has a macrocytic anemia with gradually worsening anemia and increasing MVC.  Concerning for anemia of chronic disease versus folate deficiency versus B12 deficiency.  Case is discussed with hospitalist to see if he would benefit from  admission versus transfusion in the emergency department and discharge.  At this point we do not feel that he would greatly benefit from hospitalization at this time but can be discharged after he receives his blood transfusion as long as he remains stable.  Patient underwent blood transfusion in the emergency department and received 2 units.  He continued to be asymptomatic throughout his time in the emergency department.  Will have him follow-up closely with his nephrologist and gastroenterologist.  I do not suspect acute lower GI bleed.  He has some small amount of blood on the toilet paper when he wipes but he has clinical evidence of irritated hemorrhoids on exam.  There is no active bleeding at this time.  I discussed my plan for outpatient management with the patient and he is amenable at this time.    Diagnosis:    ICD-10-CM    1. Macrocytic anemia  D53.9              MD Delfino Hughes Christopher Joseph, MD  02/21/24 0630

## 2024-02-21 NOTE — ED TRIAGE NOTES
Pt presents to triage with c/o low hemoglobin. Pt was at clinic today, hgb came back 6.4. Chronically anemic. Pt is feeling well, asymptomatic. Type and screen done today at 1426. Has required multiple transfusions in the past.

## 2024-02-25 PROBLEM — D62 ANEMIA DUE TO BLOOD LOSS, ACUTE: Status: ACTIVE | Noted: 2024-01-01

## 2024-02-25 PROBLEM — D64.9 ACUTE ON CHRONIC ANEMIA: Status: ACTIVE | Noted: 2024-01-01

## 2024-02-25 NOTE — PHARMACY-ADMISSION MEDICATION HISTORY
Pharmacist Admission Medication History    Admission medication history is complete. The information provided in this note is only as accurate as the sources available at the time of the update.    Information Source(s): Patient and CareEverywhere/SureScripts via in-person    Pertinent Information: Pt stopped taking his meds 3 days ago in preparation for upcoming surgery.    Changes made to PTA medication list:  Added: None  Deleted: inpatient order for Lidocaine 1% with Epi  Changed: Ca Acetate 667mg 3 caps qam--> 3 caps tid, Celexa 20mg daily-->daily prn panic attacks.    Allergies reviewed with patient and updates made in EHR: yes    Medication History Completed By: Jennifer Phillips, PharmLINDSEY 2/25/2024 3:27 PM    PTA Med List   Medication Sig Last Dose    budesonide (ENTOCORT EC) 3 MG EC capsule Take 3 capsules (9 mg) by mouth every morning for 30 days 3 days ago    calcium acetate (CALPHRON) 667 MG TABS tablet Take 2,668 mg by mouth 3 times daily (with meals) 3 days ago    citalopram (CELEXA) 20 MG tablet Take 20 mg by mouth daily as needed (panic attacks) prn    folic acid (FOLVITE) 1 MG tablet Take 1 mg by mouth every morning  3 days ago    lidocaine-prilocaine (EMLA) 2.5-2.5 % external cream three times a week Prior to dialysis  site access on Monday, Wednesday, Friday prn    multivitamin RENAL (MULTIVITAMIN RENAL) 1 MG capsule Take 1 capsule by mouth every morning 3 days ago    omeprazole (PRILOSEC) 20 MG DR capsule Take 20 mg by mouth every morning  3 days ago    simvastatin (ZOCOR) 20 MG tablet Take 20 mg by mouth every morning  3 days ago    ustekinumab (STELARA) 90 MG/ML Inject 1 ml ( 90 mg) subcutaneous every 4 weeks. (Patient taking differently: 90 mg every 28 days Inject 1 ml ( 90 mg) subcutaneous every 4 weeks. Last dose 2/1/24) 2/1/2024

## 2024-02-25 NOTE — H&P
[facility]    History and Physical - Hospitalist Service       Date of Admission:  2/25/2024    Assessment & Plan     Tacos Dominguez is a 64 year old male w/PMH ESRD on HD, PAF, GERD, crohns disease s/p colon resection on stelara, severe MR/severe TR/moderate aortic stenosis, chronic anemia, abdominal aorta partially calcified dissection, CAD, HTN, DLD who presents with recurrent anemia    Acute recurrent on chronic anemia  Suspected GI bleeding  Hx crohns s/p colectomy on stelaris, small bowel ulcer on last colonscopy  -has chronic anemia w/baseline 8-9, mostly from ESRD, possible low grade chronic blood loss. Preop labs 2/21 showed Hgb 5.8, transfused 2 units and improved to 8.6 and discharged home. Repeat labs today show Hgb again back down to 5.6 but now reporting some blood/redness in stools. Coagulopathy and thrombocytopenia also likely contributing  -has hx of crohns s/p colectomy and on stelaris and budesonide. Last colonoscopy 12/07/2022 showed small ulcer in small bowel 20cm from ileocolic anastomosis .  -transfuse 2 units PRBCS and w/serial labs, keep >7  -hold home omeprazole and place on IV protonix 40 BID, likely lower GI bleed but cover for now  -will get CTA A/P to evaluate for evidence of bleeding based on surgical and abdominal aneurysm history. Also has hx of hemorrhagic/proteinaceous renal cysts, unclear if contributing  -check FOBT and consult GI, NPO after midnight  -resume home budesonide, got stelara earlier this month. Plan was to hold for March due to upcoming cardiac surgery    Coagulopathy, thrombocytopenia  -INR 1.41, PLT 77. Likely contributing to above  -unclear cause, platelets possibly consumptively low. Monitor  -check liver studies    severe MR/severe TR/moderate aortic stenosis  CAD  -planning for bypass and valve repair surgery at the  next month  -HD for volume control, not on diuretics    abdominal aorta partially calcified  dissection  Hemorrhagic/proteinaceous renal cysts  -noted on previous imaging, last evaluated 9/20/2022  -repeat CTA as above    Hx ESRD on HD  -nephrology consulted, last HD Friday, some evidence of volume overload    Hx HTN, DLD, GERD  -per patient some of his home meds had been recently held in anticipation of his upcoming surgery  -await med rec       Diet:  NPO after midnight  DVT Prophylaxis: Pneumatic Compression Devices  [place]ara Catheter: Not present  Lines: None     Cardiac Monitoring: None  Code Status:  full code      Hilton Dolan DO  Hospitalist Service  [facility]  Securely message with [employer] (more info)  Text page via [employer] Paging/Directory     ______________________________________________________________________    Chief Complaint   Anemia, GI bleeding    History of Present Illness   Tacos Dominguez is a 64 year old male w/PMH ESRD on HD, PAF, GERD, crohns disease s/p colon resection on stelara, severe MR/severe TR/moderate aortic stenosis, chronic anemia, abdominal aorta partially calcified dissection, CAD, HTN, DLD who presents with recurrent anemia. He was noted to have Hgb of 5.8 on preop labs on 2/21, was directed to ED where he received 2 units of PRBC's with improvement to 8.6 and was discharged home. He had repeat outpt lab work done that showed Hgb of 5.6 today and presented back to ED. He initially denied any bloody stools but has noted red tinge to his stools/toilet water and thought it was from red coolaid he had been drinking. Also notes a hemorrhoid that he thought could be contributing as well. Denies any significant CP, sob, no weakness or fatigue even. Also denies fever, chills, NV, abd pain or other complaints    In ED 2 additional units of blood were ordered, vitals are stable. Platelets low at 77, INR 1.41    He has an upcoming cardiac bypass surgery and valve replacement. His home meds have been adjsuted recently and plan was to hold his Stelara for  month of March. Did have a colonoscpy n 2022 that showed an ulcer, plan was for repeat colonoscopy per patient but have been unable to complete apparenty as he doesn't have a person to transport him after the procedure. He's on budesonide for his crohns as well.      Past Medical History    Past Medical History:   Diagnosis Date    Aortic dissection (H)     distal, thin.  stable/chronic on MRCP 3/2022    Benign essential hypertension     CAD (coronary artery disease)     Cerebral infarction (H)     Crohn's colitis (H)     Crohn's disease of large intestine (H) 11/22/2021    Current smoker     Esophageal reflux     ESRD (end stage renal disease) on dialysis (H)     History of basal cell carcinoma     Hypertension     Mixed hyperlipidemia     NSTEMI (non-ST elevated myocardial infarction) (H)     PAF (paroxysmal atrial fibrillation) (H)        Past Surgical History   Past Surgical History:   Procedure Laterality Date    ABDOMEN SURGERY      x 6.  colon resections for crohn's disease    APPENDECTOMY      CHOLECYSTECTOMY      COLONOSCOPY N/A 07/20/2021    Procedure: COLONOSCOPY, WITH POLYPECTOMY AND BIOPSY;  Surgeon: Eric Preston MD;  Location:  GI    COLONOSCOPY N/A 12/7/2022    Procedure: COLONOSCOPY, WITH POLYPECTOMY AND BIOPSY;  Surgeon: Willian Kee MD;  Location:  GI    CV CORONARY ANGIOGRAM N/A 05/25/2021    Procedure: CV CORONARY ANGIOGRAM;  Surgeon: Judson Ybarra MD;  Location: [facility] CARDIAC CATH LAB    CV CORONARY ANGIOGRAM N/A 2/15/2024    Procedure: Coronary Angiogram;  Surgeon: Tico Finn MD;  Location: [facility] CARDIAC CATH LAB    CV LEFT HEART CATH N/A 2/15/2024    Procedure: Left Heart Catheterization;  Surgeon: Tico Finn MD;  Location: [facility] CARDIAC CATH LAB    CV RIGHT HEART CATH MEASUREMENTS RECORDED N/A 2/15/2024    Procedure: Right Heart Catheterization;  Surgeon: Tico Finn MD;  Location: [facility] CARDIAC CATH LAB     ESOPHAGOSCOPY, GASTROSCOPY, DUODENOSCOPY (EGD), COMBINED N/A 07/20/2021    Procedure: ESOPHAGOGASTRODUODENOSCOPY, WITH FINE NEEDLE ASPIRATION BIOPSY, WITH ENDOSCOPIC ULTRASOUND GUIDANCE;  Surgeon: Eric Preston MD;  [facility]    IR DIALYSIS FISTULOGRAM LEFT  12/01/2022    IR DIALYSIS FISTULOGRAM LEFT  1/13/2023    LAPAROTOMY, LYSIS ADHESIONS, COMBINED N/A 03/17/2022    Procedure: Laparotomy, extensive lysis adhesions, combined;  Surgeon: Sarath Smith MD;  [facility]    PANCREATECTOMY PARTIAL N/A 03/17/2022    Procedure: Open Subtotal Pancreatectomy, intra-op ultrasound;  Surgeon: Sarath Smith MD;  [facility]    REVISION FISTULA ARTERIOVENOUS UPPER EXTREMITY Left 2/14/2023    Procedure: LEFT UPPER ARM FISTULA OUTFLOW REVISION FROM CEPHALIC VEIN TO JUGULAR VEIN WITH 10mm THIN-WALLED RINGED POLYTETRAFLUEROETHYLINE;  Surgeon: Mo Gramajo MD;  Location: [facility]    TRANSESOPHAGEAL ECHOCARDIOGRAM INTRAOPERATIVE N/A 1/11/2024    Procedure: ECHOCARDIOGRAM, TRANSESOPHAGEAL, WITH ANESTHESIA;  Surgeon: GENERIC ANESTHESIA PROVIDER;  [facility]    VASCULAR SURGERY      dialysis access- left upper       Prior to Admission Medications   Prior to Admission Medications   Prescriptions Last Dose Informant Patient Reported? Taking?   budesonide (ENTOCORT EC) 3 MG EC capsule   No No   Sig: Take 3 capsules (9 mg) by mouth every morning for 30 days   calcium acetate (CALPHRON) 667 MG TABS tablet  Self Yes No   Sig: Take 2,668 mg by mouth every morning   citalopram (CELEXA) 20 MG tablet   Yes No   Sig: Take 20 mg by mouth every morning   folic acid (FOLVITE) 1 MG tablet  Self Yes No   Sig: Take 1 mg by mouth every morning    lidocaine-prilocaine (EMLA) 2.5-2.5 % external cream  Self Yes No   Sig: three times a week Prior to dialysis  site access on Monday, Wednesday, Friday   multivitamin RENAL (MULTIVITAMIN RENAL) 1 MG capsule  Self Yes No   Sig: Take 1 capsule by mouth every morning    omeprazole (PRILOSEC) 20 MG DR capsule  Self Yes No   Sig: Take 20 mg by mouth every morning    simvastatin (ZOCOR) 20 MG tablet  Self Yes No   Sig: Take 20 mg by mouth every morning    ustekinumab (STELARA) 90 MG/ML   No No   Sig: Inject 1 ml ( 90 mg) subcutaneous every 4 weeks.   Patient taking differently: 90 mg every 28 days Inject 1 ml ( 90 mg) subcutaneous every 4 weeks. Last dose 2/1/24      Facility-Administered Medications Last Administration Doses Remaining   lidocaine 1% with EPINEPHrine 1:100,000 injection 3 mL None recorded 1           Review of Systems    The 10 point Review of Systems is negative other than noted in the HPI or here.    Social History   I have reviewed this patient's social history and updated it with pertinent information if needed.  Social History     Tobacco Use    Smoking status: Every Day     Packs/day: 0.25     Years: 50.00     Additional pack years: 0.00     Total pack years: 12.50     Types: Cigarettes     Passive exposure: Current    Smokeless tobacco: Never   Substance Use Topics    Alcohol use: Yes     Comment: 2-3 drinks once a month    Drug use: Not Currently         Family History   I have reviewed this patient's family history and updated it with pertinent information if needed.  Family History   Problem Relation Age of Onset    Breast Cancer Mother     Coronary Artery Disease Father     Hypertension Brother     Anesthesia Reaction No family hx of     Thrombosis No family hx of          Allergies   Allergies   Allergen Reactions    Lisinopril Anaphylaxis and Other (See Comments)     Shortness of breath        Physical Exam   Vital Signs: Temp: 97.6  F (36.4  C) Temp src: Oral BP: 119/66 Pulse: 98   Resp: 18 SpO2: 97 % O2 Device: None (Room air)    Weight: 155 lbs 6.79 oz    Constitutional: awake, alert, and cooperative  Eyes: pupils equal, round and reactive to light and conjunctiva normal  ENT: normocepalic, without obvious abnormality, atramatic  Respiratory: no  increased work of breathing, good air exchange, and clear to auscultation  Cardiovascular: regular rate and rhythm and murmur present, pitting edema bilateral LE  GI: normal bowel sounds, soft, non-distended, and non-tender, R sided hernia present  Skin: scattered bruising on extremities  Neurologic: alert, oriented, no focal deficits    65 MINUTES SPENT BY ME on the date of service doing chart review, history, exam, documentation & further activities per the note.      Data   ------------------------- PAST 24 HR DATA REVIEWED -----------------------------------------------    I have personally reviewed the following data over the past 24 hrs:    5.7  \   5.6 (LL)   / 77 (L)     139 98 43.3 (H) /  108 (H)   5.1 24 7.69 (H) \     INR:  1.41 (H) PTT:  33   D-dimer:  N/A Fibrinogen:  N/A       Imaging results reviewed over the past 24 hrs:   No results found for this or any previous visit (from the past 24 hour(s)).

## 2024-02-25 NOTE — TELEPHONE ENCOUNTER
Bri calling from [facility] with a critical lab result.    Patient's Hgb is  5.9.     No pager listed in [facility] for ordering provider. [facility] only accepts critical labs for E clinics, so unable to page a provider for this result.    Lab will continue to search for a provider to notify.    Barbara Escudero RN, BSN  [facility]   Triage Nurse Advisor     Patient called stating she was returning a call from Dr. Arnie Lima for test results. She ill not be available between 12:00 and 12:30, and her cell phone will be on before and after that time frame.

## 2024-02-25 NOTE — ED PROVIDER NOTES
History     Chief Complaint:  Abnormal Labs       HPI   Tacos Dominguez is a 64 year old male with a history of anemia and Chron's disease who presents for evaluation of abnormal labs (low hemoglobin). The patient has a history of anemia and received two pints of blood on 2/21/24. He noticed pinkish coloration in his stool but denies black coloration, occurring most recently on 2/23/24. He notes some shortness of breath though not changing significantly from baseline, worsening when drinking excess fluids. He stopped taking his normal medication on 2/22/24 as he is undergoing heart surgery on 3/5/24. He is being seen for dialysis and monitored for his Chron's disease by Dr. Betty Ponce at the .    Independent Historian:   None - Patient Only    Review of External Notes:         Medications:    budesonide (ENTOCORT EC) 3 MG EC capsule  calcium acetate (CALPHRON) 667 MG TABS tablet  citalopram (CELEXA) 20 MG tablet  folic acid (FOLVITE) 1 MG tablet  lidocaine-prilocaine (EMLA) 2.5-2.5 % external cream  multivitamin RENAL (MULTIVITAMIN RENAL) 1 MG capsule  omeprazole (PRILOSEC) 20 MG DR capsule  simvastatin (ZOCOR) 20 MG tablet  ustekinumab (STELARA) 90 MG/ML        Past Medical History:    Chron's disease  Anemia    Past Medical History:   Diagnosis Date    Aortic dissection (H)     Benign essential hypertension     CAD (coronary artery disease)     Cerebral infarction (H)     Crohn's colitis (H)     Crohn's disease of large intestine (H) 11/22/2021    Current smoker     Esophageal reflux     ESRD (end stage renal disease) on dialysis (H)     History of basal cell carcinoma     Hypertension     Mixed hyperlipidemia     NSTEMI (non-ST elevated myocardial infarction) (H)     PAF (paroxysmal atrial fibrillation) (H)        Past Surgical History:    Past Surgical History:   Procedure Laterality Date    ABDOMEN SURGERY      x 6.  colon resections for crohn's disease    APPENDECTOMY      CHOLECYSTECTOMY       COLONOSCOPY N/A 07/20/2021    Procedure: COLONOSCOPY, WITH POLYPECTOMY AND BIOPSY;  Surgeon: Eric Preston MD;  [facility]    COLONOSCOPY N/A 12/7/2022    Procedure: COLONOSCOPY, WITH POLYPECTOMY AND BIOPSY;  Surgeon: Willian Kee MD;  Location: [facility]    CV CORONARY ANGIOGRAM N/A 05/25/2021    Procedure: CV CORONARY ANGIOGRAM;  Surgeon: Judson Ybarra MD;  Location:  [facility]    CV CORONARY ANGIOGRAM N/A 2/15/2024    Procedure: Coronary Angiogram;  Surgeon: Tico Finn MD;  Location:  [facility]    CV LEFT HEART CATH N/A 2/15/2024    Procedure: Left Heart Catheterization;  Surgeon: Tico Finn MD;  Location:  [facility]    CV RIGHT HEART CATH MEASUREMENTS RECORDED N/A 2/15/2024    Procedure: Right Heart Catheterization;  Surgeon: Tico Finn MD;  Location:  [facility]    ESOPHAGOSCOPY, GASTROSCOPY, DUODENOSCOPY (EGD), COMBINED N/A 07/20/2021    Procedure: ESOPHAGOGASTRODUODENOSCOPY, WITH FINE NEEDLE ASPIRATION BIOPSY, WITH ENDOSCOPIC ULTRASOUND GUIDANCE;  Surgeon: Eric Preston MD;  [facility]    IR DIALYSIS FISTULOGRAM LEFT  12/01/2022    IR DIALYSIS FISTULOGRAM LEFT  1/13/2023    LAPAROTOMY, LYSIS ADHESIONS, COMBINED N/A 03/17/2022    Procedure: Laparotomy, extensive lysis adhesions, combined;  Surgeon: Sarath Smith MD;  [facility]    PANCREATECTOMY PARTIAL N/A 03/17/2022    Procedure: Open Subtotal Pancreatectomy, intra-op ultrasound;  Surgeon: Sarath Smith MD;  [facility]    REVISION FISTULA ARTERIOVENOUS UPPER EXTREMITY Left 2/14/2023    Procedure: LEFT UPPER ARM FISTULA OUTFLOW REVISION FROM CEPHALIC VEIN TO JUGULAR VEIN WITH 10mm THIN-WALLED RINGED POLYTETRAFLUEROETHYLINE;  Surgeon: Mo Gramajo MD;  Location:  OR    TRANSESOPHAGEAL ECHOCARDIOGRAM INTRAOPERATIVE N/A 1/11/2024    Procedure: ECHOCARDIOGRAM, TRANSESOPHAGEAL, WITH ANESTHESIA;   "Surgeon: GENERIC ANESTHESIA PROVIDER;  [facility]    VASCULAR SURGERY      dialysis access- left upper        Physical Exam   Patient Vitals for the past 24 hrs:   BP Temp Temp src Pulse Resp SpO2 Height Weight   02/25/24 1454 123/68 97.4  F (36.3  C) Oral 94 -- 100 % -- --   02/25/24 1436 119/66 97.6  F (36.4  C) Oral 98 -- 97 % -- --   02/25/24 1324 118/53 -- -- 96 -- 100 % -- --   02/25/24 1256 110/66 97.3  F (36.3  C) Temporal 104 18 100 % 1.803 m (5' 11\") 70.5 kg (155 lb 6.8 oz)        Physical Exam  HENT:      Head: Normocephalic.      Mouth/Throat:      Mouth: Mucous membranes are moist.   Eyes:      Pupils: Pupils are equal, round, and reactive to light.   Cardiovascular:      Rate and Rhythm: Normal rate and regular rhythm.   Pulmonary:      Effort: Pulmonary effort is normal.   Abdominal:      General: Abdomen is flat. Bowel sounds are normal.   Skin:     Capillary Refill: Capillary refill takes less than 2 seconds.      Coloration: Skin is pale.   Neurological:      General: No focal deficit present.      Mental Status: He is alert.   Psychiatric:         Mood and Affect: Mood normal.         Emergency Department Course   ECG    Imaging:  CTA Abdomen Pelvis with Contrast   Final Result   IMPRESSION:      1.  No evidence of active gastrointestinal bleeding.      2.  A right lower quadrant ventral abdominal wall hernia containing a nonobstructed loop of the small bowel is unchanged. No bowel obstruction or other acute complications.      3.  A hemorrhagic left renal cyst has slightly decreased in size since 10/24/2023.      4.  Multiple nonobstructing bilateral intrarenal calculi. No hydronephrosis.      5.  Additional nonemergent findings as described in the report body.             Laboratory:  Labs Ordered and Resulted from Time of ED Arrival to Time of ED Departure   INR - Abnormal       Result Value    INR 1.41 (*)    BASIC METABOLIC PANEL - Abnormal    Sodium 139      Potassium 5.1      Chloride " 98      Carbon Dioxide (CO2) 24      Anion Gap 17 (*)     Urea Nitrogen 43.3 (*)     Creatinine 7.69 (*)     GFR Estimate 7 (*)     Calcium 8.1 (*)     Glucose 108 (*)    CBC WITH PLATELETS AND DIFFERENTIAL - Abnormal    WBC Count 5.7      RBC Count 1.59 (*)     Hemoglobin 5.6 (*)     Hematocrit 18.8 (*)      (*)     MCH 35.2 (*)     MCHC 29.8 (*)     RDW 21.7 (*)     Platelet Count 77 (*)     % Neutrophils        % Lymphocytes        % Monocytes        % Eosinophils        % Basophils        % Immature Granulocytes        NRBCs per 100 WBC 1 (*)     Absolute Neutrophils        Absolute Lymphocytes        Absolute Monocytes        Absolute Eosinophils        Absolute Basophils        Absolute Immature Granulocytes        Absolute NRBCs 0.0     DIFFERENTIAL - Abnormal    % Neutrophils 78      % Lymphocytes 13      % Monocytes 5      % Eosinophils 4      % Basophils 0      Absolute Neutrophils 4.4      Absolute Lymphocytes 0.7 (*)     Absolute Monocytes 0.3      Absolute Eosinophils 0.2      Absolute Basophils 0.0      RBC Morphology Confirmed RBC Indices      Platelet Assessment        Value: Automated Count Confirmed. Platelet morphology is normal.    Elliptocytes Slight (*)     Polychromasia Slight (*)    PARTIAL THROMBOPLASTIN TIME - Normal    aPTT 33     TYPE AND SCREEN, ADULT    ABO/RH(D) A POS      Antibody Screen Negative      SPECIMEN EXPIRATION DATE [id number]     PREPARE RED BLOOD CELLS (UNIT)    Blood Component Type Red Blood Cells      Product Code T4238J14      Unit Status Transfused      Unit Number [id number]      CROSSMATCH Compatible      CODING SYSTEM [id number]      ISSUE DATE AND TIME [id number]      UNIT ABO/RH A+      UNIT TYPE ISBT 6200     PREPARE RED BLOOD CELLS (UNIT)   TRANSFUSE RED BLOOD CELLS (UNIT)   ABO/RH TYPE AND SCREEN          Emergency Department Course & Assessments:    Interventions:  Medications - No data to display     Independent Interpretation (X-rays, CTs,  rhythm strip):      Assessments/Consultations/Discussion of Management or Tests:  ED Course as of 02/25/24 1501   Sun Feb 25, 2024   1317 I saw the patient for an initial exam and evaluation.   1400 I rechecked and updated the patient.     1415 I spoke with Dr. Dolan from Hospital [facility] about the patient's presentation, findings, and plan of care.         Social Determinants of Health affecting care:   None    Disposition:  The patient was admitted to the hospital under the care of Dr. Dolan.     Impression & Plan      Medical Decision Making:  Patient presents with recurrent blood loss.  Recent history of anemia with blood transfusion and discharged again back of hemoglobin of 5.8.  Does have active Crohn's disease reasons for his anemia is unclear could be multifactorial as he does have dialysis but due to discolored stool and rapid repeat decrease in hemoglobin recommend admission for further assessment care was discussed with the hospitalist was initially admitted on observation may require longer stay if further workup for GI bleeding is obtained      Diagnosis:    ICD-10-CM    1. Anemia due to blood loss, acute  D62       2. Acute on chronic anemia  D64.9            Discharge Medications:  New Prescriptions    No medications on file          Scribe Disclosure:  I, Isael Ulises, am serving as a scribe at 1:14 PM on 2/25/2024 to document services personally performed by Juan Dunlpa MD based on my observations and the provider's statements to me.        Juan Dunlap MD  02/28/24 [id number]

## 2024-02-25 NOTE — ED NOTES
Pt. Had bowel movement at 1530. Stool was red, loose, and foamy. VS were taken when Pt. Returned from restroom. RN notified.

## 2024-02-25 NOTE — ED TRIAGE NOTES
Pt here for hemoglobin of 5.8. Here Wednesday for similar and received transfusion. Pt is not on blood thinners. Pt is a dialysis patient. Denies bloody stool.

## 2024-02-25 NOTE — TELEPHONE ENCOUNTER
Contacted by patients laboratory regarding critical hemoglobin level of 5.9. I spoke with Mr. Dominguez and we discussed going back into the ER for transfusion and workup. 5 days ago he went into the ER for hemoglobin of 5.8 and received 2 units of pRBCs. He remains asymptomatic per report. Highly likelihood of GI bleed, per patient changes in BM color over the last week. Recommend GI workup prior to cardiac surgery scheduled for 3/5 with Dr. Romero. Patient in agreement and plans to head to the ER today.    Heaven Hernandez PA-C   Cardiothoracic Surgery   Pager #[phone]  February 25, 2024 at 11:41 AM

## 2024-02-25 NOTE — ED NOTES
[facility]  ED Nurse Handoff Report    ED Chief complaint: Abnormal Labs  . ED Diagnosis:   Final diagnoses:   Anemia due to blood loss, acute   Acute on chronic anemia       Allergies:   Allergies   Allergen Reactions    Lisinopril Anaphylaxis and Other (See Comments)     Shortness of breath       Code Status: Full Code    Activity level - Baseline/Home:  independent.  Activity Level - Current:   independent.   Lift room needed: No.   Bariatric: No   Needed: No   Isolation: No.   Infection: Not Applicable.     Respiratory status: Room air    Vital Signs (within 30 minutes):   Vitals:    02/25/24 1454 02/25/24 1502 02/25/24 1536 02/25/24 1540   BP: 123/68 128/69 114/58    Pulse: 94 93  101   Resp:    15   Temp: 97.4  F (36.3  C) 97.5  F (36.4  C)     TempSrc: Oral Oral     SpO2: 100% 100%  100%   Weight:       Height:           Cardiac Rhythm:  ,      Pain level:    Patient confused: No.   Patient Falls Risk: arm band in place and patient and family education.   Elimination Status: Has voided     Patient Report - Initial Complaint: Abnormal Labs.   Focused Assessment: Abnormal Labs        HPI   Tacos Dominguez is a 64 year old male with a history of anemia and Chron's disease who presents for evaluation of abnormal labs (low hemoglobin). The patient has a history of anemia and received two pints of blood on 2/21/24. He noticed pinkish coloration in his stool but denies black coloration, occurring most recently on 2/23/24. He notes some shortness of breath though not changing significantly from baseline, worsening when drinking excess fluids. He stopped taking his normal medication on 2/22/24 as he is undergoing heart surgery on 3/5/24. He is being seen for dialysis and monitored for his Chron's disease by Dr. Betty Ponce at the .     Abnormal Results:   Labs Ordered and Resulted from Time of ED Arrival to Time of ED Departure   INR - Abnormal       Result Value    INR 1.41 (*)     BASIC METABOLIC PANEL - Abnormal    Sodium 139      Potassium 5.1      Chloride 98      Carbon Dioxide (CO2) 24      Anion Gap 17 (*)     Urea Nitrogen 43.3 (*)     Creatinine 7.69 (*)     GFR Estimate 7 (*)     Calcium 8.1 (*)     Glucose 108 (*)    CBC WITH PLATELETS AND DIFFERENTIAL - Abnormal    WBC Count 5.7      RBC Count 1.59 (*)     Hemoglobin 5.6 (*)     Hematocrit 18.8 (*)      (*)     MCH 35.2 (*)     MCHC 29.8 (*)     RDW 21.7 (*)     Platelet Count 77 (*)     % Neutrophils        % Lymphocytes        % Monocytes        % Eosinophils        % Basophils        % Immature Granulocytes        NRBCs per 100 WBC 1 (*)     Absolute Neutrophils        Absolute Lymphocytes        Absolute Monocytes        Absolute Eosinophils        Absolute Basophils        Absolute Immature Granulocytes        Absolute NRBCs 0.0     DIFFERENTIAL - Abnormal    % Neutrophils 78      % Lymphocytes 13      % Monocytes 5      % Eosinophils 4      % Basophils 0      Absolute Neutrophils 4.4      Absolute Lymphocytes 0.7 (*)     Absolute Monocytes 0.3      Absolute Eosinophils 0.2      Absolute Basophils 0.0      RBC Morphology Confirmed RBC Indices      Platelet Assessment        Value: Automated Count Confirmed. Platelet morphology is normal.    Elliptocytes Slight (*)     Polychromasia Slight (*)    PARTIAL THROMBOPLASTIN TIME - Normal    aPTT 33     TYPE AND SCREEN, ADULT    ABO/RH(D) A POS      Antibody Screen Negative      SPECIMEN EXPIRATION DATE [id number]     PREPARE RED BLOOD CELLS (UNIT)    Blood Component Type Red Blood Cells      Product Code [id number]      Unit Status Transfused      Unit Number [id number]      CROSSMATCH Compatible      CODING SYSTEM XJSN953      ISSUE DATE AND TIME [id number]      UNIT ABO/RH A+      UNIT TYPE ISBT 6200     PREPARE RED BLOOD CELLS (UNIT)   TRANSFUSE RED BLOOD CELLS (UNIT)   ABO/RH TYPE AND SCREEN        CT Abdomen Pelvis w Contrast    (Results Pending)        Treatments provided: BT  Family Comments: None  OBS brochure/video discussed/provided to patient:  No  ED Medications: Medications - No data to display    Drips infusing:  No  For the majority of the shift this patient was Green.   Interventions performed were None.    Sepsis treatment initiated: No    Cares/treatment/interventions/medications to be completed following ED care: 1 unit of blood administered in ED, 1 unit still needs to be given.     ED Nurse Name: Stephanie Cannon RN  3:48 PM     RECEIVING UNIT ED HANDOFF REVIEW    Above ED Nurse Handoff Report was reviewed: Yes  Reviewed by: Nikita Mayberry RN on February 25, 2024 at 4:41 PM

## 2024-02-26 NOTE — CONSULTS
GASTROENTEROLOGY CONSULTATION      Tacos Dominguez  [address]  [facility] [address]  64 year old male     Admission Date/Time: 2/25/2024  Primary Care Provider: Good Pham     We were asked to see the patient in consultation by Dr. Dolan for evaluation of acute on chronic anemia.    CC: no complaints     HPI:  Tacos Dominguez is a 64 year old male with past medical history significant for Crohn's disease currently on Stelara every 4 weeks status post multiple surgeries including a right hemicolectomy, multiple small bowel resections, ileocolic resection with ileostomy/takedown, chronic pancreatitis, IPMN with high grade dysplasia (EUS 8/2022), ESRD on dialysis, severe MR/severe TR/moderate aortic stenosis, CAD, chronic abdominal aorta dissection, admitted 2/25 with acute on chronic anemia.    Patient has been undergoing evaluation for heart/valve disease and appears that he has plans for coronary artery bypass and cardiac valve repair surgery next week.  He had labs drawn 2/21 in preparation for his surgery according to his report and it was low at 5.8.  He reports his baseline is in the 8-8.5 range. He was given 2 units of blood outpatient.  Hemoglobin following the blood transfusion was 8.6.  Plan was to have him have labs drawn yesterday to follow-up and they were noted to be low again at 5.7 prompting him to be directed to the emergency room.    Patient reports that his Crohn's has been under control on Stelara every 4 weeks.  He is managed through the [facility].  About a month ago he started noticing stools to be more liquid without blood and a weight loss of about 10 pounds.  He was started on budesonide.  He reports taking this for the last month up until about 3 days ago, 9 mg a day.  It seems to be helping with his diarrhea.  He reports that her plans with his primary GI to have a follow-up colonoscopy sometime this year but this is not yet been scheduled.  He denies any  overt melena, hematochezia.  He denies any abdominal pain, nausea, vomiting, hematemesis, fevers, chills, lightheadedness, dizziness.    Last colonoscopy was in December 2022 to assess response to Stelara showed a poor bowel prep, single ulcer in the small bowel 20 cm from the ileocolonic anastomosis, small in size compared to prior exam, possibly representative of healing, congested mucosa in the distal small bowel, otherwise normal ileum, patent end-to-end ileocolonic anastomosis, diverticulosis versus old fistulas in the distal rectum, excoriated mucosa at the anus and in the distal rectum.  Overall exam was noted to be similar to previous but improved with decrease of the chronic small bowel ulcer, remainder of the small bowel essentially appearing somewhat normal apart from patchy mild erythema and congestion.  In the looks like after that his Stelara dose was increased to every 4 weeks based on conversation with patient today and his current Stelara dosing.    No prior EGD.  Had EUS in 2021 due to abnormal findings on imaging of the pancreas and was found to have evidence of a chronic pancreatitis, IPMN with high-grade dysplasia.    Labs reviewed on presentation with a hemoglobin of 5.6, white blood cell count normal 5.7, platelets low at 77,000 (chronic), BMP with elevated BUN of 42.3, creatinine 7.69, normal LFTs, bilirubin.  He received 2 units of blood and hemoglobin improved to 7.5.  Hemoglobin this morning stable at 7.3.    CTA abdomen pelvis with IV contrast showed no evidence of active GI bleeding, incidentally noted severe stenosis of the SMA, chronic focal dissections within the infrarenal aorta, status post distal pancreatectomy, normal remaining pancreas, unchanged right hepatic cyst without any new liver lesions, evidence of subtotal colectomy without any bowel inflammation or obstruction, unchanged right lower quadrant abdominal wall hernia containing nonobstructed small bowel loop.    PAST  MEDICAL HISTORY:  Patient Active Problem List    Diagnosis Date Noted    Anemia due to blood loss, acute 02/25/2024     Priority: Medium    Acute on chronic anemia 02/25/2024     Priority: Medium    Tricuspid valve disorder 02/15/2024     Priority: Medium    Aortic valve disorder 02/15/2024     Priority: Medium    Mitral valve disease 02/15/2024     Priority: Medium    Status post coronary angiogram 02/15/2024     Priority: Medium    Mitral valve disorder 02/15/2024     Priority: Medium    Anemia 05/05/2023     Priority: Medium    Aortic dissection, abdominal (H) 03/03/2023     Priority: Medium    Current smoker 03/03/2023     Priority: Medium    History of basal cell carcinoma 03/03/2023     Priority: Medium    CAD (coronary artery disease) 03/03/2023     Priority: Medium    Status post repair of arteriovenous fistula 02/14/2023     Priority: Medium    IPMN (intraductal papillary mucinous neoplasm) 03/17/2022     Priority: Medium    Crohn's disease of large intestine (H) 11/22/2021     Priority: Medium    ESRD (end stage renal disease) (H) 04/26/2021     Priority: Medium     Added automatically from request for surgery [id number]      Organ transplant candidate 04/26/2021     Priority: Medium     Added automatically from request for surgery [id number]      PAF (paroxysmal atrial fibrillation) (H) 04/26/2021     Priority: Medium    Benign essential hypertension 04/26/2021     Priority: Medium    Acquired cyst of kidney 04/23/2021     Priority: Medium          ROS: A comprehensive ten point review of systems was negative aside from those in mentioned in the HPI.       MEDICATIONS:   Prior to Admission medications    Medication Sig Start Date End Date Taking? Authorizing Provider   budesonide (ENTOCORT EC) 3 MG EC capsule Take 3 capsules (9 mg) by mouth every morning for 30 days 1/26/24 2/25/24 Yes Betty Rubio MD   calcium acetate (CALPHRON) 667 MG TABS tablet Take 2,668 mg by mouth 3 times daily (with meals) 12/3/21   "Yes Reported, Patient   citalopram (CELEXA) 20 MG tablet Take 20 mg by mouth daily as needed (panic attacks) 12/7/23  Yes Reported, Patient   folic acid (FOLVITE) 1 MG tablet Take 1 mg by mouth every morning  2/19/22  Yes Reported, Patient   lidocaine-prilocaine (EMLA) 2.5-2.5 % external cream three times a week Prior to dialysis  site access on Monday, Wednesday, Friday 2/14/22  Yes Reported, Patient   multivitamin RENAL (MULTIVITAMIN RENAL) 1 MG capsule Take 1 capsule by mouth every morning 2/1/22  Yes Reported, Patient   omeprazole (PRILOSEC) 20 MG DR capsule Take 20 mg by mouth every morning  10/29/20  Yes Reported, Patient   simvastatin (ZOCOR) 20 MG tablet Take 20 mg by mouth every morning  3/10/21  Yes Reported, Patient   ustekinumab (STELARA) 90 MG/ML Inject 1 ml ( 90 mg) subcutaneous every 4 weeks.  Patient taking differently: 90 mg every 28 days Inject 1 ml ( 90 mg) subcutaneous every 4 weeks. Last dose 2/1/24 10/18/23  Yes Betty Rubio MD        ALLERGIES:   Allergies   Allergen Reactions    Lisinopril Anaphylaxis and Other (See Comments)     Shortness of breath        SOCIAL HISTORY:  Social History     Tobacco Use    Smoking status: Every Day     Packs/day: 0.25     Years: 50.00     Additional pack years: 0.00     Total pack years: 12.50     Types: Cigarettes     Passive exposure: Current    Smokeless tobacco: Never   Substance Use Topics    Alcohol use: Yes     Comment: 2-3 drinks once a month    Drug use: Not Currently        FAMILY HISTORY:  Family History   Problem Relation Age of Onset    Breast Cancer Mother     Coronary Artery Disease Father     Hypertension Brother     Anesthesia Reaction No family hx of     Thrombosis No family hx of         PHYSICAL EXAM:   /58 (BP Location: Right arm)   Pulse 93   Temp 97.5  F (36.4  C) (Oral)   Resp 20   Ht 1.803 m (5' 11\")   Wt 70.5 kg (155 lb 8 oz)   SpO2 100%   BMI 21.69 kg/m       PHYSICAL EXAM:  General: alert, oriented, NAD  SKIN: no " suspicious lesions, rashes, jaundice, or spider angiomas  HEAD: Normocephalic. No masses, lesions, tenderness or abnormalities  NECK: Neck supple. No adenopathy. Thyroid symmetric, normal size.  EYES: No scleral icterus  ENT: ENT exam normal, no neck nodes or sinus tenderness  RESPIRATORY: negative, Good diaphragmatic excursion. Lungs clear  CARDIOVASCULAR: negative, PMI normal. No lifts, heaves, or thrills. RRR. No murmurs, clicks gallops or rub  GASTROINTESTINAL: +BS, soft, NT, ND, RLQ abdominal wall hernia - reducible/nontender no HSM, no masses/guarding/rebound  JOINT/EXTREMITIES: extremities normal- no gross deformities noted, gait normal and normal muscle tone  NEURO: Reflexes grossly normal and symmetric. Sensation grossly WNL.  PSYCH: no abnormal anxiety/depression  LYMPH: No anterior cervical, posterior cervical, or supraclavicular adenopathy     LABS:  I reviewed the patient's new clinical lab test results.   Recent Labs   Lab Test 02/26/24  0722 02/25/24  2205 02/25/24  1820 02/25/24  1333 02/25/24  0947 02/20/24  1426 02/15/24  0723 03/18/22  0826 03/17/22  1426   WBC 5.0  --   --  5.7 5.7   < > 6.5   < >  --    HGB 7.3* 7.5* 7.1* 5.6* 5.7*   < > 6.9*   < >  --    *  --   --  118* 117*   < > 123*   < >  --    PLT 62*  --   --  77* 79*   < > 125*   < >  --    INR  --   --   --  1.41*  --   --  1.97*  --  1.30*    < > = values in this interval not displayed.     Recent Labs   Lab Test 02/26/24  0722 02/25/24  1333 02/20/24  1426    139 138   POTASSIUM 5.1 5.1 4.3   CHLORIDE 100 98 98   CO2 22 24 26   BUN 52.8* 43.3* 27.6*   ANIONGAP 14 17* 14   KARY 8.1* 8.1* 8.3*     Recent Labs   Lab Test 02/25/24  1333 02/20/24  1426 02/15/24  0723 07/30/22  1238 03/18/22  0826 03/25/21  1229 03/25/21  1225 09/28/20  0525 09/27/20  0520 09/26/20  2312 09/26/20  1451   ALBUMIN 3.1* 3.2* 3.1*   < >  --    < >  --    < > 2.4*   < >  --    BILITOTAL 0.8 0.9 0.9   < >  --    < >  --    < > 0.4   < >  --    ALT 27  21 18   < >  --    < >  --    < > 15   < >  --    AST 36 32 27   < >  --    < >  --    < > 20   < >  --    ALKPHOS 95 100 108   < >  --    < >  --    < > 82   < >  --    PROTEIN  --   --   --   --   --   --  100*  --   --   --  30 mg/dL*   LIPASE  --   --   --   --   --   --   --   --  115*  --   --    AMYLASE  --   --   --   --  34  --   --   --   --   --   --     < > = values in this interval not displayed.        IMAGING  I personally reviewed the patient's new imaging results.    EXAM: CTA ABDOMEN PELVIS WITH CONTRAST  LOCATION: [facility]  DATE: 2/25/2024     INDICATION: Gastrointestinal bleeding. Evaluate for evidence of active bleeding.  COMPARISON: MRCP 10/24/2023.  TECHNIQUE: CT angiogram abdomen pelvis during arterial phase of injection of IV contrast. 2D and 3D MIP reconstructions were performed by the CT technologist. Dose reduction techniques were used.  CONTRAST: 78mL Isovue 370     FINDINGS:     ANGIOGRAM ABDOMEN/PELVIS: No evidence of active gastrointestinal bleeding. Severe aortobiiliac atherosclerosis. Chronic focal dissections within the infrarenal aorta and right common iliac artery. No abdominal aortic aneurysm. Celiac artery and its   branches are patent. Severe ostial stenosis of the superior mesenteric artery. Right hepatic artery arises from the superior mesenteric artery. Severe stenosis at the origins of the bilateral single renal arteries. Inferior mesenteric artery is patent.   Severe stenosis of the proximal right common iliac artery. Moderate stenosis of the left common iliac artery. Severe stenoses of the proximal bilateral internal iliac arteries. Mild stenoses of the bilateral external iliac arteries. Moderate stenoses of   the bilateral common femoral arteries. Visualized superficial femoral and deep femoral arteries are patent. Portal, splenic, and superior mesenteric veins are patent. Perigastric varices.     LOWER CHEST: No consolidations or pleural  effusions.     HEPATOBILIARY: Unchanged right hepatic cyst. No new liver lesions. Mild nonspecific heterogeneous enhancement of the posterior right hepatic lobe     PANCREAS: Status post distal pancreatectomy. Remaining pancreas is normal.     SPLEEN: Normal. Small accessory splenules in the splenic hilum.      ADRENAL GLANDS: Normal.     KIDNEYS/BLADDER: Diffuse bilateral renal cortical atrophy. Multiple small nonobstructing bilateral intrarenal calculi. Hemorrhagic, rim calcified cyst at the left lower pole has slightly decreased in size and now measures up to 9.7 cm. Additional benign   renal cysts are unchanged. No hydronephrosis. Decompressed bladder.     BOWEL: Status post subtotal colectomy. No evidence of bowel obstruction or inflammation. Unchanged right lower quadrant abdominal wall hernia containing a nonobstructed small bowel loop.     LYMPH NODES: No enlarged lymph node.     PELVIC ORGANS: Normal.     MUSCULOSKELETAL: Mild body wall edema. Multilevel degenerative changes of the spine. No acute bony abnormality. Increased density of the bones likely due to renal osteodystrophy.                                                                      IMPRESSION:     1.  No evidence of active gastrointestinal bleeding.     2.  A right lower quadrant ventral abdominal wall hernia containing a nonobstructed loop of the small bowel is unchanged. No bowel obstruction or other acute complications.     3.  A hemorrhagic left renal cyst has slightly decreased in size since 10/24/2023.     4.  Multiple nonobstructing bilateral intrarenal calculi. No hydronephrosis.     5.  Additional nonemergent findings as described in the report body.       CONSULTATION ASSESSMENT AND PLAN:     64 year old male with past medical history significant for Crohn's disease currently on Stelara every 4 weeks status post multiple surgeries including a right hemicolectomy, multiple small bowel resections, ileocolic resection with  ileostomy/takedown, chronic pancreatitis, IPMN with high grade dysplasia (EUS 8/2022) s/p partial pancreatectomy, ESRD on dialysis, severe MR/severe TR/moderate aortic stenosis, CAD, chronic abdominal aorta dissection, admitted 2/25 with acute on chronic anemia.    1. Acute on chronic anemia. No overt GI bleeding. CTA negative for active GI bleeding. Has history of Crohn's with chronic ulcer at the oleg-TI near anastomosis and this could be the source of bleeding. Crohn's noted to be under good control until about 1 month ago when he started having a change in the consistency of his stool to mor liquid with a 10lbs weight loss. Budesonide seemed to help over the last 1 month. There were discussions about repeating his colonoscopy with his primary IBD provider through [facility] but nothing currently scheduled according to patient. Last Stelara dose 2/1 with instructions to hold next dose due to surgery. Cannot exclude other sources of GI bleeding without further investigation. He has plans for mechanical heart valve replacement surgery next week and most certainly will need blood thinners following that making need for endoscopy more prudent.     We discussed setting up EGD/colonoscopy tomorrow but patient has a dog at home without a way to be fed. He has not been home for a few days already. He is requesting consideration for outpatient GI evaluation. Option could be to see if his primary GI provider could fit him in for outpatient eval this week. We discussed these options and he is willing to stay for EGD/colonoscopy tomorrow.    2. Severe MR/severe TR/moderate aortic stenosis. Coronary bypass and valve repair surgery planned for next week.    Plan:  --GoLytely prep.   --Clear liquids today, NPO midnight.   --EGD/colonoscopy tomorrow in OR with MAC.     Discussed with Dr. Woods.    Total time spent:  90 minutes was spent providing patient care, including patient evaluation, reviewing documentation/test results, and  . Thank you for asking us to participate in the care of this patient.      ANA Culp  [facility] ([facility])

## 2024-02-26 NOTE — CONSULTS
RENAL CONSULTATION NOTE      REFERRING MD:  Magdaleno    REASON FOR CONSULTATION:  ESRD      A/P:     1.  ESRD    -MWF dialysis    -[facility]   -ran as scheduled 02/23/24   -AVF   -typical UF 2 to 3 liters   -challenging weight pre CV surgery  2.  Anemia   -anemia CKD   ? GI blood loss   3.  Valvular heart disease      Continue MWF dialysis   Plan dialysis today  UF 2 liters         HPI:     64-year-old male admitted in the setting of recurrent anemia.  Presenting hemoglobin 5.8 g/dL.  Has received transfusion  GI evaluation pending  Goal to keep hemoglobin greater than 7    Reviewed outpatient dialysis parameters with the patient.  Dialyzed as scheduled dated 02/23/24  Typical ultrafiltration 2 to 3 L  Have been challenging his dry weight in anticipation of valve replacement surgery    Describes no recent cramping  Does have modest lower extremity edema    Sitting up in a chair today.  Comfortable.  No acute distress    ROS:  A complete 10 point review of systems was performed and is negative except as noted above.    PMH:    Past Medical History:   Diagnosis Date    Aortic dissection (H)     distal, thin.  stable/chronic on MRCP 3/2022    Benign essential hypertension     CAD (coronary artery disease)     Cerebral infarction (H)     Crohn's colitis (H)     Crohn's disease of large intestine (H) 11/22/2021    Current smoker     Esophageal reflux     ESRD (end stage renal disease) on dialysis (H)     History of basal cell carcinoma     Hypertension     Mixed hyperlipidemia     NSTEMI (non-ST elevated myocardial infarction) (H)     PAF (paroxysmal atrial fibrillation) (H)        PSH:    Past Surgical History:   Procedure Laterality Date    ABDOMEN SURGERY      x 6.  colon resections for crohn's disease    APPENDECTOMY      CHOLECYSTECTOMY      COLONOSCOPY N/A 07/20/2021    Procedure: COLONOSCOPY, WITH POLYPECTOMY AND BIOPSY;  Surgeon: Eric Preston MD;  Location:  GI    COLONOSCOPY N/A 12/7/2022     Procedure: COLONOSCOPY, WITH POLYPECTOMY AND BIOPSY;  Surgeon: Willian Kee MD;  Location:  GI    CV CORONARY ANGIOGRAM N/A 05/25/2021    Procedure: CV CORONARY ANGIOGRAM;  Surgeon: Judson Ybarra MD;  Location:  HEART CARDIAC CATH LAB    CV CORONARY ANGIOGRAM N/A 2/15/2024    Procedure: Coronary Angiogram;  Surgeon: Tico Finn MD;  Location:  HEART CARDIAC CATH LAB    CV LEFT HEART CATH N/A 2/15/2024    Procedure: Left Heart Catheterization;  Surgeon: Tico Finn MD;  Location:  HEART CARDIAC CATH LAB    CV RIGHT HEART CATH MEASUREMENTS RECORDED N/A 2/15/2024    Procedure: Right Heart Catheterization;  Surgeon: Tico Finn MD;  Location:  HEART CARDIAC CATH LAB    ESOPHAGOSCOPY, GASTROSCOPY, DUODENOSCOPY (EGD), COMBINED N/A 07/20/2021    Procedure: ESOPHAGOGASTRODUODENOSCOPY, WITH FINE NEEDLE ASPIRATION BIOPSY, WITH ENDOSCOPIC ULTRASOUND GUIDANCE;  Surgeon: Eric Preston MD;  [facility]    IR DIALYSIS FISTULOGRAM LEFT  12/01/2022    IR DIALYSIS FISTULOGRAM LEFT  1/13/2023    LAPAROTOMY, LYSIS ADHESIONS, COMBINED N/A 03/17/2022    Procedure: Laparotomy, extensive lysis adhesions, combined;  Surgeon: Sarath Smith MD;  [facility]    PANCREATECTOMY PARTIAL N/A 03/17/2022    Procedure: Open Subtotal Pancreatectomy, intra-op ultrasound;  Surgeon: Sarath Smith MD;  [facility]    REVISION FISTULA ARTERIOVENOUS UPPER EXTREMITY Left 2/14/2023    Procedure: LEFT UPPER ARM FISTULA OUTFLOW REVISION FROM CEPHALIC VEIN TO JUGULAR VEIN WITH 10mm THIN-WALLED RINGED POLYTETRAFLUEROETHYLINE;  Surgeon: Mo Gramajo MD;  Location:  OR    TRANSESOPHAGEAL ECHOCARDIOGRAM INTRAOPERATIVE N/A 1/11/2024    Procedure: ECHOCARDIOGRAM, TRANSESOPHAGEAL, WITH ANESTHESIA;  Surgeon: GENERIC ANESTHESIA PROVIDER;  [facility]    VASCULAR SURGERY      dialysis access- left upper       MEDICATIONS:    No current outpatient medications on file.        ALLERGIES:    Allergies as of 02/25/2024 - Reviewed 02/25/2024   Allergen Reaction Noted    Lisinopril Anaphylaxis and Other (See Comments) 09/26/2020       FH:    Family History   Problem Relation Age of Onset    Breast Cancer Mother     Coronary Artery Disease Father     Hypertension Brother     Anesthesia Reaction No family hx of     Thrombosis No family hx of        SH:    Social History     Socioeconomic History    Marital status:      Spouse name: Not on file    Number of children: 1    Years of education: Not on file    Highest education level: Not on file   Occupational History    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 Not Asked   Social History Narrative    Not on file     Social Determinants of Health     Financial Resource Strain: Not on file   Food Insecurity: Not on file   Transportation Needs: Not on file   Physical Activity: Not on file   Stress: Not on file   Social Connections: Not on file   Interpersonal Safety: Not on file   Housing Stability: Not on file       PHYSICAL EXAM:      Vitals were reviewed  Patient Vitals for the past 12 hrs:   BP Temp Temp src Pulse Resp SpO2   02/26/24 0727 104/58 97.5  F (36.4  C) Oral 93 20 100 %   02/25/24 2253 128/72 97.5  F (36.4  C) Oral 105 18 --     I/O last 3 completed shifts:  In: 631   Out: -       Vitals:    02/25/24 1256 02/25/24 1732   Weight: 70.5 kg (155 lb 6.8 oz) 70.5 kg (155 lb 8 oz)         GENERAL: awake, alert, follows  HEENT: NC/AT, PERRLA, EOMI, non icteric, pharynx moist without  lesion  RESP:  clear anteriorly  CV: RRR, normal S1 S2  ABDOMEN: soft, nontender, no HSM or masses and bowel sounds normal  MS: no clubbing, cyanosis   SKIN: clear without significant rashes or lesions  NEURO: speech normal and cranial nerves 2-12 intact  PSYCH: affect normal/bright  EXT: trace edema       LABS:        Recent Labs   Lab 02/26/24  0722      POTASSIUM 5.1   CHLORIDE 100   CO2 22   ANIONGAP 14   GLC 95   BUN 52.8*   CR 8.45*   GFRESTIMATED 6*   KARY 8.1*     Recent Labs   Lab 02/26/24  0722 02/25/24  1333 02/20/24  1426   POTASSIUM 5.1 5.1 4.3     Recent Labs   Lab 02/25/24  1333 02/20/24  1426   ALBUMIN 3.1* 3.2*     Recent Labs   Lab 02/26/24  0722 02/25/24  2205 02/25/24  1820 02/25/24  1333   HGB 7.3* 7.5* 7.1* 5.6*       DIAGNOSTICS:  Reviewed      LUCA Zuleta    [facility] consultants  [phone]

## 2024-02-26 NOTE — PLAN OF CARE
VSS on room air. Afebrile. A&Ox4. NPO for GI consult today. Pt up ind. Telemetry monitoring in place-SR/ST. Restoril per patient request for sleep.     Major Shift Events:   Uneventful night, slept well. No significant change in condition.     Treatment Plan:  PRBC PRN- monitor Hgb, telemetry, NPO for GI to see, Nephrology- MWF dialysis at baseline.

## 2024-02-26 NOTE — PLAN OF CARE
Pertinent assessments: VSS on room air. Afebrile. A&Ox4. Tolerating a regular diet, NPO at MN. Up with SBA. Bed alarm on for safety. Telemetry monitoring in place. Left arm restrict r/t HD fistula in place.     Major Shift Events: new admit from ER. 2nd unit of blood completed on MS5.    Treatment Plan: Blood transfusion, monitor Hgb, telemetry, GI, Nephrology, NPO at MN, and discharge pending.    Bedside Nurse: Nikita Mayberry RN

## 2024-02-26 NOTE — PROGRESS NOTES
Potassium   Date Value Ref Range Status   02/26/2024 5.1 3.4 - 5.3 mmol/L Final   01/13/2023 4.0 3.4 - 5.3 mmol/L Final   05/25/2021 3.6 3.4 - 5.3 mmol/L Final     Hemoglobin   Date Value Ref Range Status   02/26/2024 7.3 (L) 13.3 - 17.7 g/dL Final   05/25/2021 10.6 (L) 13.3 - 17.7 g/dL Final     Creatinine   Date Value Ref Range Status   02/26/2024 8.45 (H) 0.67 - 1.17 mg/dL Final   05/25/2021 10.60 (H) 0.66 - 1.25 mg/dL Final     Urea Nitrogen   Date Value Ref Range Status   02/26/2024 52.8 (H) 8.0 - 23.0 mg/dL Final   01/13/2023 36 (H) 7 - 30 mg/dL Final   05/25/2021 36 (H) 7 - 30 mg/dL Final     Sodium   Date Value Ref Range Status   02/26/2024 136 135 - 145 mmol/L Final     Comment:     Reference intervals for this test were updated on 09/26/2023 to more accurately reflect our healthy population. There may be differences in the flagging of prior results with similar values performed with this method. Interpretation of those prior results can be made in the context of the updated reference intervals.    05/25/2021 136 133 - 144 mmol/L Final     INR   Date Value Ref Range Status   02/25/2024 1.41 (H) 0.85 - 1.15 Final   05/25/2021 1.33 (H) 0.86 - 1.14 Final       DIALYSIS PROCEDURE NOTE  Hepatitis status of previous patient on machine log was checked and verified ok to use with this patients hepatitis status.  Patient dialyzed for 3 hrs. on a K2 bath with a net fluid removal of 2L.  A BFR of 450 ml/min was obtained via a Left AVF using 15 gauge needles.      The treatment plan was discussed with Dr. RACHEL Noe during the treatment.    Total heparin received during the treatment: 0 units.   Needle cannulation sites held x 10 min. each     Meds given: none   Complications: none      Person educated: patient. Knowledge base substantial. Barriers to learning: none. Educated on procedure, access care and fluid management via oral mode. Patient verbalized understanding. Pt prefers verbal education style.     ICEBOAT?  Timeout performed pre-treatment  I: Patient was identified using 2 identifiers  C:  Consent Signed Yes  E: Equipment preventative maintenance is current and dialysis delivery system OK to use  B: Hepatitis B Surface Antigen: Negative; Draw Date: 09/25/2023 per [facility]      Hepatitis B Surface Antibody: Immune; Draw Date: 09/25/2023 per [facility]  O: Dialysis orders present and complete prior to treatment  A: Vascular access verified and assessed prior to treatment  T: Treatment was performed at a clinically appropriate time  ?: Patient was allowed to ask questions and address concerns prior to treatment  See Adult Hemodialysis flowsheet in [facility] for further details and post assessment.  Machine water alarm in place and functioning. Transducer pods intact and checked every 15min.   Pt returned via wheelchair transport.  Chlorine/Chloramine water system checked every 4 hours.  Outpatient Dialysis at [facility] on MWF    Patient repositioned every 2 hours during the treatment.  Post treatment report given to TALIA Britton RN regarding 2L of fluid removed, last BP of 127/68, and patient pain rating of 0/10.     Please remove patient dressing on AVF and AVG needle sites 24 hours after dialysis. If leaking occurs please apply a Band-Aid.

## 2024-02-26 NOTE — PROGRESS NOTES
[facility]    Hospitalist Progress Note  Name: Tacos Dominguez    MRN: [id number]  Provider:  Hector Sims DO  Date of Service: 02/26/2024    Summary of Stay: Tacos Dominguez is a 64 year old male with a history of Crohn's disease status post colectomy on Stelaris, history of small bowel ulcer in 12/2022, chronic thrombocytopenia, severe mitral stenosis, severe tricuspid regurgitation, moderate aortic stenosis, nonobstructive mild coronary artery disease, ESRD on HD (MWF), hypertension, hyperlipidemia, GERD, history of abdominal aorta partially calcified dissection, history of hemorrhagic proteinaceous renal cysts admitted on 2/25/2024 with low hemoglobin.  The patient had preop labs done on 2/21 in anticipation of an upcoming mitral valve and aortic valve replacement with tricuspid valve repair and possible replacement and left atrial appendage ligation on 3/5/2024.  Hemoglobin returned at 5.8.  The patient was seen in the emergency department and transfused 2 units of packed red blood cells.  Repeat blood work on 2/25 showed hemoglobin 5.6 so the patient return to the emergency department.  In the emergency department, the patient was found to have a temperature of 97.6  F, blood pressure 119/66, heart rate 98, respiratory rate 18, SpO2 97% on room air.  Initial lab work showed hemoglobin 5.6, platelets 77, , BUN/creatinine 43.3/7.69, anion gap 17, albumin 3.1, glucose 108.  FOBT returned positive.  CTA abdomen and pelvis showed no evidence of acute gastrointestinal bleeding, right lower quadrant ventral abdominal hernia containing a nonobstructed loop of small bowel, hemorrhagic left renal cyst that has slightly decreased in size, multiple nonobstructing bilateral intrarenal calculi without evidence of hydronephrosis.  The patient was made n.p.o. and transfused 2 units of packed red blood cells.  Gastroenterology was consulted to see the patient.    TODAY'S PLAN:  Appreciate JACINTA  and Nephrology recommendations.  Plan for EGD/Colonoscopy tomorrow.  NPO at midnight with bowel prep today.  Trend hgb q12h.  Transfuse for hgb less than 7.  Pt seen in dialysis this afternoon.  Denies any abd pain.  Has not been taking aspirin.  Pt has plan for mitral valve replacement next month.  We discussed that often warfarin is started after placement of a mechanical valve so to be cognizant of any signs of bleeding and to make his surgeon aware of his current bleeding issues.  All questions answered.  Possible discharge in the next 1-2 days pending stable hgb and EGD/Colonoscopy results tomorrow.    Problem List:   Acute Blood Loss Anemia  GIB - Unclear if Upper or Lower  Hx of Crohns Disease s/p Colectomy on Stelaris  Hx of Small Bowel Ulcer in 12/2022  - Appreciate GI recommendations  - s/p 2 units PRBC  - Transfuse for hgb less than 7  - Hgb q12h  - PPI IV BID  - NPO at midnight for EGD/Colonoscopy tomorrow    Chronic Thrombocytopenia  - Dating back to 2023  - Complicates cares  - Daily CBC    Severe Mitral Stenosis  Severe Tricuspid Regurgitation  Moderate Aortic Stenosis  Non Obstructive Mild Coronary Artery Disease  - Pt denies being on ASA recently  - Follows with Dr. Romero of [facility].  Planning for Aortic and Mitral Valve Replacement with Tricuspid Valve Repair and Possible Replacement and Left Atrial Appendage Ligation on 3/5/2024.  We discussed that often mechanical valves are placed which require anticoagulation with warfarin.  We discussed the importance of monitoring for bleeding after starting warfarin as he is high risk for bleeding.  Pt expressed understanding.    ESRD on HD (M W F)  - Appreciate Nephrology management of HD    Chronic Medical Problems:  Hypertension (no longer on meds due to hypotension during HD)  Hyperlipidemia  GERD  Hx of Abdominal Aorta Partially Calcified Dissection  Hemorrhagic/Proteinaceous Renal Cysts    I spent 46 minutes in reviewing this patient's labs, imaging,  medications, medical history.  In addition time was spent interviewing the patient, communicating with family, and medical decision making.     DVT Prophylaxis: Pneumatic Compression Devices  Code Status: Full Code  Diet: Clear Liquid Diet    Alcantara Catheter: Not present  Disposition: Expected discharge in 1-2 days to home. Goals prior to discharge include hgb stable, GI work up complete.   Family updated today: No     Interval History   Pt seen and examined.  Pt seen in HD.  Denies any abd pain.      -Data reviewed today: I personally reviewed all new labs and imaging results over the last 24 hours.     Physical Exam   Temp: 97.5  F (36.4  C) Temp src: Oral BP: 126/73 Pulse: 96   Resp: 16 SpO2: 100 % O2 Device: Nasal cannula Oxygen Delivery: 1 LPM  Vitals:    02/25/24 1256 02/25/24 1732   Weight: 70.5 kg (155 lb 6.8 oz) 70.5 kg (155 lb 8 oz)     Vital Signs with Ranges  Temp:  [97.2  F (36.2  C)-98.3  F (36.8  C)] 97.5  F (36.4  C)  Pulse:  [] 96  Resp:  [15-20] 16  BP: (104-138)/(53-86) 126/73  SpO2:  [88 %-100 %] 100 %  I/O last 3 completed shifts:  In: 631   Out: -     GENERAL: No apparent distress. Awake, alert, and fully oriented.  HEENT: Normocephalic, atraumatic. Extraocular movements intact.  CARDIOVASCULAR: Regular rate and rhythm, +systolic murmur. No S3.  PULMONARY: Clear bilaterally.  GASTROINTESTINAL: Soft, non-tender, non-distended. Bowel sounds normoactive.   EXTREMITIES: No cyanosis or clubbing. No edema.  NEUROLOGICAL: CN 2-12 grossly intact, no focal neurological deficits.  DERMATOLOGICAL: No rash, ulcer, bruising, nor jaundice.    Medications      budesonide  9 mg Oral QAM    calcium acetate  2,668 mg Oral QAM    citalopram  20 mg Oral QAM    - MEDICATION INSTRUCTIONS -   Does not apply Once    pantoprazole  40 mg Intravenous BID    sodium chloride (PF)  3 mL Intracatheter Q8H    sodium chloride 0.9%  250 mL Intravenous Once in dialysis/CRRT    sodium chloride 0.9%  300 mL Hemodialysis  "Machine Once     Data     Laboratory:  Recent Labs   Lab 02/26/24  0722 02/25/24  2205 02/25/24  1820 02/25/24  1333 02/25/24  0947   WBC 5.0  --   --  5.7 5.7   HGB 7.3* 7.5* 7.1* 5.6* 5.7*   HCT 23.2*  --   --  18.8* 19.1*   *  --   --  118* 117*   PLT 62*  --   --  77* 79*     Recent Labs   Lab 02/26/24  0722 02/25/24  1333 02/20/24  1426    139 138   POTASSIUM 5.1 5.1 4.3   CHLORIDE 100 98 98   CO2 22 24 26   ANIONGAP 14 17* 14   GLC 95 108* 159*   BUN 52.8* 43.3* 27.6*   CR 8.45* 7.69* 6.26*   GFRESTIMATED 6* 7* 9*   KARY 8.1* 8.1* 8.3*     No results for input(s): \"CULT\" in the last 168 hours.    Imaging:  Recent Results (from the past 24 hour(s))   CTA Abdomen Pelvis with Contrast    Narrative    EXAM: CTA ABDOMEN PELVIS WITH CONTRAST  LOCATION: [facility]  DATE: 2/25/2024    INDICATION: Gastrointestinal bleeding. Evaluate for evidence of active bleeding.  COMPARISON: [facility]CP 10/24/2023.  TECHNIQUE: CT angiogram abdomen pelvis during arterial phase of injection of IV contrast. 2D and 3D MIP reconstructions were performed by the CT technologist. Dose reduction techniques were used.  CONTRAST: 78mL Isovue 370    FINDINGS:    ANGIOGRAM ABDOMEN/PELVIS: No evidence of active gastrointestinal bleeding. Severe aortobiiliac atherosclerosis. Chronic focal dissections within the infrarenal aorta and right common iliac artery. No abdominal aortic aneurysm. Celiac artery and its   branches are patent. Severe ostial stenosis of the superior mesenteric artery. Right hepatic artery arises from the superior mesenteric artery. Severe stenosis at the origins of the bilateral single renal arteries. Inferior mesenteric artery is patent.   Severe stenosis of the proximal right common iliac artery. Moderate stenosis of the left common iliac artery. Severe stenoses of the proximal bilateral internal iliac arteries. Mild stenoses of the bilateral external iliac arteries. Moderate stenoses of   the " bilateral common femoral arteries. Visualized superficial femoral and deep femoral arteries are patent. Portal, splenic, and superior mesenteric veins are patent. Perigastric varices.    LOWER CHEST: No consolidations or pleural effusions.    HEPATOBILIARY: Unchanged right hepatic cyst. No new liver lesions. Mild nonspecific heterogeneous enhancement of the posterior right hepatic lobe    PANCREAS: Status post distal pancreatectomy. Remaining pancreas is normal.    SPLEEN: Normal. Small accessory splenules in the splenic hilum.     ADRENAL GLANDS: Normal.    KIDNEYS/BLADDER: Diffuse bilateral renal cortical atrophy. Multiple small nonobstructing bilateral intrarenal calculi. Hemorrhagic, rim calcified cyst at the left lower pole has slightly decreased in size and now measures up to 9.7 cm. Additional benign   renal cysts are unchanged. No hydronephrosis. Decompressed bladder.    BOWEL: Status post subtotal colectomy. No evidence of bowel obstruction or inflammation. Unchanged right lower quadrant abdominal wall hernia containing a nonobstructed small bowel loop.    LYMPH NODES: No enlarged lymph node.    PELVIC ORGANS: Normal.    MUSCULOSKELETAL: Mild body wall edema. Multilevel degenerative changes of the spine. No acute bony abnormality. Increased density of the bones likely due to renal osteodystrophy.      Impression    IMPRESSION:    1.  No evidence of active gastrointestinal bleeding.    2.  A right lower quadrant ventral abdominal wall hernia containing a nonobstructed loop of the small bowel is unchanged. No bowel obstruction or other acute complications.    3.  A hemorrhagic left renal cyst has slightly decreased in size since 10/24/2023.    4.  Multiple nonobstructing bilateral intrarenal calculi. No hydronephrosis.    5.  Additional nonemergent findings as described in the report body.         Hector Sims DO  [facility] Hospitalist  [address] [place] [address]  Securely message with Vocera (more  info)  Text page via [facility] Paging/Directory   02/26/2024

## 2024-02-26 NOTE — PROGRESS NOTES
Renal Medicine       Second visit today  Dialysis run parameters reviewed with dialysis RN at patient bedside.    3 hours  2K  2 liter UF    AVF    Stable mid run         Recent Labs   Lab 02/26/24  0722      POTASSIUM 5.1   CHLORIDE 100   CO2 22   ANIONGAP 14   GLC 95   BUN 52.8*   CR 8.45*   GFRESTIMATED 6*   KARY 8.1*           LUCA Zuleta    [facility]  [phone]

## 2024-02-27 NOTE — PLAN OF CARE
Goal Outcome Evaluation:         To Do:  End of Shift Summary  For vital signs and complete assessments, please see documentation flowsheets.     Pertinent assessments: A&o--- up in room and halls --denies pain and nausea ---tolerating clear liquids----  Major Shift Events surgery and tests  Treatment Plan: monitor  Bedside Nurse: Jocy Campos RN

## 2024-02-27 NOTE — ANESTHESIA POSTPROCEDURE EVALUATION
Patient: Tacos Dominguez    Procedure: Procedure(s):  ESOPHAGOGASTRODUODENOSCOPY  COLONOSCOPY       Anesthesia Type:  MAC    Note:  Disposition: Outpatient   Postop Pain Control: Uneventful            Sign Out: Well controlled pain   PONV: No   Neuro/Psych: Uneventful            Sign Out: Acceptable/Baseline neuro status   Airway/Respiratory: Uneventful            Sign Out: Acceptable/Baseline resp. status   CV/Hemodynamics: Uneventful            Sign Out: Acceptable CV status; No obvious hypovolemia; No obvious fluid overload   Other NRE: NONE   DID A NON-ROUTINE EVENT OCCUR? No           Last vitals:  Vitals Value Taken Time   /52 02/27/24 1529   Temp 97.2  F (36.2  C) 02/27/24 1529   Pulse 86 02/27/24 1529   Resp 20 02/27/24 1529   SpO2 100 % 02/27/24 1529       Electronically Signed By: Juan Briceño MD  February 27, 2024  4:19 PM

## 2024-02-27 NOTE — ANESTHESIA CARE TRANSFER NOTE
Patient: Tacos Dominguez    Procedure: Procedure(s):  ESOPHAGOGASTRODUODENOSCOPY  COLONOSCOPY       Diagnosis: Anemia due to blood loss, acute [D62]  Diagnosis Additional Information: No value filed.    Anesthesia Type:   MAC     Note:    Oropharynx: oropharynx clear of all foreign objects and spontaneously breathing  Level of Consciousness: awake  Oxygen Supplementation: room air    Independent Airway: airway patency satisfactory and stable  Dentition: dentition unchanged  Vital Signs Stable: post-procedure vital signs reviewed and stable  Report to RN Given: handoff report given  Patient transferred to: Phase II    Handoff Report: Identifed the Patient, Identified the Reponsible Provider, Reviewed the pertinent medical history, Discussed the surgical course, Reviewed Intra-OP anesthesia mangement and issues during anesthesia, Set expectations for post-procedure period and Allowed opportunity for questions and acknowledgement of understanding  Vitals:  Vitals Value Taken Time   /63 02/27/24 1415   Temp     Pulse 101 02/27/24 1415   Resp     SpO2 99 % 02/27/24 1419   Vitals shown include unfiled device data.    Electronically Signed By: JANNET Patel CRNA  February 27, 2024  2:21 PM

## 2024-02-27 NOTE — ANESTHESIA PREPROCEDURE EVALUATION
Anesthesia Pre-Procedure Evaluation    Patient: Tacos Dominguez   MRN: [id number] : 1960        Procedure : Procedure(s):  ESOPHAGOGASTRODUODENOSCOPY  COLONOSCOPY          Past Medical History:   Diagnosis Date    Aortic dissection (H)     distal, thin.  stable/chronic on MRCP 3/2022    Benign essential hypertension     CAD (coronary artery disease)     Cerebral infarction (H)     Crohn's colitis (H)     Crohn's disease of large intestine (H) 2021    Current smoker     Esophageal reflux     ESRD (end stage renal disease) on dialysis (H)     History of basal cell carcinoma     Hypertension     Mixed hyperlipidemia     NSTEMI (non-ST elevated myocardial infarction) (H)     PAF (paroxysmal atrial fibrillation) (H)       Past Surgical History:   Procedure Laterality Date    ABDOMEN SURGERY      x 6.  colon resections for crohn's disease    APPENDECTOMY      CHOLECYSTECTOMY      COLONOSCOPY N/A 2021    Procedure: COLONOSCOPY, WITH POLYPECTOMY AND BIOPSY;  Surgeon: Eric Preston MD;  Location:  GI    COLONOSCOPY N/A 2022    Procedure: COLONOSCOPY, WITH POLYPECTOMY AND BIOPSY;  Surgeon: Willian Kee MD;  Location:  GI    CV CORONARY ANGIOGRAM N/A 2021    Procedure: CV CORONARY ANGIOGRAM;  Surgeon: Judson Ybarar MD;  Location:  HEART CARDIAC CATH LAB    CV CORONARY ANGIOGRAM N/A 2/15/2024    Procedure: Coronary Angiogram;  Surgeon: Tico Finn MD;  Location:  HEART CARDIAC CATH LAB    CV LEFT HEART CATH N/A 2/15/2024    Procedure: Left Heart Catheterization;  Surgeon: Tico Finn MD;  Location: [facility] CARDIAC CATH LAB    CV RIGHT HEART CATH MEASUREMENTS RECORDED N/A 2/15/2024    Procedure: Right Heart Catheterization;  Surgeon: Tico Finn MD;  Location: [facility] CARDIAC CATH LAB    ESOPHAGOSCOPY, GASTROSCOPY, DUODENOSCOPY (EGD), COMBINED N/A 2021    Procedure: ESOPHAGOGASTRODUODENOSCOPY, WITH FINE NEEDLE ASPIRATION  BIOPSY, WITH ENDOSCOPIC ULTRASOUND GUIDANCE;  Surgeon: Eric Preston MD;  [facility]    IR DIALYSIS FISTULOGRAM LEFT  12/01/2022    IR DIALYSIS FISTULOGRAM LEFT  1/13/2023    LAPAROTOMY, LYSIS ADHESIONS, COMBINED N/A 03/17/2022    Procedure: Laparotomy, extensive lysis adhesions, combined;  Surgeon: Sarath Smith MD;  [facility]    PANCREATECTOMY PARTIAL N/A 03/17/2022    Procedure: Open Subtotal Pancreatectomy, intra-op ultrasound;  Surgeon: Sarath Smith MD;  [facility]    REVISION FISTULA ARTERIOVENOUS UPPER EXTREMITY Left 2/14/2023    Procedure: LEFT UPPER ARM FISTULA OUTFLOW REVISION FROM CEPHALIC VEIN TO JUGULAR VEIN WITH 10mm THIN-WALLED RINGED POLYTETRAFLUEROETHYLINE;  Surgeon: Mo Gramajo MD;  Location: [facility]    TRANSESOPHAGEAL ECHOCARDIOGRAM INTRAOPERATIVE N/A 1/11/2024    Procedure: ECHOCARDIOGRAM, TRANSESOPHAGEAL, WITH ANESTHESIA;  Surgeon: GENERIC ANESTHESIA PROVIDER;  [facility]    VASCULAR SURGERY      dialysis access- left upper      Allergies   Allergen Reactions    Lisinopril Anaphylaxis and Other (See Comments)     Shortness of breath      Social History     Tobacco Use    Smoking status: Every Day     Packs/day: 0.25     Years: 50.00     Additional pack years: 0.00     Total pack years: 12.50     Types: Cigarettes     Passive exposure: Current    Smokeless tobacco: Never   Substance Use Topics    Alcohol use: Yes     Comment: 2-3 drinks once a month      Wt Readings from Last 1 Encounters:   02/25/24 70.5 kg (155 lb 8 oz)        Anesthesia Evaluation            ROS/MED HX  ENT/Pulmonary:     (+)                tobacco use, Current use,   patient smoked within 24 hours,                    Neurologic:     (+)          CVA,                      Cardiovascular: Comment: Aortic dissection (H)   distal, thin.  stable/chronic on MRCP 3/2022      (+)  hypertension- -  CAD -  - -                        dysrhythmias, a-fib,             METS/Exercise  Tolerance: 3 - Able to walk 1-2 blocks without stopping    Hematologic: Comments: Lab Test        02/27/24     02/26/24     02/26/24     02/25/24     02/25/24     02/20/24     02/15/24     03/18/22     03/17/22                       0728          1827          0722          1820          1333          1426          0723          0826          1426          WBC          5.2           --          5.0           --          5.7            < >        6.5            < >         --           HGB          7.8*  7.8*  8.2*         7.3*           < >        5.6*           < >        6.9*           < >         --           MCV          108*          --          109*          --          118*           < >        123*           < >         --           PLT          67*           --          62*           --          77*            < >        125*           < >         --           INR           --           --           --           --          1.41*         --          1.97*         --          1.30*          < > = values in this interval not displayed.                  Lab Test        02/27/24 02/26/24 02/25/24                       0728          0722          1333          NA           130*         136          139           POTASSIUM    4.1          5.1          5.1           CHLORIDE     93*          100          98            CO2          26 22 24            BUN          29.8*        52.8*        43.3*         CR           5.71*        8.45*        7.69*         ANIONGAP     11           14           17*           KARY          8.4*         8.1*         8.1*          GLC          95           95           108*              (+)      anemia,          Musculoskeletal:       GI/Hepatic:     (+) GERD, Asymptomatic on medication,                  Renal/Genitourinary:     (+) renal disease, type: ESRD, Pt requires dialysis,           Endo:  - neg endo ROS     Psychiatric/Substance Use:      "  Infectious Disease:  - neg infectious disease ROS     Malignancy:  - neg malignancy ROS     Other:  - neg other ROS          Physical Exam    Airway        Mallampati: II   TM distance: > 3 FB   Neck ROM: full   Mouth opening: > 3 cm    Respiratory Devices and Support         Dental       (+) Modest Abnormalities - crowns, retainers, 1 or 2 missing teeth      Cardiovascular   cardiovascular exam normal          Pulmonary   pulmonary exam normal                OUTSIDE LABS:  CBC:   Lab Results   Component Value Date    WBC 5.2 02/27/2024    WBC 5.0 02/26/2024    HGB 7.8 (L) 02/27/2024    HGB 7.8 (L) 02/27/2024    HCT 24.0 (L) 02/27/2024    HCT 23.2 (L) 02/26/2024    PLT 67 (L) 02/27/2024    PLT 62 (L) 02/26/2024     BMP:   Lab Results   Component Value Date     (L) 02/27/2024     02/26/2024    POTASSIUM 4.1 02/27/2024    POTASSIUM 5.1 02/26/2024    CHLORIDE 93 (L) 02/27/2024    CHLORIDE 100 02/26/2024    CO2 26 02/27/2024    CO2 22 02/26/2024    BUN 29.8 (H) 02/27/2024    BUN 52.8 (H) 02/26/2024    CR 5.71 (H) 02/27/2024    CR 8.45 (H) 02/26/2024    GLC 95 02/27/2024    GLC 95 02/26/2024     COAGS:   Lab Results   Component Value Date    PTT 33 02/25/2024    INR 1.41 (H) 02/25/2024     POC: No results found for: \"BGM\", \"HCG\", \"HCGS\"  HEPATIC:   Lab Results   Component Value Date    ALBUMIN 3.1 (L) 02/25/2024    PROTTOTAL 5.5 (L) 02/25/2024    ALT 27 02/25/2024    AST 36 02/25/2024    ALKPHOS 95 02/25/2024    BILITOTAL 0.8 02/25/2024     OTHER:   Lab Results   Component Value Date    PH 7.36 03/17/2022    LACT 0.6 (L) 03/19/2022    KARY 8.4 (L) 02/27/2024    PHOS 4.7 (H) 03/21/2022    MAG 1.2 (L) 03/21/2022    LIPASE 115 (H) 09/27/2020    AMYLASE 34 03/18/2022    CRP 5.6 05/07/2008    SED 70 (H) 02/21/2024       Anesthesia Plan    ASA Status:  4    NPO Status:  NPO Appropriate    Anesthesia Type: MAC.     - Reason for MAC: immobility needed   Induction: Propofol.   Maintenance: TIVA.    "     Consents    Anesthesia Plan(s) and associated risks, benefits, and realistic alternatives discussed. Questions answered and patient/representative(s) expressed understanding.     - Discussed:     - Discussed with:  Patient      - Extended Intubation/Ventilatory Support Discussed: No.      - Patient is DNR/DNI Status: No     Use of blood products discussed: No .     Postoperative Care    Pain management: IV analgesics.   PONV prophylaxis: Dexamethasone or Solumedrol, Ondansetron (or other 5HT-3)     Comments:               Juan Briceño MD    I have reviewed the pertinent notes and labs in the chart from the past 30 days and (re)examined the patient.  Any updates or changes from those notes are reflected in this note.

## 2024-02-27 NOTE — PLAN OF CARE
Pertinent assessments:pt is a/o indep in room.  Refuses bed alarm gait is steady.  Dialysis today.  Will have colonoscopy tomorrow.  Prep started at 1800 pt jose well so far.  NPO after MN.  Stool hemmacult pos.  HgB 7.3. Telemetry SR 94  Major Shift Events:  Treatment Plan:PRBC PRN- monitor Hgb, telemetry, NPO for GI to see, Nephrology- MWF dialysis at baseline.     Bedside Nurse: Rosa Britton RN

## 2024-02-27 NOTE — CONSULTS
Care Management Discharge Note    Discharge Date: 02/27/2024       Discharge Disposition: Home    Discharge Services:      Discharge DME:      Discharge Transportation:      Private pay costs discussed: Not applicable    Does the patient's insurance plan have a 3 day qualifying hospital stay waiver?  No  Handoff Referral Completed: No    Additional Information:  URR 20%. Patient admitted for acute blood loss anemia related to GIB. Per chart review patient has history of ESRD on HD M/W/F at [facility]. Patient is being seen by GI and Nephrology while they are here. Per chart review no CM needs currently identified. Please consult CM team if any discharge needs arise.    Mckayla Rosas RN BSN OCN  Care Coordinator  [facility]  [phone]

## 2024-02-27 NOTE — PLAN OF CARE
Goal Outcome Evaluation:      Plan of Care Reviewed With: patient    Overall Patient Progress: improvingOverall Patient Progress: improving         Pt up ind in room. NPO @ midnight. Tolerated golytely prep well. Tele SR w/ BBB. Plan for scope today, will continue with plan of care.

## 2024-02-27 NOTE — PROGRESS NOTES
Renal Medicine       Following for ESRD  Dialyzed 02/26/24 without issue  UF 2 liters    GI workup today    Comfortable in bed  No CP or SOB    Plan dialysis 02/28/24          Recent Labs   Lab 02/27/24  0728 02/26/24  0722   POTASSIUM 4.1 5.1           LUCA Zuleta    [facility]  [phone]

## 2024-02-28 NOTE — PROGRESS NOTES
[facility]    Medicine Progress Note - Hospitalist Service    Date of Admission:  2/25/2024    Assessment & Plan   Summary of Stay: Tacos Dominguez is a 64 year old male with a history of Crohn's disease status post colectomy on Stelaris, history of small bowel ulcer in 12/2022, chronic thrombocytopenia, severe mitral stenosis, severe tricuspid regurgitation, moderate aortic stenosis, nonobstructive mild coronary artery disease, ESRD on HD (MWF), hypertension, hyperlipidemia, GERD, history of abdominal aorta partially calcified dissection, history of hemorrhagic proteinaceous renal cysts admitted on 2/25/2024 with low hemoglobin.  The patient had preop labs done on 2/21 in anticipation of an upcoming mitral valve and aortic valve replacement with tricuspid valve repair and possible replacement and left atrial appendage ligation on 3/5/2024.  Hemoglobin returned at 5.8.  The patient was seen in the emergency department and transfused 2 units of packed red blood cells.  Repeat blood work on 2/25 showed hemoglobin 5.6 so the patient return to the emergency department.  In the emergency department, the patient was found to have a temperature of 97.6  F, blood pressure 119/66, heart rate 98, respiratory rate 18, SpO2 97% on room air.  Initial lab work showed hemoglobin 5.6, platelets 77, , BUN/creatinine 43.3/7.69, anion gap 17, albumin 3.1, glucose 108.  FOBT returned positive.  CTA abdomen and pelvis showed no evidence of acute gastrointestinal bleeding, right lower quadrant ventral abdominal hernia containing a nonobstructed loop of small bowel, hemorrhagic left renal cyst that has slightly decreased in size, multiple nonobstructing bilateral intrarenal calculi without evidence of hydronephrosis.  The patient was made n.p.o. and transfused 2 units of packed red blood cells. Gastroenterology was consulted to see the patient.     Appreciate [facility] and Nephrology recommendations.    EGD/Colonoscopy complete. Bleeding site in ileum crohn's flare up, successfully treated . Started on steroid IV Trend hgb q12h. Transfuse for hgb less than 7.   Denies any abd pain.  Has not been taking aspirin.  Possible discharge in 2 hours.      Problem List:   Acute Blood Loss Anemia  GIB - Unclear if Upper or Lower  Hx of Crohns Disease s/p Colectomy on Stelaris  Hx of Small Bowel Ulcer in 12/2022  - Appreciate GI recommendations  - s/p 2 units PRBC  - Transfuse for hgb less than 7  - Hgb q12h  - PPI IV BID  - CLD     Chronic Thrombocytopenia  - Dating back to 2023  - Complicates cares  - Daily CBC     Severe Mitral Stenosis  Severe Tricuspid Regurgitation  Moderate Aortic Stenosis  Non Obstructive Mild Coronary Artery Disease  - Pt denies being on ASA recently  - Follows with Dr. Romero of [facility].  Planning for Aortic and Mitral Valve Replacement with Tricuspid Valve Repair and Possible Replacement and Left Atrial Appendage Ligation on 3/5/2024.  We discussed that often mechanical valves are placed which require anticoagulation with warfarin.  We discussed the importance of monitoring for bleeding after starting warfarin as he is high risk for bleeding.  Pt expressed understanding.     ESRD on HD (M W F)  - Appreciate Nephrology management of HD     Chronic Medical Problems:  Hypertension (no longer on meds due to hypotension during HD)  Hyperlipidemia  GERD  Hx of Abdominal Aorta Partially Calcified Dissection  Hemorrhagic/Proteinaceous Renal Cysts     I spent 44 minutes in reviewing this patient's labs, imaging, medications, medical history.          Diet: Clear Liquid Diet    DVT Prophylaxis: Pneumatic Compression Devices  Alcantara Catheter: Not present  Lines: None     Cardiac Monitoring: ACTIVE order. Indication: GI bleeding  Code Status: Full Code      Clinically Significant Risk Factors              # Hypoalbuminemia: Lowest albumin = 3.1 g/dL at 2/25/2024  1:33 PM, will monitor as appropriate  # Coagulation  Defect: INR = 1.41 (Ref range: 0.85 - 1.15) and/or PTT = 33 Seconds (Ref range: 22 - 38 Seconds), will monitor for bleeding  # Thrombocytopenia: Lowest platelets = 62 in last 2 days, will monitor for bleeding   # Hypertension: Noted on problem list                   Disposition Plan     Expected Discharge Date: 02/27/2024,  3:00 PM                  Mak Peters MD  Hospitalist Service  [facility]  Securely message with [employer] (more info)  Text page via [employer] Paging/Directory   ______________________________________________________________________    Interval History   Crohn disease with active site of bleeding found during the scope, local hemostasis performed.  Case discussed with Dr. Woods from GI.  Started on methylprednisolone IV.  Hold oral budesonide for now.  Will continue close follow-up until a.m.    Physical Exam   Vital Signs: Temp: 97.2  F (36.2  C) Temp src: Oral BP: 105/52 Pulse: 86   Resp: 20 SpO2: 100 % O2 Device: None (Room air)    Weight: 155 lbs 8 oz    General Appearance: Normal body habitus   Respiratory: Normal effort and sounds  Cardiovascular: Regular rate and rhythm  GI: Abdomen soft, no pain, no masses.  Skin: No rashes seen.     Medical Decision Making       40 MINUTES SPENT BY ME on the date of service doing chart review, history, exam, documentation & further activities per the note.      Data     I have personally reviewed the following data over the past 24 hrs:    N/A  \   7.0 (L)   / N/A     N/A N/A N/A /  N/A   N/A N/A N/A \       Imaging results reviewed over the past 24 hrs:   No results found for this or any previous visit (from the past 24 hour(s)).

## 2024-02-28 NOTE — PLAN OF CARE
Goal Outcome Evaluation:                  To Do:  End of Shift Summary  For vital signs and complete assessments, please see documentation flowsheets.     Pertinent assessments:A&o--- up in room-- tolerating renal diet -- denies pain and nausea----   Major Shift Events Dialysis  Treatment Plan: monitor labs  Bedside Nurse: Jocy Campos RN

## 2024-02-28 NOTE — PROGRESS NOTES
Renal Medicine       Following for ESRD  Seen on dialysis run today     Dialysis run parameters reviewed with dialysis RN at patient bedside.    3 hours  2K  2 liter UF    AVF    Stable mid run         Recent Labs   Lab 02/27/24  0728   *   POTASSIUM 4.1   CHLORIDE 93*   CO2 26   ANIONGAP 11   GLC 95   BUN 29.8*   CR 5.71*   GFRESTIMATED 10*   KARY 8.4*           LUCA Zuleta    [facility]  [phone]

## 2024-02-28 NOTE — PLAN OF CARE
Goal Outcome Evaluation:    Pt up ind in room. Tolerating clear liquids. Tele SR w/ BBB. Plan for dialysis today, will continue with plan of care.

## 2024-02-28 NOTE — PROGRESS NOTES
GASTROENTEROLOGY PROGRESS NOTE    Chart Reviewed- pt not seen as was off the unit for HD. Hgb drop on am labs 7.6-->7.0. No BM or emesis documented today. Suspect at least in part dilutional.        Plan:   --Continue IV steroids   --Closely monitor stool output  --ADAT to low fiber diet   --Follow hgb, stool, transfuse PRN   --IBD follow up on discharge   --GI will continue to follow      Sil Funk PA-C  [facility] ([facility])  Office: [phone]

## 2024-02-28 NOTE — UTILIZATION REVIEW
[facility] Utilization Review  Admission Status; Secondary Review Determination     Admission Date: 2/25/2024  1:05 PM      Under the authority of the Utilization Management Committee, the utilization review process indicated a secondary review on the above patient.  The review outcome is based on review of the medical records, discussions with staff, and applying clinical experience noted on the date of the review.        (X)      Inpatient Status Appropriate - This patient's medical care is consistent with medical management for inpatient care and reasonable inpatient medical practice.          RATIONALE FOR DETERMINATION   64-year-old male with history of Crohn's disease status post colectomy constellatus, small bowel ulcer, chronic thrombocytopenia, severe mitral stenosis, tricuspid regurgitation, moderate aortic stenosis, nonobstructive mild coronary artery disease, ESRD on hemodialysis, admitted with hemoglobin dropped to 5.6, platelets 77.  CTA abdomen pelvis showed no evidence of acute GI bleed, ventral abdominal hernia, hemorrhagic left renal cyst that is decreased in size, multiple nonobstructing bilateral intrarenal calculi without evidence of hydronephrosis.  Status post transfusion, GI team evaluated patient, EGD/endoscopy complete, bleeding site and IVM with purulence flareup, started on IV steroids, with serial hemoglobin checks, and transfuse as needed.  Complex patient who has anemia, with EGD and colonoscopy done, started on IV steroids for Crohn's flareup, required blood transfusion, needs close monitoring in the hospital with ongoing interventions, is at risk of acute decompensation.  Was denied by insurance, but received interventions that cannot be done as outpatient, required ongoing monitoring in the hospital, agree with inpatient status      The severity of illness, intensity of service provided, expected LOS and risk for adverse outcome make the care complex, high risk and  appropriate for hospital admission.The patient requires hospital based medical care which is anticipated to require a stay of 2 or more midnights.        The information on this document is developed by the utilization review team in order for the business office to ensure compliance.  This only denotes the appropriateness of proper admission status and does not reflect the quality of care rendered.              Sincerely,       Mihai Bruce MD  Physician Advisor  Utilization Review-[place]    Phone: [phone]

## 2024-02-28 NOTE — PROGRESS NOTES
Potassium   Date Value Ref Range Status   02/27/2024 4.1 3.4 - 5.3 mmol/L Final   01/13/2023 4.0 3.4 - 5.3 mmol/L Final   05/25/2021 3.6 3.4 - 5.3 mmol/L Final     Hemoglobin   Date Value Ref Range Status   02/28/2024 7.0 (L) 13.3 - 17.7 g/dL Final   05/25/2021 10.6 (L) 13.3 - 17.7 g/dL Final     Creatinine   Date Value Ref Range Status   02/27/2024 5.71 (H) 0.67 - 1.17 mg/dL Final   05/25/2021 10.60 (H) 0.66 - 1.25 mg/dL Final     Urea Nitrogen   Date Value Ref Range Status   02/27/2024 29.8 (H) 8.0 - 23.0 mg/dL Final   01/13/2023 36 (H) 7 - 30 mg/dL Final   05/25/2021 36 (H) 7 - 30 mg/dL Final     Sodium   Date Value Ref Range Status   02/27/2024 130 (L) 135 - 145 mmol/L Final     Comment:     Reference intervals for this test were updated on 09/26/2023 to more accurately reflect our healthy population. There may be differences in the flagging of prior results with similar values performed with this method. Interpretation of those prior results can be made in the context of the updated reference intervals.    05/25/2021 136 133 - 144 mmol/L Final     INR   Date Value Ref Range Status   02/25/2024 1.41 (H) 0.85 - 1.15 Final   05/25/2021 1.33 (H) 0.86 - 1.14 Final       DIALYSIS PROCEDURE NOTE  Hepatitis status of previous patient on machine log was checked and verified ok to use with this patients hepatitis status.  Patient dialyzed for 3 hrs. on a K3 bath with a net fluid removal of  2L.  A BFR of 400 ml/min was obtained via a LUE AVF using 15 gauge needles.      The treatment plan was discussed with Dr. Zuleta during the treatment.    Total heparin received during the treatment: 0 units.   Needle cannulation sites held x 5 min.     Meds  given: None   Complications: None      Person educated: Patient. Knowledge base extensive. Barriers to learning: none. Educated on procedure via verbal mode. Patient verbalized understanding.   ICEBOAT? Timeout performed pre-treatment  I: Patient was identified using 2  identifiers  C:  Consent Signed Yes  E: Equipment preventative maintenance is current and dialysis delivery system OK to use  B: Hepatitis B Surface Antigen: Negative; Draw Date: 9/25/23 per [facility]      Hepatitis B Surface Antibody: Immune; Draw Date: 9/25/23 per [facility]  O: Dialysis orders present and complete prior to treatment  A: Vascular access verified and assessed prior to treatment  T: Treatment was performed at a clinically appropriate time  ?: Patient was allowed to ask questions and address concerns prior to treatment  See Adult Hemodialysis flowsheet in [facility] for further details and post assessment.  Machine water alarm in place and functioning. Transducer pods intact and checked every 15min.   Pt assisted with repositioning throughout dialysis treatment.  Pt returned via wheelchair.  Chlorine/Chloramine water system checked every 4 hours.  Outpatient Dialysis at [facility]    Post treatment report given to JR Ca regarding 2L of fluid removed, last /64.    Please remove patient dressing on AVF and AVG needle sites 24 hours after dialysis. If leaking occurs please apply a Band-Aid.     Cary Alcantara RN

## 2024-02-29 NOTE — PROGRESS NOTES
Renal Medicine       Following for ESRD  Dialyzed 02/28/24 without issue  UF 2 liters    Dressed in room  Transfusing     No CP or SOB    Plan dialysis 03/01/24          Recent Labs   Lab 02/27/24  0728 02/26/24  0722   POTASSIUM 4.1 5.1           LUCA Zuleta    [facility]  [phone]

## 2024-02-29 NOTE — PLAN OF CARE
Goal Outcome Evaluation:                      To Do:  End of Shift Summary  For vital signs and complete assessments, please see documentation flowsheets.     Pertinent assessments: A&o-- hg 6.7 2u PC infused with no problems--up in room--restless ---- stools watery and small streaks of blood ---taking in food and fluids well-- denies pain and nausea   Major Shift Events  comfortable  Treatment Plan: monitor  Bedside Nurse: Jocy Campos RN

## 2024-02-29 NOTE — PROGRESS NOTES
[facility]    Medicine Progress Note - Hospitalist Service    Date of Admission:  2/25/2024    Assessment & Plan   Summary of Stay: Tacos Dominguez is a 64 year old male with a history of Crohn's disease status post colectomy on Stelaris, history of small bowel ulcer in 12/2022, chronic thrombocytopenia, severe mitral stenosis, severe tricuspid regurgitation, moderate aortic stenosis, nonobstructive mild coronary artery disease, ESRD on HD (MWF), hypertension, hyperlipidemia, GERD, history of abdominal aorta partially calcified dissection, history of hemorrhagic proteinaceous renal cysts admitted on 2/25/2024 with low hemoglobin.  The patient had preop labs done on 2/21 in anticipation of an upcoming mitral valve and aortic valve replacement with tricuspid valve repair and possible replacement and left atrial appendage ligation on 3/5/2024.  Hemoglobin returned at 5.8.  The patient was seen in the emergency department and transfused 2 units of packed red blood cells.  Repeat blood work on 2/25 showed hemoglobin 5.6 so the patient return to the emergency department.  In the emergency department, the patient was found to have a temperature of 97.6  F, blood pressure 119/66, heart rate 98, respiratory rate 18, SpO2 97% on room air.  Initial lab work showed hemoglobin 5.6, platelets 77, , BUN/creatinine 43.3/7.69, anion gap 17, albumin 3.1, glucose 108.  FOBT returned positive.  CTA abdomen and pelvis showed no evidence of acute gastrointestinal bleeding, right lower quadrant ventral abdominal hernia containing a nonobstructed loop of small bowel, hemorrhagic left renal cyst that has slightly decreased in size, multiple nonobstructing bilateral intrarenal calculi without evidence of hydronephrosis.  The patient was made n.p.o. and transfused 2 units of packed red blood cells. Gastroenterology was consulted to see the patient.     Appreciate [facility] and Nephrology recommendations.    EGD/Colonoscopy complete. Bleeding site in ileum crohn's flare up, successfully treated . Started on steroid IV Trend hgb q12h. Transfuse for hgb less than 7.   Denies any abd pain.  Has not been taking aspirin.  Possible discharge in 24 hours.      Problem List:   Acute Blood Loss Anemia  GIB - Unclear if Upper or Lower  Hx of Crohns Disease s/p Colectomy on Stelaris  Hx of Small Bowel Ulcer in 12/2022  - Appreciate GI recommendations  - s/p 2 units PRBC  -Hemoglobin dropped to 6.7 this morning, another 2 units has been requested.  Will continue checking hemoglobin every 6 hours and I will be waiting for gastrointestinal assessment and plan.  - Hgb q12h  - PPI IV BID  - CLD     Chronic Thrombocytopenia  - Dating back to 2023  - Complicates cares  - Daily CBC     Severe Mitral Stenosis  Severe Tricuspid Regurgitation  Moderate Aortic Stenosis  Non Obstructive Mild Coronary Artery Disease  - Pt denies being on ASA recently  - Follows with Dr. Romero of [facility].  Planning for Aortic and Mitral Valve Replacement with Tricuspid Valve Repair and Possible Replacement and Left Atrial Appendage Ligation on 3/5/2024.  We discussed that often mechanical valves are placed which require anticoagulation with warfarin.  We discussed the importance of monitoring for bleeding after starting warfarin as he is high risk for bleeding.  Pt expressed understanding.     ESRD on HD (M W F)  - Appreciate Nephrology management of HD  - Dialysis today     Chronic Medical Problems:  Hypertension (no longer on meds due to hypotension during HD)  Hyperlipidemia  GERD  Hx of Abdominal Aorta Partially Calcified Dissection  Hemorrhagic/Proteinaceous Renal Cysts     I spent 40 minutes in reviewing this patient's labs, imaging, medications, medical history.          Diet: Renal Diet (dialysis)    DVT Prophylaxis: Pneumatic Compression Devices  Alcantara Catheter: Not present  Lines: None     Cardiac Monitoring: None  Code Status: Full Code      Clinically  Significant Risk Factors              # Hypoalbuminemia: Lowest albumin = 3.1 g/dL at 2/25/2024  1:33 PM, will monitor as appropriate     # Thrombocytopenia: Lowest platelets = 82 in last 2 days, will monitor for bleeding   # Hypertension: Noted on problem list                   Disposition Plan     Expected Discharge Date: 02/29/2024,  3:00 PM                  Mak Peters MD  Hospitalist Service  [facility]  Securely message with [employer] (more info)  Text page via [employer] Paging/Directory   ______________________________________________________________________    Interval History   Crohn disease with active site of bleeding found during the scope, local hemostasis performed.  This morning hemoglobin drop to 6.7.  Patient exhibits good tolerance to anemia, I am requesting 2 units PRBC I will be waiting for GI assessment..  Continue on methylprednisolone IV.  Hold oral budesonide for now.   .    Physical Exam   Vital Signs: Temp: 97.6  F (36.4  C) Temp src: Axillary BP: 116/57 Pulse: 106   Resp: 20 SpO2: 99 % O2 Device: None (Room air)    Weight: 155 lbs 8 oz    General Appearance: Normal body habitus   Respiratory: Normal effort and sounds  Cardiovascular: Regular rate and rhythm  GI: Abdomen soft, no pain, no masses.  Skin: No rashes seen.     Medical Decision Making       44 MINUTES SPENT BY ME on the date of service doing chart review, history, exam, documentation & further activities per the note.      Data     I have personally reviewed the following data over the past 24 hrs:    9.0  \   6.7 (LL)   / 82 (L)     N/A N/A N/A /  N/A   N/A N/A N/A \       Imaging results reviewed over the past 24 hrs:   No results found for this or any previous visit (from the past 24 hour(s)).

## 2024-02-29 NOTE — PLAN OF CARE
"Goal Outcome Evaluation:    Nursing Summary:    [id number]    Pt is alert and oriented x 4. Pt denies any pain or shortness of breath and on room air. PRN Seroquel administered for sleep.    Loose brown BM x 2 on this shift; no blood. IV Solumedrol administered. Dialysis (M-W-F). Hgb-7.0     Pt ambulates independently in room. Ongoing monitoring.    Addendum: Informed day nurse of critical lab value: hgb of 6.7. Day nurse will follow up on this.     Gastro and Nephro following.      /57 (BP Location: Right arm)   Pulse 101   Temp 97.6  F (36.4  C) (Oral)   Resp 20   Ht 1.803 m (5' 11\")   Wt 70.5 kg (155 lb 8 oz)   SpO2 100%   BMI 21.69 kg/m                    "

## 2024-02-29 NOTE — PROGRESS NOTES
[facility]    Medicine Progress Note - Hospitalist Service    Date of Admission:  2/25/2024    Assessment & Plan   Summary of Stay: Tacos Dominguez is a 64 year old male with a history of Crohn's disease status post colectomy on Stelaris, history of small bowel ulcer in 12/2022, chronic thrombocytopenia, severe mitral stenosis, severe tricuspid regurgitation, moderate aortic stenosis, nonobstructive mild coronary artery disease, ESRD on HD (MWF), hypertension, hyperlipidemia, GERD, history of abdominal aorta partially calcified dissection, history of hemorrhagic proteinaceous renal cysts admitted on 2/25/2024 with low hemoglobin.  The patient had preop labs done on 2/21 in anticipation of an upcoming mitral valve and aortic valve replacement with tricuspid valve repair and possible replacement and left atrial appendage ligation on 3/5/2024.  Hemoglobin returned at 5.8.  The patient was seen in the emergency department and transfused 2 units of packed red blood cells.  Repeat blood work on 2/25 showed hemoglobin 5.6 so the patient return to the emergency department.  In the emergency department, the patient was found to have a temperature of 97.6  F, blood pressure 119/66, heart rate 98, respiratory rate 18, SpO2 97% on room air.  Initial lab work showed hemoglobin 5.6, platelets 77, , BUN/creatinine 43.3/7.69, anion gap 17, albumin 3.1, glucose 108.  FOBT returned positive.  CTA abdomen and pelvis showed no evidence of acute gastrointestinal bleeding, right lower quadrant ventral abdominal hernia containing a nonobstructed loop of small bowel, hemorrhagic left renal cyst that has slightly decreased in size, multiple nonobstructing bilateral intrarenal calculi without evidence of hydronephrosis.  The patient was made n.p.o. and transfused 2 units of packed red blood cells. Gastroenterology was consulted to see the patient.     Appreciate [facility] and Nephrology recommendations.    EGD/Colonoscopy complete. Bleeding site in ileum crohn's flare up, successfully treated . Started on steroid IV Trend hgb q12h. Transfuse for hgb less than 7.   Denies any abd pain.  Has not been taking aspirin.  Possible discharge in 24 hours.      Problem List:   Acute Blood Loss Anemia  GIB - Unclear if Upper or Lower  Hx of Crohns Disease s/p Colectomy on Stelaris  Hx of Small Bowel Ulcer in 12/2022  - Appreciate GI recommendations  - s/p 2 units PRBC  - Transfuse for hgb less than 7  - Hgb q12h  - PPI IV BID  - CLD     Chronic Thrombocytopenia  - Dating back to 2023  - Complicates cares  - Daily CBC     Severe Mitral Stenosis  Severe Tricuspid Regurgitation  Moderate Aortic Stenosis  Non Obstructive Mild Coronary Artery Disease  - Pt denies being on ASA recently  - Follows [facility] of [facility].  Planning for Aortic and Mitral Valve Replacement with Tricuspid Valve Repair and Possible Replacement and Left Atrial Appendage Ligation on 3/5/2024.  We discussed that often mechanical valves are placed which require anticoagulation with warfarin.  We discussed the importance of monitoring for bleeding after starting warfarin as he is high risk for bleeding.  Pt expressed understanding.     ESRD on HD (M W F)  - Appreciate Nephrology management of HD  - Dialysis today     Chronic Medical Problems:  Hypertension (no longer on meds due to hypotension during HD)  Hyperlipidemia  GERD  Hx of Abdominal Aorta Partially Calcified Dissection  Hemorrhagic/Proteinaceous Renal Cysts     I spent 40 minutes in reviewing this patient's labs, imaging, medications, medical history.          Diet: Renal Diet (dialysis)    DVT Prophylaxis: Pneumatic Compression Devices  Alcantara Catheter: Not present  Lines: None     Cardiac Monitoring: None  Code Status: Full Code      Clinically Significant Risk Factors              # Hypoalbuminemia: Lowest albumin = 3.1 g/dL at 2/25/2024  1:33 PM, will monitor as appropriate     # Thrombocytopenia:  Lowest platelets = 67 in last 2 days, will monitor for bleeding   # Hypertension: Noted on problem list                   Disposition Plan      Expected Discharge Date: 02/29/2024,  3:00 PM                  Mak Peters MD  Hospitalist Service  [facility]  Securely message with [employer] (more info)  Text page via [employer] Paging/Directory   ______________________________________________________________________    Interval History   Crohn disease with active site of bleeding found during the scope, local hemostasis performed.  Case discussed with Dr. Woods from GI.  On methylprednisolone IV.  Hold oral budesonide for now.   Dialysis  today.    Physical Exam   Vital Signs: Temp: 97.7  F (36.5  C) Temp src: Oral BP: 115/64 Pulse: 94   Resp: 18 SpO2: 100 % O2 Device: None (Room air)    Weight: 155 lbs 8 oz    General Appearance: Normal body habitus   Respiratory: Normal effort and sounds  Cardiovascular: Regular rate and rhythm  GI: Abdomen soft, no pain, no masses.  Skin: No rashes seen.     Medical Decision Making       40 MINUTES SPENT BY ME on the date of service doing chart review, history, exam, documentation & further activities per the note.      Data     I have personally reviewed the following data over the past 24 hrs:    N/A  \   7.0 (L)   / N/A     N/A N/A N/A /  N/A   N/A N/A N/A \       Imaging results reviewed over the past 24 hrs:   No results found for this or any previous visit (from the past 24 hour(s)).

## 2024-02-29 NOTE — PROGRESS NOTES
"GASTROENTEROLOGY PROGRESS NOTE     INTERVAL HISTORY   Hgb dropped again today- per RN no melena, hematochezia, emesis   Continues with loose stools approximately every 3 hours, stools more formed since starting IV steroids       OBJECTIVE:  General Appearance:  Male pt resting in bed, PRBC infusing   BP (!) 142/73   Pulse 111   Temp 98  F (36.7  C)   Resp 20   Ht 1.803 m (5' 11\")   Wt 70.5 kg (155 lb 8 oz)   SpO2 99%   BMI 21.69 kg/m    Temp (24hrs), Av.7  F (36.5  C), Min:97.3  F (36.3  C), Max:98  F (36.7  C)    No data found.    Intake/Output Summary (Last 24 hours) at 2024 1328  Last data filed at 2024 1300  Gross per 24 hour   Intake 540 ml   Output 2000 ml   Net -1460 ml        PHYSICAL EXAM   GEN: Ill appearing male, NAD   EYES: No scleral icterus  RESP: Breathing non-labored  Abdomen: Non-reducible hernia in RLQ, non-tender, + bowel sounds            Additional Comments:  ROS, FH, SH: See initial GI consult for details.     I have reviewed the patient's new clinical lab results:     Recent Labs   Lab Test 24  0618 24  0705 24  2354 24  1758 24  0728 24  1827 24  0722 24  1820 24  1333 24  1426 02/15/24  0723 22  0826 22  1426   WBC 9.0  --   --   --  5.2  --  5.0  --  5.7   < > 6.5   < >  --    HGB 6.7* 7.0* 7.6*   < > 7.8*  7.8*   < > 7.3*   < > 5.6*   < > 6.9*   < >  --    *  --   --   --  108*  --  109*  --  118*   < > 123*   < >  --    PLT 82*  --   --   --  67*  --  62*  --  77*   < > 125*   < >  --    INR  --   --   --   --   --   --   --   --  1.41*  --  1.97*  --  1.30*    < > = values in this interval not displayed.     Recent Labs   Lab Test 24  0728 24  0722 24  1333   POTASSIUM 4.1 5.1 5.1   CHLORIDE 93* 100 98   CO2    BUN 29.8* 52.8* 43.3*   ANIONGAP 11 14 17*     Recent Labs   Lab Test 24  1333 24  1426 02/15/24  0723 22  1238 22  0826 " 03/25/21  1229 03/25/21  1225 09/28/20  0525 09/27/20  0520 09/26/20  2312 09/26/20  1451   ALBUMIN 3.1* 3.2* 3.1*   < >  --    < >  --    < > 2.4*   < >  --    BILITOTAL 0.8 0.9 0.9   < >  --    < >  --    < > 0.4   < >  --    ALT 27 21 18   < >  --    < >  --    < > 15   < >  --    AST 36 32 27   < >  --    < >  --    < > 20   < >  --    PROTEIN  --   --   --   --   --   --  100*  --   --   --  30 mg/dL*   LIPASE  --   --   --   --   --   --   --   --  115*  --   --    AMYLASE  --   --   --   --  34  --   --   --   --   --   --     < > = values in this interval not displayed.     Endoscopic Evaluation    2/27/24 EGD  Impression:               - Z-line irregular.                             - Congestive and erythematous gastropathy. Biopsied.                             - Normal examined duodenum.   Recommendation:           - Await pathology results.                             - Proceed with colonoscopy   A(1). Stomach, body, biopsy:  -Oxyntic type gastric mucosa with reactive changes of the type seen in reactive gastropathy (chemical gastritis) (see comment).  -Negative for H. Pylori organisms on routine stains.  -Negative for intestinal metaplasia.  -Negative for dysplasia or malignancy.       2/7/24 Colonoscopy   Impression:               - Preparation of the colon was poor due to stool                             and blood. Lavaged and suctioned with limited                             visualization.                             - Patent end-to-end ileo-colonic anastomosis,                             characterized by relatively healthy appearing                             mucosa.                             - Multiple huge ulcers in the ileum about 30 cm                             proximal to the anastomosis. Visible vessel with                             adherent clot that was oozing. Injected with                             epinephrine. Treated with bipolar cautery. Bleeding                              stopped completely.                             - The examined portion of the ileum proximal to the                             ulcerated area was normal.                             - The ileum distal to the ulcerated site was                             inflamed. It did not contain any further                             ulcerations however.                             - No active Crohn's seen in the colon.                             - Three diverticulum (or previous fistulas) in the                             rectum. No inflammation around this site.                             - No specimens collected.     Assessment & Plan  #Acute on Chronic anemia  #Crohn's disease with ongoing ulceration   64 year old male with past medical history significant for Crohn's disease currently on Stelara every 4 weeks status post multiple surgeries including a right hemicolectomy, multiple small bowel resections, ileocolic resection with ileostomy/takedown, chronic pancreatitis, IPMN with high grade dysplasia (EUS 8/2022), ESRD on dialysis, severe MR/severe TR/moderate aortic stenosis, CAD, chronic abdominal aorta dissection, admitted 2/25 with acute on chronic anemia.     EGD with congested  and erythematous mucosa in the stomach consistent with reactive gastropathy. Colonoscopy with poor preparation and multiple large ulcers in the ileum one of which had adherent clot with visible vessel- treated with epinephrine and cautery with resolution of bleeding.     Hgb continues to trend downward without overt evidence of GIB. Will continue to monitor on steroids for now.  Patient is currently scheduled for coronary bypass with valve repair next week. If hgb not improved tomorrow, may need to consider discussing transfer to the [facility] given his co-morbidities and severe valvular heart disease along with ongoing Crohn's-- further discussion between GI and surgery for this may be warranted. At this juncture do not  think he would tolerate the anticoagulation needed for valvular surgery. He needs further discussion regarding long term treatment of Crohn's with GI as well. He already has short gut syndrome so surgical resection not a great option.     Plan:  --Continue IV steroids  --Follow hgb, stool, transfuse PRN   --Consider discussing transfer to [facility] if hgb not improving tomorrow   --Low fiber diet as tolerated     Discussed with Dr. Woods, GI staff     Time spent: 30  minutes, greater than 50% of the visit was spent in counseling/coordination of care.     Sil Funk PA-C  [facility] ([facility])  Office: [phone]

## 2024-03-01 NOTE — PROGRESS NOTES
"GASTROENTEROLOGY PROGRESS NOTE    SUBJECTIVE: 6 total stools today, first was pink tinged, brown since that time. Denies abdominal pain. Tolerating diet.     OBJECTIVE:   /68 (BP Location: Right arm)   Pulse 92   Temp 97.8  F (36.6  C) (Oral)   Resp 18   Ht 1.803 m (5' 11\")   Wt 70.5 kg (155 lb 8 oz)   SpO2 (!) 82%   BMI 21.69 kg/m    Temp (24hrs), Av.7  F (36.5  C), Min:97.3  F (36.3  C), Max:98  F (36.7  C)    No data found.    Intake/Output Summary (Last 24 hours) at 2024 1328  Last data filed at 2024 1300  Gross per 24 hour   Intake 540 ml   Output 2000 ml   Net -1460 ml        PHYSICAL EXAM   GEN: Ill appearing male, NAD   EYES: No scleral icterus  RESP: Breathing non-labored  Abdomen: Non-reducible hernia in RLQ, non-tender, + bowel sounds     Additional Comments:  ROS, FH, SH: See initial GI consult for details.     I have reviewed the patient's new clinical lab results:     Recent Labs   Lab Test 24  1331 24  0810 24  0618 24  1758 24  0728 24  1820 24  1333 24  1426 02/15/24  0723 22  0826 22  1426   WBC  --  9.8 9.0  --  5.2   < > 5.7   < > 6.5   < >  --    HGB 8.7* 7.9* 6.7*   < > 7.8*  7.8*   < > 5.6*   < > 6.9*   < >  --    MCV  --  104* 110*  --  108*   < > 118*   < > 123*   < >  --    PLT  --  81* 82*  --  67*   < > 77*   < > 125*   < >  --    INR  --   --   --   --   --   --  1.41*  --  1.97*  --  1.30*    < > = values in this interval not displayed.     Recent Labs   Lab Test 24  0728 24  0722 24  1333   POTASSIUM 4.1 5.1 5.1   CHLORIDE 93* 100 98   CO2    BUN 29.8* 52.8* 43.3*   ANIONGAP 11 14 17*     Recent Labs   Lab Test 24  1333 24  1426 02/15/24  0723 22  1238 22  0826 21  1229 21  1225 20  0525 20  0520 20  2312 20  1451   ALBUMIN 3.1* 3.2* 3.1*   < >  --    < >  --    < > 2.4*   < >  --    BILITOTAL 0.8 0.9 0.9   < >  -- "    < >  --    < > 0.4   < >  --    ALT 27 21 18   < >  --    < >  --    < > 15   < >  --    AST 36 32 27   < >  --    < >  --    < > 20   < >  --    PROTEIN  --   --   --   --   --   --  100*  --   --   --  30 mg/dL*   LIPASE  --   --   --   --   --   --   --   --  115*  --   --    AMYLASE  --   --   --   --  34  --   --   --   --   --   --     < > = values in this interval not displayed.     Endoscopic Evaluation    2/27/24 EGD  Impression:               - Z-line irregular.                             - Congestive and erythematous gastropathy. Biopsied.                             - Normal examined duodenum.   Recommendation:           - Await pathology results.                             - Proceed with colonoscopy   A(1). Stomach, body, biopsy:  -Oxyntic type gastric mucosa with reactive changes of the type seen in reactive gastropathy (chemical gastritis) (see comment).  -Negative for H. Pylori organisms on routine stains.  -Negative for intestinal metaplasia.  -Negative for dysplasia or malignancy.       2/7/24 Colonoscopy   Impression:               - Preparation of the colon was poor due to stool                             and blood. Lavaged and suctioned with limited                             visualization.                             - Patent end-to-end ileo-colonic anastomosis,                             characterized by relatively healthy appearing                             mucosa.                             - Multiple huge ulcers in the ileum about 30 cm                             proximal to the anastomosis. Visible vessel with                             adherent clot that was oozing. Injected with                             epinephrine. Treated with bipolar cautery. Bleeding                             stopped completely.                             - The examined portion of the ileum proximal to the                             ulcerated area was normal.                             - The  ileum distal to the ulcerated site was                             inflamed. It did not contain any further                             ulcerations however.                             - No active Crohn's seen in the colon.                             - Three diverticulum (or previous fistulas) in the                             rectum. No inflammation around this site.                             - No specimens collected.     Assessment & Plan  #Acute on Chronic anemia  #Crohn's disease with ongoing ulceration   64 year old male with past medical history significant for Crohn's disease currently on Stelara every 4 weeks status post multiple surgeries including a right hemicolectomy, multiple small bowel resections, ileocolic resection with ileostomy/takedown, chronic pancreatitis, IPMN with high grade dysplasia (EUS 8/2022), ESRD on dialysis, severe MR/severe TR/moderate aortic stenosis, CAD, chronic abdominal aorta dissection, admitted 2/25 with acute on chronic anemia.     2/27 EGD with congested  and erythematous mucosa in the stomach consistent with reactive gastropathy. Colonoscopy with poor preparation and multiple large ulcers in the ileum one of which had adherent clot with visible vessel- treated with epinephrine and cautery with resolution of bleeding.     2/29 HGB dropped from 7 to 6.7. Received transfusion. HGB improved to 7.9 3/1.   3/1 Repeat this afternoon up to 8.7. Overt bleeding has resolved. Patient hoping to discharge. Reports his cardiology team aware of his admission in regards to surgery planned next week (valve repair, stent).     Plan:  --Ok to discharge today on prednisone taper with recommendation for close follow up with IBD MD through [facility]. Discussed with patient.   --Prednisone taper: 40mg daily for 7 days, taper by 5mg every 7 days.   --Close monitoring of HGB outpatient.   --Recommend if he needs to return to hospital, it would be best to go to [facility] where the majority of his care is  through.   --Low fiber diet as tolerated.     Discussed with Dr. Woods, GI staff. Reviewed recommendations with Dr. Peters from hospital team.     Time spent: 30  minutes, greater than 50% of the visit was spent in counseling/coordination of care.     Leeanne Barone, PAC  [facility] ([facility])

## 2024-03-01 NOTE — PROGRESS NOTES
Patient experiencing epistaxis. Given PRN affrin w/no improvement. Bright red blood w/clot. Cross cover MD notified.    Kristyn Powell RN

## 2024-03-01 NOTE — ED PROVIDER NOTES
I was asked to see this patient by the hospitalist for epistasis that was uncontrolled.  Please see procedure note below.  Verbal consent was obtained from patient.      [facility]    -Epistaxis Mgmt    Date/Time: 2/29/2024 11:44 PM    Performed by: Bri Hernandez MD  Authorized by: Bri Hernandez MD    Risks, benefits and alternatives discussed.      ANESTHESIA (see MAR for exact dosages)     Anesthesia method:  None  PROCEDURE DETAILS     Treatment site:  R anterior    Treatment method:  Nasal balloon (rhinorocket)    Treatment complexity:  Limited    Treatment episode: recurrence of recent bleed      POST PROCEDURE     Assessment:  Bleeding decreased    PROCEDURE  Describe Procedure: Rhinorocket was soaked in sterile saline.  It was placed into the nare.  Balloon was inflated.  Bleeding decreased markedly. Slight increase when he walked around but re inflated balloon with good hemostasis.    Patient Tolerance:  Patient tolerated the procedure well with no immediate complications         Bri Hernandez MD  02/29/24 [id number]

## 2024-03-01 NOTE — PLAN OF CARE
Pt to D/C to home. Pt provided with d/c instructions, including new medications, when medications were last given, and when to take them again.  Pt also informed to f/u with PCP in 1 week and GI. Pt verbalized understanding of all d/c and f/u instructions.  All questions were answered at this time.  Copy of paperwork sent with pt.  Medications sent with pt.  Patient drove himself to the hospital and will drive home. All personal belongings sent with pt.

## 2024-03-01 NOTE — PROGRESS NOTES
Renal Medicine       Following for ESRD  Seen on dialysis run today     Dialysis run parameters reviewed with dialysis RN at patient bedside.    3 hours  2K  2.5 liter UF    AVF without issue    Stable mid run   No patient complaints  Discharge per Hospitalist service     Next 03/04/24        Recent Labs   Lab 02/27/24  0728   *   POTASSIUM 4.1   CHLORIDE 93*   CO2 26   ANIONGAP 11   GLC 95   BUN 29.8*   CR 5.71*   GFRESTIMATED 10*   KARY 8.4*           LUCA Zuleta    [facility]  [phone]

## 2024-03-01 NOTE — PROGRESS NOTES
Potassium   Date Value Ref Range Status   02/27/2024 4.1 3.4 - 5.3 mmol/L Final   01/13/2023 4.0 3.4 - 5.3 mmol/L Final   05/25/2021 3.6 3.4 - 5.3 mmol/L Final     Hemoglobin   Date Value Ref Range Status   02/29/2024 6.7 (LL) 13.3 - 17.7 g/dL Final   05/25/2021 10.6 (L) 13.3 - 17.7 g/dL Final     Creatinine   Date Value Ref Range Status   02/27/2024 5.71 (H) 0.67 - 1.17 mg/dL Final   05/25/2021 10.60 (H) 0.66 - 1.25 mg/dL Final     Urea Nitrogen   Date Value Ref Range Status   02/27/2024 29.8 (H) 8.0 - 23.0 mg/dL Final   01/13/2023 36 (H) 7 - 30 mg/dL Final   05/25/2021 36 (H) 7 - 30 mg/dL Final     Sodium   Date Value Ref Range Status   02/27/2024 130 (L) 135 - 145 mmol/L Final     Comment:     Reference intervals for this test were updated on 09/26/2023 to more accurately reflect our healthy population. There may be differences in the flagging of prior results with similar values performed with this method. Interpretation of those prior results can be made in the context of the updated reference intervals.    05/25/2021 136 133 - 144 mmol/L Final     INR   Date Value Ref Range Status   02/25/2024 1.41 (H) 0.85 - 1.15 Final   05/25/2021 1.33 (H) 0.86 - 1.14 Final       DIALYSIS PROCEDURE NOTE  Hepatitis status of previous patient on machine log was checked and verified ok to use with this patients hepatitis status.  Patient dialyzed for 3 hrs. on a K3 bath with a net fluid removal of  2.5 L.  A BFR of 450 ml/min was obtained via a left AV fistula using 15 gauge needles.      The treatment plan was discussed with Dr. Zuleta during the treatment.    Total heparin received during the treatment: 0 units.   Needle cannulation sites held x 5 min.       Meds  given: floor nurse gave anxiety medication, see MAR  Complications: treatment paused over urgent need to use restroom.      Person educated: patient. Knowledge base substantial. Barriers to learning: none. Educated on procedure via verbal mode. The patient  verbalized understanding. Pt prefers verbal education style.     ICEBOAT? Timeout performed pre-treatment  I: Patient was identified using 2 identifiers  C:  Consent Signed Yes  E: Equipment preventative maintenance is current and dialysis delivery system OK to use  B: Hepatitis B Surface Antigen: Negative; Draw Date: 9/25/23        Hepatitis B Surface Antibody: Immune; Draw Date: 9/25/23  O: Dialysis orders present and complete prior to treatment  A: Vascular access verified and assessed prior to treatment  T: Treatment was performed at a clinically appropriate time  ?: Patient was allowed to ask questions and address concerns prior to treatment  See Adult Hemodialysis flowsheet in [facility] for further details and post assessment.  Machine water alarm in place and functioning. Transducer pods intact and checked every 15min.   Pt returned via bed.  Chlorine/Chloramine water system checked every 4 hours.  Outpatient Dialysis at [facility] on MWF    Patient repositioned every 2 hours during the treatment.  Post treatment report given to JEANINE Cast RN regarding 2.5L of fluid removed, last BP of 131/82, and patient pain rating of 4/10.     Please remove patient dressing on AVF and AVG needle sites 24 hours after dialysis. If leaking occurs please apply a Band-Aid.

## 2024-03-01 NOTE — DISCHARGE SUMMARY
[facility]  Hospitalist Discharge Summary      Date of Admission:  2/25/2024  Date of Discharge:  3/1/2024  Discharging Provider: Mak Peters MD  Discharge Service: Hospitalist Service    Discharge Diagnoses   Acute Blood Loss Anemia  GIB from SBO Crohn's flare up   Hx of Crohns Disease s/p Colectomy on Stelaris  Hx of Small Bowel Ulcer in 12/2022  Chronic Thrombocytopenia  Severe Mitral Stenosis  Severe Tricuspid Regurgitation  Moderate Aortic Stenosis  Non Obstructive Mild Coronary Artery Disease  ESRD on HD (M W F)  Chronic Medical Problems:  Hypertension (no longer on meds due to hypotension during HD)  Hyperlipidemia  GERD  Hx of Abdominal Aorta Partially Calcified Dissection  Hemorrhagic/Proteinaceous Renal Cysts    Clinically Significant Risk Factors   ESRD on HD  Crohn's,   Cardiac valve malfunction     Follow-ups Needed After Discharge   Follow-up Appointments     Follow-up and recommended labs and tests       Follow up with primary care provider, PETER BARRIGA, within 7 days   for hospital follow- up.  The following labs/tests are recommended:   Hemoglobin.  With your gastroenterologist at the [facility].  Cardiothoracic surgeon as planned..             Unresulted Labs Ordered in the Past 30 Days of this Admission       Date and Time Order Name Status Description    2/21/2024 12:49 AM Prepare red blood cells (unit) Preliminary         These results will be followed up by  PCP    Discharge Disposition   Discharged to home  Condition at discharge: Stable    Hospital Course   Summary of Stay: Tacos Dominguez is a 64 year old male with a history of Crohn's disease status post colectomy on Stelaris, history of small bowel ulcer in 12/2022, chronic thrombocytopenia, severe mitral stenosis, severe tricuspid regurgitation, moderate aortic stenosis, nonobstructive mild coronary artery disease, ESRD on HD (MWF), hypertension, hyperlipidemia, GERD, history of abdominal  aorta partially calcified dissection, history of hemorrhagic proteinaceous renal cysts admitted on 2/25/2024 with low hemoglobin.  The patient had preop labs done on 2/21 in anticipation of an upcoming mitral valve and aortic valve replacement with tricuspid valve repair and possible replacement and left atrial appendage ligation on 3/5/2024.  Hemoglobin returned at 5.8.  The patient was seen in the emergency department and transfused 2 units of packed red blood cells.  Repeat blood work on 2/25 showed hemoglobin 5.6 so the patient return to the emergency department.  In the emergency department, the patient was found to have a temperature of 97.6  F, blood pressure 119/66, heart rate 98, respiratory rate 18, SpO2 97% on room air.  Initial lab work showed hemoglobin 5.6, platelets 77, , BUN/creatinine 43.3/7.69, anion gap 17, albumin 3.1, glucose 108.  FOBT returned positive.  CTA abdomen and pelvis showed no evidence of acute gastrointestinal bleeding, right lower quadrant ventral abdominal hernia containing a nonobstructed loop of small bowel, hemorrhagic left renal cyst that has slightly decreased in size, multiple nonobstructing bilateral intrarenal calculi without evidence of hydronephrosis.  The patient was made n.p.o. and transfused 2 units of packed red blood cells. Gastroenterology was consulted to see the patient.     Appreciate [facility] and Nephrology recommendations.   EGD/Colonoscopy complete. Bleeding site in ileum crohn's flare up, successfully treated . Started on steroid IV Trend hgb q12h. Transfuse for hgb less than 7.   Denies any abd pain.  Has not been taking aspirin.  Possible discharge in 24 hours.      Problem List:   Acute Blood Loss Anemia  GIB from SBO Crohn's flare up   Hx of Crohns Disease s/p Colectomy on Stelaris  Hx of Small Bowel Ulcer in 12/2022  - Appreciate GI recommendations  - total of 4 units PRBC, last one 2/29  -Hemoglobin dropped to 6.7 2/29, another 2 units transfused.      - Hgb q12h  - PPI IV BID     Chronic Thrombocytopenia  - Dating back to 2023  - Complicates cares  - Daily CBC     Severe Mitral Stenosis  Severe Tricuspid Regurgitation  Moderate Aortic Stenosis  Non Obstructive Mild Coronary Artery Disease  - Pt denies being on ASA recently  - Follows with Dr. Romero of [facility].  Planning for Aortic and Mitral Valve Replacement with Tricuspid Valve Repair and Possible Replacement and Left Atrial Appendage Ligation on 3/5/2024.  We discussed that often mechanical valves are placed which require anticoagulation with warfarin.  We discussed the importance of monitoring for bleeding after starting warfarin as he is high risk for bleeding.  Pt expressed understanding.     ESRD on HD (M W F)  - Appreciate Nephrology management of HD  - Dialysis today     Chronic Medical Problems:  Hypertension (no longer on meds due to hypotension during HD)  Hyperlipidemia  GERD  Hx of Abdominal Aorta Partially Calcified Dissection  Hemorrhagic/Proteinaceous Renal Cysts     I spent 40 minutes in reviewing this patient's labs, imaging, medications, medical history.          Diet: Renal Diet (dialysis)    DVT Prophylaxis: Pneumatic Compression Devices  Alcantara Catheter: Not present  Lines: None     Cardiac Monitoring: None  Code Status: Full Code      Consultations This Hospital Stay   GASTROENTEROLOGY IP CONSULT  NEPHROLOGY IP CONSULT  CARE MANAGEMENT / SOCIAL WORK IP CONSULT    Code Status   Full Code    Time Spent on this Encounter   I, Mak Peters MD, personally saw the patient today and spent greater than 30 minutes discharging this patient.       Mak Peters MD  [place] [address] [facility]  [address] [place] [address]  Phone: [phone]  Fax: [phone]  ______________________________________________________________________    Physical Exam   Vital Signs: Temp: 97.8  F (36.6  C) Temp src: Oral BP: 138/68 Pulse: 92   Resp: 18 SpO2: (!) 82 % O2 Device:  None (Room air)    Weight: 155 lbs 8 oz  Constitutional: awake, alert, cooperative, no apparent distress, and appears stated age  Respiratory: No increased work of breathing, good air exchange, clear to auscultation bilaterally, no crackles or wheezing  Cardiovascular: Normal apical impulse, regular rate and rhythm, normal S1 and S2, no S3 or S4, and no murmur noted       Primary Care Physician   PETER BARRIGA    Discharge Orders      Adult GI  Referral - Consult Only      Follow-up and recommended labs and tests     Follow up with primary care provider, PETER BARRIGA, within 7 days for hospital follow- up.  The following labs/tests are recommended: Hemoglobin.  With your gastroenterologist at the [facility].  Cardiothoracic surgeon as planned..     Activity    Your activity upon discharge: activity as tolerated     Reason for your hospital stay    Crohn's disease flareup, gastrointestinal bleeding and acute blood loss anemia     Diet    Follow this diet upon discharge: Orders Placed This Encounter      Renal Diet (dialysis)       Significant Results and Procedures   Results for orders placed or performed during the hospital encounter of 02/25/24   CTA Abdomen Pelvis with Contrast    Narrative    EXAM: CTA ABDOMEN PELVIS WITH CONTRAST  LOCATION: [facility]  DATE: 2/25/2024    INDICATION: Gastrointestinal bleeding. Evaluate for evidence of active bleeding.  COMPARISON: MRCP 10/24/2023.  TECHNIQUE: CT angiogram abdomen pelvis during arterial phase of injection of IV contrast. 2D and 3D MIP reconstructions were performed by the CT technologist. Dose reduction techniques were used.  CONTRAST: 78mL Isovue 370    FINDINGS:    ANGIOGRAM ABDOMEN/PELVIS: No evidence of active gastrointestinal bleeding. Severe aortobiiliac atherosclerosis. Chronic focal dissections within the infrarenal aorta and right common iliac artery. No abdominal aortic aneurysm. Celiac artery and its    branches are patent. Severe ostial stenosis of the superior mesenteric artery. Right hepatic artery arises from the superior mesenteric artery. Severe stenosis at the origins of the bilateral single renal arteries. Inferior mesenteric artery is patent.   Severe stenosis of the proximal right common iliac artery. Moderate stenosis of the left common iliac artery. Severe stenoses of the proximal bilateral internal iliac arteries. Mild stenoses of the bilateral external iliac arteries. Moderate stenoses of   the bilateral common femoral arteries. Visualized superficial femoral and deep femoral arteries are patent. Portal, splenic, and superior mesenteric veins are patent. Perigastric varices.    LOWER CHEST: No consolidations or pleural effusions.    HEPATOBILIARY: Unchanged right hepatic cyst. No new liver lesions. Mild nonspecific heterogeneous enhancement of the posterior right hepatic lobe    PANCREAS: Status post distal pancreatectomy. Remaining pancreas is normal.    SPLEEN: Normal. Small accessory splenules in the splenic hilum.     ADRENAL GLANDS: Normal.    KIDNEYS/BLADDER: Diffuse bilateral renal cortical atrophy. Multiple small nonobstructing bilateral intrarenal calculi. Hemorrhagic, rim calcified cyst at the left lower pole has slightly decreased in size and now measures up to 9.7 cm. Additional benign   renal cysts are unchanged. No hydronephrosis. Decompressed bladder.    BOWEL: Status post subtotal colectomy. No evidence of bowel obstruction or inflammation. Unchanged right lower quadrant abdominal wall hernia containing a nonobstructed small bowel loop.    LYMPH NODES: No enlarged lymph node.    PELVIC ORGANS: Normal.    MUSCULOSKELETAL: Mild body wall edema. Multilevel degenerative changes of the spine. No acute bony abnormality. Increased density of the bones likely due to renal osteodystrophy.      Impression    IMPRESSION:    1.  No evidence of active gastrointestinal bleeding.    2.  A right  lower quadrant ventral abdominal wall hernia containing a nonobstructed loop of the small bowel is unchanged. No bowel obstruction or other acute complications.    3.  A hemorrhagic left renal cyst has slightly decreased in size since 10/24/2023.    4.  Multiple nonobstructing bilateral intrarenal calculi. No hydronephrosis.    5.  Additional nonemergent findings as described in the report body.       Discharge Medications   Current Discharge Medication List        START taking these medications    Details   predniSONE (DELTASONE) 20 MG tablet Take 2 tablets (40 mg) by mouth daily for 21 days  Qty: 42 tablet, Refills: 0    Associated Diagnoses: Crohn's disease of both small and large intestine with other complication (H)           CONTINUE these medications which have CHANGED    Details   budesonide (ENTOCORT EC) 3 MG EC capsule Take 3 capsules (9 mg) by mouth every morning  Qty: 90 capsule, Refills: 0    Associated Diagnoses: Crohn's disease of both small and large intestine with other complication (H)           CONTINUE these medications which have NOT CHANGED    Details   calcium acetate (CALPHRON) 667 MG TABS tablet Take 2,668 mg by mouth 3 times daily (with meals)      citalopram (CELEXA) 20 MG tablet Take 20 mg by mouth daily as needed (panic attacks)      folic acid (FOLVITE) 1 MG tablet Take 1 mg by mouth every morning       lidocaine-prilocaine (EMLA) 2.5-2.5 % external cream three times a week Prior to dialysis  site access on Monday, Wednesday, Friday      multivitamin RENAL (MULTIVITAMIN RENAL) 1 MG capsule Take 1 capsule by mouth every morning      omeprazole (PRILOSEC) 20 MG DR capsule Take 20 mg by mouth every morning       simvastatin (ZOCOR) 20 MG tablet Take 20 mg by mouth every morning       ustekinumab (STELARA) 90 MG/ML Inject 1 ml ( 90 mg) subcutaneous every 4 weeks.  Qty: 1 mL, Refills: 5    Comments: MTM patient  Associated Diagnoses: Crohn's disease of large intestine with complication (H)            Allergies   Allergies   Allergen Reactions    Lisinopril Anaphylaxis and Other (See Comments)     Shortness of breath

## 2024-03-01 NOTE — CARE PLAN
"At 2330 patient having a nose bleed in the R nostril. Dr Mosquera at bedside, inserted nosebleed \"rocket\". But patient continued to bleed.   At 0100 Dr Mosquera had patient blow his nose, and had blood clots. She applied topical TXA and nose clip for 20min. Bleeding stopped. Crosscover notified, and made aware of the blood loss.   "

## 2024-03-01 NOTE — ED PROVIDER NOTES
Epistaxis Care     Procedure: Epistaxis Care    Indication: Epistaxis    Consent: Verbal    Medication: Patient was topically medicated with Tranexamic acid    Procedure detail: TXA soaked pledgets were used to place into the naris.  The nose plug was placed for 25 minutes.  Patient was closely monitored and did not have evidence of recurrent bleeding.     Patient Status: The patient tolerated the procedure well: Yes. There were no complications.      I was asked to reassess the patient.  He had continued bleeding.  We remove the Rhino Rocket.  Patient blew his nose and a large clot came out.  Bleeding was relatively controlled.  We opted to try topical TXA.  Plan for reassessment after 20 minutes.    Patient was reassessed.  There is no continued bleeding.  Given that bleeding has stopped have not opted to replace packing.     Bri Hernandez MD  03/01/24 0139

## 2024-03-01 NOTE — PLAN OF CARE
Goal Outcome Evaluation:    To Do:  End of Shift Summary  For vital signs and complete assessments, please see documentation flowsheets.     Pertinent assessments: A&O x4. Denies pain. Bright red blood noted in loose stool. LS diminished. Denies SOB. BS hyperactive, passing flatus. Given PRN ativan for anxiety/restlessness. Up independent in room.    Major Shift Events: Epistaxis w/no improvement after afrin x2 and holding pressure.     Treatment Plan: Monitor for bleeding.    Bedside Nurse: Kristyn Powell RN

## 2024-03-01 NOTE — PLAN OF CARE
Goal Outcome Evaluation:    Pertinent assessments: [facility] [id number]. Pt A&Ox4. For first couple hours patient has nose bleeding, controlled with topical TXA by Raissa Mosquera after unsuccessful nosebleed rocket.     Patient c/o pain in the nose (when rocket in) and asked for pain med. Denying Tylenol, asked to notify MD. Guillen paged, and ordered one time IV Dilaudid 0.3mg with improvement. In about 2 hours patient called and asked if he can get IV Dilaudid again (nose bleed rocket is not in place). The writer explained that it was a one time dose, and there are no indications that patient should need IV Dilaudid at this time. Patient agreed. Prn Ativan and Seroquel given for anxiety.     Vitals stable, /86. Patient reported bloody stool. However, writer assessed one stool, and found it to be dark brown.   Patient up most of the night asking for popsicles, lemon ice, sherbet, gram crackers.   Independent in the room.

## 2024-03-04 NOTE — TELEPHONE ENCOUNTER
Per task, pts 3/5 surgery needs to be r/s. Leave message with new surgery date. Nurse will follow up. Left with new surgery date 4/3. Updated nurse. Will follow up if anything changes

## 2024-03-05 NOTE — TELEPHONE ENCOUNTER
Called patient to follow-up after hospitalization for GI bleed. Patient states he is feeling much better after his scope and they were able to stop the bleeding. Due to his hospitalization, his heart surgery had to be rescheduled to 4/3/24 (previously scheduled 3/5/24). Last dose of Stelara 2/1/24; March dose was being held for surgery. Will follow-up with Dr. Rubio about whether patient should dose now.    Per Dr. Rubio, post-hospitalization plan:  -- Clinic appt scheduled for 3/20 @ 11:20am with Dr. Rubio  -- MRE ordered for disease activity assessment ASAP

## 2024-03-06 NOTE — TELEPHONE ENCOUNTER
FUTURE VISIT INFORMATION      SURGERY INFORMATION:  Date: 4/3/24  [facility]  Surgeon:  Veda Romero MD   Anesthesia Type:  general  Procedure: Repeat STERNOTOMY WITH AORTIC AND MITRAL VALVE REPLACEMENT Repeat STERNOTOMY WITH TRICUSPID VALVE REPAIR POSSIBLE REPLACEMENT WITH pulmonary vein isolation, and left atrial appendage ligation Transesophageal Echocardiogram (GUERO) AND ALL ASSOCIATED PROCEDURES     RECORDS REQUESTED FROM:       Primary Care Provider: [facility]    Pertinent Medical History: PAF. Hypertension, CAD, tricuspid valve disorder    Most recent EKG+ Tracin/15/24    Most recent ECHO: 23    Most recent Coronary Angiogram: 2/15/24    Most recent PFT's: 10/24/23

## 2024-03-14 PROBLEM — K83.8 PNEUMOBILIA: Status: ACTIVE | Noted: 2024-01-01

## 2024-03-14 NOTE — PROGRESS NOTES
"[facility]    Medicine Progress Note - Hospitalist Service        Date of Admission:  3/14/2024  2:24 AM    Assessment & Plan:   64M hx of Crohn's disease status post colectomy on Stelaris, history of small bowel ulcer in 12/2022, chronic thrombocytopenia, severe mitral stenosis, severe tricuspid regurgitation, moderate aortic stenosis, nonobstructive mild coronary artery disease, ESRD on HD (MWF), hypertension, hyperlipidemia, GERD, history of abdominal aorta partially calcified dissection, history of hemorrhagic proteinaceous renal cysts  transferred from [place] for colonic penumatosis, scrotal swelling and anemia.      Sepsis 2/2 Colonic Pneumatosis  Lactic Acidosis  Anion gap metabolic acidosis  -Patient presenting to outside emergency room with complaints of 4 days of scrotal swelling without tenderness, along with LE edema. Complaining of mild abdominal pain but no nausea/vomiting/constipation. Has chronic soft stools in setting of short gut. No fevers/chills. Compliant with dialysis without any acute dyspnea.   -Labs: lactate 4.5>3, WBC 21.6, trops 389  -Imaging: CT showed extensive large bowel pneumoatosis  -Continue n.p.o.   -Hold IV fluids given patient is a dialysis patient  -Colorectal surgery and [facility] GI consulted  -Continue empiric Zosyn for now  -Await blood culture  -Repeat lactate  -Suspect elevated lactate is multifactorial      Acute on Chronic Anemia  Hx recurret anemias 2/2 GIB from SBO Crohn's flares  Chronic thrombocytopenia  Hx of epistaxis  -Has hx of chronic thrombocytopenia since 2023  -Had episode of acute anemia in 2/2024, requiring hospitalisation that resulted in 4 units of PRBC transfused. Endoscopy was performed showing \"congested and erythematous mucosa in the stomach consistent with reactive gastropathy. Colonoscopy with poor preparation and multiple large ulcers in the ileum one of which had adherent clot with visible vessel- treated with epinephrine and " "cautery with resolution of bleeding. \"  -No clinical history of hematochezia or melena  -Presenting hemoglobin of 5.7, getting 1 unit of PRBC right now  -Check serial hemoglobin every 8 hours  -GI consulted  -Hematology also consulted    Scrotal Swelling  Anasarca  Chronic Hyponatremia  Hypoalbuminemia  -Suspect scrotal swelling is from hypoalbuminemia/hypoproteinemia, albumin is 2.9 with a total protein of 4.5  -Ultrasound showed bilateral moderate hydroceles  -Nutrition consulted    Hx ESRD on HD  -Defer dialysis to nephrology, normally dialyzes on Monday Wednesday and Friday      Type 2 NSTEMI  Severe Mitral Stenosis  Severe Tricuspid Regurgitation  Moderate Aortic Stenosis  Moderate Pulmonary Hypertesnion  Non Obstructive Mild Coronary Artery Disease  Afib  HTN  -pending \"mechanical MVR, AVR, tricuspid valve repair vs. replacement with a bioprosthetic valve, bilateral Pulmonary vein Isolation and Left Atrial Appendage Ligation \"  -GUERO in 2024 showed EF 55-60 w/ severe Mitral insufisicncy 2/2 annular calcification, moderate Aortic Stenosis, Moderate Pulm HTN, Tricuspid insufficicency  -C 2024: Left sided filling pressures are severely elevated.Moderately elevated pulmonary artery hypertension, Normal cardiac output level. Prox RCA lesion is 30% stenosed. 2nd Mrg lesion is 30% stenosed. Right sided filling pressures are moderately elevated.  -RHC showed marked volume overload (L>R)  -CXR showed mild vascular congestion with faint opacities  -Troponin elevated at 389, likely due to multiple valvular heart disease and end-stage renal disease  -Cardiology consulted although patient just had a recent left heart which did not show significant CAD    Hx QTC prolongation  -last Qtc 520s in 2/2024  -repeat   -limit Qtc prolonging medications such as zofran  -cardiac telemetry  -Mg and Phos add ons     Hx of Crohns Disease s/p Colectomy on Stelaris  Hx of Small Bowel Ulcer in 12/2022  Peristomal Hernia  Chronic " Pancreatitis  -patient only has 9 feet of bowel left due to right hemicolectomy, multiple small bowel resections, and ileocolic resection with ileostomy/takedown since diagnosis in the 1970s  -Hx of chronic pancreatitis, withIntraductal papillary mucinous neoplasm with high grade dysplasia (EUS 8/2022)   -recent colonoscopy in 2/2024 showing multiple large ulcers in the ileum  -has chronic 4-5 loose BM a day  -GI consulted, no plans to continue steroid at this time     Hyperglycemia  Neuropathy  -Patient presented with elevated blood sugar in the 240s   -hemoglobin A1c is 5.8  -Suspect acute hyperglycemia is due to recent steroid use  -No plans to continue steroids so I expect blood sugars to get better.     HLD  -resume statin once confirmed     Hypokalemia  -3.2 at [place], no evidence that it was repleted  -likely in setting of chronic loose stools  -Will defer to nephrology for potassium replacement in the context of end-stage renal disease/dialysis     Chronic abdominal aorta partially calcified dissection since 4/2021  -No acute intervention     Possible remote hx of CVA  -resume statin once confirmed     GERD  -resume PPI once confirmed     Anxiety  Depression  -resume celexa once confirmed     Complex L renal cyst  Hx Hemorrhagic/proteinaceous renal cysts  -Known calcified cyst of the left lower pole measuring 9.7 cm based on CT from 2/25  -Outpatient follow-up     MICHAEL  Nicotine User  -25-40, pack year history  -Continuous oximetry with PRN oxygen    Diet: NPO for Medical/Clinical Reasons Except for: Meds, Ice Chips     DVT Prophylaxis: Pneumatic Compression Devices   Alcantara Catheter: Not present  Code Status: Full Code     Disposition Plan       Expected Discharge Date: 03/16/2024              Entered: Porfirio Smith MD 03/14/2024, 9:54 AM        Clinically Significant Risk Factors Present on Admission        # Hypokalemia: Lowest K = 3.3 mmol/L in last 2 days, will replace as needed     # Hypomagnesemia:  "Lowest Mg = 1.5 mg/dL in last 2 days, will replace as needed  # Anion Gap Metabolic Acidosis: Highest Anion Gap = 19 mmol/L in last 2 days, will monitor and treat as appropriate  # Hypoalbuminemia: Lowest albumin = 2.9 g/dL at 3/14/2024  5:35 AM, will monitor as appropriate  # Coagulation Defect: INR = 1.96 (Ref range: 0.85 - 1.15) and/or PTT = 35 Seconds (Ref range: 22 - 38 Seconds), will monitor for bleeding  # Thrombocytopenia: Lowest platelets = 62 in last 2 days, will monitor for bleeding   # Hypertension: Noted on problem list                         The patient's care was discussed with the Bedside Nurse and Patient.    Medical Decision Making       **CLEAR ALL SELECTIONS**      Labs/Imaging Reviewed:  See Information above and Data section below  Time SPENT BY ME on the date of service doing chart review, history, exam, documentation & further activities per the note:  . MINUTES    Chart documentation was completed, in part, with [employer] voice-recognition software. Even though reviewed, some grammatical, spelling, and word errors may remain.    Porfirio Smith MD  Hospitalist Service  [facility]  Text Page 7AM-6PM  Securely message with the Vocera Web Console (learn more here)  Text page via [employer] Paging/Directory    ______________________________________________________________________    Interval History   Patient denies hematochezia or melena this morning.  Denies abdominal pain, nausea or vomiting.  Complains of scrotal swelling but no pain.    Data reviewed today: I reviewed all medications, new labs and imaging results over the last 24 hours. I personally reviewed no images or EKG's today.    Physical Exam   Vital signs:  Temp: 97.2  F (36.2  C) Temp src: Axillary BP: 112/67 Pulse: 90   Resp: 20 SpO2: 99 % O2 Device: None (Room air)   Height: 180.3 cm (5' 11\") Weight: 72.4 kg (159 lb 9.8 oz)  Estimated body mass index is 22.26 kg/m  as calculated from the following:    Height " "as of this encounter: 1.803 m (5' 11\").    Weight as of this encounter: 72.4 kg (159 lb 9.8 oz).      Wt Readings from Last 2 Encounters:   03/14/24 72.4 kg (159 lb 9.8 oz)   02/25/24 70.5 kg (155 lb 8 oz)       Gen: AAOX3, NAD, comfortable  HEENT: Conjunctival pallor+  Resp: Decreased air entry at both bases, normal effort of breathing  CVS: RRR, systolic and diastolic murmur no murmur  Abd/GI: Soft, non-tender. BS- normoactive.    : Scrotal edema++  Skin: Warm, dry no rashes  MSK:  no pedal edema  Neuro- CN- intact. No focal deficits.  Spontaneously moving all 4 extremities      Data   Recent Labs   Lab 03/14/24  0836 03/14/24  0535   WBC  --  17.8*   HGB  --  5.8*   MCV  --  109*   PLT  --  62*   INR  --  1.96*   NA  --  136   POTASSIUM  --  3.3*   CHLORIDE  --  95*   CO2  --  22   BUN  --  54.9*   CR  --  5.37*   ANIONGAP  --  19*   KARY  --  8.4*   * 256*   ALBUMIN  --  2.9*   PROTTOTAL  --  4.5*   BILITOTAL  --  1.5*   ALKPHOS  --  96   ALT  --  32   AST  --  20       No results found for this or any previous visit (from the past 24 hour(s)).  Medications      insulin aspart  1-7 Units Subcutaneous Q4H    nicotine  1 patch Transdermal Daily    pantoprazole  40 mg Intravenous Q12H    piperacillin-tazobactam  2.25 g Intravenous Q8H    sodium chloride (PF)  3 mL Intracatheter Q8H                  "

## 2024-03-14 NOTE — PLAN OF CARE
"Goal Outcome Evaluation:  Orientations: A&Ox4  Vitals/Pain: VSS on RA. Scrotal pain, IV dilaudid for pain management.   Tele: SR w/ BBB and PVCs.   Lines/Drains: 2 PIV SL  Skin/Wounds: Cracked heels, redness to sacrum. WOC consult.   GI/: NPO ex; meds and ice chips. BM today.   Labs: Abnormal/Trends, Electrolyte Replacement- Mag replaced, recheck at 2207. K protocol not ran due to pt being a dialysis pt, provider notified, managed by nephrology.   Ambulation/Assist: SBA  Sleep Quality: Poor overnight, napped today.   Plan: HD tomorrow and Saturday. Abx. Possible repeat CT scan.     Spoke with nephrology via phone, Dr. De L aTorre, regarding pt needing two units of blood this afternoon ~1300. Advised to wait for Hgb recheck.     Paged Dr. Crandall via [employer] at [phone], \"hemoglobin recheck was 7.6 and cardiology note states to transfuse if less than 8, do we need to order and tx another unit? Also, pt is having anxiety despite his citalopram, is he able to get an additional anxiety medication?\" No new orders from Dr. Crandall.     Paged on-call provider Dr. Rose for anxiety medication at 1905- provider ordered ativan 0.5 mg PO.       "

## 2024-03-14 NOTE — PLAN OF CARE
Pt arrived to floor ~0230.    AxOx4. Tele = SR w/ BBB + frequent ectopy.    VSS on RA. SBA. NPO except meds. Anuric, on HD. BM x1.    PIV x1 SL. VAT consult placed for this AM for a midline.    LUE fistula-- thrill and bruit present.    Skin stephen.  A few scattered scabs, particularly on L upper arm.  Some excoriation to perianal skin.    Pain managed with PO Dilaudid 2mg x1 + IV Dilaudid 0.3mg x1. Denies nausea / shortness of breath. Endorses dyspnea on exertion.    Plan for blood transfusion this AM. Continue plan of care.

## 2024-03-14 NOTE — PHARMACY-ADMISSION MEDICATION HISTORY
Pharmacist Admission Medication History    Admission medication history is complete. The information provided in this note is only as accurate as the sources available at the time of the update.    Information Source(s): Patient, Hospital records, and CareEverywhere/SureScripts via in-person    Pertinent Information:   Pt states he hasn't taken any medications since 3/12    Changes made to PTA medication list:  Added: None  Deleted: None  Changed: None    Allergies reviewed with patient and updates made in EHR: no    Medication History Completed By: Elda Love RPH 3/14/2024 9:08 AM    PTA Med List   Medication Sig Note Last Dose    budesonide (ENTOCORT EC) 3 MG EC capsule Take 3 capsules (9 mg) by mouth every morning  3/12/2024    calcium acetate (CALPHRON) 667 MG TABS tablet Take 2,668 mg by mouth 3 times daily (with meals)  3/12/2024    citalopram (CELEXA) 20 MG tablet Take 20 mg by mouth daily as needed (panic attacks)   at prn    folic acid (FOLVITE) 1 MG tablet Take 1 mg by mouth every morning   3/12/2024    lidocaine-prilocaine (EMLA) 2.5-2.5 % external cream three times a week Prior to dialysis  site access on Monday, Wednesday, Friday  3/13/2024    multivitamin RENAL (MULTIVITAMIN RENAL) 1 MG capsule Take 1 capsule by mouth every morning  3/12/2024    omeprazole (PRILOSEC) 20 MG DR capsule Take 20 mg by mouth every morning   3/12/2024    predniSONE (DELTASONE) 20 MG tablet Take 2 tablets (40 mg) by mouth daily for 21 days 3/14/2024: Started 3/2 when discharged from [facility] 3/12/2024    simvastatin (ZOCOR) 20 MG tablet Take 20 mg by mouth every morning   3/12/2024    ustekinumab (STELARA) 90 MG/ML Inject 1 ml ( 90 mg) subcutaneous every 4 weeks. (Patient taking differently: 90 mg every 28 days Inject 1 ml ( 90 mg) subcutaneous every 4 weeks. Last dose 2/1/24)  3/6/2024

## 2024-03-14 NOTE — CONSULTS
RENAL CONSULTATION NOTE    REFERRING MD:  John Powers MD     REASON FOR CONSULTATION:  fluid overload, MWF, consider HD on thursday     HPI:  64 y.o man with with ESRD, Crohn's disease s/p colectomy, severe tricuspid regurgitation and moderate aortic stenosis, who was transferred from [place] for colonic pneumatosis.     Patient says he came to the ER in [place] for scrotal edema x 4 days.   He had some mild abdominal discomfort.   Per report, CT scan showed extensive large bowel pneumatosis.   Currently, he denies abdominal pain.     He gets IHD MWF at [facility] in [place].   He had his full HD treatment on Wednesday with ~ 3 liters UF (per patient).   He says his dialysis team has been trying to dry him out in anticipation of cardiac surgery in April.   He was scheduled to have an extra HD tx on Saturday.       ROS:  A complete review of systems was performed and is negative except as noted above.    PMH:    Past Medical History:   Diagnosis Date    Aortic dissection (H)     distal, thin.  stable/chronic on MRCP 3/2022    Benign essential hypertension     CAD (coronary artery disease)     Cerebral infarction (H)     Crohn's colitis (H)     Crohn's disease of large intestine (H) 11/22/2021    Current smoker     Esophageal reflux     ESRD (end stage renal disease) on dialysis (H)     History of basal cell carcinoma     Hypertension     Mixed hyperlipidemia     NSTEMI (non-ST elevated myocardial infarction) (H)     PAF (paroxysmal atrial fibrillation) (H)        PSH:    Past Surgical History:   Procedure Laterality Date    ABDOMEN SURGERY      x 6.  colon resections for crohn's disease    APPENDECTOMY      CHOLECYSTECTOMY      COLONOSCOPY N/A 07/20/2021    Procedure: COLONOSCOPY, WITH POLYPECTOMY AND BIOPSY;  Surgeon: Eric Preston MD;  [facility]    COLONOSCOPY N/A 12/7/2022    Procedure: COLONOSCOPY, WITH POLYPECTOMY AND BIOPSY;  Surgeon: Willian Kee MD;  Location:  GI     COLONOSCOPY N/A 2/27/2024    Procedure: COLONOSCOPY;  Surgeon: Judson Woods MD;  Location: [facility]    CV CORONARY ANGIOGRAM N/A 05/25/2021    Procedure: CV CORONARY ANGIOGRAM;  Surgeon: Judson Ybarra MD;  [facility]    CV CORONARY ANGIOGRAM N/A 2/15/2024    Procedure: Coronary Angiogram;  Surgeon: Tico Finn MD;  [facility]    CV LEFT HEART CATH N/A 2/15/2024    Procedure: Left Heart Catheterization;  Surgeon: Tico Finn MD;  Location:  HEART CARDIAC CATH LAB    CV RIGHT HEART CATH MEASUREMENTS RECORDED N/A 2/15/2024    Procedure: Right Heart Catheterization;  Surgeon: Tico Finn MD;  Location:  HEART CARDIAC CATH LAB    ESOPHAGOSCOPY, GASTROSCOPY, DUODENOSCOPY (EGD), COMBINED N/A 07/20/2021    Procedure: ESOPHAGOGASTRODUODENOSCOPY, WITH FINE NEEDLE ASPIRATION BIOPSY, WITH ENDOSCOPIC ULTRASOUND GUIDANCE;  Surgeon: Eric Preston MD;  Location:  GI    ESOPHAGOSCOPY, GASTROSCOPY, DUODENOSCOPY (EGD), COMBINED N/A 2/27/2024    Procedure: ESOPHAGOGASTRODUODENOSCOPY;  Surgeon: Judson Woods MD;  Location: [facility]    IR DIALYSIS FISTULOGRAM LEFT  12/01/2022    IR DIALYSIS FISTULOGRAM LEFT  1/13/2023    LAPAROTOMY, LYSIS ADHESIONS, COMBINED N/A 03/17/2022    Procedure: Laparotomy, extensive lysis adhesions, combined;  Surgeon: Sarath Smith MD;  [facility]    PANCREATECTOMY PARTIAL N/A 03/17/2022    Procedure: Open Subtotal Pancreatectomy, intra-op ultrasound;  Surgeon: Sarath Smith MD;  [facility]    REVISION FISTULA ARTERIOVENOUS UPPER EXTREMITY Left 2/14/2023    Procedure: LEFT UPPER ARM FISTULA OUTFLOW REVISION FROM CEPHALIC VEIN TO JUGULAR VEIN WITH 10mm THIN-WALLED RINGED POLYTETRAFLUEROETHYLINE;  Surgeon: Mo Gramajo MD;  Location: [facility]    TRANSESOPHAGEAL ECHOCARDIOGRAM INTRAOPERATIVE N/A 1/11/2024    Procedure: ECHOCARDIOGRAM, TRANSESOPHAGEAL, WITH ANESTHESIA;   Surgeon: GENERIC ANESTHESIA PROVIDER;  [facility]    VASCULAR SURGERY      dialysis access- left upper       MEDICATIONS:     insulin aspart  1-7 Units Subcutaneous Q4H    nicotine  1 patch Transdermal Daily    pantoprazole  40 mg Intravenous Q12H    piperacillin-tazobactam  2.25 g Intravenous Q8H    sodium chloride (PF)  3 mL Intracatheter Q8H       ALLERGIES:    Allergies as of 03/13/2024 - Reviewed 03/06/2024   Allergen Reaction Noted    Lisinopril Anaphylaxis and Other (See Comments) 09/26/2020       FH:    Family History   Problem Relation Age of Onset    Breast Cancer Mother     Coronary Artery Disease Father     Hypertension Brother     Anesthesia Reaction No family hx of     Thrombosis No family hx of        SH:    Social History     Socioeconomic History    Marital status:      Spouse name: Not on file    Number of children: 1    Years of education: Not on file    Highest education level: Not on file   Occupational History    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 Not Asked   Social History Narrative    Not on file     Social Determinants of Health     Financial Resource Strain: Not on file   Food Insecurity: Not on file   Transportation Needs: Not on file   Physical Activity: Not on file   Stress: Not on file   Social Connections: Not on file   Interpersonal Safety: Not on file   Housing Stability: Not on file       PHYSICAL EXAM:    /64 (BP Location: Right arm)   Pulse 91   Temp 97.2  F (36.2  C) (Axillary)   Resp 23   Ht 1.803 m  "(5' 11\")   Wt 72.4 kg (159 lb 9.8 oz)   SpO2 100%   BMI 22.26 kg/m    GENERAL: NAD  HEENT:  Normocephalic. No gross abnormalities.  Pupils equal.  MMM.    CV: RRR, + murmurs, no clicks, gallops, or rubs, 2-3+ pitting edema.   RESP: Clear bilaterally with good efforts  GI: Abdomen Soft, ND.  MUSCULOSKELETAL: extremities 2-3+ edema  SKIN: no suspicious lesions or rashes, dry to touch  NEURO:  Awake, alert and conversing normally  PSYCH: mood good, affect appropriate  LYMPH: No palpable ant/post c  L upper AVF     LABS:      CBC RESULTS:     Recent Labs   Lab 03/14/24  0535   WBC 17.8*   RBC 1.59*   HGB 5.8*   HCT 17.3*   PLT 62*       BMP RESULTS:  Recent Labs   Lab 03/14/24  0836 03/14/24  0535   NA  --  136   POTASSIUM  --  3.3*   CHLORIDE  --  95*   CO2  --  22   BUN  --  54.9*   CR  --  5.37*   * 256*   KARY  --  8.4*       INR  Recent Labs   Lab 03/14/24  0535   INR 1.96*        DIAGNOSTICS:  Reviewed    A/P:  64 y.o man with ESRD, admitted for colonic pneumatosis.     # ESRD:    -3.5 hrs   -LAVF   -challenge EDW   -[facility] in [place]   -Dr. Scherer    # Hypervolemia: challenge EDW    # Anemia: Hgb is low. He needs PRBC transfusion.     # Colonic pneumatosis? Crohn's s/p colectomy.    -Zosyn    # Severe valves disease.     Plan:  # Can try 2 hrs HD for UF today or Saturday. No urgency. Regular HD schedule is MWF.   # Needs 2 units PRBC  # Colorectal consult for colonic pneumatosis    Donta De La Torre MD  [facility] - Nephrology  Office Phone: [phone]  Pager: [phone]   "

## 2024-03-14 NOTE — H&P
"Assessment/Plan    64M hx of Crohn's disease status post colectomy on Stelaris, history of small bowel ulcer in 12/2022, chronic thrombocytopenia, severe mitral stenosis, severe tricuspid regurgitation, moderate aortic stenosis, nonobstructive mild coronary artery disease, ESRD on HD (MWF), hypertension, hyperlipidemia, GERD, history of abdominal aorta partially calcified dissection, history of hemorrhagic proteinaceous renal cysts  transferred from [place] for colonc penumatosis, scrotal swelling and anemia.     Sepsis 2/2 Colonic Pneumatosis  Lactic Acidosis, Anion Gap  ``Hx: Patient presenting with complaints of 4 days of scrotal swelling without tenderness, along with LE edema. Complaining of mild abdominal pain but no nausea/vomiting/constipation. Has chronic soft stools in setting of short gut. No fevers/chills. Compliant with dialysis without any acute dyspnea.   ``Vitals, Exam: [id number], abdominal exam shows diffuse very mild tenderness, with evidence of scars from previous surgeries  ``Labs: lactate 4.5>3, WBC 21.6, trops 389  ``Imaging: CT showed extensive large bowel pneumoatosis  ``ER Course: zosyn, 250cc bolus prn ordered but unsure if given, 100cc of RBC transfusion before line blew.   --NPO  --Hold IVF given fluid overload (elevated lactate should improve with HD)  --prn tylenol  --colorectal consult  --blood cultures  --zosyn q6  --repeat lactate/wbc  >>Fits sepsis criteria given tachycardia, leukocytosis and source being abdominal pneumoatosis.     Acute on Chronic Anemia  Acute on Chronic Coagulopathy  Hx recurret anemias 2/2 GIB from [facility]BO Crohn's flares  Chronic thrombocytopenia  Hx Epistaxix  ``Patient had episode of episatxic in 2/2024  ``Has hx of chronic thrombocytopenia since 2023  ``Had episode of acute anemia in 2/2024, requiring hospitalisation that resulted in 4 units of PRBC transfused. Endoscopy was performed showing \"congested and erythematous mucosa in the stomach consistent with reactive " "gastropathy. Colonoscopy with poor preparation and multiple large ulcers in the ileum one of which had adherent clot with visible vessel- treated with epinephrine and cautery with resolution of bleeding. \"  ``no GI bleeding complaints or epistaxis from the patient.   ``Hb 5.7, plts 61, Fibrongen 300s, TIBC low (suggesting chronic disease) transfused only 100cc of blood at [place] before line infiltrated , INR 1.96 (baseline 1.4 though has been 2.0 in 2/2024)  --CBC q8  --PPI IV BID  --GI consult  --Ddimer (if positive, wont  as could be 2/2 ESRD and wont order CT imaging as no evidence of PE, but if negative could help rule out DIC)  --HemeOnc consulted personally by Nocturnist>ESR, Vit K and Mixing study ordered. Hold off on PCC/FFP  --will transfuse 1 unit for now over 4 hours to prevent overload (consent obtained overnight)    Scrotal Swelling  Anasarca  Chronic Hyponatremia  Hypoalbuminemia  Hx ESRD on HD  ``MWF w/ LUE AV fistula  ``hx of flash pulm edema 10/2023 due to anxiety+excess water intake  ``Ultrasound showed bilateral moderate hydrocyeles  ``Na 129, likely in setting of anasarca, ESRD  ``exam shows LE edema, hand edema and scrotal edema.  ``Patient makes urine once a day, minimal amount  --Nephrology consult to assess if patient can tolerate an extra session on 3/14/2024 given anasarca  --resume calcitriol/phos binders once confirmed  --Mod CHO/renal diet once NPO completed  --trend Na daily  --nutrition consult    Type 2 NSTEMI  Chronic dyspnea 2/2 the below:  Severe Mitral Stenosis  Severe Tricuspid Regurgitation  Moderate Aortic Stenosis  Moderate Pulmonary Hypertesnion  Non Obstructive Mild Coronary Artery Disease  Afib  HTN  ``pending \"mechanical MVR, AVR, tricuspid valve repair vs. replacement with a bioprosthetic valve, bilateral Pulmonary vein Isolation and Left Atrial Appendage Ligation \"  ``GUERO in 2024 showed EF 55-60 w/ severe Mitral insufisicncy 2/2 annular calcification, " moderate Aortic Stenosis, Moderate Pulm HTN, Tricuspid insufficicency  ``LHC 2024: Left sided filling pressures are severely elevated.Moderately elevated pulmonary artery hypertension, Normal cardiac output level. Prox RCA lesion is 30% stenosed. 2nd Mrg lesion is 30% stenosed. Right sided filling pressures are moderately elevated.  ``RHC showed marked volume overload (L>R)  ``patient has chronic mild dyspnea, but no acute symptoms. Chest clear, Edema in hands/feet/scrotum. Systolic murmur present  ``CXR showed mild vascular congestion with faint opacities  ``BNP  88654  ``EKG shows sinus tachycardia with bifasicular block, LVH, PACs  --Nephro consult for HD for volume control  --resume coreg once confirmed  --prn hydralazine  --cardiology consult  --Holding off on heparin drip or aspirin, as trops of 389 likely 2/2 chronic heart disease and ESRD. No acute SOB/Chest to consider ACS.  >>Despite LE edema and anasarca, I do not suspect that these are due to decompensated valvular heart disease, but more fluid overload given ESRD. Despite high trop, I do not suspect active ACS, and as apart from his lab value, no EKG/Physical findings/Symptoms to suggest active cardiac disease. However, I will obtain cardiology consult given probable endoscopy to be performed by GI given anemia and hx of chrons    Hx QTC prolongation  ``last Qtc 520s in 2/2024  ``repeat   --limit Qtc prolonging medications such as zofran  --cardiac telemetry  --Mg and Phos add ons    Hx of Crohns Disease s/p Colectomy on Stelaris  Hx of Small Bowel Ulcer in 12/2022  Peristomal Hernia  Chronic Pancreatitis  `` patient only has 9 feet of bowel left due to right hemicolectomy, multiple small bowel resections, and ileocolic resection with ileostomy/takedown since diagnosis in the 1970s  ``Hx of chronic pancreatitis, withIntraductal papillary mucinous neoplasm with high grade dysplasia (EUS 8/2022)   ``recent colonoscopy in 2/2024 showing multiple  large ulcers in the ileum  ``has chronic 4-5 loose BM a day  --Resume home steroids once confirmed  --GI consulted (more so for the above stated anemia)    Hyperglycemia  Neuropathy  `` at [place], though no evidence that insulin was collected  ``A1c 5.8, based on elevated FS, patient may have newly diagnosed diabetes  --ISS, repeat FS immediately  --Mod CHO/renal diet once NPO completed  --resume gabapentin once confirmed    HLD  --resume statin once confirmed    Hypokalemia  ``3.2 at [place], no evidence that it was repleted  ``likely in setting of chronic loose stools  --will repeat labs  Addendum: RN based repletion ordered    Chronic abdominal aorta partially calcified dissection since 4/2021  ``noted on previous imaging  --No acute intervention    Possible remote hx of CVA  ``no neuro deficits, but patient states possible hx of CVA, and that it might have caused his ESRD  --resume statin once confirmed    GERD  --resume PPI once confirmed    Anxiety  Depression  --resume celexa once confirmed    Complex L renal cyst  Hx Hemorrhagic/proteinaceous renal cysts  ``CT showed L complex renal cyst 7.5cm  --US renal    BCC  ``resected in 2021  --no intervention    Arthralgias  --tylenol PRN    MICHAEL  Nicotine User  ``25-40, pack year history  --Continuous oximetry with PRN oxygen  --nicotine protocol    Supportive Care  DVT ppx: SCDs  Diet: NPO  Pain Control: Tylenol  Upper GI ppx: zofran  Lower GI ppx: senna  Lines/Catheters: IV  TTE/Dopplers: None  Contact Precautions: none  PT/OT/Social/Wound/Palliative Consults: None  Code Status: Full Code     History of Present Illness  Subjective: Patient presenting with complaints of 4 days of scrotal swelling without tenderness, along with LE edema. Complaining of mild abdominal pain but no nausea/vomiting/constipation. Has chronic soft stools in setting of short gut. No fevers/chills. Compliant with dialsyis without any acute dyspnea.     ROS: 10 point ROS neg other than the  symptoms noted above in the HPI.     Physical Exam  /64 (BP Location: Right arm, Patient Position: Dangled, Cuff Size: Adult Regular)   Pulse 73   Temp 97.5  F (36.4  C) (Oral)   Wt 72.4 kg (159 lb 9.8 oz)   BMI 22.26 kg/m      Constitutional: Alert and oriented to person, place and time; no apparent distress.   HEENT: Normocephalic, no scleral icterus  Abdomen: soft, no distention/guarding, multiple scars present, mild diffuse tenderness  : testicular swelling w/o tenderness  Lungs: lungs clear to auscultation bilaterally  Heart: Regular s1s2, systolic murmur present  Extremities/Neuro:No focal strength/sensation deficits, LE edema present bilaterally  Skin: No obvious signs of skin breakdown. AV fistula  Psychiatric: appropriate affect, insight and judgment    I have personally spent 86 minutes total time today in preparing to see the patient (eg, review of tests), obtaining and/or reviewing separately obtained history, performing a medically appropriate examination and/or evaluation, counseling and educating the patient/family/caregiver, ordering medications, tests, or procedures, referring and communicating with other health care professionals, documenting clinical information in the electronic or other health record, independently interpreting results and communicating results to the patient/ family/caregiver and care coordination.    EMR History    Past Medical Hx:   Past Medical History:   Diagnosis Date    Aortic dissection (H)     distal, thin.  stable/chronic on MRCP 3/2022    Benign essential hypertension     CAD (coronary artery disease)     Cerebral infarction (H)     Crohn's colitis (H)     Crohn's disease of large intestine (H) 11/22/2021    Current smoker     Esophageal reflux     ESRD (end stage renal disease) on dialysis (H)     History of basal cell carcinoma     Hypertension     Mixed hyperlipidemia     NSTEMI (non-ST elevated myocardial infarction) (H)     PAF (paroxysmal atrial  fibrillation) (H)         Home Medications: Present in Med Rec    Allergies:   Allergies   Allergen Reactions    Lisinopril Anaphylaxis and Other (See Comments)     Shortness of breath        Pertinent Family Hx:   Family History   Problem Relation Age of Onset    Breast Cancer Mother     Coronary Artery Disease Father     Hypertension Brother     Anesthesia Reaction No family hx of     Thrombosis No family hx of         Surgical Hx:   Past Surgical History:   Procedure Laterality Date    ABDOMEN SURGERY      x 6.  colon resections for crohn's disease    APPENDECTOMY      CHOLECYSTECTOMY      COLONOSCOPY N/A 07/20/2021    Procedure: COLONOSCOPY, WITH POLYPECTOMY AND BIOPSY;  Surgeon: Eric Preston MD;  [facility]    COLONOSCOPY N/A 12/7/2022    Procedure: COLONOSCOPY, WITH POLYPECTOMY AND BIOPSY;  Surgeon: Willian Kee MD;  Location:  GI    COLONOSCOPY N/A 2/27/2024    Procedure: COLONOSCOPY;  Surgeon: Judson Woods MD;  Location: [facility]    CV CORONARY ANGIOGRAM N/A 05/25/2021    Procedure: CV CORONARY ANGIOGRAM;  Surgeon: Judson Ybarra MD;  Location:  HE[facility] CARDIAC CATH LAB    CV CORONARY ANGIOGRAM N/A 2/15/2024    Procedure: Coronary Angiogram;  Surgeon: Tico Finn MD;  Location:  [facility]ARD[facility] CATH LAB    CV LEFT HEART CATH N/A 2/15/2024    Procedure: Left Heart Catheterization;  Surgeon: Tico Finn MD;  Location:  [facility]ATH [facility]    CV RIGHT HEART CATH MEASUREMENTS RECORDED N/A 2/15/2024    Procedure: Right Heart Catheterization;  Surgeon: Tico Finn MD;  Location:  [facility] CARDIAC CATH LAB    ESOPHAGOSCOPY, GASTROSCOPY, DUODENOSCOPY (EGD), COMBINED N/A 07/20/2021    Procedure: ESOPHAGOGASTRODUODENOSCOPY, WITH FINE NEEDLE ASPIRATION BIOPSY, WITH ENDOSCOPIC ULTRASOUND GUIDANCE;  Surgeon: Eric Preston MD;  Location:  GI    ESOPHAGOSCOPY, GASTROSCOPY, DUODENOSCOPY (EGD), COMBINED N/A 2/27/2024    Procedure:  ESOPHAGOGASTRODUODENOSCOPY;  Surgeon: Judson Woods MD;  Location: [facility]    IR DIALYSIS FISTULOGRAM LEFT  12/01/2022    IR DIALYSIS FISTULOGRAM LEFT  1/13/2023    LAPAROTOMY, LYSIS ADHESIONS, COMBINED N/A 03/17/2022    Procedure: Laparotomy, extensive lysis adhesions, combined;  Surgeon: Sarath Smith MD;  [facility]    PANCREATECTOMY PARTIAL N/A 03/17/2022    Procedure: Open Subtotal Pancreatectomy, intra-op ultrasound;  Surgeon: Sarath Smith MD;  [facility]    REVISION FISTULA ARTERIOVENOUS UPPER EXTREMITY Left 2/14/2023    Procedure: LEFT UPPER ARM FISTULA OUTFLOW REVISION FROM CEPHALIC VEIN TO JUGULAR VEIN WITH 10mm THIN-WALLED RINGED POLYTETRAFLUEROETHYLINE;  Surgeon: Mo Gramajo MD;  Location: [facility]    TRANSESOPHAGEAL ECHOCARDIOGRAM INTRAOPERATIVE N/A 1/11/2024    Procedure: ECHOCARDIOGRAM, TRANSESOPHAGEAL, WITH ANESTHESIA;  Surgeon: GENERIC ANESTHESIA PROVIDER;  [facility]    VASCULAR SURGERY      dialysis access- left upper        Substance Hx:   History   Drug Use Unknown   ,  Social History    Substance and Sexual Activity      Alcohol use: Yes        Comment: 2-3 drinks once a month  ,   History   Smoking Status    Every Day    Packs/day: 0.25    Years: 50.00    Types: Cigarettes   Smokeless Tobacco    Never   ,   History   Sexual Activity    Sexual activity: Not on file        Imaging/Labs    Imaging: No results found.     Recent Labs   Lab Test 02/27/24  0728 02/26/24  0722   * 136   POTASSIUM 4.1 5.1   CHLORIDE 93* 100   CO2 26 22   ANIONGAP 11 14   GLC 95 95   BUN 29.8* 52.8*   CR 5.71* 8.45*   KARY 8.4* 8.1*     CBC RESULTS:   Recent Labs   Lab Test 03/01/24  1331 03/01/24  0810   WBC  --  9.8   RBC  --  2.37*   HGB 8.7* 7.9*   HCT  --  24.6*   MCV  --  104*   MCH  --  33.3*   MCHC  --  32.1   RDW  --  22.5*   PLT  --  81*      Liver Function Studies -   Recent Labs   Lab Test 02/25/24  1333   PROTTOTAL 5.5*   ALBUMIN 3.1*   BILITOTAL 0.8   ALKPHOS  "95   AST 36   ALT 27      No results found for: \"TROPI\"       "

## 2024-03-14 NOTE — CONSULTS
"CLINICAL NUTRITION SERVICES  -  ASSESSMENT NOTE      Recommendations Ordered by Registered Dietitian (RD):   Ordered renal multivitamin      Malnutrition:   % Weight Loss:  Weight loss does not meet criteria for malnutrition-- 4.1% loss w/in 3 months  % Intake:  Decreased intake does not meet criteria for malnutrition  Subcutaneous Fat Loss:  Orbital region moderate depletion, Upper arm region  mild depletion, Thoracic region severe depletion, and Lumbar region severe depletion  Muscle Loss:  Temporal region moderate depletion, Clavicle bone region severe depletion, Acromion bone region severe depletion, Scapular bone region severe depletion, and Dorsal hand region severe depletion-- ESTRELLITA lower body d/t edema   Fluid Retention:  Mild to moderate BLE, scrotum, bilateral arm edema    Malnutrition Diagnosis: Severe malnutrition in the context of --  Chronic illness or disease         REASON FOR ASSESSMENT  Tacos Dominguez is a 64 year old male seen by Registered Dietitian for Provider Order - \"hypoalbunemia\"      NUTRITION HISTORY  Information obtained from chart review and d/w pt     PMH of HTN, HLD, GERD, ESRD on HD (MWF w/ LUE AV fistula), anxiety, depression, Neuropathy, Chronic thrombocytopenia, Chronic Hyponatremia, Crohn's Disease s/p Colectomy on Stelaris (9 feet of bowel left 2/2 right hemicolectomy, multiple small bowel resections, and ileocolic resection with ileostomy/takedown since diagnosis in the 1970s), Hx of recurrent anemia 2/2 GIB from SBO Crohn's flares, Hx of Small Bowel Ulcer (12/2022), Chronic abdominal aorta partially calcified dissection (4/2021), Peristomal Hernia, Complex L renal cyst, Hx Hemorrhagic/proteinaceous renal cysts, Chronic Pancreatitis, Arthralgias, MICHAEL, Possible remote hx of CVA, Hx of BCC, Hx of tobacco use disorder; Chronic dyspnea 2/2 Severe Mitral Stenosis, Severe Tricuspid Regurgitation, Moderate Aortic Stenosis, Moderate Pulmonary Hypertesnion, Non Obstructive Mild Coronary " "Artery Disease; Afib; Hx QTC prolongation     Per chart review, assessed by OP RD for SOT kidney eval in 2021.     Transferred from [place]   Admitted for: Sepsis 2/2 Colonic Pneumatosis; Anasarca; Type 2 NSTEMI    Pt reported that he has been trying to focus on eating enough protein at home prior to admission. Last good meal 8 am Wednesday. Endorsed \"loose stools\" prior to admission. Has a protein bar at dialysis. Takes thera-vit-m. Lost about 10# since end of February, unsure of cause. Tries to maintain UBW of about 170#      CURRENT NUTRITION ORDERS  Diet: NPO for Medical/Clinical Reasons Except for: Meds, Ice Chips      Current Intake/Tolerance:  NPO upon admit      NUTRITION FOCUSED PHYSICAL ASSESSMENT FOR DIAGNOSING MALNUTRITION)  Yes    Observed:    Muscle wasting (refer to documentation in Malnutrition section)  Subcutaneous fat loss (refer to documentation in Malnutrition section)  Fluid retention (refer to documentation in Malnutrition section)  Dry skin    Obtained from Chart/Interdisciplinary Team:  GI, cardiology, hem/onc, nephrology, CRS consulted      ANTHROPOMETRICS  Height: 5' 11\"  Weight: 159 lbs 9.81 oz (72.4 kg)  Body mass index is 22.26 kg/m .  Weight Status:  Normal BMI  IBW: 78.2 kg  % IBW: 93%  Weight History:   Wt loss of 3.1 kg over past 3 months (4.1%)  Wt loss of 9.7 kg over past 14 months (12%)   03/14/24 72.4 kg (159 lb 9.8 oz)   02/25/24 70.5 kg (155 lb 8 oz)   02/15/24 73.2 kg (161 lb 6 oz)   02/01/24 72.4 kg (159 lb 9.6 oz)   01/11/24 75.5 kg (166 lb 7.2 oz)   12/26/23 78.8 kg (173 lb 12.8 oz)   12/18/23 73.8 kg (162 lb 11.2 oz)   11/28/23 73 kg (160 lb 15 oz)   06/26/23 76.2 kg (167 lb 15.9 oz)   05/24/23 82.1 kg (181 lb)   02/14/23 82.1 kg (181 lb)   01/31/23 82.1 kg (181 lb 1.6 oz)   12/07/22 80.3 kg (177 lb)   04/04/22 72.2 kg (159 lb 1.6 oz)   03/21/22 76.1 kg (167 lb 12.3 oz)     Care everywhere--  10/30/23 : 71.7 kg (158 lb 1.1 oz)  06/14/23 : 72.9 kg (160 lb 12.8 " oz)  12/05/22 : 80.3 kg (177 lb)      LABS  Component Value Date   POTASSIUM 3.3 (L) 03/14/2024   BUN 54.9 (H) 03/14/2024   CR 5.37 (H) 03/14/2024   MAG 1.5 (L) 03/14/2024   PHOS 6.7 (H) 03/14/2024     Lab Test 03/14/24  0535   BILITOTAL 1.5*     Component Value Date   ALBUMIN 2.9 03/14/2024   Of note, albumin is a poor indicator of nutritional status, especially with stress factors and likely increased acute phase response (in response to inflammation and/or infection). During inflammation, the liver synthesizes CRP (a positive acute-phase protein) at the expense of albumin (a negative acute-phase protein).    No CRP lab value on file for this admission    Hemoglobin A1C   Date Value Ref Range Status   03/14/2024 5.8 (H) <5.7 % Final     Lab 03/14/24  0535   *        MEDICATIONS   insulin aspart (MSSI)  1-7 Units Subcutaneous Q4H    nicotine  1 patch Transdermal Daily    pantoprazole  40 mg Intravenous Q12H    phytonadione  10 mg Intravenous Once         ASSESSED NUTRITION NEEDS PER APPROVED PRACTICE GUIDELINES:  Dosing Weight: 72.4 kg (3/14)  Estimated Energy Needs: 6224-6343 kcal (30-35 Kcal/Kg)  Justification: dialysis, IBW <100%  Estimated Protein Needs: 109-130 grams protein (1.5-1.8 g pro/Kg)  Justification: dialysis, IBW <100%  Estimated Fluid Needs: 1 mL/kcal  Justification: maintenance         MALNUTRITION:  % Weight Loss:  Weight loss does not meet criteria for malnutrition-- 4.1% loss w/in 3 months  % Intake:  Decreased intake does not meet criteria for malnutrition  Subcutaneous Fat Loss:  Orbital region moderate depletion, Upper arm region  mild depletion, Thoracic region severe depletion, and Lumbar region severe depletion  Muscle Loss:  Temporal region moderate depletion, Clavicle bone region severe depletion, Acromion bone region severe depletion, Scapular bone region severe depletion, and Dorsal hand region severe depletion-- ESTRELLITA lower body d/t edema   Fluid Retention:  Mild to moderate BLE,  scrotum, bilateral arm edema    Malnutrition Diagnosis: Severe malnutrition in the context of --  Chronic illness or disease        NUTRITION DIAGNOSIS:  Malnutrition related to chronic illnesses (including crohn's disease, ESRD on HD) as evidenced by mild-severe fat and muscle losses, IBW of 93%, wt loss of 4.1% over past 3 months        NUTRITION INTERVENTIONS  Recommendations / Nutrition Prescription  Ordered renal multivitamin     Implementation  Multivitamin/mineral supplement therapy      Nutrition Goals  Diet advancement past NPO w/in 3 days   Pt to consume >75% of 3 meals/day when diet allows   Wt >72 kg       MONITORING AND EVALUATION:  Progress towards goals will be monitored and evaluated per protocol and Practice Guidelines      Jocelynn Talamantes RD, LD   Pager: [phone]

## 2024-03-14 NOTE — CONSULTS
"[facility]  Colon and Rectal Surgery Consult Note  Name: Tacos Dominguez    MRN: [id number]  YOB: 1960    Age: 64 year old  Date of admission: 3/14/2024  Primary care provider: Good Pham     Requesting Physician: Dr. Powers   Reason for consult:  Colonic pneumatosis           History of Present Illness:   Tacos Dominguez is a 64 year old male with a history of Crohn's disease (On Stelara) s/p ESRD on HD, HTN, HLD, severe mitral valve stenosis, severe tricuspid regurg, CAD, chronic thrombocytopenia, seen at the request of Dr. Powers, presents as a transfer from [place] due to colonic pneumatosis, scrotal swelling, and anemia.     The patient reports that about 4 days ago he developed nontender scrotal swelling along with lower extremity edema. He presented to the ER given the swelling. A CT scan was performed at [place] which revealed colonic pneumatosis. Unfortunately, the images are not available for review. He is currently afebrile and all other vital signs are within normal limits. His labs are remarkable for potassium 3.3, creatinine 5.37,calcium 8.4, magnesium 1.5,phosphorus 6.7, albumin 2.9, total protein 4.5, total bili 1.5, lactic acid 2.2, WBC 17.8, and hgb of 5.8. He has been given one unit of PRBC's and we are awaiting repeat hgb. The patient has been started on IV antibiotics and admitted for further management.     Today, the patient is comfortably resting in bed. He currently denies any abdominal pain, nausea, and vomiting. He follows with Dr. Ponce for his Crohn's disease and is on Stelara every 4 weeks. He typically has 4-6 stools a day. These used to be watery in consistency but has started to take one dose of Imodium a day and his stools are now \"oatmeal like\" in consistency. He denies any recent bleeding other than occasional BRB spotting on the toilet paper which he believes is from the external skin. No recent NSAID use. Per GI, patient " has been on 40mg of prednisone for the last 2 weeks and has not tapered.     Of note, the patient reports he has an upcoming cardiac surgery in April.     Colonoscopy History:  2/27/24: Patent end to end ileo-colonic anastomosis 60 cm from the anus. 30 cm proximal to this were multiple large ulcers. There was one visible vessel with oozing blood which was cauterized. No active Crohn's disease.     Surgical History: Appendectomy, Cholecystectomy, subtotal pancreatectomy in 2022, Subtotal colectomy with Dr. Sawyer in 2006, multiple small bowel resections            Past Medical History:     Past Medical History:   Diagnosis Date    Aortic dissection (H)     distal, thin.  stable/chronic on MRCP 3/2022    Benign essential hypertension     CAD (coronary artery disease)     Cerebral infarction (H)     Crohn's colitis (H)     Crohn's disease of large intestine (H) 11/22/2021    Current smoker     Esophageal reflux     ESRD (end stage renal disease) on dialysis (H)     History of basal cell carcinoma     Hypertension     Mixed hyperlipidemia     NSTEMI (non-ST elevated myocardial infarction) (H)     PAF (paroxysmal atrial fibrillation) (H)              Past Surgical History:     Past Surgical History:   Procedure Laterality Date    ABDOMEN SURGERY      x 6.  colon resections for crohn's disease    APPENDECTOMY      CHOLECYSTECTOMY      COLONOSCOPY N/A 07/20/2021    Procedure: COLONOSCOPY, WITH POLYPECTOMY AND BIOPSY;  Surgeon: Eric Preston MD;  Location:  GI    COLONOSCOPY N/A 12/7/2022    Procedure: COLONOSCOPY, WITH POLYPECTOMY AND BIOPSY;  Surgeon: Willian Kee MD;  Location:  GI    COLONOSCOPY N/A 2/27/2024    Procedure: COLONOSCOPY;  Surgeon: Judson Woods MD;  Location: [facility]    CV CORONARY ANGIOGRAM N/A 05/25/2021    Procedure: CV CORONARY ANGIOGRAM;  Surgeon: Judson Ybarra MD;  Location:  HEART CARDIAC CATH LAB    CV CORONARY ANGIOGRAM N/A 2/15/2024    Procedure: Coronary  Angiogram;  Surgeon: Tico Finn MD;  Location:  HEART CARDIAC CATH LAB    CV LEFT HEART CATH N/A 2/15/2024    Procedure: Left Heart Catheterization;  Surgeon: Tico Finn MD;  Location:  HEART CARDIAC CATH LAB    CV RIGHT HEART CATH MEASUREMENTS RECORDED N/A 2/15/2024    Procedure: Right Heart Catheterization;  Surgeon: Tico Finn MD;  Location:  HEART CARDIAC CATH LAB    ESOPHAGOSCOPY, GASTROSCOPY, DUODENOSCOPY (EGD), COMBINED N/A 07/20/2021    Procedure: ESOPHAGOGASTRODUODENOSCOPY, WITH FINE NEEDLE ASPIRATION BIOPSY, WITH ENDOSCOPIC ULTRASOUND GUIDANCE;  Surgeon: Eric Preston MD;  Location:  GI    ESOPHAGOSCOPY, GASTROSCOPY, DUODENOSCOPY (EGD), COMBINED N/A 2/27/2024    Procedure: ESOPHAGOGASTRODUODENOSCOPY;  Surgeon: Judson Woods MD;  Location: [facility]    IR DIALYSIS FISTULOGRAM LEFT  12/01/2022    IR DIALYSIS FISTULOGRAM LEFT  1/13/2023    LAPAROTOMY, LYSIS ADHESIONS, COMBINED N/A 03/17/2022    Procedure: Laparotomy, extensive lysis adhesions, combined;  Surgeon: Sarath Smith MD;  [facility]    PANCREATECTOMY PARTIAL N/A 03/17/2022    Procedure: Open Subtotal Pancreatectomy, intra-op ultrasound;  Surgeon: Sarath Smith MD;  [facility]    REVISION FISTULA ARTERIOVENOUS UPPER EXTREMITY Left 2/14/2023    Procedure: LEFT UPPER ARM FISTULA OUTFLOW REVISION FROM CEPHALIC VEIN TO JUGULAR VEIN WITH 10mm THIN-WALLED RINGED POLYTETRAFLUEROETHYLINE;  Surgeon: Mo Gramajo MD;  Location: [facility]    TRANSESOPHAGEAL ECHOCARDIOGRAM INTRAOPERATIVE N/A 1/11/2024    Procedure: ECHOCARDIOGRAM, TRANSESOPHAGEAL, WITH ANESTHESIA;  Surgeon: GENERIC ANESTHESIA PROVIDER;  [facility]    VASCULAR SURGERY      dialysis access- left upper               Social History:     Social History     Tobacco Use    Smoking status: Every Day     Packs/day: 0.25     Years: 50.00     Additional pack years: 0.00     Total pack years: 12.50     Types:  "Cigarettes     Passive exposure: Current    Smokeless tobacco: Never   Substance Use Topics    Alcohol use: Yes     Comment: 2-3 drinks once a month             Family History:     Family History   Problem Relation Age of Onset    Breast Cancer Mother     Coronary Artery Disease Father     Hypertension Brother     Anesthesia Reaction No family hx of     Thrombosis No family hx of              Allergies:     Allergies   Allergen Reactions    Lisinopril Anaphylaxis and Other (See Comments)     Shortness of breath             Medications:      insulin aspart  1-7 Units Subcutaneous Q4H    nicotine  1 patch Transdermal Daily    - MEDICATION INSTRUCTIONS -   Does not apply Once    pantoprazole  40 mg Intravenous Q12H    piperacillin-tazobactam  2.25 g Intravenous Q8H    sodium chloride (PF)  3 mL Intracatheter Q8H    sodium chloride 0.9%  250 mL Intravenous Once in dialysis/CRRT    sodium chloride 0.9%  300 mL Hemodialysis Machine Once             Review of Systems:   A comprehensive greater than 10 system review of systems was carried out.  Pertinent positives and negatives are noted above.  Otherwise negative for contributory info.            Physical Exam:     Blood pressure 119/71, pulse 89, temperature 97.1  F (36.2  C), temperature source Axillary, resp. rate 11, height 1.803 m (5' 11\"), weight 72.4 kg (159 lb 9.8 oz), SpO2 98%.    Intake/Output Summary (Last 24 hours) at 3/14/2024 1219  Last data filed at 3/14/2024 1042  Gross per 24 hour   Intake 770 ml   Output --   Net 770 ml     EXAM:  GEN: Awake alert and oriented, appears stated age  PULM: Non-labored breathing with normal respiratory effort  CVS: + Peripheral edema  ABD: Soft, non tender, non distended. No rebound or guarding. Hernia on the right. Multiple abdominal scars from previous surgeries  RECTAL: Rectal exam with some stool soiling the skin. No obvious blood. +scrotal swelling  NEURO: CN II-XII grossly intact  MSK: extremeties with no clubbing, " cyanosis or edema; able to ambulate  PSYCH: responsive, alert, cooperative; oriented x3; appropriate mood and affect  EXT/SKIN: inspection reveals no rashes, lesions or ulcers, normal coloring         Data Reviewed:     Results for orders placed or performed during the hospital encounter of 02/25/24   CTA Abdomen Pelvis with Contrast    Narrative    EXAM: CTA ABDOMEN PELVIS WITH CONTRAST  LOCATION: [facility]  DATE: 2/25/2024    INDICATION: Gastrointestinal bleeding. Evaluate for evidence of active bleeding.  COMPARISON: MRCP 10/24/2023.  TECHNIQUE: CT angiogram abdomen pelvis during arterial phase of injection of IV contrast. 2D and 3D MIP reconstructions were performed by the CT technologist. Dose reduction techniques were used.  CONTRAST: 78mL Isovue 370    FINDINGS:    ANGIOGRAM ABDOMEN/PELVIS: No evidence of active gastrointestinal bleeding. Severe aortobiiliac atherosclerosis. Chronic focal dissections within the infrarenal aorta and right common iliac artery. No abdominal aortic aneurysm. Celiac artery and its   branches are patent. Severe ostial stenosis of the superior mesenteric artery. Right hepatic artery arises from the superior mesenteric artery. Severe stenosis at the origins of the bilateral single renal arteries. Inferior mesenteric artery is patent.   Severe stenosis of the proximal right common iliac artery. Moderate stenosis of the left common iliac artery. Severe stenoses of the proximal bilateral internal iliac arteries. Mild stenoses of the bilateral external iliac arteries. Moderate stenoses of   the bilateral common femoral arteries. Visualized superficial femoral and deep femoral arteries are patent. Portal, splenic, and superior mesenteric veins are patent. Perigastric varices.    LOWER CHEST: No consolidations or pleural effusions.    HEPATOBILIARY: Unchanged right hepatic cyst. No new liver lesions. Mild nonspecific heterogeneous enhancement of the posterior right  hepatic lobe    PANCREAS: Status post distal pancreatectomy. Remaining pancreas is normal.    SPLEEN: Normal. Small accessory splenules in the splenic hilum.     ADRENAL GLANDS: Normal.    KIDNEYS/BLADDER: Diffuse bilateral renal cortical atrophy. Multiple small nonobstructing bilateral intrarenal calculi. Hemorrhagic, rim calcified cyst at the left lower pole has slightly decreased in size and now measures up to 9.7 cm. Additional benign   renal cysts are unchanged. No hydronephrosis. Decompressed bladder.    BOWEL: Status post subtotal colectomy. No evidence of bowel obstruction or inflammation. Unchanged right lower quadrant abdominal wall hernia containing a nonobstructed small bowel loop.    LYMPH NODES: No enlarged lymph node.    PELVIC ORGANS: Normal.    MUSCULOSKELETAL: Mild body wall edema. Multilevel degenerative changes of the spine. No acute bony abnormality. Increased density of the bones likely due to renal osteodystrophy.      Impression    IMPRESSION:    1.  No evidence of active gastrointestinal bleeding.    2.  A right lower quadrant ventral abdominal wall hernia containing a nonobstructed loop of the small bowel is unchanged. No bowel obstruction or other acute complications.    3.  A hemorrhagic left renal cyst has slightly decreased in size since 10/24/2023.    4.  Multiple nonobstructing bilateral intrarenal calculi. No hydronephrosis.    5.  Additional nonemergent findings as described in the report body.       Recent Labs   Lab 03/14/24  0535   WBC 17.8*   HGB 5.8*   HCT 17.3*   *   PLT 62*     Recent Labs   Lab 03/14/24  1202 03/14/24  0836 03/14/24  0535   NA  --   --  136   POTASSIUM  --   --  3.3*   CHLORIDE  --   --  95*   CO2  --   --  22   ANIONGAP  --   --  19*   * 244* 256*   BUN  --   --  54.9*   CR  --   --  5.37*   GFRESTIMATED  --   --  11*   KARY  --   --  8.4*   MAG  --   --  1.5*   PHOS  --   --  6.7*   PROTTOTAL  --   --  4.5*   ALBUMIN  --   --  2.9*   BILITOTAL  " --   --  1.5*   ALKPHOS  --   --  96   AST  --   --  20   ALT  --   --  32     Recent Labs   Lab 03/14/24  0535 03/14/24  0534   INR 1.96* 2.14*     Recent Labs   Lab 03/14/24  1059 03/14/24  0535   LACT 2.2* 3.0*     No results for input(s): \"COLOR\", \"APPEARANCE\", \"URINEGLC\", \"URINEBILI\", \"URINEKETONE\", \"SG\", \"UBLD\", \"URINEPH\", \"PROTEIN\", \"UROBILINOGEN\", \"NITRITE\", \"LEUKEST\", \"RBCU\", \"WBCU\" in the last 168 hours.      Assessment and Plan:   Tacos Dominguez is a 64 year old male with a history of Crohn's disease (On Stelara) s/p subtotal colectomy with Dr. Sawyer and multiple small bowel resections, ESRD on HD, HTN, HLD, severe mitral valve stenosis, severe tricuspid regurg, CAD, chronic thrombocytopenia, seen at the request of Dr. Powers, presents as a transfer from [place] due to colonic pneumatosis, scrotal swelling, and anemia. CT scan was performed at [facility] and revealed colonic pneumatosis. Unfortunately, the images are not available for review. Please upload images into PACS. If unable to upload images, may need a repeat CT scan. His hgb is 5.9 but was transfused one unit of PRBCs and are awaiting repeat hgb. WBC 17.8. Patient denies any abdominal pain, nausea, and vomiting. His abdominal exam is benign. He is currently hemodynamically stable. No emergent surgery is indicated as patient's exam is benign. Recommend continued conservative management including bowel rest. Continue to monitor hgb and transfuse if less than 7. Agree with GI, cardiology, and nephrology consults. As his previous surgeries were with Dr. Sawyer, recommend consult with colorectal surgery from the  for further management.     Plan:  Admit to hospitalist  Surgery: No emergent surgery indicated  Diet: NPO  IV Fluids: Per hospitalist  Antibiotics:  As ordered  Medications: Continue home meds per hospitalist  I&O s:  strict I&O s  Labs:   - Reviewed  - Ordered:  None   Imaging:   - Dr. Leslie and myself have " personally viewed: Need CT images in PACS to review  - Ordered: If unable to attain, may need repeat CT scan   Activity: ambulate as tolerated, encourage OOB  DVT prophylaxis: SCD s  This plan has been discussed with Dr. Leslie    Patient specific identified risk factors considered as part of today s evaluation include: Crohn's, ESRD, complicated cardiac history    Additional history obtained from the chart and patient.  Time spent on consultation: 60 minutes, greater than 50 percent of the total encounter time is spent in counseling and/or coordination of care          Madhavi Alva PA-C  [facility]  Phone:  [phone]

## 2024-03-14 NOTE — PROGRESS NOTES
[facility] note    Patient is managed as an outpatient at the [facility]. He was seen by [facility] last because he was hospitalized at [facility] where the [facility] does not cover. Thus, while at [facility] he should be seen by his primary GI provider, the [facility]. This was discussed with Dr. Smith, he will consult the [facility] GI group.    Liam Sharma MD   [facility]  [phone]

## 2024-03-14 NOTE — CONSULTS
"  Gastroenterology Consultation      Date of Admission:  3/14/2024  Reason for Admission: Anemia  Date of Consult  3/14/2024   Requesting Physician:  John Powers MD           ASSESSMENT AND RECOMMENDATIONS:   Assessment:  64 year old male with a history of Crohn's disease s/p colectomy on Stelara s/p multiple surgeries including a right hemicolectomy with minimal remaining bowel,   chronic thrombocytopenia, ESRD on HD (MWF), hypertension, hyperlipidemia, GERD, IPMN with high grade dysplasia (EUS 7/2021), severe MR/severe TR/moderate aortic stenosis with plan for heart surgery in April 2024, CAD, chronic abdominal aorta dissection, recent admission to [facility] for anemia s/p colonoscopy with multiple large ulcers in the ileum, one of which had adherent clot with visible vessel treated with epinephrine and cautery, who initially presented to [place] ED with scrotal swelling, found to have a hgb of 5.7, leukocytosis of 17 and CT scan with evidence of colonic pneumatosis, started on IV antibiotics and transferred to [facility] for further cares. GI consulted for \"crohn's disease, recurrent anemia\".     # Acute on chronic anemia   # Acute on chronic coagulopathy   # Recent GIB r/t large ulcer in ileum with visible vessel s/p epinephrine and cautery    # Hx of epistaxis   # Chronic thrombocytopenia  Hgb 5.7 at [place], repeat ~ 12 hours later 5.8. From chart review appears a unit of blood was ordered, unclear if it was given prior to transfer. S/p 1 unit PRBC 3/14/2024. Hematology consulted, workup pending. Recent admission to [facility] for anemia, found incidentally on labs for pending heart procedure, s/p colonoscopy 2/27/24 with multiple large ulcers in the ileum, one of which had adherent clot with visible vessel treated with epinephrine and cautery, and s/p EGD same day with evidence of congestive and erythematous gastropathy, biopsies consistent with chemical gastritis. On IV PPI BID. No evidence of " "further GIB.   - Monitor hgb, transfuse for hgb <7 from GI perspective   - Monitor for evidence of GIB, appreciate detailed nursing documentation of stool output including quantity and consistency  - Given colonic pneumatosis, no plans for endoscopic evaluation in the absence of overt GI bleed   - Continue PPI BID   - Continue NPO status      # Colonic pneumatosis   # Leukocytosis   Patient asymptomatic with no abdominal pain on exam. Labs notable for lactic acid 4.5 > 3.0 >2.2, leukocytosis 17.8 (in the setting of steroid use). Bcx pending. VSS, noted mild tachycardia on admission to OSH. CT scan without contrast reviewed via paper chart, \"extensive pneumatosis involving large bowel raises concern for ischemia. Large bowel and mildly dilated small bowel loops also extend into a right anterior abdominal wall hernia. Could be related to recent bipolar cautery. Started on IV Zosyn.   - CRS consulted, appreciate recommendations.   - Conservative management with IVF   - Continue IV Zosyn for now     # Crohn's disease s/p multiple surgeries including right hemicolectomy with multiple large ulcers in the ileum   Patient follows with Dr. Rubio, with [facility] IBD, last seen by Darek Romero in IBD clinic 11/28/2023. At that time he had been refusing future colonoscopies. Currently on stelara every 4 weeks, last dose 2/1/24 as March dose has been held for upcoming heart surgery. S/p colonoscopy 2/27/24 (with [facility] while inpatient at [facility]) with poor prep, patent end to end ileo-colonic anastomosis characterized by relatively healthy appearing mucosa, multiple large ulcers in the ileum with a visible vessel with adherent clot that was oozing treated with epinephrine and bipolar cautery, no active Crohn's seen in the colon. Patient was started on budesonide 9mg daily for 30 days and prednisone 40mg daily for 21 days (per [facility] notes, they recommended a prednisone taper).   - Hold all steroids  - Stelara currently held     # ESRD on " "HD MWF  Nephrology following    # Severe MR/severe TR/moderate aortic stenosis  Heart surgery planned for 4/3/24    # IPMN with high grade dysplasia   Seen by Dr. Preston, s/p EUS 7/2021   - MRI abd without contrast in 1 year for surveillance    Gastroenterology follow up recommendations: Appt scheduled for 3/20/24 with Dr. Rubio     Thank you for involving us in this patient's care. Please do not hesitate to contact the GI service with any questions or concerns.     Pt seen and care plan discussed with Dr. Smith, hospitalist, Madhavi Alva PA-C with CRS, and Dr. Rubio, GI staff physician.      Overall time spent on the date of this encounter preparing to see the patient (including chart review of available notes, clinical status events, imaging and labs); obtaining and/or reviewing separately obtained history; ordering medications, tests or procedures; communicating with other health care professionals; and documenting the above clinical information in the electronic medical record was 85 minutes.    Yi Kohler PA-C  GI Service  [facility]  Text Page (Monday-Friday, 8am-4pm)    To page the [facility] GI provider 24 hours a day, please click [facility] and select GASTROENTEROLOGY MEDICINE ADULT / [facility] in the drop-down menu.   -------------------------------------------------------------------------------------------------------------------       Reason for Consultation:   We were asked by John Powers MD of medicine to evaluate this patient with \"crohn's disease, recurrent anemia\".     History is obtained from the patient and the medical record.            History of Present Illness:     Tacos Dominguez is a 64 year old male with a PMH significant for Crohn's disease s/p colectomy on Stelara s/p multiple surgeries including a right hemicolectomy with minimal remaining bowel,   chronic thrombocytopenia, ESRD on HD (MWF), hypertension, hyperlipidemia, GERD, IPMN " "with high grade dysplasia (EUS 7/2021), severe MR/severe TR/moderate aortic stenosis with plan for heart surgery in April 2024, CAD, chronic abdominal aorta dissection, recent admission to [facility] for anemia s/p colonoscopy with multiple large ulcers in the ileum, one of which had adherent clot with visible vessel treated with epinephrine and cautery, who initially presented to [place] ED with scrotal swelling, found to have a hgb of 5.7, leukocytosis of 17 and CT scan with evidence of colonic pneumatosis, started on IV antibiotics and transferred to [facility] for further cares.    He reports that for the past 4 days he has had scrotal swelling. He presented to the ED because it started to interfere with his walking. He denies any tenderness to the scrotum. US at [place] was obtained which showed bilateral hydrocele, no torsion. A CT scan was also obtained that showed colonic pneumatosis. He denies abdominal pain, nausea, vomiting, and had been tolerating a regular diet. He has been afebrile and his vitals signs have otherwise been normal other than mild tachycardia in the ED.     He reports at baseline he has 6 stools daily, usually water \"looks like urine\". He started taking 1 tablet of imodium last week and now continues to have 6 stools daily but the consistency has firmed a little to oatmeal consistency. He denies bloody stools or black stools. He reports intermittent red blood on the toilet paper when wiping. He denies NSAID use. He no longer is smoking. He reports that he is taking 40mg of prednisone daily and budesonide 9mg daily that was prescribed at discharge from [facility].       Crohn's disease hx per last IBD note:   In 1975, patient underwent emergent appendectomy and was diagnosed with CD at that time, underwent right hemicolectomy. Has had multiple surgeries since then including LOAs and a cholecystectomy. He has reported that he has 9 feet of bowel left in total. He underwent resection " with colostomy bag in 4/2006, but that was reversed in 11/2006. Had a peristomal hernia that was not corrected in an effort to avoid further bowel loss.       Previous Procedures:  Colonoscopy on 2/25/2024 for hematochezia, active GIB   Impression:               - Preparation of the colon was poor due to stool                             and blood. Lavaged and suctioned with limited                             visualization.                             - Patent end-to-end ileo-colonic anastomosis,                             characterized by relatively healthy appearing                             mucosa.                             - Multiple huge ulcers in the ileum about 30 cm                             proximal to the anastomosis. Visible vessel with                             adherent clot that was oozing. Injected with                             epinephrine. Treated with bipolar cautery. Bleeding                             stopped completely.                             - The examined portion of the ileum proximal to the                             ulcerated area was normal.                             - The ileum distal to the ulcerated site was                             inflamed. It did not contain any further                             ulcerations however.                             - No active Crohn's seen in the colon.                             - Three diverticulum (or previous fistulas) in the                             rectum. No inflammation around this site.                             - No specimens collected.     EGD on 2/27/2024 for hematochezia, active GIB   Impression:               - Z-line irregular.                             - Congestive and erythematous gastropathy. Biopsied.                             - Normal examined duodenum.     Final Diagnosis   A(1). Stomach, body, biopsy:  -Oxyntic type gastric mucosa with reactive changes of the type seen in reactive gastropathy  (chemical gastritis) (see comment).  -Negative for H. Pylori organisms on routine stains.  -Negative for intestinal metaplasia.  -Negative for dysplasia or malignancy.               Past Medical History:   Reviewed and edited as appropriate  Past Medical History:   Diagnosis Date    Aortic dissection (H)     distal, thin.  stable/chronic on MRCP 3/2022    Benign essential hypertension     CAD (coronary artery disease)     Cerebral infarction (H)     Crohn's colitis (H)     Crohn's disease of large intestine (H) 11/22/2021    Current smoker     Esophageal reflux     ESRD (end stage renal disease) on dialysis (H)     History of basal cell carcinoma     Hypertension     Mixed hyperlipidemia     NSTEMI (non-ST elevated myocardial infarction) (H)     PAF (paroxysmal atrial fibrillation) (H)             Past Surgical History:   Reviewed and edited as appropriate   Past Surgical History:   Procedure Laterality Date    ABDOMEN SURGERY      x 6.  colon resections for crohn's disease    APPENDECTOMY      CHOLECYSTECTOMY      COLONOSCOPY N/A 07/20/2021    Procedure: COLONOSCOPY, WITH POLYPECTOMY AND BIOPSY;  Surgeon: Eric Preston MD;  Location:  GI    COLONOSCOPY N/A 12/7/2022    Procedure: COLONOSCOPY, WITH POLYPECTOMY AND BIOPSY;  Surgeon: Willian Kee MD;  Location:  GI    COLONOSCOPY N/A 2/27/2024    Procedure: COLONOSCOPY;  Surgeon: Judson Woods MD;  Location: [facility]    CV CORONARY ANGIOGRAM N/A 05/25/2021    Procedure: CV CORONARY ANGIOGRAM;  Surgeon: Judson Ybarra MD;  Location: [facility] CARDIAC CATH LAB    CV CORONARY ANGIOGRAM N/A 2/15/2024    Procedure: Coronary Angiogram;  Surgeon: Tico Finn MD;  Location:  HEART CARDIAC CATH LAB    CV LEFT HEART CATH N/A 2/15/2024    Procedure: Left Heart Catheterization;  Surgeon: Tico Finn MD;  Location: [facility] CARDIAC CATH LAB    CV RIGHT HEART CATH MEASUREMENTS RECORDED N/A 2/15/2024    Procedure: Right Heart  Catheterization;  Surgeon: Tico Finn MD;  Location:  HEART CARDIAC CATH LAB    ESOPHAGOSCOPY, GASTROSCOPY, DUODENOSCOPY (EGD), COMBINED N/A 07/20/2021    Procedure: ESOPHAGOGASTRODUODENOSCOPY, WITH FINE NEEDLE ASPIRATION BIOPSY, WITH ENDOSCOPIC ULTRASOUND GUIDANCE;  Surgeon: Eric Preston MD;  Location:  GI    ESOPHAGOSCOPY, GASTROSCOPY, DUODENOSCOPY (EGD), COMBINED N/A 2/27/2024    Procedure: ESOPHAGOGASTRODUODENOSCOPY;  Surgeon: Judson Woods MD;  Location: [facility]    IR DIALYSIS FISTULOGRAM LEFT  12/01/2022    IR DIALYSIS FISTULOGRAM LEFT  1/13/2023    LAPAROTOMY, LYSIS ADHESIONS, COMBINED N/A 03/17/2022    Procedure: Laparotomy, extensive lysis adhesions, combined;  Surgeon: Sarath Smiht MD;  [facility]    PANCREATECTOMY PARTIAL N/A 03/17/2022    Procedure: Open Subtotal Pancreatectomy, intra-op ultrasound;  Surgeon: Sarath Smith MD;  [facility]    REVISION FISTULA ARTERIOVENOUS UPPER EXTREMITY Left 2/14/2023    Procedure: LEFT UPPER ARM FISTULA OUTFLOW REVISION FROM CEPHALIC VEIN TO JUGULAR VEIN WITH 10mm THIN-WALLED RINGED POLYTETRAFLUEROETHYLINE;  Surgeon: Mo Gramajo MD;  Location: [facility]    TRANSESOPHAGEAL ECHOCARDIOGRAM INTRAOPERATIVE N/A 1/11/2024    Procedure: ECHOCARDIOGRAM, TRANSESOPHAGEAL, WITH ANESTHESIA;  Surgeon: GENERIC ANESTHESIA PROVIDER;  [facility]    VASCULAR SURGERY      dialysis access- left upper              Social History:   Reviewed and edited as appropriate  Social History     Socioeconomic History    Marital status:      Spouse name: Not on file    Number of children: 1    Years of education: Not on file    Highest education level: Not on file   Occupational History    Occupation: [employer] ,    Tobacco Use    Smoking status: Every Day     Packs/day: 0.25     Years: 50.00     Additional pack years: 0.00     Total pack years: 12.50     Types: Cigarettes     Passive exposure: Current     Smokeless tobacco: Never   Substance and Sexual Activity    Alcohol use: Yes     Comment: 2-3 drinks once a month    Drug use: Not Currently    Sexual activity: Not on file   Other Topics Concern    Parent/sibling w/ CABG, MI or angioplasty before 65F 55M? Not Asked   Social History Narrative    Not on file     Social Determinants of Health     Financial Resource Strain: Not on file   Food Insecurity: Not on file   Transportation Needs: Not on file   Physical Activity: Not on file   Stress: Not on file   Social Connections: Not on file   Interpersonal Safety: Not on file   Housing Stability: Not on file              Family History:   Patient's family history is reviewed today and is non-contributory  Family History   Problem Relation Age of Onset    Breast Cancer Mother     Coronary Artery Disease Father     Hypertension Brother     Anesthesia Reaction No family hx of     Thrombosis No family hx of             Allergies:   Reviewed and edited as appropriate     Allergies   Allergen Reactions    Lisinopril Anaphylaxis and Other (See Comments)     Shortness of breath            Medications:     Medications Prior to Admission   Medication Sig Dispense Refill Last Dose    budesonide (ENTOCORT EC) 3 MG EC capsule Take 3 capsules (9 mg) by mouth every morning 90 capsule 0     calcium acetate (CALPHRON) 667 MG TABS tablet Take 2,668 mg by mouth 3 times daily (with meals)       citalopram (CELEXA) 20 MG tablet Take 20 mg by mouth daily as needed (panic attacks)       folic acid (FOLVITE) 1 MG tablet Take 1 mg by mouth every morning        lidocaine-prilocaine (EMLA) 2.5-2.5 % external cream three times a week Prior to dialysis  site access on Monday, Wednesday, Friday       multivitamin RENAL (MULTIVITAMIN RENAL) 1 MG capsule Take 1 capsule by mouth every morning       omeprazole (PRILOSEC) 20 MG DR capsule Take 20 mg by mouth every morning        predniSONE (DELTASONE) 20 MG tablet Take 2 tablets (40 mg) by mouth daily for  21 days 42 tablet 0     simvastatin (ZOCOR) 20 MG tablet Take 20 mg by mouth every morning        ustekinumab (STELARA) 90 MG/ML Inject 1 ml ( 90 mg) subcutaneous every 4 weeks. (Patient taking differently: 90 mg every 28 days Inject 1 ml ( 90 mg) subcutaneous every 4 weeks. Last dose 2/1/24) 1 mL 5              Review of Systems:     A complete review of systems was performed and is negative except as noted in the HPI             Physical Exam:   Temp: 97.2  F (36.2  C) Temp src: Oral BP: 113/52 Pulse: 86   Resp: 18 SpO2: 100 % O2 Device: None (Room air)    Wt:   Wt Readings from Last 2 Encounters:   03/14/24 72.4 kg (159 lb 9.8 oz)   02/25/24 70.5 kg (155 lb 8 oz)        General: 64 year old male in NAD.  Answers appropriately.    HEENT: Head is AT/NC. Sclera anicteric. No conjunctival injection.  Oropharynx is clear, moist   Neck: Supple  Lungs: Non labored breathing on room air  Heart: Regular rate   Abdomen: Soft, non-tender, non-distended.  Abd wall hernia present. Multiple healed surgical scars present  Rectal: no external hemorrhoids, no apparent fissure (chaperone present)  Extremities: BLE pedal edema   Skin: No jaundice or rash  Neurologic: Grossly non-focal            Data:   Labs and imaging below were independently reviewed and interpreted    LAB WORK:    BMP  Recent Labs   Lab 03/14/24  0836 03/14/24  0535   NA  --  136   POTASSIUM  --  3.3*   CHLORIDE  --  95*   KARY  --  8.4*   CO2  --  22   BUN  --  54.9*   CR  --  5.37*   * 256*     CBC  Recent Labs   Lab 03/14/24  0535   WBC 17.8*   RBC 1.59*   HGB 5.8*   HCT 17.3*   *   MCH 36.5*   MCHC 33.5   RDW 25.2*   PLT 62*     INR  Recent Labs   Lab 03/14/24  0535   INR 1.96*     LFTs  Recent Labs   Lab 03/14/24  0535   ALKPHOS 96   AST 20   ALT 32   BILITOTAL 1.5*   PROTTOTAL 4.5*   ALBUMIN 2.9*      PANCNo lab results found in last 7 days.  CULTURES:   7-Day Micro Results       Collected Updated Procedure Result Status      03/14/2024 0535  03/14/2024 0539 Blood Culture Hand, Left [[id number]]   Blood from Hand, Left    In process Component Value   No component results               03/14/2024 0534 03/14/2024 0634 Blood Culture Peripheral Blood [[id number]]   Peripheral Blood    In process Component Value   No component results                     IMAGING:  Reviewed in paper chart     =======================================================================

## 2024-03-14 NOTE — CONSULTS
[facility]    Cardiology Consultation     Date of Admission:  3/14/2024    Assessment & Plan   Tacos Dominguez is a 64 year old male who was admitted on 3/14/2024.    1.  Preoperative cardiovascular risk stratification before possible endoscopy-patient has minimal coronary disease.  He has multiple valvular issues but is currently asymptomatic from these.  He does not have any contraindication to undergo GI workup and it is actually encouraged to stabilize him before his upcoming open heart surgery on 4/30/2024.  He is high risk for developing volume overload so would be cautious if he receives blood transfusion.    2.  Severe mitral regurgitation, tricuspid regurgitation and moderate aortic stenosis-scheduled for surgery in April by Dr. Romero    3.  Mild troponin elevation in the setting of minimal CAD- likely from type II NSTEMI in the setting of severe anemia. Will monitor. Transfuse to keep HB > 8.  Recheck trops to make sure they are trending down but otherwise no other work up is needed.     4.  Hypokalemia- Keep K > 4     5. ESRD on HD    No further recs from cardiac standpoint. Will sign off for now.    Moderate complexity     Jf Crane MD, MD    Primary Care Physician   PETER BARRIGA    Reason for Consult   Reason for consult: I was asked to evaluate this patient for preoperative cardiovascular restratification..    History of Present Illness   Tacos Dominguez is a 64 year old male with past medical history of hemodynamically significant mitral, aortic and tricuspid valve disease, Crohn's disease complicated by GI bleed, mild coronary disease on angiogram, end-stage renal disease on hemodialysis was admitted to the hospital with hemoglobin of 5.8 mg/dL in the setting of GI bleed.  The patient is known to cardiology and cardiothoracic surgery.  He was scheduled to undergo MVR, AVR and TV repair on 3/5/2023.  However, the surgery got postponed because of recent admission  to the hospital for GI bleed and anemia.  The surgery is now scheduled for 4/3/2024.  He is part of the surgery he will also undergo surgical pulmonary vein isolation as well as left atrial appendage ligation.    During the hospital he was noted to have stable vitals.  Troponin was elevated at 389.  It has not been checked again. EKG with bifascicular block.  He has severe electrolyte abnormalities significant electrolyte abnormalities with potassium being 3.3, chloride 95, phosphorus 6.7.  Albumin is 2.9.  Last echocardiogram was a GUERO on 1/11/2024.  LHC/RHC on 2/15/2024 showed elevated left and right-sided filling pressures and mild coronary disease.    Past Medical History   Past Medical History:   Diagnosis Date    Aortic dissection (H)     distal, thin.  stable/chronic on MRCP 3/2022    Benign essential hypertension     CAD (coronary artery disease)     Cerebral infarction (H)     Crohn's colitis (H)     Crohn's disease of large intestine (H) 11/22/2021    Current smoker     Esophageal reflux     ESRD (end stage renal disease) on dialysis (H)     History of basal cell carcinoma     Hypertension     Mixed hyperlipidemia     NSTEMI (non-ST elevated myocardial infarction) (H)     PAF (paroxysmal atrial fibrillation) (H)      Past Surgical History   Past Surgical History:   Procedure Laterality Date    ABDOMEN SURGERY      x 6.  colon resections for crohn's disease    APPENDECTOMY      CHOLECYSTECTOMY      COLONOSCOPY N/A 07/20/2021    Procedure: COLONOSCOPY, WITH POLYPECTOMY AND BIOPSY;  Surgeon: Eric Preston MD;  Location:  GI    COLONOSCOPY N/A 12/7/2022    Procedure: COLONOSCOPY, WITH POLYPECTOMY AND BIOPSY;  Surgeon: Willian Kee MD;  Location:  GI    COLONOSCOPY N/A 2/27/2024    Procedure: COLONOSCOPY;  Surgeon: Judson Woods MD;  Location: [facility]    CV CORONARY ANGIOGRAM N/A 05/25/2021    Procedure: CV CORONARY ANGIOGRAM;  Surgeon: Judson Ybarra MD;  Location:  HEART  CARDIAC CATH LAB    CV CORONARY ANGIOGRAM N/A 2/15/2024    Procedure: Coronary Angiogram;  Surgeon: Tico Finn MD;  Location:  HEART CARDIAC CATH LAB    CV LEFT HEART CATH N/A 2/15/2024    Procedure: Left Heart Catheterization;  Surgeon: Tico Finn MD;  Location:  HEART CARDIAC CATH LAB    CV RIGHT HEART CATH MEASUREMENTS RECORDED N/A 2/15/2024    Procedure: Right Heart Catheterization;  Surgeon: Tico Finn MD;  Location:  HEART CARDIAC CATH LAB    ESOPHAGOSCOPY, GASTROSCOPY, DUODENOSCOPY (EGD), COMBINED N/A 07/20/2021    Procedure: ESOPHAGOGASTRODUODENOSCOPY, WITH FINE NEEDLE ASPIRATION BIOPSY, WITH ENDOSCOPIC ULTRASOUND GUIDANCE;  Surgeon: Eric Preston MD;  Location:  GI    ESOPHAGOSCOPY, GASTROSCOPY, DUODENOSCOPY (EGD), COMBINED N/A 2/27/2024    Procedure: ESOPHAGOGASTRODUODENOSCOPY;  Surgeon: Judson Woods MD;  Location: [facility]    IR DIALYSIS FISTULOGRAM LEFT  12/01/2022    IR DIALYSIS FISTULOGRAM LEFT  1/13/2023    LAPAROTOMY, LYSIS ADHESIONS, COMBINED N/A 03/17/2022    Procedure: Laparotomy, extensive lysis adhesions, combined;  Surgeon: Sarath Smith MD;  [facility]    PANCREATECTOMY PARTIAL N/A 03/17/2022    Procedure: Open Subtotal Pancreatectomy, intra-op ultrasound;  Surgeon: Sarath Smith MD;  [facility]    REVISION FISTULA ARTERIOVENOUS UPPER EXTREMITY Left 2/14/2023    Procedure: LEFT UPPER ARM FISTULA OUTFLOW REVISION FROM CEPHALIC VEIN TO JUGULAR VEIN WITH 10mm THIN-WALLED RINGED POLYTETRAFLUEROETHYLINE;  Surgeon: Mo Gramajo MD;  Location: [facility]    TRANSESOPHAGEAL ECHOCARDIOGRAM INTRAOPERATIVE N/A 1/11/2024    Procedure: ECHOCARDIOGRAM, TRANSESOPHAGEAL, WITH ANESTHESIA;  Surgeon: GENERIC ANESTHESIA PROVIDER;  [facility]    VASCULAR SURGERY      dialysis access- left upper         Prior to Admission Medications   Prior to Admission Medications   Prescriptions Last Dose Informant Patient Reported?  Taking?   budesonide (ENTOCORT EC) 3 MG EC capsule 3/12/2024  No Yes   Sig: Take 3 capsules (9 mg) by mouth every morning   calcium acetate (CALPHRON) 667 MG TABS tablet 3/12/2024 Self Yes Yes   Sig: Take 2,668 mg by mouth 3 times daily (with meals)   citalopram (CELEXA) 20 MG tablet  at prn  Yes Yes   Sig: Take 20 mg by mouth daily as needed (panic attacks)   folic acid (FOLVITE) 1 MG tablet 3/12/2024 Self Yes Yes   Sig: Take 1 mg by mouth every morning    lidocaine-prilocaine (EMLA) 2.5-2.5 % external cream 3/13/2024 Self Yes Yes   Sig: three times a week Prior to dialysis  site access on Monday, Wednesday, Friday   multivitamin RENAL (MULTIVITAMIN RENAL) 1 MG capsule 3/12/2024 Self Yes Yes   Sig: Take 1 capsule by mouth every morning   omeprazole (PRILOSEC) 20 MG DR capsule 3/12/2024 Self Yes Yes   Sig: Take 20 mg by mouth every morning    predniSONE (DELTASONE) 20 MG tablet 3/12/2024  No Yes   Sig: Take 2 tablets (40 mg) by mouth daily for 21 days   simvastatin (ZOCOR) 20 MG tablet 3/12/2024 Self Yes Yes   Sig: Take 20 mg by mouth every morning    ustekinumab (STELARA) 90 MG/ML 3/6/2024  No Yes   Sig: Inject 1 ml ( 90 mg) subcutaneous every 4 weeks.   Patient taking differently: 90 mg every 28 days Inject 1 ml ( 90 mg) subcutaneous every 4 weeks. Last dose 2/1/24      Facility-Administered Medications: None     Current Facility-Administered Medications   Medication Dose Route Frequency    insulin aspart  1-7 Units Subcutaneous Q4H    nicotine  1 patch Transdermal Daily    pantoprazole  40 mg Intravenous Q12H    piperacillin-tazobactam  2.25 g Intravenous Q8H    sodium chloride (PF)  3 mL Intracatheter Q8H     Current Facility-Administered Medications   Medication Last Rate     Allergies   Allergies   Allergen Reactions    Lisinopril Anaphylaxis and Other (See Comments)     Shortness of breath       Social History    reports that he has been smoking cigarettes. He has a 12.5 pack-year smoking history. He has been  "exposed to tobacco smoke. He has never used smokeless tobacco. He reports current alcohol use. He reports that he does not currently use drugs.      Family History   I have reviewed this patient's family history and updated it with pertinent information if needed.  Family History   Problem Relation Age of Onset    Breast Cancer Mother     Coronary Artery Disease Father     Hypertension Brother     Anesthesia Reaction No family hx of     Thrombosis No family hx of           Review of Systems   A comprehensive review of system was performed and is negative other than that noted in the HPI or here.     Physical Exam   Vital Signs with Ranges  Temp:  [97.1  F (36.2  C)-97.5  F (36.4  C)] 97.1  F (36.2  C)  Pulse:  [73-92] 89  Resp:  [11-23] 11  BP: ()/(52-68) 99/67  SpO2:  [98 %-100 %] 98 %  Wt Readings from Last 4 Encounters:   03/14/24 72.4 kg (159 lb 9.8 oz)   02/25/24 70.5 kg (155 lb 8 oz)   02/15/24 73.2 kg (161 lb 6 oz)   02/01/24 72.4 kg (159 lb 9.6 oz)     No intake/output data recorded.      Vitals: BP 99/67   Pulse 89   Temp 97.1  F (36.2  C) (Axillary)   Resp 11   Ht 1.803 m (5' 11\")   Wt 72.4 kg (159 lb 9.8 oz)   SpO2 98%   BMI 22.26 kg/m      Physical Exam:   General - Alert and oriented to time place and person in no acute distress  Eyes - No scleral icterus  HEENT - Neck supple, moist mucous membranes  Cardiovascular - s1 s2 normal, harsh systolic murmur at the apex.   Extremities - There is 1+b/l LE edema  Respiratory - Mild basal crackles  Skin - No pallor or cyanosis  Gastrointestinal - Non tender and non distended without rebound or guarding  Psych - Appropriate affect   Neurological - No gross motor neurological focal deficits    No lab results found in last 7 days.    Invalid input(s): \"TROPONINIES\"    Recent Labs   Lab 03/14/24  0836 03/14/24  0535   WBC  --  17.8*   HGB  --  5.8*   MCV  --  109*   PLT  --  62*   INR  --  1.96*   NA  --  136   POTASSIUM  --  3.3*   CHLORIDE  --  95* " "  CO2  --  22   BUN  --  54.9*   CR  --  5.37*   GFRESTIMATED  --  11*   ANIONGAP  --  19*   KARY  --  8.4*   * 256*   ALBUMIN  --  2.9*   PROTTOTAL  --  4.5*   BILITOTAL  --  1.5*   ALKPHOS  --  96   ALT  --  32   AST  --  20     Recent Labs   Lab Test 05/25/21  1143   CHOL 86   HDL 62   LDL 3   TRIG 108     Recent Labs   Lab 03/14/24  0535   WBC 17.8*   HGB 5.8*   HCT 17.3*   *   PLT 62*   IRON 45*   IRONSAT 19 *     No results for input(s): \"PH\", \"PHV\", \"PO2\", \"PO2V\", \"SAT\", \"PCO2\", \"PCO2V\", \"HCO3\", \"HCO3V\" in the last 168 hours.  No results for input(s): \"NTBNPI\", \"NTBNP\" in the last 168 hours.  Recent Labs   Lab 03/14/24  0535   DD 1.79*     Recent Labs   Lab 03/14/24  0534   SED 59*     Recent Labs   Lab 03/14/24  0535   PLT 62*     No results for input(s): \"TSH\" in the last 168 hours.  No results for input(s): \"COLOR\", \"APPEARANCE\", \"URINEGLC\", \"URINEBILI\", \"URINEKETONE\", \"SG\", \"UBLD\", \"URINEPH\", \"PROTEIN\", \"UROBILINOGEN\", \"NITRITE\", \"LEUKEST\", \"RBCU\", \"WBCU\" in the last 168 hours.    Imaging:  No results found for this or any previous visit (from the past 48 hour(s)).    Echo:  No results found for this or any previous visit (from the past 4320 hour(s)).    Clinically Significant Risk Factors Present on Admission        # Hypokalemia: Lowest K = 3.3 mmol/L in last 2 days, will replace as needed     # Hypomagnesemia: Lowest Mg = 1.5 mg/dL in last 2 days, will replace as needed  # Anion Gap Metabolic Acidosis: Highest Anion Gap = 19 mmol/L in last 2 days, will monitor and treat as appropriate  # Hypoalbuminemia: Lowest albumin = 2.9 g/dL at 3/14/2024  5:35 AM, will monitor as appropriate  # Coagulation Defect: INR = 1.96 (Ref range: 0.85 - 1.15) and/or PTT = 35 Seconds (Ref range: 22 - 38 Seconds), will monitor for bleeding  # Thrombocytopenia: Lowest platelets = 62 in last 2 days, will monitor for bleeding   # Hypertension: Noted on problem list          "       Cardiomyopathy    Hypokalemia    CKD POA List: ESRD on dialysis

## 2024-03-14 NOTE — CONSULTS
[facility]    Hematology/Oncology Consult Note      Date of Admission:  3/14/2024  Date of Consult:  03/14/24  Reason for Consult: Elevated INR      ASSESSMENT/PLAN:  Tacos Dominguez is a 64 year old male with multiple comorbidities who we are asked to see for prolonged INR  Chronic in setting of low platelets       Workup for prolonged INR already underway.  Potential causes may be liver disease versus factor deficiency.  PTT and fibrinogen are normal unlikely this is DIC.  Mixing studies are pending.  Could be low-grade DIC as fibrinogen should be higher than this in the acute setting        Elevated INR:   -await mixing studies results if does not correct may be an inhibitor  -check Factor 7 activity    Thrombocytopenia   -check HIT platelets are dropping almost 50% lower than based, change Anticoagulation to argatroban for flushes with HD in the meantime  -check lower ext dopplers to rule out DVT    Rigo follow   Total time spent on day of visit, including review of tests, obtaining/reviewing separately obtained history, ordering medications/tests/procedures, communicating with PCP/consultants, and documenting in electronic medical record: 80 minutes         HISTORY OF PRESENT ILLNESS: Tacos is a 64-year-old male who we are asked to see for elevated INR    He has a history of Crohn's disease status post colectomy on Stelara's, small bowel ulcer in 2022,, chronic thrombocytopenia, severe mitral valve stenosis, tricuspid regurgitation, moderate aortic stenosis, end-stage renal disease on hemodialysis, hypertension, hyperlipidemia, hemorrhagic proteinaceous renal cyst transferred from [place] for colonic pneumatosis, scrotal swelling and anemia.  He has had chronic elevation of INR in the range of 1.16 most recently at 1.96.  His PTT is normal at 35.  Fibrinogen level normal at 367. Hx of espitaxis . Bili 1.5, Lfts normal spleen size and architecture normal. Platelets at 62K , hgb dropping  Per Dr Haji- and epixstasis        REVIEW OF SYSTEMS:   14 point ROS was reviewed and is negative other than as noted above in HPI.       MEDICATIONS:  Current Facility-Administered Medications   Medication    acetaminophen (TYLENOL) tablet 650 mg    Or    acetaminophen (TYLENOL) Suppository 650 mg    calcium carbonate (TUMS) chewable tablet 1,000 mg    glucose gel 15-30 g    Or    dextrose 50 % injection 25-50 mL    Or    glucagon injection 1 mg    hydrALAZINE (APRESOLINE) injection 10 mg    HYDROmorphone (PF) (DILAUDID) injection 0.3 mg    insulin aspart (NovoLOG) injection (RAPID ACTING)    lidocaine (LMX4) cream    lidocaine 1 % 0.1-1 mL    naloxone (NARCAN) injection 0.2 mg    Or    naloxone (NARCAN) injection 0.4 mg    Or    naloxone (NARCAN) injection 0.2 mg    Or    naloxone (NARCAN) injection 0.4 mg    nicotine (NICODERM CQ) 7 MG/24HR 24 hr patch 1 patch    pantoprazole (PROTONIX) IV push injection 40 mg    piperacillin-tazobactam (ZOSYN) 2.25 g vial to attach to  ml bag    prochlorperazine (COMPAZINE) injection 10 mg    Or    prochlorperazine (COMPAZINE) tablet 10 mg    Or    prochlorperazine (COMPAZINE) suppository 25 mg    senna-docusate (SENOKOT-S/PERICOLACE) 8.6-50 MG per tablet 1 tablet    Or    senna-docusate (SENOKOT-S/PERICOLACE) 8.6-50 MG per tablet 2 tablet    sodium chloride (PF) 0.9% PF flush 3 mL    sodium chloride (PF) 0.9% PF flush 3 mL         ALLERGIES:  Allergies   Allergen Reactions    Lisinopril Anaphylaxis and Other (See Comments)     Shortness of breath         PAST MEDICAL HISTORY:  Past Medical History:   Diagnosis Date    Aortic dissection (H)     distal, thin.  stable/chronic on MRCP 3/2022    Benign essential hypertension     CAD (coronary artery disease)     Cerebral infarction (H)     Crohn's colitis (H)     Crohn's disease of large intestine (H) 11/22/2021    Current smoker     Esophageal reflux     ESRD (end stage renal disease) on dialysis (H)     History of basal cell carcinoma      Hypertension     Mixed hyperlipidemia     NSTEMI (non-ST elevated myocardial infarction) (H)     PAF (paroxysmal atrial fibrillation) (H)          PAST SURGICAL HISTORY:  Past Surgical History:   Procedure Laterality Date    ABDOMEN SURGERY      x 6.  colon resections for crohn's disease    APPENDECTOMY      CHOLECYSTECTOMY      COLONOSCOPY N/A 07/20/2021    Procedure: COLONOSCOPY, WITH POLYPECTOMY AND BIOPSY;  Surgeon: Eric Preston MD;  [facility]    COLONOSCOPY N/A 12/7/2022    Procedure: COLONOSCOPY, WITH POLYPECTOMY AND BIOPSY;  Surgeon: Willian Kee MD;  Location:  GI    COLONOSCOPY N/A 2/27/2024    Procedure: COLONOSCOPY;  Surgeon: Judson Woods MD;  Location: [facility]    CV CORONARY ANGIOGRAM N/A 05/25/2021    Procedure: CV CORONARY ANGIOGRAM;  Surgeon: Judson Ybarra MD;  Location:  HEART CARDIAC CATH LAB    CV CORONARY ANGIOGRAM N/A 2/15/2024    Procedure: Coronary Angiogram;  Surgeon: Tico Finn MD;  [facility]    CV LEFT HEART CATH N/A 2/15/2024    Procedure: Left Heart Catheterization;  Surgeon: Tico Finn MD;  [facility]    CV RIGHT HEART CATH MEASUREMENTS RECORDED N/A 2/15/2024    Procedure: Right Heart Catheterization;  Surgeon: Tico Finn MD;  Location:  HEART CARDIAC CATH LAB    ESOPHAGOSCOPY, GASTROSCOPY, DUODENOSCOPY (EGD), COMBINED N/A 07/20/2021    Procedure: ESOPHAGOGASTRODUODENOSCOPY, WITH FINE NEEDLE ASPIRATION BIOPSY, WITH ENDOSCOPIC ULTRASOUND GUIDANCE;  Surgeon: Eric Preston MD;  [facility]    ESOPHAGOSCOPY, GASTROSCOPY, DUODENOSCOPY (EGD), COMBINED N/A 2/27/2024    Procedure: ESOPHAGOGASTRODUODENOSCOPY;  Surgeon: Judson Woods MD;  Location: [facility]    IR DIALYSIS FISTULOGRAM LEFT  12/01/2022    IR DIALYSIS FISTULOGRAM LEFT  1/13/2023    LAPAROTOMY, LYSIS ADHESIONS, COMBINED N/A 03/17/2022    Procedure: Laparotomy, extensive lysis  adhesions, combined;  Surgeon: Sarath Smith MD;  [facility]    PANCREATECTOMY PARTIAL N/A 03/17/2022    Procedure: Open Subtotal Pancreatectomy, intra-op ultrasound;  Surgeon: Sarath Smith MD;  [facility]    REVISION FISTULA ARTERIOVENOUS UPPER EXTREMITY Left 2/14/2023    Procedure: LEFT UPPER ARM FISTULA OUTFLOW REVISION FROM CEPHALIC VEIN TO JUGULAR VEIN WITH 10mm THIN-WALLED RINGED POLYTETRAFLUEROETHYLINE;  Surgeon: Mo Gramajo MD;  Location: [facility]    TRANSESOPHAGEAL ECHOCARDIOGRAM INTRAOPERATIVE N/A 1/11/2024    Procedure: ECHOCARDIOGRAM, TRANSESOPHAGEAL, WITH ANESTHESIA;  Surgeon: GENERIC ANESTHESIA PROVIDER;  [facility]    VASCULAR SURGERY      dialysis access- left upper         SOCIAL HISTORY:  Social History     Socioeconomic History    Marital status:      Spouse name: Not on file    Number of children: 1    Years of education: Not on file    Highest education level: Not on file   Occupational History    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 Not Asked   Social History Narrative    Not on file     Social Determinants of Health     Financial Resource Strain: Not on file   Food Insecurity: Not on file   Transportation Needs: Not on file   Physical Activity: Not on file   Stress: Not on file   Social Connections: Not on file   Interpersonal Safety: Not on file   Housing Stability: Not on file         FAMILY HISTORY:  Family History   Problem Relation Age of Onset    Breast Cancer Mother     Coronary Artery  "Disease Father     Hypertension Brother     Anesthesia Reaction No family hx of     Thrombosis No family hx of          PHYSICAL EXAM:  Vital signs:  Temp: 97.2  F (36.2  C) Temp src: Axillary BP: 121/65 Pulse: 85   Resp: 18 SpO2: 99 % O2 Device: None (Room air)   Height: 180.3 cm (5' 11\") Weight: 72.4 kg (159 lb 9.8 oz)  Estimated body mass index is 22.26 kg/m  as calculated from the following:    Height as of this encounter: 1.803 m (5' 11\").    Weight as of this encounter: 72.4 kg (159 lb 9.8 oz).    Unable to assess pt fast asleep (ECOG)  LE 3+ edema and erythema  Heart RRR  Lungs clear       LABS:  CBC RESULTS:   Recent Labs   Lab Test 03/14/24  0535   WBC 17.8*   RBC 1.59*   HGB 5.8*   HCT 17.3*   *   MCH 36.5*   MCHC 33.5   RDW 25.2*   PLT 62*       Recent Labs   Lab Test 03/14/24  0836 03/14/24  0535 02/27/24  0728   NA  --  136 130*   POTASSIUM  --  3.3* 4.1   CHLORIDE  --  95* 93*   CO2  --  22 26   ANIONGAP  --  19* 11   * 256* 95   BUN  --  54.9* 29.8*   CR  --  5.37* 5.71*   KARY  --  8.4* 8.4*         PATHOLOGY:    None  IMAGING:  none          Thank you for the opportunity to participate in this patient's care.  Please call with any questions.    Francisco Rubin MD  Hematology/Oncology  [facility]   "

## 2024-03-15 NOTE — PROVIDER NOTIFICATION
"MD Notification    Notified Person: MD    Notified Person Name: Dr. Porfirio Smith    Notification Date/Time: 3/15/2024     Face-to-Face 0815: \"Per GI and colorectal surg note transfuse for Hgb<7. Per cardiology note transfuse for Hgb<8. What is the parameter?\"   Comments: Per MD- Update when Hgb results and will decide to transfuse or not    Vocera 0937: \"Please advise, Hgb is 7.7 and Plt is 66. Thanks\"   Orders Received: No new orders    Vocera 1217: \"Pt is flagging for the sepsis protocol. Lactic ordered per order set\"   Comments: Sepsis protocol ordered    Vocera 1309: \"Please advice, Critical lactic of 2.1. Thanks\"   Comments: No need to repeat lactic  "

## 2024-03-15 NOTE — PROGRESS NOTES
"    GASTROENTEROLOGY PROGRESS NOTE    Date of Admission: 3/14/2024  Reason for Admission: anemia    ASSESSMENT:  64 year old male with a history of Crohn's disease s/p colectomy on Stelara s/p multiple surgeries including a right hemicolectomy with minimal remaining bowel, chronic thrombocytopenia, ESRD on HD (MWF), hypertension, hyperlipidemia, GERD, IPMN with high grade dysplasia (EUS 7/2021), severe MR/severe TR/moderate aortic stenosis with plan for heart surgery in April 2024, CAD, chronic abdominal aorta dissection, recent admission to [facility] for anemia s/p colonoscopy with multiple large ulcers in the ileum, one of which had adherent clot with visible vessel treated with epinephrine and cautery, who initially presented to [facility] ED with scrotal swelling, found to have a hgb of 5.7, leukocytosis of 17 and CT scan with evidence of colonic pneumatosis, started on IV antibiotics and transferred to [facility] for further cares. GI consulted for \"crohn's disease, recurrent anemia\".      # Acute on chronic anemia   # Acute on chronic coagulopathy   # Recent GIB r/t large ulcer in ileum with visible vessel s/p epinephrine and cautery    # Hx of epistaxis   # Chronic thrombocytopenia  Hgb 5.7 at [facility], repeat ~ 12 hours later 5.8. From chart review appears a unit of blood was ordered, unclear if it was given prior to transfer. S/p 1 unit PRBC 3/14/2024 with repeat hgb 7.6, currently stable 7.7.   Hematology consulted, workup pending. Recent admission to [facility] for anemia, found incidentally on labs for pending heart procedure, s/p colonoscopy 2/27/24 with multiple large ulcers in the ileum, one of which had adherent clot with visible vessel treated with epinephrine and cautery, and s/p EGD same day with evidence of congestive and erythematous gastropathy, biopsies consistent with chemical gastritis. On IV PPI BID. No evidence of further GIB.   - ADAT  - Monitor hgb, transfuse for hgb <7 from GI " "perspective   - Monitor for evidence of GIB, appreciate detailed nursing documentation of stool output including quantity and consistency  - Given colonic pneumatosis, no plans for endoscopic evaluation in the absence of overt GI bleed    - Continue PPI BID      # Colonic pneumatosis   # Leukocytosis   Patient asymptomatic with no abdominal pain on exam. Labs notable for lactic acid 4.5 > 3.0 >2.2, leukocytosis 17.8 >19.1 (in the setting of steroid use). Bcx NGTD. VSS, noted mild tachycardia on admission to OSH. CT scan without contrast reviewed via paper chart, \"extensive pneumatosis involving large bowel raises concern for ischemia. Large bowel and mildly dilated small bowel loops also extend into a right anterior abdominal wall hernia. Could be related to recent bipolar cautery. Started on IV Zosyn (3/14-present). CRS consulted, no plans for surgical intervention at this time.   - Follow blood cultures  - Continue IV Zosyn, on discharge recommend treatment with metronidazole 500mg TID (may need to be renally dosed) for 30 days   - Repeat CT scan in 1 month (we will arrange)     # Crohn's disease s/p multiple surgeries including right hemicolectomy with multiple large ulcers in the ileum   Patient follows with Dr. Rubio, with [facility] IBD, last seen by Darek Romero in IBD clinic 11/28/2023. At that time he had been refusing future colonoscopies. Currently on stelara every 4 weeks, last dose 2/1/24 as March dose has been held for upcoming heart surgery. S/p colonoscopy 2/27/24 (with [facility] while inpatient at [facility]) with poor prep, patent end to end ileo-colonic anastomosis characterized by relatively healthy appearing mucosa, multiple large ulcers in the ileum with a visible vessel with adherent clot that was oozing treated with epinephrine and bipolar cautery, no active Crohn's seen in the colon. Patient was started on budesonide 9mg daily for 30 days and prednisone 40mg daily for 21 days (per [facility] notes, they " "recommended a prednisone taper).   - Hold all steroids  - Stelara currently held   - Outpatient IBD appt 3/20/24 with Dr. Rubio      # ESRD on HD F  Nephrology following     # Severe MR/severe TR/moderate aortic stenosis  Heart surgery planned for 4/3/24     # IPMN with high grade dysplasia   Seen by Dr. Preston, s/p EUS 7/2021   - MRI abd without contrast in 1 year for surveillance    Thank you for involving us in this patient's care. Please do not hesitate to contact the GI service with any questions or concerns.     GI will follow peripherally over the weekend, please contact the on call MD with questions or concerns.     Pt care plan discussed with Dr. Rubio, GI staff physician.    Overall time spent on the date of this encounter preparing to see the patient (including chart review of available notes, clinical status events, imaging and labs); obtaining and/or reviewing separately obtained history; ordering medications, tests or procedures; communicating with other health care professionals; and documenting the above clinical information in the electronic medical record was 45 minutes.    Yi Kohler PA-C  GI Service  [facility]  Text Page (Monday-Friday, 8am-4pm)    To page the On-Call [facility] GI provider 24 hours a day, please click [facility] and select GASTROENTEROLOGY MEDICINE ADULT / [facility] in the drop-down menu.   _______________________________________________________________      Subjective: Nursing notes and 24hr events reviewed. Patient seen during dialysis run. He reports no abdominal pain, nausea, vomiting. He states he is very hungry, asking to eat a turkey sandwich and hot dog. He reports 7 loose bowel movements yesterday, no bloody stools. He reports some \"pink foam\" once in the toilet yesterday. He notes the scrotal swelling has improved significantly.     Per chart review:  Bowel movements per day:  3/14: 7x  3/15: none so far    ROS:   4 pt ROS negative " unless noted in subjective.     Medications:  Current Facility-Administered Medications   Medication    - MEDICATION INSTRUCTIONS for Dialysis Patients -    acetaminophen (TYLENOL) tablet 650 mg    Or    acetaminophen (TYLENOL) Suppository 650 mg    albumin human 25 % injection 50 mL    albumin human 5 % injection  mL    budesonide (ENTOCORT EC) EC capsule 9 mg    calcium acetate (PHOSLO) capsule 2,668 mg    calcium carbonate (TUMS) chewable tablet 1,000 mg    citalopram (celeXA) tablet 20 mg    glucose gel 15-30 g    Or    dextrose 50 % injection 25-50 mL    Or    glucagon injection 1 mg    folic acid (FOLVITE) tablet 1 mg    hydrALAZINE (APRESOLINE) injection 10 mg    HYDROmorphone (PF) (DILAUDID) injection 0.3 mg    insulin aspart (NovoLOG) injection (RAPID ACTING)    lidocaine (LMX4) cream    lidocaine 1 % 0.1-1 mL    lidocaine 1 % 0.5 mL    lidocaine 1 % 0.5 mL    LORazepam (ATIVAN) tablet 0.5 mg    multivitamin RENAL (TRIPHROCAPS) capsule 1 capsule    naloxone (NARCAN) injection 0.2 mg    Or    naloxone (NARCAN) injection 0.4 mg    Or    naloxone (NARCAN) injection 0.2 mg    Or    naloxone (NARCAN) injection 0.4 mg    nicotine (NICODERM CQ) 7 MG/24HR 24 hr patch 1 patch    pantoprazole (PROTONIX) IV push injection 40 mg    piperacillin-tazobactam (ZOSYN) 2.25 g vial to attach to  ml bag    prochlorperazine (COMPAZINE) injection 10 mg    Or    prochlorperazine (COMPAZINE) tablet 10 mg    Or    prochlorperazine (COMPAZINE) suppository 25 mg    senna-docusate (SENOKOT-S/PERICOLACE) 8.6-50 MG per tablet 1 tablet    Or    senna-docusate (SENOKOT-S/PERICOLACE) 8.6-50 MG per tablet 2 tablet    simvastatin (ZOCOR) tablet 20 mg    sodium chloride (PF) 0.9% PF flush 3 mL    sodium chloride (PF) 0.9% PF flush 3 mL    sodium chloride 0.9% BOLUS 100-150 mL    Stop Heparin 60 minutes before end of treatment       Objective:  Blood pressure 125/70, pulse 81, temperature 97.5  F (36.4  C), temperature source  "Axillary, resp. rate 12, height 1.803 m (5' 11\"), weight 74.3 kg (163 lb 12.8 oz), SpO2 100%.  Gen: Lying in bed in dialysis suite. Appears comfortable, resting  HEENT: NCAT. Conjunctiva clear. Sclera anicteric    CV: Regular rate  Resp: Non-labored breathing  Abd: Soft, non tender, non distended.   Skin:  No jaundice  Ext: warm, well perfused    Mental status/Psych: A&O. Asks/answers questions appropriately     PROCEDURES:  No GI procedures this admission     LABS:  BMP  Recent Labs   Lab 03/15/24  0745 03/15/24  0422 03/15/24  0044 03/14/24 2057 03/14/24  0836 03/14/24  0535   *  --   --   --   --  136   POTASSIUM 3.6  --   --   --   --  3.3*   CHLORIDE 92*  --   --   --   --  95*   KARY 8.2*  --   --   --   --  8.4*   CO2 19*  --   --   --   --  22   BUN 62.4*  --   --   --   --  54.9*   CR 6.36*  --   --   --   --  5.37*   * 140* 145* 107*   < > 256*    < > = values in this interval not displayed.     CBC  Recent Labs   Lab 03/14/24  1249 03/14/24  0535   WBC 19.5* 17.8*   RBC 2.18* 1.59*   HGB 7.6* 5.8*   HCT 23.2* 17.3*   * 109*   MCH 34.9* 36.5*   MCHC 32.8 33.5   RDW 25.1* 25.2*   PLT 67* 62*     INR  Recent Labs   Lab 03/14/24  0535 03/14/24  0534   INR 1.96* 2.14*     LFTs  Recent Labs   Lab 03/14/24  0535   ALKPHOS 96   AST 20   ALT 32   BILITOTAL 1.5*   PROTTOTAL 4.5*   ALBUMIN 2.9*      PANCNo lab results found in last 7 days.  CULTURES:   7-Day Micro Results       Collected Updated Procedure Result Status      03/14/2024 0535 03/15/2024 0249 Blood Culture Hand, Left [[id number]]   Blood from Hand, Left    Preliminary result Component Value   Culture No growth after 12 hours  [P]                03/14/2024 0534 03/14/2024 2316 Blood Culture Peripheral Blood [[id number]]   Peripheral Blood    Preliminary result Component Value   Culture No growth after 12 hours  [P]                      IMAGING:  Reviewed   "

## 2024-03-15 NOTE — PLAN OF CARE
Orientations: Alert and oriented x4  Vitals/Pain: VSS, on RA; pain is well-managed with IV Dilaudid given, PRN Ativan given for anxiety   Tele: SR with BBB / ST wave abn. / PVCs  Lines/Drains: LUE AVF, R PIV-SL  Skin/Wounds: cracked heels and toes  GI/: continent, no BM, oliguric  Labs: Abnormal/Trends, Electrolyte Replacement- Mg/K protocol  Ambulation/Assist: SBA  Sleep Quality: long intervals

## 2024-03-15 NOTE — PLAN OF CARE
Goal Outcome Evaluation:      Plan of Care Reviewed With: patient    Overall Patient Progress: improvingOverall Patient Progress: improving    Outcome Evaluation: Patient A/Ox4. RA w/ clear/dim LS. Soft BP's but w/in parameters. SR w/ BBB & PVC's on tele. Advanced to clear liquid renal diet. Multiple liquid, loose BM's this shift. +1/2 edema in BUE & +3 edema in BLE. Magnesium and potassium w/in range- recheck in AM. MD notified regarding Hgb in AM- no new orders. PRN ativan and PRN celexa given for anxiety. PRN dilaudid given for pain. Dialysis completed in AM- plan to have dialysis Saturday morning.      Problem: Anemia  Goal: Anemia Symptom Improvement  Outcome: Progressing  Intervention: Monitor and Manage Anemia  Recent Flowsheet Documentation  Taken 3/15/2024 1600 by Zofia Melgoza RN  Safety Promotion/Fall Prevention: activity supervised  Fatigue Management: activity assistance provided  Taken 3/15/2024 1200 by Zofia Melgoza RN  Safety Promotion/Fall Prevention: activity supervised  Fatigue Management: activity assistance provided

## 2024-03-15 NOTE — PROGRESS NOTES
Potassium   Date Value Ref Range Status   03/15/2024 3.6 3.4 - 5.3 mmol/L Final   01/13/2023 4.0 3.4 - 5.3 mmol/L Final   05/25/2021 3.6 3.4 - 5.3 mmol/L Final     Hemoglobin   Date Value Ref Range Status   03/14/2024 7.6 (L) 13.3 - 17.7 g/dL Final   05/25/2021 10.6 (L) 13.3 - 17.7 g/dL Final     Creatinine   Date Value Ref Range Status   03/15/2024 6.36 (H) 0.67 - 1.17 mg/dL Final   05/25/2021 10.60 (H) 0.66 - 1.25 mg/dL Final     Urea Nitrogen   Date Value Ref Range Status   03/15/2024 62.4 (H) 8.0 - 23.0 mg/dL Final   01/13/2023 36 (H) 7 - 30 mg/dL Final   05/25/2021 36 (H) 7 - 30 mg/dL Final     Sodium   Date Value Ref Range Status   03/15/2024 130 (L) 135 - 145 mmol/L Final     Comment:     Reference intervals for this test were updated on 09/26/2023 to more accurately reflect our healthy population. There may be differences in the flagging of prior results with similar values performed with this method. Interpretation of those prior results can be made in the context of the updated reference intervals.    05/25/2021 136 133 - 144 mmol/L Final     INR   Date Value Ref Range Status   03/14/2024 1.96 (H) 0.85 - 1.15 Final   05/25/2021 1.33 (H) 0.86 - 1.14 Final       DIALYSIS PROCEDURE NOTE  Hepatitis status of previous patient on machine log was checked and verified ok to use with this patients hepatitis status.    Patient dialyzed for 3.5 hrs. on a K4 bath with a net fluid removal of  4L.  A BFR of 450 ml/min was obtained via a left AVF using 15 gauge needles.      The treatment plan was discussed with Dr. De La Torre during the treatment.      Total heparin received during the treatment: 0 units.     Needle cannulation sites held x 10 min.       Meds  given: dilaudid    Complications: none      Person educated: pre tx. Knowledge base substantial. Barriers to learning: none. Educated on procedure via verbal mode. The patient verbalized understanding. Pt prefers verbal education style.     ICEBOAT? Timeout performed  pre-treatment  I: Patient was identified using 2 identifiers  C:  Consent Signed Yes  E: Equipment preventative maintenance is current and dialysis delivery system OK to use  B: Hepatitis B Surface Antigen: negative; Draw Date: negative      Hepatitis B Surface Antibody: 09/25/2023; Draw Date: immune  O: Dialysis orders present and complete prior to treatment  A: Vascular access verified and assessed prior to treatment  T: Treatment was performed at a clinically appropriate time  ?: Patient was allowed to ask questions and address concerns prior to treatment  See Adult Hemodialysis flowsheet in [facility] for further details and post assessment.  Machine water alarm in place and functioning. Transducer pods intact and checked every 15min.   Pt returned via bed.  Chlorine/Chloramine water system checked every 4 hours.  Outpatient Dialysis at [facility]    Patient repositioned every 2 hours during the treatment.  Post treatment report given to 09/25/2023, RN regarding 4L of fluid removed, last BP of 103/57, and patient pain rating of 2/10.     Please remove patient dressing on AVF and AVG needle sites 24 hours after dialysis. If leaking occurs please apply a Band-Aid.

## 2024-03-15 NOTE — CONSULTS
"[facility] Nurse Inpatient Assessment     Consulted for: Wound heel, sacrum     Summary: patient with POA bilateral heel fissures, with mild masd to perianal, sacrum intact, initial exam     Patient History (according to provider note(s):      \"64M hx of Crohn's disease status post colectomy on Stelaris, history of small bowel ulcer in 12/2022, chronic thrombocytopenia, severe mitral stenosis, severe tricuspid regurgitation, moderate aortic stenosis, nonobstructive mild coronary artery disease, ESRD on HD (MWF), hypertension, hyperlipidemia, GERD, history of abdominal aorta partially calcified dissection, history of hemorrhagic proteinaceous renal cysts  transferred from [place] for colonic penumatosis, scrotal swelling and anemia. \"    Assessment:      Areas visualized during today's visit: Focused:, Sacrum/coccyx, and Lower extremities     Wound location: buttock perianal     Last photo: 3/15  Wound due to: Moisture Associated Skin Damage (MASD) mild   Wound history/plan of care: wearing pull up   Wound base:  epidermis and mucosal ,      Palpation of the wound bed: normal      Drainage: none     Description of drainage: none     Measurements (length x width x depth, in cm): without focal measurable open areas      Tunneling: N/A     Undermining: N/A  Periwound skin: Intact      Color: normal and consistent with surrounding tissue      Temperature: normal   Odor: none  Pain: mild and moderate, tender  Pain interventions prior to dressing change: patient tolerated well  Treatment goal: Heal  and Protection  STATUS: initial assessment  Supplies ordered: supplies stored on unit and discussed with patient     Wound location: bilateral heels plantar feet           Last photo: 3/15  Wound due to: Neuropathic Ulcer heel fissures   Wound history/plan of care: sween 24 lotion in use  Wound base:  dermis     Palpation of the wound bed: firm      Drainage: none     Description of drainage: " none     Measurements (length x width x depth, in cm): largest appears 2  x 0.1  x  0.1 cm      Tunneling: N/A     Undermining: N/A  Periwound skin: Intact and Dry/scaly      Color: normal and consistent with surrounding tissue      Temperature: normal   Odor: none  Pain: moderate and severe, sharp and tender  Pain interventions prior to dressing change: patient tolerated well  Treatment goal: Heal  and Increase moisture   STATUS: initial assessment  Supplies ordered: supplies stored on unit and discussed with patient        Treatment Plan:       Wound care  EVERY SHIFT        Comments: Location: bilat feet and heels  Care: provided qshift by primary RN (and patient as needed)  1. Cleanse daily with routine hygiene, showering for example, or mild soap and water  2. Apply sween 24 lotion or aquaphor lotion to bilat feet and heels every shift and PRN as often as desired          Wound care  EVERY SHIFT        Comments: Location: perianal  Care: provided qshift by primary RN  1. Cleanse as needed each shift with jenn cleanser and soft dry wipes  2. Apply criticaid paste to perianal in a thin layer          Orders: Written    RECOMMEND PRIMARY TEAM ORDER: None, at this time  Education provided: plan of care  Discussed plan of care with: Patient and Nurse  WOC nurse follow-up plan: weekly  Notify WOC if wound(s) deteriorate.  Nursing to notify the Provider(s) and re-consult the WOC Nurse if new skin concern.    DATA:     Current support surface: Standard  Standard gel/foam mattress (IsoFlex, Atmos air, etc)  Containment of urine/stool: Incontinence Protocol and Incontinent pad in bed  BMI: Body mass index is 22.85 kg/m .   Active diet order: Orders Placed This Encounter      Advance Diet as Tolerated: Clear Liquid Diet; Renal Diet (dialysis)     Output: I/O last 3 completed shifts:  In: 200 [I.V.:200]  Out: 3500 [Other:3500]     Labs:   Recent Labs   Lab 03/15/24  0745 03/14/24  1249 03/14/24  0535   ALBUMIN  --   --   2.9*   HGB 7.7*   < > 5.8*   INR  --   --  1.96*   WBC 19.1*   < > 17.8*   A1C  --   --  5.8*    < > = values in this interval not displayed.     Pressure injury risk assessment:   Sensory Perception: 4-->no impairment  Moisture: 4-->rarely moist  Activity: 3-->walks occasionally  Mobility: 3-->slightly limited  Nutrition: 2-->probably inadequate  Friction and Shear: 2-->potential problem  Nikita Score: 18    Darcie CWOCN   1st choice: Securely message with [employer] ([facility] [employer])   (2nd option: [facility] Office Phone [phone], messages checked periodically Mon-Fri 8a-4p)

## 2024-03-15 NOTE — UTILIZATION REVIEW
Admission Status; Secondary Review Determination         Under the authority of the Utilization Management Committee, the utilization review process indicated a secondary review on the above patient.  The review outcome is based on review of the medical records, discussions with staff, and applying clinical experience noted on the date of the review.        (x)      Inpatient Status Appropriate - This patient's medical care is consistent with medical management for inpatient care and reasonable inpatient medical practice.     RATIONALE FOR DETERMINATION   The patient is a 64-year-old male admitted on 3/14/2024.  Patient was transferred from [facility] due to a new diagnosis of colonic pneumatosis and scrotal swelling and anemia.  Patient had presented to [facility] with complaints of 4 days of scrotal swelling and lower extremity edema.  Patient had abdominal pain nausea and vomiting along with constipation.  CT scan showed extensive large bowel pneumatosis.  Patient was started on empiric Zosyn.  Repeat CT scan is scheduled.  Patient is n.p.o. pending colorectal surgery and GI evaluation.  White blood cell count is very elevated at 19.5 on admission and is currently 19.1.  Patient has an elevated troponin of 415 and elevated lactic acid level on admission of 2.2 and currently is 2.1.  Patient has significant hyponatremia of 130 and a creatinine at 6.36 consistent with end-stage renal disease.  Patient is also very anemic with a 7.7 hemoglobin which will likely require transfusion if it drops below 7.  Patient does have an anion gap consistent with metabolic acidosis.  Elevated troponin may be consistent with a non-STEMI with a history of cardiac disease with mitral stenosis, severe tricuspid regurgitation and moderate aortic stenosis along with moderate pulmonary hypertension and atrial fibrillation.  Patient is on hemodialysis consistent with end-stage renal disease.  Based on significant acute  and exacerbation of chronic issues requiring more evaluation and treatment, agree with inpatient status.      The severity of illness, intensity of service provided, expected LOS and risk for adverse outcome make the care complex, high risk and appropriate for hospital admission.        The information on this document is developed by the utilization review team in order for the business office to ensure compliance.  This only denotes the appropriateness of proper admission status and does not reflect the quality of care rendered.         The definitions of Inpatient Status and Observation Status used in making the determination above are those provided in the CMS Coverage Manual, Chapter 1 and Chapter 6, section 70.4.      Sincerely,     Jeffry Shine MD  Physician Advisor  Utilization Review/ Case Management  [facility].

## 2024-03-15 NOTE — PROGRESS NOTES
" Renal Medicine Progress Note            Assessment/Plan:     64 y.o man with ESRD, admitted for colonic pneumatosis.      # ESRD:                -3.5 hrs               -LAVF               -challenge EDW               -[facility] in [place]               -Dr. Scherer     # Hypervolemia: challenge EDW     # Anemia: Hgb is low. Transfuse as needed.      # Colonic pneumatosis? Crohn's s/p colectomy.                -Zosyn     # Severe valves disease: Scheduled for surgery in April with [facility].      Plan:  # 3.5hrs. UF ~ 3-4 liters net as tolerates. + IHD. Decrease Qb to 300 ml/min due IDH. Plan for HD again tomorrow if we have a spot. I reviewed HD prescription with patient and RN at the bedside.           Interval History:     Afebrile. VSS.   + IDH. Had dilaudid earlier.  Asymptomatic.   Denies abdominal pain.  Denies chest pain  Denies SOB.            Medications and Allergies:      - MEDICATION INSTRUCTIONS for Dialysis Patients -   Does not apply See Admin Instructions    budesonide  9 mg Oral QAM    calcium acetate  2,668 mg Oral TID w/meals    folic acid  1 mg Oral QAM    insulin aspart  1-7 Units Subcutaneous Q4H    multivitamin RENAL  1 capsule Oral QAM    nicotine  1 patch Transdermal Daily    pantoprazole  40 mg Intravenous Q12H    piperacillin-tazobactam  2.25 g Intravenous Q8H    simvastatin  20 mg Oral QAM    sodium chloride (PF)  3 mL Intracatheter Q8H        Allergies   Allergen Reactions    Lisinopril Anaphylaxis and Other (See Comments)     Shortness of breath            Physical Exam:   Vitals were reviewed   , Blood pressure 100/67, pulse 80, temperature 97.5  F (36.4  C), temperature source Axillary, resp. rate (!) 9, height 1.803 m (5' 11\"), weight 74.3 kg (163 lb 12.8 oz), SpO2 100%.    Wt Readings from Last 3 Encounters:   03/15/24 74.3 kg (163 lb 12.8 oz)   02/25/24 70.5 kg (155 lb 8 oz)   02/15/24 73.2 kg (161 lb 6 oz)       Intake/Output Summary (Last 24 hours) at 3/15/2024 0930  Last data filed " at 3/14/2024 1700  Gross per 24 hour   Intake 700 ml   Output --   Net 700 ml     GENERAL: NAD  HEENT:  Normocephalic. No gross abnormalities.  Pupils equal.  MMM.    CV: RRR, + murmurs, no clicks, gallops, or rubs, 2-3+ pitting edema.   RESP: Clear bilaterally with good efforts  GI: Abdomen Soft, ND.  MUSCULOSKELETAL: extremities 2-3+ edema  NEURO:  Awake, alert and conversing normally  PSYCH: mood good, affect appropriate    L upper AVF          Data:     CBC RESULTS:     Recent Labs   Lab 03/15/24  0745 03/14/24  1249 03/14/24  0535   WBC 19.1* 19.5* 17.8*   RBC 2.17* 2.18* 1.59*   HGB 7.7* 7.6* 5.8*   HCT 22.7* 23.2* 17.3*   PLT 66* 67* 62*       Basic Metabolic Panel:  Recent Labs   Lab 03/15/24  0745 03/15/24  0422 03/15/24  0044 03/14/24  2057 03/14/24  1608 03/14/24  1202 03/14/24  0836 03/14/24  0535   *  --   --   --   --   --   --  136   POTASSIUM 3.6  --   --   --   --   --   --  3.3*   CHLORIDE 92*  --   --   --   --   --   --  95*   CO2 19*  --   --   --   --   --   --  22   BUN 62.4*  --   --   --   --   --   --  54.9*   CR 6.36*  --   --   --   --   --   --  5.37*   * 140* 145* 107* 123* 172*   < > 256*   KARY 8.2*  --   --   --   --   --   --  8.4*    < > = values in this interval not displayed.       INR  Recent Labs   Lab 03/14/24  0535 03/14/24  0534   INR 1.96* 2.14*      Attestation:   I have reviewed today's relevant vital signs, notes, medications, labs and imaging.    Donta De La Torre MD  [facility] - Nephrology  Office phone :[phone]  Pager: [phone]

## 2024-03-15 NOTE — PROGRESS NOTES
"[facility]    Medicine Progress Note - Hospitalist Service        Date of Admission:  3/14/2024  2:24 AM    Assessment & Plan:   64M hx of Crohn's disease status post colectomy on Stelaris, history of small bowel ulcer in 12/2022, chronic thrombocytopenia, severe mitral stenosis, severe tricuspid regurgitation, moderate aortic stenosis, nonobstructive mild coronary artery disease, ESRD on HD (MWF), hypertension, hyperlipidemia, GERD, history of abdominal aorta partially calcified dissection, history of hemorrhagic proteinaceous renal cysts  transferred from [place] for colonic penumatosis, scrotal swelling and anemia.      Colonic Pneumatosis  Lactic Acidosis  Anion gap metabolic acidosis  -Patient presenting to outside emergency room with complaints of 4 days of scrotal swelling without tenderness, along with LE edema. Complaining of mild abdominal pain but no nausea/vomiting/constipation. Has chronic soft stools in setting of short gut. No fevers/chills. Compliant with dialysis without any acute dyspnea.   -Labs: lactate 4.5>3, WBC 21.6, trops 389  -Imaging: CT showed extensive large bowel pneumoatosis  -Etiology and clinical relevance of above finding is unclear as he is hemodynamically stable, asymptomatic and exam is benign  -Colorectal surgery and [facility] GI following  -Continue empiric Zosyn for now  -?  Repeat CT abdomen  -Continue n.p.o. for now pending colorectal surgery and gastroenterology reevaluation  -Cultures are negative thus far  -Suspect elevated lactate is multifactorial, improving     Acute on Chronic Anemia  Hx recurret anemias 2/2 GIB from [facility] Crohn's flares  Chronic thrombocytopenia  Hx of epistaxis  -Has hx of chronic thrombocytopenia since 2023  -Had episode of acute anemia in 2/2024, requiring hospitalisation that resulted in 4 units of PRBC transfused. Endoscopy was performed showing \"congested and erythematous mucosa in the stomach consistent with reactive gastropathy. " "Colonoscopy with poor preparation and multiple large ulcers in the ileum one of which had adherent clot with visible vessel- treated with epinephrine and cautery with resolution of bleeding. \"  -No clinical history of hematochezia or melena  -Presenting hemoglobin of 5.7, s/p 1 unit of PRBC yesterday, hemoglobin stable at 7.7  -[facility] GI following, no plans for endoscopy at this time  -Recheck CBC in the morning    Scrotal Swelling  Anasarca  Chronic Hyponatremia  Hypoalbuminemia  -Suspect scrotal swelling is from hypoalbuminemia/hypoproteinemia, albumin is 2.9 with a total protein of 4.5  -Ultrasound showed bilateral moderate hydroceles  -Nutrition consulted  -Scrotal swelling much improved today.    Hx ESRD on HD  -Defer dialysis to nephrology, normally dialyzes on Monday Wednesday and Friday      Type 2 NSTEMI  Severe Mitral Stenosis  Severe Tricuspid Regurgitation  Moderate Aortic Stenosis  Moderate Pulmonary Hypertesnion  Non Obstructive Mild Coronary Artery Disease  Afib  HTN  -pending \"mechanical MVR, AVR, tricuspid valve repair vs. replacement with a bioprosthetic valve, bilateral Pulmonary vein Isolation and Left Atrial Appendage Ligation \"  -GUERO in 2024 showed EF 55-60 w/ severe Mitral insufisicncy 2/2 annular calcification, moderate Aortic Stenosis, Moderate Pulm HTN, Tricuspid insufficicency  -[facility] 2024: Left sided filling pressures are severely elevated.Moderately elevated pulmonary artery hypertension, Normal cardiac output level. Prox RCA lesion is 30% stenosed. 2nd Mrg lesion is 30% stenosed. Right sided filling pressures are moderately elevated.  -RHC showed marked volume overload (L>R)  -CXR showed mild vascular congestion with faint opacities  -Troponin elevated at 389, likely due to multiple valvular heart disease and end-stage renal disease  -Cardiology consulted, no plans for intervention or workup at this time.  Continue conservative management.    Hx QTC prolongation  -last Qtc 520s in " 2/2024  -repeat   -limit Qtc prolonging medications such as zofran  -cardiac telemetry  -Mg and Phos add ons     Hx of Crohns Disease s/p Colectomy on Stelaris  Hx of Small Bowel Ulcer in 12/2022  Peristomal Hernia  Chronic Pancreatitis  -patient only has 9 feet of bowel left due to right hemicolectomy, multiple small bowel resections, and ileocolic resection with ileostomy/takedown since diagnosis in the 1970s  -Hx of chronic pancreatitis, withIntraductal papillary mucinous neoplasm with high grade dysplasia (EUS 8/2022)   -recent colonoscopy in 2/2024 showing multiple large ulcers in the ileum  -has chronic 4-5 loose BM a day  -GI consulted, they recommended discontinuing steroids.  No plans for endoscopy at this time.  Patient appears to be at baseline symptoms as far as his Crohn's disease is concerned.     Hyperglycemia  Neuropathy  -Patient presented with elevated blood sugar in the 240s   -hemoglobin A1c is 5.8  -Suspect acute hyperglycemia is due to recent steroid use  -Hyperglycemia much improved now.     HLD  -resume statin      Hypokalemia  -3.2 at [place], no evidence that it was repleted  -likely in setting of chronic loose stools  -Will defer to nephrology for potassium replacement in the context of end-stage renal disease/dialysis     Chronic abdominal aorta partially calcified dissection since 4/2021  -No acute intervention     Possible remote hx of CVA     GERD  -resume PPI      Anxiety  Depression  -resume celexa      Complex L renal cyst  Hx Hemorrhagic/proteinaceous renal cysts  -Known calcified cyst of the left lower pole measuring 9.7 cm based on CT from 2/25  -Outpatient follow-up     MICHAEL  Nicotine User  -25-40, pack year history  -Continuous oximetry with PRN oxygen    Diet: NPO for Medical/Clinical Reasons Except for: Meds, Ice Chips     DVT Prophylaxis: Pneumatic Compression Devices   [place] Catheter: Not present  Code Status: Full Code     Disposition Plan       Expected Discharge Date:  03/16/2024      Destination: home with help/services        Entered: Porfirio Smith MD 03/15/2024, 10:28 AM        Clinically Significant Risk Factors        # Hypokalemia: Lowest K = 3.3 mmol/L in last 2 days, will replace as needed     # Hypomagnesemia: Lowest Mg = 1.5 mg/dL in last 2 days, will replace as needed  # Anion Gap Metabolic Acidosis: Highest Anion Gap = 19 mmol/L in last 2 days, will monitor and treat as appropriate  # Hypoalbuminemia: Lowest albumin = 2.9 g/dL at 3/14/2024  5:35 AM, will monitor as appropriate    # Coagulation Defect: INR = 1.96 (Ref range: 0.85 - 1.15) and/or PTT = 35 Seconds (Ref range: 22 - 38 Seconds), will monitor for bleeding  # Thrombocytopenia: Lowest platelets = 62 in last 2 days, will monitor for bleeding   # Hypertension: Noted on problem list         # Severe Malnutrition: based on nutrition assessment, PRESENT ON ADMISSION   # Financial/Environmental Concerns: unable to afford rent/mortgage  # Support System: poor social support noted in nursing assessment                 The patient's care was discussed with the Bedside Nurse and Patient.    Medical Decision Making       **CLEAR ALL SELECTIONS**      Labs/Imaging Reviewed:  See Information above and Data section below  Time SPENT BY ME on the date of service doing chart review, history, exam, documentation & further activities per the note:  52 MINUTES    Chart documentation was completed, in part, with [employer] voice-recognition software. Even though reviewed, some grammatical, spelling, and word errors may remain.    Porfirio Smith MD  Hospitalist Service  [facility]  Text Page 7AM-6PM  Securely message with the Vocera Web Console (learn more here)  Text page via [employer] Paging/Directory    ______________________________________________________________________    Interval History   Seen at dialysis.  Patient denies nausea, vomiting or pain.  Afebrile.  Hemoglobin is better at 7.7 after  "transfusion.  Denies hematochezia or melena.  Scrotal swelling is much better.  He states that he is hungry    Data reviewed today: I reviewed all medications, new labs and imaging results over the last 24 hours. I personally reviewed no images or EKG's today.    Physical Exam   Vital signs:  Temp: 97.5  F (36.4  C) Temp src: Axillary BP: 109/63 Pulse: 81   Resp: (!) 9 SpO2: 99 % O2 Device: None (Room air)   Height: 180.3 cm (5' 11\") Weight: 74.3 kg (163 lb 12.8 oz)  Estimated body mass index is 22.85 kg/m  as calculated from the following:    Height as of this encounter: 1.803 m (5' 11\").    Weight as of this encounter: 74.3 kg (163 lb 12.8 oz).      Wt Readings from Last 2 Encounters:   03/15/24 74.3 kg (163 lb 12.8 oz)   02/25/24 70.5 kg (155 lb 8 oz)       Gen: AAOX3, NAD, comfortable  HEENT: Conjunctival pallor+  Resp: Decreased air entry at both bases, normal effort of breathing  CVS: RRR, systolic and diastolic murmur   Abd/GI: Soft, non-tender. BS- normoactive.    : Scrotal edema+  Skin: Warm, dry no rashes  MSK:  no pedal edema  Neuro- CN- intact. No focal deficits.  Spontaneously moving all 4 extremities      Data   Recent Labs   Lab 03/15/24  0745 03/15/24  0422 03/15/24  0044 03/14/24  1608 03/14/24  1249 03/14/24  0836 03/14/24  0535 03/14/24  0534   WBC 19.1*  --   --   --  19.5*  --  17.8*  --    HGB 7.7*  --   --   --  7.6*  --  5.8*  --    *  --   --   --  106*  --  109*  --    PLT 66*  --   --   --  67*  --  62*  --    INR  --   --   --   --   --   --  1.96* 2.14*   *  --   --   --   --   --  136  --    POTASSIUM 3.6  --   --   --   --   --  3.3*  --    CHLORIDE 92*  --   --   --   --   --  95*  --    CO2 19*  --   --   --   --   --  22  --    BUN 62.4*  --   --   --   --   --  54.9*  --    CR 6.36*  --   --   --   --   --  5.37*  --    ANIONGAP 19*  --   --   --   --   --  19*  --    KARY 8.2*  --   --   --   --   --  8.4*  --    * 140* 145*   < >  --    < > 256*  --  "   ALBUMIN  --   --   --   --   --   --  2.9*  --    PROTTOTAL  --   --   --   --   --   --  4.5*  --    BILITOTAL  --   --   --   --   --   --  1.5*  --    ALKPHOS  --   --   --   --   --   --  96  --    ALT  --   --   --   --   --   --  32  --    AST  --   --   --   --   --   --  20  --     < > = values in this interval not displayed.       Recent Results (from the past 24 hour(s))   US Lower Extremity Venous Duplex Bilateral    Narrative    VENOUS ULTRASOUND BOTH LEGS  3/14/2024 3:05 PM     HISTORY: lower ext swelling low plts    COMPARISON: None.    FINDINGS:  Examination of the deep veins with graded compression and  color flow Doppler with spectral wave form analysis was performed.      Impression    IMPRESSION: No evidence of deep venous thrombosis.    UMAIR ZIMMERMAN MD         SYSTEM ID:  [id number]     Medications    - MEDICATION INSTRUCTIONS -        - MEDICATION INSTRUCTIONS for Dialysis Patients -   Does not apply See Admin Instructions    budesonide  9 mg Oral QAM    calcium acetate  2,668 mg Oral TID w/meals    folic acid  1 mg Oral QAM    insulin aspart  1-7 Units Subcutaneous Q4H    multivitamin RENAL  1 capsule Oral QAM    nicotine  1 patch Transdermal Daily    pantoprazole  40 mg Intravenous Q12H    piperacillin-tazobactam  2.25 g Intravenous Q8H    simvastatin  20 mg Oral QAM    sodium chloride (PF)  3 mL Intracatheter Q8H

## 2024-03-15 NOTE — DISCHARGE INSTRUCTIONS
Wound care  EVERY SHIFT        Comments: Location: bilat feet and heels  Care: provided qshift by primary RN (and patient as needed)  1. Cleanse daily with routine hygiene, showering for example, or mild soap and water  2. Apply sween 24 lotion or aquaphor lotion to bilat feet and heels every shift and PRN as often as desired          Wound care  EVERY SHIFT        Comments: Location: perianal  Care: provided qshift by primary RN  1. Cleanse as needed each shift with jenn cleanser and soft dry wipes  2. Apply criticaid paste to perianal in a thin layer

## 2024-03-15 NOTE — PROGRESS NOTES
[facility]    Hematology/Oncology Follow-up Note      Today's Date: 03/15/24  Date of Admission:  3/14/2024  Reason for Consult: elevated INR      ASSESSMENT/PLAN:  Tacos Dominguez is a 64 year old male who we are asked to see for Elevated INR mixing studies did not correct    Does not have evidence of DIC and is not on warfarin Await Factor 7 levels if low , and mixing study did not correct would be worrisome for an inhibitor      Acquired inhibitors of Factor 7 are rare and may result in minor or severe bleeding . If level low will confirm . If bleeding occurs immunosuppression and or Factor 7 used to bypass/overload the system /pheresis etc. If no bleeding and  hemostatic stable no further intervention would be needed      Will follow    Francisco Rubin MD       INTERIM HISTORY: platelets improving, h/h stable. Dopplers negative for DVT . Mixing studies did not correct Factor 7 level is pending        MEDICATIONS:  Current Facility-Administered Medications   Medication    - MEDICATION INSTRUCTIONS for Dialysis Patients -    acetaminophen (TYLENOL) tablet 650 mg    Or    acetaminophen (TYLENOL) Suppository 650 mg    albumin human 25 % injection 50 mL    albumin human 5 % injection  mL    budesonide (ENTOCORT EC) EC capsule 9 mg    calcium acetate (PHOSLO) capsule 2,668 mg    calcium carbonate (TUMS) chewable tablet 1,000 mg    citalopram (celeXA) tablet 20 mg    glucose gel 15-30 g    Or    dextrose 50 % injection 25-50 mL    Or    glucagon injection 1 mg    folic acid (FOLVITE) tablet 1 mg    hydrALAZINE (APRESOLINE) injection 10 mg    HYDROmorphone (PF) (DILAUDID) injection 0.3 mg    insulin aspart (NovoLOG) injection (RAPID ACTING)    lidocaine (LMX4) cream    lidocaine 1 % 0.1-1 mL    lidocaine 1 % 0.5 mL    lidocaine 1 % 0.5 mL    LORazepam (ATIVAN) tablet 0.5 mg    multivitamin RENAL (TRIPHROCAPS) capsule 1 capsule    naloxone (NARCAN) injection 0.2 mg    Or    naloxone (NARCAN)  "injection 0.4 mg    Or    naloxone (NARCAN) injection 0.2 mg    Or    naloxone (NARCAN) injection 0.4 mg    nicotine (NICODERM CQ) 7 MG/24HR 24 hr patch 1 patch    pantoprazole (PROTONIX) IV push injection 40 mg    piperacillin-tazobactam (ZOSYN) 2.25 g vial to attach to  ml bag    prochlorperazine (COMPAZINE) injection 10 mg    Or    prochlorperazine (COMPAZINE) tablet 10 mg    Or    prochlorperazine (COMPAZINE) suppository 25 mg    senna-docusate (SENOKOT-S/PERICOLACE) 8.6-50 MG per tablet 1 tablet    Or    senna-docusate (SENOKOT-S/PERICOLACE) 8.6-50 MG per tablet 2 tablet    simvastatin (ZOCOR) tablet 20 mg    sodium chloride (PF) 0.9% PF flush 3 mL    sodium chloride (PF) 0.9% PF flush 3 mL    sodium chloride 0.9% BOLUS 100-150 mL    Stop Heparin 60 minutes before end of treatment           ALLERGIES:  Allergies   Allergen Reactions    Lisinopril Anaphylaxis and Other (See Comments)     Shortness of breath         PHYSICAL EXAM:  Vital signs:  Temp: 97.5  F (36.4  C) Temp src: Axillary BP: 114/72 Pulse: 80   Resp: (!) 9 SpO2: 91 % O2 Device: None (Room air)   Height: 180.3 cm (5' 11\") Weight: 74.3 kg (163 lb 12.8 oz)  Estimated body mass index is 22.85 kg/m  as calculated from the following:    Height as of this encounter: 1.803 m (5' 11\").    Weight as of this encounter: 74.3 kg (163 lb 12.8 oz).    ECO  GENERAL/CONSTITUTIONAL: No acute distress.  EYES: Pupils are equal, round, and react to light and accommodation. Extraocular movements intact.  No scleral icterus.  ENT/MOUTH: Neck supple. Oropharynx clear, no mucositis.  LYMPH: No anterior cervical, posterior cervical, supraclavicular, axillary or inguinal adenopathy.   RESPIRATORY: Clear to auscultation bilaterally. No crackles or wheezing.   CARDIOVASCULAR: Regular rate and rhythm without murmurs, gallops, or rubs.  GASTROINTESTINAL: No hepatosplenomegaly, masses, or tenderness. The patient has normal bowel sounds. No guarding.  No " distention.  MUSCULOSKELETAL: Warm and well-perfused, no cyanosis, clubbing, or edema.  NEUROLOGIC: Cranial nerves II-XII are intact. Alert, oriented, answers questions appropriately.  INTEGUMENTARY: No rashes or jaundice.  GAIT: Steady, does not use assistive device      LABS:  CBC RESULTS:   Recent Labs   Lab Test 03/15/24  0745   WBC 19.1*   RBC 2.17*   HGB 7.7*   HCT 22.7*   *   MCH 35.5*   MCHC 33.9   RDW 25.4*   PLT 66*       Recent Labs   Lab Test 03/15/24  0745 03/15/24  0422 03/14/24  0836 03/14/24  0535   *  --   --  136   POTASSIUM 3.6  --   --  3.3*   CHLORIDE 92*  --   --  95*   CO2 19*  --   --  22   ANIONGAP 19*  --   --  19*   * 140*   < > 256*   BUN 62.4*  --   --  54.9*   CR 6.36*  --   --  5.37*   KARY 8.2*  --   --  8.4*    < > = values in this interval not displayed.              IMAGING:    Ultrasound negative for DVT    Total time spent on day of visit, including review of tests, obtaining/reviewing separately obtained history, ordering medications/tests/procedures, communicating with PCP/consultants, and documenting in electronic medical record: 50 minutes       Francisco Rubin MD  Hematology/Oncology  [facility]

## 2024-03-16 NOTE — PROGRESS NOTES
Renal Medicine Progress Note            Assessment/Plan:     64 y.o man with ESRD, admitted for colonic pneumatosis.      # ESRD:                -3.5 hrs               -LAVF               -challenge EDW               -[facility] in [place]               -Dr. Scherer     # Hypervolemia: challenge EDW     # Anemia: Hgb is low. Transfuse as needed.      # Colonic pneumatosis? Crohn's s/p colectomy.                -Zosyn     # Severe valves disease: Scheduled for surgery in April with Gladys.      Plan:  # 2 hrs HD, UF 2-3 liters as tolerate. 1 unit of PRBC with HD. Next HD treatment is on Monday  # Avoid IV access on the left arm (AVF) if possible        Interval History:     He is getting an extra 2 hrs HD treatment for mainly for UF.   I discussed IV access with RN last night.   Getting one unit of PRBC with HD.  I discussed fluid restriction with him.  He does not have questions or concerns.           Medications and Allergies:      - MEDICATION INSTRUCTIONS for Dialysis Patients -   Does not apply See Admin Instructions    budesonide  9 mg Oral QAM    calcium acetate  2,668 mg Oral TID w/meals    folic acid  1 mg Oral QAM    insulin aspart  1-7 Units Subcutaneous Q4H    iron sucrose  100 mg Intravenous Once in dialysis/CRRT    multivitamin RENAL  1 capsule Oral QAM    nicotine  1 patch Transdermal Daily    - MEDICATION INSTRUCTIONS -   Does not apply Once    pantoprazole  40 mg Intravenous Q12H    piperacillin-tazobactam  2.25 g Intravenous Q8H    simvastatin  20 mg Oral QAM    sodium chloride (PF)  10 mL Intracatheter Q8H    sodium chloride (PF)  10 mL Intracatheter Q8H    sodium chloride (PF)  3 mL Intracatheter Q8H    sodium chloride 0.9%  250 mL Intravenous Once in dialysis/CRRT    sodium chloride 0.9%  300 mL Hemodialysis Machine Once        Allergies   Allergen Reactions    Lisinopril Anaphylaxis and Other (See Comments)     Shortness of breath            Physical Exam:   Vitals were reviewed   , Blood  "pressure 109/68, pulse 80, temperature 97.9  F (36.6  C), resp. rate 19, height 1.803 m (5' 11\"), weight 74.3 kg (163 lb 12.8 oz), SpO2 90%.    Wt Readings from Last 3 Encounters:   03/15/24 74.3 kg (163 lb 12.8 oz)   02/25/24 70.5 kg (155 lb 8 oz)   02/15/24 73.2 kg (161 lb 6 oz)       Intake/Output Summary (Last 24 hours) at 3/16/2024 0926  Last data filed at 3/15/2024 1200  Gross per 24 hour   Intake 100 ml   Output 3500 ml   Net -3400 ml     GENERAL: NAD  HEENT:  Normocephalic. No gross abnormalities.  Pupils equal.  MMM.    CV: RRR, + murmurs, no clicks, gallops, or rubs, 1++ pitting edema.   RESP: Clear bilaterally with good efforts  GI: Abdomen Soft, ND.  MUSCULOSKELETAL: extremities 2-3+ edema  NEURO:  Awake, alert and conversing normally  PSYCH: mood good, affect appropriate     L upper AVF          Data:     CBC RESULTS:     Recent Labs   Lab 03/16/24  0639 03/15/24  0745 03/14/24  1249 03/14/24  0535   WBC 12.0* 19.1* 19.5* 17.8*   RBC 2.09* 2.17* 2.18* 1.59*   HGB 7.3* 7.7* 7.6* 5.8*   HCT 22.6* 22.7* 23.2* 17.3*   PLT 62* 66* 67* 62*       Basic Metabolic Panel:  Recent Labs   Lab 03/16/24  0639 03/16/24  0429 03/16/24  0222 03/16/24  0045 03/15/24  2005 03/15/24  1608 03/15/24  1211 03/15/24  0745 03/14/24  0836 03/14/24  0535   NA  --   --   --   --   --   --   --  130*  --  136   POTASSIUM 3.3*  --   --   --   --   --   --  3.6  --  3.3*   CHLORIDE  --   --   --   --   --   --   --  92*  --  95*   CO2  --   --   --   --   --   --   --  19*  --  22   BUN  --   --   --   --   --   --   --  62.4*  --  54.9*   CR  --   --   --   --   --   --   --  6.36*  --  5.37*   GLC  --  171* 189* 265* 173* 121* 105* 162*   < > 256*   KARY  --   --   --   --   --   --   --  8.2*  --  8.4*    < > = values in this interval not displayed.       INR  Recent Labs   Lab 03/14/24  0535 03/14/24  0534   INR 1.96* 2.14*      Attestation:   I have reviewed today's relevant vital signs, notes, medications, labs and " imaging.    Donta De La Torre MD  [facility] - Nephrology  Office phone :[phone]  Pager: [phone]

## 2024-03-16 NOTE — PROVIDER NOTIFICATION
MD Notification    Notified Person: MD    Notified Person Name: Lalo Kearns    Notification Date/Time: 3/16/2024 0119     Notification Interaction: vocera     Purpose of Notification: Zosyn infusing and infiltrated. Orders placed for midline, pt now without PIV will miss 2 doses of IV zosyn. Having difficulty getting 02 read on pt attempted different finger/toe, ear probe, forehead probe; pt is IMC w/ continuous 02 monitoring, not on oxygen     Orders Received: follow extravasation protocol.     Comments:

## 2024-03-16 NOTE — PLAN OF CARE
Goal Outcome Evaluation:    Pt complains of general discomfort, up independently, had dialysis today to left AV fistula, received 1 unit of blood, tolerating diet, possible discharge home tomorrow.

## 2024-03-16 NOTE — PROGRESS NOTES
[facility]    Medicine Progress Note - Hospitalist Service        Date of Admission:  3/14/2024  2:24 AM    Assessment & Plan:   64M hx of Crohn's disease status post colectomy on Stelaris, history of small bowel ulcer in 12/2022, chronic thrombocytopenia, severe mitral stenosis, severe tricuspid regurgitation, moderate aortic stenosis, nonobstructive mild coronary artery disease, ESRD on HD (MWF), hypertension, hyperlipidemia, GERD, history of abdominal aorta partially calcified dissection, history of hemorrhagic proteinaceous renal cysts  transferred from [place] for colonic penumatosis, scrotal swelling and anemia.      Colonic Pneumatosis  Lactic Acidosis  Anion gap metabolic acidosis  -Patient presenting to outside emergency room with complaints of 4 days of scrotal swelling without tenderness, along with LE edema. Complaining of mild abdominal pain but no nausea/vomiting/constipation. Has chronic soft stools in setting of short gut. No fevers/chills. Compliant with dialysis without any acute dyspnea.   -Labs: lactate 4.5>3, WBC 21.6, trops 389  -Imaging: Outside CT showed extensive large bowel pneumoatosis  -Etiology and clinical relevance of above finding is unclear as he is hemodynamically stable, asymptomatic and exam is benign  -Colorectal surgery and [facility] GI followed  -Discussed with GI team yesterday, they recommended starting diet and follow clinically.  -Plan is to repeat CT abdomen in 1 month as outpatient, GI will arrange.  -Blood culture negative, no other evidence of active infection.  Discontinue Zosyn  -Per GI recommendation we will start metronidazole 500 mg p.o. 3 times daily for 30 days for pneumatosis  -Advance diet to soft diet today, if tolerating anticipate home tomorrow morning.     Acute on Chronic Anemia  Hx recurret anemias 2/2 GIB from SBO Crohn's flares  Chronic thrombocytopenia  Hx of epistaxis  -Has hx of chronic thrombocytopenia since 2023  -Had episode of  "acute anemia in 2/2024, requiring hospitalisation that resulted in 4 units of PRBC transfused. Endoscopy was performed showing \"congested and erythematous mucosa in the stomach consistent with reactive gastropathy. Colonoscopy with poor preparation and multiple large ulcers in the ileum one of which had adherent clot with visible vessel- treated with epinephrine and cautery with resolution of bleeding. \"  -No clinical history of hematochezia or melena  -Presenting hemoglobin of 5.7, s/p 1 unit of PRBC on 3/14  -Hemoglobin slightly lower at 7.3, no clinical evidence of hematochezia or melena  -[facility] GI following, no plans for endoscopy at this time  -Transfuse 1 unit of PRBC with dialysis today.    Scrotal Swelling  Anasarca  Chronic Hyponatremia  Hypoalbuminemia  -Suspect scrotal swelling is from hypoalbuminemia/hypoproteinemia, albumin is 2.9 with a total protein of 4.5  -Ultrasound showed bilateral moderate hydroceles  -Nutrition consulted  -Scrotal swelling much improved.    Hx ESRD on HD  -Defer dialysis to nephrology, normally dialyzes on Monday Wednesday and Friday      Type 2 NSTEMI  Severe Mitral Stenosis  Severe Tricuspid Regurgitation  Moderate Aortic Stenosis  Moderate Pulmonary Hypertesnion  Non Obstructive Mild Coronary Artery Disease  Afib  HTN  -pending \"mechanical MVR, AVR, tricuspid valve repair vs. replacement with a bioprosthetic valve, bilateral Pulmonary vein Isolation and Left Atrial Appendage Ligation \"  -GUERO in 2024 showed EF 55-60 w/ severe Mitral insufisicncy 2/2 annular calcification, moderate Aortic Stenosis, Moderate Pulm HTN, Tricuspid insufficicency  -[facility] [place] 2024: Left sided filling pressures are severely elevated.Moderately elevated pulmonary artery hypertension, Normal cardiac output level. Prox RCA lesion is 30% stenosed. 2nd Mrg lesion is 30% stenosed. Right sided filling pressures are moderately elevated.  -RHC showed marked volume overload (L>R)  -CXR showed mild vascular congestion " with faint opacities  -Troponin elevated at 389, likely due to multiple valvular heart disease and end-stage renal disease  -Cardiology consulted, no plans for intervention or workup at this time.  Continue conservative management.    Hx QTC prolongation  -last Qtc 520s in 2/2024  -repeat   -limit Qtc prolonging medications such as zofran  -cardiac telemetry  -Mg and Phos replacement as indicated     Hx of Crohns Disease s/p Colectomy on Stelaris  Hx of Small Bowel Ulcer in 12/2022  Peristomal Hernia  Chronic Pancreatitis  -patient only has 9 feet of bowel left due to right hemicolectomy, multiple small bowel resections, and ileocolic resection with ileostomy/takedown since diagnosis in the 1970s  -Hx of chronic pancreatitis, withIntraductal papillary mucinous neoplasm with high grade dysplasia (EUS 8/2022)   -recent colonoscopy in 2/2024 showing multiple large ulcers in the ileum  -has chronic 4-5 loose BM a day  -GI consulted, they recommended discontinuing steroids.  No plans for endoscopy at this time.  Please see plans for outpatient follow-up as mentioned above     Hyperglycemia  Neuropathy  -Patient presented with elevated blood sugar in the 240s   -hemoglobin A1c is 5.8  -Suspect acute hyperglycemia is due to recent steroid use  -Hyperglycemia much improved now.     HLD  -resume statin      Hypokalemia  -3.2 at [place], no evidence that it was repleted  -likely in setting of chronic loose stools  -Will defer to nephrology for potassium replacement in the context of end-stage renal disease/dialysis     Chronic abdominal aorta partially calcified dissection since 4/2021  -No acute intervention     Possible remote hx of CVA     GERD  -resume PPI      Anxiety  Depression  -resume celexa      Complex L renal cyst  Hx Hemorrhagic/proteinaceous renal cysts  -Known calcified cyst of the left lower pole measuring 9.7 cm based on CT from 2/25  -Outpatient follow-up     MICHAEL  Nicotine User  -25-40, pack year  history  -Continuous oximetry with PRN oxygen    Diet: Mechanical/Dental Soft Diet     DVT Prophylaxis: Pneumatic Compression Devices   [place] Catheter: Not present  Code Status: Full Code     Disposition Plan      Expected Discharge Date: 03/17/2024      Destination: home with help/services  Discharge Comments:      Entered: Porfirio Smith MD 03/16/2024, 9:22 AM        Clinically Significant Risk Factors        # Hypokalemia: Lowest K = 3.3 mmol/L in last 2 days, will replace as needed      # Anion Gap Metabolic Acidosis: Highest Anion Gap = 19 mmol/L in last 2 days, will monitor and treat as appropriate  # Hypoalbuminemia: Lowest albumin = 2.9 g/dL at 3/14/2024  5:35 AM, will monitor as appropriate    # Coagulation Defect: INR = 1.96 (Ref range: 0.85 - 1.15) and/or PTT = 35 Seconds (Ref range: 22 - 38 Seconds), will monitor for bleeding  # Thrombocytopenia: Lowest platelets = 62 in last 2 days, will monitor for bleeding   # Hypertension: Noted on problem list         # Severe Malnutrition: based on nutrition assessment, PRESENT ON ADMISSION   # Financial/Environmental Concerns: unable to afford rent/mortgage  # Support System: poor social support noted in nursing assessment                 The patient's care was discussed with the Bedside Nurse and Patient.    Medical Decision Making       **CLEAR ALL SELECTIONS**      Labs/Imaging Reviewed:  See Information above and Data section below  Time SPENT BY ME on the date of service doing chart review, history, exam, documentation & further activities per the note:  35 MINUTES    Chart documentation was completed, in part, with [employer] voice-recognition software. Even though reviewed, some grammatical, spelling, and word errors may remain.    Porfirio Smith MD  Hospitalist Service  [facility]  Text Page 7AM-6PM  Securely message with the Vocera Web Console (learn more here)  Text page via [employer]  "Paging/Directory    ______________________________________________________________________    Interval History   Patient tolerated clear liquids well.  Leukocytosis improving.  Hemoglobin dropped to 7.3 although no evidence of bleeding from any source.  Specifically no hematochezia or melena.  Transfuse 1 unit of PRBC today.  Denies abdominal pain, nausea or vomiting.  Afebrile.  Cultures are negative thus far.    Data reviewed today: I reviewed all medications, new labs and imaging results over the last 24 hours. I personally reviewed no images or EKG's today.    Physical Exam   Vital signs:  Temp: 97.3  F (36.3  C) Temp src: Axillary BP: 110/67 Pulse: 75   Resp: 17 SpO2: 90 % O2 Device: None (Room air)   Height: 180.3 cm (5' 11\") Weight: 74.3 kg (163 lb 12.8 oz)  Estimated body mass index is 22.85 kg/m  as calculated from the following:    Height as of this encounter: 1.803 m (5' 11\").    Weight as of this encounter: 74.3 kg (163 lb 12.8 oz).      Wt Readings from Last 2 Encounters:   03/15/24 74.3 kg (163 lb 12.8 oz)   02/25/24 70.5 kg (155 lb 8 oz)       Gen: AAOX3, NAD, comfortable  HEENT: Conjunctival pallor+  Resp: Decreased air entry at both bases, normal effort of breathing  CVS: RRR, systolic and diastolic murmur   Abd/GI: Soft, non-tender. BS- normoactive.    : Scrotal edema+  Skin: Warm, dry no rashes  MSK:  no pedal edema  Neuro- CN- intact. No focal deficits.  Spontaneously moving all 4 extremities      Data   Recent Labs   Lab 03/16/24  0639 03/16/24  0429 03/16/24  0222 03/16/24  0045 03/15/24  1211 03/15/24  0745 03/14/24  1608 03/14/24  1249 03/14/24  0836 03/14/24  0535 03/14/24  0534   0000   WBC 12.0*  --   --   --   --  19.1*  --  19.5*  --  17.8*  --    < >   HGB 7.3*  --   --   --   --  7.7*  --  7.6*  --  5.8*  --    < >   *  --   --   --   --  105*  --  106*  --  109*  --    < >   PLT 62*  --   --   --   --  66*  --  67*  --  62*  --    < >   INR  --   --   --   --   --   --   --   " --   --  1.96* 2.14*  --    NA  --   --   --   --   --  130*  --   --   --  136  --   --    POTASSIUM 3.3*  --   --   --   --  3.6  --   --   --  3.3*  --   --    CHLORIDE  --   --   --   --   --  92*  --   --   --  95*  --   --    CO2  --   --   --   --   --  19*  --   --   --  22  --   --    BUN  --   --   --   --   --  62.4*  --   --   --  54.9*  --   --    CR  --   --   --   --   --  6.36*  --   --   --  5.37*  --   --    ANIONGAP  --   --   --   --   --  19*  --   --   --  19*  --   --    KARY  --   --   --   --   --  8.2*  --   --   --  8.4*  --   --    GLC  --  171* 189* 265*   < > 162*   < >  --    < > 256*  --    < >   ALBUMIN  --   --   --   --   --   --   --   --   --  2.9*  --   --    PROTTOTAL  --   --   --   --   --   --   --   --   --  4.5*  --   --    BILITOTAL  --   --   --   --   --   --   --   --   --  1.5*  --   --    ALKPHOS  --   --   --   --   --   --   --   --   --  96  --   --    ALT  --   --   --   --   --   --   --   --   --  32  --   --    AST  --   --   --   --   --   --   --   --   --  20  --   --     < > = values in this interval not displayed.       No results found for this or any previous visit (from the past 24 hour(s)).    Medications    - MEDICATION INSTRUCTIONS -        - MEDICATION INSTRUCTIONS for Dialysis Patients -   Does not apply See Admin Instructions    budesonide  9 mg Oral QAM    calcium acetate  2,668 mg Oral TID w/meals    folic acid  1 mg Oral QAM    insulin aspart  1-7 Units Subcutaneous Q4H    iron sucrose  100 mg Intravenous Once in dialysis/CRRT    multivitamin RENAL  1 capsule Oral QAM    nicotine  1 patch Transdermal Daily    - MEDICATION INSTRUCTIONS -   Does not apply Once    pantoprazole  40 mg Intravenous Q12H    piperacillin-tazobactam  2.25 g Intravenous Q8H    simvastatin  20 mg Oral QAM    sodium chloride (PF)  10 mL Intracatheter Q8H    sodium chloride (PF)  10 mL Intracatheter Q8H    sodium chloride (PF)  3 mL Intracatheter Q8H    sodium chloride 0.9%   250 mL Intravenous Once in dialysis/CRRT    sodium chloride 0.9%  300 mL Hemodialysis Machine Once

## 2024-03-16 NOTE — PROGRESS NOTES
HEMATOLOGY Chart Check:    Patient off the floor, labs and chart reviewed.  INR significantly elevated at admission (2.14) with normal PTT, mixing study partially corrected (1.20).  Does have chronically elevated although variable elevation of INR as well as chronic and variable thrombocytopenia.  Factor VII level  slightly decreased at 35% which would not fit with inhibitor (should be significantly lower if clinically significant inhibitor).    He  does have numerous other comorbidities including short gut from Crohn's and bowel resections.  Overall picture consistent with vitamin K deficiency, I do not see that he has had K replacement so worth a trial.  Also, amyloidosis can cause inhibition of INR so checking for paraprotein would be appropriate.    Will order vitamin K replacement (oral) and serum immunofixation.    Will follow.    Hector Casas MD  P Hem/Onc

## 2024-03-16 NOTE — PLAN OF CARE
Goal Outcome Evaluation:    COGNITION/MENTATION: A/O x 4   NEURO/CMS: BLE and BUE upper arm +3 edema, cool extremities   CARDIAC/TELE: SR  RESPIRATORY: LS diminished, on RA. Cool extremities causing hard to read oxygen sensor attempted finger/toe rotating site, ear probe and forehead probe.  No signs of respiratory distress, pt denied any SOB   GI: Irregular contour. BS+, last BM 3/16, bowels loose   : on HD, oliguria   PAIN: c/o bilateral groin pain, prn dilaudid admin x3  SKIN: scattered bruising, stephen, BLE dry/cracked creams applied, MASD to buttock/coccyx.   DRAINS/LINES: Loss of IV access. JOHNIE fistula bruit and thrill present, dressing CDI.   ACTIVITY: SBA, unsteady on feet   DIET: clear liquid, renal diet    B, 265, 171

## 2024-03-16 NOTE — PROGRESS NOTES
Potassium   Date Value Ref Range Status   03/16/2024 3.3 (L) 3.4 - 5.3 mmol/L Final   01/13/2023 4.0 3.4 - 5.3 mmol/L Final   05/25/2021 3.6 3.4 - 5.3 mmol/L Final     Hemoglobin   Date Value Ref Range Status   03/16/2024 7.3 (L) 13.3 - 17.7 g/dL Final   05/25/2021 10.6 (L) 13.3 - 17.7 g/dL Final     Creatinine   Date Value Ref Range Status   03/15/2024 6.36 (H) 0.67 - 1.17 mg/dL Final   05/25/2021 10.60 (H) 0.66 - 1.25 mg/dL Final     Urea Nitrogen   Date Value Ref Range Status   03/15/2024 62.4 (H) 8.0 - 23.0 mg/dL Final   01/13/2023 36 (H) 7 - 30 mg/dL Final   05/25/2021 36 (H) 7 - 30 mg/dL Final     Sodium   Date Value Ref Range Status   03/15/2024 130 (L) 135 - 145 mmol/L Final     Comment:     Reference intervals for this test were updated on 09/26/2023 to more accurately reflect our healthy population. There may be differences in the flagging of prior results with similar values performed with this method. Interpretation of those prior results can be made in the context of the updated reference intervals.    05/25/2021 136 133 - 144 mmol/L Final     INR   Date Value Ref Range Status   03/14/2024 1.96 (H) 0.85 - 1.15 Final   05/25/2021 1.33 (H) 0.86 - 1.14 Final       DIALYSIS PROCEDURE NOTE  Hepatitis status of previous patient on machine log was checked and verified ok to use with this patients hepatitis status.  Patient dialyzed for 2 hrs. on a K4 bath with a net fluid removal of  3L.  A BFR of 350 ml/min was obtained via a LAVG using 16 gauge needles.      The treatment plan was discussed with Dr. De La Torre during the treatment.    Total heparin received during the treatment: 0 units.   Needle cannulation sites held x 5 min.     Meds  given: [place], 1 unit RBC's, Albumin prime, see MAR     Complications: None, tx tolerated as ordered.     ICEBOAT? Timeout performed pre-treatment  I: Patient was identified using 2 identifiers  C:  Consent Signed Yes  E: Equipment preventative maintenance is current and  dialysis delivery system OK to use  B: Hepatitis B Surface Antigen: negative; Draw Date: 2/26/24      Hepatitis B Surface Antibody: susceptible; Draw Date: 2/26/24  O: Dialysis orders present and complete prior to treatment  A: Vascular access verified and assessed prior to treatment  T: Treatment was performed at a clinically appropriate time  ?: Patient was allowed to ask questions and address concerns prior to treatment  See Adult Hemodialysis flowsheet in [facility] for further details and post assessment.  Machine water alarm in place and functioning. Transducer pods intact and checked every 15min.   Pt returned via Bed.  Chlorine/Chloramine water system checked every 4 hours.    Patient repositioned every 2 hours during the treatment.  Post treatment report given to TAYLA Herrera RN regarding 3L of fluid removed, last BP of 116/63, and patient pain rating of 6/10.     Please remove patient dressing on AVF and AVG needle sites 24 hours after dialysis. If leaking occurs please apply a Band-Aid.

## 2024-03-16 NOTE — PROGRESS NOTES
Patient with historically difficult vasculature for access per patient.  Other RN staff placed PIV in right upper forearm which extravasated approximately one hour after placement.  Restricted to right arm because left fistula.  Only visible vessel in right forearm cannulates easily, blood return present, but unable to advance catheter into vein.  Patient with visible vasculature above fistula on left side.  Nephrologist Dr. De La Torre contacted, seeking authorization for VAT to utilize superficial vessel near axilla.  Plan would be to utilize upper arm vein above fistula on left side overnight, and have PICC or midline placed in am.  Dr. De La Torre states that vessel should only be utilized if urgent, defers decision on other access to hospitalist.  Dr. Kearns, hospitalist contacted, explain no overnight coverage for VAT, earliest midline placement is 3/16/24.  Dr. Kearns states will transition patient to oral meds overnight, with plan to place midline in am.  Will update oncoming VAT RN staff.

## 2024-03-16 NOTE — PLAN OF CARE
OT: orders received and met with pt to discuss smoking cessation. Pt reports that he is in the process of quitting smoking. States he has the patch and is working with his dr and uses his insurance help line for support. Will complete order as pt declined further assist

## 2024-03-17 NOTE — DISCHARGE SUMMARY
[facility]    Discharge Summary  Hospitalist    Date of Admission:  3/14/2024  Date of Discharge:  3/17/2024  Discharging Provider: Porfirio Smith MD, MD    Discharge Diagnoses        Colonic Pneumatosis  Lactic Acidosis  Anion gap metabolic acidosis  Acute on Chronic Anemia  Hx recurret anemias 2/2 GIB from [facility] Crohn's flares  Chronic thrombocytopenia  Hx of epistaxis  Scrotal Swelling most likely due to hypoproteinemia/hypoalbuminemia, improving  Anasarca  Chronic Hyponatremia  Hypoalbuminemia  Hx ESRD on HD  Type 2 NSTEMI  Severe Mitral Stenosis  Severe Tricuspid Regurgitation  Moderate Aortic Stenosis  Moderate Pulmonary Hypertesnion  Non Obstructive Mild Coronary Artery Disease  Afib  HTN  Hx QTC prolongation  Hx of Crohns Disease s/p Colectomy on Stelaris  Hx of Small Bowel Ulcer in 12/2022  Peristomal Hernia  Chronic Pancreatitis  Hyperglycemia-steroid induced  Neuropathy  HLD  Hypokalemia  GERD  Anxiety  Depression  Complex L renal cyst  Hx Hemorrhagic/proteinaceous renal cysts  MICHAEL  Nicotine User         Hospital Course:  64M hx of Crohn's disease status post colectomy on Stelaris, history of small bowel ulcer in 12/2022, chronic thrombocytopenia, severe mitral stenosis, severe tricuspid regurgitation, moderate aortic stenosis, nonobstructive mild coronary artery disease, ESRD on HD (MWF), hypertension, hyperlipidemia, GERD, history of abdominal aorta partially calcified dissection, history of hemorrhagic proteinaceous renal cysts  transferred from [place] for colonic penumatosis, scrotal swelling and anemia.      Colonic Pneumatosis  Lactic Acidosis  Anion gap metabolic acidosis  -Patient presenting to outside emergency room with complaints of 4 days of scrotal swelling without tenderness, along with LE edema. Complaining of mild abdominal pain but no nausea/vomiting/constipation. Has chronic soft stools in setting of short gut. No fevers/chills. Compliant with dialysis  "without any acute dyspnea.   -Labs: lactate 4.5>3, WBC 21.6, trops 389  -Imaging: Outside CT showed extensive large bowel pneumoatosis  -Etiology and clinical relevance of above finding is unclear as he is hemodynamically stable, asymptomatic and exam is benign  -Colorectal surgery and [facility] GI followed  -No plans for endoscopic evaluation per GI.  They recommended metronidazole for 1 month with plans for repeat CT in 1 month as a follow-up to colonic pneumatosis  -Blood culture negative, no other evidence of active infection.  Initially placed on empiric Zosyn which was discontinued and switched to metronidazole per GI recommendation.  Patient will be going home with 29 more days of oral metronidazole 500 mg 3 times daily  -Diet advanced to soft diet, tolerating well, no nausea, vomiting or abdominal pain with oral intake.     Acute on Chronic Anemia  Hx recurret anemias 2/2 GIB from [facility] Crohn's flares  Chronic thrombocytopenia  Hx of epistaxis  -Has hx of chronic thrombocytopenia since 2023  -Had episode of acute anemia in 2/2024, requiring hospitalisation that resulted in 4 units of PRBC transfused. Endoscopy was performed showing \"congested and erythematous mucosa in the stomach consistent with reactive gastropathy. Colonoscopy with poor preparation and multiple large ulcers in the ileum one of which had adherent clot with visible vessel- treated with epinephrine and cautery with resolution of bleeding. \"  -No clinical history of hematochezia or melena  -Presenting hemoglobin of 5.7, s/p 1 unit of PRBC on 3/14  -Received another unit of PRBC on 3/16, hemoglobin now stable at 8.0  -[facility] GI followed, no plans for endoscopy at this time     Scrotal Swelling  Anasarca  Chronic Hyponatremia  Hypoalbuminemia  -Suspect scrotal swelling is from hypoalbuminemia/hypoproteinemia, albumin is 2.9 with a total protein of 4.5  -Ultrasound showed bilateral moderate hydroceles  -Nutrition consulted  -Scrotal swelling much " "improved.     Hx ESRD on HD  -Defer dialysis to nephrology, normally dialyzes on Monday Wednesday and Friday      Type 2 NSTEMI  Severe Mitral Stenosis  Severe Tricuspid Regurgitation  Moderate Aortic Stenosis  Moderate Pulmonary Hypertesnion  Non Obstructive Mild Coronary Artery Disease  Afib  HTN  -pending \"mechanical MVR, AVR, tricuspid valve repair vs. replacement with a bioprosthetic valve, bilateral Pulmonary vein Isolation and Left Atrial Appendage Ligation \"  -GUERO in 2024 showed EF 55-60 w/ severe Mitral insufisicncy 2/2 annular calcification, moderate Aortic Stenosis, Moderate Pulm HTN, Tricuspid insufficicency  -LHC 2024: Left sided filling pressures are severely elevated.Moderately elevated pulmonary artery hypertension, Normal cardiac output level. Prox RCA lesion is 30% stenosed. 2nd Mrg lesion is 30% stenosed. Right sided filling pressures are moderately elevated.  -RHC showed marked volume overload (L>R)  -CXR showed mild vascular congestion with faint opacities  -Troponin elevated at 389, likely due to multiple valvular heart disease and end-stage renal disease  -Cardiology consulted, no plans for intervention or workup at this time.  Continue conservative management.  Follow-up closely with his outpatient cardiologist.     Hx QTC prolongation  -last Qtc 520s in 2/2024  -repeat   -limit Qtc prolonging medications such as zofran  -cardiac telemetry     Hx of Crohns Disease s/p Colectomy on Stelaris  Hx of Small Bowel Ulcer in 12/2022  Peristomal Hernia  Chronic Pancreatitis  -patient only has 9 feet of bowel left due to right hemicolectomy, multiple small bowel resections, and ileocolic resection with ileostomy/takedown since diagnosis in the 1970s  -Hx of chronic pancreatitis, withIntraductal papillary mucinous neoplasm with high grade dysplasia (EUS 8/2022)   -recent colonoscopy in 2/2024 showing multiple large ulcers in the ileum  -has chronic 4-5 loose BM a day  -GI consulted, they " recommended discontinuing steroids.  No plans for endoscopy at this time.  Please see plans for outpatient follow-up as mentioned above     Hyperglycemia  Neuropathy  -Patient presented with elevated blood sugar in the 240s   -hemoglobin A1c is 5.8  -Suspect acute hyperglycemia is due to recent steroid use  -Hyperglycemia much improved now.     HLD  -resume statin      Hypokalemia  -3.2 at [place], no evidence that it was repleted  -likely in setting of chronic loose stools  -Will defer to nephrology for potassium replacement in the context of end-stage renal disease/dialysis        Anxiety  Depression  -resume celexa      Complex L renal cyst  Hx Hemorrhagic/proteinaceous renal cysts  -Known calcified cyst of the left lower pole measuring 9.7 cm based on CT from 2/25  -Outpatient follow-up     MICHAEL  Nicotine User  -25-40, pack year history  -Continuous oximetry with PRN oxygen       Porfirio Smith MD, MD    Significant Results and Procedures   See below    Pending Results     Unresulted Labs Ordered in the Past 30 Days of this Admission       Date and Time Order Name Status Description    3/16/2024  9:32 AM Protein Immunofixation Serum In process     3/14/2024  4:20 AM Blood Culture Peripheral Blood Preliminary     3/14/2024  4:20 AM Blood Culture Hand, Left Preliminary     2/21/2024 12:49 AM Prepare red blood cells (unit) Preliminary             Code Status   Full Code       Primary Care Physician   PETER BARRIGA    Physical Exam   Temp: 97.1  F (36.2  C) Temp src: Axillary BP: 99/65 Pulse: 98   Resp: 18 SpO2: 100 % O2 Device: None (Room air)      Constitutional: AAOX3, NAD  Respiratory: CTA B/L, Normal WOB  Cardiovascular: RRR, No murmur  GI: Soft, Non- tender, BS- normoactive  Neuro: CN- grossly intact, Moving  all 4 extremities.     Discharge Disposition   Discharged to home  Condition at discharge: Stable    Consultations This Hospital Stay   COLORECTAL SURGERY IP CONSULT  NEPHROLOGY IP CONSULT  SMOKING  CESSATION PROGRAM IP CONSULT  VASCULAR ACCESS ADULT IP CONSULT  GASTROENTEROLOGY IP CONSULT  NUTRITION SERVICES ADULT IP CONSULT  HEMATOLOGY & ONCOLOGY IP CONSULT  CARDIOLOGY IP CONSULT  GI LUMINAL ADULT IP CONSULT  CARE MANAGEMENT / SOCIAL WORK IP CONSULT  WOUND OSTOMY CONTINENCE NURSE  IP CONSULT  VASCULAR ACCESS ADULT IP CONSULT  VASCULAR ACCESS ADULT IP CONSULT    Time Spent on this Encounter   Porfirio HARTMAN MD, personally saw the patient today and spent greater than 30 minutes discharging this patient.    Discharge Orders      Reason for your hospital stay    Pneumatosis coli     Follow-up and recommended labs and tests     Follow up with primary care provider, PETER BARRIGA, within 7 days for hospital follow- up.    [facility] GI in 1 to 2 weeks     Activity    Your activity upon discharge: activity as tolerated     Diet    Follow this diet upon discharge: Orders Placed This Encounter      Mechanical/Dental Soft Diet     Discharge Medications   Current Discharge Medication List        START taking these medications    Details   metroNIDAZOLE (FLAGYL) 500 MG tablet Take 1 tablet (500 mg) by mouth 3 times daily for 29 days  Qty: 87 tablet, Refills: 0    Associated Diagnoses: Pneumatosis coli           CONTINUE these medications which have NOT CHANGED    Details   budesonide (ENTOCORT EC) 3 MG EC capsule Take 3 capsules (9 mg) by mouth every morning  Qty: 90 capsule, Refills: 0    Associated Diagnoses: Crohn's disease of both small and large intestine with other complication (H)      calcium acetate (CALPHRON) 667 MG TABS tablet Take 2,668 mg by mouth 3 times daily (with meals)      citalopram (CELEXA) 20 MG tablet Take 20 mg by mouth daily as needed (panic attacks)      folic acid (FOLVITE) 1 MG tablet Take 1 mg by mouth every morning       lidocaine-prilocaine (EMLA) 2.5-2.5 % external cream three times a week Prior to dialysis  site access on Monday, Wednesday, Friday      multivitamin RENAL (MULTIVITAMIN  RENAL) 1 MG capsule Take 1 capsule by mouth every morning      omeprazole (PRILOSEC) 20 MG DR capsule Take 20 mg by mouth every morning       simvastatin (ZOCOR) 20 MG tablet Take 20 mg by mouth every morning       ustekinumab (STELARA) 90 MG/ML Inject 1 ml ( 90 mg) subcutaneous every 4 weeks.  Qty: 1 mL, Refills: 5    Comments: MTM patient  Associated Diagnoses: Crohn's disease of large intestine with complication (H)           STOP taking these medications       predniSONE (DELTASONE) 20 MG tablet Comments:   Reason for Stopping:             Allergies   Allergies   Allergen Reactions    Lisinopril Anaphylaxis and Other (See Comments)     Shortness of breath     Data   Most Recent 3 CBC's:  Recent Labs   Lab Test 03/17/24  0704 03/16/24  0639 03/15/24  0745 03/14/24  1249   WBC  --  12.0* 19.1* 19.5*   HGB 8.0* 7.3* 7.7* 7.6*   MCV  --  108* 105* 106*   PLT  --  62* 66* 67*      Most Recent 3 BMP's:  Recent Labs   Lab Test 03/17/24  0558 03/16/24  2347 03/16/24  1132 03/16/24  0639 03/16/24  0429 03/16/24  0222 03/15/24  1211 03/15/24  0745 03/14/24  0836 03/14/24  0535 02/27/24  0728   NA  --   --   --   --   --   --   --  130*  --  136 130*   POTASSIUM 3.6 3.4  --  3.3*  --   --   --  3.6  --  3.3* 4.1   CHLORIDE  --   --   --   --   --   --   --  92*  --  95* 93*   CO2  --   --   --   --   --   --   --  19*  --  22 26   BUN  --   --   --   --   --   --   --  62.4*  --  54.9* 29.8*   CR  --   --   --   --   --   --   --  6.36*  --  5.37* 5.71*   ANIONGAP  --   --   --   --   --   --   --  19*  --  19* 11   KARY  --   --   --   --   --   --   --  8.2*  --  8.4* 8.4*   GLC  --   --  173*  --  171* 189*   < > 162*   < > 256* 95    < > = values in this interval not displayed.     Most Recent 2 LFT's:  Recent Labs   Lab Test 03/14/24  0535 02/25/24  1333   AST 20 36   ALT 32 27   ALKPHOS 96 95   BILITOTAL 1.5* 0.8     Most Recent INR's and Anticoagulation Dosing History:  Anticoagulation Dose History  More data exists          Latest Ref Rng & Units 10/1/2020 3/25/2021 5/25/2021 3/17/2022 2/15/2024 2/25/2024 3/14/2024   Recent Dosing and Labs   INR 0.85 - 1.15 1.16  1.31  1.33  1.30  1.97  1.41  1.96  2.14      Most Recent 3 Troponin's:No lab results found.  Most Recent Cholesterol Panel:  Recent Labs   Lab Test 03/14/24  0535   CHOL 72   LDL <4   HDL 43   TRIG 130     Most Recent 6 Bacteria Isolates From Any Culture (See EPIC Reports for Culture Details):No lab results found.  Most Recent TSH, T4 and A1c Labs:  Recent Labs   Lab Test 03/14/24  0535   A1C 5.8*       Results for orders placed or performed during the hospital encounter of 03/14/24   US Lower Extremity Venous Duplex Bilateral    Narrative    VENOUS ULTRASOUND BOTH LEGS  3/14/2024 3:05 PM     HISTORY: lower ext swelling low plts    COMPARISON: None.    FINDINGS:  Examination of the deep veins with graded compression and  color flow Doppler with spectral wave form analysis was performed.      Impression    IMPRESSION: No evidence of deep venous thrombosis.    UMAIR ZIMMERMAN MD         SYSTEM ID:  [id number]

## 2024-03-17 NOTE — PROVIDER NOTIFICATION
MD Notification    Notified Person: MD    Notified Person Name: Derrick Mobley     Notification Date/Time: 3/16/2024 1932    Notification Interaction: vocera    Purpose of Notification: Can you please change the protonix IV to PO, pt does not have IV access, thanks     Orders Received: med changed to oral     Comments:

## 2024-03-17 NOTE — PLAN OF CARE
Goal Outcome Evaluation:    COGNITION/MENTATION: A/O x 4, reports anxious prn ativan admin    NEURO/CMS: severe BLE pitting edema L>R. BUE edema L>R  CARDIAC/TELE: n/a  RESPIRATORY: LS diminished, on RA   GI: BS+, pt reports loose BM   : Oliguria, on HD   PAIN: c/o groin pain, managed w/prn dilaudid x2  SKIN: [place], dry/cracked heels/feet, MASD to buttocks   DRAINS/LINES: no IV access, JOHNIE fistula bruit and thrill present   ACTIVITY: Up SBA   DIET: Mechanical dental soft     K+ replaced per protocol

## 2024-03-18 NOTE — PROGRESS NOTES
[facility] Procedure Nursing Note    Patient Information  Name: Tacos Dominguez  Age: 64 year old    Procedure  Procedure: MRI enterography  Procedure start time: 1620  Procedure complete time: 1710  Concerns/abnormal assessment: No immediate  If abnormal assessment, provider notified: N/A  Plan/Other: Proceed as planned.  First dose of glucagon 0.5 mg IV at 1629  Second dose of glucagon 0.5 mg IV at 1646  Pt tolerated well.  Pt is being discharged from MRI department.    Javi Trotter RN

## 2024-03-20 NOTE — LETTER
3/20/2024         RE: Tacos Dominguez  [address]  [facility] [address]        Dear Colleague,    Thank you for referring your patient, Tacos Dominguez, to the [facility] [place]. Please see a copy of my visit note below.    Virtual Visit Details    Type of service:  Video Visit     Originating Location (pt. Location): Home    Distant Location (provider location):  Off-site  Platform used for Video Visit: [facility]    CD follow up    PATIENT: Tacos Dominguez    MRN: [id number]    Date of Birth 1960    Tel: There are no phone numbers on file.    PCP: Antonio Madrigal     HPI: Mr. Dominguez is a 64 year old male here to follow up for Crohn's disease.    In 1975, underwent an emergency appendectomy and was dx with CD at that time, underwent R crystal. Had multiple surgeries after then, 76, 77, 78, including LOAs and a cholecystectomy. Reports having 9 feet of bowel left, in total. Did well until 4/2006, at which time he had intermittent flares and underwent resection with a colostomy bag that was reversed in 11/2006.  At that time, he required TPN for weight loss.  Had a peristomal hernia that was not corrected in an effort to avoid further bowel loss. Previously saw Dr. Finnegan, 2013/2014.     Currently, has 4-5 BMs per day, loose. No blood. Occasional urgency based on diet. Weight has been stable around 75-80 kg.     Last cscope was in 10/2020 that showed ulcers in the TI and at the anastomosis.   Does have ESRD in the setting of CLARISA from afib and aortic dissection in April 2021.  Skin cancer (BCC) was removed on forearm 2 years.     Quit smoking in October 2020. Smoked about 1/2-3/4 ppd x 50 years.     Noteworthy diet history- well balanced    HBI  General well-being 0 = very well  Abdominal pain 0 = none  Number of liquid stools per day 4-5   Abdominal mass cannot assess virtually  Current Complications arthralgias (knees)    Constitutional symptoms:  Fever  NO  Weight loss NO    Other GI symptoms present REFLUX SYMPTOMS - acid taste in mouth  Takes omeprazole 20 mg before breakfast      Yamil Classification  AGE AT DIAGNOSIS: A1 below 16 y  CURRENT DISEASE LOCATION: jejunum  L4 upper GI: YES possibly, given chronic gastritis seen on EGD in 10/2020  DISEASE BEHAVIOR (since disease onset): B2: stricturing  Perianal disease: NO    Total number of IBD surgeries (except perianal): multiple    Remaining bowel: 9 feet, per patient    Current IBD Medications:  none    Past IBD Medications:   Sulfasalazine in the 1970s  Remicade around early 2000s. Was  On this for at least 2 years. Stopped due to remission.   Azathioprine ~2014    Interval history, 11/16/21 (virtual)  Continues to feel well. Unchanged HBI from above.     Icscope in July showed active disease (below).     Cholestyramine was not very helpful for loose stools.   Smokes up to 2 cigs per day. Has tried Chantix and gum in the past. Will get OTC patches.     Met with Jayshree Jordan, Pharm D on 11/10 with recs for the following     -- COVID booster  -- pneumovax-23 Received 12/2020 per Brayan.  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 Smith on 3/2022. 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    Continues on Stelara; last injection was last Tuesday. Every 8 weeks.     Smoking; down to a 1/4 pack per day. Using patches. Trying to quit.      HBI  General well-being 0 = very well  Abdominal pain 0 = none  Number of liquid stools per day 4-5   Abdominal mass cannot assess virtually  Current Complications arthralgias resolved    Interval history, 5/2023 (virtual)  Continues on Stelara.   Hgb 9.6 on 5/22. Does report more energy since transfusion.   Continues to try to quit smoking. Smoking 1/4 ppd.     HBI  General well-being 0 = very well  Abdominal pain 0  = none  Number of liquid stools per day 5 in total (vary between soft and loose)    Abdominal mass cannot assess virtually  Current Complications arthralgias in the setting of dialysis (swelling in ankles from fluid overload)    Interval hx 11/2023  Recent hosptialization due to SOB, found to have hypoxic respiratory failure and flash pulmonary edema.  He was admitted to the ICU for hemodialysis, which allowed for resolution of respiratory symptoms.   Currently having 5 stools per day, consistency ranges from oatmeal to liquid. No blood in the stool.  No urgency or accidents. No abdominal pain, nausea or vomiting.  No joint pain or skin concerns.   He continues with stelara q4 weeks with good compliance, (has been on q4 dosing since Spring 2023).     Interval hx 3/2024 (virtual)  No abdominal pain, passing gas. Tolerating all food. Weight is 70 kg.   Eating eggs, toast, hamburgers, waffles.     Past Medical History:   Diagnosis Date    Aortic dissection (H)     distal, thin.  stable/chronic on MRCP 3/2022    Benign essential hypertension     CAD (coronary artery disease)     Cerebral infarction (H)     Crohn's colitis (H)     Crohn's disease of large intestine (H) 11/22/2021    Current smoker     Esophageal reflux     ESRD (end stage renal disease) on dialysis (H)     History of basal cell carcinoma     Hypertension     Mixed hyperlipidemia     NSTEMI (non-ST elevated myocardial infarction) (H)     PAF (paroxysmal atrial fibrillation) (H)         Past Surgical History:   Procedure Laterality Date    ABDOMEN SURGERY      x 6.  colon resections for crohn's disease    APPENDECTOMY      CHOLECYSTECTOMY      COLONOSCOPY N/A 07/20/2021    Procedure: COLONOSCOPY, WITH POLYPECTOMY AND BIOPSY;  Surgeon: Eric Preston MD;  Location:  GI    COLONOSCOPY N/A 12/7/2022    Procedure: COLONOSCOPY, WITH POLYPECTOMY AND BIOPSY;  Surgeon: Willian Kee MD;  Location:  GI    COLONOSCOPY N/A 2/27/2024     Procedure: COLONOSCOPY;  Surgeon: Judson Woods MD;  Location: [facility]    CV CORONARY ANGIOGRAM N/A 05/25/2021    Procedure: CV CORONARY ANGIOGRAM;  Surgeon: Judson Ybarra MD;  Location:  HEART CARDIAC CATH LAB    CV CORONARY ANGIOGRAM N/A 2/15/2024    Procedure: Coronary Angiogram;  Surgeon: Tico Finn MD;  Location:  HEART CARDIAC CATH LAB    CV LEFT HEART CATH N/A 2/15/2024    Procedure: Left Heart Catheterization;  Surgeon: Tico Finn MD;  Location:  HEART CARDIAC CATH LAB    CV RIGHT HEART CATH MEASUREMENTS RECORDED N/A 2/15/2024    Procedure: Right Heart Catheterization;  Surgeon: Tico Finn MD;  Location:  HEART CARDIAC CATH LAB    ESOPHAGOSCOPY, GASTROSCOPY, DUODENOSCOPY (EGD), COMBINED N/A 07/20/2021    Procedure: ESOPHAGOGASTRODUODENOSCOPY, WITH FINE NEEDLE ASPIRATION BIOPSY, WITH ENDOSCOPIC ULTRASOUND GUIDANCE;  Surgeon: Eric Preston MD;  Location:  GI    ESOPHAGOSCOPY, GASTROSCOPY, DUODENOSCOPY (EGD), COMBINED N/A 2/27/2024    Procedure: ESOPHAGOGASTRODUODENOSCOPY;  Surgeon: Judson Woods MD;  Location: [facility]    IR DIALYSIS FISTULOGRAM LEFT  12/01/2022    IR DIALYSIS FISTULOGRAM LEFT  1/13/2023    LAPAROTOMY, LYSIS ADHESIONS, COMBINED N/A 03/17/2022    Procedure: Laparotomy, extensive lysis adhesions, combined;  Surgeon: Sarath Smith MD;  [facility]    PANCREATECTOMY PARTIAL N/A 03/17/2022    Procedure: Open Subtotal Pancreatectomy, intra-op ultrasound;  Surgeon: Sarath Smith MD;  [facility]    REVISION FISTULA ARTERIOVENOUS UPPER EXTREMITY Left 2/14/2023    Procedure: LEFT UPPER ARM FISTULA OUTFLOW REVISION FROM CEPHALIC VEIN TO JUGULAR VEIN WITH 10mm THIN-WALLED RINGED POLYTETRAFLUEROETHYLINE;  Surgeon: Mo Gramajo MD;  Location: [facility]    TRANSESOPHAGEAL ECHOCARDIOGRAM INTRAOPERATIVE N/A 1/11/2024    Procedure: ECHOCARDIOGRAM, TRANSESOPHAGEAL, WITH ANESTHESIA;  Surgeon: GENERIC ANESTHESIA  PROVIDER;  [facility]    VASCULAR SURGERY      dialysis access- left upper       Social History     Tobacco Use    Smoking status: Every Day     Packs/day: 0.25     Years: 50.00     Additional pack years: 0.00     Total pack years: 12.50     Types: Cigarettes     Passive exposure: Current    Smokeless tobacco: Never   Substance Use Topics    Alcohol use: Yes     Comment: 2-3 drinks once a month       Family History   Problem Relation Age of Onset    Breast Cancer Mother     Coronary Artery Disease Father     Hypertension Brother     Anesthesia Reaction No family hx of     Thrombosis No family hx of        Allergies   Allergen Reactions    Lisinopril Anaphylaxis and Other (See Comments)     Shortness of breath        Outpatient Encounter Medications as of 3/20/2024   Medication Sig Dispense Refill    budesonide (ENTOCORT EC) 3 MG EC capsule Take 3 capsules (9 mg) by mouth every morning 90 capsule 0    calcium acetate (CALPHRON) 667 MG TABS tablet Take 2,668 mg by mouth 3 times daily (with meals)      citalopram (CELEXA) 20 MG tablet Take 20 mg by mouth daily as needed (panic attacks)      folic acid (FOLVITE) 1 MG tablet Take 1 mg by mouth every morning       lidocaine-prilocaine (EMLA) 2.5-2.5 % external cream three times a week Prior to dialysis  site access on Monday, Wednesday, Friday      metroNIDAZOLE (FLAGYL) 500 MG tablet Take 1 tablet (500 mg) by mouth 3 times daily for 29 days 87 tablet 0    multivitamin RENAL (MULTIVITAMIN RENAL) 1 MG capsule Take 1 capsule by mouth every morning      omeprazole (PRILOSEC) 20 MG DR capsule Take 20 mg by mouth every morning       simvastatin (ZOCOR) 20 MG tablet Take 20 mg by mouth every morning       ustekinumab (STELARA) 90 MG/ML Inject 1 ml ( 90 mg) subcutaneous every 4 weeks. (Patient taking differently: 90 mg every 28 days Inject 1 ml ( 90 mg) subcutaneous every 4 weeks. Last dose 2/1/24) 1 mL 5     No facility-administered encounter medications on file as of  3/20/2024.      NSAID  No     Review of Systems  Complete 10 System ROS performed. All are negative except as documented below, in the HPI, or in patient questionnaire from today's visit.    1) Constitutional: No fevers, chills, night sweats or malaise, weight loss or gain  2) Skin: No rash  3) Pulmonary: No wheeze, SOB, cough, sputum or hemoptysis  4) Cardiovascular: No Chest pain or palpitations  5) Genitourinary: No blood in urine or dysuria  6) Endocrine: No increased sweating, hunger, thirst or thyroid problems  7) Hematologic: No bruising and easy bleeding  8) Musculoskeletal: no new pain in joints or limitation in ROM  9) Neurologic: No dizziness, paresthesias or weakness or falls  10) Psychiatric:  not depressed/anxious, no sleep problems    PHYSICAL EXAM  General appearance  Healthy appearing adult, in no acute distress     Eyes  Sclera anicteric  Pupils round and reactive to light     Ears, nose, mouth and throat  No obvious external lesions of ears and nose  Hearing intact     Neck  Symmetric  No obvious external lesions     Respiratory  Normal respiration, no use of accessory muscles      MSK  Gait normal     Skin  No rashes or jaundice      Psychiatric  Oriented to person, place and time  Appropriate mood and affect.       DATA:  Reviewed in detail past documentation, medications and prior workup available in electronic health records or through outside records.    PERTINENT STUDIES:  Most recent CBC:  WBC   Date Value Ref Range Status   05/25/2021 7.2 4.0 - 11.0 10e9/L Final     WBC Count   Date Value Ref Range Status   03/19/2024 9.1 4.0 - 11.0 10e3/uL Final   ]  Hemoglobin   Date Value Ref Range Status   03/19/2024 6.5 (LL) 13.3 - 17.7 g/dL Final   05/25/2021 10.6 (L) 13.3 - 17.7 g/dL Final   ]   Platelet Count   Date Value Ref Range Status   03/19/2024 61 (L) 150 - 450 10e3/uL Final   05/25/2021 224 150 - 450 10e9/L Final       Most recent coag:  INR   Date Value Ref Range Status   03/19/2024 1.46  (H) 0.85 - 1.15 Final   05/25/2021 1.33 (H) 0.86 - 1.14 Final       Most recent hepatic panel:  AST   Date Value Ref Range Status   03/19/2024 21 0 - 45 U/L Final     Comment:     Reference intervals for this test were updated on 6/12/2023 to more accurately reflect our healthy population. There may be differences in the flagging of prior results with similar values performed with this method. Interpretation of those prior results can be made in the context of the updated reference intervals.   03/25/2021 16 0 - 45 U/L Final     ALT   Date Value Ref Range Status   03/19/2024 22 0 - 70 U/L Final     Comment:     Reference intervals for this test were updated on 6/12/2023 to more accurately reflect our healthy population. There may be differences in the flagging of prior results with similar values performed with this method. Interpretation of those prior results can be made in the context of the updated reference intervals.     03/25/2021 22 0 - 70 U/L Final     Bilirubin Conjugated   Date Value Ref Range Status   11/13/2008 0.0 0.0 - 0.3 mg/dL Final      Bilirubin Total   Date Value Ref Range Status   03/19/2024 0.8 <=1.2 mg/dL Final   03/25/2021 0.5 0.2 - 1.3 mg/dL Final     Albumin   Date Value Ref Range Status   03/19/2024 2.7 (L) 3.5 - 5.2 g/dL Final   07/30/2022 3.7 3.4 - 5.0 g/dL Final   03/25/2021 3.4 3.4 - 5.0 g/dL Final     Alkaline Phosphatase   Date Value Ref Range Status   03/19/2024 109 40 - 150 U/L Final     Comment:     Reference intervals for this test were updated on 11/14/2023 to more accurately reflect our healthy population. There may be differences in the flagging of prior results with similar values performed with this method. Interpretation of those prior results can be made in the context of the updated reference intervals.   03/25/2021 91 40 - 150 U/L Final       Most recent creatinine:  Creatinine   Date Value Ref Range Status   03/19/2024 4.58 (H) 0.67 - 1.17 mg/dL Final   05/25/2021 10.60  (H) 0.66 - 1.25 mg/dL Final     Endoscopy:     12/2022: icscope  Impression:               - Preparation of the colon was poor. This exam was                             not adequate for colorectal cancer or polyps                             screening.                             - Perianal skin tags found on perianal exam.                             - A single ulcer in the small bowel 20 cm from the                             ileocolonic anastomosis. Biopsied. This is                             presumably the same ulcer that was seen before.                             This was smaller (now 5mm) with surrounding                             granular mucosa that may represent healing. Bipsies                             taken.                             - Congested mucosa in the distal small bowel.                             Biopsied. Otherwise remainder of ileum normal                             - Patent end-to-end ileo-colonic anastomosis,                             characterized by healthy appearing mucosa.                             - Diverticulosis vs old fistulas in the distal                             rectum.                             - Excoriated mucosa at the anus and in the distal                             rectum. Biopsied.                             - The examination was otherwise normal on direct                             and retroflexion views.                             - Simple Endoscopic Score for Crohn's Disease: 5,                             mucosal inflammatory changes secondary to Crohn's                             disease with ileitis.                             Overall exam is similar to previous but improved                             with decreased size of chronic small bowel ulcer -                             now only 5mm. Remainder of small bowel really looks                             almost normal apart from some patchy mild erythema                             and  congestion. Given partial improvement IBD                             clinic may consider increasing Stelara to q 6 weeks                             or q 4 weeks. Would also consider colorectal                             evaluation for anoscopy.     PATH:  A.  Ileum, ulcer: Biopsy:  - Chronic active ileitis with architectural disarray, pyloric gland metaplasia, and active inflammation with ulceration and granulation tissue  - Negative for granulomas and dysplasia  - Immunostain for CMV is negative     B.  Ileum: Biopsy:  - Mild chronic active ileitis with architectural disarray, pyloric gland metaplasia, and active inflammation  - Negative for granulomas and dysplasia  - Immunostain for CMV is negative     C.  Rectum: Biopsy:  - Benign anorectal mucosa with quiescent colitis  - No active colitis  - Negative for granulomas and dysplasia    7/2021:   - Normal perirectal exam.                        - Apparenty end-end ileocolonic anastomosis at                        approximately 70 cm (likely distal transverse colon).                        - 15 mm ulcer in the terminal ileum. Additional                        cobblestoning in this region. Biopsies obtained.                        - Unusual diverticulae in the distal 5-10 cm of the                        rectum without ulceration. Perhaps related to past                        fistulous disease.     PATH:  A. TERMINAL ILEUM, BIOPSY:  Severe ileitis with ulceration and granulation tissue; consistent with severe chronic (Crohn) ileitis; negative for dysplasia; report of CMV immunohistochemistry to follow     B. TERMINAL ILEUM, LABELED BIOPSY OF ULCER:  Chronic active ileitis with granulomas; consistent with mildly active Crohn; no ulceration or dysplasia identified    10/2020:  EGD: chronic gastritis. Bx neg for HP.  Cscope: ulcers in small intestine and at anastomosis. Bx normal.    Imaging:    MRE 3/2024:                         IMPRESSION:  1.  Multifocal areas  of segmental wall thickening and stricturing seen along small bowel loops in the right hemiabdomen compatible with acute Crohn's disease. These areas of stricturing result in a complex, multifocal small bowel obstruction as described   above. For example, there are at least 2 transition points on either side of distended small bowel loop along the hernia sac measuring up to 6.5 cm. Dilated loops of small bowel also seen along the right hemiabdomen adjacent to the hernia with   associated transition point demonstrating segmental wall thickening measuring up to 5.2 cm. Furthermore, a third area of distended small bowel seen along the left upper and central abdomen with associated transition point seen along the neck of the   hernia containing segmental wall thickening measuring up to 2.6 cm.  2.  Increasingly complex exophytic cyst along the lower pole of the left kidney with internal hemorrhagic/proteinaceous contents as well as increasing peripheral wall and septal thickening. 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 Recommend continued attention on 3-6 month follow-up exam as well as urological consultation.  3.  Hepatic and splenic iron deposition.  4.  Stable appearance of dissection flap along the infrarenal abdominal aorta.    CT a/p 3/13/2024:      CT a/p:    IMPRESSION:   1. Marked dilation of the main pancreatic duct up to 2.8 cm in the  pancreatic tail with associated parenchymal atrophy, raising concern  for main duct type IPMN, though sequela of prior pancreatitis could  have a similar appearance. Follow-up GI consultation as directed  below.  2. Atheromatous changes of the infrarenal aorta and iliac arteries.  Iliac arteries demonstrating a medial and posterior predominance of  calcification, greater on the right.   3. Multiple simple and hemorrhagic/proteinaceous cysts arising from  the atrophic kidneys; no  "evidence of abnormal enhancement to suggest  neoplasm.  4. Incidentally noted sigmoid pneumatosis without abnormal  enhancement, wall thickening, or associated portal venous gas. This is  likely benign idiopathic pneumatosis in the asymptomatic patient,  though recommend correlation with clinical exam and lactate levels as  bowel ischemia can have similar findings.  5. Slightly increased bowel containing right anterolateral abdominal  wall hernia.    IMPRESSION:  Mr. Dominguez is a 64 year old here with long-standing Crohn's disease s/p multiple bowel resections with minimal remaining bowel.  Scar also has ESRD of unknown etiology, on HD. 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 Smith on 3/2022. 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    Scar has been on Stelara every 4 weeks. However, he was hospitalized for a GI bleed and was noted to have \"huge\" ulcers in the TI. One of the ulcers was noted to be actively bleeding and this was successfully treated with epi injection and coagulation. Most recently, he was hospitalized at [facility] for an incidental finding of pneumatosis in the ileum/R colon. He was treated with Flagyl, prednisone was stopped and he was discharged.     MRE on  3/18/24 showed the above findings, which are certainly concerning for SBOs and acute inflammation.     Also, he does still smoke about 1/4 ppd which is counterproductive to Crohn's healing; he is trying to quit using patches.     He is planned for open heart surgery in the beginning of April.     I am very concerned regarding Scar's very active Crohn's disease and I think we must prioritize controlling his active inflammation prior to heart surgery. Otherwise, he will be at high risk for bleeding and subsequent perforation. I will discuss this with Dr. Romero (CT surgeon).     In order to treat the active Crohn's " disease we will stop Stelara and restart Remicade, which has worked in the past. I suspect he will need high dose therapy. We will plan on the re-initiation protocol. Scar is aware of the risk of an infusion reaction if he has ATIs, which we will check for after the first infusion.     As a bridge until Remicade, we will re-initiate prednisone.     The above measures will take place pending my conversation with Dr. Romero.     The potential risks of Remicade (infliximab) therapy were discussed with the patient including risks of infection including tuberculosis, fungal infections like histoplasmosis, and others.  We additionally reviewed the risks of infusion reactions including immediate ones that might include shortness of breath and hives, and that these are usually treated with slowing down the rate of infusion and giving more IV fluid; sometimes, antihistamines are used, and rarely, corticosteroids are needed.  Delayed infusion reactions with fevers and joint aches, and/or rashes were also discussed occurring 1-10 days after an infusion.  Additionally, the potential risk for changes in blood counts and liver chemistries were mentioned, as were the uncommon multiple sclerosis-like reactions, approximately 90% of which resolve after cessation of the drug.  Finally, the risk of blood cancers including lymphoma and rare lymphomas such as hepatosplenic T-cell lymphomas were discussed.  The patient agrees and understands these risks.    # Active Crohn's disease, dx age 16, s/p multiple bowel resections, stable, on Stelara every 4 weeks     PLAN:  --Start prednisone 40 mg daily until Remicade can be started   --Stop Stelara. Start Remicade.   ---------Will update Jayshree Jordan, PharmD   --Referral to Dr. Carlos El (colorectal surgeon) to establish care in case surgery will be needed   --Low fiber diet. Scar declined a nutritionist referral; he is already following with a neph nutritionist.   --Urology referral  for kidney mass seen on recent MRE   --Stop smoking   --Continue to avoid NSAIDs    RTC 1 months     40 minutes spent on the date of the encounter performing chart review, history and exam, documentation and further activities as noted above.             Again, thank you for allowing me to participate in the care of your patient.      Sincerely,    Betty Rubio MD

## 2024-03-20 NOTE — PATIENT INSTRUCTIONS
Preparing for Your Surgery      Name:  Tacos Dominguez   MRN:  [id number]   :  1960   Today's Date:  3/20/2024       Arriving for surgery:  Surgery date:  4/3/24  Arrival time:  5:00 am  Surgery time: 7:30 am    Please come to:     Please come to:      [facility] [address]  [address] [place]  [address]      The [facility] Patient /Visitor Ramp is located at [address]. Patients and visitors who self-park will receive the reduced hospital parking rate. If the Patient /Visitor Ramp is full, please follow the signs to the  parking located at the main hospital entrance.     parking is available ( 24 hours/ 7 days a week)    Discounted parking pass options are available for patients and visitors. They can be purchased at the [facility] desk at the main hospital entrance.    -    Stop at the security desk and they will direct surgery patients to the 3rd floor Surgery Waiting Room. [phone]     -  If you are in need of directions, wheelchair or escort please stop at the Information/security desk in the lobby.       What can I eat or drink?  -  You may eat and drink normally up to 8 hours prior to arrival time. (Until 9:00 pm on 24)  -  You may have clear liquids until 2 hours prior to arrival time. (Until 3:00 am on 4/3/24)    Examples of clear liquids:  Water  Clear broth  Juices (apple, white grape, white cranberry  and cider) without pulp  Noncarbonated, powder based beverages  (lemonade and David-Aid)  Sodas (Sprite, 7-Up, ginger ale and seltzer)  Coffee or tea (without milk or cream)  Gatorade    -  No Alcohol or cannabis products for at least 24 hours before surgery.     Which medicines can I take?    Hold Multivitamins for 7 days before surgery.    Hold Ibuprofen (Advil, Motrin) for 7 day(s) before surgery--unless otherwise directed by surgeon.  Hold Naproxen (Aleve) for 7 days before  surgery.    Follow up with your GI provider regarding changes with any Chrohn's medications    -  DO NOT take these medications the day of surgery:    Calcium acetate    Folic acid       -  ok to TAKE these medications the day of surgery:   Budesonide (Entocort)   Citalopram (Celexa) as needed   Metronidazole (Flagyl)   Omeprazole (Prilosec)   Simvastatin (Zocor)       How do I prepare myself?  - Please take 2 showers (one the night prior to surgery and one the morning of surgery) using Scrubcare or Hibiclens soap.    Use this soap only from the neck to your toes.     Leave the soap on your skin for one minute--then rinse thoroughly.      You may use your own shampoo and conditioner. No other hair products.   - Please remove all jewelry and body piercings.  - No lotions, deodorants or fragrance.   - Bring your ID and insurance card.    -If you use a CPAP machine, please bring the CPAP machine, tubing, and mask to hospital.    -If you have a Deep Brain Stimulator, Spinal Cord Stimulator, or any Neuro Stimulator device---you must bring the remote control to the hospital.        Covid testing policy as of 12/06/2022  Your surgeon will notify and schedule you for a COVID test if one is needed before surgery--please direct any questions or COVID symptoms to your surgeon      Questions or Concerns:    - For any questions regarding the day of surgery or your hospital stay, please contact the Pre Admission Nursing Office at [phone].       - If you have health changes between today and your surgery, please call your surgeon.       - For questions after surgery, please call your surgeons office.           Current Visitor Guidelines    You may have 2 visitors in the pre op area.    Visiting hours: 8 a.m. to 8:30 p.m.    Patients confirmed or suspected to have symptoms of COVID 19 or flu:     No visitors allowed for adult patients.   Children (under age 18) can have 1 named visitor.     People who are sick or showing  symptoms of COVID 19 or flu:    Are not allowed to visit patients--we can only make exceptions in special situations.       Please follow these guidelines for your visit:          Please maintain social distance          Masking is optional--however at times you may be asked to wear a mask for the safety of yourself and others     Clean your hands with alcohol hand . Do this when you arrive at and leave the building and patient room,    And again after you touch your mask or anything in the room.     Go directly to and from the room you are visiting.     Stay in the patient s room during your visit. Limit going to other places in the hospital as much as possible     Leave bags and jackets at home or in the car.     For everyone s health, please don t come and go during your visit. That includes for smoking   during your visit.

## 2024-03-20 NOTE — PROGRESS NOTES
Virtual Visit Details    Type of service:  Video Visit     Originating Location (pt. Location): Home    Distant Location (provider location):  Off-site  Platform used for Video Visit: Allen    CD follow up    PATIENT: Tacos Dominguez    MRN: [id number]    Date of Birth 1960    Tel: There are no phone numbers on file.    PCP: Antonio Madrigal     HPI: Mr. Dominguez is a 64 year old male here to follow up for Crohn's disease.    In 1975, underwent an emergency appendectomy and was dx with CD at that time, underwent R crystal. Had multiple surgeries after then, 76, 77, 78, including LOAs and a cholecystectomy. Reports having 9 feet of bowel left, in total. Did well until 4/2006, at which time he had intermittent flares and underwent resection with a colostomy bag that was reversed in 11/2006.  At that time, he required TPN for weight loss.  Had a peristomal hernia that was not corrected in an effort to avoid further bowel loss. Previously saw Dr. Finnegan, 2013/2014.     Currently, has 4-5 BMs per day, loose. No blood. Occasional urgency based on diet. Weight has been stable around 75-80 kg.     Last cscope was in 10/2020 that showed ulcers in the TI and at the anastomosis.   Does have ESRD in the setting of CLARISA from afib and aortic dissection in April 2021.  Skin cancer (BCC) was removed on forearm 2 years.     Quit smoking in October 2020. Smoked about 1/2-3/4 ppd x 50 years.     Noteworthy diet history- well balanced    HBI  General well-being 0 = very well  Abdominal pain 0 = none  Number of liquid stools per day 4-5   Abdominal mass cannot assess virtually  Current Complications arthralgias (knees)    Constitutional symptoms:  Fever NO  Weight loss NO    Other GI symptoms present REFLUX SYMPTOMS - acid taste in mouth  Takes omeprazole 20 mg before breakfast      [place] Classification  AGE AT DIAGNOSIS: A1 below 16 y  CURRENT DISEASE LOCATION: jejunum  L4 upper GI: YES possibly, given chronic gastritis seen  on EGD in 10/2020  DISEASE BEHAVIOR (since disease onset): B2: stricturing  Perianal disease: NO    Total number of IBD surgeries (except perianal): multiple    Remaining bowel: 9 feet, per patient    Current IBD Medications:  none    Past IBD Medications:   Sulfasalazine in the 1970s  Remicade around early 2000s. Was  On this for at least 2 years. Stopped due to remission.   Azathioprine ~2014    Interval history, 11/16/21 (virtual)  Continues to feel well. Unchanged HBI from above.     Icscope in July showed active disease (below).     Cholestyramine was not very helpful for loose stools.   Smokes up to 2 cigs per day. Has tried Chantix and gum in the past. Will get OTC patches.     Met with Jayshree Jordan, Pharm D on 11/10 with recs for the following     -- COVID booster  -- pneumovax-23 Received 12/2020 per Brayan.  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 Smith on 3/2022. 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    Continues on Stelara; last injection was last Tuesday. Every 8 weeks.     Smoking; down to a 1/4 pack per day. Using patches. Trying to quit.      HBI  General well-being 0 = very well  Abdominal pain 0 = none  Number of liquid stools per day 4-5   Abdominal mass cannot assess virtually  Current Complications arthralgias resolved    Interval history, 5/2023 (virtual)  Continues on Stelara.   Hgb 9.6 on 5/22. Does report more energy since transfusion.   Continues to try to quit smoking. Smoking 1/4 ppd.     HBI  General well-being 0 = very well  Abdominal pain 0 = none  Number of liquid stools per day 5 in total (vary between soft and loose)    Abdominal mass cannot assess virtually  Current Complications arthralgias in the setting of dialysis (swelling in ankles from fluid overload)    Interval hx 11/2023  Recent hosptialization due to  SOB, found to have hypoxic respiratory failure and flash pulmonary edema.  He was admitted to the ICU for hemodialysis, which allowed for resolution of respiratory symptoms.   Currently having 5 stools per day, consistency ranges from oatmeal to liquid. No blood in the stool.  No urgency or accidents. No abdominal pain, nausea or vomiting.  No joint pain or skin concerns.   He continues with stelara q4 weeks with good compliance, (has been on q4 dosing since Spring 2023).     Interval hx 3/2024 (virtual)  No abdominal pain, passing gas. Tolerating all food. Weight is 70 kg.   Eating eggs, toast, hamburgers, waffles.     Past Medical History:   Diagnosis Date    Aortic dissection (H)     distal, thin.  stable/chronic on MRCP 3/2022    Benign essential hypertension     CAD (coronary artery disease)     Cerebral infarction (H)     Crohn's colitis (H)     Crohn's disease of large intestine (H) 11/22/2021    Current smoker     Esophageal reflux     ESRD (end stage renal disease) on dialysis (H)     History of basal cell carcinoma     Hypertension     Mixed hyperlipidemia     NSTEMI (non-ST elevated myocardial infarction) (H)     PAF (paroxysmal atrial fibrillation) (H)         Past Surgical History:   Procedure Laterality Date    ABDOMEN SURGERY      x 6.  colon resections for crohn's disease    APPENDECTOMY      CHOLECYSTECTOMY      COLONOSCOPY N/A 07/20/2021    Procedure: COLONOSCOPY, WITH POLYPECTOMY AND BIOPSY;  Surgeon: Eric Preston MD;  Location:  GI    COLONOSCOPY N/A 12/7/2022    Procedure: COLONOSCOPY, WITH POLYPECTOMY AND BIOPSY;  Surgeon: Willian Kee MD;  Location:  GI    COLONOSCOPY N/A 2/27/2024    Procedure: COLONOSCOPY;  Surgeon: Judson Woods MD;  Location: [facility]    CV CORONARY ANGIOGRAM N/A 05/25/2021    Procedure: CV CORONARY ANGIOGRAM;  Surgeon: Judson Ybarra MD;  Location:  [facility]ARD[facility]ATH [facility]    CV CORONARY ANGIOGRAM N/A 2/15/2024    Procedure: Coronary  Angiogram;  Surgeon: Tico Finn MD;  Location:  [facility]ATH [facility]    CV LEFT HEART CATH N/A 2/15/2024    Procedure: Left Heart Catheterization;  Surgeon: Tico Finn MD;  Location:  [facility]ATH [facility]    CV RIGHT HEART CATH MEASUREMENTS RECORDED N/A 2/15/2024    Procedure: Right Heart Catheterization;  Surgeon: Tico Finn MD;  Location:  HEART [facility]ARD[facility] CATH LAB    ESOPHAGOSCOPY, GASTROSCOPY, DUODENOSCOPY (EGD), COMBINED N/A 07/20/2021    Procedure: ESOPHAGOGASTRODUODENOSCOPY, WITH FINE NEEDLE ASPIRATION BIOPSY, WITH ENDOSCOPIC ULTRASOUND GUIDANCE;  Surgeon: Eric Preston MD;  Location:  GI    ESOPHAGOSCOPY, GASTROSCOPY, DUODENOSCOPY (EGD), COMBINED N/A 2/27/2024    Procedure: ESOPHAGOGASTRODUODENOSCOPY;  Surgeon: Judson Woods MD;  Location: [facility]    IR DIALYSIS FISTULOGRAM LEFT  12/01/2022    IR DIALYSIS FISTULOGRAM LEFT  1/13/2023    LAPAROTOMY, LYSIS ADHESIONS, COMBINED N/A 03/17/2022    Procedure: Laparotomy, extensive lysis adhesions, combined;  Surgeon: Sarath Smith MD;  [facility]    PANCREATECTOMY PARTIAL N/A 03/17/2022    Procedure: Open Subtotal Pancreatectomy, intra-op ultrasound;  Surgeon: Sarath Smith MD;  [facility]    REVISION FISTULA ARTERIOVENOUS UPPER EXTREMITY Left 2/14/2023    Procedure: LEFT UPPER ARM FISTULA OUTFLOW REVISION FROM CEPHALIC VEIN TO JUGULAR VEIN WITH 10mm THIN-WALLED RINGED POLYTETRAFLUEROETHYLINE;  Surgeon: Mo Gramajo MD;  Location: [facility]    TRANSESOPHAGEAL ECHOCARDIOGRAM INTRAOPERATIVE N/A 1/11/2024    Procedure: ECHOCARDIOGRAM, TRANSESOPHAGEAL, WITH ANESTHESIA;  Surgeon: GENERIC ANESTHESIA PROVIDER;  [facility]    VASCULAR SURGERY      dialysis access- left upper       Social History     Tobacco Use    Smoking status: Every Day     Packs/day: 0.25     Years: 50.00     Additional pack years: 0.00     Total pack years: 12.50     Types: Cigarettes     Passive exposure:  Current    Smokeless tobacco: Never   Substance Use Topics    Alcohol use: Yes     Comment: 2-3 drinks once a month       Family History   Problem Relation Age of Onset    Breast Cancer Mother     Coronary Artery Disease Father     Hypertension Brother     Anesthesia Reaction No family hx of     Thrombosis No family hx of        Allergies   Allergen Reactions    Lisinopril Anaphylaxis and Other (See Comments)     Shortness of breath        Outpatient Encounter Medications as of 3/20/2024   Medication Sig Dispense Refill    budesonide (ENTOCORT EC) 3 MG EC capsule Take 3 capsules (9 mg) by mouth every morning 90 capsule 0    calcium acetate (CALPHRON) 667 MG TABS tablet Take 2,668 mg by mouth 3 times daily (with meals)      citalopram (CELEXA) 20 MG tablet Take 20 mg by mouth daily as needed (panic attacks)      folic acid (FOLVITE) 1 MG tablet Take 1 mg by mouth every morning       lidocaine-prilocaine (EMLA) 2.5-2.5 % external cream three times a week Prior to dialysis  site access on Monday, Wednesday, Friday      metroNIDAZOLE (FLAGYL) 500 MG tablet Take 1 tablet (500 mg) by mouth 3 times daily for 29 days 87 tablet 0    multivitamin RENAL (MULTIVITAMIN RENAL) 1 MG capsule Take 1 capsule by mouth every morning      omeprazole (PRILOSEC) 20 MG DR capsule Take 20 mg by mouth every morning       simvastatin (ZOCOR) 20 MG tablet Take 20 mg by mouth every morning       ustekinumab (STELARA) 90 MG/ML Inject 1 ml ( 90 mg) subcutaneous every 4 weeks. (Patient taking differently: 90 mg every 28 days Inject 1 ml ( 90 mg) subcutaneous every 4 weeks. Last dose 2/1/24) 1 mL 5     No facility-administered encounter medications on file as of 3/20/2024.      NSAID  No     Review of Systems  Complete 10 System ROS performed. All are negative except as documented below, in the HPI, or in patient questionnaire from today's visit.    1) Constitutional: No fevers, chills, night sweats or malaise, weight loss or gain  2) Skin: No  rash  3) Pulmonary: No wheeze, SOB, cough, sputum or hemoptysis  4) Cardiovascular: No Chest pain or palpitations  5) Genitourinary: No blood in urine or dysuria  6) Endocrine: No increased sweating, hunger, thirst or thyroid problems  7) Hematologic: No bruising and easy bleeding  8) Musculoskeletal: no new pain in joints or limitation in ROM  9) Neurologic: No dizziness, paresthesias or weakness or falls  10) Psychiatric:  not depressed/anxious, no sleep problems    PHYSICAL EXAM  General appearance  Healthy appearing adult, in no acute distress     Eyes  Sclera anicteric  Pupils round and reactive to light     Ears, nose, mouth and throat  No obvious external lesions of ears and nose  Hearing intact     Neck  Symmetric  No obvious external lesions     Respiratory  Normal respiration, no use of accessory muscles      MSK  Gait normal     Skin  No rashes or jaundice      Psychiatric  Oriented to person, place and time  Appropriate mood and affect.       DATA:  Reviewed in detail past documentation, medications and prior workup available in electronic health records or through outside records.    PERTINENT STUDIES:  Most recent CBC:  WBC   Date Value Ref Range Status   05/25/2021 7.2 4.0 - 11.0 10e9/L Final     WBC Count   Date Value Ref Range Status   03/19/2024 9.1 4.0 - 11.0 10e3/uL Final   ]  Hemoglobin   Date Value Ref Range Status   03/19/2024 6.5 (LL) 13.3 - 17.7 g/dL Final   05/25/2021 10.6 (L) 13.3 - 17.7 g/dL Final   ]   Platelet Count   Date Value Ref Range Status   03/19/2024 61 (L) 150 - 450 10e3/uL Final   05/25/2021 224 150 - 450 10e9/L Final       Most recent coag:  INR   Date Value Ref Range Status   03/19/2024 1.46 (H) 0.85 - 1.15 Final   05/25/2021 1.33 (H) 0.86 - 1.14 Final       Most recent hepatic panel:  AST   Date Value Ref Range Status   03/19/2024 21 0 - 45 U/L Final     Comment:     Reference intervals for this test were updated on 6/12/2023 to more accurately reflect our healthy  population. There may be differences in the flagging of prior results with similar values performed with this method. Interpretation of those prior results can be made in the context of the updated reference intervals.   03/25/2021 16 0 - 45 U/L Final     ALT   Date Value Ref Range Status   03/19/2024 22 0 - 70 U/L Final     Comment:     Reference intervals for this test were updated on 6/12/2023 to more accurately reflect our healthy population. There may be differences in the flagging of prior results with similar values performed with this method. Interpretation of those prior results can be made in the context of the updated reference intervals.     03/25/2021 22 0 - 70 U/L Final     Bilirubin Conjugated   Date Value Ref Range Status   11/13/2008 0.0 0.0 - 0.3 mg/dL Final      Bilirubin Total   Date Value Ref Range Status   03/19/2024 0.8 <=1.2 mg/dL Final   03/25/2021 0.5 0.2 - 1.3 mg/dL Final     Albumin   Date Value Ref Range Status   03/19/2024 2.7 (L) 3.5 - 5.2 g/dL Final   07/30/2022 3.7 3.4 - 5.0 g/dL Final   03/25/2021 3.4 3.4 - 5.0 g/dL Final     Alkaline Phosphatase   Date Value Ref Range Status   03/19/2024 109 40 - 150 U/L Final     Comment:     Reference intervals for this test were updated on 11/14/2023 to more accurately reflect our healthy population. There may be differences in the flagging of prior results with similar values performed with this method. Interpretation of those prior results can be made in the context of the updated reference intervals.   03/25/2021 91 40 - 150 U/L Final       Most recent creatinine:  Creatinine   Date Value Ref Range Status   03/19/2024 4.58 (H) 0.67 - 1.17 mg/dL Final   05/25/2021 10.60 (H) 0.66 - 1.25 mg/dL Final     Endoscopy:     12/2022: icscope  Impression:               - Preparation of the colon was poor. This exam was                             not adequate for colorectal cancer or polyps                             screening.                              - Perianal skin tags found on perianal exam.                             - A single ulcer in the small bowel 20 cm from the                             ileocolonic anastomosis. Biopsied. This is                             presumably the same ulcer that was seen before.                             This was smaller (now 5mm) with surrounding                             granular mucosa that may represent healing. Bipsies                             taken.                             - Congested mucosa in the distal small bowel.                             Biopsied. Otherwise remainder of ileum normal                             - Patent end-to-end ileo-colonic anastomosis,                             characterized by healthy appearing mucosa.                             - Diverticulosis vs old fistulas in the distal                             rectum.                             - Excoriated mucosa at the anus and in the distal                             rectum. Biopsied.                             - The examination was otherwise normal on direct                             and retroflexion views.                             - Simple Endoscopic Score for Crohn's Disease: 5,                             mucosal inflammatory changes secondary to Crohn's                             disease with ileitis.                             Overall exam is similar to previous but improved                             with decreased size of chronic small bowel ulcer -                             now only 5mm. Remainder of small bowel really looks                             almost normal apart from some patchy mild erythema                             and congestion. Given partial improvement IBD                             clinic may consider increasing Stelara to q 6 weeks                             or q 4 weeks. Would also consider colorectal                             evaluation for anoscopy.     PATH:  A.  Ileum, ulcer:  Biopsy:  - Chronic active ileitis with architectural disarray, pyloric gland metaplasia, and active inflammation with ulceration and granulation tissue  - Negative for granulomas and dysplasia  - Immunostain for CMV is negative     B.  Ileum: Biopsy:  - Mild chronic active ileitis with architectural disarray, pyloric gland metaplasia, and active inflammation  - Negative for granulomas and dysplasia  - Immunostain for CMV is negative     C.  Rectum: Biopsy:  - Benign anorectal mucosa with quiescent colitis  - No active colitis  - Negative for granulomas and dysplasia    7/2021:   - Normal perirectal exam.                        - Apparenty end-end ileocolonic anastomosis at                        approximately 70 cm (likely distal transverse colon).                        - 15 mm ulcer in the terminal ileum. Additional                        cobblestoning in this region. Biopsies obtained.                        - Unusual diverticulae in the distal 5-10 cm of the                        rectum without ulceration. Perhaps related to past                        fistulous disease.     PATH:  A. TERMINAL ILEUM, BIOPSY:  Severe ileitis with ulceration and granulation tissue; consistent with severe chronic (Crohn) ileitis; negative for dysplasia; report of CMV immunohistochemistry to follow     B. TERMINAL ILEUM, LABELED BIOPSY OF ULCER:  Chronic active ileitis with granulomas; consistent with mildly active Crohn; no ulceration or dysplasia identified    10/2020:  EGD: chronic gastritis. Bx neg for HP.  Cscope: ulcers in small intestine and at anastomosis. Bx normal.    Imaging:    MRE 3/2024:                         IMPRESSION:  1.  Multifocal areas of segmental wall thickening and stricturing seen along small bowel loops in the right hemiabdomen compatible with acute Crohn's disease. These areas of stricturing result in a complex, multifocal small bowel obstruction as described   above. For example, there are at least 2  transition points on either side of distended small bowel loop along the hernia sac measuring up to 6.5 cm. Dilated loops of small bowel also seen along the right hemiabdomen adjacent to the hernia with   associated transition point demonstrating segmental wall thickening measuring up to 5.2 cm. Furthermore, a third area of distended small bowel seen along the left upper and central abdomen with associated transition point seen along the neck of the   hernia containing segmental wall thickening measuring up to 2.6 cm.  2.  Increasingly complex exophytic cyst along the lower pole of the left kidney with internal hemorrhagic/proteinaceous contents as well as increasing peripheral wall and septal thickening. 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 Recommend continued attention on 3-6 month follow-up exam as well as urological consultation.  3.  Hepatic and splenic iron deposition.  4.  Stable appearance of dissection flap along the infrarenal abdominal aorta.    CT a/p 3/13/2024:      CT a/p:    IMPRESSION:   1. Marked dilation of the main pancreatic duct up to 2.8 cm in the  pancreatic tail with associated parenchymal atrophy, raising concern  for main duct type IPMN, though sequela of prior pancreatitis could  have a similar appearance. Follow-up GI consultation as directed  below.  2. Atheromatous changes of the infrarenal aorta and iliac arteries.  Iliac arteries demonstrating a medial and posterior predominance of  calcification, greater on the right.   3. Multiple simple and hemorrhagic/proteinaceous cysts arising from  the atrophic kidneys; no evidence of abnormal enhancement to suggest  neoplasm.  4. Incidentally noted sigmoid pneumatosis without abnormal  enhancement, wall thickening, or associated portal venous gas. This is  likely benign idiopathic pneumatosis in the asymptomatic patient,  though recommend  "correlation with clinical exam and lactate levels as  bowel ischemia can have similar findings.  5. Slightly increased bowel containing right anterolateral abdominal  wall hernia.    IMPRESSION:  Mr. Dominguez is a 64 year old here with long-standing Crohn's disease s/p multiple bowel resections with minimal remaining bowel.  Scar also has ESRD of unknown etiology, on HD. 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 Smith on 3/2022. 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    Scar has been on Stelara every 4 weeks. However, he was hospitalized for a GI bleed and was noted to have \"huge\" ulcers in the TI. One of the ulcers was noted to be actively bleeding and this was successfully treated with epi injection and coagulation. Most recently, he was hospitalized at [facility] for an incidental finding of pneumatosis in the ileum/R colon. He was treated with Flagyl, prednisone was stopped and he was discharged.     MRE on  3/18/24 showed the above findings, which are certainly concerning for SBOs and acute inflammation.     Also, he does still smoke about 1/4 ppd which is counterproductive to Crohn's healing; he is trying to quit using patches.     He is planned for open heart surgery in the beginning of April.     I am very concerned regarding Scar's very active Crohn's disease and I think we must prioritize controlling his active inflammation prior to heart surgery. Otherwise, he will be at high risk for bleeding and subsequent perforation. I will discuss this with Dr. Romero (CT surgeon).     In order to treat the active Crohn's disease we will stop Stelara and restart Remicade, which has worked in the past. I suspect he will need high dose therapy. We will plan on the re-initiation protocol. Scar is aware of the risk of an infusion reaction if he has ATIs, which we will check for after the first " infusion.     As a bridge until Remicade, we will re-initiate prednisone.     The above measures will take place pending my conversation with Dr. Romero.     The potential risks of Remicade (infliximab) therapy were discussed with the patient including risks of infection including tuberculosis, fungal infections like histoplasmosis, and others.  We additionally reviewed the risks of infusion reactions including immediate ones that might include shortness of breath and hives, and that these are usually treated with slowing down the rate of infusion and giving more IV fluid; sometimes, antihistamines are used, and rarely, corticosteroids are needed.  Delayed infusion reactions with fevers and joint aches, and/or rashes were also discussed occurring 1-10 days after an infusion.  Additionally, the potential risk for changes in blood counts and liver chemistries were mentioned, as were the uncommon multiple sclerosis-like reactions, approximately 90% of which resolve after cessation of the drug.  Finally, the risk of blood cancers including lymphoma and rare lymphomas such as hepatosplenic T-cell lymphomas were discussed.  The patient agrees and understands these risks.    # Active Crohn's disease, dx age 16, s/p multiple bowel resections, stable, on Stelara every 4 weeks     PLAN:  --Start prednisone 40 mg daily until Remicade can be started   --Stop Stelara. Start Remicade.   ---------Will update Jayshree Jordan, PharmD   --Referral to Dr. Carlos El (colorectal surgeon) to establish care in case surgery will be needed   --Low fiber diet. Scar declined a nutritionist referral; he is already following with a neph nutritionist.   --Urology referral for kidney mass seen on recent MRE   --Stop smoking   --Continue to avoid NSAIDs    RTC 1 months     40 minutes spent on the date of the encounter performing chart review, history and exam, documentation and further activities as noted above.

## 2024-03-20 NOTE — NURSING NOTE
Is the patient currently in the state of [place]? YES    Visit mode:VIDEO    If the visit is dropped, the patient can be reconnected by: VIDEO VISIT:  Send e-mail to at [email or web address]    Will anyone else be joining the visit? No  (If patient encounters technical issues they should call [phone])    How would you like to obtain your AVS? MyChart    Are changes needed to the allergy or medication list? No    Rooming Documentation: Assigned questionnaire(s) completed .    Reason for visit: RECHECK     YOEL Camacho

## 2024-03-20 NOTE — H&P
Pre-Operative H & P     CC:  Preoperative exam to assess for increased cardiopulmonary risk while undergoing surgery and anesthesia.    Date of Encounter: 3/20/2024  Primary Care Physician:  Good Pham     Reason for visit:   Encounter Diagnoses   Name Primary?    Pre-op evaluation Yes    Aortic valve disorder     Mitral valve disease     PAF (paroxysmal atrial fibrillation) (H)        HPI  Tacos Dominguez is a 64 year old male who presents for pre-operative H & P in preparation for  Procedure Information       Case: [id number] Date/Time: 04/03/24 0730    Procedures:       Repeat STERNOTOMY WITH AORTIC AND MITRAL VALVE REPLACEMENT (Heart)      Repeat STERNOTOMY WITH TRICUSPID VALVE REPAIR POSSIBLE REPLACEMENT (Heart)      WITH pulmonary vein isolation, and left atrial appendage ligation Transesophageal Echocardiogram (GUERO) AND ALL ASSOCIATED PROCEDURES (Heart)    Anesthesia type: General    Diagnosis:       Aortic valve disorder [I35.9]      Mitral valve disease [I05.9]      PAF (paroxysmal atrial fibrillation) (H) [I48.0]    Pre-op diagnosis:       Aortic valve disorder [I35.9]      Mitral valve disease [I05.9]      PAF (paroxysmal atrial fibrillation) (H) [I48.0]    Location:  OR [address] [place] [address] OR    Providers: Veda Romero MD            Patient is being evaluated for comorbid conditions of non-obstructive CAD, HTN, pulmonary HTN, hx of abdominal aorta partially calcified dissection, anemia, thrombocytopenia, ESRD on HD, hx of hermorrhagic renal cysts, GERD, Crohn's disease, anxiety, and history of BCC.    He has atrial fibrillation, aortic valve stenosis, and mitral valve stenosis and was referred to Dr. Romero for consideration of surgical intervention. He completed a consultation on 2/1/24 and the above surgery was recommended for further management.     He was initially scheduled for surgery in early March but was found to be profoundly anemic on pre-operative labs (hgb 5.8). He initially  "presented to the ED and received 2 units of PRBCs, but repeat blood work a few days later showed persistent anemia with hgb 5.6. He was then admitted to the hospital on 2/25/24 where FOBT returned positive. GI was consulted and he underwent EGD/Colonoscopy with findings of bleeding site in the ileum consistent with Crohn's flare. This was treated and epinephrine injection and cautery. He received a total of 4 units of PRBCs. He was ultimately discharged in stable condition on 3/1/24. Following this he presented to an outside ED on 3/13/24 with complaints of 4 days of scrotal swelling/tenderness and lower extremity edema. He was found to have an elevated lactate, leukocytosis, elevated troponin, and CT evidence of extensive large bowel pneumatosis. He was transferred to [facility] and admitted 3/14-3/17/24. Colorectal surgery and GI were consulted. He was treated with metronidazole with plan for repeat CT in 1 month to re-evaluate. During hospitalization, he again had hemoglobin of 5.7 and received 2 units of PRBCs. He was discharged with stable hgb at 8. He completed follow up MRE on 3/18 and a visit with GI today. MRE shows evidence of active Crohn's flare and SBOs. Labs completed yesterday by surgical team are remarkable for hgb 6.5. His GI provider plans to switch his GI medications in the hopes of controlling his active inflammation prior to proceeding with heart surgery.    Aside from the above, the patient states that he is feeling \"pretty good.\" He does report some shortness of breath but feels this is stable. He has ongoing lower extremity edema. He denies abdominal pain, nausea, or vomiting. He does feel bloated. He reports is bowel movements are improving - no black or bloody stools.     History is obtained from the patient and chart review    Past Medical History  Past Medical History:   Diagnosis Date    Aortic dissection (H)     distal, thin.  stable/chronic on MRCP 3/2022    Benign essential " hypertension     CAD (coronary artery disease)     Cerebral infarction (H)     Crohn's colitis (H)     Crohn's disease of large intestine (H) 11/22/2021    Current smoker     Esophageal reflux     ESRD (end stage renal disease) on dialysis (H)     History of basal cell carcinoma     Hypertension     Mixed hyperlipidemia     NSTEMI (non-ST elevated myocardial infarction) (H)     PAF (paroxysmal atrial fibrillation) (H)        Past Surgical History  Past Surgical History:   Procedure Laterality Date    ABDOMEN SURGERY      x 6.  colon resections for crohn's disease    APPENDECTOMY      CHOLECYSTECTOMY      COLONOSCOPY N/A 07/20/2021    Procedure: COLONOSCOPY, WITH POLYPECTOMY AND BIOPSY;  Surgeon: Eric Preston MD;  Location:  GI    COLONOSCOPY N/A 12/7/2022    Procedure: COLONOSCOPY, WITH POLYPECTOMY AND BIOPSY;  Surgeon: Willian Kee MD;  Location:  GI    COLONOSCOPY N/A 2/27/2024    Procedure: COLONOSCOPY;  Surgeon: Judson Woods MD;  Location: [facility]    CV CORONARY ANGIOGRAM N/A 05/25/2021    Procedure: CV CORONARY ANGIOGRAM;  Surgeon: Judson Ybarra MD;  Location:  HEART CARDIAC CATH LAB    CV CORONARY ANGIOGRAM N/A 2/15/2024    Procedure: Coronary Angiogram;  Surgeon: Tico Finn MD;  Location:  HEART CARDIAC CATH LAB    CV LEFT HEART CATH N/A 2/15/2024    Procedure: Left Heart Catheterization;  Surgeon: Tico Finn MD;  Location:  HEART CARDIAC CATH LAB    CV RIGHT HEART CATH MEASUREMENTS RECORDED N/A 2/15/2024    Procedure: Right Heart Catheterization;  Surgeon: Tico Finn MD;  Location:  HEART CARDIAC CATH LAB    ESOPHAGOSCOPY, GASTROSCOPY, DUODENOSCOPY (EGD), COMBINED N/A 07/20/2021    Procedure: ESOPHAGOGASTRODUODENOSCOPY, WITH FINE NEEDLE ASPIRATION BIOPSY, WITH ENDOSCOPIC ULTRASOUND GUIDANCE;  Surgeon: Eric Preston MD;  Location:  GI    ESOPHAGOSCOPY, GASTROSCOPY, DUODENOSCOPY (EGD), COMBINED N/A 2/27/2024     Procedure: ESOPHAGOGASTRODUODENOSCOPY;  Surgeon: Judson Woods MD;  Location: [facility]    IR DIALYSIS FISTULOGRAM LEFT  12/01/2022    IR DIALYSIS FISTULOGRAM LEFT  1/13/2023    LAPAROTOMY, LYSIS ADHESIONS, COMBINED N/A 03/17/2022    Procedure: Laparotomy, extensive lysis adhesions, combined;  Surgeon: Sarath Smith MD;  [facility]    PANCREATECTOMY PARTIAL N/A 03/17/2022    Procedure: Open Subtotal Pancreatectomy, intra-op ultrasound;  Surgeon: Sarath Smith MD;  [facility]    REVISION FISTULA ARTERIOVENOUS UPPER EXTREMITY Left 2/14/2023    Procedure: LEFT UPPER ARM FISTULA OUTFLOW REVISION FROM CEPHALIC VEIN TO JUGULAR VEIN WITH 10mm THIN-WALLED RINGED POLYTETRAFLUEROETHYLINE;  Surgeon: Mo Gramajo MD;  Location: [facility]    TRANSESOPHAGEAL ECHOCARDIOGRAM INTRAOPERATIVE N/A 1/11/2024    Procedure: ECHOCARDIOGRAM, TRANSESOPHAGEAL, WITH ANESTHESIA;  Surgeon: GENERIC ANESTHESIA PROVIDER;  [facility]    VASCULAR SURGERY      dialysis access- left upper       Prior to Admission Medications  Current Outpatient Medications   Medication Sig Dispense Refill    budesonide (ENTOCORT EC) 3 MG EC capsule Take 3 capsules (9 mg) by mouth every morning 90 capsule 0    calcium acetate (CALPHRON) 667 MG TABS tablet Take 2,668 mg by mouth 3 times daily (with meals)      citalopram (CELEXA) 20 MG tablet Take 20 mg by mouth daily as needed (panic attacks)      folic acid (FOLVITE) 1 MG tablet Take 1 mg by mouth every morning       lidocaine-prilocaine (EMLA) 2.5-2.5 % external cream three times a week Prior to dialysis  site access on Monday, Wednesday, Friday      metroNIDAZOLE (FLAGYL) 500 MG tablet Take 1 tablet (500 mg) by mouth 3 times daily for 29 days 87 tablet 0    multivitamin RENAL (MULTIVITAMIN RENAL) 1 MG capsule Take 1 capsule by mouth every morning      omeprazole (PRILOSEC) 20 MG DR capsule Take 20 mg by mouth every morning       simvastatin (ZOCOR) 20 MG tablet Take 20 mg by  mouth every morning       ustekinumab (STELARA) 90 MG/ML Inject 1 ml ( 90 mg) subcutaneous every 4 weeks. (Patient taking differently: Inject 90 mg Subcutaneous every 28 days Inject 1 ml ( 90 mg) subcutaneous every 4 weeks. Last dose 3/1/24) 1 mL 5       Allergies  Allergies   Allergen Reactions    Lisinopril Anaphylaxis and Other (See Comments)     Shortness of breath       Social History  Social History     Socioeconomic History    Marital status:      Spouse name: Not on file    Number of children: 1    Years of education: Not on file    Highest education level: Not on file   Occupational History    Occupation: [employer] ,    Tobacco Use    Smoking status: Every Day     Packs/day: 0.25     Years: 50.00     Additional pack years: 0.00     Total pack years: 12.50     Types: Cigarettes     Passive exposure: Current    Smokeless tobacco: Never    Tobacco comments:     6-7 cigarettes daily   Substance and Sexual Activity    Alcohol use: Not Currently     Comment: 2-3 drinks every 3  month    Drug use: Not Currently    Sexual activity: Not on file   Other Topics Concern    Parent/sibling w/ CABG, MI or angioplasty before 65F 55M? Not Asked   Social History Narrative    Not on file     Social Determinants of Health     Financial Resource Strain: Not on file   Food Insecurity: Not on file   Transportation Needs: Not on file   Physical Activity: Not on file   Stress: Not on file   Social Connections: Not on file   Interpersonal Safety: Not on file   Housing Stability: Not on file       Family History  Family History   Problem Relation Age of Onset    Breast Cancer Mother     Coronary Artery Disease Father     Hypertension Brother     Anesthesia Reaction No family hx of     Thrombosis No family hx of        Review of Systems  The complete review of systems is negative other than noted in the HPI or here.   Anesthesia Evaluation   Pt has had prior anesthetic. Type: General and MAC.    No history  of anesthetic complications       ROS/MED HX  ENT/Pulmonary:     (+)     MICHAEL risk factors,  hypertension,         tobacco use, Current use,                    (-) recent URI   Neurologic:     (+)    peripheral neuropathy,      CVA (possible TIA or CVA in 2021 -- never officially diagnosed, no residual symptoms),  without deficits,                 (-) no seizures   Cardiovascular: Comment: Distal aortic dissection    (+) Dyslipidemia hypertension- -  CAD (non-obstructive CAD) - past MI - -           COHEN.             dysrhythmias, a-fib,  valvular problems/murmurs  AV and MV stenosis, tricuspid insufficiency.    Previous cardiac testing   Echo: Date: 1/2024 Results:  Interpretation Summary  Global and regional left ventricular function is normal with an EF of 55-60%.  Global right ventricular function is mildly reduced.  Severe mitral insufficiency is present (ERO 0.53 cm2 and MR volume 75 ml).  Cause of MR is severe mitral annular calcification.  Mitral valve gradient is increased, 9mmHg at 94bpm, but is likely driven by  increased volume from MR. The mitral valve area is 2.0 cm^2 by planimetry.  Moderate aortic stenosis.  The mean gradient across the aortic valve is 21 mmHg.  The peak aortic velocity is 3.2 m/sec.  AoV area by plannimetry 1.13 cm2  Moderate pulmonary hypertension. Right ventricular systolic pressure is 64mmHg  above the right atrial pressure.  No pericardial effusion.    Stress Test:  Date: Results:    ECG Reviewed:  Date: 3/14/24 Results:  Sinus rhythm   Right bundle branch block   Left anterior fascicular block   Bifascicular block  Left ventricular hypertrophy with repolarization abnormality   Abnormal ECG   When compared with ECG of 15-FEB-2024 08:05,   No significant change was found     Cath:  Date: 2/15/24 Results:    Conclusion          Left sided filling pressures are severely elevated.     Moderately elevated pulmonary artery hypertension.     Normal cardiac output level.     Prox RCA  "lesion is 30% stenosed.     2nd Mrg lesion is 30% stenosed.     Right sided filling pressures are moderately elevated.     Right heart cath showed marked volume overload (L>R), and a large \"v wave in the PCW tracing c/w significant MR. PVR was 2.6 hybrid units.     (-) angina, taking anticoagulants/antiplatelets, orthopnea/PND and angina   METS/Exercise Tolerance: >4 METS Comment: Activity limited due to recent hospitalization. States he is able to still do housework but does get COHEN if doing it too fast.     +lower extremity edema, stable    Hematologic: Comments: Chronic thrombocytopenia    (+)      anemia, history of blood transfusion, no previous transfusion reaction,     (-) history of blood clots   Musculoskeletal:  - neg musculoskeletal ROS     GI/Hepatic: Comment: - Short sherly syndrome  - Ventral hernia  - Recent admission for pneumatosis 3/14-3/17/24    (+) GERD, Asymptomatic on medication,     Inflammatory bowel disease,          (-) liver disease   Renal/Genitourinary: Comment: LUE AV fistula    (+) renal disease, type: ESRD, Pt requires dialysis, type: Hemodialysis,          Endo: Comment: S/p partial pancreatectomy for IPMN    (+)            Chronic steroid usage for IB Disorder. Date most recently used: daily.     (-) Type II DM and thyroid disease   Psychiatric/Substance Use:     (+) psychiatric history anxiety (occasional panic attacks)       Infectious Disease:  - neg infectious disease ROS     Malignancy:   (+) Malignancy, History of Skin.Skin CA Remission status post Surgery.      Other:  - neg other ROS          Virtual visit -  No vitals were obtained    Physical Exam  Constitutional: Pleasant male, no apparent distress, and appears stated age.  Eyes: Pupils equal  HENT: Normocephalic and atraumatic  Respiratory: Non labored breathing on room air  Neurologic: Awake, alert, oriented to name, place and time.   Neuropsychiatric: Calm, cooperative. Normal affect.       Prior Labs/Diagnostic Studies "   All labs and imaging personally reviewed     EKG/ stress test - if available please see in ROS above   Echo result w/o MOPS: Interpretation SummaryGlobal and regional left ventricular function is normal with an EF of 55-60%.Global right ventricular function is mildly reduced.Severe left atrial enlargement is present.Moderate to severe mitral insufficiency is present.Moderate mitral stenosis is present.The mean gradient across the mitral valve is 12 mmHg, HR 96 bpm.Moderate aortic stenosis is present.The peak aortic velocity is 3.3 m/sec.The right ventricular systolic pressure is 64mmHg above the right atrialpressure.Pulmonary hypertension is present.IVC diameter and respiratory changes fall into an intermediate rangesuggesting an RA pressure of 8 mmHg.          Latest Ref Rng & Units 10/24/2023    11:35 AM   PFT   FVC L 3.41    FEV1 L 1.16    FVC% % 79    FEV1% % 34          The patient's records and results personally reviewed by this provider.     Outside records reviewed from: Care Everywhere      Assessment    Tacos Dominguez is a 64 year old male seen as a PAC referral for risk assessment and optimization for anesthesia.    Plan/Recommendations  Pt will be optimized for the proposed procedure.  See below for details on the assessment, risk, and preoperative recommendations    NEUROLOGY  - History of CVA (possible 2021). See ROS.  - No history of seizure  - Neuropathy      -Post Op delirium risk factors:  High co-morbid index    ENT  - No current airway concerns.  Will need to be reassessed day of surgery.  Mallampati: Unable to assess  TM: Unable to assess    CARDIAC  - Following with cardiology/cardiac surgery for atrial fibrillation, AV stenosis, MV stenosis, pulmonary hypertension, tricuspid insufficiency and mild CAD. Above surgery planned for further management of afib and valve disorders.  - Most recent cardiac testing as above.    - METS (Metabolic Equivalents)  Patient performs 4 or more METS  "exercise without symptoms            Total Score: 0        PULMONARY    MICHAEL Medium Risk            Total Score: 3    MICHAEL: Hypertension    MICHAEL: Over 50 ys old    MICHAEL: Male      - Denies asthma or inhaler use  - Tobacco History    History   Smoking Status    Every Day    Packs/day: 0.25    Years: 50.00    Types: Cigarettes   Smokeless Tobacco    Never       GI  - GERD  Controlled on medications: Proton Pump Inhibitor    - Crohn's Disease s/p multiple bowel resections, now with short gut  Recent admission 3/14-3/17 with pneumatosis, treated without surgical intervention. On Flagyl x 1 month with plan for repeat CT thereafeter  Recent MRE and GI visit with Dr. Rubio - active inflammation and SBO on imaging. Significant concern that if active inflammation is not controlled prior to proceeding with surgery that patient will be at high risk for bleeding and subsequent perforation.   GI is recommending that patient stop Stelara and start prednisone and Remicade but patient states prescriptions have not been sent yet. Defer to GI recommendations for this medications prior to surgery.    PONV Low Risk  Total Score: 1           1 AN PONV: Intended Post Op Opioids        /RENAL  - ESRD on HD (M/W/F). Baseline Creatinine  4.5-6.3  - History of hemorrhagic renal cysts  - History of hypokalemia. Potassium normal on BMP completed yesterday.    ENDOCRINE    - BMI: Estimated body mass index is 21.68 kg/m  as calculated from the following:    Height as of this encounter: 1.803 m (5' 11\").    Weight as of this encounter: 70.5 kg (155 lb 6.8 oz).  Healthy Weight (BMI 18.5-24.9)  - No history of Diabetes Mellitus  - S/p partial pancreatectomy for IPMN    HEME  VTE Low Risk 0.5%            Total Score: 3    VTE: Greater than 59 yrs old    VTE: Male      - No history of abnormal bleeding or antiplatelet use.  - Chronic anemia and thrombocytopenia  Baseline Platelets: in the 60s. Stable on all recent CBCs  Recent GI bleed requiring " multiple units of PRBCs - bleeding ulcer in ileum that was treated with epinephrine and cautery.   Patient continues to have unstable hemoglobin, CBC completed yesterday with hgb 6.5 (down from 8 three days prior). Recommend consideration of repeat transfusion but will defer to GI and/or surgical teams. Patient states he is asymptomatic at this time.     Recommend perioperative use of blood conservation techniques intraoperatively and close monitoring for postoperative bleeding.  A type and screen has been ordered for this patient    MSK  Patient IS Frail            Total Score: 3    Frailty: Weight loss 10 lbs or greater    Frailty: Slower walking speed    Frailty: Decrease in strength      Due to patient's risk related to frailty, post-operative cardiac rehab would be recommended.     PSYCH  - Anxiety and history of panic attacks  Continue Celexa    Different anesthesia methods/types have been discussed with the patient, but they are aware that the final plan will be decided by the assigned anesthesia provider on the date of service.    The patient is not yet optimized for their procedure due to active inflammation from Crohn's and ongoing significant anemia.     AVS with information on surgery time/arrival time, meds and NPO status given by nursing staff. No further diagnostic testing indicated.    Please refer to the physical examination documented by the anesthesiologist in the anesthesia record on the day of surgery.    Video-Visit Details    Type of service:  Video Visit    Provider received verbal consent for a Video Visit from the patient? Yes   Video Start Time: 1306  Video End Time: 1322    Originating Location (pt. Location): Home    Distant Location (provider location):  Off-site  Mode of Communication:  Video Conference via Loop App  On the day of service:     Prep time: 25 minutes  Visit time: 16 minutes  Documentation time: 35 minutes  ------------------------------------------  Total time: 76  minutes      Pearl Carrion PA-C  Preoperative Assessment Center  [facility]  Phone: [phone]  Fax: [phone]

## 2024-03-20 NOTE — PROGRESS NOTES
Scar is a 64 year old who is being evaluated via a billable video visit.    How would you like to obtain your AVS? MyChart  If the video visit is dropped, the invitation should be resent by: Text to cell phone: [phone]          Subjective   Scar is a 64 year old, presenting for the following health issues:  Pre-Op Exam    HPI           Physical Exam

## 2024-03-20 NOTE — PATIENT INSTRUCTIONS
PLAN  --Start prednisone 40 mg daily until Remicade can be started   --Stop Stelara. Start Remicade.   ---------Will update Jayshree Jordan, PharmD   --Referral to Dr. Carlos El (colorectal surgeon) to establish care in case surgery will be needed   --Low fiber diet. Scar declined a nutritionist referral; he is already following with a neph nutritionist.   --Urology referral for kidney mass seen on recent MRE   --Stop smoking   --Continue to avoid NSAIDs    RTC 1 month

## 2024-03-21 PROBLEM — K50.818 CROHN'S DISEASE OF BOTH SMALL AND LARGE INTESTINE WITH OTHER COMPLICATION (H): Status: ACTIVE | Noted: 2024-01-01

## 2024-03-21 NOTE — TELEPHONE ENCOUNTER
[facility] Call Center    Phone Message    May a detailed message be left on voicemail: yes     Reason for Call: Other: Olga from [facility] called as patient is having heart surgery and needs to be cleared for surgery by his GI Team.  Please have Darek follow up with Olga so they can discuss patient.     Action Taken: Message routed to:  [facility] ([facility]): [facility]    Travel Screening: Not Applicable

## 2024-03-21 NOTE — TELEPHONE ENCOUNTER
-- Prednisone 40mg daily ordered  -- Therapy plan initiated for Remicade restart - Week 0, 4, and 8; plan for antibody check at Week 2.  -- Quant Gold TB last completed in 2021; order placed  -- Hepatitis B serologies from 3/15/24 indicate immunity  ___________________________________________________________  Per clinic visit w/ Dr. Rubio 3/20/24:

## 2024-03-21 NOTE — TELEPHONE ENCOUNTER
Called and left message with GI team requesting clearance for upcoming open heart surgery with Dr. Romero. Awaiting response back.    CV RN contact info provided.    ----------------------------------------------------------------------------------------------------  Veda Romero MD O'Brien, Jessica, JR; Pearl Clarke  I think we need to wait til GI says it's ok to proceed          Previous Messages       ----- Message -----  From: Olga Dias, JR  Sent: 3/20/2024   2:52 PM CDT  To: Pearl Clarke; *  Subject: RE: Surgery 4/3/24                              How long should this be postponed?    Tarsha Young  ----- Message -----  From: Veda Romero MD  Sent: 3/20/2024   2:48 PM CDT  To: Pearl Carrion PA-C; Betty Rubio MD; *  Subject: RE: Surgery 4/3/24                              We will plan on postponing his surgery. Thank you    Veda  ----- Message -----  From: Pearl Carrion PA-C  Sent: 3/20/2024   2:21 PM CDT  To: Betty Rubio MD; Olga Dias RN; *  Subject: Surgery 4/3/24                                  Dr. Romero and Dr. Rubio,    I just saw Scar as a virtual PAC visit in preparation for his upcoming surgery on 4/3/24.    It seems like Scar has had a rough couple months with GI bleed and Crohn's flare. He shared with me that Dr. Rubio recommended further management of his active inflammation prior to proceeding with surgery, which I would agree with if this upcoming surgery is not too time sensitive. Additionally, his hemoglobin was 6.5 yesterday, down from 8 a few days ago. He seems to be asymptomatic from that standpoint but he was not sure if either team wanted him to come in for another transfusion so I told him I would reach out to see.    Sincerely,  Pearl Carrion PA-C

## 2024-03-22 NOTE — TELEPHONE ENCOUNTER
Returned Olga's call with [facility]. No answer; left detailed voicemail about patient's status. Plan to cancel upcoming CV surgery on 4/3 per both Dr. Romero and Dr. Rubio. Plan to get patient's Crohn's disease under better control before he can be cleared for surgery. No expected date at this time.

## 2024-03-22 NOTE — TELEPHONE ENCOUNTER
[facility] Call Center    Phone Message    May a detailed message be left on voicemail: yes     Reason for Call: Other: Olga is calling back to check in with team, she is requesting team please reach out to discuss having Pt's visit moved up. Please advise. Thank you!     Action Taken: Message routed to:  [facility] ([facility]): GI    Travel Screening: Not Applicable

## 2024-03-22 NOTE — TELEPHONE ENCOUNTER
[facility] is able to accommodate the patient tomorrow @ 1000 for 2units of blood. Patient will arrive at 0700 for type & screen. Called patient to notify him of the plan. He is agreeable and all questions have been answered. Lab appointment scheduled. Patient will complete his Quant Gold TB test at this time as well.

## 2024-03-22 NOTE — TELEPHONE ENCOUNTER
Called patient -- writer has not found an available infusion appointment for blood transfusion. Patient should hold off on completing his Type & Screen.     Patient requested two units of blood instead of one. Patient previously tolerated 2 units. He dialyzed this morning. Per Dr. Rubio, okay to update orders to 2 units of blood.    Will continue looking for an infusion appointment availability for today or tomorrow.

## 2024-03-23 PROBLEM — R73.9 HYPERGLYCEMIA: Status: ACTIVE | Noted: 2024-01-01

## 2024-03-23 NOTE — H&P
[facility]    History and Physical - Hospitalist Service, GOLD TEAM 7       Date of Admission:  3/23/2024    Assessment & Plan      Tacos Dominguez is a 64 year old male admitted on 3/23/2024. 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 He then presented to OSH ED on 3/13/24 and found to have Evidence of large bowel pneumatosis, anemia and scrotal swelling prompting transfer to [facility] and admitted to (3/14-3/17)w/ GI and CRS consultations who presents from infusion center to the ED for evaluation of Hyperglycemia. Patient admitted to Medicine for further evaluation and care.     ## Hyperglycemia: Presents from clinic w/ BG 800s. No DM hx. Hx of subtotal pancreatectomy (3/2022), steroid use and significant stress related to acute on chronic medical. Ketones less than 0.18, VBG 7.28/50/27/23.   - Endocrine consult placed. Dicussed with on admission. Appreciate assistance and recommendations   - Infectious and cardiac workup- BCx2, CXR- does not make urine; EKG, troponin  - Continue insulin gtt  - Regular diet  - Continue to closely monitor  - C.diff and Enteric panel  - Low threshold for broad spectrum abx   - A1c    ## C/F HF exacerbation: Bilateral LE and UE edema. Reports feeling volume overload. And requesting 02 for comfort. Is anuric  - ECHO ordered  - Patient to remain on EB  - Plan for Staff with Dr. Aguirre  - Nephrology to run HD tonight  -  2 units PRBC with  HD  - NT-BNP    ## ESRD on HD: unknown etiology. Access LUE AVG. Last complete run 3/22/24. K wnl.   - Nephrology consult placed. Please discuss with in AM  - Daily weights  - Daily BMP, Mg, Phos  - Favor transfusion on HD days w/ Hgb goal of 8.0 in setting of significant cardiac hx    ## Transfusion dependent, acute blood loss Anemia: Thought s/t ongoing Crohn's flare. Recent EGD and colonoscopy with bleeding noted in the ileum; s/p 4 units PRBC Hgb 6.6 this AM, was to receive PRBC, though sent from clinic for eval of Hyperglycemia. Would have transfusion goal of 8.0 w/ significant cardiac hx.   - Type and screen  - Transfuse 2U PRBC with HD tonight  - Transfusion goal of 8.0 in setting of significant cardiac hx, though favor transfusion during HD for better volume control.     ## Pseudo Hyponatremia: Na 127 on admission. Corrected for Hyperglycemia ~ 142.   - Repeat BMP    ##  Elevated troponin: No CP noted on admission. Troponin . EKG sinus rhythm w/ bifascicular block- similar to previous.  - Trend troponin     ## Moderate Aortic valve stenosis  ## Severe Mitral insufficiency  ## Non-obstructive CAD  ## Pulmonary HTN  ## Hx of NSTEMI  ## HTN: BP stable on admission. Recent ECHO Global and regional left ventricular function is normal with an EF of 55-60%; Global right ventricular function is mildly reduced.Severe mitral insufficiency is present (ERO 0.53 cm2 and MR volume 75 ml).Cause of MR is severe mitral annular calcification.Mitral valve gradient is increased, 9mmHg at 94bpm, but is likely driven by increased volume from MR. The mitral valve area is 2.0 cm^2 by planimetry.Moderate aortic stenosis.Moderate pulmonary hypertension. Right ventricular systolic pressure is 64mmHg  above the right atrial pressure. Follows with Cardiology and [facility]TC w/ plan for Mitral valve replacement- has eleazar pushed back d/t ongoing ABL and Crohn's flare.  - Follow up with Cardiology as scheduled   - Consider Cardiology consult  on admission     ## Hx of Crohn's disease w/ persistent Crohn's flare:  MRE 3/2024 w/ acute Crohn's disease; dilated loops of small bowel w/ associated transition point Was previously maintained on Stelara (q8 weeks; last dose 3/1/24) though with active disease, will transition to Remicade and resume steroids- 40 mg daily. Follows with [facility] with recent clinic visit- plan as above  - Continue Steroids- 40 mg PO prednisone daily  - Continue Budesonide 9 mg daily   - Follow up with GI  - Check Stool studies     ## Recent large bowel pneumatosis: Per chart review- incidental finding on imaging. Started on steroids and flagyl. Maintains on daily flagyl. Steroids discontinued on discharge 3/17/24. Plan for 1 month follow up w/ CT.   - Continue Flagyl daily- 30 day course, per chart review  - Follow up with Imaging as scheduled    ## Renal mass: Finding on recent MRE (- Increasingly complex exophytic cyst along the lower pole of the left kidney with internal hemorrhagic/proteinaceous contents as well as increasing peripheral wall and septal thickening. 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 Recommend continued attention on 3-6 month follow-up exam as well as urological consultation.  - Follow up with Urology as scheduled     ## Anxiety: PTA citalopram. Significant in setting of acute on chronic medical. Would liek to see Psychiatry on admission.   - Psychiatry consult placed. Please discuss with In AM   - Continue PTA medication   - Okay to use IV ativan sparingly    ## Prolonged Qtc:   - Check EKG  - Minimize use of Qtc prolonging medications     ## Tobacco abuse: Current 0.25 ppd smoker   - nicotine Patch  - Recommend cessation              Diet:  REGULAR  DVT Prophylaxis: Pneumatic Compression Devices  Alcantara Catheter: Not present  Lines: None     Cardiac Monitoring: None  Code Status:  Full Code     Clinically Significant  Risk Factors Present on Admission         # Hyponatremia: Lowest Na = 127 mmol/L in last 2 days, will monitor as appropriate    # Hypomagnesemia: Lowest Mg = 1.2 mg/dL in last 2 days, will replace as needed   # Hypoalbuminemia: Lowest albumin = 3 g/dL at 3/23/2024  7:59 AM, will monitor as appropriate   # Thrombocytopenia: Lowest platelets = 109 in last 2 days, will monitor for bleeding   # Hypertension: Noted on problem list          # Financial/Environmental Concerns:    # Support System: poor social support noted in nursing assessment         Disposition Plan      Expected Discharge Date: 03/25/2024                The patient's care was discussed with the Attending Physician, Dr. Dr. Aguirre .    Ainsley Lowe PA-C  Hospitalist Service, [facility] TEAM [address] [place] [address]  Securely message with [employer] (more info)  Text page via [facility] Paging/Directory   See signed in provider for up to date coverage information    ______________________________________________________________________    Chief Complaint   Hyperglycemia and volume overload    History is obtained from the patient and EMR    History of Present Illness   Tacos Dominguez is a 64 year old male w/ complex PMH who presents from clinic where he presented for planned PRBC transfusion, though was found to have high BG, prompting ED evaluation. Patient states that he has been in BL (sick) state of health recently. He reports that he began to have trouble with his Hgb (late 2/2024)  after finding out he needed to have heart surgery and this caused significant stress and likely led to Crohn's flare. He has been admitted at various hospitals in the past month as above. He currently reports that he feels 'fluid overloaded', and is continuing to have high-level stress related to planned heart surgery. He reports that he is not sleeping at night and is in a constant state of panic. He has no other complaints at this time. We  discussed POC as outlined above and patient agreeable.        Past Medical History    Past Medical History:   Diagnosis Date    Aortic dissection (H)     distal, thin.  stable/chronic on MRCP 3/2022    Benign essential hypertension     CAD (coronary artery disease)     Cerebral infarction (H)     Crohn's colitis (H)     Crohn's disease of large intestine (H) 11/22/2021    Current smoker     Esophageal reflux     ESRD (end stage renal disease) on dialysis (H)     History of basal cell carcinoma     Hypertension     Mixed hyperlipidemia     NSTEMI (non-ST elevated myocardial infarction) (H)     PAF (paroxysmal atrial fibrillation) (H)        Past Surgical History   Past Surgical History:   Procedure Laterality Date    ABDOMEN SURGERY      x 6.  colon resections for crohn's disease    APPENDECTOMY      CHOLECYSTECTOMY      COLONOSCOPY N/A 07/20/2021    Procedure: COLONOSCOPY, WITH POLYPECTOMY AND BIOPSY;  Surgeon: Eric Preston MD;  Location:  GI    COLONOSCOPY N/A 12/7/2022    Procedure: COLONOSCOPY, WITH POLYPECTOMY AND BIOPSY;  Surgeon: Willian Kee MD;  Location:  GI    COLONOSCOPY N/A 2/27/2024    Procedure: COLONOSCOPY;  Surgeon: Judson Woods MD;  Location: [facility]    CV CORONARY ANGIOGRAM N/A 05/25/2021    Procedure: CV CORONARY ANGIOGRAM;  Surgeon: Judson Ybarra MD;  Location:  HEART CARDIAC CATH LAB    CV CORONARY ANGIOGRAM N/A 2/15/2024    Procedure: Coronary Angiogram;  Surgeon: Tico Finn MD;  Location:  HEART CARDIAC CATH LAB    CV LEFT HEART CATH N/A 2/15/2024    Procedure: Left Heart Catheterization;  Surgeon: Tico Finn MD;  Location:  HEART CARDIAC CATH LAB    CV RIGHT HEART CATH MEASUREMENTS RECORDED N/A 2/15/2024    Procedure: Right Heart Catheterization;  Surgeon: Tico Finn MD;  Location: [facility] CARDIAC CATH LAB    ESOPHAGOSCOPY, GASTROSCOPY, DUODENOSCOPY (EGD), COMBINED N/A 07/20/2021    Procedure:  ESOPHAGOGASTRODUODENOSCOPY, WITH FINE NEEDLE ASPIRATION BIOPSY, WITH ENDOSCOPIC ULTRASOUND GUIDANCE;  Surgeon: Eric Preston MD;  [facility]    ESOPHAGOSCOPY, GASTROSCOPY, DUODENOSCOPY (EGD), COMBINED N/A 2/27/2024    Procedure: ESOPHAGOGASTRODUODENOSCOPY;  Surgeon: Judson Woods MD;  Location: [facility]    IR DIALYSIS FISTULOGRAM LEFT  12/01/2022    IR DIALYSIS FISTULOGRAM LEFT  1/13/2023    LAPAROTOMY, LYSIS ADHESIONS, COMBINED N/A 03/17/2022    Procedure: Laparotomy, extensive lysis adhesions, combined;  Surgeon: Sarath Smith MD;  [facility]    PANCREATECTOMY PARTIAL N/A 03/17/2022    Procedure: Open Subtotal Pancreatectomy, intra-op ultrasound;  Surgeon: Sarath Smith MD;  [facility]    REVISION FISTULA ARTERIOVENOUS UPPER EXTREMITY Left 2/14/2023    Procedure: LEFT UPPER ARM FISTULA OUTFLOW REVISION FROM CEPHALIC VEIN TO JUGULAR VEIN WITH 10mm THIN-WALLED RINGED POLYTETRAFLUEROETHYLINE;  Surgeon: Mo Gramajo MD;  Location: [facility]    TRANSESOPHAGEAL ECHOCARDIOGRAM INTRAOPERATIVE N/A 1/11/2024    Procedure: ECHOCARDIOGRAM, TRANSESOPHAGEAL, WITH ANESTHESIA;  Surgeon: GENERIC ANESTHESIA PROVIDER;  [facility]    VASCULAR SURGERY      dialysis access- left upper       Prior to Admission Medications   Prior to Admission Medications   Prescriptions Last Dose Informant Patient Reported? Taking?   budesonide (ENTOCORT EC) 3 MG EC capsule   No No   Sig: Take 3 capsules (9 mg) by mouth every morning   calcium acetate (CALPHRON) 667 MG TABS tablet  Self Yes No   Sig: Take 2,668 mg by mouth 3 times daily (with meals)   citalopram (CELEXA) 20 MG tablet   Yes No   Sig: Take 20 mg by mouth daily as needed (panic attacks)   folic acid (FOLVITE) 1 MG tablet  Self Yes No   Sig: Take 1 mg by mouth every morning    lidocaine-prilocaine (EMLA) 2.5-2.5 % external cream  Self Yes No   Sig: three times a week Prior to dialysis  site access on Monday, Wednesday, Friday    metroNIDAZOLE (FLAGYL) 500 MG tablet   No No   Sig: Take 1 tablet (500 mg) by mouth 3 times daily for 29 days   multivitamin RENAL (MULTIVITAMIN RENAL) 1 MG capsule  Self Yes No   Sig: Take 1 capsule by mouth every morning   omeprazole (PRILOSEC) 20 MG DR capsule  Self Yes No   Sig: Take 20 mg by mouth every morning    predniSONE (DELTASONE) 10 MG tablet   No No   Sig: Take 4 tablets (40 mg) by mouth daily for 30 days   simvastatin (ZOCOR) 20 MG tablet  Self Yes No   Sig: Take 20 mg by mouth every morning    ustekinumab (STELARA) 90 MG/ML   No No   Sig: Inject 1 ml ( 90 mg) subcutaneous every 4 weeks.   Patient taking differently: Inject 90 mg Subcutaneous every 28 days Inject 1 ml ( 90 mg) subcutaneous every 4 weeks. Last dose 3/1/24      Facility-Administered Medications: None        Review of Systems    The 10 point Review of Systems is negative other than noted in the HPI or here.      Physical Exam   Vital Signs: Temp: 97.5  F (36.4  C) Temp src: Oral BP: (!) 140/69 Pulse: 101   Resp: 18 SpO2: 99 % O2 Device: None (Room air)    Weight: 0 lbs 0 oz      Physical Exam   Constitutional:  Pleasant male sitting up in ED bed.  Well nourished, well developed, resting comfortably   HEENT:   Head: Normocephalic and atraumatic.   Eyes: Conjunctivae are normal. Pupils are equal, round, and reactive to light.  Pharynx has no erythema or exudate, mucous membranes are moist  Neck:   No adenopathy, no bony tenderness  Cardiovascular: Regular rate and rhythm without murmurs or gallops  Pulmonary/Chest: Clear to auscultation bilaterally, with no wheezes or retractions. No respiratory distress.  GI: Soft with good bowel sounds.  Non-tender, non-distended, with no guarding, no rebound, no peritoneal signs.   Back:  No bony or CVA tenderness   Musculoskeletal:  Pitting edema bilateral UE and LE. LUE AVG w/ + thrill   Skin: Skin is warm and dry. No rash noted.   Neurological: Alert and oriented to person, place, and time. Nonfocal  exam  Psychiatric:  Normal mood and affect.      Medical Decision Making             Data

## 2024-03-23 NOTE — CONSULTS
"  Inpatient Diabetes Management Service : New Consult Note     Patient: Tacos Dominguez   YOB: 1960    MRN: [id number]     Date of Consult : 03/23/2024   Date of Admission: 3/23/2024     Reason for Consult: hyperglycemia in setting of subtoal pancreatectomy( for main duct IPMN by Dr. Smith on 3/2022) and steroid use    Consult Requestor: Ainsley Lowe PA-C         All information gathered via chart review in [facility] everywhere and face-to-face interview and assessment.  Did not speak with family  Professional  utilized --> No         History of Present Illness:     Tacos Dominguez is a 64 year old  is a 64 year old male admitted on 3/23/2024. 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 He then presented to [facility] ED on 3/13/24 and found to have Evidence of large bowel pneumatosis, anemia and scrotal swelling prompting transfer to [facility] and admitted from (3/14-3/17)w/ GI and CRS consultations who presents today from [facility] center to the ED for evaluation of Hyperglycemia. Patient admitted to Medicine for further evaluation and care.         Diabetes Focus:    Patient is not known to the Inpatient Diabetes Service from past admission(s).  Patient was started on Prednisone 20 mg bid around 3/1/2024  as his Stelara was stopped for his Crohn's in preparation to have \"heart surgery\". He has not yet received any Remicade. He is not a known diabetic. He denies any diabetes in " his family. His hgb A1c on 3/14/2024 after starting steroids=5.8( prediabetic)-may be low as very anemic and also has had multiple transfusions secondary to Crohn's. A1c on 9/17/2019=5.3 which is in the normal range. Reviewing BG from 1/20/2024 to 2/27/2024 in EPIC running from .  BG from 3/14/2024 to 3/23/7743=515,162,326 and 822 today trending up after starting steroids.Patient started on a non DKA insulin drip as he does not seem to be in DKA.( See labs below)  He is post a subtoal pancreatectomy( for main duct IPMN).  He is currently on HD with last run yesterday. He sometimes makes some urine.     Last dose insulin PTA: none   Current inpatient regimen:  Non DKA insulin drip    BG at time of consult: 592-822    Other Active/Contributory Medical Problems: many issues   Planned Procedures/Surgeries: none during this admission    Relevant Labs on Admission:    Renal function: Creatinine (3.93), eGFR (16)    Co2=21, AG=14 and ketone quant=<0.18  Lactic Acid: not done   VBG ph=7.28        GAD65 antibody, islet antibody, insulin antibody, and c-peptide  not available on epic search     Inpatient Glucose Trends:             Diabetes History:   Diabetes Type and Duration: not known to be diabetic    Last A1c: 6.4 on 3/23/2024    Hemoglobin at time of last A1c: 6.6  RBC transfusion in past 3 months: yes    Medic Alert if Type 1: no  History of DKA: no    Safety Kit:   - Glucagon: no   - Ketone Strips: no    Outpatient Diabetes Provider: none   Formal Diabetes Education/Educator: none    Diet/Lifestyle: trying to loose weight, has stopped drinking a lot of pop.  Tries to limit fluids so will have some pop during the day, no juice.  He does eat [employer] for breakfast.    Recent weight change:  weight goes up and down with HD trying to reach weight of 70 kg  Ability to [place] Prescribed Regimen:  not known yet but alert and  oriented x3              PTA Regimen:   Diabetes Medications: none    Historical Diabetes  Medications: none    BG monitoring device & frequency:  none    BG trends at home: none   Hypoglycemia PTA: none           Medications Impacting Glycemia:    Steroids: took last prednisone dose this am, 3/23/2024 in the am, prednisone 40 mg daily-- now ordered prednisone 10 mg 4 times daily starting at 16:00  D5W containing solutions/medications: NS  Other medications impacting glucose:  none         Diet/Nutrition:    Orders Placed This Encounter      Regular Diet Adult     Supplements:  none  TF: none  TPN: none          Review of Systems:    CC:   Constitutional: no fever and chills.  HEENT: no headache, vision changes, hearing changes, sinus congestion  Cardiac: no chest pain.    Respiratory: Has dyspnea on exertion and at rest. frequent  chronic cough     GI: no abdominal pain, has Crohn's so not normal bowel patter, always has diarrhea  : ESRD so does not urinate usually, occasional make some urine in shower    Endocrine: no polyuria, polydipsia           Past Medical History:       Past Medical History:   Diagnosis Date    Aortic dissection (H)     distal, thin.  stable/chronic on MRCP 3/2022    Benign essential hypertension     CAD (coronary artery disease)     Cerebral infarction (H)     Crohn's colitis (H)     Crohn's disease of large intestine (H) 11/22/2021    Current smoker     Esophageal reflux     ESRD (end stage renal disease) on dialysis (H)     History of basal cell carcinoma     Hypertension     Mixed hyperlipidemia     NSTEMI (non-ST elevated myocardial infarction) (H)     PAF (paroxysmal atrial fibrillation) (H)              Past Surgical History:      Past Surgical History:   Procedure Laterality Date    ABDOMEN SURGERY      x 6.  colon resections for crohn's disease    APPENDECTOMY      CHOLECYSTECTOMY      COLONOSCOPY N/A 07/20/2021    Procedure: COLONOSCOPY, WITH POLYPECTOMY AND BIOPSY;  Surgeon: Eric Preston MD;  Location:  GI    COLONOSCOPY N/A 12/7/2022    Procedure:  COLONOSCOPY, WITH POLYPECTOMY AND BIOPSY;  Surgeon: Willian Kee MD;  Location: [facility] GI    COLONOSCOPY N/A 2/27/2024    Procedure: COLONOSCOPY;  Surgeon: Judson Woods MD;  Location: [facility]    CV CORONARY ANGIOGRAM N/A 05/25/2021    Procedure: CV CORONARY ANGIOGRAM;  Surgeon: Judson Ybarra MD;  Location:  HEART CARDIAC CATH LAB    CV CORONARY ANGIOGRAM N/A 2/15/2024    Procedure: Coronary Angiogram;  Surgeon: Tico Finn MD;  Location:  HEART CARDIAC CATH LAB    CV LEFT HEART CATH N/A 2/15/2024    Procedure: Left Heart Catheterization;  Surgeon: Tico Finn MD;  Location:  HEART CARDIAC CATH LAB    CV RIGHT HEART CATH MEASUREMENTS RECORDED N/A 2/15/2024    Procedure: Right Heart Catheterization;  Surgeon: Tico Finn MD;  Location:  HEART CARDIAC CATH LAB    ESOPHAGOSCOPY, GASTROSCOPY, DUODENOSCOPY (EGD), COMBINED N/A 07/20/2021    Procedure: ESOPHAGOGASTRODUODENOSCOPY, WITH FINE NEEDLE ASPIRATION BIOPSY, WITH ENDOSCOPIC ULTRASOUND GUIDANCE;  Surgeon: Eric Preston MD;  Location:  GI    ESOPHAGOSCOPY, GASTROSCOPY, DUODENOSCOPY (EGD), COMBINED N/A 2/27/2024    Procedure: ESOPHAGOGASTRODUODENOSCOPY;  Surgeon: Judson Woods MD;  Location: [facility]    IR DIALYSIS FISTULOGRAM LEFT  12/01/2022    IR DIALYSIS FISTULOGRAM LEFT  1/13/2023    LAPAROTOMY, LYSIS ADHESIONS, COMBINED N/A 03/17/2022    Procedure: Laparotomy, extensive lysis adhesions, combined;  Surgeon: Sarath Smith MD;  [facility]    PANCREATECTOMY PARTIAL N/A 03/17/2022    Procedure: Open Subtotal Pancreatectomy, intra-op ultrasound;  Surgeon: Sarath Smith MD;  [facility]    REVISION FISTULA ARTERIOVENOUS UPPER EXTREMITY Left 2/14/2023    Procedure: LEFT UPPER ARM FISTULA OUTFLOW REVISION FROM CEPHALIC VEIN TO JUGULAR VEIN WITH 10mm THIN-WALLED RINGED POLYTETRAFLUEROETHYLINE;  Surgeon: Mo Gramajo MD;  Location:  OR    TRANSESOPHAGEAL  ECHOCARDIOGRAM INTRAOPERATIVE N/A 1/11/2024    Procedure: ECHOCARDIOGRAM, TRANSESOPHAGEAL, WITH ANESTHESIA;  Surgeon: GENERIC ANESTHESIA PROVIDER;  [facility]    VASCULAR SURGERY      dialysis access- left upper             Social History:      Social History     Tobacco Use    Smoking status: Every Day     Packs/day: 0.25     Years: 50.00     Additional pack years: 0.00     Total pack years: 12.50     Types: Cigarettes     Passive exposure: Current    Smokeless tobacco: Never    Tobacco comments:     6-7 cigarettes daily   Substance Use Topics    Alcohol use: Not Currently     Comment: 2-3 drinks every 3  month        History   Sexual Activity    Sexual activity: Not on file        Tobacco: current smoker    Etoh: not currently    Other Substance: did not ask    Marital Status: no             Family History:    Reviewed and non-contributory.    Family History of Diabetes: no family history of diabetes    Family History   Problem Relation Age of Onset    Breast Cancer Mother     Coronary Artery Disease Father     Hypertension Brother     Anesthesia Reaction No family hx of     Thrombosis No family hx of              Physical Exam:    BP (!) 141/95   Pulse 94   Temp 97.4  F (36.3  C) (Oral)   Resp 18   SpO2 100%    General:  chronic ill appearing, in no acute distress.  HEENT:  NC/AT. MMM, sclera not injected  Lungs:  unremarkable,frequent cough, some dyspnea, remains on room air  ABD:  rounded, soft  Skin:  warm and dry, no obvious lesions  MSK:   moving all extremities  Lymp:   bilateral LE edema  Mental Status:  Alert and oriented x3  Psych:   Cooperative, good eye contact, full/appropriate affect           Laboratory Data:      Lab Results   Component Value Date    A1C 6.4 03/23/2024    A1C 6.1 03/19/2024    A1C 5.8 03/14/2024     Recent Labs   Lab Test 03/23/24  0759   WBC 5.9   RBC 1.86*   HGB 6.6*   HCT 21.9*   *   MCH 35.5*   MCHC 30.1*   RDW 25.9*   *     Recent Labs   Lab Test  03/23/24  1536 03/23/24  1427 03/23/24  0949 03/23/24  0759 03/19/24  1021   NA  --   --   --  127* 138   POTASSIUM  --   --   --  4.7 4.5   CHLORIDE  --   --   --  92* 101   CO2  --   --   --  21* 24   ANIONGAP  --   --   --  14 13   GLC >600* 592*   < > 822* 326*   BUN  --   --   --  28.7* 37.8*   CR  --   --   --  3.93* 4.58*   KARY  --   --   --  8.2* 7.7*    < > = values in this interval not displayed.     Recent Labs   Lab Test 03/23/24  0759   PROTTOTAL 4.6*   ALBUMIN 3.0*   BILITOTAL 0.7   ALKPHOS 142   AST 15   ALT 21            Assessment and Recommendations:     Tacos Dominguez is a 64 year old  is a 64 year old male admitted on 3/23/2024. 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 He then presented to [facility] ED on 3/13/24 and found to have Evidence of large bowel pneumatosis, anemia and scrotal swelling prompting transfer to [facility] and admitted from (3/14-3/17)w/ GI and CRS consultations who presents today from [facility] to the ED for evaluation of Hyperglycemia. Patient admitted to Medicine for further evaluation and care.  Assessment:   Unclear if pre diabetes or Diabetes Mellitus, or post pancreatectomy diabetes  Steroid induced hyperglycemia  Post  subtotal pancreatectomy for main duct IPMN (3/2022  Hyperglycemia, initial labs without DKA  Anemia with history of recent RBC transfusions  ESRD, on HD, last run on 3/22/2024    Plan/Recommendations:    - Primary team ordered  DKA insulin drip, would  repeat DKA labs in  4 hours after initial, if no DKA-- change to non DKA insulin drip  -If in DKA would initiate DKA protocol  -Consider consulting nephrology/cardiology to see if can give any fluids safely to dilute out glucose  - Diet per primary team, Novolog Meal Coverage: 1 units per 15 g CHO, TID AC and PRN with snacks/supplements   - Novolog Correction Scale: none with insulin drip  - BG monitoring:  Every hour on insulin drip     - Hypoglycemia protocol    - Carb counting protocol     Discharge Planning: (tentative)    Medications: TBD    Test Claims: To be done   Education: will definitely need education as new diabetic .    Outpatient Follow-up:  recommend [facility] Endocrinology vs PCP     Discussion:  Patient admitted with significant hyperglycemia without history of diabetes in himself or his family.  Post starting of steroids around 3/1/2024- A1c show a prediabetic but likely falsely low due to significant anemia.  Also, has had many RBC transfusions: last transfusion appear to be around 3/14/2024 and 2/29/2024.  To make the picture more cloudy he had a Post  subtotal pancreatectomy in 2022.  Will look again for DKA in 4 hours and then would change to non DKA insulin drip.  Patient not having any DKA symptoms including n,v,or abdominal pain.  Cannot get a C-peptide until he is no longer glucose toxic.      Thank you for this consult. IDS will continue to follow.      Please notify Inpatient Diabetes Service if changes are planned to steroids, nutrition, TPN/TF and anticipated procedures requiring prolonged NPO status.     Daniela Doran PA-C  Inpatient Diabetes Service  Pager   [phone]  Available by [employer]  Date of Service: 3/23/2024      To contact Inpatient Diabetes Service:     7 AM - 5 PM: Page the IDS [facility]RA following the patient that day (see filed or incomplete progress notes/consult notes under Endocrinology)    OR if uncertain of provider assignment: page job code [phone]    5 PM - 7 AM: First call  after hours is to primary service.    For urgent after-hours questions, page job code for on call fellow: [phone]     I spent a total of 85 minutes on the date of the encounter doing prep/post-work, chart review, history and exam, documentation and further activities per the note including lab review, multidisciplinary communication, counseling the patient and/or coordinating care regarding acute hyper/hypoglycemic management, as well as discharge management and planning/communication.  See note for details.

## 2024-03-23 NOTE — ED PROVIDER NOTES
ED Provider Note  [facility]      History     Chief Complaint   Patient presents with    Hyperglycemia     HPI  Tacos Dominguez is a 64 year old male with history of Crohn's disease status post colectomy on Stelara's, history of small bowel ulcer, chronic thrombocytopenia, severe mitral stenosis, severe tricuspid regurgitation, moderate aortic stenosis, end-stage renal disease on dialysis, hypertension, hyperlipidemia, GERD, recent sepsis related to colonic pneumatosis to the emergency department with hyperglycemia.  Patient went to the infusion center today for a blood transfusion of his hemoglobin of 6.5.  Pretransfusion labs reveal a blood sugar over 800.  The patient was referred to the emergency department for management.  Patient states that he last had his dialysis run yesterday.  He denies any increased abdominal pain.  No nausea or vomiting.  No chest pain.  He reports his baseline dyspnea.  He denies any swelling.  No fevers.  He has been on prednisone since the beginning of the month for his Crohn's disease.  He denies any other new medications in the past few days.     Past Medical History  Past Medical History:   Diagnosis Date    Aortic dissection (H)     distal, thin.  stable/chronic on MRCP 3/2022    Benign essential hypertension     CAD (coronary artery disease)     Cerebral infarction (H)     Crohn's colitis (H)     Crohn's disease of large intestine (H) 11/22/2021    Current smoker     Esophageal reflux     ESRD (end stage renal disease) on dialysis (H)     History of basal cell carcinoma     Hypertension     Mixed hyperlipidemia     NSTEMI (non-ST elevated myocardial infarction) (H)     PAF (paroxysmal atrial fibrillation) (H)      Past Surgical History:   Procedure Laterality Date    ABDOMEN SURGERY      x 6.  colon resections for crohn's disease    APPENDECTOMY      CHOLECYSTECTOMY      COLONOSCOPY N/A 07/20/2021    Procedure: COLONOSCOPY, WITH POLYPECTOMY AND  BIOPSY;  Surgeon: Eric Preston MD;  [facility]    COLONOSCOPY N/A 12/7/2022    Procedure: COLONOSCOPY, WITH POLYPECTOMY AND BIOPSY;  Surgeon: Willian Kee MD;  Location:  GI    COLONOSCOPY N/A 2/27/2024    Procedure: COLONOSCOPY;  Surgeon: Judson Woods MD;  Location: [facility]    CV CORONARY ANGIOGRAM N/A 05/25/2021    Procedure: CV CORONARY ANGIOGRAM;  Surgeon: Judson Ybarra MD;  [facility]    CV CORONARY ANGIOGRAM N/A 2/15/2024    Procedure: Coronary Angiogram;  Surgeon: Tico Finn MD;  Location:  HEART CARDIAC CATH LAB    CV LEFT HEART CATH N/A 2/15/2024    Procedure: Left Heart Catheterization;  Surgeon: Tico Finn MD;  Location:  HEART CARDIAC CATH LAB    CV RIGHT HEART CATH MEASUREMENTS RECORDED N/A 2/15/2024    Procedure: Right Heart Catheterization;  Surgeon: Tico Finn MD;  Location:  HEART CARDIAC CATH LAB    ESOPHAGOSCOPY, GASTROSCOPY, DUODENOSCOPY (EGD), COMBINED N/A 07/20/2021    Procedure: ESOPHAGOGASTRODUODENOSCOPY, WITH FINE NEEDLE ASPIRATION BIOPSY, WITH ENDOSCOPIC ULTRASOUND GUIDANCE;  Surgeon: Eric Preston MD;  Location:  GI    ESOPHAGOSCOPY, GASTROSCOPY, DUODENOSCOPY (EGD), COMBINED N/A 2/27/2024    Procedure: ESOPHAGOGASTRODUODENOSCOPY;  Surgeon: Judson Woods MD;  Location: [facility]    IR DIALYSIS FISTULOGRAM LEFT  12/01/2022    IR DIALYSIS FISTULOGRAM LEFT  1/13/2023    LAPAROTOMY, LYSIS ADHESIONS, COMBINED N/A 03/17/2022    Procedure: Laparotomy, extensive lysis adhesions, combined;  Surgeon: Sarath Smith MD;  [facility]    PANCREATECTOMY PARTIAL N/A 03/17/2022    Procedure: Open Subtotal Pancreatectomy, intra-op ultrasound;  Surgeon: Sarath Smith MD;  [facility]    REVISION FISTULA ARTERIOVENOUS UPPER EXTREMITY Left 2/14/2023    Procedure: LEFT UPPER ARM FISTULA OUTFLOW REVISION FROM CEPHALIC VEIN TO JUGULAR VEIN WITH 10mm THIN-WALLED RINGED  POLYTETRAFLUEROETHYLINE;  Surgeon: Mo Gramajo MD;  Location: [facility]    TRANSESOPHAGEAL ECHOCARDIOGRAM INTRAOPERATIVE N/A 1/11/2024    Procedure: ECHOCARDIOGRAM, TRANSESOPHAGEAL, WITH ANESTHESIA;  Surgeon: GENERIC ANESTHESIA PROVIDER;  [facility]    VASCULAR SURGERY      dialysis access- left upper     budesonide (ENTOCORT EC) 3 MG EC capsule  calcium acetate (CALPHRON) 667 MG TABS tablet  citalopram (CELEXA) 20 MG tablet  folic acid (FOLVITE) 1 MG tablet  lidocaine-prilocaine (EMLA) 2.5-2.5 % external cream  metroNIDAZOLE (FLAGYL) 500 MG tablet  multivitamin RENAL (MULTIVITAMIN RENAL) 1 MG capsule  omeprazole (PRILOSEC) 20 MG DR capsule  predniSONE (DELTASONE) 10 MG tablet  simvastatin (ZOCOR) 20 MG tablet  ustekinumab (STELARA) 90 MG/ML      Allergies   Allergen Reactions    Lisinopril Anaphylaxis and Other (See Comments)     Shortness of breath     Family History  Family History   Problem Relation Age of Onset    Breast Cancer Mother     Coronary Artery Disease Father     Hypertension Brother     Anesthesia Reaction No family hx of     Thrombosis No family hx of      Social History   Social History     Tobacco Use    Smoking status: Every Day     Packs/day: 0.25     Years: 50.00     Additional pack years: 0.00     Total pack years: 12.50     Types: Cigarettes     Passive exposure: Current    Smokeless tobacco: Never    Tobacco comments:     6-7 cigarettes daily   Substance Use Topics    Alcohol use: Not Currently     Comment: 2-3 drinks every 3  month    Drug use: Not Currently         A medically appropriate review of systems was performed with pertinent positives and negatives noted in the HPI, and all other systems negative.    Physical Exam   BP: 130/80  Pulse: 101  Temp: 97.5  F (36.4  C)  Resp: 16  SpO2: 99 %  Physical Exam  Vitals and nursing note reviewed.   Constitutional:       General: He is not in acute distress.     Appearance: He is not diaphoretic.   HENT:      Head: Normocephalic  and atraumatic.   Eyes:      General: No scleral icterus.     Pupils: Pupils are equal, round, and reactive to light.   Cardiovascular:      Rate and Rhythm: Normal rate and regular rhythm.      Pulses: Normal pulses.      Heart sounds: Normal heart sounds.   Pulmonary:      Effort: No respiratory distress.      Breath sounds: Normal breath sounds.   Abdominal:      General: Bowel sounds are normal.      Palpations: Abdomen is soft.      Tenderness: There is no abdominal tenderness.   Musculoskeletal:         General: No tenderness. Normal range of motion.   Skin:     General: Skin is warm.      Capillary Refill: Capillary refill takes less than 2 seconds.      Findings: No rash.   Neurological:      General: No focal deficit present.           ED Course, Procedures, & Data      Reviewed recent admission and discharge summary.  Reviewed infusion center notes from today.  Reviewed previous CBC, comprehensive metabolic panel, EKG    Procedures            EKG Interpretation:      Interpreted by JEANNA ROTHMAN MD, MD  Time reviewed: 1139  Symptoms at time of EKG: None   Rhythm: normal sinus   Rate: 97  Axis: Left Axis Deviation  Ectopy: premature ventricular contractions (infrequent)  Conduction: right bundle branch block (complete)  ST Segments/ T Waves: No acute ischemic changes  Q Waves: none  Comparison to prior: Unchanged from 3/14/24    Clinical Impression: no acute changes                Results for orders placed or performed during the hospital encounter of 03/23/24   Glucose by meter     Status: Abnormal   Result Value Ref Range    GLUCOSE BY METER POCT >600 (HH) 70 - 99 mg/dL   Results for orders placed or performed in visit on 03/23/24   Glucose by meter     Status: Abnormal   Result Value Ref Range    GLUCOSE BY METER POCT >600 (HH) 70 - 99 mg/dL   Prepare red blood cells (unit)     Status: None (Preliminary result)   Result Value Ref Range    Blood Component Type Red Blood Cells     Product Code R2441E40      Unit Status Ready for issue     Unit Number [id number]     CROSSMATCH Compatible     CODING SYSTEM [id number]    Prepare red blood cells (unit)     Status: None (Preliminary result)   Result Value Ref Range    Blood Component Type Red Blood Cells     Product Code I5915P72     Unit Status Ready for issue     Unit Number [id number]     CROSSMATCH Compatible     CODING SYSTEM [id number]    Results for orders placed or performed in visit on 03/23/24   Magnesium     Status: Abnormal   Result Value Ref Range    Magnesium 1.2 (L) 1.7 - 2.3 mg/dL   Basic metabolic panel     Status: Abnormal   Result Value Ref Range    Sodium 127 (L) 135 - 145 mmol/L    Potassium 4.7 3.4 - 5.3 mmol/L    Chloride 92 (L) 98 - 107 mmol/L    Carbon Dioxide (CO2) 21 (L) 22 - 29 mmol/L    Anion Gap 14 7 - 15 mmol/L    Urea Nitrogen 28.7 (H) 8.0 - 23.0 mg/dL    Creatinine 3.93 (H) 0.67 - 1.17 mg/dL    GFR Estimate 16 (L) >60 mL/min/1.73m2    Calcium 8.2 (L) 8.8 - 10.2 mg/dL    Glucose 822 (HH) 70 - 99 mg/dL   CRP inflammation     Status: Normal   Result Value Ref Range    CRP Inflammation 4.65 <5.00 mg/L   Hepatic function panel     Status: Abnormal   Result Value Ref Range    Protein Total 4.6 (L) 6.4 - 8.3 g/dL    Albumin 3.0 (L) 3.5 - 5.2 g/dL    Bilirubin Total 0.7 <=1.2 mg/dL    Alkaline Phosphatase 142 40 - 150 U/L    AST 15 0 - 45 U/L    ALT 21 0 - 70 U/L    Bilirubin Direct 0.40 (H) 0.00 - 0.30 mg/dL   CBC with platelets and differential     Status: Abnormal   Result Value Ref Range    WBC Count 5.9 4.0 - 11.0 10e3/uL    RBC Count 1.86 (L) 4.40 - 5.90 10e6/uL    Hemoglobin 6.6 (LL) 13.3 - 17.7 g/dL    Hematocrit 21.9 (L) 40.0 - 53.0 %     (H) 78 - 100 fL    MCH 35.5 (H) 26.5 - 33.0 pg    MCHC 30.1 (L) 31.5 - 36.5 g/dL    RDW 25.9 (H) 10.0 - 15.0 %    Platelet Count 109 (L) 150 - 450 10e3/uL    % Neutrophils 93 %    % Lymphocytes 2 %    % Monocytes 3 %    % Eosinophils 0 %    % Basophils 0 %    % Immature Granulocytes 2 %    NRBCs per  100 WBC 1 (H) <1 /100    Absolute Neutrophils 5.5 1.6 - 8.3 10e3/uL    Absolute Lymphocytes 0.1 (L) 0.8 - 5.3 10e3/uL    Absolute Monocytes 0.2 0.0 - 1.3 10e3/uL    Absolute Eosinophils 0.0 0.0 - 0.7 10e3/uL    Absolute Basophils 0.0 0.0 - 0.2 10e3/uL    Absolute Immature Granulocytes 0.1 <=0.4 10e3/uL    Absolute NRBCs 0.1 10e3/uL   Adult Type and Screen     Status: None   Result Value Ref Range    ABO/RH(D) A POS     Antibody Screen Negative Negative    SPECIMEN EXPIRATION DATE [id number]    PRA Single Antigen IgG Antibody     Status: None (In process)    Narrative    The following orders were created for panel order PRA Single Antigen IgG Antibody.  Procedure                               Abnormality         Status                     ---------                               -----------         ------                     PRA Single Antigen IgG A...[[id number]]                      In process                   Please view results for these tests on the individual orders.   Quantiferon-TB Gold Plus     Status: None (In process)    Specimen: Arm, Right; Blood    Narrative    The following orders were created for panel order Quantiferon-TB Gold Plus.  Procedure                               Abnormality         Status                     ---------                               -----------         ------                     Quantiferon TB Gold Plus...[[id number]]                      In process                 Quantiferon TB Gold Plus...[[id number]]                      In process                 Quantiferon TB Gold Plus...[[id number]]                      In process                 Quantiferon TB Gold Plus...[[id number]]                      In process                   Please view results for these tests on the individual orders.   CBC with Platelets & Differential     Status: Abnormal    Narrative    The following orders were created for panel order CBC with Platelets & Differential.  Procedure                                Abnormality         Status                     ---------                               -----------         ------                     CBC with platelets and d...[[id number]]  Abnormal            Final result                 Please view results for these tests on the individual orders.   ABO/Rh type and screen     Status: None    Narrative    The following orders were created for panel order ABO/Rh type and screen.  Procedure                               Abnormality         Status                     ---------                               -----------         ------                     Adult Type and Screen[[id number]]                            Final result                 Please view results for these tests on the individual orders.     Medications   dextrose 5% and 0.45% NaCl infusion (has no administration in time range)   dextrose 50 % injection 25-50 mL (has no administration in time range)   insulin regular (MYXREDLIN) 1 unit/mL infusion (has no administration in time range)     Labs Ordered and Resulted from Time of ED Arrival to Time of ED Departure   GLUCOSE BY METER - Abnormal       Result Value    GLUCOSE BY METER POCT >600 (*)    GLUCOSE MONITOR NURSING POCT   OSMOLALITY   KETONE BETA-HYDROXYBUTYRATE QUANTITATIVE, RAPID     No orders to display        Medications   dextrose 5% and 0.45% NaCl infusion (has no administration in time range)   dextrose 50 % injection 25-50 mL (has no administration in time range)   insulin regular (MYXREDLIN) 1 unit/mL infusion (has no administration in time range)        Critical care was not performed.     Medical Decision Making  The patient's presentation was of high complexity (a chronic illness severe exacerbation, progression, or side effect of treatment).    The patient's evaluation involved:  review of external note(s) from 2 sources (see separate area of note for details)  review of 3+ test result(s) ordered prior to this encounter (see separate area of note  for details)  ordering and/or review of 3+ test(s) in this encounter (see separate area of note for details)  independent interpretation of testing performed by another health professional (EKG interpreted by me with above results)  discussion of management or test interpretation with another health professional (admitting internal medicine triage physician)    The patient's management necessitated high risk (a decision regarding hospitalization).    Assessment & Plan    64 year old male with history of Crohn's disease and valvular heart disease with recent admission for sepsis and pneumatosis coli on prednisone for the majority of the month to the emergency department hyperglycemia.  He was found of a blood sugar of 822 at the infusion center today where he presented for a blood transfusion.  The patient did have hyperglycemia during his last hospitalization but only into the 200s.  He is not on any insulin.  He denies any other acute symptoms or acute illness.  He has an unrevealing physical examination.  He is a dialysis dependent renal failure patient.  He will be initiated on an insulin drip and admitted to the internal medicine service for further evaluation and management.  His EKG does not have any acute ischemic change.  Serum osmolality and ketones pending.    I have reviewed the nursing notes. I have reviewed the findings, diagnosis, plan and need for follow up with the patient.    New Prescriptions    No medications on file       Final diagnoses:   Hyperglycemia     Chart documentation was completed with Dragon voice-recognition software. Even though reviewed, this chart may still contain some grammatical, spelling, and word errors.     Isrrael Pollock Md    [facility] EMERGENCY DEPARTMENT  3/23/2024     Isrrael Pollock MD  03/23/24 9335

## 2024-03-23 NOTE — PROGRESS NOTES
"Pt came to [facility] today for 2 units PRBCS. Pt had labs and type and screen scheduled prior in 1st floor lab. Received call from lab with critical glucose of 822. Pt brought into appointment and checked vitals and obtained assessment. Pt alert and oriented x 3 and besides chronic fatigue feels \"ok\" today. Pt reports no other new symptoms. Pt is not diabetic and states he as never been told his glucose has been high. Pt last ate approx 5 hours prior to lab draw. Rechecked glucose with POC fingerstick and read critical HIGH. RN called RRT to assess to decide on emergent vs non-emergent transport to ED to be evaluated. Decided to call non-emergent transport to ED. Pt left [facility] at approx 10:30. Report called to ED. [employer] message sent to team with update.   "

## 2024-03-23 NOTE — MEDICATION SCRIBE - ADMISSION MEDICATION HISTORY
Medication Scribe Admission Medication History    Admission medication history is complete. The information provided in this note is only as accurate as the sources available at the time of the update.    Information Source(s): Patient via in-person    Pertinent Information:   Patient is prescribed Stelara 90 MG/ML inject 1 ml (90 mg) subcutaneous every  4 weeks per notation on the PTA list. 3/1/0224 was the last time patient was injected with this medication, but dispense report  in Epic noted medication last dispensed on 03/05/2024.  Patient prescribed prednisone 10 mg tablet take 4 tablets (40 mg) by mouth daily for 30 days medication dispensed 03/21/2024 to end 04/20/202. Patient has taken 16 tablets and has 104 tablets left. Patient is prescribed  metronidazole 500 mg tablet  take 1 tablet (500 mg) by mouth 3 times daily for 29 days started 3/17/2024 to  end 4/15/2024. Patient has taken 19 tablets and has 68 tablets to go.   Changes made to PTA medication list:  Added: None  Deleted: None  Changed: None    Allergies reviewed with patient and updates made in EHR: yes    Medication History Completed By: Ruth Smith 3/23/2024 4:35 PM    PTA Med List   Medication Sig Last Dose    budesonide (ENTOCORT EC) 3 MG EC capsule Take 3 capsules (9 mg) by mouth every morning 3/23/2024 at am    calcium acetate (CALPHRON) 667 MG TABS tablet Take 2,668 mg by mouth 3 times daily (with meals) 3/23/2024 at am    citalopram (CELEXA) 20 MG tablet Take 20 mg by mouth daily as needed (panic attacks) Unknown at as needed    folic acid (FOLVITE) 1 MG tablet Take 1 mg by mouth every morning  3/23/2024 at am    lidocaine-prilocaine (EMLA) 2.5-2.5 % external cream three times a week Prior to dialysis  site access on Monday, Wednesday, Friday 3/22/2024 at am    metroNIDAZOLE (FLAGYL) 500 MG tablet Take 1 tablet (500 mg) by mouth 3 times daily for 29 days 3/23/2024 at am    multivitamin RENAL (MULTIVITAMIN RENAL) 1 MG capsule Take 1  capsule by mouth every morning 3/23/2024 at am    omeprazole (PRILOSEC) 20 MG DR capsule Take 20 mg by mouth every morning  3/23/2024 at am    predniSONE (DELTASONE) 10 MG tablet Take 4 tablets (40 mg) by mouth daily for 30 days 3/23/2024 at am    simvastatin (ZOCOR) 20 MG tablet Take 20 mg by mouth every morning  3/23/2024 at am    ustekinumab (STELARA) 90 MG/ML Inject 1 ml ( 90 mg) subcutaneous every 4 weeks. (Patient taking differently: Inject 90 mg Subcutaneous every 28 days Inject 1 ml ( 90 mg) subcutaneous every 4 weeks. Last dose 3/1/24) 3/1/2024 at unknown

## 2024-03-23 NOTE — ED TRIAGE NOTES
BIBA from transfusion clinic, was there to be transfused for low HGB, when labs were drawn pts blood sugar was 802mg/dL.      Triage Assessment (Adult)       Row Name 03/23/24 1059          Triage Assessment    Airway WDL WDL        Respiratory WDL    Respiratory WDL WDL        Skin Circulation/Temperature WDL    Skin Circulation/Temperature WDL WDL        Cardiac WDL    Cardiac WDL WDL        Peripheral/Neurovascular WDL    Peripheral Neurovascular WDL WDL        Cognitive/Neuro/Behavioral WDL    Cognitive/Neuro/Behavioral WDL WDL

## 2024-03-24 NOTE — PROVIDER NOTIFICATION
03/23/24 2158   Call Information   Date of Call 03/23/24   Time of Call 2137   Name of person requesting the team Rylie   Title of person requesting team RN   RRT Arrival time 2142   Time RRT ended 2155   Reason for call   Type of RRT Adult   Primary reason for call Sepsis suspected   Sepsis Suspected Elevated Lactate level   Was patient transferred from the ED, ICU, or PACU within last 24 hours prior to RRT call? No   SBAR   Situation Lactic acid 4.6   Background Per MD notes 64 year old male admitted on 3/23/2024. 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 He then presented to [facility] ED on 3/13/24 and found to have Evidence of large bowel pneumatosis, anemia and scrotal swelling prompting transfer to [facility] and admitted to (3/14-3/17)w/ GI and CRS consultations who presents from [facility] center to the ED for evaluation of Hyperglycemi   Notable History/Conditions Hypertension;Cardiac;Congestive heart failure;Organ failure;End-Stage disease   Assessment Patient A&Ox4. conversing and pleasant, good historian. Has some leg swelling noted, denies pain. Otherwise VSS   Interventions Blood glucose;Labs   Patient Outcome   Patient Outcome Stabilized on unit   RRT Team   Attending/Primary/Covering Physician Gold   Date Attending Physician notified 03/23/24   Time Attending Physician notified 2130   Physician(s) Annette JACKSON   Lead RN Madhavi Youngblood   RT Bill   Post RRT Intervention Assessment   Post RRT  Assessment Stable/Improved   Date Follow Up Done 03/24/24   Time Follow Up Done 9656

## 2024-03-24 NOTE — PROGRESS NOTES
HEMODIALYSIS TREATMENT NOTE    Date: 3/23/2024  Time: 8:36 PM    Data:  Pre Wt: 70.5 kg (155 lb 6.8 oz)   Desired Wt:  67.5 kg   Post Wt: 67.9 kg (149 lb 11.1 oz)  Weight change: 2.6 kg  Ultrafiltration - Post Run Net Total Removed (mL): 2600 mL  Vascular Access Status: Fistula  patent  Dialyzer Rinse: Clear  Total Blood Volume Processed: 56.21 L   Total Dialysis (Treatment) Time: 2 hrs 13mins   Dialysate Bath: K 2, Ca 3  Heparin: None    Lab:   No    Interventions:  ESRD Pt. Cannulated with 15g needle X 2 without lido inj @ . Pt remain on insulin drip, and insulin coverage administered per alg. At 1930, BG was 184, writer confirmed with PCN (Rylie) on insulin dosage, pt was switched from alg 2 to 1, and was started on D5 and 0.45% NaCl infusion per order. Pt was transfused with 2 PRBC without any transfusion reaction. Ended tx 17mins early per pt request d/t bathroom use. Unable to obtain stool sample as pt already went before bringing Stool collection Hat. Pt rinsed back post tx, needles were pulled, new dressings applied, and sites were held for about 10mins to help achieve hemostasis. Hand off report was given to JR Youngblood.    Assessment:  -Calm & Cooperative  -VSS  -A & O X 4     Plan:    Per renal

## 2024-03-24 NOTE — PROGRESS NOTES
Hospital Medicine  CBC WBC 17K, lactic acid normal.  BP (!) 131/97   Pulse 85   Temp 97.4  F (36.3  C) (Oral)   Resp 20   SpO2 100%    Plan:  continue close observation and monitoring.  Alex Damon MD

## 2024-03-24 NOTE — PROGRESS NOTES
Inpatient Diabetes Management Service: Daily Progress Note     HPI: Tacos Dominguez is a 64 year old  is a 64 year old male admitted on 3/23/2024. 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 He then presented to OSH ED on 3/13/24 and found to have Evidence of large bowel pneumatosis, anemia and scrotal swelling prompting transfer to [facility] and admitted from (3/14-3/17)w/ GI and CRS consultations who presents today from infusion center to the ED for evaluation of Hyperglycemia. Patient admitted to Medicine for further evaluation and care.           Assessment/Plan:       Assessment:   Unclear if pre diabetes or Diabetes Mellitus, or post pancreatectomy diabetes or just steroid induced hyperglycemia  Steroid induced hyperglycemia  Post  subtotal pancreatectomy for main duct IPMN (3/2022  Hyperglycemia, labs x2 without DKA  Anemia with history of recent RBC transfusions, received 2 units on 3/23/2024  ESRD, on HD, last run on 3/22/2024  Stress induced hyperglycemia with elevated LA and elevated WBC    Addendum:  patient was going to go off the insulin drip and his PH=737.  Unclear why RN not changing the insulin drip with the increasing BG, drip remained at 0.5 units per hour.  Went down to the ER as could not reach RN by Steven.  Asked her to restart the insulin drip.  She is having trouble with IV access so she is going to give a one time dose of Novolog and then restart the IV insulin  soon.  Hold Novolog correction scale but continue carb correction even on the insulin drip.  Patient is informed of the change and understands the reason for the change.  Contacted primary team as well     Plan/Recommendations:    - NPH 7 units to cover the prednisone, so give when give dose of prednisone(hold if prednisone is held)--will likely need to increase this.  0.15 x70kg=10 units  -Give lantus 10 units at 13:00 and then 2 hours later stop the insulin drip   -Stop Non DKA insulin 2 hours after lantus is given  -- Change regular diet to high consistent carb diet  -Increase Novolog Meal Coverage: 1 units per 10 g CHO, TID AC and PRN with snacks/supplements--> PLEASE  give even when on insulin drip-- will keep insulin drip at a more even level  - Increase Novolog Correction Scale:  1/25 with meals and  1/50 at bedtime-- since  prednisone will have worn off  - BG monitoring:  Every hour on insulin drip---> change to before meals, bedtime and 5 am x1 tomorrow     - Hypoglycemia protocol    - Carb counting protocol      Discharge Planning: (tentative)    Medications: TBD    Test Claims: Submitted test claim for meter, strips, NPH, Novolog and lantus   Education: will definitely need education as new diabetic.  Will put in order today.  Let KHADRA and RN know that there are no CDE available on the weekend.  Put in order for CDE tomorrow  Outpatient Follow-up:  recommend [facility] Endocrinology vs PCP      Discussion:  Ran about average of 0.875 units overnight with just prednisone 10 mg in the background.  So 10 units of lantus.  0.15 x 70 kg for NPH dose.  Looks like this afternoon, under dosed.  Plan was to stop the drip but BG trended up.    Outpatient Follow-up: recommend [facility] Endocrinology vs PCP     Please notify Inpatient Diabetes Service if changes are planned to steroids, nutrition, TPN/TF and anticipated procedures requiring prolonged NPO status.         Interval History/Review of Systems :   The last 24  "hours progress and nursing notes reviewed.    Patient doing well.  He is tolerating the finger sticks.  He did eat 250g of cho this am and is still snacking per RN.I discussed a consistent carb diet with him.  No nausea or emesis.  Always diarrhea with the Crohn's. Feels less sob after 2 units RBC  elevated LA overnight.WBC is up--> could be cause of elevated BG as well?.    RN having a hard time with finger sticks because patient so edematous.  Need to =put hot pack on fingers as they are so cold.  She is going to ask about a midline.    Planned Procedures/Surgeries: echo today    Inpatient Glucose Control:       Recent Labs   Lab 03/24/24  1423 03/24/24  1138 03/24/24  0937 03/24/24  0748 03/24/24  0705 03/24/24  0629   * 292* 237* 147* 142* 148*             Medications Impacting Glycemia:   Steroids:  Change prednisone to 40 mg daily instead of 10mg 4 times daily   D5W-containing solutions/medications: none   Other medications impacting glucose: none         Nutrition:   Orders Placed This Encounter      High Consistent Carb (75 g CHO per Meal) Diet  Changed to ml carb diet from regular diet  Supplements: none   TF: none   TPN: none         Diabetes History: see full consult note for complete diabetes history   Diabetes Type and Duration: not known to be diabetic    Last A1c: 6.4 on 3/23/2024    Hemoglobin at time of last A1c: 6.6  RBC transfusion in past 3 months: yes     PTA Medication Regimen: none  Historical Diabetes Medications: none    Glucose monitoring device and frequency: none  Outpatient Diabetes Provider: none        Physical Exam:   /83   Pulse 85   Temp 97.4  F (36.3  C) (Oral)   Resp 20   Ht 1.803 m (5' 11\")   Wt 73.3 kg (161 lb 9.6 oz)   SpO2 100%   BMI 22.54 kg/m    General:  chronic ill appearing, in no acute distress.  HEENT:  NC/AT. MMM, sclera not injected  Lungs:  unremarkable,frequent cough, some dyspnea, remains on room air  ABD:  rounded, soft  Skin:  warm and dry, no " obvious lesions  MSK:   moving all extremities  Lymp:   bilateral LE edema  Mental Status:  Alert and oriented x3  Psych:   Cooperative, good eye contact, full/appropriate affect           Data:     Lab Results   Component Value Date    A1C 6.4 03/23/2024    A1C 6.1 03/19/2024    A1C 5.8 03/14/2024       ROUTINE IP LABS (Last four results)  BMP  Recent Labs   Lab 03/24/24  1423 03/24/24  1138 03/24/24  0937 03/24/24  0748 03/24/24  0705 03/23/24  2135 03/23/24 2052 03/23/24  0949 03/23/24  0759 03/19/24  1021   NA  --   --   --   --  133*  --  135  --  127* 138   POTASSIUM  --   --   --   --  3.7  --  3.3*  --  4.7 4.5   CHLORIDE  --   --   --   --  96*  --  94*  --  92* 101   KARY  --   --   --   --  9.0  --  9.1  --  8.2* 7.7*   CO2  --   --   --   --  24  --  24  --  21* 24   BUN  --   --   --   --  23.9*  --  18.7  --  28.7* 37.8*   CR  --   --   --   --  3.53*  --  2.83*  --  3.93* 4.58*   * 292* 237* 147* 142*   < > 181*   < > 822* 326*    < > = values in this interval not displayed.     CBC  Recent Labs   Lab 03/24/24  0705 03/24/24  0013 03/23/24  0759 03/19/24  1021   WBC 20.4* 17.2* 5.9 9.1   RBC 2.95* 2.67* 1.86* 1.90*   HGB 9.9* 9.1* 6.6* 6.5*   HCT 30.8* 27.6* 21.9* 21.3*   * 103* 118* 112*   MCH 33.6* 34.1* 35.5* 34.2*   MCHC 32.1 33.0 30.1* 30.5*   RDW 27.5* 26.2* 25.9* 26.0*   * 95* 109* 61*     INR  Recent Labs   Lab 03/19/24  1021   INR 1.46*       Inpatient Diabetes Service will continue to follow, please don't hesitate to contact the team with any questions or concerns.     Daniela Doran PA-C  Inpatient Diabetes Service  Pager   [phone]  Available by [employer]  Date of Service: 3/24/2024      Plan discussed with patient in person, bedside RN in person, and primary team via phone/Open Me.    To contact Inpatient Diabetes Service:     7 AM - 5 PM: Page the IDS KHADRA following the patient that day (see filed or incomplete progress notes/consult notes under Endocrinology)    OR if  uncertain of provider assignment: page job code [phone]    5 PM - 7 AM: First call after hours is to primary service.    For urgent after-hours questions, page job code for on call fellow: [phone]     I spent a total of 50 minutes on the date of the encounter doing prep/post-work, chart review, history and exam, documentation and further activities per the note including lab review, multidisciplinary communication, counseling the patient and/or coordinating care regarding acute hyper/hypoglycemic management, as well as discharge management and planning/communication.  See note for details.

## 2024-03-24 NOTE — PROGRESS NOTES
[facility]    Medicine Progress Note - Hospitalist Service, GOLD TEAM 6    Date of Admission:  3/23/2024    Assessment & Plan   Tacos Dominguez is a 64 year old male admitted on 3/23/2024. 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 He then presented to OSH ED on 3/13/24 and found to have Evidence of large bowel pneumatosis, anemia and scrotal swelling prompting transfer to [facility] and admitted to (3/14-3/17)w/ GI and CRS consultations who presents from infusion center to the ED for evaluation of Hyperglycemia. Patient admitted to Medicine for further evaluation and care.     Changes today:  - insert midline  - start mirtazapine and stop citalopram per psychiatry   - start cefepime      Hyperglycemia  Presents from clinic w/ BG 800s. No DM hx. Hx of subtotal pancreatectomy (3/2022), steroid use and significant stress related to acute on chronic medical. Ketones less than 0.18, VBG 7.28/50/27/23. Infectious workup as below. Stated he was started on prednisone for his Crohn's disease about 1 month ago. Used prednisone a few years before without issue. HgbA1c 6.4% on 3/23/24; although difficult to interpret given history of blood transfusions.   - endocrine consulted, appreciate assistance   - insulin drip   - novolog coverage 1 unit per 15g CHO TID, AC, PRN  - regular diet   - hourly glucose checks while on insulin drip      Type IV acute respiratory failure 2/2 acute on  chronic HFpEF (LVEF 55%) - improved   Lactic acidosis - resolved   Severe sepsis with c/f pneumonia vs. Fluid overload   Bilateral LE and UE edema. Reports feeling volume overload and requesting O2 for comfort. Aneuric. BNP 27,076, lactic 4.6, WBC 17.2. CXR streaky perihilar opacities likely representing edema vs. Atelectasis vs. Atypical infection; no focal consolidative opacity. Echo obtained 2/23 showing LVEF 55-60%, severe mitral insufficiency, mild to moderate tricuspid insuffiencey. HD run 3/23, 2.6L removed. Upon exam today, patient's breathing improved and no longer requesting oxygen.   - start cefepime   - repeat labs in the morning  - continue to monitor     ESRD on HD  Unknown etiology. Access LUE AVG. Last complete run 3/22/24. K WNL.    - Nephrology consulted, appreciate assistance    - HD on [facility]    - received additional run on Saturday (3/23) as he received blood and started on insulin drip    - favor transfusion on HD days w/ Hgb goal of 8.0 in setting of significant cardiac hx  - Daily weights  - Daily BMP, Mg, Phos     Transfusion dependent, acute blood loss Anemia  Thought s/t ongoing Crohn's flare. Recent EGD and colonoscopy with bleeding noted in the ileum; s/p 4 units PRBC Hgb 6.6 this AM, was to receive PRBC, though sent from clinic for eval of Hyperglycemia. Would have transfusion goal of 8.0 w/ significant cardiac hx. Received 2 units PRBCs on 3/24. Repeat Hgb 9.1.  - Transfusion goal of 8.0 in setting of significant cardiac hx, though favor transfusion during HD for better volume control.   - continue to monitor      Pseudo Hyponatremia  Na 127 on admission. Corrected for Hyperglycemia ~ 142.   - Repeat BMP     Elevated troponin  No CP noted on admission. Troponin  -> 174. EKG sinus rhythm w/ bifascicular block- similar to previous.  - continue to monitor      Moderate Aortic valve stenosis  Severe Mitral insufficiency  Non-obstructive CAD  Pulmonary HTN  Hx of NSTEMI  HTN  BP stable  on admission. Recent ECHO Global and regional left ventricular function is normal with an EF of 55-60%; Global right ventricular function is mildly reduced.Severe mitral insufficiency is present (ERO 0.53 cm2 and MR volume 75 ml).Cause of MR is severe mitral annular calcification.Mitral valve gradient is increased, 9mmHg at 94bpm, but is likely driven by increased volume from MR. The mitral valve area is 2.0 cm^2 by planimetry.Moderate aortic stenosis.Moderate pulmonary hypertension. Right ventricular systolic pressure is 64mmHg above the right atrial pressure. Follows with Cardiology and CVTC w/ plan for Mitral valve replacement- has eleazar pushed back d/t ongoing ABL and Crohn's flare.  - Follow up with Cardiology as scheduled   - Consider Cardiology consult on admission      Hx of Crohn's disease w/ persistent Crohn's flare:  MRE 3/2024 w/ acute Crohn's disease; dilated loops of small bowel w/ associated transition point Was previously maintained on Stelara (q8 weeks; last dose 3/1/24) though with active disease, will transition to Remicade and resume steroids- 40 mg daily. Follows with [facility] with recent clinic visit- plan as above  - PTA 40 mg (10 mg QID); this was changed to 40 mg once daily as to better control blood glucose in conjunction with endocrine   - Continue Budesonide 9 mg daily   - Follow up with GI  - reach out to GI clinic regarding reasoning for QID dosing and if we can keep prednisone once daily going forward or if we will need to transition back to QID upon better control of glucose      Recent large bowel pneumatosis  Per chart review- incidental finding on imaging. Started on steroids and flagyl. Maintains on daily flagyl. Steroids discontinued on discharge 3/17/24. Plan for 1 month follow up w/ CT.   - Continue Flagyl daily- 30 day course, per chart review  - Follow up with Imaging as scheduled    Renal mass  Finding on recent MRE (- Increasingly complex exophytic cyst along the lower pole of  the left kidney with internal hemorrhagic/proteinaceous contents as well as increasing peripheral wall and septal thickening. 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 Recommend continued attention on 3-6 month follow-up exam as well as urological consultation.  - Follow up with Urology as scheduled     Anxiety  PTA citalopram. Significant in setting of acute on chronic medical needs, including upcoming cardiac surgery. Requested to see psychiatry while hospitalized.    - Psychiatry consulted, appreciate assistance   - discontinue citalopram d/t borderline Qtc    - start mirtazapine 7.5 mg at bedtime    - follow up with PCP at discharge   - continue IV ativan TID PRN      Prolonged Qtc  Qtc 492; improved from prior (518)  - Minimize use of Qtc prolonging medications      Tobacco abuse: Current 0.25 ppd smoker   - nicotine Patch  - Recommend cessation          Diet: High Consistent Carb (75 g CHO per Meal) Diet    DVT Prophylaxis: Pneumatic Compression Devices  Alcantara Catheter: Not present  Lines: None     Cardiac Monitoring: None  Code Status: Full Code      Clinically Significant Risk Factors        # Hypokalemia: Lowest K = 3.3 mmol/L in last 2 days, will replace as needed  # Hyponatremia: Lowest Na = 127 mmol/L in last 2 days, will monitor as appropriate    # Hypomagnesemia: Lowest Mg = 1.2 mg/dL in last 2 days, will replace as needed   # Hypoalbuminemia: Lowest albumin = 3 g/dL at 3/23/2024  7:59 AM, will monitor as appropriate   # Thrombocytopenia: Lowest platelets = 95 in last 2 days, will monitor for bleeding   # Hypertension: Noted on problem list            # Financial/Environmental Concerns:    # Support System: poor social support noted in nursing assessment         Disposition Plan     Expected Discharge Date: 03/25/2024                  The patient's care was discussed with the Attending Physician, Dr. Aguirre,  Bedside Nurse, and Patient.    Madhavi Morrow NP  Hospitalist Service, GOLD TEAM [address] [place] [address]  Securely message with [employer] (more info)  Text page via [employer] Paging/Directory   See signed in provider for up to date coverage information  ______________________________________________________________________    Interval History   Scar was seen sitting on the edge of bed in the ER. He says that his breathing is much improved from yesterday. He shares that he's been on prednisone before and didn't have an issue with it. He says that he's looking forward with speaking to a psychiatrist later on today. He says that ativan has worked for him in the past, large amount of anxiety/panic attacks largely stemming from finding out he needs heart surgery. No further questions or concerns for me at this time.     Physical Exam   Vital Signs: Temp: 97.4  F (36.3  C) Temp src: Oral BP: 131/83 Pulse: 85   Resp: 20 SpO2: 100 % O2 Device: None (Room air)    Weight: 161 lbs 9.55 oz    GENERAL: Alert and awake. Answering questions appropriately. Oriented x 3. NAD. Pleasant and conversational   HEENT: Anicteric sclera. EOMI. Mucous membranes moist   CARDIOVASCULAR: RRR. S1, S2. Significant systolic murmur   RESPIRATORY: Effort normal on RA. Clear to auscultation bilaterally, no rales, rhonchi or wheezes  GI: Abdomen soft, non-tender abdomen without rebound or guarding, normoactive bowel sounds present  MUSCULOSKELETAL: No joint swelling or tenderness. Moves all extremities.   EXTREMITIES: +4 bilateral lower extremity pitting edema. Unable to palpate pedal pulses d/t edema. No calf asymmetry, erythema, or tenderness.   NEUROLOGICAL: No focal deficits. CN II-XII grossly intact. Moving all extremities symmetrically.   SKIN: Intact. Warm and dry. No jaundice. No rashes on exposed skin   PSYCH: Affect appropriate     Medical Decision Making       60 MINUTES SPENT BY ME on the date of service  doing chart review, history, exam, documentation & further activities per the note.      Data   Imaging results reviewed over the past 24 hrs:   Recent Results (from the past 24 hour(s))   Echo Complete   Result Value    LVEF  60-65%    [facility]    [id number]  [id number]  [id number]  [id number]^SHEILATANISHA^ETHAN^BRENDON     [facility]  Echocardiography Laboratory  [address] [place] [address]     Name: KRAIG CHAUDHARI  MRN: [id number]  : 1960  Study Date: 2024 07:32 AM  Age: 64 yrs  Gender: Male  Patient Location: [facility]  Reason For Study: CHF  Ordering Physician: ETHAN WHYTE  Performed By: Domonique Day RDCS     BSA: 1.9 m2  Height: 71 in  Weight: 155 lb  BP: 131/83 mmHg  ______________________________________________________________________________  Procedure  Echocardiogram with two-dimensional, color and spectral Doppler performed.  ______________________________________________________________________________  Interpretation Summary  Global and regional left ventricular function is normal with an EF of 60-65%.  Global right ventricular function is borderline reduced. The right ventricle  is normal size.  Severe mitral insufficiency is present. The etiology is MAC causing posterior  leaflet restriction/failure of coaptation. There is also a severely elevated  mitral gradient, in part due to the severity of the regurgitation.  Mild to moderate tricuspid insufficiency is present.  The estimated PA systolic pressure is 65 mmHg.  IVC diameter <2.1 cm collapsing >50% with sniff suggests a normal RA pressure  of 3 mmHg.  This study was compared with the study from 2024. No significant  changes. The LV function was underestimated on the last study.  ______________________________________________________________________________  Left Ventricle  Global and regional left ventricular function is normal with an EF of 60-65%.  Left ventricular wall thickness is  normal. Left ventricular size is normal.  Diastolic function not assessed due to significant valvular regurgitation.     Right Ventricle  The right ventricle is normal size. Global right ventricular function is  borderline reduced.     Atria  The right atria appears normal. Mild to moderate left atrial enlargement is  present.     Mitral Valve  Moderate mitral annular calcification is present. Severe mitral insufficiency  is present. The etiology is MAC causing posterior leaflet restriction/failure  of coaptation. The mean gradient is 11 mmHg at 122 bpm.     Aortic Valve  The aortic valve is tricuspid. Mild aortic insufficiency is present.     Tricuspid Valve  Mild to moderate tricuspid insufficiency is present. The right ventricular  systolic pressure is 62mmHg above the right atrial pressure.     Pulmonic Valve  The pulmonic valve cannot be assessed.     Vessels  The aorta root cannot be assessed. The thoracic aorta cannot be assessed. IVC  diameter <2.1 cm collapsing >50% with sniff suggests a normal RA pressure of 3  mmHg.     Pericardium  No pericardial effusion is present.     Compared to Previous Study  This study was compared with the study from 2024 . No significant  changes. The LV function was underestimated on the last study.  ______________________________________________________________________________  MMode/2D Measurements & Calculations  IVSd: 0.76 cm  LVIDd: 6.4 cm  LVIDs: 3.6 cm  LVPWd: 0.99 cm  FS: 43.7 %  LV mass(C)d: 229.5 grams  LV mass(C)dI: 121.3 grams/m2  LVOT diam: 2.0 cm  LVOT area: 3.1 cm2  RWT: 0.31     Doppler Measurements & Calculations  MV E max maddy: 223.5 cm/sec  MV A max maddy: 190.0 cm/sec  MV E/A: 1.2  MV max P.5 mmHg  MV mean P.0 mmHg  MV V2 VTI: 48.6 cm  MVA(VTI): 1.5 cm2  MV dec slope: 429.0 cm/sec2  MV dec time: 0.30 sec  Ao V2 max: 326.0 cm/sec  Ao max P.5 mmHg  Ao V2 mean: 227.0 cm/sec  Ao mean P.0 mmHg  Ao V2 VTI: 56.8 cm  LAURA(I,D): 1.3  cm2  LAURA(V,D): 1.4 cm2  LV V1 max P.3 mmHg  LV V1 max: 144.0 cm/sec  LV V1 VTI: 22.9 cm  SV(LVOT): 71.9 ml  SI(LVOT): 38.0 ml/m2  AV Josué Ratio (DI): 0.44     LAURA Index (cm2/m2): 0.67     ______________________________________________________________________________  Report approved by: Brain Rey 2024 11:01 AM           Recent Labs   Lab 24  1138 24  0937 24  0748 24  0705 24  0055 24  0013 24  2135 24  2052 24  0949 24  0759 24  1021   WBC  --   --   --  20.4*  --  17.2*  --   --   --  5.9 9.1   HGB  --   --   --  9.9*  --  9.1*  --   --   --  6.6* 6.5*   MCV  --   --   --  104*  --  103*  --   --   --  118* 112*   PLT  --   --   --  112*  --  95*  --   --   --  109* 61*   INR  --   --   --   --   --   --   --   --   --   --  1.46*   NA  --   --   --  133*  --   --   --  135  --  127* 138   POTASSIUM  --   --   --  3.7  --   --   --  3.3*  --  4.7 4.5   CHLORIDE  --   --   --  96*  --   --   --  94*  --  92* 101   CO2  --   --   --  24  --   --   --  24  --  21* 24   BUN  --   --   --  23.9*  --   --   --  18.7  --  28.7* 37.8*   CR  --   --   --  3.53*  --   --   --  2.83*  --  3.93* 4.58*   ANIONGAP  --   --   --  13  --   --   --  17*  --  14 13   KARY  --   --   --  9.0  --   --   --  9.1  --  8.2* 7.7*   * 237* 147* 142*   < >  --    < > 181*   < > 822* 326*   ALBUMIN  --   --   --   --   --   --   --   --   --  3.0* 2.7*   PROTTOTAL  --   --   --   --   --   --   --   --   --  4.6* 4.4*   BILITOTAL  --   --   --   --   --   --   --   --   --  0.7 0.8   ALKPHOS  --   --   --   --   --   --   --   --   --  142 109   ALT  --   --   --   --   --   --   --   --   --  21 22   AST  --   --   --   --   --   --   --   --   --  15 21    < > = values in this interval not displayed.

## 2024-03-24 NOTE — PROGRESS NOTES
"Message sent to [facility]AD[facility] \"Steven Rosa or [facility] Chat ED 26 Requesting IV lorazepam to help with anxiety. Pt. also wondering if he can have a dose now and one before bed to help with sleep? Thanks!\"  "

## 2024-03-24 NOTE — PROGRESS NOTES
2 RN skin assessment completed by Bolivar LEMONS & Barbara LOPES On L upper extremity fistula going from clavicle down his whole arm.  Fingers are almost blue because they are so cold, sensation intact.  RLQ abdominal hernia and old incision center abdomen.  Meatal edema.  Bilateral lower extremity from upper thigh to feet- pitting edema- worse than normal per patient.  Dry skin to feet and heels.

## 2024-03-24 NOTE — CODE/RAPID RESPONSE
Rapid response was called for pt at 2135 due to Lactic Acid resulting as 4.6. Provider (RENETTA Dixon) notified at 2130. Plan to recheck Lactic & CBC at a later time.      Rylie Trimble, BARBARAN  Shift: [id number] [phone]

## 2024-03-24 NOTE — CODE/RAPID RESPONSE
Rapid Response Team Note    Assessment   In assessment a rapid response was called on [place] [facility] due to SIRS/Sepsis trigger and lactic acidosis. This presentation is likely due to anemia, hyperglycemia with associated metabolic acidosis, ESRD.      Plan   -  Blood cultures pending x2  -  Obtain UA/UC if able  -  Pending enteric, Cdiff testing  -  Stat recheck CBC post transfusion  -  Recheck lactic at 2300  -  The Internal Medicine primary team was able to be reached and they are in agreement with the above plan.  -  Disposition: The patient will remain on the current unit. We will continue to monitor this patient closely.  -  Reassessment and plan follow-up will be performed by the primary team    Annette Benitez PA-C  [facility] [place] RRT [facility] Job Code Contact #[phone]  [facility] Paging/Directory    Hospital Course   Brief Summary of events leading to rapid response:   Triggered sepsis, lactic 4.6. Received 2u pRBCs with dialysis prior to lactic check. Reports feeling better at this time - denies any shortness of breath or cough, no abdominal pain, no dysuria. Vitals stable afebrile. Came in with hyperglycemia with associated metabolic acidosis without DKA in setting of no hx of DM. No prior lactic available for comparison since admission.     Admission Diagnosis:   Hyperglycemia [R73.9]    Physical Exam   Temp: 97.4  F (36.3  C) Temp  Min: 97.2  F (36.2  C)  Max: 97.7  F (36.5  C)  Resp: 20 Resp  Min: 16  Max: 22  SpO2: 100 % SpO2  Min: 99 %  Max: 100 %  Pulse: 81 Pulse  Min: 79  Max: 101    No data recorded  BP: 124/55 Systolic (24hrs), Av , Min:81 , Max:141   Diastolic (24hrs), Av, Min:55, Max:103     I/Os: No intake/output data recorded.     Exam:   General: Awake. Alert and oriented x3. No acute distress.  CV: RRR. Bilateral lower extremity pitting edema.  Pulm: Normal work of breathing on room air. Lungs CTAB. No wheezes.  Mental Status: Baseline.     Significant Results and Procedures   Lactic  Acid:   Recent Labs   Lab Test 03/23/24  2052 03/15/24  1452 03/15/24  1254   LACT 4.6* 2.4* 2.1*     CBC:   Recent Labs   Lab Test 03/23/24  0759 03/19/24  1021 03/17/24  0704 03/16/24  0639   WBC 5.9 9.1  --  12.0*   HGB 6.6* 6.5* 8.0* 7.3*   HCT 21.9* 21.3*  --  22.6*   * 61*  --  62*        Sepsis Evaluation   The patient is not known to have an infection.  NO EVIDENCE OF SEPSIS at this time.  Vital sign, physical exam, and lab findings are due to anemia, hyperglycemia/metabolic acidosis.

## 2024-03-24 NOTE — CONSULTS
Initial Psychiatric Consult   Consult date: March 24, 2024         Reason for Consult, requesting source:    Reason for Consult: anxiety, trouble sleeping  Requesting source: Philly Aguirre    Labs and imaging reviewed.     Total time spent in chart review, patient interview and coordination of care; 80 mintues         HPI:   Tacos Dominguez is a 64 year old male with past psychiatric history of panic attacks and medical history of Crohn's disease status post colectomy on Stelara's, history of small bowel ulcer, chronic thrombocytopenia, severe mitral stenosis, severe tricuspid regurgitation, moderate aortic stenosis, end-stage renal disease on dialysis, hypertension, hyperlipidemia, GERD, recent sepsis related to colonic pneumatosis to the emergency department with hyperglycemia. Psychiatry consulted for anxiety and trouble sleeping.     Scar reports a long history of anxiety and panic attacks that worsen when his health worsens or with upcoming procedures. Says recently his anxiety has been worse because he has an upcoming cardiac procedure in April. Says he has panic attacks about 4 times per week. Notably, says he takes his citalopram PRN when he has panic attacks, so is taking 4x per week. Reports this has been helpful. Counseled this class of medication should be taken daily to help with anxiety, not PRN. Will discontinue citalopram due to cardiac disease and borderline elevated QTc (has RBBB, though does have history of prolonged QTc as well). He is agreeable to start mirtazapine for mood, anxiety, sleep. Denies any SI.         Past Psychiatric History:   Past Diagnoses: Panic attacks  Medication Trials: Citalopram, using PRN  Past/Current Providers: None currently  Psychiatric Hospitalizations: None  Suicide Attempts: None  Self Injury: None  History of Trauma: Denies  Past Psychosis: None  Past Lindsay: None  Outpatient Programs: None  Civil Commitment: None  ECT: None        Substance Use and History:      Denies any significant use or history     Social History     Tobacco Use    Smoking status: Every Day     Packs/day: 0.25     Years: 50.00     Additional pack years: 0.00     Total pack years: 12.50     Types: Cigarettes     Passive exposure: Current    Smokeless tobacco: Never    Tobacco comments:     6-7 cigarettes daily   Substance Use Topics    Alcohol use: Not Currently     Comment: 2-3 drinks every 3  month           Past Medical History:   PAST MEDICAL HISTORY:   Past Medical History:   Diagnosis Date    Aortic dissection (H)     distal, thin.  stable/chronic on MRCP 3/2022    Benign essential hypertension     CAD (coronary artery disease)     Cerebral infarction (H)     Crohn's colitis (H)     Crohn's disease of large intestine (H) 11/22/2021    Current smoker     Esophageal reflux     ESRD (end stage renal disease) on dialysis (H)     History of basal cell carcinoma     Hypertension     Mixed hyperlipidemia     NSTEMI (non-ST elevated myocardial infarction) (H)     PAF (paroxysmal atrial fibrillation) (H)        PAST SURGICAL HISTORY:   Past Surgical History:   Procedure Laterality Date    ABDOMEN SURGERY      x 6.  colon resections for crohn's disease    APPENDECTOMY      CHOLECYSTECTOMY      COLONOSCOPY N/A 07/20/2021    Procedure: COLONOSCOPY, WITH POLYPECTOMY AND BIOPSY;  Surgeon: Eric Preston MD;  Location:  GI    COLONOSCOPY N/A 12/7/2022    Procedure: COLONOSCOPY, WITH POLYPECTOMY AND BIOPSY;  Surgeon: Willian Kee MD;  Location:  GI    COLONOSCOPY N/A 2/27/2024    Procedure: COLONOSCOPY;  Surgeon: Judson Woods MD;  Location: [facility]    CV CORONARY ANGIOGRAM N/A 05/25/2021    Procedure: CV CORONARY ANGIOGRAM;  Surgeon: Judson Ybarra MD;  Location:  [facility]    CV CORONARY ANGIOGRAM N/A 2/15/2024    Procedure: Coronary Angiogram;  Surgeon: Tico Finn MD;  Location:  [facility]    CV LEFT HEART CATH N/A 2/15/2024     Procedure: Left Heart Catheterization;  Surgeon: Tico Finn MD;  Location:  [facility]    CV RIGHT HEART CATH MEASUREMENTS RECORDED N/A 2/15/2024    Procedure: Right Heart Catheterization;  Surgeon: Tico Finn MD;  Location:  [facility]ATH [facility]    ESOPHAGOSCOPY, GASTROSCOPY, DUODENOSCOPY (EGD), COMBINED N/A 07/20/2021    Procedure: ESOPHAGOGASTRODUODENOSCOPY, WITH FINE NEEDLE ASPIRATION BIOPSY, WITH ENDOSCOPIC ULTRASOUND GUIDANCE;  Surgeon: Eric Preston MD;  Location:  GI    ESOPHAGOSCOPY, GASTROSCOPY, DUODENOSCOPY (EGD), COMBINED N/A 2/27/2024    Procedure: ESOPHAGOGASTRODUODENOSCOPY;  Surgeon: Judson Woods MD;  Location: [facility]    IR DIALYSIS FISTULOGRAM LEFT  12/01/2022    IR DIALYSIS FISTULOGRAM LEFT  1/13/2023    LAPAROTOMY, LYSIS ADHESIONS, COMBINED N/A 03/17/2022    Procedure: Laparotomy, extensive lysis adhesions, combined;  Surgeon: Sarath Smith MD;  [facility]    PANCREATECTOMY PARTIAL N/A 03/17/2022    Procedure: Open Subtotal Pancreatectomy, intra-op ultrasound;  Surgeon: Sarath Smith MD;  [facility]    REVISION FISTULA ARTERIOVENOUS UPPER EXTREMITY Left 2/14/2023    Procedure: LEFT UPPER ARM FISTULA OUTFLOW REVISION FROM CEPHALIC VEIN TO JUGULAR VEIN WITH 10mm THIN-WALLED RINGED POLYTETRAFLUEROETHYLINE;  Surgeon: Mo Gramajo MD;  Location: [facility]    TRANSESOPHAGEAL ECHOCARDIOGRAM INTRAOPERATIVE N/A 1/11/2024    Procedure: ECHOCARDIOGRAM, TRANSESOPHAGEAL, WITH ANESTHESIA;  Surgeon: GENERIC ANESTHESIA PROVIDER;  [facility]    VASCULAR SURGERY      dialysis access- left upper             Family History:   FAMILY HISTORY:   Family History   Problem Relation Age of Onset    Breast Cancer Mother     Coronary Artery Disease Father     Hypertension Brother     Anesthesia Reaction No family hx of     Thrombosis No family hx of            Social History:     Current Living arrangement: Lives alone with his  dog  Relationships: Single  Children: 1 adult child  Occupation/Finances: Works for [employer]         Physical ROS:   The 10 point Review of Systems is negative other than noted in the HPI or here.           Medications:      budesonide  9 mg Oral QAM    calcium acetate  2,668 mg Oral TID w/meals    citalopram  20 mg Oral Daily    folic acid  1 mg Oral QAM    insulin aspart  1-7 Units Subcutaneous TID AC    insulin aspart  1-5 Units Subcutaneous At Bedtime    insulin aspart   Subcutaneous Daily with breakfast    insulin aspart   Subcutaneous Daily with lunch    insulin aspart   Subcutaneous Daily with supper    insulin glargine  10 Units Subcutaneous Q24H    insulin NPH  7 Units Subcutaneous Q24H    metroNIDAZOLE  500 mg Oral TID    multivitamin RENAL  1 capsule Oral QAM    nicotine  1 patch Transdermal Daily    pantoprazole  40 mg Oral QAM    [START ON 3/25/2024] predniSONE  40 mg Oral Daily    predniSONE  40 mg Oral Once    simvastatin  20 mg Oral QAM    sodium chloride (PF)  3 mL Intracatheter Q8H              Allergies:     Allergies   Allergen Reactions    Lisinopril Anaphylaxis and Other (See Comments)     Shortness of breath          Labs:     Recent Results (from the past 48 hour(s))   Prepare red blood cells (unit)    Collection Time: 03/22/24  4:43 PM   Result Value Ref Range    Blood Component Type Red Blood Cells     Product Code V6453K51     Unit Status Not used     Unit Number [id number]     CROSSMATCH Compatible     CODING SYSTEM [id number]     UNIT ABO/RH A+     UNIT TYPE ISBT 6200    Prepare red blood cells (unit)    Collection Time: 03/22/24  4:43 PM   Result Value Ref Range    Blood Component Type Red Blood Cells     Product Code E5186U47     Unit Status Not used     Unit Number [id number]     CROSSMATCH Compatible     CODING SYSTEM [id number]     UNIT ABO/RH A+     UNIT TYPE ISBT 6200    Magnesium    Collection Time: 03/23/24  7:59 AM   Result Value Ref Range    Magnesium 1.2 (L) 1.7 - 2.3 mg/dL    Hemoglobin A1c    Collection Time: 03/23/24  7:59 AM   Result Value Ref Range    Hemoglobin A1C 6.4 (H) <5.7 %   Prealbumin    Collection Time: 03/23/24  7:59 AM   Result Value Ref Range    Prealbumin 17.6 (L) 20.0 - 40.0 mg/dL   Basic metabolic panel    Collection Time: 03/23/24  7:59 AM   Result Value Ref Range    Sodium 127 (L) 135 - 145 mmol/L    Potassium 4.7 3.4 - 5.3 mmol/L    Chloride 92 (L) 98 - 107 mmol/L    Carbon Dioxide (CO2) 21 (L) 22 - 29 mmol/L    Anion Gap 14 7 - 15 mmol/L    Urea Nitrogen 28.7 (H) 8.0 - 23.0 mg/dL    Creatinine 3.93 (H) 0.67 - 1.17 mg/dL    GFR Estimate 16 (L) >60 mL/min/1.73m2    Calcium 8.2 (L) 8.8 - 10.2 mg/dL    Glucose 822 (HH) 70 - 99 mg/dL   CRP inflammation    Collection Time: 03/23/24  7:59 AM   Result Value Ref Range    CRP Inflammation 4.65 <5.00 mg/L   Hepatic function panel    Collection Time: 03/23/24  7:59 AM   Result Value Ref Range    Protein Total 4.6 (L) 6.4 - 8.3 g/dL    Albumin 3.0 (L) 3.5 - 5.2 g/dL    Bilirubin Total 0.7 <=1.2 mg/dL    Alkaline Phosphatase 142 40 - 150 U/L    AST 15 0 - 45 U/L    ALT 21 0 - 70 U/L    Bilirubin Direct 0.40 (H) 0.00 - 0.30 mg/dL   CBC with platelets and differential    Collection Time: 03/23/24  7:59 AM   Result Value Ref Range    WBC Count 5.9 4.0 - 11.0 10e3/uL    RBC Count 1.86 (L) 4.40 - 5.90 10e6/uL    Hemoglobin 6.6 (LL) 13.3 - 17.7 g/dL    Hematocrit 21.9 (L) 40.0 - 53.0 %     (H) 78 - 100 fL    MCH 35.5 (H) 26.5 - 33.0 pg    MCHC 30.1 (L) 31.5 - 36.5 g/dL    RDW 25.9 (H) 10.0 - 15.0 %    Platelet Count 109 (L) 150 - 450 10e3/uL    % Neutrophils 93 %    % Lymphocytes 2 %    % Monocytes 3 %    % Eosinophils 0 %    % Basophils 0 %    % Immature Granulocytes 2 %    NRBCs per 100 WBC 1 (H) <1 /100    Absolute Neutrophils 5.5 1.6 - 8.3 10e3/uL    Absolute Lymphocytes 0.1 (L) 0.8 - 5.3 10e3/uL    Absolute Monocytes 0.2 0.0 - 1.3 10e3/uL    Absolute Eosinophils 0.0 0.0 - 0.7 10e3/uL    Absolute Basophils 0.0 0.0 - 0.2  10e3/uL    Absolute Immature Granulocytes 0.1 <=0.4 10e3/uL    Absolute NRBCs 0.1 10e3/uL   Adult Type and Screen    Collection Time: 03/23/24  7:59 AM   Result Value Ref Range    ABO/RH(D) A POS     Antibody Screen Negative Negative    SPECIMEN EXPIRATION DATE [id number]    Glucose by meter    Collection Time: 03/23/24  9:49 AM   Result Value Ref Range    GLUCOSE BY METER POCT >600 (HH) 70 - 99 mg/dL   Glucose by meter    Collection Time: 03/23/24 11:00 AM   Result Value Ref Range    GLUCOSE BY METER POCT >600 (HH) 70 - 99 mg/dL   Osmolality    Collection Time: 03/23/24 12:34 PM   Result Value Ref Range    Osmolality Blood 318 (H) 280 - 301 mmol/kg   Ketone Beta-Hydroxybutyrate Quantitative    Collection Time: 03/23/24 12:34 PM   Result Value Ref Range    Ketone (Beta-Hydroxybutyrate) Quantitative <0.18 <=0.30 mmol/L   Blood gas venous    Collection Time: 03/23/24 12:34 PM   Result Value Ref Range    pH Venous 7.28 (L) 7.32 - 7.43    pCO2 Venous 50 40 - 50 mm Hg    pO2 Venous 27 25 - 47 mm Hg    Bicarbonate Venous 23 21 - 28 mmol/L    Base Excess/Deficit Venous -3.4 (L) -3.0 - 3.0 mmol/L    FIO2 21     Oxyhemoglobin Venous 35 (L) 70 - 75 %    O2 Sat, Venous 36.3 (L) 70.0 - 75.0 %   Procalcitonin    Collection Time: 03/23/24 12:34 PM   Result Value Ref Range    Procalcitonin 1.90 (H) <0.50 ng/mL   Troponin T, High Sensitivity    Collection Time: 03/23/24 12:34 PM   Result Value Ref Range    Troponin T, High Sensitivity 206 (HH) <=22 ng/L   Extra Green Top (Lithium Heparin) Tube    Collection Time: 03/23/24 12:48 PM   Result Value Ref Range    Hold Specimen JIC    Extra Purple Top Tube    Collection Time: 03/23/24 12:48 PM   Result Value Ref Range    Hold Specimen JIC    Glucose    Collection Time: 03/23/24 12:48 PM   Result Value Ref Range    Glucose 831 (HH) 70 - 99 mg/dL   Glucose by meter    Collection Time: 03/23/24  1:15 PM   Result Value Ref Range    GLUCOSE BY METER POCT >600 (HH) 70 - 99 mg/dL   EKG  12-lead, complete    Collection Time: 03/23/24  1:19 PM   Result Value Ref Range    Systolic Blood Pressure  mmHg    Diastolic Blood Pressure  mmHg    Ventricular Rate 96 BPM    Atrial Rate 96 BPM    OR Interval 178 ms    QRS Duration 136 ms     ms    QTc 492 ms    P Axis 71 degrees    R AXIS -62 degrees    T Axis 77 degrees    Interpretation ECG       Sinus rhythm  Right bundle branch block  Left anterior fascicular block  ** Bifascicular block **  Left ventricular hypertrophy with repolarization abnormality  Abnormal ECG     Blood Culture Peripheral Blood    Collection Time: 03/23/24  2:21 PM    Specimen: Peripheral Blood   Result Value Ref Range    Culture No growth after 12 hours    Glucose    Collection Time: 03/23/24  2:21 PM   Result Value Ref Range    Glucose 796 (HH) 70 - 99 mg/dL   Glucose    Collection Time: 03/23/24  2:21 PM   Result Value Ref Range    Glucose 795 (HH) 70 - 99 mg/dL   Troponin T, High Sensitivity    Collection Time: 03/23/24  2:21 PM   Result Value Ref Range    Troponin T, High Sensitivity 174 (HH) <=22 ng/L   Magnesium    Collection Time: 03/23/24  2:21 PM   Result Value Ref Range    Magnesium 1.3 (L) 1.7 - 2.3 mg/dL   Phosphorus    Collection Time: 03/23/24  2:21 PM   Result Value Ref Range    Phosphorus 3.9 2.5 - 4.5 mg/dL   N terminal pro BNP outpatient    Collection Time: 03/23/24  2:21 PM   Result Value Ref Range    N Terminal Pro BNP Outpatient 27,076 (H) 0 - 900 pg/mL   Glucose by meter    Collection Time: 03/23/24  2:27 PM   Result Value Ref Range    GLUCOSE BY METER POCT 592 (HH) 70 - 99 mg/dL   Prepare red blood cells (unit)    Collection Time: 03/23/24  2:39 PM   Result Value Ref Range    Blood Component Type Red Blood Cells     Product Code V1850X28     Unit Status Transfused     Unit Number [id number]     CROSSMATCH Compatible     CODING SYSTEM ELEI253     ISSUE DATE AND TIME 89072824049807     UNIT ABO/RH A+     UNIT TYPE ISBT 6200    Glucose by meter     Collection Time: 03/23/24  3:36 PM   Result Value Ref Range    GLUCOSE BY METER POCT >600 (HH) 70 - 99 mg/dL   Prepare red blood cells (unit)    Collection Time: 03/23/24  3:56 PM   Result Value Ref Range    Blood Component Type Red Blood Cells     Product Code B2680F14     Unit Status Transfused     Unit Number [id number]     CROSSMATCH Compatible     CODING SYSTEM [id number]     ISSUE DATE AND TIME [id number]     UNIT ABO/RH A+     UNIT TYPE ISBT 6200    Blood Culture Hand, Right    Collection Time: 03/23/24  4:12 PM    Specimen: Hand, Right; Blood   Result Value Ref Range    Culture No growth after 12 hours    Glucose by meter    Collection Time: 03/23/24  4:45 PM   Result Value Ref Range    GLUCOSE BY METER POCT 499 (H) 70 - 99 mg/dL   Glucose by meter    Collection Time: 03/23/24  5:35 PM   Result Value Ref Range    GLUCOSE BY METER POCT 383 (H) 70 - 99 mg/dL   Glucose by meter    Collection Time: 03/23/24  6:31 PM   Result Value Ref Range    GLUCOSE BY METER POCT 244 (H) 70 - 99 mg/dL   Glucose by meter    Collection Time: 03/23/24  7:25 PM   Result Value Ref Range    GLUCOSE BY METER POCT 185 (H) 70 - 99 mg/dL   Glucose by meter    Collection Time: 03/23/24  8:39 PM   Result Value Ref Range    GLUCOSE BY METER POCT 233 (H) 70 - 99 mg/dL   Basic metabolic panel    Collection Time: 03/23/24  8:52 PM   Result Value Ref Range    Sodium 135 135 - 145 mmol/L    Potassium 3.3 (L) 3.4 - 5.3 mmol/L    Chloride 94 (L) 98 - 107 mmol/L    Carbon Dioxide (CO2) 24 22 - 29 mmol/L    Anion Gap 17 (H) 7 - 15 mmol/L    Urea Nitrogen 18.7 8.0 - 23.0 mg/dL    Creatinine 2.83 (H) 0.67 - 1.17 mg/dL    GFR Estimate 24 (L) >60 mL/min/1.73m2    Calcium 9.1 8.8 - 10.2 mg/dL    Glucose 181 (H) 70 - 99 mg/dL   Magnesium    Collection Time: 03/23/24  8:52 PM   Result Value Ref Range    Magnesium 1.2 (L) 1.7 - 2.3 mg/dL   Blood gas venous    Collection Time: 03/23/24  8:52 PM   Result Value Ref Range    pH Venous 7.35 7.32 - 7.43     pCO2 Venous 50 40 - 50 mm Hg    pO2 Venous 32 25 - 47 mm Hg    Bicarbonate Venous 27 21 - 28 mmol/L    Base Excess/Deficit Venous 1.2 -3.0 - 3.0 mmol/L    FIO2 21     Oxyhemoglobin Venous 49 (L) 70 - 75 %    O2 Sat, Venous 51.1 (L) 70.0 - 75.0 %    Lactic Acid 4.6 (HH) 0.7 - 2.0 mmol/L   Ketone Beta-Hydroxybutyrate Quantitative    Collection Time: 03/23/24  8:52 PM   Result Value Ref Range    Ketone (Beta-Hydroxybutyrate) Quantitative <0.18 <=0.30 mmol/L   Enteric Bacteria and Virus Panel PCR    Collection Time: 03/23/24  9:14 PM    Specimen: Per Rectum; Stool   Result Value Ref Range    Campylobacter species Negative Negative    Salmonella species Negative Negative    Vibrio species Negative Negative    Vibrio cholerae Negative Negative    Yersinia enterocolitica Negative Negative    Enteropathogenic E. coli (EPEC) Negative Negative, NA    Shiga-like toxin-producing E. coli (STEC) Negative Negative    Shigella/Enteroinvasive E. coli (EIEC) Negative Negative    Cryptosporidium species Negative Negative    Giardia lamblia Negative Negative    Norovirus Gl/Gll Negative Negative    Rotavirus A Negative Negative    Plesiomonas shigelloides Negative Negative    Enteroaggregative E. coli (EAEC) Negative Negative    Enterotoxigenic E. coli (ETEC) Negative Negative    E. coli O157 NA Negative, NA    Cyclospora cayetanensis Negative Negative    Entamoeba histolytica Negative Negative    Adenovirus F40/41 Negative Negative    Astrovirus Negative Negative    Sapovirus Negative Negative   C. difficile Toxin B PCR with reflex to C. difficile Antigen and Toxins A/B EIA    Collection Time: 03/23/24  9:14 PM    Specimen: Per Rectum; Stool   Result Value Ref Range    C Difficile Toxin B by PCR Negative Negative   Glucose by meter    Collection Time: 03/23/24  9:35 PM   Result Value Ref Range    GLUCOSE BY METER POCT 188 (H) 70 - 99 mg/dL   Glucose by meter    Collection Time: 03/23/24 10:16 PM   Result Value Ref Range    GLUCOSE BY  METER POCT 177 (H) 70 - 99 mg/dL   Glucose by meter    Collection Time: 03/23/24 11:40 PM   Result Value Ref Range    GLUCOSE BY METER POCT 176 (H) 70 - 99 mg/dL   CBC with platelets    Collection Time: 03/24/24 12:13 AM   Result Value Ref Range    WBC Count 17.2 (H) 4.0 - 11.0 10e3/uL    RBC Count 2.67 (L) 4.40 - 5.90 10e6/uL    Hemoglobin 9.1 (L) 13.3 - 17.7 g/dL    Hematocrit 27.6 (L) 40.0 - 53.0 %     (H) 78 - 100 fL    MCH 34.1 (H) 26.5 - 33.0 pg    MCHC 33.0 31.5 - 36.5 g/dL    RDW 26.2 (H) 10.0 - 15.0 %    Platelet Count 95 (L) 150 - 450 10e3/uL   Lactic acid whole blood    Collection Time: 03/24/24 12:13 AM   Result Value Ref Range    Lactic Acid 3.5 (H) 0.7 - 2.0 mmol/L   Glucose by meter    Collection Time: 03/24/24 12:55 AM   Result Value Ref Range    GLUCOSE BY METER POCT 165 (H) 70 - 99 mg/dL   Glucose by meter    Collection Time: 03/24/24  1:56 AM   Result Value Ref Range    GLUCOSE BY METER POCT 154 (H) 70 - 99 mg/dL   Glucose by meter    Collection Time: 03/24/24  3:33 AM   Result Value Ref Range    GLUCOSE BY METER POCT 112 (H) 70 - 99 mg/dL   Lactic acid whole blood    Collection Time: 03/24/24  4:16 AM   Result Value Ref Range    Lactic Acid 1.7 0.7 - 2.0 mmol/L   Glucose by meter    Collection Time: 03/24/24  5:12 AM   Result Value Ref Range    GLUCOSE BY METER POCT 72 70 - 99 mg/dL   Glucose by meter    Collection Time: 03/24/24  6:29 AM   Result Value Ref Range    GLUCOSE BY METER POCT 148 (H) 70 - 99 mg/dL   Basic metabolic panel    Collection Time: 03/24/24  7:05 AM   Result Value Ref Range    Sodium 133 (L) 135 - 145 mmol/L    Potassium 3.7 3.4 - 5.3 mmol/L    Chloride 96 (L) 98 - 107 mmol/L    Carbon Dioxide (CO2) 24 22 - 29 mmol/L    Anion Gap 13 7 - 15 mmol/L    Urea Nitrogen 23.9 (H) 8.0 - 23.0 mg/dL    Creatinine 3.53 (H) 0.67 - 1.17 mg/dL    GFR Estimate 19 (L) >60 mL/min/1.73m2    Calcium 9.0 8.8 - 10.2 mg/dL    Glucose 142 (H) 70 - 99 mg/dL   CBC with platelets    Collection  Time: 24  7:05 AM   Result Value Ref Range    WBC Count 20.4 (H) 4.0 - 11.0 10e3/uL    RBC Count 2.95 (L) 4.40 - 5.90 10e6/uL    Hemoglobin 9.9 (L) 13.3 - 17.7 g/dL    Hematocrit 30.8 (L) 40.0 - 53.0 %     (H) 78 - 100 fL    MCH 33.6 (H) 26.5 - 33.0 pg    MCHC 32.1 31.5 - 36.5 g/dL    RDW 27.5 (H) 10.0 - 15.0 %    Platelet Count 112 (L) 150 - 450 10e3/uL   Glucose by meter    Collection Time: 24  7:48 AM   Result Value Ref Range    GLUCOSE BY METER POCT 147 (H) 70 - 99 mg/dL          Physical and Psychiatric Examination:     /83   Pulse 85   Temp 97.4  F (36.3  C) (Oral)   Resp 20   SpO2 94%   Weight is 0 lbs 0 oz  There is no height or weight on file to calculate BMI.    Physical Exam:  I have reviewed the physical exam as documented by by the medical team and agree with findings and assessment and have no additional findings to add at this time.         MSE:   Appearance: awake, alert, adequately groomed, and casually dressed  Attitude:  cooperative  Eye Contact:  good  Mood:  anxious  Affect:  mood congruent and intensity is normal  Speech:  clear, coherent  Psychomotor Behavior:  no evidence of tardive dyskinesia, dystonia, or tics  Muscle strength and tone: Normal  Thought Process:  logical, linear, and goal oriented  Associations:  no loose associations  Thought Content:  no evidence of suicidal ideation or homicidal ideation and no evidence of psychotic thought  Insight:  fair  Judgement:  fair  Oriented to:  time, person, and place  Attention Span and Concentration:  fair  Recent and Remote Memory:  fair     EK BPM, QTc 492ms, RBBB, L ant fasicular block         DSM-5 Diagnosis:     LAUREN with panic features          Assessment:     Scar has a long history of anxiety with panic attacks that correspond to his medical condition, crohn's flares, etc. Says his anxiety has worsened due to upcoming cardiac procedure in April. He had been using citalopram PRN for panic attacks. Will  discontinue citalopram due to borderline high QTc (does have RBBB though also has history of prolonged QTc). Will start mirtazapine 7.5mg at bedtime due to  his report of sleeping only 3-5 hours at night. Counseled on side effect of sedation, not to drive after taking, weight gain.           Summary of Recommendations:     Medications: Stop citalopram. Start mirtazapine 7.5mg at bedtime   Follow-Up: Recommend following up with PCP    Stefan Benitez MD    Department of Psychiatry  Please Vocera if questions

## 2024-03-24 NOTE — PLAN OF CARE
Physical Therapy defer - Orders received, chart reviewed, discussed with care team. No IP PT needs indicated. Met with patient who reports they are at functional baseline, denies concerns with returning home from mobility standpoint. Will complete consult and defer evaluation, please reconsult as appropriate if patient has decline in functional mobility requiring further skilled inpatient PT needs. Defer Discharge recommendations to medical team.       [FreeTextEntry1] : Warm bath. Tylenol / Advil prn. Recommend colonoscopy due to rectal bleeding / change in BM. Return to office if symptoms worsen or persist.

## 2024-03-24 NOTE — CONSULTS
Nephrology Initial Consult  March 23, 2024      Tacos Dominguez MRN:[id number] YOB: 1960  Date of Admission:3/23/2024  Primary care provider: Good Pham  Requesting physician: Philly Aguirre MD    ASSESSMENT AND RECOMMENDATIONS:   Tacos Dominguez is a 64 year old male admitted on 3/23/2024. 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 He then presented to OSH ED on 3/13/24 and found to have Evidence of large bowel pneumatosis, anemia and scrotal swelling prompting transfer to [facility] and admitted to (3/14-3/17)w/ GI and CRS consultations who presents from infusion center to the ED for evaluation of Hyperglycemia. Patient admitted to Medicine for further evaluation and care.         # ESRD on HD MWF Access LUE AVG. Last complete run 3/22/24.   POTASSIUM wnl.   - DW 70.9  Does HD for 3.5 hrs via left AVF, no urine output   - Daily BMP, Mg, Phos  -will do dialysis today 3/23 as pt will receive blood and insulin drip   Favor transfusion on HD days w/ Hgb goal of 8.0 in setting of significant cardiac hx       # Transfusion dependent, acute blood loss Anemia: Thought s/t ongoing Crohn's flare. Recent EGD and colonoscopy with bleeding noted in the ileum; s/p 4 units PRBC Hgb 6.6 this AM, was to receive PRBC, though sent from clinic for eval of Hyperglycemia. Would have transfusion goal of 8.0 w/ significant cardiac hx.   - Type and screen  - Transfuse 2U PRBC with HD tonight  - Transfusion goal  of 8.0 in setting of significant cardiac hx, though favor transfusion during HD for better volume control.     Recommendations were communicated to primary team via note    Seen and discussed with Dr. Portillo Olson MD  Division of Renal Disease and Hypertension  [employer]        REASON FOR CONSULT: ESRD    HISTORY OF PRESENT ILLNESS:  Tacos Dominguez is a 64 year old male with history of Crohn's disease status post colectomy on Stelara's, history of small bowel ulcer, chronic thrombocytopenia, severe mitral stenosis, severe tricuspid regurgitation, moderate aortic stenosis, end-stage renal disease on dialysis, hypertension, hyperlipidemia, GERD, recent sepsis related to colonic pneumatosis to the emergency department with hyperglycemia.  Patient went to the infusion center today for a blood transfusion of his hemoglobin of 6.5.  Pretransfusion labs reveal a blood sugar over 800.  The patient was referred to the emergency department for management.  Patient states that he last had his dialysis run yesterday.  He denies any increased abdominal pain.  No nausea or vomiting.  No chest pain.  He reports his baseline dyspnea.  He denies any swelling.  No fevers.  He has been on prednisone since the beginning of the month for his Crohn's disease.  He denies any other new medications in the past few days.     PAST MEDICAL HISTORY:  Reviewed with patient on 03/23/2024     Past Medical History:   Diagnosis Date    Aortic dissection (H)     distal, thin.  stable/chronic on MRCP 3/2022    Benign essential hypertension     CAD (coronary artery disease)     Cerebral infarction (H)     Crohn's colitis (H)     Crohn's disease of large intestine (H) 11/22/2021    Current smoker     Esophageal reflux     ESRD (end stage renal disease) on dialysis (H)     History of basal cell carcinoma     Hypertension     Mixed hyperlipidemia     NSTEMI (non-ST elevated myocardial infarction) (H)     PAF  (paroxysmal atrial fibrillation) (H)        Past Surgical History:   Procedure Laterality Date    ABDOMEN SURGERY      x 6.  colon resections for crohn's disease    APPENDECTOMY      CHOLECYSTECTOMY      COLONOSCOPY N/A 07/20/2021    Procedure: COLONOSCOPY, WITH POLYPECTOMY AND BIOPSY;  Surgeon: Eric Preston MD;  [facility]    COLONOSCOPY N/A 12/7/2022    Procedure: COLONOSCOPY, WITH POLYPECTOMY AND BIOPSY;  Surgeon: Willian Kee MD;  Location:  GI    COLONOSCOPY N/A 2/27/2024    Procedure: COLONOSCOPY;  Surgeon: Judson Woods MD;  Location: [facility]    CV CORONARY ANGIOGRAM N/A 05/25/2021    Procedure: CV CORONARY ANGIOGRAM;  Surgeon: Judson Ybarra MD;  [facility]    CV CORONARY ANGIOGRAM N/A 2/15/2024    Procedure: Coronary Angiogram;  Surgeon: Tico Finn MD;  [facility]    CV LEFT HEART CATH N/A 2/15/2024    Procedure: Left Heart Catheterization;  Surgeon: Tico Finn MD;  [facility]    CV RIGHT HEART CATH MEASUREMENTS RECORDED N/A 2/15/2024    Procedure: Right Heart Catheterization;  Surgeon: Tico Finn MD;  [facility]    ESOPHAGOSCOPY, GASTROSCOPY, DUODENOSCOPY (EGD), COMBINED N/A 07/20/2021    Procedure: ESOPHAGOGASTRODUODENOSCOPY, WITH FINE NEEDLE ASPIRATION BIOPSY, WITH ENDOSCOPIC ULTRASOUND GUIDANCE;  Surgeon: Eric Preston MD;  [facility]    ESOPHAGOSCOPY, GASTROSCOPY, DUODENOSCOPY (EGD), COMBINED N/A 2/27/2024    Procedure: ESOPHAGOGASTRODUODENOSCOPY;  Surgeon: Judson Woods MD;  Location: [facility]    IR DIALYSIS FISTULOGRAM LEFT  12/01/2022    IR DIALYSIS FISTULOGRAM LEFT  1/13/2023    LAPAROTOMY, LYSIS ADHESIONS, COMBINED N/A 03/17/2022    Procedure: Laparotomy, extensive lysis adhesions, combined;  Surgeon: Sarath Smith MD;  [facility]    PANCREATECTOMY PARTIAL N/A 03/17/2022    Procedure: Open  Subtotal Pancreatectomy, intra-op ultrasound;  Surgeon: Sarath Smith MD;  [facility]    REVISION FISTULA ARTERIOVENOUS UPPER EXTREMITY Left 2/14/2023    Procedure: LEFT UPPER ARM FISTULA OUTFLOW REVISION FROM CEPHALIC VEIN TO JUGULAR VEIN WITH 10mm THIN-WALLED RINGED POLYTETRAFLUEROETHYLINE;  Surgeon: Mo Gramajo MD;  Location: [facility]    TRANSESOPHAGEAL ECHOCARDIOGRAM INTRAOPERATIVE N/A 1/11/2024    Procedure: ECHOCARDIOGRAM, TRANSESOPHAGEAL, WITH ANESTHESIA;  Surgeon: GENERIC ANESTHESIA PROVIDER;  [facility]    VASCULAR SURGERY      dialysis access- left upper        MEDICATIONS:  PTA Meds  Prior to Admission medications    Medication Sig Last Dose Taking? Auth Provider Long Term End Date   budesonide (ENTOCORT EC) 3 MG EC capsule Take 3 capsules (9 mg) by mouth every morning 3/23/2024 at am Yes Mak Peters MD Yes    calcium acetate (CALPHRON) 667 MG TABS tablet Take 2,668 mg by mouth 3 times daily (with meals) 3/23/2024 at am Yes Reported, Patient     citalopram (CELEXA) 20 MG tablet Take 20 mg by mouth daily as needed (panic attacks) Unknown at as needed Yes Reported, Patient No    folic acid (FOLVITE) 1 MG tablet Take 1 mg by mouth every morning  3/23/2024 at am Yes Reported, Patient     lidocaine-prilocaine (EMLA) 2.5-2.5 % external cream three times a week Prior to dialysis  site access on Monday, Wednesday, Friday 3/22/2024 at am Yes Reported, Patient Yes    metroNIDAZOLE (FLAGYL) 500 MG tablet Take 1 tablet (500 mg) by mouth 3 times daily for 29 days 3/23/2024 at am Yes Porfirio Smith MD  4/15/24   multivitamin RENAL (MULTIVITAMIN RENAL) 1 MG capsule Take 1 capsule by mouth every morning 3/23/2024 at am Yes Reported, Patient     omeprazole (PRILOSEC) 20 MG DR capsule Take 20 mg by mouth every morning  3/23/2024 at am Yes Reported, Patient     predniSONE (DELTASONE) 10 MG tablet Take 4 tablets (40 mg) by mouth daily for 30 days 3/23/2024 at am Yes Betty Rubio MD No  4/20/24   simvastatin (ZOCOR) 20 MG tablet Take 20 mg by mouth every morning  3/23/2024 at am Yes Reported, Patient Yes    ustekinumab (STELARA) 90 MG/ML Inject 1 ml ( 90 mg) subcutaneous every 4 weeks.  Patient taking differently: Inject 90 mg Subcutaneous every 28 days Inject 1 ml ( 90 mg) subcutaneous every 4 weeks. Last dose 3/1/24 3/1/2024 at unknown Yes Betty Rubio MD        Current Meds   [START ON 3/24/2024] budesonide  9 mg Oral QAM    calcium acetate  2,668 mg Oral TID w/meals    [START ON 3/24/2024] citalopram  20 mg Oral Daily    [START ON 3/24/2024] folic acid  1 mg Oral QAM    metroNIDAZOLE  500 mg Oral TID    [START ON 3/24/2024] multivitamin RENAL  1 capsule Oral QAM    nicotine  1 patch Transdermal Daily    [START ON 3/24/2024] pantoprazole  40 mg Oral QAM    predniSONE  10 mg Oral 4x Daily    [START ON 3/24/2024] simvastatin  20 mg Oral QAM    sodium chloride (PF)  3 mL Intracatheter Q8H     Infusion Meds   dextrose 5% and 0.45% NaCl      insulin 6 mL/hr at 03/23/24 1833       ALLERGIES:    Allergies   Allergen Reactions    Lisinopril Anaphylaxis and Other (See Comments)     Shortness of breath       REVIEW OF SYSTEMS:  A comprehensive of systems was negative except as noted above.    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 Not Asked   Social History Narrative    Not on file     Social Determinants of Health     Financial Resource Strain: Not on file   Food Insecurity: Not on file   Transportation Needs: Not on file   Physical Activity: Not on file   Stress: Not on file   Social Connections: Not on file   Interpersonal Safety: Not on file   Housing Stability: Not on file     Reviewed with patient    accompanies Tacos Dominguez in hospital room    FAMILY MEDICAL HISTORY:   Family History   Problem Relation Age of Onset    Breast Cancer Mother     Coronary Artery Disease Father     Hypertension Brother     Anesthesia Reaction No family hx of     Thrombosis No family hx of      Reviewed with patient     PHYSICAL EXAM:   Temp  Av.4  F (36.3  C)  Min: 97.2  F (36.2  C)  Max: 97.7  F (36.5  C)      Pulse  Av.6  Min: 79  Max: 101 Resp  Av.4  Min: 16  Max: 22  SpO2  Av.9 %  Min: 99 %  Max: 100 %       /55   Pulse 81   Temp 97.3  F (36.3  C)   Resp 19   SpO2 100%    Date 24 07 - 24 0659   Shift 2420-0225 0499-0410 8242-6644 24 Hour Total   INTAKE   Blood Components  300  300   Shift Total  300  300   OUTPUT   Shift Total       Weight (kg)          Admit       GENERAL APPEARANCE:  distress,  awake  EYES: NO scleral icterus, pupils equal  Endo: NO goiter, NO moon facies  Lymphatics: no cervical or supraclavicular LAD  Pulmonary: lungs clear to auscultation with equal breath sounds bilaterally, NO clubbing  CV: regular rhythm, normal rate, no rub   - JVD NO   - Edema NO  GI: soft, nontender, normal bowel sounds  MS: no evidence of inflammation in joints, no muscle tenderness  : NO maza  SKIN: no rash, warm, dry, no cyanosis  NEURO: face symmetric, NO asterixis     LABS:   CMP  Recent Labs   Lab 24  1831 24  1735 24  1645 24  1536 24  1427 24  1421 24  0949 24  0754  03/19/24  1021 03/19/24  1021 03/17/24  0558 03/16/24  2347   NA  --   --   --   --   --   --   --  127*  --  138  --   --    POTASSIUM  --   --   --   --   --   --   --  4.7  --  4.5 3.6 3.4   CHLORIDE  --   --   --   --   --   --   --  92*  --  101  --   --    CO2  --   --   --   --   --   --   --  21*  --  24  --   --    ANIONGAP  --   --   --   --   --   --   --  14  --  13  --   --    * 383* 499* >600*   < > 795*  796*   < > 822*   < > 326*  --   --    BUN  --   --   --   --   --   --   --  28.7*  --  37.8*  --   --    CR  --   --   --   --   --   --   --  3.93*  --  4.58*  --   --    GFRESTIMATED  --   --   --   --   --   --   --  16*  --  14*  --   --    KARY  --   --   --   --   --   --   --  8.2*  --  7.7*  --   --    MAG  --   --   --   --   --  1.3*  --  1.2*  --  1.4*  --   --    PHOS  --   --   --   --   --  3.9  --   --   --   --   --   --    PROTTOTAL  --   --   --   --   --   --   --  4.6*  --  4.4*  --   --    ALBUMIN  --   --   --   --   --   --   --  3.0*  --  2.7*  --   --    BILITOTAL  --   --   --   --   --   --   --  0.7  --  0.8  --   --    ALKPHOS  --   --   --   --   --   --   --  142  --  109  --   --    AST  --   --   --   --   --   --   --  15  --  21  --   --    ALT  --   --   --   --   --   --   --  21  --  22  --   --     < > = values in this interval not displayed.     CBC  Recent Labs   Lab 03/23/24  0759 03/19/24  1021 03/17/24  0704   HGB 6.6* 6.5* 8.0*   WBC 5.9 9.1  --    RBC 1.86* 1.90*  --    HCT 21.9* 21.3*  --    * 112*  --    MCH 35.5* 34.2*  --    MCHC 30.1* 30.5*  --    RDW 25.9* 26.0*  --    * 61*  --      INR  Recent Labs   Lab 03/19/24  1021   INR 1.46*   PTT 29     ABG  Recent Labs   Lab 03/23/24  1234   O2PER 21      URINE STUDIES  Recent Labs   Lab Test 03/25/21  1225 09/26/20  1451   COLOR Yellow Yellow   APPEARANCE Slightly Cloudy Clear   URINEGLC Negative Negative   URINEBILI Negative Negative   URINEKETONE Negative Negative   SG 1.020 1.005    UBLD Large* Moderate*   URINEPH 6.0 5.0   PROTEIN 100* 30 mg/dL*   UROBILINOGEN  --  <2.0 E.U./dL   NITRITE Negative Negative   LEUKEST Negative Negative   RBCU 1 0-2   WBCU 2 0-5     No lab results found.  PTH  No lab results found.  IRON STUDIES  Recent Labs   Lab Test 03/14/24  0535 02/20/24  1426 08/09/23  0935 03/03/22  1559   IRON 45* 75 58*  --    * 288 265  --    IRONSAT 19 26 22  --    GRACE  --  813* 1,455* 739*       IMAGING:  All imaging studies reviewed by me.     Ethan Olson MD

## 2024-03-24 NOTE — PLAN OF CARE
Occupational Therapy: Orders received. Chart reviewed and discussed with care team.? Occupational Therapy not indicated due to pt at baseline IND w/ ADLs/IADLs, denies need for acute OT services.? Defer discharge recommendations to medical team.? Will complete orders.

## 2024-03-25 PROBLEM — N17.8 OTHER ACUTE KIDNEY FAILURE (H): Status: ACTIVE | Noted: 2024-01-01

## 2024-03-25 NOTE — PROGRESS NOTES
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       # ESRD on HD MWF at [facility] with Dr. Scherer. Access LUE AVG. EDW 69 kg. Time: 3.5 hrs via left AVF   - Dialysis per MWF schedule       # Severe mitral valve insufficiency  - cards following    # BP/Volume: anuric, EDW 71 kg; echo 3/24/24 with normal IVC; minimal UOP; has large IDWG, often requires 3-4 kg UF  - UF 3 kg today  - pre HD standing weight 73.5 kg    # BMD: Ca 8's, alb 2.5, phos 3.2     # Anemia, acute (GIB) on chronic of ESKD: Thought d/t ongoing Crohn's flare. Recent EGD and colonoscopy with bleeding noted in the ileum; s/p 4 units PRBC Would have transfusion goal of 8.0 w/ significant cardiac hx.   - will give epogen 4000 units per HD while here  - transfusions per primary team    # Kidney cyst: indeterminate  - urology consult       Recommendations were communicated to primary team via this note         RENETTA Vyas   Division of Renal Disease and Hypertension  [phone]    Interval History :   Seen on dialysis, stable run so far, attempting 3L. No n/v, CP, SOB, chills    Review of Systems:   4 point ROS neg other than as noted above    Physical Exam:   I/O last 3 completed shifts:  In: 362.03 [P.O.:300; I.V.:62.03]  Out: -    /66   Pulse 83   Temp 97  F (36.1  C) (Axillary)   Resp 16   Ht 1.803 m (5' 11\")   Wt 73.5 kg (162 lb 0.6 oz)   SpO2 100%   BMI 22.60 kg/m       GENERAL APPEARANCE: " NAD  EYES:  no scleral icterus, pupils equal  PULM: CTA  CV: RRR     -edema 1+ LE   GI: soft  INTEGUMENT: no cyanosis, no rash  NEURO:  a/o3  Access L AVG    Labs:   All labs reviewed by me  Electrolytes/Renal -   Recent Labs   Lab Test 03/25/24  1327 03/25/24  0726 03/25/24  0618 03/24/24  0748 03/24/24  0705 03/23/24  2135 03/23/24  2052 03/23/24  1427 03/23/24  1421 03/14/24  0836 03/14/24  0535   NA  --   --  131*  --  133*  --  135  --   --    < > 136   POTASSIUM  --   --  4.5  --  3.7  --  3.3*  --   --    < > 3.3*   CHLORIDE  --   --  98  --  96*  --  94*  --   --    < > 95*   CO2  --   --  20*  --  24  --  24  --   --    < > 22   BUN  --   --  34.5*  --  23.9*  --  18.7  --   --    < > 54.9*   CR  --   --  4.49*  --  3.53*  --  2.83*  --   --    < > 5.37*   * 170* 169*   < > 142*   < > 181*  181*   < > 795*  796*   < > 256*   KARY  --   --  8.6*  --  9.0  --  9.1  --   --    < > 8.4*   MAG  --   --  1.0*  --   --   --  1.2*  --  1.3*   < > 1.5*   PHOS  --   --  3.2  --   --   --   --   --  3.9  --  6.7*    < > = values in this interval not displayed.       CBC -   Recent Labs   Lab Test 03/25/24  0738 03/24/24  0705 03/24/24  0013   WBC 14.3* 20.4* 17.2*   HGB 9.4* 9.9* 9.1*   PLT 67* 112* 95*       LFTs -   Recent Labs   Lab Test 03/25/24  0618 03/23/24  0759 03/19/24  1021   ALKPHOS 96 142 109   BILITOTAL 1.1 0.7 0.8   ALT 18 21 22   AST 20 15 21   PROTTOTAL 4.0* 4.6* 4.4*   ALBUMIN 2.5* 3.0* 2.7*       Iron Panel -   Recent Labs   Lab Test 03/14/24  0535 02/20/24  1426 08/09/23  0935   IRON 45* 75 58*   IRONSAT 19 26 22   GRACE  --  813* 1,455*         Imaging:  Reviewed    Current Medications:   budesonide  9 mg Oral QAM    calcium acetate  2,668 mg Oral TID w/meals    cefTRIAXone  1 g Intravenous Q24H    folic acid  1 mg Oral QAM    insulin aspart  1-5 Units Subcutaneous 2 times per day    insulin aspart  1-10 Units Subcutaneous TID AC    insulin aspart   Subcutaneous TID w/meals    insulin  glargine  10 Units Subcutaneous Q24H    insulin NPH  7 Units Subcutaneous Q24H    metroNIDAZOLE  500 mg Oral TID    mirtazapine  7.5 mg Oral At Bedtime    multivitamin RENAL  1 capsule Oral QAM    nicotine  1 patch Transdermal Daily    pantoprazole  40 mg Oral QAM    predniSONE  40 mg Oral Daily    simvastatin  20 mg Oral QAM    sodium chloride (PF)  10 mL Intracatheter Q8H    sodium chloride (PF)  3 mL Intracatheter Q8H      dextrose Stopped (03/23/24 2309)    insulin regular Stopped (03/24/24 1729)     RENETTA Vyas

## 2024-03-25 NOTE — PROGRESS NOTES
Inpatient Diabetes Management Service: Daily Progress Note   HPI: Tacos Dominguez is a 64 year old  is a 64 year old male admitted on 3/23/2024. 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 He then presented to OSH ED on 3/13/24 and found to have Evidence of large bowel pneumatosis, anemia and scrotal swelling prompting transfer to [facility] and admitted from (3/14-3/17)w/ GI and CRS consultations who presents today from infusion center to the ED for evaluation of Hyperglycemia. Patient admitted to Medicine for further evaluation and care.           Assessment/Plan:     Assessment:   Pre diabetes or Diabetes Mellitus, or post pancreatectomy diabetes or just steroid induced hyperglycemia  Steroid induced hyperglycemia  Post  subtotal pancreatectomy for main duct IPMN (3/2022  Hyperglycemia, labs x2 without DKA  Anemia with history of recent RBC transfusions, received 2 units on 3/23/2024  ESRD, on HD, last run on 3/22/2024  Stress induced hyperglycemia with elevated LA and elevated WBC    Plan/Recommendations:   - Continue NPH 7 ast 8 am  units to cover the prednisone 40 mg daily at 8 am.   - Continue Lantus 10 units at 1300  - Continue Novolog Meal Coverage: 1 units per 10 g CHO, TID AC and PRN with snacks/supplements  - Continue Novolog Correction Scale:  1/25 with meals and  1/50 at bedtime-- since  prednisone will have worn off  - BG monitoring:   before meals, bedtime and 0200.   -  Hypoglycemia protocol    - Carb counting protocol    Discussion:   Patient was on an insulin drip until 11 am yesterday.  Has not been on insulin PTA. NPH given at 7 am and Pred given at 9 am. . Lantus 10 units given at 1200. Weight based ( Lantus 18 units).      Discharge Planning:(tentative)  Medications: TBD  Test Claims:  Lantus, Novolog, NPH today (3/25/24)   Education: Will need DM education closer to discharge.   Outpatient Follow-up:Recommend [facility] Endocrinology vs PCP     Please notify Inpatient Diabetes Service if changes are planned to steroids, nutrition, TPN/TF and anticipated procedures requiring prolonged NPO status.         Interval History/Review of Systems :   The last 24 hours progress and nursing notes reviewed.    Patient doing well.  Ate fewer carbs this morning (only 34 grams) Had 240 grams yesterday am. NPH given at 7 am and Pred given at 9 am. NPH 7 units. Lantus 10 units. Weight based ( Lantus 18 units)   No nausea or emesis. WBC decreasing today. Hgb 9.4     Planned Procedures/Surgeries: none    Inpatient Glucose Control:       Recent Labs   Lab 03/25/24  0726 03/25/24  0618 03/25/24  0451 03/25/24  0220 03/24/24  2259 03/24/24  1906   * 169* 147* 138* 158* 189*             Medications Impacting Glycemia:   Steroids:  Prednisone to 40 mg daily i  D5W-containing solutions/medications: none   Other medications impacting glucose: none         Nutrition:   Orders Placed This Encounter      High Consistent Carb (75 g CHO per Meal) Diet    Supplements: none   TF: none   TPN: none         Diabetes History: see full consult note for complete diabetes history   Diabetes Type and Duration: not known to be diabetic    Last A1c: 6.4 on 3/23/2024    Hemoglobin at time of last A1c: 6.6  RBC transfusion in past 3 months: yes      PTA Medication Regimen: none  Historical Diabetes Medications: none     Glucose monitoring device and frequency: none  Outpatient Diabetes Provider: none         "Physical Exam:   /53 (BP Location: Right arm)   Pulse 98   Temp 97.2  F (36.2  C) (Axillary)   Resp 18   Ht 1.803 m (5' 11\")   Wt 73.5 kg (162 lb 0.6 oz)   SpO2 100%   BMI 22.60 kg/m    General:  well appearing, in no acute distress.  HEENT:  NC/AT. MMM, sclera not injected  Lungs:  unremarkable, no new cough, no SOB  ABD:  rounded, soft, non-tender  Skin:  warm and dry, no obvious lesions  Feet:    CMS intact  MSK:   moving all extremities  Lymp:    Bilatera; LE edema  Mental Status:  Alert and oriented x3  Psych:   Cooperative, good eye contact, full/appropriate affect           Data:     Lab Results   Component Value Date    A1C 6.4 03/23/2024    A1C 6.1 03/19/2024    A1C 5.8 03/14/2024       ROUTINE IP LABS (Last four results)  BMP  Recent Labs   Lab 03/25/24  0726 03/25/24  0618 03/25/24  0451 03/25/24  0220 03/24/24  0748 03/24/24  0705 03/23/24  2135 03/23/24  2052 03/23/24  0949 03/23/24  0759   NA  --  131*  --   --   --  133*  --  135  --  127*   POTASSIUM  --  4.5  --   --   --  3.7  --  3.3*  --  4.7   CHLORIDE  --  98  --   --   --  96*  --  94*  --  92*   KARY  --  8.6*  --   --   --  9.0  --  9.1  --  8.2*   CO2  --  20*  --   --   --  24  --  24  --  21*   BUN  --  34.5*  --   --   --  23.9*  --  18.7  --  28.7*   CR  --  4.49*  --   --   --  3.53*  --  2.83*  --  3.93*   * 169* 147* 138*   < > 142*   < > 181*  181*   < > 822*    < > = values in this interval not displayed.     CBC  Recent Labs   Lab 03/25/24  0738 03/24/24  0705 03/24/24  0013 03/23/24  0759   WBC 14.3* 20.4* 17.2* 5.9   RBC 2.76* 2.95* 2.67* 1.86*   HGB 9.4* 9.9* 9.1* 6.6*   HCT 29.8* 30.8* 27.6* 21.9*   * 104* 103* 118*   MCH 34.1* 33.6* 34.1* 35.5*   MCHC 31.5 32.1 33.0 30.1*   RDW 28.2* 27.5* 26.2* 25.9*   PLT 67* 112* 95* 109*     INR  Recent Labs   Lab 03/19/24  1021   INR 1.46*       Inpatient Diabetes Service will continue to follow, please don't hesitate to contact the team with any questions or " concerns.     JANNET Talley CNP    Plan discussed with patient, bedside RN, and primary team via this note.    To contact Inpatient Diabetes Service:     7 AM - 5 PM: Page the IDS [email or web address] following the patient that day (see filed or incomplete progress notes/consult notes under Endocrinology)    OR if uncertain of provider assignment: page job code [phone]    5 PM - 7 AM: First call after hours is to primary service.    For urgent after-hours questions, page job code for on call fellow: [phone]     I spent a total of 45 minutes on the date of the encounter doing prep/post-work, chart review, history and exam, documentation and further activities per the note including lab review, multidisciplinary communication, counseling the patient and/or coordinating care regarding acute hyper/hypoglycemic management, as well as discharge management and planning/communication.  See note for details.

## 2024-03-25 NOTE — TELEPHONE ENCOUNTER
Cancelling heart surgery with Dr. Romero at this time with no rescheule date in mind. Patient is currently working with GI to get his Chron's under better control. It may be weeks to months that surgery will need to be postponed.    Sonia Infante RNCC from GI stated patient is in process of changing his medical therapy with patient's GI provider Dr. Ponce. At this time, patient is not cleared for surgery from a GI perspective. It will take a couple of weeks for patient to even start changing his medication due to prior authorizations.

## 2024-03-25 NOTE — PLAN OF CARE
Neuro: A&Ox4 forgetful.   Cardiac: Afebrile, SR. VSS. BLE edema,  Respiratory: Sating > 95 on RA.  GI/: ESRD on Hemo dialysis MWF. Loose BM X3  Diet/appetite: Tolerating High Consistency Carb (75g Cho) diet. Eating well.  Activity:  Assist of 1, OOB to bathroom.  Pain: At acceptable level on current regimen. Dilaudid IV push x 1  Skin: No new deficits noted.  LDA's: R PIV x 1, L AV Fistula, Bruit and thrill present    Plan: In patient Hemo dialysis, Continue with POC. Notify primary team with changes.

## 2024-03-25 NOTE — PLAN OF CARE
"/69 (BP Location: Right arm, Cuff Size: Adult Regular)   Pulse 92   Temp 97.4  F (36.3  C) (Oral)   Resp 22   Ht 1.803 m (5' 11\")   Wt 73.5 kg (162 lb 0.6 oz)   SpO2 96%   BMI 22.60 kg/m      Hours of Care: 3250-0332.    Neuro: AO x 4.  Vitals: VSS.   Respiratory: WDL on RA.  Cardiac: WDL, No tele.    GI/: Oliguric. BM today.  Skin/Wounds: No new deficits noted.  Lines: R PIV. L A/V fistula.  Diet: Consistent carb.  Activity: Up with A1.  Pain: Denies.    Dialysis today with 2.2L removed.      Continue to monitor and follow POC    Tacos Schmid RN on 3/25/2024 at 6:15 PM    6B-Intermediate Care           "

## 2024-03-25 NOTE — CONSULTS
Discharge Pharmacy Test Claim      Duplicate consult, see pharmacy liaison note from 3/25/2024  8:50AM.      Humera Dinh  [facility] Pharmacy Liaison  Phone: [phone] Fax: [phone]  Available on Teams

## 2024-03-25 NOTE — PROGRESS NOTES
[facility]    Medicine Progress Note - Hospitalist Service, GOLD TEAM 6    Date of Admission:  3/23/2024    Assessment & Plan   Tacos Dominguez is a 64 year old male with history of ESRD on HD, HTN, CAD, prior NSTEMI, PAF, pulmonary HTN, partially calcified abdominal aortic dissection, mitral annular calcification with severe mitral insufficiency, moderate tricuspid insufficiency, prolonged QTc, Crohn's disease s/p multiple bowel resections, IPMN s/p subtotal pancreatectomy, chronic anemia, anxiety, and recent admissions for acute lower GI bleed secondary to Crohn's flare (2/25-3/1/24) and colonic pneumatosis (3/14-3/17/24) who presented from infusion center with hyperglycemia and was subsequently admitted for further evaluation and management.    Today's Plan:  Replace Mg per protocol  Insulin regimen per endocrine (see below)  De-escalate abx to Ceftriaxone 1g daily for possible pneumonia  HD per nephrology  Decrease ativan to 0.5mg PO q8h PRN  CBC and BMP in AM  Will likely need DM education prior to discharge    # Hyperglycemia, likely stress/steroid-induced:  Presented with BG >800. Hx subtotal pancreatectomy but no prior diabetes. Hx steroid-induced hyperglycemia, but never to this extent. Hgb A1C 6.4%, up-trending recently in setting of steroids for Crohn's disease. No evidence of DKA or HHS on arrival. Started on insulin drip. Endocrine consulted, felt QID dosing of prednisone could be contributing. Switched to daily prednisone. BG improved. Transitioned off insulin drip 3/24. BG remain well controlled today.   - Appreciate endocrine recs  - NPH 7 units daily with prednisone  - Lantus 10 untis daily  - Carb coverage 1/10g CHO w meals  - HSSI  - Hypoglycemia protocol  - Diabetic educator consulted     # Leukocytosis, concern for severe sepsis and possible pneumonia:    # Lactic acidosis:   WBC 5.9 on arrival. Lactate 4.6. Afebrile. Repeat WBC up to 17.2. Moderate  dyspnea reported. No hypoxia. No other localizing symptoms. CXR with streaky perihilar opacities favored to be pulmonary edema, although infection could not be ruled out. Does not make urine. BC x 2 NGTD. Started on cefepime + vancomycin. Lactate improved w/ fluid bolus. Remains afebrile. Repeat WBC 14.3 today. Respiratory status has improved, ? D/t antibiotics vs volume management.   - Stop cefepime  - Start ceftriaxone for possible CAP  - Repeat CBC in AM   - Will consider further de-escalation pending clinical improvement    # Recent colonic pneumatosis:  Similar presentation during recent admission with leukocytosis and lactic acidosis. Outside CT noted extensive colonic pneumatosis c/f ischemia. CRS and GI consulted at that time and recommended conservative management and outpatient monitoring with repeat CT d/t lack of symptoms. Currently no GI complaints. Abdominal exam benign.   - Monitor clinically   - Continue flagyl 500mg TID  - Follow up with GI as directed for repeat CT A/P    # Acute HFpEF:  # Severe mitral insufficiency:  # Mild to moderate tricuspid insufficiency:  # Pulmonary HTN:      Developed dyspnea shortly after admission. Likely iatrogenic in setting of fluid resuscitation and steroids. No hypoxia noted. Used supplemental O2 for comfort. CXR showed streaky perihilar opacities c/w edema. BNP 27,076. TTE with LVEF 60-65%, indeterminate diastolic function, borderline reduced RV function, severe mitral insufficiency, moderate tricuspid insufficiency, RVSP 62 mmHg, and non-dilated IVC. Clinically improved following extra run of HD. Suspect heart failure related to severe mitral disease. Scheduled for MVR and TVR on 4/3 but surgery cancelled d/t ongoing GI issues.  - Monitor clinically  - Volume management with HD  - Daily weights, I&O's  - Follow up with cardiology and CVTS as outpatient    # ESRD:  on HD MWF via LUE AVG. No missed runs. Received extra run yesterday d/t fluid overload and  decompensated HF.   - Nephrology consulted  - HD q MWF  - BMP daily    # Chronic anemia with acute blood loss:  Secondary to GI bleed from Crohn's flare. Recent EGD and colonoscopy wth bleeding noted in terminal ileum. Transfusion dependent, last 3/23 for Hgb 6.6. Denies bleeding today. Repeat Hgb 9.4, stable.   - CBC in AM  - Consider transfusion for Hgb <8.0 in setting of CHF    # Crohns disease:  Hx multiple bowel resections. Follows with Dr. Rubio of GI at [facility]. Previously managed with Stelara. Recent admission 2/25/24 for GI bleed in setting of acute flare. EGD showed active bleeding at terminal ileum. Treated with steroid burst. Re-admitted 3/15 with pneumatosis, at which time GI recommended stopping steroids. Had GI follow up on 3/20 (note reviewed) with plan start remicade to optimize CD prior to CV surgery. Restarted on prednisone 40mg daily. Patient reports being instructed to take 10mg QID, likely contributing to hyperglycemia per endocrine.  Currently no acute GI complaints or evidence of bleeding.  - Budesonide 9mg daily  - Continue prednisone 40mg daily  - Message sent to Dr. Rubio to confirm prednisone dosing  - Follow up with GI as directed for remicade    # Hyponatremia:  Likely multifactorial with renal disease and hyperglycemia contributing. Na improved to 133 on 3/24. Repeat Na 131 today but due to HD.   - HD today  - BMP in AM    # Elevated troponin, suspect type II NSTEMI:  Trop 206 on arrival, repeat 174. No ischemic changes on EKG. Suspect demand ischemia in setting of heart failure. Likely in part elevated d/t ESRD.     # Anxiety:  Psychiatry consulted 3/25. Started remeron d/t poor sleep quality. Has been using frequent IV ativan. More sedated and borderline confused today, likely d/t polypharmacy.   - Continue remeron 7.5mg at bedtime   - Decrease ativan to 0.5mg PO every 8h prn    # Acute toxic-metabolic encephalopathy:  Suspect polypharmacy secondary to benzos and remeron. No clear  source of infection, but remains afebrile and WBC down-trending on abx. No severe electrolyte derangements. Does not appear uremic.  - Avoid sedating meds as able  - Delirium precautions    # Renal mass:  Recent MRE noted increasingly complex exophytic cyst along lower pole of L kidney with internal hemorrhagic/proteinaceous contents and peripheral wall and septal thickening. Developing malignancy cannot be ruled out.   - Urology referral ordered as outpatient  - Will need follow up in 3-6 monhts    # Hypomagnesemia:  Mg 1.0 today. Replace per protocol.             Diet: High Consistent Carb (75 g CHO per Meal) Diet    DVT Prophylaxis: Pneumatic Compression Devices  Alcantara Catheter: Not present  Lines: None     Cardiac Monitoring: None  Code Status: Full Code      Clinically Significant Risk Factors        # Hypokalemia: Lowest K = 3.3 mmol/L in last 2 days, will replace as needed  # Hyponatremia: Lowest Na = 127 mmol/L in last 2 days, will monitor as appropriate   # Hypercalcemia: corrected calcium is >10.1, will monitor as appropriate  # Hypomagnesemia: Lowest Mg = 1 mg/dL in last 2 days, will replace as needed   # Hypoalbuminemia: Lowest albumin = 2.5 g/dL at 3/25/2024  6:18 AM, will monitor as appropriate   # Thrombocytopenia: Lowest platelets = 95 in last 2 days, will monitor for bleeding   # Hypertension: Noted on problem list            # Financial/Environmental Concerns:    # Support System: poor social support noted in nursing assessment         Disposition Plan      Expected Discharge Date: 03/26/2024                  The patient's care was discussed with the Attending Physician, Dr. Aguirre .    Quique Montenegro PA-C  Hospitalist Service, GOLD TEAM [address] [place] [address]  Securely message with [employer] (more info)  Text page via [facility] Paging/Directory   See signed in provider for up to date coverage  information  ______________________________________________________________________    Interval History   Patient appears very sedated and confused today. States he is just tired. Currently denies any GI complaints. No hematochezia or abdominal pain. LE swelling improved. Denies any dyspnea, cough, chest pain. No fevers or chills. Patient frequently brings up his anxiety/stress levels, which he says causes his blood sugars to rise.     Asked patient about prednisone dosing. Says he was instructed by GI to start taking prednisone 10mg x4 tabs daily following virtual visit on 3/20. When asked if provider instructed him to take 40mg all at once or spread out 4x daily, he says he was specifically told to take it 4x-daily.     Physical Exam   Vital Signs: Temp: 97.5  F (36.4  C) Temp src: Oral BP: 110/61 Pulse: 98   Resp: 18 SpO2: 100 % O2 Device: None (Room air)    Weight: 162 lbs .61 oz    General Appearance:  RASS -1. Sleeping on arrival and had difficulty staying awake. Oriented x3. Answered some questions inappropriately. Overall non-toxic appearance.   HEENT:  No scleral icterus. Moist mucous membranes.   Respiratory:  Normal effort. CTAB. No wheezing, rhonchi, rales.   Cardiovascular:  RRR. S1/S2. III/VI holosystolic murmur at apex. No gallops.   GI:  Soft, non-distended, non-tender, +BS. No mass.  Extremity:  Venous stasis changes bilaterally. 1-2+ woody edema bilateraly. No calf tenderness.   Skin:  No visible rash. No jaundice.   Neuro:  CN II-XI intact. No pronator drift. Equal .     Medical Decision Making       60 MINUTES SPENT BY ME on the date of service doing chart review, history, exam, documentation & further activities per the note.      Data     I have personally reviewed the following data over the past 24 hrs:    14.3 (H)  \   9.4 (L)   / 67 (L)     131 (L) 98 34.5 (H) /  192 (H)   4.5 20 (L) 4.49 (H) \     ALT: 18 AST: 20 AP: 96 TBILI: 1.1   ALB: 2.5 (L) TOT PROTEIN: 4.0 (L) LIPASE: N/A        Imaging results reviewed over the past 24 hrs:   No results found for this or any previous visit (from the past 24 hour(s)).

## 2024-03-25 NOTE — CONSULTS
Discharge Pharmacy Test Claim    Patient has pharmacy benefits through a [employer] commercial plan. This plan covers accu-chek guide testing supplies, humalog pens, humulin N pens, lispro pens, and lantus pens with $0 copays.    Test Claim Copay   accu-chek guide 0.00   humalog kwikpens 0.00   humulin N kwikpens 0.00   insulin lispro kwikpens 0.00   lantus solostar pens 0.00   novolog flexpens not covered     Barbara Adams  [facility] Pharmacy Liaison  Ph: [phone]  Fax [phone]  Available on [employer] (learn more here)

## 2024-03-25 NOTE — PROGRESS NOTES
Antimicrobial Stewardship Team Note    Antimicrobial Stewardship Program - A joint venture between [facility] and  Physicians to optimize antibiotic management.  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    History of Present Illness: Patient was initially admitted 2/25-3/1 for anemia where he underwent an EGD and colonoscopy that noted bleeding in the ileum s/p epinephrine and cautery, which was consistent with Crohn's flare. He then presented to an [facility] ED on 3/13 and was found to have evidence of large bowel pneumatosis, anemia, and scrotal swelling, prompted transfer to [facility]. He was admitted 3/14-3/17 with GI and CRS consultations with plans for a 30-day metronidazole course as well as prednisone (40 mg daily). On the day of most recent admission, patient went to the infusion center for a planned blood transfusion with a hemoglobin of 6.5. Pre-transfusion labs showed a blood glucose over 800, prompting patient to be referred to the ED.     On presentation, patient reported that he began to have trouble with his hemoglobin in late 2/2024 after finding out he needs to have heart surgery. This caused significant stress and likley led to Crohn's flare. He endorsed feeling fluid overloaded and ongoing high stress levels, not sleeping at night. He was afebrile, mildly tachycardic (HR  90-100s), normotensive (/72), RR 16, and on room air. Labs showed a WBC 5.9, lactic acid 4.6, creatinine 3.93, glucose 822, no ketones, BNP 04274, troponin 174, and procalcitonin 1.9. 3/23 CXR showed streaky pulmonary opacities favored to represent edema and possible trace R pleural effusion, no focal consolidative opacity. Metronidazole was continued on admission and cefepime was started yesterday as WBC peaked at 20.4. Peripheral blood cultures with no growth to date. Enteric panel and C.diff pcr tests were negative.           Active Anti-infective Medications   (From admission, onward)                 Start     Stop    03/24/24 1430  ceFEPIme  1 g,   Intravenous,   EVERY 24 HOURS        Community Acquired Pneumonia       --    03/23/24 1450  metroNIDAZOLE  500 mg,   Oral,   3 TIMES DAILY        Intra-Abdominal Infection       --                  Assessment: SIRS with low suspicion of infection, concerned for fluid overload  Patient has remained afebrile and vitally stable. His HR is still tachycardic. Notably, his leukocytosis trended down to 14.3 today with one dose of cefepime (not at steady-state concentrations) and lactic acidosis resolved quickly. Blood glucose also appears better controlled. Patient had minimal localizing symptoms on admission. Imaging was not suggestive of a bacterial pneumonia with no obvious lobar consolidations, instead, more suggestive of fluid overload. Patient's procalcitonin is very difficult to interpret as renal dysfunction can cause artifical elevation. Per chart review, patient has not required any supplemental oxygen. Anemia may be impacting shortness of breath along with concern for fluid overload. With Crohn's and current prednisone course, patient is high risk for development of secondary infection, such as C.difficile. With no apparent infectious nidus, favor stopping cefepime and closely monitoring off broad spectrum antibiotic therapy. If prednisone course is  extended beyond 30 days, patient will need Bactrim prophylaxis.        Recommendations:  Stop cefepime with continued close monitoring  Agree with metronidazole plan  If prednisone course is extended beyond 30 days, patient will need Bactrim prophylaxis    Pharmacy took the following actions: Electronic note created, Called/paged provider.    Discussed with ID Staff KAYA Andrade, PharmD, BCIDP  Pager: [phone]      Vital Signs/Clinical Features:  Vitals         03/23 0700  03/24 0659 03/24 0700  03/25 0659 03/25 0700  03/25 1230   Most Recent      Temp ( F) 97.2 -  97.7    97.5 -  98.2      97.2     97.2 (36.2) 03/25 1210    Pulse 79 -  101    98 -  106       98 03/24 1941    Resp 16 - 22    18 -  20    16 -  18     16 03/25 1210    BP 81/56 -  141/95    110/61 -  131/83    107/53 -  116/69     116/69 03/25 1210    SpO2 (%) 99 -  100    94 -  100      100     100 03/25 1210            Labs  Estimated Creatinine Clearance: 17.3 mL/min (A) (based on SCr of 4.49 mg/dL (H)).  Recent Labs   Lab Test 03/15/24  0745 03/19/24  1021 03/23/24  0759 03/23/24  2052 03/24/24  0705 03/25/24  0618   CR 6.36* 4.58* 3.93* 2.83* 3.53* 4.49*       Recent Labs   Lab Test 11/18/22  0957 02/14/23  1241 04/07/23  1222 06/26/23  1653 08/09/23  0935 10/24/23  0827 01/20/24  1028 02/15/24  0723 02/25/24  1333 02/25/24  1820 03/16/24  0639 03/17/24  0704 03/19/24  1021 03/23/24  0759 03/24/24  0013 03/24/24  0705 03/25/24  0738   WBC 4.3  --  7.4 7.3   < > 4.2 7.4   < > 5.7   < > 12.0*  --  9.1 5.9 17.2* 20.4* 14.3*   ANEU 3.3  --  6.2 6.4  --  3.3 5.9  --  4.4  --   --   --   --   --   --   --   --    ALYM 0.7*  --  0.7* 0.6*  --  0.4* 0.7*  --  0.7*  --   --   --   --   --   --   --   --    MARY CARMEN 0.2  --  0.4 0.3  --  0.3 0.7  --  0.3  --   --   --   --   --   --   --   --    AEOS 0.1  --  0.1 0.0  --  0.1 0.1  --  0.2  --   --   --   --   --   --   --   --    HGB 11.1*   < > 7.9* 9.3*   < > 10.5* 8.3*   < > 5.6*    < > 7.3* 8.0* 6.5* 6.6* 9.1* 9.9* 9.4*   HCT 32.8*  --  24.2* 29.5*   < > 33.2* 27.0*   < > 18.8*   < > 22.6*  --  21.3* 21.9* 27.6* 30.8* 29.8*   *  --  109* 107*   < > 105* 121*   < > 118*   < > 108*  --  112* 118* 103* 104* 108*   PLT 88*   < > 117* 144*   < > 63* 110*   < > 77*   < > 62*  --  61* 109* 95* 112* 67*    < > = values in this interval not displayed.       Recent Labs   Lab Test 02/20/24  1426 02/25/24  1333 03/14/24  0535 03/19/24  1021 03/23/24  0759 03/25/24  0618   BILITOTAL 0.9 0.8 1.5* 0.8 0.7 1.1   ALKPHOS 100 95 96 109 142 96   ALBUMIN 3.2* 3.1* 2.9* 2.7* 3.0* 2.5*   AST 32 36 20 21 15 20   ALT 21 27 32 22 21 18       Recent Labs   Lab Test 09/26/20  1252 03/17/22  1116 06/26/23  1653 08/09/23  0935 10/24/23  0827 01/20/24  1028 02/20/24  1426 02/21/24  0124 03/14/24  0534 03/14/24  0535 03/14/24  1059 03/15/24  1254 03/15/24  1452 03/23/24  0759 03/23/24  1234 03/23/24  2052 03/24/24  0013 03/24/24  0416   PCAL 0.45  --   --   --   --   --   --   --   --   --   --   --   --   --  1.90*  --   --   --    LACT  --    < >  --   --   --   --   --   --   --    < > 2.2* 2.1* 2.4*  --   --  4.6* 3.5* 1.7   CRPI  --    < > 154.00* 7.10* 25.50* 68.80* 8.25*  --   --   --   --   --   --  4.65  --   --   --   --    SED  --    < > 77* 81* 65* 64*  --  70* 59*  --   --   --   --   --   --   --   --   --     < > = values in this interval not displayed.             Culture Results:  7-Day Micro Results       Procedure Component Value Units Date/Time    MRSA MSSA PCR, Nasal Swab     Order Status: Sent Lab Status: No result     Specimen: Swab     Enteric Bacteria and Virus Panel PCR [[id number]]  (Normal) Collected: 03/23/24 0621    Order Status: Completed Lab Status: Final result Updated: 03/24/24 1692    Specimen: Stool from Per Rectum      Campylobacter species Negative     Salmonella species Negative     Vibrio species Negative     Vibrio cholerae Negative     Yersinia enterocolitica Negative      Enteropathogenic E. coli (EPEC) Negative     Shiga-like toxin-producing E. coli (STEC) Negative     Shigella/Enteroinvasive E. coli (EIEC) Negative     Cryptosporidium species Negative     Giardia lamblia Negative     Norovirus Gl/Gll Negative     Rotavirus A Negative     Plesiomonas shigelloides Negative     Enteroaggregative E. coli (EAEC) Negative     Enterotoxigenic E. coli (ETEC) Negative     E. coli O157 NA     Cyclospora cayetanensis Negative     Entamoeba histolytica Negative     Adenovirus F40/41 Negative     Astrovirus Negative     Sapovirus Negative    Narrative:      Assay performed using the FDA-cleared FilmArray GI Panel from [employer], [employer].  A negative result should not rule out infection in patients with a probability for gastrointestinal infection. The assay does not test for all potential infectious agents of diarrheal disease.  Positive results do not distinguish between a viable or replicating organism and the presence of a nonviable organism or nucleic acid, nor do they exclude the possibility of coinfection by organisms not in the panel.  Results are intended to aid in the diagnosis of illness and are meant to be used in conjunction with other clinical findings.  This test has been verified and is performed by the [facility] Laboratory at [facility]. This laboratory is certified under the Clinical Laboratory Improvement Amendments of 1988 (CLIA-88) as qualified to perform high complexity clinical laboratory testing.    C. difficile Toxin B PCR with reflex to C. difficile Antigen and Toxins A/B EIA [[id number]]  (Normal) Collected: 03/23/24 2116    Order Status: Completed Lab Status: Final result Updated: 03/23/24 5844    Specimen: Stool from Per Rectum      C Difficile Toxin B by PCR Negative     Comment: A negative result does not exclude actual disease due to C. difficile and may be due to improper collection, handling and storage of the specimen or the  number of organisms in the specimen is below the detection limit of the assay.       Narrative:      The [employer] Xpert C. difficile Assay, performed on the [employer], is a qualitative in vitro diagnostic test for rapid detection of toxin B gene sequences from unformed (liquid or soft) stool specimens collected from patients suspected of having Clostridioides difficile infection (CDI). The test utilizes automated real-time polymerase chain reaction (PCR) to detect toxin gene sequences associated with toxin producing C. difficile. The Xpert C. difficile Assay is intended as an aid in the diagnosis of CDI.    Blood Culture Hand, Right [[id number]]  (Normal) Collected: 03/23/24 1612    Order Status: Completed Lab Status: Preliminary result Updated: 03/24/24 1805    Specimen: Blood from Hand, Right      Culture No growth after 1 day    Narrative:      Only an Aerobic Blood Culture Bottle was collected, interpret results with caution.        Blood Culture Peripheral Blood [[id number]]  (Normal) Collected: 03/23/24 1421    Order Status: Completed Lab Status: Preliminary result Updated: 03/24/24 1517    Specimen: Peripheral Blood      Culture No growth after 1 day    Quantiferon TB Gold Plus Grey Tube [[id number]] Collected: 03/23/24 0759    Order Status: Completed Lab Status: Final result Updated: 03/24/24 1033    Specimen: Blood from Arm, Right      Quantiferon Nil Tube 0.01 IU/mL     Quantiferon TB Gold Plus Green Tube [[id number]] Collected: 03/23/24 0759    Order Status: Completed Lab Status: Final result Updated: 03/24/24 1033    Specimen: Blood from Arm, Right      Quantiferon TB1 Tube 0.00 IU/mL     Quantiferon TB Gold Plus Yellow Tube [[id number]] Collected: 03/23/24 0759    Order Status: Completed Lab Status: Final result Updated: 03/24/24 1033    Specimen: Blood from Arm, Right      Quantiferon TB2 Tube 0.00    Quantiferon TB Gold Plus Purple Tube [[id number]] Collected:  24 0759    Order Status: Completed Lab Status: Final result Updated: 24 1034    Specimen: Blood from Arm, Right      Quantiferon Mitogen 0.47 IU/mL     Quantiferon TB Gold Plus [[id number]]  (Abnormal) Collected: 24 0759    Order Status: Completed Lab Status: Final result Updated: 24 1307    Specimen: Blood from Arm, Right      Quantiferon-TB Gold Plus Indeterminate     Comment: Unable to evaluate interferon gamma response due to a low   mitogen value, which may be the result of insufficient lymphocytes, reduced   lymphocyte activity due to improper specimen handling, or inability of the   patient's lymphocytes to generate interferon gamma.        TB1 Ag minus Nil Value -0.01 IU/mL      TB2 Ag minus Nil Value -0.01 IU/mL      Mitogen minus Nil Result 0.46 IU/mL      Nil Result 0.01 IU/mL             Recent Labs   Lab Test 20  1451 21  1225   URINEPH 5.0 6.0   NITRITE Negative Negative   LEUKEST Negative Negative   WBCU 0-5 2                   Recent Labs   Lab Test 24  2114   CDBPCT Negative       Imaging: Echo Complete    Result Date: 3/24/2024  [id number]^ISABELL^ETHAN^BRENDON  [facility] [address] [place] [id number]  Name: KRAIG CHAUDHARI MRN: [id number] : 1960 Study Date: 2024 07:32 AM Age: 64 yrs Gender: Male Patient Location: [facility] Reason For Study: CHF Ordering Physician: ETHAN WHYTE Performed By: Domonique Day RDCS  BSA: 1.9 m2 Height: 71 in Weight: 155 lb BP: 131/83 mmHg ______________________________________________________________________________ Procedure Echocardiogram with two-dimensional, color and spectral Doppler performed. ______________________________________________________________________________ Interpretation Summary Global and regional left ventricular function is normal with an EF of 60-65%. Global right ventricular function is  borderline reduced. The right ventricle is normal size. Severe mitral insufficiency is present. The etiology is MAC causing posterior leaflet restriction/failure of coaptation. There is also a severely elevated mitral gradient, in part due to the severity of the regurgitation. Mild to moderate tricuspid insufficiency is present. The estimated PA systolic pressure is 65 mmHg. IVC diameter <2.1 cm collapsing >50% with sniff suggests a normal RA pressure of 3 mmHg. This study was compared with the study from 01/11/2024. No significant changes. The LV function was underestimated on the last study. ______________________________________________________________________________ Left Ventricle Global and regional left ventricular function is normal with an EF of 60-65%. Left ventricular wall thickness is normal. Left ventricular size is normal. Diastolic function not assessed due to significant valvular regurgitation.  Right Ventricle The right ventricle is normal size. Global right ventricular function is borderline reduced.  Atria The right atria appears normal. Mild to moderate left atrial enlargement is present.  Mitral Valve Moderate mitral annular calcification is present. Severe mitral insufficiency is present. The etiology is MAC causing posterior leaflet restriction/failure of coaptation. The mean gradient is 11 mmHg at 122 bpm.  Aortic Valve The aortic valve is tricuspid. Mild aortic insufficiency is present.  Tricuspid Valve Mild to moderate tricuspid insufficiency is present. The right ventricular systolic pressure is 62mmHg above the right atrial pressure.  Pulmonic Valve The pulmonic valve cannot be assessed.  Vessels The aorta root cannot be assessed. The thoracic aorta cannot be assessed. IVC diameter <2.1 cm collapsing >50% with sniff suggests a normal RA pressure of 3 mmHg.  Pericardium No pericardial effusion is present.  Compared to Previous Study This study was compared with the study from 01/11/2024 .  No significant changes. The LV function was underestimated on the last study. ______________________________________________________________________________ MMode/2D Measurements & Calculations IVSd: 0.76 cm LVIDd: 6.4 cm LVIDs: 3.6 cm LVPWd: 0.99 cm FS: 43.7 % LV mass(C)d: 229.5 grams LV mass(C)dI: 121.3 grams/m2 LVOT diam: 2.0 cm LVOT area: 3.1 cm2 RWT: 0.31  Doppler Measurements & Calculations MV E max josué: 223.5 cm/sec MV A max josué: 190.0 cm/sec MV E/A: 1.2 MV max P.5 mmHg MV mean P.0 mmHg MV V2 VTI: 48.6 cm MVA(VTI): 1.5 cm2 MV dec slope: 429.0 cm/sec2 MV dec time: 0.30 sec Ao V2 max: 326.0 cm/sec Ao max P.5 mmHg Ao V2 mean: 227.0 cm/sec Ao mean P.0 mmHg Ao V2 VTI: 56.8 cm LAURA(I,D): 1.3 cm2 LAURA(V,D): 1.4 cm2 LV V1 max P.3 mmHg LV V1 max: 144.0 cm/sec LV V1 VTI: 22.9 cm SV(LVOT): 71.9 ml SI(LVOT): 38.0 ml/m2 AV Josué Ratio (DI): 0.44  LAURA Index (cm2/m2): 0.67  ______________________________________________________________________________ Report approved by: Brain Rey 2024 11:01 AM       XR Chest Port 1 View    Result Date: 3/23/2024  EXAM: XR CHEST PORT 1 VIEW 3/23/2024 1:41 PM  HISTORY: Hyperglycemia. Please eval for acute process. COMPARISON: Chest radiograph 2/15/2024, CT chest 10/24/2023 TECHNIQUE: Frontal view of the chest. FINDINGS: Trachea is midline. Cardiac silhouette is within normal limits. Calcification of the aortic arch. Pulmonary vasculature is distinct. No visualized focal airspace opacity, or pneumothorax. Slight blunting of the right costophrenic angle. The left costophrenic angle is collimated out of the field-of-view. Calcification near the cardiac apex, appears unchanged compared to prior prior radiograph and present on CT 10/24/2023 along the pleura/pericardium. Streaky perihilar opacities present. No acute osseous abnormality. Visualized soft tissues and upper abdomen are unremarkable.     IMPRESSION: Streaky perihilar opacities, likely to represent  edema, could also represent atelectasis or atypical infection. Possible trace right pleural effusion. There is no focal consolidative opacity. I have personally reviewed the examination and initial interpretation and I agree with the findings. PRUDENCE KONG MD   SYSTEM ID:  [id number]    MR Enterography w/o & wContrast    Result Date: 3/19/2024  EXAM: MRI ENTEROGRAPHY/MRI ABDOMEN AND MRI PELVIS WITHOUT AND WITH CONTRAST LOCATION: [facility] DATE: 3/18/2024 INDICATION:  Crohn's disease of both small and large intestine with other complication (H) COMPARISON: 10/24/2023 TECHNIQUE: Routine enterography protocol including imaging of abdomen and pelvis with axial T1 in/out phase, axial T2, coronal T2. Post contrast abdomen and pelvis axial and coronal thin-section T1 with fat sat. 1 mg Glucagon. Oral VoLumen. CONTRAST: 7 mL Gadavist FINDINGS: ENTEROGRAPHY: Redemonstrated anterior right abdominal wall hernia containing loops of small bowel extending between the right rectus abdominis musculature and lateral abdominal wall musculature. There is increasing distention of a small bowel loop within  the hernia sac measuring up to 6.5 cm in diameter (series 16, image 19). There are at least 2 transition points seen along the neck of the hernia on either side of a distended small bowel loop (series 16, images 20-28). Several dilated small bowel loops  are also noted along the right hemiabdomen adjacent to the hernia measuring up to 3.1 cm in diameter (series 19, image 24) with a transition point seen inferior to the right hepatic lobe (series 19, image 24). There is subsegmental wall thickening and enhancement seen along this transition point measuring up to 5.2 cm in length (series 19, image 24). Furthermore another area of distended small bowel is seen along the left upper and central abdomen measuring up to 3.9 cm (series 19, image 27). A transition point is noted near the neck of the hernia  (series 19, image 24) with associated segmental area of wall thickening measuring up to 2.6 cm (series 19, image 25). ADDITIONAL FINDINGS: Diffuse loss of parenchymal signal on in phase images compared to out of phase images compatible with iron deposition in the liver and spleen. Stable cystic lesion within the right hepatic lobe measuring up to 1.4 cm. Gallbladder is surgically absent. No intrahepatic or extra hepatic biliary duct dilatation is seen. Kidneys appear atrophic with bilateral renal cysts. A 9.3 x 6.9 cm T1 hyperintense cystic mass along the inferior pole of the left kidney appears slightly decreased from  the prior measurement of 9.7 x 6.8 cm (series 7, image 45). Mixed T2 signal and internal septations are present measuring up to 3 mm. There is also increasing peripheral wall thickening measuring up to 8 mm (series 25, image 48) as well as areas of 2 mm  obtusely marginated convex protrusions (series 26, image 56). Postsurgical changes are present status post distal pancreatectomy with stable appearance of the residual pancreatic head and neck. Adrenal glands appear unremarkable. No intra-abdominal or pelvic lymphadenopathy is seen. Moderate to severe atherosclerotic plaque along the abdominal aorta with unchanged chronic dissection flap in the infrarenal abdominal aorta (series 11, image 49). Multilevel degenerative changes are seen in the spine.     IMPRESSION: 1.  Multifocal areas of segmental wall thickening and stricturing seen along small bowel loops in the right hemiabdomen compatible with acute Crohn's disease. These areas of stricturing result in a complex, multifocal small bowel obstruction as described  above. For example, there are at least 2 transition points on either side of distended small bowel loop along the hernia sac measuring up to 6.5 cm. Dilated loops of small bowel also seen along the right hemiabdomen adjacent to the hernia with associated transition point demonstrating  segmental wall thickening measuring up to 5.2 cm. Furthermore, a third area of distended small bowel seen along the left upper and central abdomen with associated transition point seen along the neck of the hernia containing segmental wall thickening measuring up to 2.6 cm. 2.  Increasingly complex exophytic cyst along the lower pole of the left kidney with internal hemorrhagic/proteinaceous contents as well as increasing peripheral wall and septal thickening. Findings compatible with Bosniak class III cyst which is indeterminant and developing malignancy is in the differential diagnosis. Recommend continued attention on 3-6 month follow-up exam as well as urological consultation. 3.  Hepatic and splenic iron deposition. 4.  Stable appearance of dissection flap along the infrarenal abdominal aorta. Bosniak MRI characterization Class I: Well-defined, thin (<= 2mm width) smooth wall; homogenous simple fluid (signal intensity similar to CSF); no septa or calcifications; the wall may enhance Class II: Three types, all well-defined with thin (<=2 mm) smooth walls; -Cystic masses with thin (<= 2 mm) and few (1-3) enhancing septa; any nonenhancing septa; may have calcification of any type -Homogeneous masses markedly hyperintense at T2-weighted imaging (similar to CSF) at noncontrast MRI -Homogeneous masses markedly hyperintense at T1-weighted imaging (approximately 2.5 times normal parenchymal signal intensity at noncontrast MRI) Class IIF: Two types: -Cystic masses with a smooth minimally thickened (3 mm) enhancing wall, or smooth minimal thickening of one or more enhancing septa, or many (>=4 in number) smooth thin (<=2mm width) enhancing septa -Cystic masses that are heterogeneously hyperintense on T1-weighted fat-saturated non-contrast imaging Class III: One or more enhancing thick (>= 4 mm width) or enhancing irregular walls or septa. (Irregular defined as displaying 3mm or smaller obtusely marginated convex  protrusions) Class IV: One or more enhancing nodules (>= 4mm convex protrusion with obtuse margins or a convex protrusion of any size with acute margins) Bosniak Classification of Cystic Renal Masses, Version 2019: An Update Proposal and Needs Assessment. Rosa M SG et al. Radiology 2019.

## 2024-03-26 NOTE — PHARMACY-VANCOMYCIN DOSING SERVICE
"Pharmacy Vancomycin Initial Note  Date of Service 2024  Patient's  1960  64 year old, male    Indication: Sepsis    Current estimated CrCl = Estimated Creatinine Clearance: 25.5 mL/min (A) (based on SCr of 2.98 mg/dL (H)).    Creatinine for last 3 days  3/23/2024:  8:52 PM Creatinine 2.83 mg/dL  3/24/2024:  7:05 AM Creatinine 3.53 mg/dL  3/25/2024:  6:18 AM Creatinine 4.49 mg/dL  3/26/2024:  3:44 AM Creatinine 2.98 mg/dL    Recent Vancomycin Level(s) for last 3 days  No results found for requested labs within last 3 days.      Vancomycin IV Administrations (past 72 hours)        No vancomycin orders with administrations in past 72 hours.                    Nephrotoxins and other renal medications (From now, onward)      Start     Dose/Rate Route Frequency Ordered Stop    24 1730  vancomycin (VANCOCIN) 1,750 mg in sodium chloride 0.9 % 500 mL intermittent infusion         1,750 mg  over 120 Minutes Intravenous ONCE 24 1728      24 1727  vancomycin place arteaga - receiving intermittent dosing         1 each Intravenous SEE ADMIN INSTRUCTIONS 24 1728      24 1230  vasopressin (VASOSTRICT) 20 Units in sodium chloride 0.9 % 100 mL standard conc infusion         2.4 Units/hr  12 mL/hr  Intravenous CONTINUOUS 24 1207      24 1200  norepinephrine (LEVOPHED) 16 mg in  mL infusion MAX CONC CENTRAL LINE         0.01-0.6 mcg/kg/min × 71.5 kg  0.7-40.2 mL/hr  Intravenous CONTINUOUS 24 1141      24 1000  piperacillin-tazobactam (ZOSYN) 2.25 g vial to attach to  ml bag        Note to Pharmacy: For [facility], [facility] and [facility]: For Zosyn-naive patients, use the \"Zosyn initial dose + extended infusion\" order panel.    2.25 g  over 30 Minutes Intravenous EVERY 6 HOURS 24 0336              Contrast Orders - past 72 hours (72h ago, onward)      Start     Dose/Rate Route Frequency Stop    24 1430  iopamidol (ISOVUE-370) solution 67 mL         67 mL " Intravenous ONCE 03/26/24 1428    03/26/24 1300  iopamidol (ISOVUE-370) solution 96 mL         96 mL Intravenous ONCE 03/26/24 1315    03/26/24 0700  iopamidol (ISOVUE-370) solution 67 mL         67 mL Intravenous ONCE 03/26/24 0654    03/26/24 0030  iopamidol (ISOVUE-370) solution 90 mL         90 mL Intravenous ONCE 03/26/24 0009            [employer] Prediction of Planned Initial Vancomycin Regimen  N/A        Plan:  Start vancomycin  1750 mg IV once, then intermittent dosing pending HD/levels.   Vancomycin monitoring method: Trough (Method 2 = manual dose calculation)  Vancomycin therapeutic monitoring goal: 15-20 mg/L  Pharmacy will check vancomycin levels as appropriate in 1-3 Days.    Serum creatinine levels will be ordered daily for the first week of therapy and at least twice weekly for subsequent weeks.      Lena Davis [facility]

## 2024-03-26 NOTE — H&P
MEDICAL ICU H&P  03/26/2024    Date of Hospital Admission: 3/26/2024  Date of ICU Admission: 3/26/24   Reason for Critical Care Admission: 3/26/2024  Date of Service (when I saw the patient): 03/26/2024    ASSESSMENT:  Tacos Dominguez is a 65 yo male with PMHx of ESRD on HD, PHTN, partially calcified abdominal aortic dissection, severe mitral insufficiency, moderate tricuspid insufficiency, Crohn's disease s/p multiple bowel resections with end to end ileocolonic anastomosis, IPMN s/p subtotal pancreatectomy and SANTOS 3/17/2022, chronic anemia, and recent admissions for acute lower GI bleed secondary to Crohn's flare (2/25-3/1/24) and colonic pneumatosis (3/14-3/17/24) at OSH treated conservatively. Admission for acute respiratory failure 2/2 chronic HFpEF 2/2 acute on chronic blood loss anemia now with hematochezia and c/f bowel perforation and ischemic stroke of the R MCA. Transferred to ICU on 3/26 for further management of hemorrhagic shock requiring recurrent transfusions and vasopressors.      PLAN:    Neuro:  # Pain and sedation  Not on sedation.  - RASS goal 0 -1    #Transient L sided weakness and facial droop   #R MCA M2 severe stenosis   Stroke code called on 3/26. CT with small focal low density in the anterior right frontal lobe, assumed more likely chronic than acute, no intracranial hemorrhage. CTA head and neck with severe stenosis of the Right MCA segment. No aneurysm. Symptoms appeared consistent with R MCA syndrome and symptoms transient in nature. Pt was transferred to IR suite with intention to treat, but pt's symptoms resolved back to almost back baseline, so the decision was made not to complete the procedure. Second stroke code called in afternoon due to worsening neuro symptoms. Per Neuro likely worsening of stroke in setting of profound hypotension from hemorrhagic shock. Now w/ decreased LOC, R gaze deviation, L sided sensation loss, L sided arm posturing, L sided crystal-neglect, dysarthria.  Embolectomy considered initially but thrombus is too distal to be reached.    - Neuro checks Q2H  - Neurocrit following    - MRI Brain w/ and w/o contrast when more stable    - Goal normotension (per d/w stroke team on time of arrival)  - Avoid sedating meds   - NPO     # Anxiety:   Psychiatry consulted 3/25. Started remeron d/t poor sleep quality. Had ativan PRN.   - Hold remeron 7.5mg at bedtime   - Hold ativan to 0.5mg PO every 8h prn    Pulmonary:   No acute concerns, on RA    Cardiovascular:  #Shock likely 2/2 hemorrhagic vs septic   BP decreased to 50/60s in setting of c/f for both acute GI bleed (likely from prior ulcer), bowel perforation given pneumoperitoneum seen on CT imaging, and/or crohns flareup.    - Arterial Line Placement   - Central line placement   - NE and Vaso for MAP >65     #Hx of NSTEMI (10/2020)  # Elevated troponin, suspect type II NSTEMI   Trop 206 on arrival, repeat 174. No ischemic changes on EKG. Suspect demand ischemia in setting of heart failure. Likely in part elevated d/t ESRD.   -CTM    #HFpEF  #HLD  3/26 TTE with EF 60-65%.   - Strict I/Os  - hold simvastatin 20mg w/ NPO status     #Severe symptomatic mitral valve regurgitation with  significant MAC  #Severe functional tricuspid regurgitation   Per 3/14 IP Cardiology note was planned to have valve replacement surgery in April.    GI/Nutrition:  #BRBPR  #Hx of bleeding ulcers at terminal ileum near anastomosis  #Extravasation into small bowel loop in RLQ hernia  # Pneumoperitoneum   # Pneumatosis Intestinalis   Actively bleeding bright red blood per rectum seen on 3/26.  Hgb drop from 8 to 5.2,  Pt became hypotensive, requring pressors and 2 units RBC. Concerns for rebleeding from ulceration at the ileum near anastomosis vs Crohn flare. CTA showing active extravasation into small bowel loop in RLQ hernia. GI, colorectal surgery involved. Due to amount of active bleeding, the benefit of giving TXA outweighs the harm of worsening  his stroke. Formal CT read additionally returned w/ free air. Colorectal surgery consulted- Surgery offered and discussed with proxy decision maker patient's brother (Nando), who felt this would not be in line w/ patient's GOC. Brother would like to medically manage.   - Obtain central access for ongoing high volume transfusions    - TXA bolus + infusion    - S/p 4 PRBC, 1U plasma  - Give 1U PLT now, followed by 2U PRBC now  - Goal Hgb >8 and PLT >50k   - q4h Hgb checks   - trend iCal w/ blood product transfusions   - Continue Zosyn, add Vanc and Micafungin     - GI unable to scope given contraindicated in perforation  -  STAT Remicade ordered  - Hold on steroids in setting of perforation   - Colorectal surgery consulted, appreciate input   - NPO     # Crohns disease:   Hx multiple bowel resections.  Previously managed with Stelara. Recent admission 2/25/24 for GI bleed in setting of acute flare. EGD showed active bleeding at terminal ileum. Treated with steroid burst. Re-admitted 3/15 with pneumatosis, at which time GI recommended stopping steroids. Had GI follow up on 3/20 with plan start remicade to optimize CD prior to CV surgery. Restarted on prednisone 40mg daily.    - GI consulted, apprecitate recs   - Budesonide 9mg daily held, NPO    - Start remicade 10mg/kg x1    Renal/Fluids/Electrolytes:  # ESRD on HD   MWF. A-V fistula R UE.   - Nephrology following   - HD q MWF  - BMP daily  - Hold PTA phoslo given NPO      #Hyponatremia:  Likely multifactorial with renal disease and hyperglycemia contributing. Na improved to 133 on 3/24. Repeat Na 133 today.  - BMP every day    #Hypomagnesemia:    - Replace as needed for Mg >2    #Lactic acidosis  3/25 lactate 4.8, now decreased to 1.3. Likely 2/2 bowel perforation, hypotension.   - Abx as below  - CTM     # Renal  exophytic cyst   Recent MRE noted increasingly complex exophytic cyst along lower pole of L kidney with internal hemorrhagic/proteinaceous contents and  peripheral wall and septal thickening. Developing malignancy cannot be ruled out.   - Urology referral ordered as outpatient  - Will need follow up in 3-6 months     Endocrine:  #Hypoglycemia   In setting of acute illness (bowel perforation and GI bleed) and NPO status.     # Hyperglycemia, likely stress/steroid-induced, now resolved  Initially presented with BG >800. Hx subtotal pancreatectomy but no prior diabetes.   No evidence of DKA or HHS on arrival. Started on insulin drip. Endocrine consulted, felt QID dosing of prednisone could be contributing. Switched to daily prednisone. BG improved. Transitioned off insulin drip 3/24.  Hgb A1C 6.4%, up-trending recently in setting of steroids for Crohn's disease.    ID:  # Sepsis 2/2 bowel perforation   WBC elevated to 20 on 3/24, now decreased to 7.7. Lactate 4.6. Afebrile.  Moderate dyspnea reported. No hypoxia. No other localizing symptoms. CXR with streaky perihilar opacities favored to be pulmonary edema, although infection could not be ruled out. Does not make urine. BC x 2 NGTD. Started on cefepime + vancomycin. Lactate improved w/ fluid bolus. Remains afebrile. Pneumoperitoneum seen on CT concerning for possible bowel perforation, now on zosyn.   -c/t Zosyn     Hematology:    # Chronic anemia with acute blood loss:  - See above     Musculoskeletal:  #Deconditioning secondary to acute on chronic illness  - PT/OT         Skin:  - Skin checks per ICU protocol    General Cares/Prophylaxis:    DVT Prophylaxis: Pneumatic Compression Devices  GI Prophylaxis: PPI bid  Restraints: none  Family Communication: Nando Andujar is patient's proxy decision maker , number in Chart. Friend Emanuel is at bedside   Code Status: DNR/DNI (discussed w/ Nando)    Lines/tubes/drains:  - PICC line (3/26 -   - Arterial Line (3/26 -   - 2 large bore Ivs   - Fem CVC, large bore     Disposition:  - Medical ICU     Patient seen and findings/plan discussed with medical ICU staff,   Bienvenido.    Espinoza Nicholson, MS4    Resident/Fellow Attestation   I, Madhavi Wright MD, was present with the medical/KHADRA student who participated in the service and in the documentation of the note.  I have verified the history and personally performed the physical exam and medical decision making.  I agree with the assessment and plan of care as documented in the note.      Madhavi Wright MD  PGY3  Date of Service (when I saw the patient): 03/26/24  -----------------------------------------------------------------------    HISTORY PRESENTING ILLNESS:   Mr. Tacos Dominguez is a 65 yo male with PMHx of ESRD on HD, Crohn's disease s/p multiple bowel resections with end to end ileocolonic anastomosis, recent admissions for acute lower GI bleed secondary to Crohn's flare (2/25-3/1/24) and colonic pneumatosis (3/14-3/17/24) at OSH treated conservatively. Current admission for hyperglycemia, acute respiratory failure 2/2 chronic HFpEF 2/2 acute on chronic blood loss anemia with new BRBPR and c/f perforation.    Rapid response called overnight on 3/25 given elevated lactic acidosis to 4.8, thought to be multifactorial due to CAP and BRBPR and blood loss. He has 2 large bore Ivs, IV PPI, and made NPO. GI consulted. Transfused 1 unit(s) PRBC for Hgb 7.1. BP at that time stable (101/67 and 95bpm). Stat CT A/P with contrast obtained which demonstrated free air.     Colorectal consulted: recommended no surgical intervention, strict NPO, and zosyn with serial abdominal exams.     At 7am, stroke code called for L sided weakness, slurred speech and facial droop, stroke code called. Patient running D10% infusion given NP.     Patient required transfer to the ICU for hypotension and c/f septic and hemorrhagic shock. BP dropped as low as 48/29, patient started on NE.     REVIEW OF SYSTEMS: unable to obtain given acuity of patient illness       PAST MEDICAL HISTORY:   Past Medical History:   Diagnosis Date    Aortic dissection (H)      distal, thin.  stable/chronic on MRCP 3/2022    Benign essential hypertension     CAD (coronary artery disease)     Cerebral infarction (H)     Crohn's colitis (H)     Crohn's disease of large intestine (H) 11/22/2021    Current smoker     Esophageal reflux     ESRD (end stage renal disease) on dialysis (H)     History of basal cell carcinoma     Hypertension     Mixed hyperlipidemia     NSTEMI (non-ST elevated myocardial infarction) (H)     PAF (paroxysmal atrial fibrillation) (H)      SURGICAL HISTORY:  Past Surgical History:   Procedure Laterality Date    ABDOMEN SURGERY      x 6.  colon resections for crohn's disease    APPENDECTOMY      CHOLECYSTECTOMY      COLONOSCOPY N/A 07/20/2021    Procedure: COLONOSCOPY, WITH POLYPECTOMY AND BIOPSY;  Surgeon: Eric Preston MD;  Location:  GI    COLONOSCOPY N/A 12/07/2022    Procedure: COLONOSCOPY, WITH POLYPECTOMY AND BIOPSY;  Surgeon: Willian Kee MD;  Location:  GI    COLONOSCOPY N/A 02/27/2024    Procedure: COLONOSCOPY;  Surgeon: Judson Woods MD;  Location: [facility]    CV CORONARY ANGIOGRAM N/A 05/25/2021    Procedure: CV CORONARY ANGIOGRAM;  Surgeon: Judson Ybarra MD;  Location:  HEART CARDIAC CATH LAB    CV CORONARY ANGIOGRAM N/A 02/15/2024    Procedure: Coronary Angiogram;  Surgeon: Tico Finn MD;  Location:  HEART CARDIAC CATH LAB    CV LEFT HEART CATH N/A 02/15/2024    Procedure: Left Heart Catheterization;  Surgeon: Tico Finn MD;  Location: [facility] CARDIAC CATH LAB    CV RIGHT HEART CATH MEASUREMENTS RECORDED N/A 02/15/2024    Procedure: Right Heart Catheterization;  Surgeon: Tico Finn MD;  Location: [facility] CARDIAC CATH LAB    ESOPHAGOSCOPY, GASTROSCOPY, DUODENOSCOPY (EGD), COMBINED N/A 07/20/2021    Procedure: ESOPHAGOGASTRODUODENOSCOPY, WITH FINE NEEDLE ASPIRATION BIOPSY, WITH ENDOSCOPIC ULTRASOUND GUIDANCE;  Surgeon: Eric Preston MD;  Location:  GI    ESOPHAGOSCOPY,  GASTROSCOPY, DUODENOSCOPY (EGD), COMBINED N/A 02/27/2024    Procedure: ESOPHAGOGASTRODUODENOSCOPY;  Surgeon: Judson Woods MD;  Location: [facility]    IR DIALYSIS FISTULOGRAM LEFT  12/01/2022    IR DIALYSIS FISTULOGRAM LEFT  01/13/2023    LAPAROTOMY, LYSIS ADHESIONS, COMBINED N/A 03/17/2022    Procedure: Laparotomy, extensive lysis adhesions, combined;  Surgeon: Sarath Smith MD;  [facility]    PANCREATECTOMY PARTIAL N/A 03/17/2022    Procedure: Open Subtotal Pancreatectomy, intra-op ultrasound;  Surgeon: Sarath Smith MD;  [facility]    PICC TRIPLE LUMEN PLACEMENT Right 03/26/2024    basilic, 39 cm total length, 2 cm external length    REVISION FISTULA ARTERIOVENOUS UPPER EXTREMITY Left 02/14/2023    Procedure: LEFT UPPER ARM FISTULA OUTFLOW REVISION FROM CEPHALIC VEIN TO JUGULAR VEIN WITH 10mm THIN-WALLED RINGED POLYTETRAFLUEROETHYLINE;  Surgeon: Mo Gramajo MD;  Location: [facility]    TRANSESOPHAGEAL ECHOCARDIOGRAM INTRAOPERATIVE N/A 01/11/2024    Procedure: ECHOCARDIOGRAM, TRANSESOPHAGEAL, WITH ANESTHESIA;  Surgeon: GENERIC ANESTHESIA PROVIDER;  [facility]    VASCULAR SURGERY      dialysis access- left upper     SOCIAL HISTORY:  Social History     Socioeconomic History    Marital status:      Spouse name: None    Number of children: 1    Years of education: None    Highest education level: None   Occupational History    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 ,    Tobacco Use    Smoking status: Every Day     Packs/day: 0.25     Years: 50.00     Additional pack years: 0.00     Total pack years: 12.50     Types: Cigarettes     Passive exposure: Current    Smokeless tobacco: Never    Tobacco comments:     6-7 cigarettes daily   Substance and Sexual Activity    Alcohol use: Not Currently     Comment: 2-3 drinks every 3  month    Drug use: Not Currently     FAMILY HISTORY:   Family History   Problem Relation Age of Onset    Breast Cancer Mother     Coronary Artery  Disease Father     Hypertension Brother     Anesthesia Reaction No family hx of     Thrombosis No family hx of      ALLERGIES:   Allergies   Allergen Reactions    Lisinopril Anaphylaxis and Other (See Comments)     Shortness of breath     MEDICATIONS:  No current facility-administered medications on file prior to encounter.  budesonide (ENTOCORT EC) 3 MG EC capsule, Take 3 capsules (9 mg) by mouth every morning  calcium acetate (CALPHRON) 667 MG TABS tablet, Take 2,668 mg by mouth 3 times daily (with meals)  citalopram (CELEXA) 20 MG tablet, Take 20 mg by mouth daily as needed (panic attacks)  folic acid (FOLVITE) 1 MG tablet, Take 1 mg by mouth every morning   lidocaine-prilocaine (EMLA) 2.5-2.5 % external cream, three times a week Prior to dialysis  site access on Monday, Wednesday, Friday  metroNIDAZOLE (FLAGYL) 500 MG tablet, Take 1 tablet (500 mg) by mouth 3 times daily for 29 days  multivitamin RENAL (MULTIVITAMIN RENAL) 1 MG capsule, Take 1 capsule by mouth every morning  omeprazole (PRILOSEC) 20 MG DR capsule, Take 20 mg by mouth every morning   predniSONE (DELTASONE) 10 MG tablet, Take 4 tablets (40 mg) by mouth daily for 30 days  simvastatin (ZOCOR) 20 MG tablet, Take 20 mg by mouth every morning   ustekinumab (STELARA) 90 MG/ML, Inject 1 ml ( 90 mg) subcutaneous every 4 weeks. (Patient taking differently: Inject 90 mg Subcutaneous every 28 days Inject 1 ml ( 90 mg) subcutaneous every 4 weeks. Last dose 3/1/24)      PHYSICAL EXAMINATION:  Temp:  [95.8  F (35.4  C)-97.8  F (36.6  C)] 95.8  F (35.4  C)  Pulse:  [] 77  Resp:  [12-22] 14  BP: ()/() 91/63  Cuff Mean (mmHg):  [72] 72  MAP:  [75 mmHg-115 mmHg] 79 mmHg  Arterial Line BP: (129-189)/(50-79) 134/52  SpO2:  [71 %-100 %] 100 %  General: Obtunded intermittently responding to noxious stimuli  HEENT: L facial droop   Neuro: L facial droop, PERRL, R gaze deviation, L crystal-neglect, L sided sensory loss   Pulm/Resp: Clear breath sounds  bilaterally without rhonchi, crackles or wheeze, breathing non-labored  CV: Mildly tachycardic, warm extremities, dopplerable PT pulses   Abdomen: Distended, unable to determine if tender, soft, RLQ hernia     LABS: Reviewed.   Arterial Blood Gases   No lab results found in last 7 days.  Complete Blood Count   Recent Labs   Lab 03/26/24  1130 03/26/24  0533 03/26/24  0346 03/25/24  2150   WBC 7.7 11.2* 13.0* 11.8*   HGB 5.5* 7.8* 8.2* 7.1*   PLT 44* 49* 52* 44*     Basic Metabolic Panel  Recent Labs   Lab 03/26/24  1144 03/26/24  0737 03/26/24  0620 03/26/24  0524 03/26/24  0410 03/26/24  0344 03/25/24  0726 03/25/24  0618 03/24/24  0748 03/24/24  0705 03/23/24  2135 03/23/24 2052   NA  --   --   --   --   --  133*  --  131*  --  133*  --  135   POTASSIUM  --   --   --   --   --  3.7  --  4.5  --  3.7  --  3.3*   CHLORIDE  --   --   --   --   --  99  --  98  --  96*  --  94*   CO2  --   --   --   --   --  26  --  20*  --  24  --  24   BUN  --   --   --   --   --  20.9  --  34.5*  --  23.9*  --  18.7   CR  --   --   --   --   --  2.98*  --  4.49*  --  3.53*  --  2.83*   * 125* 92 108*   < > 121*   < > 169*   < > 142*   < > 181*  181*    < > = values in this interval not displayed.     Liver Function Tests  Recent Labs   Lab 03/25/24 2054 03/25/24  0618 03/23/24  0759   AST  --  20 15   ALT  --  18 21   ALKPHOS  --  96 142   BILITOTAL  --  1.1 0.7   ALBUMIN  --  2.5* 3.0*   INR 2.10*  --   --      Coagulation Profile  Recent Labs   Lab 03/25/24 2054   INR 2.10*       IMAGING:  Recent Results (from the past 24 hour(s))   CT Abdomen Pelvis w Contrast   Result Value    Radiologist flags Gastrointestinal perforation with free (AA)    Narrative    EXAMINATION: CT ABDOMEN PELVIS W CONTRAST, 3/26/2024 12:26 AM    INDICATION: Elevated LA, Hgb drop. C/f acute abdominal process    COMPARISON STUDY: CT abdomen and pelvis 2/25/2024    TECHNIQUE: CT scan of the abdomen and pelvis was performed on  multidetector CT  scanner using volumetric acquisition technique and  images were reconstructed in multiple planes with variable thickness  and reviewed on dedicated workstations.     CONTRAST: iopamidol (ISOVUE-370) solution 90 mL injected IV without  oral contrast    CT scan radiation dose is optimized to minimum requisite dose using  automated dose modulation techniques.    FINDINGS:    Angiogram abdomen/pelvis: Patent major abdominal vasculature with  chronically calcified aortic dissection flap extending into the right  common iliac artery; severe aortobiiliac atherosclerotic  calcification. No abdominal aortic aneurysm. Celiac artery and  branches are patent. Unchanged severe ostial stenosis of the superior  mesenteric artery. Severe stenosis of the renal arteries bilaterally.  Inferior mesenteric artery is patent. Mild stenosis of the common  iliac arteries and bilateral proximal internal iliac arteries. Mild  stenosis of the bilateral external iliac arteries and common femoral  arteries. Partially visualized superficial femoral arteries are  patent. Portal, splenic, superior mesenteric veins are patent.  Perigastric varices.    Lower thorax: Small bilateral pleural effusions with associated  compressive atelectasis. Centrilobular emphysematous changes.      Liver: No mass. No intrahepatic biliary ductal dilation. Unchanged  right hepatic lobe simple cyst.    Biliary System: Surgically absent gallbladder. No biliary dilatation.    Spleen: Multiple splenules, otherwise within normal limits.    Pancreas: Status post distal pancreatectomy. Remaining pancreas is  within normal limits.    Adrenal glands: No mass or nodules    Kidneys: Unchanged bilateral renal cortical atrophy. Multiple  nonobstructing bilateral intrarenal calculi. Unchanged hemorrhagic rim  calcified cyst at the lower pole of the left kidney measures up to 8  cm in greatest axial dimension, previously 9.7 cm. Additional  unchanged benign simple renal cyst. No  hydronephrosis.    Gastrointestinal tract: Postsurgical changes of ileostomy takedown  with ileocolic anastomosis. Extensive pneumatosis of the large bowel  with air scattered throughout the mesentery resulting in septated  appearance(best seen on the lung window settings), which has improved  compared to 3/13/2024 CT scan. There is normal bowel wall enhancement.   Normal caliber small bowel.    Peritoneum/Retroperitoneum: Hazy attenuation of the peritoneal fat.  Small simple fluid layering posterior to the rectum. No significant  lymphadenopathy.    Pelvis: Urinary bladder is decompressed.    Osseous structures: No aggressive or acute osseous lesion. Multilevel  degenerative changes of the visualized spine and hips. Increased bone  density likely related to renal osteodystrophy.      Soft tissues: Right lower quadrant ventral hernia containing  nonobstructed small  bowel. Diffuse anasarca.        Impression    IMPRESSION:  1.  Diffuse air in large bowel wall and extraluminal air with septated  appearance in the mesentery is improved compared to 3/13/2024 CT scan.  No portal venous gas. Normal bowel wall enhancement. Findings favored  to represent improving extensive pneumatosis intestinalis. Recommend  follow up and repeat CT scan if clinical condition worsens.  2.  No evidence of acute abdominal/pelvic hemorrhage.  3.  Mild ascites, diffuse anasarca, and bilateral small pleural  effusions with adjacent atelectasis.    Critical Result: Impression #1  Finding was identified on 3/26/2024 12:27 AM.   Discussion between Archana Gilliam, Lucila Leiva, and Errol Venegas on 3/26/2024 3:28 AM.    I have personally reviewed the examination and initial interpretation  and I agree with the findings.    GASPER GILLIAM MD         SYSTEM ID:  [id number]   CTA Head Neck with Contrast    Narrative    CTA HEAD NECK W CONTRAST 3/26/2024 7:07 AM    CT Angiogram of the Neck with contrast   CT Angiogram of the Head with  contrast    History:  R/O LVO stroke code    Comparison: Same day head CT.     Technique:     HEAD and NECK CTA: Thereafter, postcontrast images were obtained from  the aortic arch through the [place] of [place].  Axial images were  obtained using thin collimation multidetector helical technique. This  CT angiogram data was reconstructed at thin intervals with mild  overlap. Images were sent to the [employer] workstation, and 3D  reconstructions and multiplanar reformations were obtained with  reconstructions performed by the technologist and the radiologist. The  source images, multiplanar reformations, 3D reconstructions in both  maximum intensity projection display and volume rendered models were  reviewed,     Contrast: 67cc of isovue 370    Findings:   Head CTA demonstrates no aneurysm of the major intracranial arteries.  Suspect severe stenosis in the proximal right MCA M1 segment secondary  to calcified plaque. The anterior communicating artery is patent. The  posterior communicating arteries are patent bilaterally. Moderate  atherosclerotic calcifications within the cavernous portions of the  bilateral internal carotid arteries.    Neck CTA demonstrates approximately 54% stenosis within the right  carotid bifurcation secondary to small mostly lipid rich partially  calcified plaque and 46% within the left carotid bifurcation secondary  to calcified plaques. The origins of the great vessels from the aortic  arch are patent. The normal distal right internal carotid artery  measures 4 mm. The normal distal left internal carotid artery measures  4 mm. The bilateral vertebral arteries are patent. Dominant left  vertebral artery. Partially visualized stent within the left  subclavian vein.    No mass is noted within the visualized portions of the cervical soft  tissues or lung apices. Prominent pleural fat within the posterior  lung fields. Degenerative disease of the bilateral sternoclavicular  joints.       Impression     Impression:   1. Head CTA demonstrates severe stenosis in the proximal portion of  the right MCA M1 segment secondary to bulky calcified plaque. No  aneurysm of the major intracranial arteries. Moderate atherosclerotic  ossifications within the intracranial portions of the internal carotid  arteries.  2. Neck CTA demonstrates approximately 54% stenosis within the right  carotid artery location secondary to small mostly lipid rich,  partially calcified plaque and 46% stenosis within the left cervical  carotid bifurcation.     I have personally reviewed the examination and initial interpretation  and I agree with the findings.    KUSH PIRES MD         SYSTEM ID:  [id number]   CT Head w/o Contrast    Narrative    CT HEAD W/O CONTRAST 3/26/2024 7:08 AM    History:  r/o stroke     Comparison: None available     Technique: Using multidetector thin collimation helical acquisition  technique, axial, coronal and sagittal CT images from the skull base  to the vertex were obtained without intravenous contrast.     Findings: There is no intracranial hemorrhage, mass effect or midline  shift. Small focal area of low density and gray-white differentiation  loss in the anterior right frontal lobe, best seen on sagittal image  45 of series 5. There is a questionable tiny focus of calcification in  this area. Otherwise, there is no focal loss of gray-white matter  differentiation, insular ribbon sign, or focally hyperdense artery to  suggest acute infarction. Scattered periventricular and subcortical  white matter hypodensities, likely secondary to chronic small vessel  ischemic disease given patient's age. The ventricles are proportionate  to the cerebral sulci.    The bony calvaria and the bones of the skull base appear normal. The  visualized portions of the paranasal sinuses and mastoid air cells are  unremarkable.       Impression    Impression:   1. Small focal area of low-density in the anterior right frontal lobe,  more  likely chronic than related to acute infarct, although that is  difficult to entirely exclude. No acute intracranial hemorrhage.  2. MCA ASPECT Score: 10/10.    I have personally reviewed the examination and initial interpretation  and I agree with the findings.    PAUL BAH MD         SYSTEM ID:  [id number]   Echo Complete   Result Value    LVEF  60-65%    Narrative    [id number]  [id number]  [id number]  [id number]^THONY^ANN MARIE     [facility]  Echocardiography Laboratory  [address] [place] [address]     Name: KRAIG CHAUDHARI  MRN: [id number]  : 1960  Study Date: 2024 08:56 AM  Age: 64 yrs  Gender: Male  Patient Location: [facility]  Reason For Study: CVA  Ordering Physician: ANN MARIE BHANDARI  Performed By: Jil Hess     BSA: 1.9 m2  Height: 71 in  Weight: 157 lb  HR: 67  BP: 95/51 mmHg  ______________________________________________________________________________  Procedure  Complete Portable Echo Adult.  ______________________________________________________________________________  Interpretation Summary  No cardiac source for embolus identified.  ______________________________________________________________________________  Left Ventricle  Global and regional left ventricular function is normal with an EF of 60-65%.  Left ventricular wall thickness is normal. Left ventricular size is normal.  Left ventricular diastolic function is not assessable. No regional wall motion  abnormalities are seen.     Right Ventricle  Right ventricular function, chamber size, wall motion, and thickness are  normal.     Atria  The atria cannot be assessed.     Mitral Valve  Calcified fixed posterior mitral leaflet with severe MR.     Aortic Valve  Aortic valve sclerosis is present. Trace aortic insufficiency is present.     Tricuspid Valve  The tricuspid valve is normal. Trace to mild tricuspid insufficiency is  present. Pulmonary artery systolic pressure cannot be  assessed.     Pulmonic Valve  The pulmonic valve is normal.     Vessels  The thoracic aorta cannot be assessed. The pulmonary artery cannot be  assessed. The inferior vena cava is normal.     Pericardium  No pericardial effusion is present.     ______________________________________________________________________________  Report approved by: Brain Villegas 03/26/2024 10:06 AM     ______________________________________________________________________________      US Lower Extremity Venous Duplex Right    Narrative    EXAMINATION: DOPPLER VENOUS ULTRASOUND OF THE RIGHT LOWER EXTREMITY,  3/26/2024 11:37 AM     COMPARISON: Ultrasound lower extremities Doppler 3/14/2024.    HISTORY: Swelling, erythema    TECHNIQUE:  Gray-scale evaluation with compression, spectral flow, and  color Doppler assessment of the deep venous system of the right leg  from groin to knee, and then at the ankle. Examination was ordered as  bilateral lower extremities however due to patient's symptoms,  technologist was unable to scan the left leg at this time.    FINDINGS:  In the right lower extremity, the common femoral, femoral, popliteal  and posterior tibial veins demonstrate normal compressibility and  blood flow.      Impression    IMPRESSION:  No evidence of right lower extremity deep venous thrombosis.    PRUDENCE KONG MD         SYSTEM ID:  [id number]

## 2024-03-26 NOTE — CONSULTS
[facility] Surgery Consultation    Tacos Dominguez MRN# [id number]   Age: 64 year old YOB: 1960     Date of Admission:  3/23/2024    Date of Consult:   3/23/24    Reason for consult: Intra-abdominal free air on CT scan       Requesting service: Medicine                   Assessment and Plan:   Assessment:    Mr. Tacos Dominguez is a 63 yo male with PMHx of ESRD on HD, PHTN, partially calcified abdominal aortic dissection, severe mitral insufficiency, moderate tricuspid insufficiency, Crohn's disease s/p multiple bowel resections with end to end ileocolonic anastomosis, IPMN s/p subtotal pancreatectomy and SANTOS 3/17/2022, chronic anemia, and recent admissions for acute lower GI bleed secondary to Crohn's flare (2/25-3/1/24) and colonic pneumatosis (3/14-3/17/24) at OSH treated conservatively. Current admission for acute respiratory failure 2/2 chronic HFpEF 2/2 acute on chronic blood loss anemia. Imaging ordered after episodes of hematochezia, which depicted GI perforation with free air. Thus, surgery was consulted. Patient hemodynamically stable with unremarkable physical exam and without pain.     Plan:  - No surgical intervention at this moment  - Strict NPO  - Follow up CBC  - Serial abdominal exam  - Start Zosyn  - Colorectal surgery will continue to follow  - Please call with further questions or concerns      Patient discussed with chief fellow, Dr. Leiva, and to be discussed with staff, BHUPENDRA Hernandez .    Hay Rain MD  PGY2  Dept. of Pediatric Surgery          Chief Complaint:     CT imaging concern for free air         History of Present Illness:     Mr. Tacos Dominguez is a 63 yo male with PMHx of ESRD on HD, HTN, CAD, NSTEMI, pAFib, PHTN, partially calcified abdominal aortic dissection, severe mitral insufficiency, moderate tricuspid insufficiency, prolonged QTc, Crohn's disease s/p multiple bowel resections with end to end ileocolonic anastomosis, IPMN s/p  subtotal pancreatectomy and SANTOS 3/17/2022, chronic anemia, and recent admissions for acute lower GI bleed secondary to Crohn's flare (2/25-3/1/24) and colonic pneumatosis (3/14-3/17/24) at OSH treated conservatively. Current admission for acute respiratory failure 2/2 chronic HFpEF 2/2 acute on chronic blood loss anemia.    Per chart, rapid response was called today for lactic acidosis of 4.8, in addition, patient reported bright red bleeding per rectum on 3/25 at 8 pm and another bright red output with bowel movement 30 minutes ago; thus, patient underwent CT-AP with contrast which depict free intra abdominal air, for which we have been consulted.    Labs relevant for LA 4.8 decreasing to 3.0 and  to 99, Hb 7.1, WBC 11.8, Plt 44    On exam patient hemodynamically stable, without complaining of any pain and abdomen is soft and non tender with a reducible right sided parastomal hernia.             Past Medical History:     Past Medical History:   Diagnosis Date    Aortic dissection (H)     distal, thin.  stable/chronic on MRCP 3/2022    Benign essential hypertension     CAD (coronary artery disease)     Cerebral infarction (H)     Crohn's colitis (H)     Crohn's disease of large intestine (H) 11/22/2021    Current smoker     Esophageal reflux     ESRD (end stage renal disease) on dialysis (H)     History of basal cell carcinoma     Hypertension     Mixed hyperlipidemia     NSTEMI (non-ST elevated myocardial infarction) (H)     PAF (paroxysmal atrial fibrillation) (H)                Past Surgical History:     Past Surgical History:   Procedure Laterality Date    ABDOMEN SURGERY      x 6.  colon resections for crohn's disease    APPENDECTOMY      CHOLECYSTECTOMY      COLONOSCOPY N/A 07/20/2021    Procedure: COLONOSCOPY, WITH POLYPECTOMY AND BIOPSY;  Surgeon: Eric Preston MD;  Location:  GI    COLONOSCOPY N/A 12/7/2022    Procedure: COLONOSCOPY, WITH POLYPECTOMY AND BIOPSY;  Surgeon: Willian Kee  MD Kasia;  Location:  GI    COLONOSCOPY N/A 2/27/2024    Procedure: COLONOSCOPY;  Surgeon: Judson Woods MD;  Location: [facility]    CV CORONARY ANGIOGRAM N/A 05/25/2021    Procedure: CV CORONARY ANGIOGRAM;  Surgeon: Judson Ybarra MD;  Location:  HEART CARDIAC CATH LAB    CV CORONARY ANGIOGRAM N/A 2/15/2024    Procedure: Coronary Angiogram;  Surgeon: Tico Finn MD;  Location:  HEART CARDIAC CATH LAB    CV LEFT HEART CATH N/A 2/15/2024    Procedure: Left Heart Catheterization;  Surgeon: Tico Finn MD;  Location:  HEART CARDIAC CATH LAB    CV RIGHT HEART CATH MEASUREMENTS RECORDED N/A 2/15/2024    Procedure: Right Heart Catheterization;  Surgeon: Tico Finn MD;  Location:  HEART CARDIAC CATH LAB    ESOPHAGOSCOPY, GASTROSCOPY, DUODENOSCOPY (EGD), COMBINED N/A 07/20/2021    Procedure: ESOPHAGOGASTRODUODENOSCOPY, WITH FINE NEEDLE ASPIRATION BIOPSY, WITH ENDOSCOPIC ULTRASOUND GUIDANCE;  Surgeon: Eric Preston MD;  Location:  GI    ESOPHAGOSCOPY, GASTROSCOPY, DUODENOSCOPY (EGD), COMBINED N/A 2/27/2024    Procedure: ESOPHAGOGASTRODUODENOSCOPY;  Surgeon: Judson Woods MD;  Location: [facility]    IR DIALYSIS FISTULOGRAM LEFT  12/01/2022    IR DIALYSIS FISTULOGRAM LEFT  1/13/2023    LAPAROTOMY, LYSIS ADHESIONS, COMBINED N/A 03/17/2022    Procedure: Laparotomy, extensive lysis adhesions, combined;  Surgeon: Sarath Smith MD;  [facility]    PANCREATECTOMY PARTIAL N/A 03/17/2022    Procedure: Open Subtotal Pancreatectomy, intra-op ultrasound;  Surgeon: Sarath Smith MD;  [facility]    REVISION FISTULA ARTERIOVENOUS UPPER EXTREMITY Left 2/14/2023    Procedure: LEFT UPPER ARM FISTULA OUTFLOW REVISION FROM CEPHALIC VEIN TO JUGULAR VEIN WITH 10mm THIN-WALLED RINGED POLYTETRAFLUEROETHYLINE;  Surgeon: Mo Gramajo MD;  Location:  OR    TRANSESOPHAGEAL ECHOCARDIOGRAM INTRAOPERATIVE N/A 1/11/2024    Procedure: ECHOCARDIOGRAM,  TRANSESOPHAGEAL, WITH ANESTHESIA;  Surgeon: GENERIC ANESTHESIA PROVIDER;  [facility]    VASCULAR SURGERY      dialysis access- left upper               Social History:     Social History     Tobacco Use    Smoking status: Every Day     Packs/day: 0.25     Years: 50.00     Additional pack years: 0.00     Total pack years: 12.50     Types: Cigarettes     Passive exposure: Current    Smokeless tobacco: Never    Tobacco comments:     6-7 cigarettes daily   Substance Use Topics    Alcohol use: Not Currently     Comment: 2-3 drinks every 3  month             Family History:     Family History   Problem Relation Age of Onset    Breast Cancer Mother     Coronary Artery Disease Father     Hypertension Brother     Anesthesia Reaction No family hx of     Thrombosis No family hx of                Allergies:     Allergies   Allergen Reactions    Lisinopril Anaphylaxis and Other (See Comments)     Shortness of breath             Medications:     Current Facility-Administered Medications   Medication    budesonide (ENTOCORT EC) EC capsule 9 mg    calcium acetate (PHOSLO) capsule 2,668 mg    calcium carbonate (TUMS) chewable tablet 1,000 mg    cefTRIAXone (ROCEPHIN) 1 g vial to attach to  mL bag for ADULTS or NS 50 mL bag for PEDS    dextrose 10% infusion    dextrose 50 % injection 25-50 mL    glucose gel 15-30 g    Or    dextrose 50 % injection 25-50 mL    Or    glucagon injection 1 mg    folic acid (FOLVITE) tablet 1 mg    insulin aspart (NovoLOG) injection (RAPID ACTING)    insulin aspart (NovoLOG) injection (RAPID ACTING)    insulin aspart (NovoLOG) injection (RAPID ACTING)    insulin aspart (NovoLOG) injection (RAPID ACTING)    insulin glargine (LANTUS PEN) injection 10 Units    insulin NPH injection 7 Units    insulin regular (MYXREDLIN) 1 unit/mL infusion    lidocaine (LMX4) cream    lidocaine (LMX4) cream    lidocaine 1 % 0.1-1 mL    lidocaine 1 % 0.1-1 mL    LORazepam (ATIVAN) tablet 0.5 mg    metroNIDAZOLE  (FLAGYL) tablet 500 mg    mirtazapine (REMERON) tablet TABS 7.5 mg    multivitamin RENAL (TRIPHROCAPS) capsule 1 capsule    nicotine (NICODERM CQ) 21 MG/24HR 24 hr patch 1 patch    pantoprazole (PROTONIX) IV push injection 40 mg    predniSONE (DELTASONE) tablet 40 mg    senna-docusate (SENOKOT-S/PERICOLACE) 8.6-50 MG per tablet 1 tablet    Or    senna-docusate (SENOKOT-S/PERICOLACE) 8.6-50 MG per tablet 2 tablet    simvastatin (ZOCOR) tablet 20 mg    sodium chloride (PF) 0.9% PF flush 10 mL    sodium chloride (PF) 0.9% PF flush 10-20 mL    sodium chloride (PF) 0.9% PF flush 3 mL    sodium chloride (PF) 0.9% PF flush 3 mL    traZODone (DESYREL) half-tab 25 mg    Or    traZODone (DESYREL) tablet 50 mg               Review of Systems:     Complete review of systems negative except as documented in HPI          Physical Exam:   All vitals have been reviewed  Temp:  [97  F (36.1  C)-97.8  F (36.6  C)] 97.2  F (36.2  C)  Pulse:  [] 96  Resp:  [12-22] 14  BP: ()/(34-69) 117/62  Cuff Mean (mmHg):  [68-82] 72  SpO2:  [71 %-100 %] 100 %    Intake/Output Summary (Last 24 hours) at 3/26/2024 0106  Last data filed at 3/25/2024 2000  Gross per 24 hour   Intake 540 ml   Output 2230 ml   Net -1690 ml       Physical Exam:   General: Alert, well-appearing  in no acute distress.  Neuro: A&Ox3  HEENT: Normocephalic, atraumatic.   Neck: No cervical lymphadenopathy. No thyromegaly.   Respiratory: Non-labored breathing. Lung sounds clear to auscultation bilaterally without rhonchi, crackles or wheeze  Cardiovascular: Regular rate and rhythm. No tachycardia. Strong radial pulse  Gastrointestinal: Abdomen soft, non-distended, non-tender to palpation. Parastomal hernia. No organomegaly or masses appreciated. No inguinal hernias. JUDAH with bright red blood  Genitourinary: No CVA tenderness.  MSK/Extremities: Moving all four extremities spontaneously. Warm, well perfused. No peripheral edema. Peripheral pulses intact. Right sided  JOSE MC AV fistula for hemodialysis  Incisions/Skin: As noted above. No jaundice, rashes, or lesions appreciated.              Data:   All laboratory data reviewed    Results:  BMP  Recent Labs   Lab 03/25/24 2235 03/25/24  1950 03/25/24  1924 03/25/24  1858 03/25/24  0726 03/25/24  0618 03/24/24  0748 03/24/24  0705 03/23/24 2135 03/23/24 2052 03/23/24  0949 03/23/24  0759   NA  --   --   --   --   --  131*  --  133*  --  135  --  127*   POTASSIUM  --   --   --   --   --  4.5  --  3.7  --  3.3*  --  4.7   CHLORIDE  --   --   --   --   --  98  --  96*  --  94*  --  92*   CO2  --   --   --   --   --  20*  --  24  --  24  --  21*   BUN  --   --   --   --   --  34.5*  --  23.9*  --  18.7  --  28.7*   CR  --   --   --   --   --  4.49*  --  3.53*  --  2.83*  --  3.93*   * 86 68* 66*   < > 169*   < > 142*   < > 181*  181*   < > 822*    < > = values in this interval not displayed.     CBC  Recent Labs   Lab 03/25/24 2150 03/25/24  0738 03/24/24  0705 03/24/24  0013   WBC 11.8* 14.3* 20.4* 17.2*   HGB 7.1* 9.4* 9.9* 9.1*   PLT 44* 67* 112* 95*     LFT  Recent Labs   Lab 03/25/24 2054 03/25/24 0618 03/23/24  0759 03/19/24  1021   AST  --  20 15 21   ALT  --  18 21 22   ALKPHOS  --  96 142 109   BILITOTAL  --  1.1 0.7 0.8   ALBUMIN  --  2.5* 3.0* 2.7*   INR 2.10*  --   --  1.46*     Recent Labs   Lab 03/25/24 2235 03/25/24  1950 03/25/24  1924 03/25/24  1858 03/25/24  1327 03/25/24  0726   * 86 68* 66* 192* 170*           Imaging:  CT AP with IV contrast 3/25:  1.  Diffuse air in bowel wall and small volume extraluminal air with  septated appearance in mesentery is improved compared to 3/13/2024 CT  abdomen and pelvis exam. Findings favored to represent improving  extensive pneumatosis intestinalis.  2.  No evidence of acute abdominal/pelvic bleeding.

## 2024-03-26 NOTE — PROGRESS NOTES
Colorectal Surgery Progress Note  [facility]      Subjective:  Altered mental status this morning at about 7:30am. Non-responsive.  Was en route by transport down to IR for concern for acute stroke.      Vitals:  Vitals:    03/26/24 0900 03/26/24 0905 03/26/24 0910 03/26/24 0915   BP: 97/52 104/41 105/57 115/63   BP Location:       Cuff Size:       Pulse:       Resp:       Temp:       TempSrc:       SpO2:       Weight:       Height:         I/O:  I/O last 3 completed shifts:  In: 540 [P.O.:240]  Out: 2230 [Other:2230]    Physical Exam:  Gen: Encephalopathic, not tracking.  Staring off into space.  Gray appearing.    Pulm: Non-labored breathing  Abd: Soft, non-distended, does not appear tender, no guarding/rebound.  No wincing with palpation.   Ext:  Warm and well-perfused.  Left arm appears mildly contracted.  Neuro:  encephalopathic - being transported to IR.  Mild left facial droop.     BMP  Recent Labs   Lab 03/26/24  0737 03/26/24  0620 03/26/24  0524 03/26/24  0433 03/26/24  0410 03/26/24  0344 03/25/24  1950 03/25/24  1939 03/25/24  0726 03/25/24  0618 03/24/24  0748 03/24/24  0705 03/23/24  2135 03/23/24  2052 03/23/24  1427 03/23/24  1421   NA  --   --   --   --   --  133*  --   --   --  131*  --  133*  --  135  --   --    POTASSIUM  --   --   --   --   --  3.7  --   --   --  4.5  --  3.7  --  3.3*  --   --    CHLORIDE  --   --   --   --   --  99  --   --   --  98  --  96*  --  94*  --   --    CO2  --   --   --   --   --  26  --   --   --  20*  --  24  --  24  --   --    BUN  --   --   --   --   --  20.9  --   --   --  34.5*  --  23.9*  --  18.7  --   --    CR  --   --   --   --   --  2.98*  --   --   --  4.49*  --  3.53*  --  2.83*  --   --    * 92 108* 112*   < > 121*   < >  --    < > 169*   < > 142*   < > 181*  181*   < > 795*  796*   MAG  --   --   --   --   --   --   --  1.6*  --  1.0*  --   --   --  1.2*  --  1.3*   PHOS  --   --   --   --   --   --   --   --    --  3.2  --   --   --   --   --  3.9    < > = values in this interval not displayed.     CBC  Recent Labs   Lab 03/26/24  0533 03/26/24  0346 03/25/24  2150 03/25/24  0738   WBC 11.2* 13.0* 11.8* 14.3*   HGB 7.8* 8.2* 7.1* 9.4*   HCT 23.7* 26.2* 22.3* 29.8*   PLT 49* 52* 44* 67*         ASSESSMENT: This is a 64 year old male PMH ESRD on HD, PHTN, partially calcified abdominal aortic dissection, severe mitral insufficiency, moderate tricuspid insufficiency, CAD, NSTEMI, pAfib, Crohns on Stelara, s/p prior multiple prior bowel resections with end to end ileocolonic anastomosis and likely minimal remaining bowel per prior notes, IPMN s/p subtotal pancreatectomy and SANTOS 3/2022, chronic anemia, and has had recent admissions for acute lower GI bleed 2/2 crohns flare (2/25-3/1/24) and colonic pneumatosis (3/14-3/17) at OSH, treated conservatively.  MRE in 3/2024 showed active Crohns.  Last scope was 2/27/24: patent end to end ileocolonic anastomosis characterized by health appearing mucosa; multiple large ulcers in the ileum about 30 cm proximal to anastomosis; visible vessel with adherent clot that was oozing.  Injected with epi.  Treated w/ bipolar cautery, bleeding stopped.  Ileum distal to ulcer was inflamed but did not contain any further ulcerations.  No active crohns seen in colon.      Current admission for acute resp failure 2/2 chronic HFpEF 2/2 acute on chronic blood loss anemia.  Rapid response was called yesterday for lactic acidosis of 4.8 and persistent hematochezia, thus underwent CT that showed diffuse air in large bowel wall and extraluminal air with septated appearance in the mesentery that is improved compared to 3/13.  No portal venous gas; normal bowel wall enhancement.  Findings favored to represent improving extensive pneumatosis intestinalis.  Abdominal exam was not concerning for peritonitis/guarding.      Subsequently early this morning, pt developed left sided weakness & facial droop concerning  for acute stroke.  In setting of continued hematochezia and Hgb of 5.5.      - greatly appreciate Medicine and GI management  - do not recommend surgical intervention.  Would be high risk for additional stroke upon induction/intra-op  - OK for remicade & high dose steroids from CRS perspective  - continue supportive care  - correct all coagulopathies   - recommend continued NPO status  - agree with continued antibiotics  - recommend CTA for GI bleed and IR embolization if indicated on CTA  - verbally discussed with GI and medicine.  GI does not recommend endoscopic intervention at this time due to high risk for perforation in setting of pneumatosis.    - CRS will continue to follow      Clinically Significant Risk Factors            # Hypomagnesemia: Lowest Mg = 1 mg/dL in last 2 days, will replace as needed   # Hypoalbuminemia: Lowest albumin = 2.5 g/dL at 3/25/2024  6:18 AM, will monitor as appropriate  # Coagulation Defect: INR = 2.10 (Ref range: 0.85 - 1.15) and/or PTT = 29 Seconds (Ref range: 22 - 38 Seconds), will monitor for bleeding  # Thrombocytopenia: Lowest platelets = 44 in last 2 days, will monitor for bleeding   # Hypertension: Noted on problem list            # Financial/Environmental Concerns:    # Support System: poor social support noted in nursing assessment           Kary Stapleton PA-C ..................3/26/2024   11:25 AM  Colon and Rectal Surgery    Patient was seen and discussed with Dr. Leiva    The above plan of care was performed and communicated to me by Dr. Molina    I spent 30 minute face-to-face or coordinating care of Tacos Dominguez. Over 50% of our time on the unit was spent counseling the patient and/or coordinating care as documented in the assessment and plan.

## 2024-03-26 NOTE — CODE/RAPID RESPONSE
"Rapid Response Team Note    Assessment   In assessment a rapid response was called on Tacos Dominguez due to lactic acidosis w/ LA 4.8. This presentation is likely due to multifactorial in setting of suspected CAP and now with BRBPR w/ ongoing ABL in setting of Crohn's flare.   Plan   - Type and screen  - Repeat CBC, INR, LA  - Pleas ensure patient has 2 large bore IV, BID PPI, NPO  - GI consult placed. Discussed with tonight, Appreciate assistance and recommendations   - 1U PRBC for Hgb 7.1- repeat LA and HGb at 0400  - STAT CT A/p w/ contrast- will need to discuss results with Surgery vs GI  -  The Internal Medicine primary team was able to be reached and they are in agreement with the above plan.  -  Disposition: The patient will remain on the current unit. We will continue to monitor this patient closely.  -  Reassessment and plan follow-up will be performed by the primary team      Ainsley Lowe PA-C  [facility] RRT [facility] Job Code Contact #[phone]  [facility] Paging/Directory    Hospital Course   Brief Summary of events leading to rapid response:   RRT called for LA 4.7. VSS. Does report BRBPR which he has not experienced for 'some time\". No prior episodes on admission. No abdominal pain, CP, sob, Fever, chills, HA, cough. We discussed POC as outlined above and patient agreeable .    Admission Diagnosis:   Hyperglycemia [R73.9]    Physical Exam   Temp: 97.5  F (36.4  C) Temp  Min: 97  F (36.1  C)  Max: 97.6  F (36.4  C)  Resp: 15 Resp  Min: 15  Max: 22  SpO2: 100 % SpO2  Min: 71 %  Max: 100 %  Pulse: 95 Pulse  Min: 72  Max: 112    No data recorded  BP: 101/67 Systolic (24hrs), Av , Min:78 , Max:120   Diastolic (24hrs), Av, Min:34, Max:69     I/Os: I/O last 3 completed shifts:  In: 300 [P.O.:300]  Out: -      Exam:   General: in no acute distress  Mental Status: AAOx4.  Resp: Breathing non-labored on rA  CV: RRR, no appreciable m/r.g    Significant Results and Procedures   Lactic Acid:   Recent Labs "   Lab Test 03/25/24  1939 03/24/24  0416 03/24/24  0013   LACT 4.8* 1.7 3.5*     CBC:   Recent Labs   Lab Test 03/25/24  0738 03/24/24  0705 03/24/24  0013   WBC 14.3* 20.4* 17.2*   HGB 9.4* 9.9* 9.1*   HCT 29.8* 30.8* 27.6*   PLT 67* 112* 95*        Sepsis Evaluation   The patient is known to have an infection.  Tacos Dominguez meets SIRS criteria AND has a lactate >2 or other evidence of acute organ damage.  These vital signs, lab and physical exam findings constitute a diagnosis of SEVERE SEPSIS, based on: Lactate resulted, and the level was > 2.0          Anti-infectives (From now, onward)      Start     Dose/Rate Route Frequency Ordered Stop    03/25/24 1800  cefTRIAXone (ROCEPHIN) 1 g vial to attach to  mL bag for ADULTS or NS 50 mL bag for PEDS         1 g  over 15-30 Minutes Intravenous EVERY 24 HOURS 03/25/24 1321      03/23/24 1450  metroNIDAZOLE (FLAGYL) tablet 500 mg        Note to Pharmacy: PTA Sig:Take 1 tablet (500 mg) by mouth 3 times daily for 29 days      500 mg Oral 3 TIMES DAILY 03/23/24 1446            Current antibiotic coverage is appropriate for source of infection.    3 Hour Severe Sepsis Bundle Completion:  1. Initial Lactic Acid result shown above. Repeat lactic acid ordered by reflex for 3 hours from initial collection.  2. Blood Cultures before Antibiotics: Yes  3. Broad Spectrum Antibiotics Administered: yes  4. Is hypotension present? No (IV fluid bolus NOT required). IV Fluid volume administered: 1u PRBC

## 2024-03-26 NOTE — PROGRESS NOTES
Stroke Code Nurse-Responder Note    Arrival Time to Stroke Code: 0730    Stroke Code Team interventions:   - De-escalated at 0800 by Neuro IR    ED/Bedside Nurse providing handoff: JR Abbott    Time left for CT: CT already performed when arrived, taking over for night shift flyer      ED/Bedside Nurse given handoff (name/time): JR Abbott at 0800 when de-escalated     Maria Del Carmen Haney, RN

## 2024-03-26 NOTE — PROGRESS NOTES
Spoke to Dr. Molina from colorectal surgery as well as Dr. Faria in the ICU L as well as the patient's primary gastroenterologist Betty Ponce regarding Mr. Dominguez.      Mr Dominguez underwent CT angiogram of the abdomen pelvis around noon today after was evident he was having GI hemorrhage.  There are some extravasation in the small bowel likely distally near or in the patient is known hernia.      Patient is known to have advanced ulceration in the setting of Crohn's disease with active tobacco abuse and has had CT scans showing thickening of the intestine bleeding which is resulted in colonoscopies as recent as a few weeks ago which were demonstrated extensive ulceration of the small bowel with a visible vessel which was injected with epinephrine and cauterized with electrocautery.  Soon after that the patient developed further bleeding which was managed conservatively but CT scans demonstrated extensive pneumatosis in the colon and small intestine.  More recently, had elevated lactate altered levels of consciousness possible stroke continuing pneumatosis.    Unfortunately today's second scan, the CTA,  shows free peritoneal air.  Patient is in the ICU receiving blood products.    I do not think colonoscopy would help the patient this point would likely do further harm.  Therefore would hold off on any endoscopic intervention at this time and would recommend what we follow his hemoglobin and hematocrit his vital signs transfusing as necessary discussing further possible interventions with colorectal surgery.    Eric Moncada MD  Associate Professor of Medicine  Division of Gastroenterology, Hepatology, and Nutrition  [facility]

## 2024-03-26 NOTE — PROGRESS NOTES
Pt. has poor vasculature, and R forearm is swollen & has Left AVF, writer was able to place PIV but due to his current condition and swelling, pt. needs a more stable access (PICC), notified bedside nurse to relay in the morning.

## 2024-03-26 NOTE — PLAN OF CARE
Significant events: Lactic Acid of 4.8 at start of shift, Code Sepsis activated, HGB was at 7.1 and plt 44, 1 unit RBC transfused, D10% infusion started to support BG while on NPO, Pt is hard stick and PIVs keep failing, Vascular inserted PIV 2x, BG at 343 at 55, 25ml of D50 given, BG >92 since then, at 630am noticed left sided weakness, slurred speech and facial droop, stroke code was activated and pt sent to CT.        Neuro: A&Ox4 for most of the shift. Stroke called at 6:30am due to left sided weakness, slurred speech, left facial droop.  Cardiac: SR. VSS, except during stroke code when SBP reached 160s.   Respiratory: Sating >92% 2L Via NC.   GI/: Adequate urine output. BM X3 loose bright red stool  Diet/appetite: Currently on strict NPO.  Activity:  SBA most of the shift, now AO2 with new left sided deficits.  Pain: At acceptable level on current regimen.   Skin: No new deficits noted.  LDA's: 2 R PIVs: TKO and running D10% infusion.     Plan: Continue with POC. Notify primary team with changes.

## 2024-03-26 NOTE — PLAN OF CARE
HOLD: Speech Language Pathology: Orders received. Chart reviewed and discussed with care team.? Spoke to RN in AM and PM and both times, RN reported pt too lethargic, neuro changes and inappropriate for PO at this time. Will schedule for tomorrow and complete evaluation as appropriate.

## 2024-03-26 NOTE — PROCEDURES
[facility]    Central line    Date/Time: 3/26/2024 4:56 PM    Performed by: Zulay Scott MD  Authorized by: Zulay Scott MD  Indications: vascular access      UNIVERSAL PROTOCOL   Site Marked: NA  Prior Images Obtained and Reviewed:  NA  Required items: Required blood products, implants, devices and special equipment available    Patient identity confirmed:  Anonymous protocol, patient vented/unresponsive  Patient was reevaluated immediately before administering moderate or deep sedation or anesthesia  Confirmation Checklist:  Procedure was appropriate and matched the consent or emergent situation  Time out: Immediately prior to the procedure a time out was called    Universal Protocol: the Joint Commission Universal Protocol was followed    Preparation: Patient was prepped and draped in usual sterile fashion       ANESTHESIA    Anesthesia:  Local infiltration  Local Anesthetic:  Lidocaine 1% without epinephrine  Anesthetic Total (mL):  3      SEDATION    Patient Sedated: No      Preparation: skin prepped with 2% chlorhexidine  Skin prep agent dried: skin prep agent completely dried prior to procedure  Sterile barriers: all five maximum sterile barriers used - cap, mask, sterile gown, sterile gloves, and large sterile sheet  Hand hygiene: hand hygiene performed prior to central venous catheter insertion  Patient position: flat  Catheter type: cordis  Catheter size: 8 Fr  Pre-procedure: landmarks identified  Ultrasound guidance: yes  Sterile ultrasound techniques: sterile gel and sterile probe covers were used  Number of attempts: 1  Successful placement: yes  Post-procedure: line sutured and dressing applied  Assessment: blood return through all ports      PROCEDURE    Patient Tolerance:  Patient tolerated the procedure well with no immediate complications  Length of time physician/provider present for 1:1 monitoring during sedation: 20

## 2024-03-26 NOTE — PROGRESS NOTES
Stroke Code Nurse-Responder Note    Arrival Time to Stroke Code: 1400    Stroke Code Team interventions:   - De-escalated at 1445 by Stroke Team    ED/Bedside Nurse providing handoff: JR Abbott    Time left for CT: 1440    Time arrived to next location (ED/Unit/IR): Transferred to , decided no stroke interventions but needed higher level of care    ED/Bedside Nurse given handoff (name/time): JR Roman RN

## 2024-03-26 NOTE — CODE/RAPID RESPONSE
03/25/24 2004   Reason for call   Type of RRT Adult   Primary reason for call Sepsis suspected;Nurse is concerned/worried;Uncontrolled excessive bleeding   Sepsis Suspected Elevated Lactate level   Was patient transferred from the ED, ICU, or PACU within last 24 hours prior to RRT call? Yes   SBAR   Situation LA 4.8 post dialysis. Also having increased blood in stool   Background 64 year old male with history of ESRD on HD, HTN, CAD, prior NSTEMI, PAF, pulmonary HTN, partially calcified abdominal aortic dissection, mitral annular calcification with severe mitral insufficiency, moderate tricuspid insufficiency, prolonged QTc, Crohn's disease s/p multiple bowel resections, IPMN s/p subtotal pancreatectomy, chronic anemia, anxiety, and recent admissions for acute lower GI bleed secondary to Crohn's flare (2/25-3/1/24) and colonic pneumatosis (3/14-3/17/24) who presented from infusion center with hyperglycemia and was subsequently admitted for further evaluation and management.   Notable History/Conditions Cardiac;Congestive heart failure;End-Stage disease;Hypertension;Organ failure   Assessment Pt oriented, denies pain, faintness or other complaints post dialysis. VS stable. Lung sounds clear on RA. Noted red streaking on R leg. Pt denies pain/irritation/change on localized skin.   Interventions Labs  (Recheck Hgb, LA. Keep NPO for now until GI consulted.)   Patient Outcome   Patient Outcome Stabilized on unit   RRT Team   Attending/Primary/Covering Physician Gold 6   Date Attending Physician notified 03/25/24   Time Attending Physician notified 2000   Physician(s) Ainsley Lowe PA-C   Lead RN Harish JONES   Post RRT Intervention Assessment   Post RRT Assessment Stable/Improved  (LA improved but hgb dropped. Will get CT Abd.)   Date Follow Up Done 03/25/24   Time Follow Up Done 2220   Comments Pt oriented, denies change/complaints. LA decreased to 3.0. Hgb dropped from 9.4 to 7.1.

## 2024-03-26 NOTE — PROGRESS NOTES
[facility]    Medicine Progress Note - Hospitalist Service, GOLD TEAM 6    Date of Admission:  3/23/2024    Assessment & Plan   Tacos Dominguez is a 64 year old male with history of ESRD on HD, HTN, CAD, prior NSTEMI, PAF, pulmonary HTN, partially calcified abdominal aortic dissection, mitral annular calcification with severe mitral insufficiency, moderate tricuspid insufficiency, prolonged QTc, Crohn's disease s/p multiple bowel resections, IPMN s/p subtotal pancreatectomy, chronic anemia, anxiety, and recent admissions for acute lower GI bleed secondary to Crohn's flare (2/25-3/1/24) and colonic pneumatosis (3/14-3/17/24) who presented from [facility] center with hyperglycemia and was subsequently admitted for further evaluation and management.    Today's Plan:  - Transfer to ICU for further management of hypotension and possible hemorrhagic shock. Case discussed with Dr. Faria, who has accepted.   - See below for additional interventions and recommendations  - FULL CODE, no advanced directive on file  - Decision making:  Patient is  and does not have much contact with ex-wife. He has a daughter, who is disabled (cognitively may not be able to be decision maker per brother, Nando). Multiple siblings - closest to brother, Nando Dominguez, who lives locally. Spoke with Nando and updated via phone with patient's status. Nando is agreeable to be family contact and surrogate decision maker. ICU team updated.      # Hypotension, concern for hemorrhagic shock:  # Crohn's disease with acute flare and acute GI bleed:  # Acute on chronic anemia with acute blood loss:  Longstanding history of Crohn's, follows with Dr. Rubio of GI at [facility] for Crohn's. Previously on Stelara. Recent admission 2/25/24 for GI bleed in setting of acute flare treated w/ steroids. EGD showed active bleeding at terminal ileum, injected w/ epinephrine. Had GI follow up 3/20  with plan start remicade  to optimize CD prior to CV surgery. Continued on prednisone 40mg daily as bridging therapy. Admitted primarily for steroid-induced hyperglycemia. Developed hematochezia in evening of 3/25. Hgb dropped from 9.4 to 7.1 s/p 1 unit PRBC. GI consulted. Had multiple episodes of hematochezia this AM with resulting hypotension. SBP down to 50's. Repeat Hgb 5.5. No other source of bleeding. No history of varices. No other clear source of hypotension. No evidence of sepsis, lactate 1.3. No evidence of cardiogenic shock. Received 500 mL bolus with minimal improvement. PRBC x 2 ordered STAT. Started on peripheral norephi pending arrival of blood products. Norephi titrated to 0.4mcg/kg/min. Blood products started. Patient is vasculopath, therefor felt NIBP could be falsely low . Case discussed with Dr. Faria of ICU, who agreed with transfer to higher level of care. ICU team was able to place arterial line pending transfer. Arterial pressures much more reliable than NIBP (~50-70 mmHg difference systolic).   - Case discussed with ICU team   - Transfer to ICU pending bed availability  - Transfusing 2 units PRBC (1 of 2 completed)   - Wean Norephine as able   - BP monitoring via art-line   - Give 2 units FFP and Vitamin K 10mg IV x 1 for coagulopathy  - Case discussed with GI team   - High risk for complications given pneumatosis, therefore endoscopic evaluation or intervention not recommended   - Check CTA C/A/P to eval active extravasation and possible IR target   - IR notified, will complete CT when hemodynamically stable   - Recommend starting Remicade 10mg/kg STAT, awaiting PharmD to approve   - Switch prednisone to IV solu-medrol 32mg BID  - Serial Hgb q4h  - Continue budesonide and PPI  - CBC in AM    # Acute L hemiparesis, concern for R MCA stroke:  Developed L sided weakness and facial droop this AM. Stroke code called. Noted to have R gaze preference. CT head negative for acute intracranial process. CTA showed possible  occlusion of distal M2. Not a candidate for tPA d/t thrombocytopenia and GI bleeding. Taken to IR for angiogram and possible thrombectomy but cancelled after repeat NIHSS showed significant improvement. Per stroke team, symptoms c/w R MCA syndrome. Developed worsening symptoms upon return to floor, however further evaluate limited d/t hypotension and management of acute bleeding. Patient now hemodynamically stable and repeat NIHSS significantly worse.   - Stroke team recommends repeat imaging with CT Head and perfusion study STAT  - Neuro checks q4h   - MRI brain, MRA brain, MRA neck ordered (likely later tonight if still needed)  - Telemetry   - Will need PT/OT/SLP when stable  - Lipid panel ordered   - Neuro ICU team aware of patient and plans for transfer    # Hyperglycemia, likely stress/steroid-induced:  Presented with BG >800. Hx subtotal pancreatectomy but no prior diabetes. Hx steroid-induced hyperglycemia in past, but never to this degree. Hgb A1C 6.4% but has been on steroids over the past month. No evidence of DKA or HHS on arrival. Started on insulin drip. Endocrine consulted, felt QID dosing of prednisone could be contributing. Switched to daily prednisone. BG improved. Transitioned off insulin drip 3/24. BG remain well controlled.   - Appreciate endocrine recs  - Insulin held by endocrine given NPO status  - D10 infusion PRN for hypoglycemia  - Hypoglycemia protocol ordered     # Leukocytosis, concern for severe sepsis and possible pneumonia:    # Lactic acidosis:   WBC 5.9 on arrival. Lactate 4.6. Afebrile. Repeat WBC up to 17.2. Moderate dyspnea reported. No hypoxia. No other localizing symptoms. CXR with streaky perihilar opacities favored to be pulmonary edema, although infection could not be ruled out. Does not make urine. BC x 2 NGTD. Started on cefepime + vancomycin on admission. Lactate improved w/ fluid bolus. Remains afebrile. Repeat WBC 14.3 today. Respiratory status has improved, seems more  related to extra run of HD than antibiotics.    - Transitioned to zosyn d/t pneumatosis  - Further management per ICU team    # Recent colonic pneumatosis:  Similar presentation during recent admission with leukocytosis and lactic acidosis. Outside CT noted extensive colonic pneumatosis c/f ischemia. CRS and GI consulted at that time and recommended conservative management and outpatient monitoring. Repeat CT 3/25 noted improved pneumatosis with possible extraluminal air. CRS consulted and reviewed imaging with radiology. Noted that CT appeared to be improved from previous. Concern for perforation low given benign abdominal exam.  - CRS updated with above complaints. Evaluated patient at bedside. No indication for surgical intervention at this time.   - Continue IV zosyn    # Acute HFpEF:  # Severe mitral insufficiency:  # Mild to moderate tricuspid insufficiency:  # Pulmonary HTN:      Developed dyspnea shortly after admission. Likely iatrogenic in setting of fluid resuscitation and steroids. No hypoxia noted. Used supplemental O2 for comfort. CXR showed streaky perihilar opacities c/w edema. BNP 27,076. TTE with LVEF 60-65%, indeterminate diastolic function, borderline reduced RV function, severe mitral insufficiency, moderate tricuspid insufficiency, RVSP 62 mmHg, and non-dilated IVC. Clinically improved following extra run of HD. Suspect heart failure related to severe mitral disease. Scheduled for MVR and TVR on 4/3 but surgery cancelled d/t ongoing GI issues. Currently stable.   - High risk for volume overload with fluid resuscitation and blood products  - Monitor clinically  - Volume management with HD  - Daily weights, I&O's  - Follow up with cardiology and CVTS as outpatient    # ESRD:  on HD MWF via LUE AVG. No missed runs. Received extra run 3/24 d/t fluid overload and decompensated HF.   - Nephrology consulted  - HD q MWF  - BMP daily    # Hyponatremia:  Likely multifactorial with renal disease and  hyperglycemia contributing. Na improved to 133 today   - BMP in AM    # Elevated troponin, suspect type II NSTEMI:  Trop 206 on arrival, repeat 174. No ischemic changes on EKG. Suspect demand ischemia in setting of heart failure. Likely in part elevated d/t ESRD.     # Anxiety:  Psychiatry consulted 3/25. Started remeron d/t poor sleep quality. Developed confusion 3/25 which was attributed to frequent IV ativan. Difficult to evaluate today in setting of CVA.   - Continue remeron 7.5mg at bedtime     # Acute toxic-metabolic encephalopathy:  Suspect polypharmacy secondary to benzos and remeron. No clear source of infection, remains afebrile and WBC down-trending on abx. No severe electrolyte derangements. Does not appear uremic.  - Avoid sedating meds as able  - Delirium precautions    # Renal mass:  Recent MRE noted increasingly complex exophytic cyst along lower pole of L kidney with internal hemorrhagic/proteinaceous contents and peripheral wall and septal thickening. Developing malignancy cannot be ruled out.   - Urology referral ordered as outpatient  - Will need follow up in 3-6 monhts    # Hypomagnesemia:  Mg 1.0 today. Replaced per protocol.             Diet: NPO per Anesthesia Guidelines for Procedure/Surgery Except for: No Exceptions    DVT Prophylaxis: Pneumatic Compression Devices  [place]ara Catheter: Not present  Lines: PRESENT      PICC 03/26/24 Triple Lumen Right Basilic Difficult Access. PICC was ready to use.-Site Assessment: WDL  Hemodialysis Vascular Access Arteriovenous fistula Left Arm-Site Assessment: WDL;Bruit present    Cardiac Monitoring: None  Code Status: Full Code      Clinically Significant Risk Factors            # Hypomagnesemia: Lowest Mg = 1 mg/dL in last 2 days, will replace as needed   # Hypoalbuminemia: Lowest albumin = 2.5 g/dL at 3/25/2024  6:18 AM, will monitor as appropriate  # Coagulation Defect: INR = 2.10 (Ref range: 0.85 - 1.15) and/or PTT = 29 Seconds (Ref range: 22 - 38  Seconds), will monitor for bleeding  # Thrombocytopenia: Lowest platelets = 44 in last 2 days, will monitor for bleeding   # Hypertension: Noted on problem list              # Financial/Environmental Concerns:    # Support System: poor social support noted in nursing assessment         Disposition Plan      Expected Discharge Date: 03/29/2024                  The patient's care was discussed with the Attending Physician, Dr. Ambriz.    Quique Montenegro PA-C  Hospitalist Service, [facility]  [facility]  Securely message with [employer] (more info)  Text page via [facility] Paging/Directory   See signed in provider for up to date coverage information  ______________________________________________________________________    Interval History   Rapid response overnight d/t elevated lactate and recurrent GIB. CT showed ongoing pneumatosis. GI and CRS consulted.   Developed left sided weakness this AM. Stroke code called. Neurology concerned for R MCA stroke. Patient now unable to answer questions.  Developed frequent bouts of hematochezia and subsequently became hypotensive. Hgb dropped to 5.5. Rapid response called for nursing assistance. Received fluid bolus and blood transfusion. Started on pressors. NIBP felt to be unreliable. Art line placed by ICU team, much more reliable. ICU team planning to accept in transfer due to hypotension, concern for hemorrhagic shock, and need for BP monitoring via art line. GI team aware. CRS team aware.    Friend Emanuel and brother Nando updated.     Physical Exam   Vital Signs: Temp: (!) 95.8  F (35.4  C) Temp src: Axillary BP: 91/63 Pulse: 77   Resp: 14 SpO2: 100 % O2 Device: Nasal cannula Oxygen Delivery: 2 LPM  Weight: 157 lbs 10.06 oz    General Appearance:  Awakens to voice and stimulation. Occasionally answers basic questions. Follows simple commands. Unable to assess mentation.    HEENT:  No scleral icterus. R gaze preference. Moist mucous  membranes.   Respiratory:  Normal effort. CTAB. No wheezing, rhonchi, rales.   Cardiovascular:  RRR. S1/S2. III/VI holosystolic murmur at apex. No gallops.   GI:  Soft, non-distended, non-tender, +BS. No mass. RLQ hernia.   Extremity:  Venous stasis changes bilaterally. 1-2+ woody edema bilateraly. No calf tenderness.   Skin:  No visible rash. No jaundice.   Neuro:  R gaze preference. L facial droop. Normal  on R, essentially absent on L.     Medical Decision Making     Time Spent on this Encounter   Tacos was seen and evaluated by me on 03/26/24.  He was in critical condition.    His condition is now Guarded.      The acute issues managed by me today include  1) Hypotension, possible hemorrhagic shock. 2) Acute blood loss anemia due to GI bleed. 3) Crohn's flare. 4) Probable R MCA stroke.   Supportive interventions provided and/or ordered by me include:  - Fluid bolus  - Blood transfusion x 4, FFP x 2  - Vasopressors  - Ordered arterial line for BP monitoring  - Discussion with consulting teams (neurology, GI, CRS)  - Discussion with ICU team, transferred to ICU     Total Critical Care time spent by me, excluding procedures, was 120 minutes.    Qiuque Montenegro PA-C    150 MINUTES SPENT BY ME on the date of service doing chart review, history, exam, documentation & further activities per the note.      Data     I have personally reviewed the following data over the past 24 hrs:    7.7  \   5.5 (LL)   / 44 (LL)     133 (L) 99 20.9 /  215 (H)   3.7 26 2.98 (H) \     Procal: N/A CRP: 99.30 (H) Lactic Acid: 1.3       INR:  2.10 (H) PTT:  N/A   D-dimer:  N/A Fibrinogen:  473       Imaging results reviewed over the past 24 hrs:   Recent Results (from the past 24 hour(s))   CT Abdomen Pelvis w Contrast   Result Value    Radiologist flags Gastrointestinal perforation with free (AA)    Narrative    EXAMINATION: CT ABDOMEN PELVIS W CONTRAST, 3/26/2024 12:26 AM    INDICATION: Elevated LA, Hgb drop. C/f acute abdominal  process    COMPARISON STUDY: CT abdomen and pelvis 2/25/2024    TECHNIQUE: CT scan of the abdomen and pelvis was performed on  multidetector CT scanner using volumetric acquisition technique and  images were reconstructed in multiple planes with variable thickness  and reviewed on dedicated workstations.     CONTRAST: iopamidol (ISOVUE-370) solution 90 mL injected IV without  oral contrast    CT scan radiation dose is optimized to minimum requisite dose using  automated dose modulation techniques.    FINDINGS:    Angiogram abdomen/pelvis: Patent major abdominal vasculature with  chronically calcified aortic dissection flap extending into the right  common iliac artery; severe aortobiiliac atherosclerotic  calcification. No abdominal aortic aneurysm. Celiac artery and  branches are patent. Unchanged severe ostial stenosis of the superior  mesenteric artery. Severe stenosis of the renal arteries bilaterally.  Inferior mesenteric artery is patent. Mild stenosis of the common  iliac arteries and bilateral proximal internal iliac arteries. Mild  stenosis of the bilateral external iliac arteries and common femoral  arteries. Partially visualized superficial femoral arteries are  patent. Portal, splenic, superior mesenteric veins are patent.  Perigastric varices.    Lower thorax: Small bilateral pleural effusions with associated  compressive atelectasis. Centrilobular emphysematous changes.      Liver: No mass. No intrahepatic biliary ductal dilation. Unchanged  right hepatic lobe simple cyst.    Biliary System: Surgically absent gallbladder. No biliary dilatation.    Spleen: Multiple splenules, otherwise within normal limits.    Pancreas: Status post distal pancreatectomy. Remaining pancreas is  within normal limits.    Adrenal glands: No mass or nodules    Kidneys: Unchanged bilateral renal cortical atrophy. Multiple  nonobstructing bilateral intrarenal calculi. Unchanged hemorrhagic rim  calcified cyst at the lower pole  of the left kidney measures up to 8  cm in greatest axial dimension, previously 9.7 cm. Additional  unchanged benign simple renal cyst. No hydronephrosis.    Gastrointestinal tract: Postsurgical changes of ileostomy takedown  with ileocolic anastomosis. Extensive pneumatosis of the large bowel  with air scattered throughout the mesentery resulting in septated  appearance(best seen on the lung window settings), which has improved  compared to 3/13/2024 CT scan. There is normal bowel wall enhancement.   Normal caliber small bowel.    Peritoneum/Retroperitoneum: Hazy attenuation of the peritoneal fat.  Small simple fluid layering posterior to the rectum. No significant  lymphadenopathy.    Pelvis: Urinary bladder is decompressed.    Osseous structures: No aggressive or acute osseous lesion. Multilevel  degenerative changes of the visualized spine and hips. Increased bone  density likely related to renal osteodystrophy.      Soft tissues: Right lower quadrant ventral hernia containing  nonobstructed small  bowel. Diffuse anasarca.        Impression    IMPRESSION:  1.  Diffuse air in large bowel wall and extraluminal air with septated  appearance in the mesentery is improved compared to 3/13/2024 CT scan.  No portal venous gas. Normal bowel wall enhancement. Findings favored  to represent improving extensive pneumatosis intestinalis. Recommend  follow up and repeat CT scan if clinical condition worsens.  2.  No evidence of acute abdominal/pelvic hemorrhage.  3.  Mild ascites, diffuse anasarca, and bilateral small pleural  effusions with adjacent atelectasis.    Critical Result: Impression #1  Finding was identified on 3/26/2024 12:27 AM.   Discussion between Archana Gilliam, Lucila Leiva, and Errol Venegas on 3/26/2024 3:28 AM.    I have personally reviewed the examination and initial interpretation  and I agree with the findings.    GASPER GILLIAM MD         SYSTEM ID:  [id number]   CTA Head Neck with Contrast     Narrative    CTA HEAD NECK W CONTRAST 3/26/2024 7:07 AM    CT Angiogram of the Neck with contrast   CT Angiogram of the Head with contrast    History:  R/O LVO stroke code    Comparison: Same day head CT.     Technique:     HEAD and NECK CTA: Thereafter, postcontrast images were obtained from  the aortic arch through the [place] of [place].  Axial images were  obtained using thin collimation multidetector helical technique. This  CT angiogram data was reconstructed at thin intervals with mild  overlap. Images were sent to the [employer] workstation, and 3D  reconstructions and multiplanar reformations were obtained with  reconstructions performed by the technologist and the radiologist. The  source images, multiplanar reformations, 3D reconstructions in both  maximum intensity projection display and volume rendered models were  reviewed,     Contrast: 67cc of isovue 370    Findings:   Head CTA demonstrates no aneurysm of the major intracranial arteries.  Suspect severe stenosis in the proximal right MCA M1 segment secondary  to calcified plaque. The anterior communicating artery is patent. The  posterior communicating arteries are patent bilaterally. Moderate  atherosclerotic calcifications within the cavernous portions of the  bilateral internal carotid arteries.    Neck CTA demonstrates approximately 54% stenosis within the right  carotid bifurcation secondary to small mostly lipid rich partially  calcified plaque and 46% within the left carotid bifurcation secondary  to calcified plaques. The origins of the great vessels from the aortic  arch are patent. The normal distal right internal carotid artery  measures 4 mm. The normal distal left internal carotid artery measures  4 mm. The bilateral vertebral arteries are patent. Dominant left  vertebral artery. Partially visualized stent within the left  subclavian vein.    No mass is noted within the visualized portions of the cervical soft  tissues or lung apices.  Prominent pleural fat within the posterior  lung fields. Degenerative disease of the bilateral sternoclavicular  joints.       Impression    Impression:   1. Head CTA demonstrates severe stenosis in the proximal portion of  the right MCA M1 segment secondary to bulky calcified plaque. No  aneurysm of the major intracranial arteries. Moderate atherosclerotic  ossifications within the intracranial portions of the internal carotid  arteries.  2. Neck CTA demonstrates approximately 54% stenosis within the right  carotid artery location secondary to small mostly lipid rich,  partially calcified plaque and 46% stenosis within the left cervical  carotid bifurcation.     I have personally reviewed the examination and initial interpretation  and I agree with the findings.    KUSH PIRES MD         SYSTEM ID:  [id number]   CT Head w/o Contrast    Narrative    CT HEAD W/O CONTRAST 3/26/2024 7:08 AM    History:  r/o stroke     Comparison: None available     Technique: Using multidetector thin collimation helical acquisition  technique, axial, coronal and sagittal CT images from the skull base  to the vertex were obtained without intravenous contrast.     Findings: There is no intracranial hemorrhage, mass effect or midline  shift. Small focal area of low density and gray-white differentiation  loss in the anterior right frontal lobe, best seen on sagittal image  45 of series 5. There is a questionable tiny focus of calcification in  this area. Otherwise, there is no focal loss of gray-white matter  differentiation, insular ribbon sign, or focally hyperdense artery to  suggest acute infarction. Scattered periventricular and subcortical  white matter hypodensities, likely secondary to chronic small vessel  ischemic disease given patient's age. The ventricles are proportionate  to the cerebral sulci.    The bony calvaria and the bones of the skull base appear normal. The  visualized portions of the paranasal sinuses and mastoid  air cells are  unremarkable.       Impression    Impression:   1. Small focal area of low-density in the anterior right frontal lobe,  more likely chronic than related to acute infarct, although that is  difficult to entirely exclude. No acute intracranial hemorrhage.  2. MCA ASPECT Score: 10/10.    I have personally reviewed the examination and initial interpretation  and I agree with the findings.    PAUL BAH MD         SYSTEM ID:  [id number]   Echo Complete   Result Value    LVEF  60-65%    Narrative    [id number]  [id number]  [id number]  [id number]^THONY^ANN MARIE     [facility],[place]  Echocardiography Laboratory  [address] [place] [address]     Name: KRAIG CHAUDHARI  MRN: [id number]  : 1960  Study Date: 2024 08:56 AM  Age: 64 yrs  Gender: Male  Patient [facility]: [facility]  Reason For Study: CVA  Ordering Physician: ANN MARIE BHANDARI  Performed By: Jil Hess     BSA: 1.9 m2  Height: 71 in  Weight: 157 lb  HR: 67  BP: 95/51 mmHg  ______________________________________________________________________________  Procedure  Complete Portable Echo Adult.  ______________________________________________________________________________  Interpretation Summary  No cardiac source for embolus identified.  ______________________________________________________________________________  Left Ventricle  Global and regional left ventricular function is normal with an EF of 60-65%.  Left ventricular wall thickness is normal. Left ventricular size is normal.  Left ventricular diastolic function is not assessable. No regional wall motion  abnormalities are seen.     Right Ventricle  Right ventricular function, chamber size, wall motion, and thickness are  normal.     Atria  The atria cannot be assessed.     Mitral Valve  Calcified fixed posterior mitral leaflet with severe MR.     Aortic Valve  Aortic valve sclerosis is present. Trace aortic insufficiency is present.     Tricuspid  Valve  The tricuspid valve is normal. Trace to mild tricuspid insufficiency is  present. Pulmonary artery systolic pressure cannot be assessed.     Pulmonic Valve  The pulmonic valve is normal.     Vessels  The thoracic aorta cannot be assessed. The pulmonary artery cannot be  assessed. The inferior vena cava is normal.     Pericardium  No pericardial effusion is present.     ______________________________________________________________________________  Report approved by: Brain Villegas 03/26/2024 10:06 AM     ______________________________________________________________________________      US Lower Extremity Venous Duplex Right    Narrative    EXAMINATION: DOPPLER VENOUS ULTRASOUND OF THE RIGHT LOWER EXTREMITY,  3/26/2024 11:37 AM     COMPARISON: Ultrasound lower extremities Doppler 3/14/2024.    HISTORY: Swelling, erythema    TECHNIQUE:  Gray-scale evaluation with compression, spectral flow, and  color Doppler assessment of the deep venous system of the right leg  from groin to knee, and then at the ankle. Examination was ordered as  bilateral lower extremities however due to patient's symptoms,  technologist was unable to scan the left leg at this time.    FINDINGS:  In the right lower extremity, the common femoral, femoral, popliteal  and posterior tibial veins demonstrate normal compressibility and  blood flow.      Impression    IMPRESSION:  No evidence of right lower extremity deep venous thrombosis.    PRUDENCE KONG MD         SYSTEM ID:  [id number]

## 2024-03-26 NOTE — PROGRESS NOTES
GI Brief Note     64-year-old male with history of hypertension, CAD, ESRD on hemodialysis, Crohn's disease (history of multiple small bowel resection since 1970s) with end to end ileocolonic anastomosis.  Patient has been having iron deficiency anemia consequent to blood loss and has been requiring transfusions since February 2024.  He was recently admitted at [facility] and underwent colonoscopy on 2/27/24 with evidence of a terminal ileal ulcer with visible vessel that was clipped. Following this the patient had another admission in [facility] was found to have incidental colonic pneumatosis that was treated conservatively with antibiotics.  He then had an MR enterography on March 18, 2024 which showed evidence of multiple small bowel loops with obstruction and inflammation.  He followed up with Dr. Rubio in GI IBD clinic, he was referred to colorectal surgery and patient was made to switch to Remicade from Stelara with prednisone as a bridge.  Following this while the patient was at an infusion center for packed RBCs he was noticed to have high glucose level to 800 range and was referred to the hospital for further management.  Since he has been here he has been on insulin drip and was started on antibiotics for possible CAP.  He continues to be on Flagyl due to recent episode of colonic pneumatosis. He is continued on prednisone 40 mg oral along with pantoprazole 40 mg once daily    This evening at around 8 PM he reported having bright red bleeding per rectum.  As per report from primary team patient is hemodynamically stable with blood pressure of 101/ 67 and heart rate of 95/min.  This bright red bleeding per rectum happened once and has stopped since then.    Labs reviewed Hb head CT 7.1/22.3 (from 9.4/29.8) CRP is elevated to 118 (was 4.65 on 3/23/2024).  In addition patient had lactic acidosis. It is likely that bleeding is from ulcers in neoterminal ileal area consequent to post surgical  changes and Crohn's activity. Although less likely pt does have risk factor for micrograms bleeding viz prednisone use.       Recommendations  - Obtain CT abdomen with contrast (to look for any perforation patient is high risk for this complication)  - Monitor hemoglobin and hematocrit and transfuse as needed.  Considering that the patient has significant coronary artery disease would aim for a higher hemoglobin values  - Continue IV PPI  - Inform GI on call if there is any evidence of hemodynamically significant GI bleeding    Discussed with Dr. Rubio.

## 2024-03-26 NOTE — CONSULTS
"      [facility]    Stroke Consult Note    Reason for Consult: Stroke Code     Chief Complaint: Hyperglycemia      HPI  From initial stroke code consult note:   \"Tacos Dominguez is a 64 year old male with past medical history of ESRD on hemodialysis, PHTN, partially calcified abdominal aortic dissection, severe mitral valve insufficiency, moderate tricuspid insufficiency, Crohn's disease status post multiple bowel resections with end-to-end ileocolonic anastomosis, subtotal pancreatectomy and SANTOS 3/17/2022, chronic anemia, recent admission for acute lower GI bleed secondary to Crohn's flare from 2/25/2024 to 3/1/2024 and colonic pneumatosis 3/14/2024 to 3/17/2024 at outside hospital treated conservatively who was brought into [facility] for acute respiratory failure secondary to HFpEF secondary to acute on chronic blood loss anemia.  Stroke code initiated at 6:37 AM on 03/25/24 after the patient was found to have acute onset left sided weakness and altered mental status.       On arrival to bedside, the patient's blood pressure was in the 160s systolic and blood glucose was 92.  Patient was given an NIH stroke score of 14 for altered mental status requiring vocal stimuli to get his attention, answering only 1 question right, following only 1 command, not blinking to threat on the left, left facial droop, left upper drift with hitting bed and lower extremity drift without hitting bed, extinction to double stimulation.  Patient was taken to CT scanner for CT head and CTA had and neck.  CT head did not show any acute intracranial pathology, CT angiogram showed a distal M2's possible subocclusive thrombus.  The patient was not a candidate for tenecteplase as the patient's platelet count was 49, and his recent GI bleeding.  His hemoglobin has been significantly low due to the bleeding requiring recent blood transfusions.      Based on his initial deficits (NIHSS of 14) " "in the setting of possible thrombus, the decision was taken to take patient to IR suite for emergent cerebral angiogram with intent to treat if there was an occlusion.  Patient was reassessed at bedside by day stroke team and found to have an improved NIHSS of 6 only 40 minutes after initial stroke code assessment.  By the time the patient got down to the IR suite, the patient's neuroexam improved dramatically with and NIHSS of 2, the only deficits being mild left lower facial droop and extinction to double stimulation on sensory exam.  Hence the decision was ultimately taken to not go ahead with IR angiogram.  Patient was transported back to his room without incident with plan for non-urgent stroke cares and work up.\"    Following improvement in his examination, patient had rapid response called for multiple episodes of bloody stools and hypotension.  An arterial line was placed for closer blood pressure monitoring, and patient was transferred to the ICU.  Patient briefly required pressors, which were then weaned off and patient's blood pressure improved with transfusion. Patient was found to have extravasation into small bowel loop in RLQ hernia as well as pneumoperitoneum.     Later in the afternoon, stroke neurology team was notified by staff that patient's neurologic exam is worsening.  I went and reassessed the patient shortly after being notified and the stroke code was called.  On examination, patient's blood sugar was 88, and blood pressure was 130s/70s.  Patient had recently received 2 units of packed red blood cells, and was having infusion of plasma at time of assessment.    On examination, patient required focal stimulation to get his attention, he was able to answer questions correctly and follow commands.  He has a right gaze deviation, complete left hemianopia, left lower facial paralysis, drift but not to bed and left upper extremity, sensory loss on left, severe dysarthria, and profound hemibody " inattention and extinction on left.  Patient did not recognize his own hand and had visual inattention as well.  Given worsening exam decision was made to proceed with repeat imaging.  Of note, patient did have another episode of bloody stool during stroke.    Imaging Findings  CTA Head and Neck   IMPRESSION:    1. Head CTA demonstrates severe stenosis of the proximal portion of  the right M1 and A1 segments of the middle and anterior cerebral  arteries due to calcified plaque, this appears unchanged compared to  same day CTA Head and Neck. There is no occlusion or aneurysm  identified.  2. Neck CTA demonstrates patent major cervical arteries. Approximately  50% stenosis of the bilateral internal carotid arteries near the  bifurcations.    CT Head  Impression: Unchanged small focal area of low density in the anterior  right frontal lobe, seen on same day CT head. No new gray-white  differentiation loss or intracranial hemorrhage.    Intravenous Thrombolysis  Not given due to:   - active bleeding  - GI bleed within the past 14 days  - platelet count < 100,000    Endovascular Treatment  Not initiated due to absence of proximal vessel occlusion    Impression   Tacos Dominguez is a 64 year old male with past medical history of ESRD on hemodialysis, PHTN, partially calcified abdominal aortic dissection, severe mitral valve insufficiency, moderate tricuspid insufficiency, Crohn's disease status post multiple bowel resections with end-to-end ileocolonic anastomosis, subtotal pancreatectomy and SANTOS 3/17/2022, chronic anemia, recent admission for acute lower GI bleed secondary to Crohn's flare from 2/25/2024 to 3/1/2024 and colonic pneumatosis 3/14/2024 to 3/17/2024 at outside hospital treated conservatively who was brought into [facility] for acute respiratory failure secondary to HFpEF secondary to acute on chronic blood loss anemia.  Stroke orders initiated at 6:37 AM after the patient was found to have acute onset  left sided weakness and altered mental status.  Patient was given an NIH stroke score of 14.  CT head and CTA was done which showed no acute intracranial pathology with possible right M2 occlusion distally.  Patient was not a candidate for tenecteplase as there was recent history of GI bleed with a platelet count of 49.  Patient was transferred to angio suite for IR angiogram with intention to treat if there was a thrombus but patient's symptoms resolved almost back to baseline to a NIH of 2 with mild left lower facial droop and extinction to double stimulation in the left, hence the decision was taken to not go ahead with the IR angiogram.     Patient's presentation consistent with right MCA syndrome.  Initially, patient's examination improved. However, patient's examination worsened in the afternoon following multiple episodes of BRBPR and profound hypotension. On CTA, patient does have severe stenosis of proximal portion of right M1 and A1 segments. Given patient's episode of hypotension, it is possible that patient had hypoperfusion to brain leading to worsening examination and stroke.  Case was again discussed with neurointerventional team, and given no clear occlusion and active GI bleed, no indication for thrombectomy or endovascular intervention at this time.     Currently, priority remains resuscitation and management of active GI bleeding.     Recommendations  - agree with ongoing management of active GI bleed per primary team  - when patient is stable, obtain MRI Brain with and without contrast and MRA Head and Neck with and without contrast  - BP goal per primary team, from stroke perspective if possible, maintain MAP>65 but defer to primary team     Patient Follow-up     - final recommendation pending work-up    Thank you for this consult.  We will continue to follow; update given to neurocritical care team who will continue to follow patient while patient is in ICU.      The patient was seen and  discussed with attending, Dr. Lombardi.     OJ URBANO MD  Neurology Resident, PGY-1  Stroke pager [phone]  ______________________________________________________    Clinically Significant Risk Factors            # Hypomagnesemia: Lowest Mg = 1 mg/dL in last 2 days, will replace as needed   # Hypoalbuminemia: Lowest albumin = 2.5 g/dL at 3/25/2024  6:18 AM, will monitor as appropriate  # Coagulation Defect: INR = 2.10 (Ref range: 0.85 - 1.15) and/or PTT = 29 Seconds (Ref range: 22 - 38 Seconds), will monitor for bleeding  # Thrombocytopenia: Lowest platelets = 44 in last 2 days, will monitor for bleeding   # Hypertension: Noted on problem list            # Financial/Environmental Concerns:    # Support System: poor social support noted in nursing assessment           Past Medical History   Past Medical History:   Diagnosis Date    Aortic dissection (H)     distal, thin.  stable/chronic on MRCP 3/2022    Benign essential hypertension     CAD (coronary artery disease)     Cerebral infarction (H)     Crohn's colitis (H)     Crohn's disease of large intestine (H) 11/22/2021    Current smoker     Esophageal reflux     ESRD (end stage renal disease) on dialysis (H)     History of basal cell carcinoma     Hypertension     Mixed hyperlipidemia     NSTEMI (non-ST elevated myocardial infarction) (H)     PAF (paroxysmal atrial fibrillation) (H)      Past Surgical History   Past Surgical History:   Procedure Laterality Date    ABDOMEN SURGERY      x 6.  colon resections for crohn's disease    APPENDECTOMY      CHOLECYSTECTOMY      COLONOSCOPY N/A 07/20/2021    Procedure: COLONOSCOPY, WITH POLYPECTOMY AND BIOPSY;  Surgeon: Eric Preston MD;  [facility]    COLONOSCOPY N/A 12/07/2022    Procedure: COLONOSCOPY, WITH POLYPECTOMY AND BIOPSY;  Surgeon: Willian Kee MD;  Location:  GI    COLONOSCOPY N/A 02/27/2024    Procedure: COLONOSCOPY;  Surgeon: Judson Woods MD;  Location: [facility]    CV CORONARY  ANGIOGRAM N/A 05/25/2021    Procedure: CV CORONARY ANGIOGRAM;  Surgeon: Judson Ybarra MD;  Location:  HEART CARDIAC CATH LAB    CV CORONARY ANGIOGRAM N/A 02/15/2024    Procedure: Coronary Angiogram;  Surgeon: Tico Finn MD;  Location:  HEART CARDIAC CATH LAB    CV LEFT HEART CATH N/A 02/15/2024    Procedure: Left Heart Catheterization;  Surgeon: Tico Finn MD;  Location:  HEART CARDIAC CATH LAB    CV RIGHT HEART CATH MEASUREMENTS RECORDED N/A 02/15/2024    Procedure: Right Heart Catheterization;  Surgeon: Tico Finn MD;  Location:  HEART CARDIAC CATH LAB    ESOPHAGOSCOPY, GASTROSCOPY, DUODENOSCOPY (EGD), COMBINED N/A 07/20/2021    Procedure: ESOPHAGOGASTRODUODENOSCOPY, WITH FINE NEEDLE ASPIRATION BIOPSY, WITH ENDOSCOPIC ULTRASOUND GUIDANCE;  Surgeon: Eric Preston MD;  Location:  GI    ESOPHAGOSCOPY, GASTROSCOPY, DUODENOSCOPY (EGD), COMBINED N/A 02/27/2024    Procedure: ESOPHAGOGASTRODUODENOSCOPY;  Surgeon: Judson Woods MD;  Location: [facility]    IR DIALYSIS FISTULOGRAM LEFT  12/01/2022    IR DIALYSIS FISTULOGRAM LEFT  01/13/2023    LAPAROTOMY, LYSIS ADHESIONS, COMBINED N/A 03/17/2022    Procedure: Laparotomy, extensive lysis adhesions, combined;  Surgeon: Sarath Smith MD;  [facility]    PANCREATECTOMY PARTIAL N/A 03/17/2022    Procedure: Open Subtotal Pancreatectomy, intra-op ultrasound;  Surgeon: Sarath Smith MD;  [facility]    PICC TRIPLE LUMEN PLACEMENT Right 03/26/2024    basilic, 39 cm total length, 2 cm external length    REVISION FISTULA ARTERIOVENOUS UPPER EXTREMITY Left 02/14/2023    Procedure: LEFT UPPER ARM FISTULA OUTFLOW REVISION FROM CEPHALIC VEIN TO JUGULAR VEIN WITH 10mm THIN-WALLED RINGED POLYTETRAFLUEROETHYLINE;  Surgeon: Mo Gramajo MD;  Location: [facility]    TRANSESOPHAGEAL ECHOCARDIOGRAM INTRAOPERATIVE N/A 01/11/2024    Procedure: ECHOCARDIOGRAM, TRANSESOPHAGEAL, WITH ANESTHESIA;  Surgeon: GENERIC  ANESTHESIA PROVIDER;  [facility]    VASCULAR SURGERY      dialysis access- left upper     Medications   Home Meds  Prior to Admission medications    Medication Sig Start Date End Date Taking? Authorizing Provider   budesonide (ENTOCORT EC) 3 MG EC capsule Take 3 capsules (9 mg) by mouth every morning 3/1/24  Yes Mak Peters MD   calcium acetate (CALPHRON) 667 MG TABS tablet Take 2,668 mg by mouth 3 times daily (with meals) 12/3/21  Yes Reported, Patient   citalopram (CELEXA) 20 MG tablet Take 20 mg by mouth daily as needed (panic attacks) 12/7/23  Yes Reported, Patient   folic acid (FOLVITE) 1 MG tablet Take 1 mg by mouth every morning  2/19/22  Yes Reported, Patient   lidocaine-prilocaine (EMLA) 2.5-2.5 % external cream three times a week Prior to dialysis  site access on Monday, Wednesday, Friday 2/14/22  Yes Reported, Patient   metroNIDAZOLE (FLAGYL) 500 MG tablet Take 1 tablet (500 mg) by mouth 3 times daily for 29 days 3/17/24 4/15/24 Yes Porfirio Smith MD   multivitamin RENAL (MULTIVITAMIN RENAL) 1 MG capsule Take 1 capsule by mouth every morning 2/1/22  Yes Reported, Patient   omeprazole (PRILOSEC) 20 MG DR capsule Take 20 mg by mouth every morning  10/29/20  Yes Reported, Patient   predniSONE (DELTASONE) 10 MG tablet Take 4 tablets (40 mg) by mouth daily for 30 days 3/21/24 4/20/24 Yes Betty Rubio MD   simvastatin (ZOCOR) 20 MG tablet Take 20 mg by mouth every morning  3/10/21  Yes Reported, Patient   ustekinumab (STELARA) 90 MG/ML Inject 1 ml ( 90 mg) subcutaneous every 4 weeks.  Patient taking differently: Inject 90 mg Subcutaneous every 28 days Inject 1 ml ( 90 mg) subcutaneous every 4 weeks. Last dose 3/1/24 10/18/23  Yes Betty Rubio MD       Scheduled Meds   budesonide  9 mg Oral QAM    calcium acetate  2,668 mg Oral TID w/meals    folic acid  1 mg Oral QAM    heparin lock flush  5-20 mL Intracatheter Q24H    inFLIXimab  700 mg Intravenous Once    [Held by provider] insulin  aspart   Subcutaneous Daily with breakfast    [Held by provider] insulin aspart   Subcutaneous Daily with lunch    [Held by provider] insulin aspart   Subcutaneous Daily with supper    insulin aspart  1-7 Units Subcutaneous TID AC    insulin aspart  1-5 Units Subcutaneous 2 times per day    [Held by provider] insulin glargine  7 Units Subcutaneous Q24H    [Held by provider] insulin NPH  7 Units Subcutaneous Q24H    methylPREDNISolone  32 mg Intravenous Q12H    [Held by provider] metroNIDAZOLE  500 mg Oral TID    mirtazapine  7.5 mg Oral At Bedtime    multivitamin RENAL  1 capsule Oral QAM    nicotine  1 patch Transdermal Daily    pantoprazole  40 mg Intravenous Q12H    piperacillin-tazobactam  2.25 g Intravenous Q6H    [Held by provider] predniSONE  40 mg Oral Daily    simvastatin  20 mg Oral QAM    sodium chloride (PF)  10 mL Intracatheter Q8H    sodium chloride (PF)  10 mL Intracatheter Q8H    sodium chloride (PF)  10-40 mL Intracatheter Q8H    sodium chloride (PF)  10-40 mL Intracatheter Q8H    sodium chloride (PF)  3 mL Intracatheter Q8H    tranexamic acid  1 g Intravenous Once       Infusion Meds   dextrose 1,000 mL (03/26/24 0625)    heparin      heparin      insulin regular      norepinephrine Stopped (03/26/24 1255)    tranexamic acid      vasopressin         PRN Meds  calcium carbonate, dextrose, dextrose, glucose **OR** dextrose **OR** glucagon, fentaNYL, flumazenil, heparin, heparin, heparin lock flush, insulin aspart, insulin aspart, insulin regular, lidocaine 4%, lidocaine 4%, lidocaine 4%, lidocaine 4%, lidocaine (buffered or not buffered), lidocaine (buffered or not buffered), lidocaine (buffered or not buffered), lidocaine (buffered or not buffered), LORazepam, midazolam, naloxone **OR** naloxone **OR** naloxone **OR** naloxone, senna-docusate **OR** senna-docusate, sodium chloride (PF), sodium chloride (PF), sodium chloride (PF), sodium chloride (PF), sodium chloride (PF), sodium chloride (PF),  traZODone **OR** traZODone    Allergies   Allergies   Allergen Reactions    Lisinopril Anaphylaxis and Other (See Comments)     Shortness of breath     Family History   Family History   Problem Relation Age of Onset    Breast Cancer Mother     Coronary Artery Disease Father     Hypertension Brother     Anesthesia Reaction No family hx of     Thrombosis No family hx of      Social History   Social History     Tobacco Use    Smoking status: Every Day     Packs/day: 0.25     Years: 50.00     Additional pack years: 0.00     Total pack years: 12.50     Types: Cigarettes     Passive exposure: Current    Smokeless tobacco: Never    Tobacco comments:     6-7 cigarettes daily   Substance Use Topics    Alcohol use: Not Currently     Comment: 2-3 drinks every 3  month    Drug use: Not Currently       Review of Systems   The 10 point Review of Systems is negative other than noted in the HPI or here.        PHYSICAL EXAMINATION  Temp:  [95.8  F (35.4  C)-97.8  F (36.6  C)] 97.7  F (36.5  C)  Pulse:  [] 90  Resp:  [12-22] 12  BP: ()/() 91/63  Cuff Mean (mmHg):  [72] 72  MAP:  [75 mmHg-115 mmHg] 76 mmHg  Arterial Line BP: (129-189)/(44-79) 145/44  SpO2:  [71 %-100 %] 98 %     Neurologic  Mental Status:   Not alert but arouses to vocal stimuli, follows commands, severe dysarthria, naming and repetition intact but dysarthric.  Cranial Nerves:   Right gaze deviation that cannot be overcome, left hemianopia, left lower facial droop, severe dysarthria, reduced sensation to left side of face, shoulder shrug strong  Motor:  normal muscle tone and bulk, no abnormal movements, no drift in right upper extremity or bilateral lower extremities, drift but not to bed in  left upper extremity  Reflexes:  no clonus  Sensory:   Patient is not aware of being touched in left face arm or leg, and does not withdrawal to noxious stimuli, extinction on bilateral simultaneous stimulation  Coordination:   No ataxia grossly  Station/Gait:   deferred    Dysphagia Screen  Dysarthria or facial droop present - Maintain NPO, consult SLP    Stroke Scales    NIHSS  1a. Level of Consciousness 1-->Not alert, but arousable by minor stimulation to obey, answer, or respond   1b. LOC Questions 0-->Answers both questions correctly   1c. LOC Commands 0-->Performs both tasks correctly   2.   Best Gaze 1-->Partial gaze palsy, gaze is abnormal in one or both eyes, but forced deviation or total gaze paresis is not present   3.   Visual 2-->Complete hemianopia   4.   Facial Palsy 2-->Partial paralysis (total or near-total paralysis of lower face)   5a. Motor Arm, Left 1-->Drift, limb holds 90 (or 45) degrees, but drifts down before full 10 seconds, does not hit bed or other support   5b. Motor Arm, Right 0-->No drift, limb holds 90 (or 45) degrees for full 10 secs   6a. Motor Leg, Left 0-->No drift, leg holds 30 degree position for full 5 secs   6b. Motor Leg, right 0-->No drift, leg holds 30 degree position for full 5 secs   7.   Limb Ataxia 0-->Absent   8.   Sensory 2-->Severe to total sensory loss, patient is not aware of being touched in the face, arm, and leg   9.   Best Language 0-->No aphasia, normal   10. Dysarthria 2-->Severe dysarthria, patients speech is so slurred as to be unintelligible in the absence of or out of proportion to any dysphasia, or is mute/anarthric   11. Extinction and Inattention  2-->Profound crystal-inattention/extinction more than 1 modality   Total 13 (03/26/24 1400)       Modified Kayden Score (Pre-morbid)  3-Moderate disability; requiring some help, but able to walk without assistance    Imaging  I personally reviewed all imaging; relevant findings per HPI.     Lab Results Data   CBC  Recent Labs   Lab 03/26/24  1333 03/26/24  1254 03/26/24  1130 03/26/24  0533 03/26/24  0346   WBC  --   --  7.7 11.2* 13.0*   RBC  --   --  1.64* 2.25* 2.50*   HGB 7.6* 5.4* 5.5* 7.8* 8.2*   HCT  --   --  17.5* 23.7* 26.2*   PLT  --   --  44* 49* 52*      Basic Metabolic Panel    Recent Labs   Lab 03/26/24  1504 03/26/24  1411 03/26/24  1335 03/26/24  0410 03/26/24  0344 03/25/24  0726 03/25/24  0618 03/24/24  0748 03/24/24  0705   NA  --   --   --   --  133*  --  131*  --  133*   POTASSIUM  --   --   --   --  3.7  --  4.5  --  3.7   CHLORIDE  --   --   --   --  99  --  98  --  96*   CO2  --   --   --   --  26  --  20*  --  24   BUN  --   --   --   --  20.9  --  34.5*  --  23.9*   CR  --   --   --   --  2.98*  --  4.49*  --  3.53*   * 88 131*   < > 121*   < > 169*   < > 142*   KARY  --   --   --   --  8.3*  --  8.6*  --  9.0    < > = values in this interval not displayed.     Liver Panel  Recent Labs   Lab 03/25/24  0618 03/23/24  0759   PROTTOTAL 4.0* 4.6*   ALBUMIN 2.5* 3.0*   BILITOTAL 1.1 0.7   ALKPHOS 96 142   AST 20 15   ALT 18 21     INR    Recent Labs   Lab Test 03/25/24 2054 03/19/24  1021 03/14/24  0535   INR 2.10* 1.46* 1.96*      Lipid Profile    Recent Labs   Lab Test 03/26/24  0533 03/14/24  0535 05/25/21  1143   CHOL 60 72 86   HDL 45 43 62   LDL 5 <4 3   TRIG 48 130 108     A1C    Recent Labs   Lab Test 03/23/24  0759 03/19/24  1021 03/14/24  0535   A1C 6.4* 6.1* 5.8*     Troponin    Recent Labs   Lab 03/23/24  1421 03/23/24  1234   CTROPT 174* 206*          Stroke Code Data Data   Stroke Code Data  (for stroke code without tele)  Stroke code activated 03/26/24  1409   First stroke provider response 03/26/24  1409   Last known normal 03/26/24   (unknown)   Time of discovery (or onset of symptoms) 03/26/24  1350   Head CT read by Stroke Neuro Provider 03/26/24  1430   Was stroke code de-escalated? Yes  03/26/24  9774

## 2024-03-26 NOTE — PROGRESS NOTES
COLORECTAL FELLOW  NOTE     See FULL consult by resident for details.     Colorectal consulted for pneumoperitoneum and ongoing GI bleed in Mr Dominguez. Patient has Crohn's s/p RHC and multiple SBR and ileotomy takedown worsening on biologic, so here for initiating remicade with prednisone bridge. He has been having GI bleed from the ulcer that was seen in the neoileum on colonoscopy done on 2/24/24 and clipped. He had gotten a ct scan on 3/13/24 at OSH which had extensive amount of pnematosis and was started on flagyl. At midnight he had bloody bowel movement and that led to CT scan done tonight.     Patient does not endorse any abdominal pain today. No nausea. He had a full meal for dinner.    Patient was examined by me    Gen: laying comfortable, AAO x 3   Cards: pulse in 90s, sinus  Resp: no SOB   Abdomen: extremely soft, non distended, non tender, prior scars seen   Ext: moving all and wwp     WBC is 11, plt is 44.     Assessment   Mr Dominguez has Crohn's s/p RHC and multiple SBR and ileotomy takedown worsening on biologic, so here for initiating remicade with prednisone bridge.CT scan reviewed and discussed with radiologist resident and attending on call. The free air was small foci and the air in the mesentery and pneumatosis was improved compared to the CT scan on 3/13/24. This likely a contained perforation with confounding bleeding from his ulcer. His clinical exam is not concerning for peritonitis or guarding. He is HDS.     Recommendations -     Start zosyn   NPO   IVF per primary   Serial abdominal exam   Will continue to follow closely   GI to be alerted about the GI bleed and new CT scan findings as well     Pt d/w Dr Andrés Gonzalez on call     Lucila Leiva MD  Fellow, Colon and Rectal Surgery  [facility]  Pager: [phone]

## 2024-03-26 NOTE — PROGRESS NOTES
"[facility] Medicine Cross Cover Note  3/26/2024 - 12:52 AM    Called from radiology resident Errol Venegas MD.  CT abdomen showing free air.    Exam:    /62 (BP Location: Right arm, Cuff Size: Adult Regular)   Pulse 96   Temp 97.2  F (36.2  C) (Oral)   Resp 14   Ht 1.803 m (5' 11\")   Wt 73.5 kg (162 lb 0.6 oz)   SpO2 100%   BMI 22.60 kg/m    Not otherwise examined    Assessment:    Presumed bowel perforation(s).    Plan:    General surgery to see ASAP.  I will page / call to discuss.    Alex Damon MD  [phone]  Mobile: [phone]    Update:  1:03 AM  Briefly discussed with Dr. Tacos Churchill - confirms that their EGS service will see the patient.  Alex Damon MD    Update:  3:40 AM  Examined with Colorectal Surgery Service Fellow - Dr. Leiva.  Soft and nontender abdomen  Plan:  piperacillin / tazobactam (Zosyn ) and continue close observation and monitoring and CBC in the morning.  NPO now.  Alex Damno MD        "

## 2024-03-26 NOTE — CONSULTS
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, nonobstructive CAD, hypertension, history of NSTEMI, pulmonary hypertension, history of abdominal aorta partially calcified dissection, severe symptomatic mitral valve regurgitation with significant MAC, severe functional tricuspid regurgitation and moderate aortic stenosis with planned surgery April 2024, history of paroxysmal atrial fibrillation, prolonged QTc, with recent admission to [facility] for anemia status-post colonoscopy 2/27/2024 with multiple large ulcers in the ileum, one of which had adherent clot with visible vessel treated with epinephrine and bipolar cautery and status-post EGD on the same day 2/27/2024 with evidence of congestive erythematous gastritis with biopsies consistent with chemical gastritis (performed by JACINTA while inpatient at [facility]), who then presented to [place] ED with scrotal swelling, found to have a hemoglobin of 5.7 g/dL, leukocytosis of 17 10e3/uL and CT scan with evidence of colonic pneumatosis, started on IV antibiotics and transferred to [facility] 3/14/2024 where patient was treated conservatively. Patient presented to the ED on 3/23/2024 from the infection center for further evaluation of hyperglycemia with blood " "glucose in the 800s.     The GI Luminal Service was consulted regarding: \"Crohn's flare w/ recent hx of GIB, now transfusion dependent. Elevated LA and BRBPR.\"      Patient was recently hospitalized for GI bleeding and noted to have \"huge\" ulcers in the terminal ileum, one noted to be actively bleeding and was successfully treated with epinephrine injection and coagulation. Patient was most recently hospitalized at [facility] and found to have an incidental finding of pneumoatosis intestinalis in the ileum/R colon on imaging. Patient was treated conservatively with Flagyl, prednisone was discontinued and patient was discharged home.   - MRE on 3/18/2024 concerning for small bowel obstructions and acute inflammation. MRE reports multifocal areas of segmental wall thickening and stricturing seen along small bowel loops in the right hemiabdomen compatible with acute Crohn's disease.   - Patient continues to smoke 1/4 pack per day which is counterproductive to Crohn's healing; patient is trying to quit, using nicotine replacement patches.   - Patient was most recently seen in outpatient [facility] with our colleague Dr. Betty Rubio on 3/20/2024. Outpatient plan was to discontinue Stelara and restart Remicade with suspicion that a high dose therapy will be needed. As a bridge until Remicade, prednisone was re-initiated given ongoing active Crohn's inflammation.       # Active Crohn's Disease  # Bloody Stools  # Abnormal CT Scan of the GI Tract  # Pneumatosis Intestinalis  # Chronic Macrocytic Anemia  # Thrombocytopenia   # Tobacco Use Disorder  Patient with long-standing Crohn's Disease (diagnosed age 16) status-post multiple bowel resections with minimal remaining bowel on Stelara every 4 weeks prior to admission in the setting of ongoing tobacco use disorder (daily cigarette smoking). Patient with chronic macrocytic anemia and thrombocytopenia.    - 3/23/2024 NEGATIVE C. Diff and Enteric " Bacteria and Virus Panel PCR.   - CRP: 4.65 mg/L (3/23) --> 118 mg/L (3/25) --> 99.30 mg/L (3/25) --> add on pending (3/26).     Per chart review, patient was having brown loose stools until 1900 on 2/25/2024 when they were documented as large, loose, bloody with subsequent bloody stools documented at 20:00 then 00:41 on 3/26/2024.     - 3/26/2024 CT Abdomen/Pelvis with Contrast with diffuse air in the large bowel and extraluminal air with  appearance in the mesentery improved compared to 3/13/2024 CT scan, no portal venous gas, normal wall enhancement, findings favored to represent improving extensive pneumatosis intestinalis; no evidence of acute abdominal/pelvic hemorrhage; mild ascites, diffuse anasarca, and bilateral small pleural effusions with adjacent atelectasis.     Pneumatosis intestinalis makes GI endoscopy very high risk for complete bowel perforation in this patient with multiple comorbidities including stroke code called this morning with CTA Head demonstrating severe stenosis in the proximal portion of the right MCA M1 segment secondary to bulky calcified plaque with neurology noting possible right M2 subocclusive thrombus distally.     Discussed case with patient's outpatient IBD provider Dr. Betty Rubio and Colorectal surgery Lupe Stapleton PA-C (who discussed with staff Dr. Molina). Recommend STAT CTA Chest/Abdomen/Pelvis given melena, ?IR embolization if active bleed demonstrated given very high risk for GI endoscopy with pneumatosis intestinalis. Recommend continuing Zosyn for broad spectrum antibiotic coverage. Would switch from oral prednisone to IV Methylprednisolone 32 mg BID given ongoing active Crohn's inflammation plus STAT administration of Remicade 10 mg/kg.     Recommend continuing to transfuse packed red blood cells to maintain hemoglobin > 7 g/dL from a GI standpoint, though may need to consider higher hemoglobin goal given significant coronary artery disease (defer  to transfusion goal to medicine team +/- cardiology).       Recommendations:  -- Correct all coagulopathies.   -- STAT CTA Chest/Abdomen/Pelvis given active melena, ?IR emobolization if active bleed demonstrated given high risk for GI endoscopy with pneumoatosis intestinalis.   -- STAT Remicade 10 mg/kg for active Crohn's.   -- Switch to IV Methylprednisolone 32 mg BID for active Crohn's.   -- Continue Zosyn for broad spectrum antibiotic coverage.     -- Strict documentation of rectal output.  - Appreciate detailed documentation of stool appearance, including color, consistency, frequency and amount.    - Consider smearing stool thinly on white paper towel to distinguish color.     -- Appreciate Colorectal Surgery team's involvement and recommendations.   -- Tobacco Cessation.     -- Maintain 2 large bore IVs, 18G or larger.  -- Continue Supportive Cares  - Adequate volume resuscitation with IVF, pRBCs.  - Monitor Hemoglobin closely. Transfuse to keep Hgb > 7 g/dL from GI standpoint (consider higher hemoglobin goal given significant coronary artery disease).   - Monitor Platelets closely. Keep PLT > 50 10e3/uL from GI standpoint.  - Maintain hemodynamics: MAP >65 mmHg and HR <100.  - Monitor and optimize electrolytes.  - Reposition every 30 minutes while awake.  - Encourage Ambulation as able: 4-6 walks per day.   -- Avoid NSAIDs.  -- Analgesics/Antiemetics per primary team.  -- If the patient experiences overt GI bleeding with hemodynamic instability, please page the GI Luminal Service (listed on [facility]).       Outpatient:   -- Urology referral for Kidney mass seen on MRE.   - MRE 3/18/2024 reports: Increasingly complex exophytic cyst along the lower pole of the left kidney with internal hemorrhagic/proteinaceous contents as well as increasing peripheral wall and septal thickening. 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  --  "Follow-up in outpatient [facility] on 5/28/2024 with Darek Romero PA-C as currently scheduled.       COVID status: not tested this admission.     Discussed with Wellington Monteengro PA-C and Dr. Shannan Ambriz - Medicine Gold 6.     Thank you for involving us in this patient's care. Please do not hesitate to contact the GI service with any questions or concerns.     The patient was discussed and plan agreed upon with Dr. Eric Moncada, GI Luminal Service staff physician.    Overall time spent on the date of this encounter preparing to see the patient (including chart review of available notes, clinical status events, imaging and labs); discussing, ordering, coordinating recommended medications, tests or procedures; communicating with other health care professionals; and documenting the above clinical information in the electronic medical record was 95 minutes.    Agueda Manzo PA-C  Inpatient Gastroenterology Service  [facility]  Text Page         History of Present Illness:   Patient seen and examined at 1050. History is obtained from patient and chart review.    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, nonobstructive CAD, hypertension, history of NSTEMI, pulmonary hypertension, history of abdominal aorta partially calcified dissection, severe symptomatic mitral valve regurgitation with significant MAC, severe functional tricuspid regurgitation and moderate aortic stenosis with planned surgery April 2024, history of paroxysmal atrial fibrillation, " "prolonged QTc, with recent admission to [facility] for anemia status-post colonoscopy 2/27/2024 with multiple large ulcers in the ileum, one of which had adherent clot with visible vessel treated with epinephrine and bipolar cautery and status-post EGD on the same day 2/27/2024 with evidence of congestive erythematous gastritis with biopsies consistent with chemical gastritis (performed by JACINTA while inpatient at [facility]), who then presented to [place] ED with scrotal swelling, found to have a hemoglobin of 5.7 g/dL, leukocytosis of 17 10e3/uL and CT scan with evidence of colonic pneumatosis, started on IV antibiotics and transferred to [facility] 3/14/2024 where patient was treated conservatively. Patient presented to the ED on 3/23/2024 from the infection center for further evaluation of hyperglycemia with blood glucose in the 800s.     The GI Luminal Service was consulted regarding: \"Crohn's flare w/ recent hx of GIB, now transfusion dependent. Elevated LA and BRBPR.\"      Patient was recently hospitalized for GI bleeding and noted to have \"huge\" ulcers in the terminal ileum, one noted to be actively bleeding and was successfully treated with epinephrine injection and coagulation. Patient was most recently hospitalized at [facility] and found to have an incidental finding of pneumoatosis intestinalis in the ileum/R colon on imaging. Patient was treated conservatively with Flagyl, prednisone was discontinued and patient was discharged home.   - MRE on 3/18/2024 concerning for small bowel obstructions and acute inflammation. MRE reports multifocal areas of segmental wall thickening and stricturing seen along small bowel loops in the right hemiabdomen compatible with acute Crohn's disease.   - Patient continues to smoke 1/4 pack per day which is counterproductive to Crohn's healing; patient is trying to quit, using nicotine replacement patches.   - Patient was most recently " seen in outpatient [facility] IBD [facility] with our colleague Dr. Betty Rubio on 3/20/2024. Outpatient plan was to discontinue Stelara and restart Remicade with suspicion that a high dose therapy will be needed. As a bridge until Remicade, prednisone was re-initiated given ongoing active Crohn's inflammation.       Patient denies nausea, vomiting, acid reflux, heartburn, abdominal pain.     RNs cleaning up bloody stools.       Bowel Movements per I/O Flowsheet:   3/26/2024:   -- 0041: large, loose, bloody, red.   3/25/2024:   -- 2000: large, loose, bloody, red.  -- 1900: large, loose, bloody red.  -- 0810: loose, watery, brown.   -- 0500: loose brown.  -- 0245: loose brown.   3/24/2024:   -- 2015: loose brown.  -- 1761: loose brown.      Previous Procedures:    2/27/2027 - EGD - Dr. Judson Woods, [facility]   Impression:               - Z-line irregular.                             - Congestive and erythematous gastropathy. Biopsied.                             - Normal examined duodenum.   Recommendation:           - Await pathology results.                             - Proceed with colonoscopy     2/27/2024 - Colonoscopy - Dr. Judson Woods, [facility]  Impression:               - Preparation of the colon was poor due to stool                             and blood. Lavaged and suctioned with limited                             visualization.                             - Patent end-to-end ileo-colonic anastomosis,                             characterized by relatively healthy appearing                             mucosa.                             - Multiple huge ulcers in the ileum about 30 cm                             proximal to the anastomosis. Visible vessel with                             adherent clot that was oozing. Injected with                             epinephrine. Treated with bipolar cautery. Bleeding                              stopped completely.                             - The examined portion of the ileum proximal to the                             ulcerated area was normal.                             - The ileum distal to the ulcerated site was                             inflamed. It did not contain any further                             ulcerations however.                             - No active Crohn's seen in the colon.                             - Three diverticulum (or previous fistulas) in the                             rectum. No inflammation around this site.                             - No specimens collected.   Recommendation:           - Follow Hgb and stools for active bleeding.                             - Follow up with primary gastroenterologist for                             further recommendations regarding treatment for                             Crohn's                             - Will discuss steroid treatment with patient.     Final Diagnosis   A(1). Stomach, body, biopsy:  -Oxyntic type gastric mucosa with reactive changes of the type seen in reactive gastropathy (chemical gastritis) (see comment).  -Negative for H. Pylori organisms on routine stains.  -Negative for intestinal metaplasia.  -Negative for dysplasia or malignancy.         Electronically signed by Cedric Grewal MD on 2/28/2024 at 10:24 AM   Comment  [facility]   Reactive (chemical) gastropathy, best assessed in antral mucosa, is associated with previous gastrectomy, adjacent ulcer, bile reflux, alcohol, drug therapy (PPI's, NSAIDs, etc.).       12/7/2022 - Colonoscopy - Dr. Willian Pedroza   Impression:               - Preparation of the colon was poor. This exam was                             not adequate for colorectal cancer or polyps                             screening.                             - Perianal skin tags found on perianal exam.                             - A single ulcer in the small  bowel 20 cm from the                             ileocolonic anastomosis. Biopsied. This is                             presumably the same ulcer that was seen before.                             This was smaller (now 5mm) with surrounding                             granular mucosa that may represent healing. Biopsies                             taken.                             - Congested mucosa in the distal small bowel.                             Biopsied. Otherwise remainder of ileum normal                             - Patent end-to-end ileo-colonic anastomosis,                             characterized by healthy appearing mucosa.                             - Diverticulosis vs old fistulas in the distal                             rectum.                             - Excoriated mucosa at the anus and in the distal                             rectum. Biopsied.                             - The examination was otherwise normal on direct                             and retroflexion views.                             - Simple Endoscopic Score for Crohn's Disease: 5,                             mucosal inflammatory changes secondary to Crohn's                             disease with ileitis.                             Overall exam is similar to previous but improved                             with decreased size of chronic small bowel ulcer -                             now only 5mm. Remainder of small bowel really looks                             almost normal apart from some patchy mild erythema                             and congestion. Given partial improvement IBD                             clinic may consider increasing Stelara to q 6 weeks                             or q 4 weeks. Would also consider colorectal                             evaluation for anoscopy.   Recommendation:           - Discharge patient to home (with escort).                             - Resume previous diet.                              - Continue present medications.                             - Await pathology results.                             - Repeat colonoscopy for surveillance based on                             pathology results. ALL FUTURE COLONOSCOPIES SHOULD                             HAVE DOUBLE PREP. This was not adequate for                             colorectal cancer or polyps screening.                             - Return to GI clinic as previously scheduled.                             - IBD clinic can consider dose escalation of                             Stelara. Can also consider CRS for anoscopy.     Final Diagnosis   A.  Ileum, ulcer: Biopsy:  - Chronic active ileitis with architectural disarray, pyloric gland metaplasia, and active inflammation with ulceration and granulation tissue  - Negative for granulomas and dysplasia  - Immunostain for CMV is negative     B.  Ileum: Biopsy:  - Mild chronic active ileitis with architectural disarray, pyloric gland metaplasia, and active inflammation  - Negative for granulomas and dysplasia  - Immunostain for CMV is negative     C.  Rectum: Biopsy:  - Benign anorectal mucosa with quiescent colitis  - No active colitis  - Negative for granulomas and dysplasia       7/20/2021 - Upper EUS - Dr. Dewayne Preston  Impression:          - Markedly abnormal pancreas as described above. There                        are features suggestive of diffuse chronic pancreatitis,                        however also marked cystic dilation of the duct in the                        pancreatic tail.                        There were no obstructing lesions. Needle aspiration                        confirmed the presence of thick mucin in the tail which                        is highly suggestive of IPMN. There were no focal mural                        nodules.                        The fluid was too viscous to send for tumor marker                        analysis.   Recommendation:      -  Discharge patient to home (ambulatory).                        - Levaquin 250 mg po qod x 2 beginning 7/22/21.                        - Await cytology results.                        See separate colonoscopy report.                        Will address further management when cytology returns.                        Likely referral for formal surgical consultation and                        then surveillance.       7/20/2021 - Colonoscopy - Dr. Dewayne Preston  Impression:          - Normal perirectal exam.                        - Apparently end-end ileocolonic anastomosis at                        approximately 70 cm (likely distal transverse colon).                        - 15 mm ulcer in the terminal ileum. Additional                        cobblestoning in this region. Biopsies obtained.                        - Unusual diverticulae in the distal 5-10 cm of the                        rectum without ulceration. Perhaps related to past                        fistulous disease.                        - No polyps or masses.                        - Questionable mild inflammation at dentate line on                        retroflex exam. This would be better assessed by                        anoscopy if would alter management.   Recommendation:      - Discharge patient to home (ambulatory).                        - Await pathology results.                        - Follow-up in IBD clinic to discuss further management.                        - See separate EUS report.     Addendum   A. Immunostain for CMV, with appropriate controls, on block A1 is negative for cytomegalovirus            Comments:   This is an appended report. These results have been appended to a previously final verified report.      Addendum electronically signed by Yvette Lugo MD on 7/22/2021 at  2:11 PM   Final Diagnosis   A. TERMINAL ILEUM, BIOPSY:  Severe ileitis with ulceration and granulation tissue; consistent with severe chronic (Crohn)  ileitis; negative for dysplasia; report of CMV immunohistochemistry to follow     B. TERMINAL ILEUM, LABELED BIOPSY OF ULCER:  Chronic active ileitis with granulomas; consistent with mildly active Crohn; no ulceration or dysplasia identified       7/20/2007 - Colonoscopy - Dr. Karri Rock   Impression:     - The colon is normal. The anastomosis was normal as was the                   jejunum. These were biopsied.   Recommendation: - Await pathology results.                   - Return to GI clinic as previously scheduled.               Past Medical History:   Reviewed and edited as appropriate  Past Medical History:   Diagnosis Date    Aortic dissection (H)     distal, thin.  stable/chronic on MRCP 3/2022    Benign essential hypertension     CAD (coronary artery disease)     Cerebral infarction (H)     Crohn's colitis (H)     Crohn's disease of large intestine (H) 11/22/2021    Current smoker     Esophageal reflux     ESRD (end stage renal disease) on dialysis (H)     History of basal cell carcinoma     Hypertension     Mixed hyperlipidemia     NSTEMI (non-ST elevated myocardial infarction) (H)     PAF (paroxysmal atrial fibrillation) (H)             Past Surgical History:   Reviewed and edited as appropriate   Past Surgical History:   Procedure Laterality Date    ABDOMEN SURGERY      x 6.  colon resections for crohn's disease    APPENDECTOMY      CHOLECYSTECTOMY      COLONOSCOPY N/A 07/20/2021    Procedure: COLONOSCOPY, WITH POLYPECTOMY AND BIOPSY;  Surgeon: Eric Preston MD;  Location:  GI    COLONOSCOPY N/A 12/7/2022    Procedure: COLONOSCOPY, WITH POLYPECTOMY AND BIOPSY;  Surgeon: Willian Kee MD;  Location:  GI    COLONOSCOPY N/A 2/27/2024    Procedure: COLONOSCOPY;  Surgeon: Judson Woods MD;  Location: [facility]    CV CORONARY ANGIOGRAM N/A 05/25/2021    Procedure: CV CORONARY ANGIOGRAM;  Surgeon: Judson Ybarra MD;  Location:  HEART CARDIAC CATH LAB    CV CORONARY  ANGIOGRAM N/A 2/15/2024    Procedure: Coronary Angiogram;  Surgeon: Tico Finn MD;  Location:  HEART CARDIAC CATH LAB    CV LEFT HEART CATH N/A 2/15/2024    Procedure: Left Heart Catheterization;  Surgeon: Tico Finn MD;  Location:  HEART CARDIAC CATH LAB    CV RIGHT HEART CATH MEASUREMENTS RECORDED N/A 2/15/2024    Procedure: Right Heart Catheterization;  Surgeon: Tico Finn MD;  Location:  HEART CARDIAC CATH LAB    ESOPHAGOSCOPY, GASTROSCOPY, DUODENOSCOPY (EGD), COMBINED N/A 07/20/2021    Procedure: ESOPHAGOGASTRODUODENOSCOPY, WITH FINE NEEDLE ASPIRATION BIOPSY, WITH ENDOSCOPIC ULTRASOUND GUIDANCE;  Surgeon: Eric Preston MD;  Location:  GI    ESOPHAGOSCOPY, GASTROSCOPY, DUODENOSCOPY (EGD), COMBINED N/A 2/27/2024    Procedure: ESOPHAGOGASTRODUODENOSCOPY;  Surgeon: Judson Woods MD;  Location: [facility]    IR DIALYSIS FISTULOGRAM LEFT  12/01/2022    IR DIALYSIS FISTULOGRAM LEFT  1/13/2023    LAPAROTOMY, LYSIS ADHESIONS, COMBINED N/A 03/17/2022    Procedure: Laparotomy, extensive lysis adhesions, combined;  Surgeon: Sarath Smith MD;  [facility]    PANCREATECTOMY PARTIAL N/A 03/17/2022    Procedure: Open Subtotal Pancreatectomy, intra-op ultrasound;  Surgeon: Sarath Smith MD;  [facility]    REVISION FISTULA ARTERIOVENOUS UPPER EXTREMITY Left 2/14/2023    Procedure: LEFT UPPER ARM FISTULA OUTFLOW REVISION FROM CEPHALIC VEIN TO JUGULAR VEIN WITH 10mm THIN-WALLED RINGED POLYTETRAFLUEROETHYLINE;  Surgeon: Mo Gramajo MD;  Location: [facility]    TRANSESOPHAGEAL ECHOCARDIOGRAM INTRAOPERATIVE N/A 1/11/2024    Procedure: ECHOCARDIOGRAM, TRANSESOPHAGEAL, WITH ANESTHESIA;  Surgeon: GENERIC ANESTHESIA PROVIDER;  [facility]    VASCULAR SURGERY      dialysis access- left [facility]              Social History:   The patient lives in [place], [place].     Per History Tab:  Alcohol: 2-3 drinks every 3 months.  Tobacco: Every Day Cigarette Smoker.    Illicit drugs: Not currently.            Family History:   Patient's family history is reviewed today and is non-contributory    Family History   Problem Relation Age of Onset    Breast Cancer Mother     Coronary Artery Disease Father     Hypertension Brother     Anesthesia Reaction No family hx of     Thrombosis No family hx of              Allergies:   Reviewed and edited as appropriate     Allergies   Allergen Reactions    Lisinopril Anaphylaxis and Other (See Comments)     Shortness of breath            Medications:     Current Facility-Administered Medications   Medication    budesonide (ENTOCORT EC) EC capsule 9 mg    calcium acetate (PHOSLO) capsule 2,668 mg    calcium carbonate (TUMS) chewable tablet 1,000 mg    dextrose 10% infusion    dextrose 50 % injection 25-50 mL    glucose gel 15-30 g    Or    dextrose 50 % injection 25-50 mL    Or    glucagon injection 1 mg    fentaNYL (PF) (SUBLIMAZE) injection 25-50 mcg    flumazenil (ROMAZICON) injection 0.2 mg    folic acid (FOLVITE) tablet 1 mg    heparin 2 Units/mL 0.9% NaCl (1000 mL)    heparin 2 Units/mL 0.9% NaCl (1000 mL)    insulin aspart (NovoLOG) injection (RAPID ACTING)    insulin aspart (NovoLOG) injection (RAPID ACTING)    insulin aspart (NovoLOG) injection (RAPID ACTING)    insulin aspart (NovoLOG) injection (RAPID ACTING)    insulin aspart (NovoLOG) injection (RAPID ACTING)    insulin aspart (NovoLOG) injection (RAPID ACTING)    insulin aspart (NovoLOG) injection (RAPID ACTING)    [Held by provider] insulin glargine (LANTUS PEN) injection 7 Units    [Held by provider] insulin NPH injection 7 Units    lidocaine (LMX4) cream    lidocaine (LMX4) cream    lidocaine (LMX4) cream    lidocaine (LMX4) cream    lidocaine 1 % 0.1-1 mL    lidocaine 1 % 0.1-1 mL    lidocaine 1 % 0.1-1 mL    lidocaine 1 % 0.1-5 mL    LORazepam (ATIVAN) tablet 0.5 mg    metroNIDAZOLE (FLAGYL) tablet 500 mg    midazolam (VERSED) injection 0.5-2 mg    mirtazapine (REMERON) tablet  TABS 7.5 mg    multivitamin RENAL (TRIPHROCAPS) capsule 1 capsule    naloxone (NARCAN) injection 0.2 mg    Or    naloxone (NARCAN) injection 0.4 mg    Or    naloxone (NARCAN) injection 0.2 mg    Or    naloxone (NARCAN) injection 0.4 mg    nicotine (NICODERM CQ) 21 MG/24HR 24 hr patch 1 patch    pantoprazole (PROTONIX) IV push injection 40 mg    piperacillin-tazobactam (ZOSYN) 2.25 g vial to attach to  ml bag    predniSONE (DELTASONE) tablet 40 mg    senna-docusate (SENOKOT-S/PERICOLACE) 8.6-50 MG per tablet 1 tablet    Or    senna-docusate (SENOKOT-S/PERICOLACE) 8.6-50 MG per tablet 2 tablet    simvastatin (ZOCOR) tablet 20 mg    sodium chloride (PF) 0.9% PF flush 10 mL    sodium chloride (PF) 0.9% PF flush 10 mL    sodium chloride (PF) 0.9% PF flush 10-20 mL    sodium chloride (PF) 0.9% PF flush 10-20 mL    sodium chloride (PF) 0.9% PF flush 10-40 mL    sodium chloride (PF) 0.9% PF flush 3 mL    sodium chloride (PF) 0.9% PF flush 3 mL    traZODone (DESYREL) half-tab 25 mg    Or    traZODone (DESYREL) tablet 50 mg             Review of Systems:     A complete review of systems was performed and is negative except as noted in the HPI           Physical Exam:   Temp: 97.2  F (36.2  C) Temp src: Oral BP: 115/63 Pulse: 85   Resp: 14 SpO2: 100 % O2 Device: Nasal cannula Oxygen Delivery: 2 LPM  Wt:   Wt Readings from Last 2 Encounters:   03/26/24 71.5 kg (157 lb 10.1 oz)   03/20/24 70.5 kg (155 lb 6.8 oz)        General: 64 yea old male lying in bed in NAD. Appears comfortable.  Answers appropriately.    HEENT: Head is AT/NC. Sclera anicteric.   Lungs: No increased work of breathing, speaking in full sentences, equal chest rise.   Heart: Regular rate. Peripheral perfusion intact.  Abdomen: Soft, non-tender, non-distended. No peritoneal signs.  Extremities: WWP.  Musculoskeletal: No gross deformity.  Skin: No jaundice or rash on exposed skin.  Neurologic: Grossly non-focal.  CN 2-12 grossly intact.   Mental  status/Psych: A&O. Asks/answers questions appropriately. Pleasant, interactive.             Data:   LAB WORK:    BMP  Recent Labs   Lab 03/26/24  0737 03/26/24  0620 03/26/24  0524 03/26/24  0433 03/26/24  0410 03/26/24  0344 03/25/24  0726 03/25/24  0618 03/24/24  0748 03/24/24  0705 03/23/24  2135 03/23/24 2052   NA  --   --   --   --   --  133*  --  131*  --  133*  --  135   POTASSIUM  --   --   --   --   --  3.7  --  4.5  --  3.7  --  3.3*   CHLORIDE  --   --   --   --   --  99  --  98  --  96*  --  94*   KARY  --   --   --   --   --  8.3*  --  8.6*  --  9.0  --  9.1   CO2  --   --   --   --   --  26  --  20*  --  24  --  24   BUN  --   --   --   --   --  20.9  --  34.5*  --  23.9*  --  18.7   CR  --   --   --   --   --  2.98*  --  4.49*  --  3.53*  --  2.83*   * 92 108* 112*   < > 121*   < > 169*   < > 142*   < > 181*  181*    < > = values in this interval not displayed.     CBC  Recent Labs   Lab 03/26/24  0533 03/26/24  0346 03/25/24 2150 03/25/24  0738   WBC 11.2* 13.0* 11.8* 14.3*   RBC 2.25* 2.50* 2.07* 2.76*   HGB 7.8* 8.2* 7.1* 9.4*   HCT 23.7* 26.2* 22.3* 29.8*   * 105* 108* 108*   MCH 34.7* 32.8 34.3* 34.1*   MCHC 32.9 31.3* 31.8 31.5   RDW 27.9* 27.5* 27.7* 28.2*   PLT 49* 52* 44* 67*     INR  Recent Labs   Lab 03/25/24  2054 03/19/24  1021   INR 2.10* 1.46*     LFTs  Recent Labs   Lab 03/25/24  0618 03/23/24  0759 03/19/24  1021   ALKPHOS 96 142 109   AST 20 15 21   ALT 18 21 22   BILITOTAL 1.1 0.7 0.8   PROTTOTAL 4.0* 4.6* 4.4*   ALBUMIN 2.5* 3.0* 2.7*      PANCNo lab results found in last 7 days.      IMAGING:    CT HEAD W/O CONTRAST 3/26/2024 7:08 AM  History:  r/o stroke     Comparison: None available      Technique: Using multidetector thin collimation helical acquisition  technique, axial, coronal and sagittal CT images from the skull base  to the vertex were obtained without intravenous contrast.      Findings: There is no intracranial hemorrhage, mass effect or midline  shift.  Small focal area of low density and gray-white differentiation  loss in the anterior right frontal lobe, best seen on sagittal image  45 of series 5. There is a questionable tiny focus of calcification in  this area. Otherwise, there is no focal loss of gray-white matter  differentiation, insular ribbon sign, or focally hyperdense artery to  suggest acute infarction. Scattered periventricular and subcortical  white matter hypodensities, likely secondary to chronic small vessel  ischemic disease given patient's age. The ventricles are proportionate  to the cerebral sulci.     The bony calvaria and the bones of the skull base appear normal. The  visualized portions of the paranasal sinuses and mastoid air cells are  unremarkable.                                                                    Impression:   1. Small focal area of low-density in the anterior right frontal lobe,  more likely chronic than related to acute infarct, although that is  difficult to entirely exclude. No acute intracranial hemorrhage.  2. MCA ASPECT Score: 10/10.      CT ABDOMEN PELVIS W CONTRAST, 3/26/2024 12:26 AM  INDICATION: Elevated LA, Hgb drop. C/f acute abdominal process     COMPARISON STUDY: CT abdomen and pelvis 2/25/2024     TECHNIQUE: CT scan of the abdomen and pelvis was performed on  multidetector CT scanner using volumetric acquisition technique and  images were reconstructed in multiple planes with variable thickness  and reviewed on dedicated workstations.      CONTRAST: iopamidol (ISOVUE-370) solution 90 mL injected IV without  oral contrast     CT scan radiation dose is optimized to minimum requisite dose using  automated dose modulation techniques.     FINDINGS:     Angiogram abdomen/pelvis: Patent major abdominal vasculature with  chronically calcified aortic dissection flap extending into the right  common iliac artery; severe aortobiiliac atherosclerotic  calcification. No abdominal aortic aneurysm. Celiac artery  and  branches are patent. Unchanged severe ostial stenosis of the superior  mesenteric artery. Severe stenosis of the renal arteries bilaterally.  Inferior mesenteric artery is patent. Mild stenosis of the common  iliac arteries and bilateral proximal internal iliac arteries. Mild  stenosis of the bilateral external iliac arteries and common femoral  arteries. Partially visualized superficial femoral arteries are  patent. Portal, splenic, superior mesenteric veins are patent.  Perigastric varices.     Lower thorax: Small bilateral pleural effusions with associated  compressive atelectasis. Centrilobular emphysematous changes.       Liver: No mass. No intrahepatic biliary ductal dilation. Unchanged  right hepatic lobe simple cyst.     Biliary System: Surgically absent gallbladder. No biliary dilatation.     Spleen: Multiple splenules, otherwise within normal limits.     Pancreas: Status post distal pancreatectomy. Remaining pancreas is  within normal limits.     Adrenal glands: No mass or nodules     Kidneys: Unchanged bilateral renal cortical atrophy. Multiple  nonobstructing bilateral intrarenal calculi. Unchanged hemorrhagic rim  calcified cyst at the lower pole of the left kidney measures up to 8  cm in greatest axial dimension, previously 9.7 cm. Additional  unchanged benign simple renal cyst. No hydronephrosis.     Gastrointestinal tract: Postsurgical changes of ileostomy takedown  with ileocolic anastomosis. Extensive pneumatosis of the large bowel  with air scattered throughout the mesentery resulting in septated  appearance(best seen on the lung window settings), which has improved  compared to 3/13/2024 CT scan. There is normal bowel wall enhancement.   Normal caliber small bowel.     Peritoneum/Retroperitoneum: Hazy attenuation of the peritoneal fat.  Small simple fluid layering posterior to the rectum. No significant  lymphadenopathy.     Pelvis: Urinary bladder is decompressed.     Osseous structures: No  aggressive or acute osseous lesion. Multilevel  degenerative changes of the visualized spine and hips. Increased bone  density likely related to renal osteodystrophy.      Soft tissues: Right lower quadrant ventral hernia containing  nonobstructed small  bowel. Diffuse anasarca.                                                                        IMPRESSION:  1.  Diffuse air in large bowel wall and extraluminal air with septated  appearance in the mesentery is improved compared to 3/13/2024 CT scan.  No portal venous gas. Normal bowel wall enhancement. Findings favored  to represent improving extensive pneumatosis intestinalis. Recommend  follow up and repeat CT scan if clinical condition worsens.  2.  No evidence of acute abdominal/pelvic hemorrhage.  3.  Mild ascites, diffuse anasarca, and bilateral small pleural  effusions with adjacent atelectasis.     Critical Result: Impression #1  Finding was identified on 3/26/2024 12:27 AM.   Discussion between Archana Gilliam, Lucila Leiva, and Errol Venegas on 3/26/2024 3:28 AM.      ________________________________________________________________________    Previous Imaging:     MRI ENTEROGRAPHY/MRI ABDOMEN AND MRI PELVIS WITHOUT AND WITH CONTRAST  LOCATION: [facility]  DATE: 3/18/2024     INDICATION:  Crohn's disease of both small and large intestine with other complication (H)  COMPARISON: 10/24/2023     TECHNIQUE: Routine enterography protocol including imaging of abdomen and pelvis with axial T1 in/out phase, axial T2, coronal T2. Post contrast abdomen and pelvis axial and coronal thin-section T1 with fat sat. 1 mg Glucagon. Oral VoLumen.  CONTRAST: 7 mL Gadavist     FINDINGS:      ENTEROGRAPHY: Redemonstrated anterior right abdominal wall hernia containing loops of small bowel extending between the right rectus abdominis musculature and lateral abdominal wall musculature. There is increasing distention of a small bowel loop within    the hernia sac measuring up to 6.5 cm in diameter (series 16, image 19). There are at least 2 transition points seen along the neck of the hernia on either side of a distended small bowel loop (series 16, images 20-28). Several dilated small bowel loops   are also noted along the right hemiabdomen adjacent to the hernia measuring up to 3.1 cm in diameter (series 19, image 24) with a transition point seen inferior to the right hepatic lobe (series 19, image 24). There is subsegmental wall thickening and   enhancement seen along this transition point measuring up to 5.2 cm in length (series 19, image 24). Furthermore another area of distended small bowel is seen along the left upper and central abdomen measuring up to 3.9 cm (series 19, image 27). A   transition point is noted near the neck of the hernia (series 19, image 24) with associated segmental area of wall thickening measuring up to 2.6 cm (series 19, image 25).     ADDITIONAL FINDINGS: Diffuse loss of parenchymal signal on in phase images compared to out of phase images compatible with iron deposition in the liver and spleen. Stable cystic lesion within the right hepatic lobe measuring up to 1.4 cm. Gallbladder is   surgically absent. No intrahepatic or extra hepatic biliary duct dilatation is seen. Kidneys appear atrophic with bilateral renal cysts. A 9.3 x 6.9 cm T1 hyperintense cystic mass along the inferior pole of the left kidney appears slightly decreased from   the prior measurement of 9.7 x 6.8 cm (series 7, image 45). Mixed T2 signal and internal septations are present measuring up to 3 mm. There is also increasing peripheral wall thickening measuring up to 8 mm (series 25, image 48) as well as areas of 2 mm   obtusely marginated convex protrusions (series 26, image 56). Postsurgical changes are present status post distal pancreatectomy with stable appearance of the residual pancreatic head and neck. Adrenal glands appear unremarkable. No  intra-abdominal or   pelvic lymphadenopathy is seen. Moderate to severe atherosclerotic plaque along the abdominal aorta with unchanged chronic dissection flap in the infrarenal abdominal aorta (series 11, image 49). Multilevel degenerative changes are seen in the spine.                                                                      IMPRESSION:  1.  Multifocal areas of segmental wall thickening and stricturing seen along small bowel loops in the right hemiabdomen compatible with acute Crohn's disease. These areas of stricturing result in a complex, multifocal small bowel obstruction as described   above. For example, there are at least 2 transition points on either side of distended small bowel loop along the hernia sac measuring up to 6.5 cm. Dilated loops of small bowel also seen along the right hemiabdomen adjacent to the hernia with   associated transition point demonstrating segmental wall thickening measuring up to 5.2 cm. Furthermore, a third area of distended small bowel seen along the left upper and central abdomen with associated transition point seen along the neck of the   hernia containing segmental wall thickening measuring up to 2.6 cm.  2.  Increasingly complex exophytic cyst along the lower pole of the left kidney with internal hemorrhagic/proteinaceous contents as well as increasing peripheral wall and septal thickening. 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 Recommend continued attention on 3-6 month follow-up exam as well as urological consultation.  3.  Hepatic and splenic iron deposition.  4.  Stable appearance of dissection flap along the infrarenal abdominal aorta.      =======================================================================

## 2024-03-26 NOTE — PROCEDURES
[facility]    Triple Lumen PICC Placement    Date/Time: 3/26/2024 10:54 AM    Performed by: Alena Zhou RN  Authorized by: Quique Montenegro PA-C  Indications: vascular access      UNIVERSAL PROTOCOL   Site Marked: Yes  Prior Images Obtained and Reviewed:  Yes  Required items: Required blood products, implants, devices and special equipment available    Patient identity confirmed:  Arm band, provided demographic data, hospital-assigned identification number and verbally with patient  NA - No sedation, light sedation, or local anesthesia  Confirmation Checklist:  Patient's identity using two indicators, relevant allergies, procedure was appropriate and matched the consent or emergent situation and correct equipment/implants were available  Time out: Immediately prior to the procedure a time out was called    Universal Protocol: the Joint Commission Universal Protocol was followed    Preparation: Patient was prepped and draped in usual sterile fashion       ANESTHESIA    Anesthesia:  Local infiltration  Local Anesthetic:  Lidocaine 1% without epinephrine  Anesthetic Total (mL):  3.5      SEDATION    Patient Sedated: No        Preparation: skin prepped with ChloraPrep  Skin prep agent: skin prep agent completely dried prior to procedure  Sterile barriers: maximum sterile barriers were used: cap, mask, sterile gown, sterile gloves, and large sterile sheet  Hand hygiene: hand hygiene performed prior to central venous catheter insertion  Type of line used: PICC  Catheter type: triple lumen  Lumen type: power PICC and non-valved  Lumen Identification: Red, Tan and White  Catheter size: 5 Fr  Brand: Bard  Lot number: [id number]  Placement method: venipuncture, MST, ultrasound and tip navigation system  Number of attempts: 1  Difficulty threading catheter: no  Successful placement: yes  Orientation: right  Catheter to Vein (%): 23  Location: basilic vein (0.81 cm  vein  diameter)  Tip Location: SVC  Arm circumference: adults 10 cm  Extremity circumference: 27  Visible catheter length: 2  Total catheter length: 39  Dressing and securement: adhesive securement device, blood cleaned with CHG, chlorhexidine disc applied, alcohol impregnated caps, dressing applied, gloves changed prior to final dressing, glue, statlock, site cleansed, sterile dressing applied and transparent dressing  Post procedure assessment: blood return through all ports, placement verified by 3CG technology and free fluid flow  PROCEDURE Describe Procedure: PICC was verified, ready to use.  Disposal: sharps and needle count correct at the end of procedure, needles and guidewire disposed in sharps container

## 2024-03-26 NOTE — PROGRESS NOTES
Brief Care Coordination Note    Per discussion w/the primary team, patient potentially had a stroke this morning and he does not have a health care directive on file. Writer confirmed, no HCD is on file within the pt's electronic medical record. Patient has 2 friends listed as emergency contacts. A previous transplant eval social work note mentioned that the patient has 4 brothers and an adult daughter who is mentally and physically handicapped. Writer contacted pt's friend, Ro Castellanos who is noted on his facesheet. Emanuel was able to confirm that the patient's adult daughter would not be able to make medical decisions. Emanuel confirmed that the patient has 4 brothers, 1 local, named, Nando Andujar. Per further chart review, pt's brother's contact information was listed on his facesheet within [facility], it is noted below. The contact information was shared w/the primary team, Quique Montenegro, KHADRA.    Pt's brotherNando  Home/mobile number: [phone]    Care coordination will continue to follow.     Lynnette Lala, RNCC, BSN    [facility]    [address] [place] [id number]    [email or web address]    Office: [phone] Pager: [phone]

## 2024-03-26 NOTE — PROGRESS NOTES
Critical Care Services Progress Note:  I personally examined and evaluated the patient today. I formulated today s plan with the house staff team or resident(s) and agree with the findings and plan in the associated note (see separately attested resident note).   I have evaluated all laboratory values and imaging studies from the past 24 hours.  Summary of hospital course:  64M pmh of ESRD on HD, crohns disease, severe mitral regurg who presented 3/23 with respiratory failure suspected chf exacerbation for which he was being treated until 3/26 when his course was complicated by a R MCA stroke as well as lower GI bleeding.  He underwent CT angio which showed active extravasation from a loop of bowel in an abdominal hernia.  Overnight events/pertinent findings today:  See above.    Data  On and off levophed to maintain MAP 65.  Satting acceptably on room air.  Sleepy but rousable.  Labs (personally reviewed):  Ical 4.6 ok.  Lactate improving 1.6.  abg 7.37/43/67/25.  hgb 6.8 down from 7.6 in 2 hrs, acute blood loss anemia.  Pltts 41 thrombocytopenia.  INR 2.10 elevated by s/p 2 unit(s) plasma.  CTA abdomen/pelvis:  free intraperitoneal air.  Active extravasation into R sided ventral hernia bowel loop.    Assessment/plan:   Hemorrhagic shock:  responding to transfusions for now and on and off pressors.  Correcting platelets and sending tegs to optimize coags.  Overall very high risk of deterioration given multiple co-morbidities and difficulty controlling hemorrhage (below).  GI bleeding:  active extravasation from loop of small bowel in R sided ventral hernia.  D/w Dr. Andrés Gonzalez of CR surgery, and Dr. Moncada of GI.  IR doesn't feel that they could vascularly access the site and are not offering emoblization.  GI feels scope would be inappropriate in the setting of bowel free air, which I agree with.  CR surgery is hesitant to take him to the OR due to high operative mortality risk.  Given the free air also seen on  his CT I suspect his non-operative mortality would be 100% and have discussed this with Dr. Andrés Gonzalez.  She will speak with patients family to determine whether they would like to pursue surgical options.  Trending cbc, coags, etc; TXA if no procedural plans (risk/benefit of thrombus in setting of stroke considered but I feel bleeding is the bigger risk).  Acute peritonitis:  appreciate CR surg input.  OR if they are able, but otherwise will manage as best we can given likelihood of a poor outcome.  Continue zosyn.  Acute CVA:  appreciate neuro support.  Inappropriate for lytics or antipltlt agents given the above.   ESRD:  appreciate nephrology support  High risk for life threatening deterioration over the next 24 hours.  Clinically Significant Risk Factors            # Hypomagnesemia: Lowest Mg = 1 mg/dL in last 2 days, will replace as needed   # Hypoalbuminemia: Lowest albumin = 2.5 g/dL at 3/25/2024  6:18 AM, will monitor as appropriate  # Coagulation Defect: INR = 2.10 (Ref range: 0.85 - 1.15) and/or PTT = 29 Seconds (Ref range: 22 - 38 Seconds), will monitor for bleeding  # Thrombocytopenia: Lowest platelets = 44 in last 2 days, will monitor for bleeding   # Hypertension: Noted on problem list            # Financial/Environmental Concerns:    # Support System: poor social support noted in nursing assessment            Rest per resident note.   I spent a total of 70 minutes (excluding procedure time) personally providing and directing critical care services at the bedside and on the critical care unit for this patient.     Nando Faria

## 2024-03-26 NOTE — PROGRESS NOTES
Inpatient Diabetes Management Service: Daily Progress Note   HPI: Tacos Dominguez is a 64 year old  is a 64 year old male admitted on 3/23/2024. 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 He then presented to OSH ED on 3/13/24 and found to have Evidence of large bowel pneumatosis, anemia and scrotal swelling prompting transfer to [facility] and admitted from (3/14-3/17)w/ GI and CRS consultations who presents today from infusion center to the ED for evaluation of Hyperglycemia. Patient admitted to Medicine for further evaluation and care.           Assessment/Plan:     Assessment:   Pre diabetes or Diabetes Mellitus, or post pancreatectomy diabetes or just steroid induced hyperglycemia--->  A1c=6.4% but multiple RBC transfusion making this A1c  inaccurate  Steroid induced hyperglycemia  Post  subtotal pancreatectomy for main duct IPMN (3/2022  Hyperglycemia, labs x2 without DKA, resolved  Anemia with history of recent RBC transfusions, received 2 units on 3/23/2024  ESRD, on HD  Stress induced hyperglycemia with elevated LA and elevated WBC improved but imaging showing pneumoperitoneum  Septic versus hemorrhagic shock      Pt NPO since midnight, stroke code  called this am.  D10 was running but stopped at 11:00 am when PZ=222.  D10 stopped.  Here at 2:22 BG=88.   There is no insulin on board to cause hypoglycemia and NPO.  Last lantus given at 12:04 on 3/25/2024.  Now he has received  methylpred 32 mg IV at 12:22 pm, written for every 12 hours.  ( Equiv. Pred 40 every 12 hours) so increased dose of steroids    Plan/Recommendations:   Addendum:  pt acutely ill, continue correction scale insulin and if BG >250 x2 would initiate prn non DKA insulin drip.  -wrote for prn insulin drip for worsening BG with   - Hold NPH 7 ast 8 am  units to cover the prednisone 40 mg daily at 8 am due to NPO---> no sterids given yet today.   - Decrease Lantus 7 units at 1300 but hold until not NPO  - Continue Novolog Meal Coverage: 1 units per 10 g CHO for breakfast and increase to 1:15 with lunch and dinner AC and PRN with snacks/supplements  - Continue Novolog Correction Scale: Increase 1/35 with meals and  1/50 at bedtime-- since  prednisone will have worn off  - BG monitoring:   before meals, bedtime and 0200.   - Hypoglycemia protocol    - Carb counting protocol    Discussion:    BG=66 at 7 pm, too much Novolog with correction scale and carb coverage at 14:00- need to decrease correction scale and carb coverage insulin but also BG=55 at 4am, too much lantus.  D10 started, was going to cont until lantus at 12:00 pm had worn off but ED=557 at 11:44 am.  Pt very ill now, had stroke this am and now requiring ICU with hypotension.MP IV restarted at 32mg IV every 12 hours so steroid dose increased.  Would initiate steroid drip if BG >250 x2.    Discharge Planning:(tentative)  Medications: TBD  Test Claims:  Lantus, Novolog, NPH today (3/25/24)   Education: Will need DM education closer to discharge.   Outpatient Follow-up:Recommend [facility] vs PCP     Please notify Inpatient Diabetes Service if changes are planned to steroids, nutrition, TPN/TF and anticipated procedures requiring prolonged NPO status.         Interval History/Review of Systems :   The last 24 hours progress and nursing notes reviewed.  Some lows last evening and then again this am as per discussion.  Pt had stroke this am and now having  "hypotensive shock.  Watch BG, maybe another insulin drip if having stress induced hyperglycemia or adding stress dose steroids    Planned Procedures/Surgeries: none    Inpatient Glucose Control:     Recent Labs   Lab 03/26/24  0620 03/26/24  0524 03/26/24  0433 03/26/24  0410 03/26/24  0344 03/26/24  0343   GLC 92 108* 112* 89 121* 55*             Medications Impacting Glycemia:   Steroids:  Prednisone to 40 mg daily i  D5W-containing solutions/medications: none   Other medications impacting glucose: none         Nutrition:   Orders Placed This Encounter      NPO per Anesthesia Guidelines for Procedure/Surgery Except for: No Exceptions    Supplements: none   TF: none   TPN: none         Diabetes History: see full consult note for complete diabetes history   Diabetes Type and Duration: not known to be diabetic    Last A1c: 6.4 on 3/23/2024    Hemoglobin at time of last A1c: 6.6  RBC transfusion in past 3 months: yes      PTA Medication Regimen: none  Historical Diabetes Medications: none     Glucose monitoring device and frequency: none  Outpatient Diabetes Provider: none        Physical Exam:   /56   Pulse 85   Temp 97.2  F (36.2  C) (Oral)   Resp 14   Ht 1.803 m (5' 11\")   Wt 71.5 kg (157 lb 10.1 oz)   SpO2 100%   BMI 21.98 kg/m    General:  appears ill. Sleepy but awakens  HEENT:  NC/AT. MMM, sclera not injected  Lungs:  unremarkable, no new cough, no SOB  ABD:  rounded, soft, non-tender  Skin:  warm and dry, no obvious lesions  Feet:    CMS intact  MSK:   moving all extremities  Lymp:    Bilatera; LE edema  Mental Status:  eyes are close but opens to voice, answers question but with slurred speech, cannot follow all commands  Psych:  appropriate mood            Data:     Lab Results   Component Value Date    A1C 6.4 03/23/2024    A1C 6.1 03/19/2024    A1C 5.8 03/14/2024       ROUTINE IP LABS (Last four results)  BMP  Recent Labs   Lab 03/26/24  0620 03/26/24  0524 03/26/24  0433 03/26/24  0410 " 03/26/24  0344 03/25/24  0726 03/25/24  0618 03/24/24  0748 03/24/24  0705 03/23/24  2135 03/23/24 2052   NA  --   --   --   --  133*  --  131*  --  133*  --  135   POTASSIUM  --   --   --   --  3.7  --  4.5  --  3.7  --  3.3*   CHLORIDE  --   --   --   --  99  --  98  --  96*  --  94*   KARY  --   --   --   --  8.3*  --  8.6*  --  9.0  --  9.1   CO2  --   --   --   --  26  --  20*  --  24  --  24   BUN  --   --   --   --  20.9  --  34.5*  --  23.9*  --  18.7   CR  --   --   --   --  2.98*  --  4.49*  --  3.53*  --  2.83*   GLC 92 108* 112* 89 121*   < > 169*   < > 142*   < > 181*  181*    < > = values in this interval not displayed.     CBC  Recent Labs   Lab 03/26/24  0533 03/26/24  0346 03/25/24 2150 03/25/24  0738   WBC 11.2* 13.0* 11.8* 14.3*   RBC 2.25* 2.50* 2.07* 2.76*   HGB 7.8* 8.2* 7.1* 9.4*   HCT 23.7* 26.2* 22.3* 29.8*   * 105* 108* 108*   MCH 34.7* 32.8 34.3* 34.1*   MCHC 32.9 31.3* 31.8 31.5   RDW 27.9* 27.5* 27.7* 28.2*   PLT 49* 52* 44* 67*     INR  Recent Labs   Lab 03/25/24 2054 03/19/24  1021   INR 2.10* 1.46*       Inpatient Diabetes Service will continue to follow, please don't hesitate to contact the team with any questions or concerns.     Daniela Doran PA-C  Inpatient Diabetes Service  Pager   [phone]  Available by [employer]  Date of Service: 3/26/2024      Plan discussed with patient, bedside RN, and primary team via this note.    To contact Inpatient Diabetes Service:     7 AM - 5 PM: Page the IDS [email or web address] following the patient that day (see filed or incomplete progress notes/consult notes under Endocrinology)    OR if uncertain of provider assignment: page job code [phone]    5 PM - 7 AM: First call after hours is to primary service.    For urgent after-hours questions, page job code for on call fellow: [phone]     I spent a total of 50 minutes on the date of the encounter doing prep/post-work, chart review, history and exam, documentation and further activities per the note including lab  review, multidisciplinary communication, counseling the patient and/or coordinating care regarding acute hyper/hypoglycemic management, as well as discharge management and planning/communication.  See note for details.

## 2024-03-26 NOTE — PROGRESS NOTES
Stroke Code Nurse-Responder Note    Arrival Time to Stroke Code: 0638    Stroke Code Team interventions:   - CT/CTA done , awaiting further action from team pending staff and Neuro IR response    ED/Bedside Nurse providing handoff: JR Salazar    Time left for CT: 0642    Time arrived to next location (ED/Unit/IR): 0708    ED/Bedside Nurse given handoff (name/time): JR Balderrama 0715    Rafiq Figueredo RN    *please note, this is an ongoing stroke code, and handoff was provided to stroke Bharti carranza RN for ongoing neurological assessment until deescalation or intervention.

## 2024-03-26 NOTE — CONSULTS
[facility]    Stroke Consult Note    Reason for Consult: Stroke Code (first of the day at 0637)    Chief Complaint: Acute onset LUE weakness and L facial droop      HPI  Tacos Dominguez is a 64 year old male with past medical history of ESRD on hemodialysis, PHTN, partially calcified abdominal aortic dissection, severe mitral valve insufficiency, moderate tricuspid insufficiency, Crohn's disease status post multiple bowel resections with end-to-end ileocolonic anastomosis, subtotal pancreatectomy and SANTOS 3/17/2022, chronic anemia, recent admission for acute lower GI bleed secondary to Crohn's flare from 2/25/2024 to 3/1/2024 and colonic pneumatosis 3/14/2024 to 3/17/2024 at outside hospital treated conservatively who was brought into [facility] for acute respiratory failure secondary to HFpEF secondary to acute on chronic blood loss anemia.  Stroke code initiated at 6:37 AM on 03/25/24 after the patient was found to have acute onset left sided weakness and altered mental status.      On arrival to bedside, the patient's blood pressure was in the 160s systolic and blood glucose was 92.  Patient was given an NIH stroke score of 14 for altered mental status requiring vocal stimuli to get his attention, answering only 1 question right, following only 1 command, not blinking to threat on the left, left facial droop, left upper drift with hitting bed and lower extremity drift without hitting bed, extinction to double stimulation.  Patient was taken to CT scanner for CT head and CTA had and neck.  CT head did not show any acute intracranial pathology, CT angiogram showed a distal M2's possible subocclusive thrombus.  The patient was not a candidate for tenecteplase as the patient's platelet count was 49, and his recent GI bleeding.  His hemoglobin has been significantly low due to the bleeding requiring recent blood transfusions.     Based on his initial deficits  (NIHSS of 14) in the setting of possible thrombus, the decision was taken to take patient to IR suite for emergent cerebral angiogram with intent to treat if there was an occlusion.  Patient was reassessed at bedside by day stroke team and found to have an improved NIHSS of 6 only 40 minutes after initial stroke code assessment.  By the time the patient got down to the IR suite, the patient's neuroexam improved dramatically with and NIHSS of 2, the only deficits being mild left lower facial droop and extinction to double stimulation on sensory exam.  Hence the decision was ultimately taken to not go ahead with IR angiogram.  Patient was transported back to his room without incident with plan for non-urgent stroke cares and work up.      Imaging Findings  CT head/CT angiogram head and neck-no acute intracranial pathology.  Possible right M2 occlusion distally    Intravenous Thrombolysis  Not given due to:   - Platelet count 48  - GI bleed within the past 14 days  - minor/isolated/quickly resolving symptoms    Endovascular Treatment  Possible right M2 occlusion with symptoms resolving to almost baseline, therefore endovascular intervention not pursued    Impression   Tacos Dominguez is a 64 year old male with past medical history of ESRD on hemodialysis, PHTN, partially calcified abdominal aortic dissection, severe mitral valve insufficiency, moderate tricuspid insufficiency, Crohn's disease status post multiple bowel resections with end-to-end ileocolonic anastomosis, subtotal pancreatectomy and SANTOS 3/17/2022, chronic anemia, recent admission for acute lower GI bleed secondary to Crohn's flare from 2/25/2024 to 3/1/2024 and colonic pneumatosis 3/14/2024 to 3/17/2024 at outside hospital treated conservatively who was brought into [facility] for acute respiratory failure secondary to HFpEF secondary to acute on chronic blood loss anemia.  Stroke orders initiated at 6:37 AM after the patient was found to have acute  onset left sided weakness and altered mental status.  Patient was given an NIH stroke score of 14.  CT head and CTA was done which showed no acute intracranial pathology with possible right M2 occlusion distally.  Patient was not a candidate for tenecteplase as there was recent history of GI bleed with a platelet count of 49 and quickly resolving symptoms.  Patient was transferred to angio suite for IR angiogram with intention to treat if there was a thrombus but patient's symptoms resolved almost back to baseline to a NIH of 2 with mild left lower facial droop and extinction to double stimulation in the left, hence the decision was taken to not go ahead with the IR angiogram.    Patient's presentation consistent with right MCA syndrome.  Majority of symptoms were transient in nature and continuing to improve likely in the setting of spontaneously resolving occlusion of the right M2.  Etiology of this occlusion is currently unknown.  Will recommend obtaining a stroke workup at this time with MRI brain, as well as repeat vessel imaging with MRA and echocardiogram.  Recommend having therapies see the patient as well and obtaining repeat LDL (his last one was reportedly lower than 4).  Send A1c was 6.5.    Recommendations  - Neurochecks and Vital Signs every 4 hours   - Permissive HTN; goal SBP < 220 mmHg  - Aspirin for secondary stroke prevention contraindicated at this time due to active bleeding  - Statin: continue PTA simvastatin 20 mg daily for now  - MRI Brain with and without contrast (ordered for you)  - MRA Brain without contrast (ordered for you)  - MRA Neck with and without contrast (ordered for you)  - TTE when able (ordered for you)  - Telemetry, EKG  - Bedside Glucose Monitoring  - Nutrition: NPO until formal SLP evaluation  - A1c, Lipid Panel, Troponin x 3  - PT/OT/SLP when able  - Stroke Education  - Euthermia, Euglycemia    Patient Follow-up     - final recommendation pending work-up    Thank you for  this consult. We will continue to follow.      The patient was discussed with Stroke Staff Dr. Lombardi.    Roby Dubon MD  Neurology Resident  ASCOM *[id number]  ______________________________________________________    Clinically Significant Risk Factors            # Hypomagnesemia: Lowest Mg = 1 mg/dL in last 2 days, will replace as needed   # Hypoalbuminemia: Lowest albumin = 2.5 g/dL at 3/25/2024  6:18 AM, will monitor as appropriate  # Coagulation Defect: INR = 2.10 (Ref range: 0.85 - 1.15) and/or PTT = 29 Seconds (Ref range: 22 - 38 Seconds), will monitor for bleeding  # Thrombocytopenia: Lowest platelets = 44 in last 2 days, will monitor for bleeding   # Hypertension: Noted on problem list            # Financial/Environmental Concerns:    # Support System: poor social support noted in nursing assessment           Past Medical History   Past Medical History:   Diagnosis Date    Aortic dissection (H)     distal, thin.  stable/chronic on MRCP 3/2022    Benign essential hypertension     CAD (coronary artery disease)     Cerebral infarction (H)     Crohn's colitis (H)     Crohn's disease of large intestine (H) 11/22/2021    Current smoker     Esophageal reflux     ESRD (end stage renal disease) on dialysis (H)     History of basal cell carcinoma     Hypertension     Mixed hyperlipidemia     NSTEMI (non-ST elevated myocardial infarction) (H)     PAF (paroxysmal atrial fibrillation) (H)      Past Surgical History   Past Surgical History:   Procedure Laterality Date    ABDOMEN SURGERY      x 6.  colon resections for crohn's disease    APPENDECTOMY      CHOLECYSTECTOMY      COLONOSCOPY N/A 07/20/2021    Procedure: COLONOSCOPY, WITH POLYPECTOMY AND BIOPSY;  Surgeon: Eric Preston MD;  Location:  GI    COLONOSCOPY N/A 12/7/2022    Procedure: COLONOSCOPY, WITH POLYPECTOMY AND BIOPSY;  Surgeon: Willian Kee MD;  Location:  GI    COLONOSCOPY N/A 2/27/2024    Procedure: COLONOSCOPY;  Surgeon: Chuck  Judson Salcido MD;  Location: [facility]    CV CORONARY ANGIOGRAM N/A 05/25/2021    Procedure: CV CORONARY ANGIOGRAM;  Surgeon: Judson Ybarra MD;  [facility]    CV CORONARY ANGIOGRAM N/A 2/15/2024    Procedure: Coronary Angiogram;  Surgeon: Tico Finn MD;  [facility]    CV LEFT HEART CATH N/A 2/15/2024    Procedure: Left Heart Catheterization;  Surgeon: Tico Finn MD;  Location:  HEART CARDIAC CATH LAB    CV RIGHT HEART CATH MEASUREMENTS RECORDED N/A 2/15/2024    Procedure: Right Heart Catheterization;  Surgeon: Tico Finn MD;  Location:  HEART CARDIAC CATH LAB    ESOPHAGOSCOPY, GASTROSCOPY, DUODENOSCOPY (EGD), COMBINED N/A 07/20/2021    Procedure: ESOPHAGOGASTRODUODENOSCOPY, WITH FINE NEEDLE ASPIRATION BIOPSY, WITH ENDOSCOPIC ULTRASOUND GUIDANCE;  Surgeon: Eric Preston MD;  Location:  GI    ESOPHAGOSCOPY, GASTROSCOPY, DUODENOSCOPY (EGD), COMBINED N/A 2/27/2024    Procedure: ESOPHAGOGASTRODUODENOSCOPY;  Surgeon: Judson Woods MD;  Location: [facility]    IR DIALYSIS FISTULOGRAM LEFT  12/01/2022    IR DIALYSIS FISTULOGRAM LEFT  1/13/2023    LAPAROTOMY, LYSIS ADHESIONS, COMBINED N/A 03/17/2022    Procedure: Laparotomy, extensive lysis adhesions, combined;  Surgeon: Sarath Smith MD;  [facility]    PANCREATECTOMY PARTIAL N/A 03/17/2022    Procedure: Open Subtotal Pancreatectomy, intra-op ultrasound;  Surgeon: Sarath Smith MD;  [facility]    REVISION FISTULA ARTERIOVENOUS UPPER EXTREMITY Left 2/14/2023    Procedure: LEFT UPPER ARM FISTULA OUTFLOW REVISION FROM CEPHALIC VEIN TO JUGULAR VEIN WITH 10mm THIN-WALLED RINGED POLYTETRAFLUEROETHYLINE;  Surgeon: Mo Gramajo MD;  Location: [facility]    TRANSESOPHAGEAL ECHOCARDIOGRAM INTRAOPERATIVE N/A 1/11/2024    Procedure: ECHOCARDIOGRAM, TRANSESOPHAGEAL, WITH ANESTHESIA;  Surgeon: GENERIC ANESTHESIA PROVIDER;  [facility]    VASCULAR SURGERY       dialysis access- left upper     Medications   Home Meds  Prior to Admission medications    Medication Sig Start Date End Date Taking? Authorizing Provider   budesonide (ENTOCORT EC) 3 MG EC capsule Take 3 capsules (9 mg) by mouth every morning 3/1/24  Yes Mak Peters MD   calcium acetate (CALPHRON) 667 MG TABS tablet Take 2,668 mg by mouth 3 times daily (with meals) 12/3/21  Yes Reported, Patient   citalopram (CELEXA) 20 MG tablet Take 20 mg by mouth daily as needed (panic attacks) 12/7/23  Yes Reported, Patient   folic acid (FOLVITE) 1 MG tablet Take 1 mg by mouth every morning  2/19/22  Yes Reported, Patient   lidocaine-prilocaine (EMLA) 2.5-2.5 % external cream three times a week Prior to dialysis  site access on Monday, Wednesday, Friday 2/14/22  Yes Reported, Patient   metroNIDAZOLE (FLAGYL) 500 MG tablet Take 1 tablet (500 mg) by mouth 3 times daily for 29 days 3/17/24 4/15/24 Yes Porfirio Smith MD   multivitamin RENAL (MULTIVITAMIN RENAL) 1 MG capsule Take 1 capsule by mouth every morning 2/1/22  Yes Reported, Patient   omeprazole (PRILOSEC) 20 MG DR capsule Take 20 mg by mouth every morning  10/29/20  Yes Reported, Patient   predniSONE (DELTASONE) 10 MG tablet Take 4 tablets (40 mg) by mouth daily for 30 days 3/21/24 4/20/24 Yes Betty Rubio MD   simvastatin (ZOCOR) 20 MG tablet Take 20 mg by mouth every morning  3/10/21  Yes Reported, Patient   ustekinumab (STELARA) 90 MG/ML Inject 1 ml ( 90 mg) subcutaneous every 4 weeks.  Patient taking differently: Inject 90 mg Subcutaneous every 28 days Inject 1 ml ( 90 mg) subcutaneous every 4 weeks. Last dose 3/1/24 10/18/23  Yes Betty Rubio MD       Scheduled Meds   budesonide  9 mg Oral QAM    calcium acetate  2,668 mg Oral TID w/meals    folic acid  1 mg Oral QAM    insulin aspart   Subcutaneous Daily with breakfast    insulin aspart   Subcutaneous Daily with lunch    insulin aspart   Subcutaneous Daily with supper    insulin aspart  1-7 Units  Subcutaneous TID AC    insulin aspart  1-5 Units Subcutaneous 2 times per day    [Held by provider] insulin glargine  7 Units Subcutaneous Q24H    [Held by provider] insulin NPH  7 Units Subcutaneous Q24H    metroNIDAZOLE  500 mg Oral TID    mirtazapine  7.5 mg Oral At Bedtime    multivitamin RENAL  1 capsule Oral QAM    nicotine  1 patch Transdermal Daily    pantoprazole  40 mg Intravenous Q12H    piperacillin-tazobactam  2.25 g Intravenous Q6H    predniSONE  40 mg Oral Daily    simvastatin  20 mg Oral QAM    sodium chloride (PF)  10 mL Intracatheter Q8H    sodium chloride (PF)  3 mL Intracatheter Q8H       Infusion Meds   dextrose 1,000 mL (03/26/24 0625)    heparin      heparin         PRN Meds  calcium carbonate, dextrose, dextrose, glucose **OR** dextrose **OR** glucagon, fentaNYL, flumazenil, heparin, heparin, insulin aspart, insulin aspart, lidocaine 4%, lidocaine 4%, lidocaine (buffered or not buffered), lidocaine (buffered or not buffered), LORazepam, midazolam, naloxone **OR** naloxone **OR** naloxone **OR** naloxone, senna-docusate **OR** senna-docusate, sodium chloride (PF), sodium chloride (PF), traZODone **OR** traZODone    Allergies   Allergies   Allergen Reactions    Lisinopril Anaphylaxis and Other (See Comments)     Shortness of breath     Family History   Family History   Problem Relation Age of Onset    Breast Cancer Mother     Coronary Artery Disease Father     Hypertension Brother     Anesthesia Reaction No family hx of     Thrombosis No family hx of      Social History   Social History     Tobacco Use    Smoking status: Every Day     Packs/day: 0.25     Years: 50.00     Additional pack years: 0.00     Total pack years: 12.50     Types: Cigarettes     Passive exposure: Current    Smokeless tobacco: Never    Tobacco comments:     6-7 cigarettes daily   Substance Use Topics    Alcohol use: Not Currently     Comment: 2-3 drinks every 3  month    Drug use: Not Currently       Review of Systems    The 10 point Review of Systems is negative other than noted in the HPI or here.        PHYSICAL EXAMINATION  Temp:  [97  F (36.1  C)-97.8  F (36.6  C)] 97.2  F (36.2  C)  Pulse:  [] 85  Resp:  [12-22] 14  BP: ()/() 115/56  Cuff Mean (mmHg):  [72-82] 72  SpO2:  [71 %-100 %] 100 %     General Exam  General:  patient lying in bed without any acute distress    HEENT:  normocephalic/atraumatic  Cardio:  RRR  Pulmonary:  no respiratory distress  Abdomen:  soft  Extremities:  no edema  Skin:  intact     Neuro Exam  Mental Status: Patient was drowsy but able to wake him up with vocal stimuli.  Patient was able to answer the year but not the month, unable to follow one of the 2 commands given to him, no aphasia.  Cranial Nerves: Patient was not blinking to threat on the left, patient had a forced right gaze deviation which could not be overcome with oculocephalic, left lower facial droop, dysarthria  Motor: Drift and hitting bed in left upper extremity, drift without any bed and left lower extremity, right upper extremity without drift, right lower extremity drift without hitting bed  Reflexes:  toes down-going  Sensory:   Withdrawing to noxious stimuli briskly on the right upper and lower extremities and sluggishly on left upper and lower extremity, extinction to double stimulation to the left side  Coordination: Unable to assess  Station/Gait: Deferred    Dysphagia Screen  Per Nursing    Stroke Scales    NIHSS  1a. Level of Consciousness 0-->Alert, keenly responsive   1b. LOC Questions 0-->Answers both questions correctly   1c. LOC Commands 0-->Performs both tasks correctly   2.   Best Gaze 1-->Partial gaze palsy, gaze is abnormal in one or both eyes, but forced deviation or total gaze paresis is not present   3.   Visual 0-->No visual loss   4.   Facial Palsy 2-->Partial paralysis (total or near-total paralysis of lower face)   5a. Motor Arm, Left 1-->Drift, limb holds 90 (or 45) degrees, but drifts  down before full 10 seconds, does not hit bed or other support   5b. Motor Arm, Right 0-->No drift, limb holds 90 (or 45) degrees for full 10 secs   6a. Motor Leg, Left 0-->No drift, leg holds 30 degree position for full 5 secs   6b. Motor Leg, right 0-->No drift, leg holds 30 degree position for full 5 secs   7.   Limb Ataxia 0-->Absent   8.   Sensory 0-->Normal, no sensory loss   9.   Best Language 0-->No aphasia, normal   10. Dysarthria 1-->Mild-to-moderate dysarthria, patient slurs at least some words and, at worst, can be understood with some difficulty   11. Extinction and Inattention  1-->Visual, tactile, auditory, spatial, or personal inattention or extinction to bilateral simultaneous stimulation in one of the sensory modalities   Total 6 (03/26/24 0720)       Modified Kayden Score (Pre-morbid)  3-Moderate disability; requiring some help, but able to walk without assistance    Imaging  I personally reviewed all imaging; relevant findings per HPI.     Lab Results Data   CBC  Recent Labs   Lab 03/26/24  0533 03/26/24  0346 03/25/24  2150   WBC 11.2* 13.0* 11.8*   RBC 2.25* 2.50* 2.07*   HGB 7.8* 8.2* 7.1*   HCT 23.7* 26.2* 22.3*   PLT 49* 52* 44*     Basic Metabolic Panel    Recent Labs   Lab 03/26/24  0737 03/26/24  0620 03/26/24  0524 03/26/24  0410 03/26/24  0344 03/25/24  0726 03/25/24  0618 03/24/24  0748 03/24/24  0705   NA  --   --   --   --  133*  --  131*  --  133*   POTASSIUM  --   --   --   --  3.7  --  4.5  --  3.7   CHLORIDE  --   --   --   --  99  --  98  --  96*   CO2  --   --   --   --  26  --  20*  --  24   BUN  --   --   --   --  20.9  --  34.5*  --  23.9*   CR  --   --   --   --  2.98*  --  4.49*  --  3.53*   * 92 108*   < > 121*   < > 169*   < > 142*   KARY  --   --   --   --  8.3*  --  8.6*  --  9.0    < > = values in this interval not displayed.     Liver Panel  Recent Labs   Lab 03/25/24  0618 03/23/24  0759 03/19/24  1021   PROTTOTAL 4.0* 4.6* 4.4*   ALBUMIN 2.5* 3.0* 2.7*    BILITOTAL 1.1 0.7 0.8   ALKPHOS 96 142 109   AST 20 15 21   ALT 18 21 22     INR    Recent Labs   Lab Test 03/25/24  2054 03/19/24  1021 03/14/24  0535   INR 2.10* 1.46* 1.96*      Lipid Profile    Recent Labs   Lab Test 03/14/24  0535 05/25/21  1143   CHOL 72 86   HDL 43 62   LDL <4 3   TRIG 130 108     A1C    Recent Labs   Lab Test 03/23/24  0759 03/19/24  1021 03/14/24  0535   A1C 6.4* 6.1* 5.8*     Troponin    Recent Labs   Lab 03/23/24  1421 03/23/24  1234   CTROPT 174* 206*          Stroke Code Data Data   Stroke Code Data  (for stroke code without tele)  Stroke code activated 03/26/24  0637   First stroke provider response 03/26/24  0638   Last known normal 03/26/24  0630   Time of discovery (or onset of symptoms) 03/26/24  0630   Head CT read by Stroke Neuro Provider 03/26/24  0708   Was stroke code de-escalated? Yes  03/26/24  0800

## 2024-03-26 NOTE — PROGRESS NOTES
Colorectal Surgery family meeting    Spoke with Dr. Faria from critical care and Dr. Moncada from gastroenterology about patient. Worsening and now with recent CT scan with active blush and mental status with continued lethargy. Overall grim prognosis.    I spoke with the patient's brother, Nando and his wife, Alena. Some of this is even more challenging because Scar had not shared his preferences around and wishes fully. We agree that most invasive options are either infeasible (e.g., IR embolization) or extremely high risk (e.g., operating room with resection or endoscopic treatment). And that surgery has a high chance of causing more immediate demise and endoscopic treatment a high chance of perforation and similar life threatening issues.     After discussing these options, the decision was made to pursue supportive measures to see if Scar improves with respect to bleeding, potential stroke and otherwise physiologically. Supportive measures would include transfusions and medications but not ACLS or intubation.     Total time was 30 minutes.    Karla Molina MD  Colon and Rectal Surgery Attending  Department of Surgery  [facility]

## 2024-03-26 NOTE — PROGRESS NOTES
Intensivist family meeting addendum:    Discussed again with Dr. Andrés Gonzalez.  Family feels non-operative options would be more in line with patient's wishes.    I needed further information to guide medical decision making and the patient is unable to make his own medical decisions due to ongoing lethargy, so I discussed the situation with the patient's brother and LAR, Nando, as well as Nando's wife on speaker phone.  We discussed the grim prognosis given his ongoing bleeding and intraperitoneal free air, as well as the quality of life limitations brought about by his stroke this morning.  We agreed that aggressive measures such as acls or intubation would not be within the patient's wishes.  We will continue supportive cares such as TXA, antibiotics, pressors and limited transfusions to see if there is any improvement in condition with these measures tonight, but I expressed to him that should his transfusions needs go up or he otherwise deteriorate tonight this would indicate these measures aren't working and a member of our care team would likely reach out overnight to suggest transition to comfort measures.  Similarly, if he continues to have bleeding or deterioration tomorrow I suggested we should re-evaluate whether continued transfusions at any level would be beneficial.  Nando was agreeable.  I expressed that there is a high chance of overnight mortality for Mr. Dominguez, and they were understanding of that message.    All questions asked and answered.    Additional critical care time 40 minutes.  Total time today now 110 min, excluding procedures.

## 2024-03-26 NOTE — PROCEDURES
[facility]    Arterial line placement    Date/Time: 3/26/2024 1:00 PM    Performed by: Zulay Scott MD  Authorized by: Zulay Scott MD      UNIVERSAL PROTOCOL   Site Marked: NA  Prior Images Obtained and Reviewed:  NA  Required items: Required blood products, implants, devices and special equipment available    Patient identity confirmed:  Anonymous protocol, patient vented/unresponsive  NA - No sedation, light sedation, or local anesthesia  Confirmation Checklist:  Patient's identity using two indicators and procedure was appropriate and matched the consent or emergent situation  Time out: Immediately prior to the procedure a time out was called    Universal Protocol: the Joint Commission Universal Protocol was followed    Preparation: Patient was prepped and draped in usual sterile fashion    Indication:  hemodynamic monitoring  Location:  Right radial      SEDATION    Patient Sedated: No      PROCEDURE DETAILS    Needle Gauge:  18      Number of Attempts:  2  Post-procedure:  Line sutured and dressing applied      PROCEDURE    Patient Tolerance:  Patient tolerated the procedure well with no immediate complications  Length of time physician/provider present for 1:1 monitoring during sedation: 15

## 2024-03-26 NOTE — PROGRESS NOTES
RRT called at 11:30 this AM due to increased lethargy and several bloody stools.  In was determined an arterial line needed to be placed as well as pressors.  Flyer nurses took over care at that time.  Pt transferred to  at approximately 1500.    Transfer  Transferred to   Via:bed  Reason for transfer: Pt declining.  See Rapid Response note.  Family: Aware of transfer  Belongings: Sent with pt  Chart: Sent with pt  Medications: Meds sent with pt    Report given to JR Hinkle RN on 3/26/2024 at 3:32 PM

## 2024-03-26 NOTE — PROGRESS NOTES
SPIRITUAL HEALTH SERVICES Progress Note  I responded to STAT spiritual care consult for a  to offer anointing. I spoke with RN to confirm that pt is Religion. Father John Ramirez was contacted and will be here this evening to offer pt the sacrament of anointing.     Please place another STAT spiritual care Consult if other needs come up tonight.       Willian Thomas  Associate

## 2024-03-26 NOTE — PROGRESS NOTES
Admitted/transferred from: 6B  Reason for admission/transfer: Neuro changes, hypotension, c/f bleed  Patient status upon admission/transfer: Lethargic, RASS -3. L sided weakness, facial droop, stiffness and internal rotation to lower extremities. BPs stable, on RA.  Interventions: Stroke code team called to bedside, MICU and GI teams also present at bedside. Stat labs.  Plan: Give 2 units PRBC, 1 unit platelets, start Txa. Assess surgery plan.  2 RN skin assessment: completed by Kasie and Barry, RN  Sexual Orientation and Gender Identity (SOGI) smartfom completed: Not Done-pt lethargic  Result of skin assessment and interventions/actions: Large RLQ hernia, R and L heels cracked, abdominal and LLQ scar, L elbow abrasion  Height, weight, drug calc weight: Done  Patient belongings (see Flowsheet - Adult Profile for details): Sent with patient. Clothes, glasses, cell phone in room.  MDRO education (if applicable): N/a

## 2024-03-26 NOTE — CODE/RAPID RESPONSE
Rapid Response Team Note    Assessment   In assessment a rapid response was called on [facility] [facility] due to  severe anemia and hypotensive shock .  History notable for ESRD on HD, coronary disease, severe mitral insufficiency, and Crohn's disease.  Currently admitted with acute lower GI bleed from Crohn's flareup.  Prior history of Crohn's flares.  Overnight, patient developed left-sided deficits and suspected of had a stroke.  This morning, hemoglobin dipping down to the fours with recheck at 5.5 from 8 yesterday.  Also hypotensive with maps in the 40s during RRT and dipped as low as 28.  Primary team was at bedside at time RRT was called but I did present and assist as able.  Interventions as below        Plan   -Suspect GI losses in setting of Crohn's disease as cause for acute anemia.  A total of 4 units of packed red blood cells were ordered but mass transfusion protocol not formally activated.  -Due to sustained hypotension and norepinephrine was started via PICC line.  Also peripherally, transiently started Levophed due to persistent MAPS down to high 20s. Pt mentation waxing and waning throughout this time.  -Patient is a vasculopath thus concern for inaccurate blood pressures.  - MICU resident contacted and can placed an arterial line, assistance appreciated  - BP was hypertensive via arterial line so Levophed was discontinued.  - Primary team contacted ICU who accepted the patient. Currently awaiting a bed to open up.   - Primary team also contacted family to make them aware of status change.  -  Reassessment and plan follow-up will be performed by the  primary team and ICU team once transferred    See primary team and ICU notes for remainder of cares. No charge for my services.    Leonidas Hernandez PA-C  [facility] RRT [facility] Job Code Contact #[phone]  [facility] Paging/Directory    Hospital Course   Brief Summary of events leading to rapid response:   Hemoglobin dropped to 5.5. Sustained hypotension not  initially responsive to fluids. See above.    Admission Diagnosis:   Hyperglycemia [R73.9]    Physical Exam   Temp: (!) 96  F (35.6  C) Temp  Min: 96  F (35.6  C)  Max: 97.8  F (36.6  C)  Resp: 14 Resp  Min: 12  Max: 22  SpO2: 100 % SpO2  Min: 71 %  Max: 100 %  Pulse: 76 Pulse  Min: 63  Max: 112    No data recorded  BP: 91/63 Systolic (24hrs), Av , Min:44 , Max:165   Diastolic (24hrs), Av, Min:22, Max:129     I/Os: I/O last 3 completed shifts:  In: 540 [P.O.:240]  Out: 2230 [Other:2230]     Exam:   General: acutely ill appearing  Mental Status: waxing and waning .      Significant Results and Procedures   Lactic Acid:   Recent Labs   Lab Test 24  1057 24  0533 24  0340   LACT 1.3 1.4 1.5     CBC:   Recent Labs   Lab Test 24  1130 24  0533 24  0346   WBC 7.7 11.2* 13.0*   HGB 5.5* 7.8* 8.2*   HCT 17.5* 23.7* 26.2*   PLT 44* 49* 52*

## 2024-03-27 NOTE — PROGRESS NOTES
GASTROENTEROLOGY PROGRESS NOTE      Attending/treating physician Attestion Note    I saw the patient on 3/27/2024 as shared visit with Agueda Stapleton PA-C, as a shared visit with a KHADRA/FADUMO.    I performed medical decision making on this patient after reviewing his medical records, reviewing all laboratory tests images and consultations for the previous 24 hours.      I examined the patient.  I met with the patient's brother Nando and discussed his case at length.        Medical decision making: Gastrointestinal hemorrhage-evidenced by rectal bleeding maroon-colored stool drop in hemoglobin and hypotension.  The patient is known to have large spreading small bowel ulceration related to either Crohn's disease or in combination with intestinal ischemia.  The patient has vasculopathic issues throughout his body.  He also is a diagnosis of Crohn's disease.  The patient i was given 10 mg/kg of infliximab yesterday.  The most recent CT scan done yesterday afternoon reveals free air in the peritoneum of the abdomen and pneumatosis.  This was prior to this day associated with a large rise in his lactic acid level which has recovered.  We discussed his care with colorectal surgery yesterday and was elected to manage the patient conservatively given any kind of intervention would likely ended his demise.  We discussed the grave prognosis of this patient with the patient's family.    Recommendations services provided: Counseling about the grave prognosis and nature of the patient's underlying illnesses.  Observation was recommended and supportive care with conservative measures only.  Continuing IV medications and supportive care.    Eric Moncada MD  Associate Professor of Medicine  Division of Gastroenterology, Hepatology, and Nutrition  [facility]      Of note we were unable to addend the note to a typical attestation note to be attached to this note with the help of an epic support  "provider.  Is not clear why was unable to do this and unfortunately was only only able to attach this note to the prior note documented by Agueda Stapleton PA-C.        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, nonobstructive CAD, hypertension, history of NSTEMI, pulmonary hypertension, history of abdominal aorta partially calcified dissection, severe symptomatic mitral valve regurgitation with significant MAC, severe functional tricuspid regurgitation and moderate aortic stenosis with planned surgery April 2024, history of paroxysmal atrial fibrillation, prolonged QTc, with recent admission to [facility] for anemia status-post colonoscopy 2/27/2024 with multiple large ulcers in the ileum, one of which had adherent clot with visible vessel treated with epinephrine and bipolar cautery and status-post EGD on the same day 2/27/2024 with evidence of congestive erythematous gastritis with biopsies consistent with chemical gastritis (performed by JACINTA while inpatient at [facility]), who then presented to [facility] with scrotal swelling, found to have a hemoglobin of 5.7 g/dL, leukocytosis of 17 10e3/uL and CT scan with evidence of colonic pneumatosis, started on IV antibiotics and transferred to [facility] 3/14/2024 where patient was treated conservatively. Patient presented to the ED on 3/23/2024 from the infection center for further evaluation of " "hyperglycemia with blood glucose in the 800s.     The GI Luminal Service was consulted regarding: \"Crohn's flare w/ recent hx of GIB, now transfusion dependent. Elevated LA and BRBPR.\"        Patient was recently hospitalized for GI bleeding and noted to have \"huge\" ulcers in the terminal ileum, one noted to be actively bleeding and was successfully treated with epinephrine injection and coagulation. Patient was most recently hospitalized at [facility] and found to have an incidental finding of pneumoatosis intestinalis in the ileum/R colon on imaging. Patient was treated conservatively with Flagyl, prednisone was discontinued and patient was discharged home.   - MRE on 3/18/2024 concerning for small bowel obstructions and acute inflammation. MRE reports multifocal areas of segmental wall thickening and stricturing seen along small bowel loops in the right hemiabdomen compatible with acute Crohn's disease.   - Patient continues to smoke 1/4 pack per day which is counterproductive to Crohn's healing; patient is trying to quit.   - Patient was most recently seen in outpatient [facility] with our colleague Dr. Betty Rubio on 3/20/2024. Outpatient plan was to discontinue Stelara and restart Remicade with suspicion that a high dose therapy will be needed. As a bridge until Remicade, prednisone was re-initiated given ongoing active Crohn's inflammation.         # Active Crohn's Disease  # Bloody Stools, improving  # Abnormal CT Scan of the GI Tract, Free intraperitoneal air  # Pneumatosis Intestinalis  # Chronic Macrocytic Anemia  # Chronic Thrombocytopenia   # Tobacco Use Disorder  # Right MCA Stroke  Patient with long-standing Crohn's Disease (diagnosed age 16) status-post multiple bowel resections with minimal remaining bowel on Stelara every 4 weeks prior to admission in the setting of ongoing tobacco use disorder (daily cigarette smoking). Patient also has chronic macrocytic anemia and " thrombocytopenia.    - 3/23/2024 NEGATIVE C. Diff and Enteric Bacteria and Virus Panel PCR.     - 3/26/2024 CT Abdomen/Pelvis with Contrast with diffuse air in the large bowel and extraluminal air with  appearance in the mesentery improved compared to 3/13/2024 CT scan, no portal venous gas, normal wall enhancement, findings favored to represent improving extensive pneumatosis intestinalis; no evidence of acute abdominal/pelvic hemorrhage; mild ascites, diffuse anasarca, and bilateral small pleural effusions with adjacent atelectasis.     Patient is known to have advanced small bowel ulcerations in the setting of Crohn's disease with active tobacco abuse. Patient's recent CT scans and MRE show thickening of the small intestine and patient experienced previous GI bleeding which resulted in colonoscopy as recent as a few weeks ago which demonstrated extensive ulceration of the small bowel with a visible vessel that was injected with epinephrine and cauterized with electrocautery. Soon after, patient again experienced GI bleeding which was managed conservatively but CT demonstrated extensive pneumatosis intestinalis. Most recently, patient found to have elevated lactate, altered level of consciousness with possible stroke and continuing pneumatosis.      Per chart review of I/O Flowsheet, patient was having brown loose stools this admission until 1900 on 3/25/2024 when they were then documented as large, loose, bloody with subsequent bloody stools documented until 1440 on 3/26/2024. Recommended STAT CTA Chest/Abdomen/Pelvis on 3/26/2024 given ongoing melena, with question whether IR embolization would be feasible if active bleed demonstrated given very high risk for GI endoscopy with pneumatosis intestinalis.   -- CTA Chest/Abdomen/Pelvis 3/26/2024 at 1430 reported with active extravasation into the small bowel loop in the right lower quadrant hernia with offending artery not visualized but presumed a branch  from the superior mesenteric artery; aortic and iliac dissection; superior mesenteric origin stenosis, and tiny ileal and jejunal arteries making endovascular catheterization and treatment extremely challenging if not impossible. Free intraperitoneal air, possible pneumoatosis but small bubbles are difficult to localize. Please see CTA report for additional findings.     Patient was transferred to the ICU the afternoon of 3/26/2024 for pressor support, received multiple units of packed red blood cells, and higher level of cares.     On 3/26/2024, patient's case was discussed with patient's outpatient IBD provider Dr. Betty Rubio, inpatient Colorectal surgery PA Lupe Stapleton, and Colorectal surgery staff Dr. Molina. GI endoscopy would not help this patient at this time and likely do further harm given free intraperitoneal air and pneumatosis intestinalis on CT imaging. Dr. Molina discussed that patient's case with patient's brother Nando and Nando's wife Alena with the agreement that more invasive options are either infeasible (e.g., IR embolization) or extremely high risk (e.g., operating room with resection or endoscopic treatment). Shared decision making conclusion 3/26/16169 was to pursue supportive measures including transfusions and medications.     Patient received Remicade infusion 10 mg/kg (700 mg) on 3/26/2024 at 19:03. Recommended discontinuing IV steroids at that time. Patient also given IV tranexamic acid on 3/26/2024 from 17:49-00:00.    3/27/2024: Patient has not required further blood transfusions today 3/27 with hemoglobin remaining at 8.3 g/dL. Continues on and off pressors (norepinephrine). Primary MICU 2 team worried about abruptly stopping steroids so planning for taper.     CRP Downtrendin.65 mg/L (3/23) --> 118 mg/L (3/25) --> 99.30 mg/L (3/25) --> 78.60 mg/L (3/26) --> 34.60 mg/L (3/27).     Patient's condition remains tenuous. Recommend continuing Zosyn for broad spectrum  antibiotic coverage in the setting of free intraperitoneal air on imaging, likely peritonitis. Recommend continuing to transfuse packed red blood cells to maintain hemoglobin > 7 g/dL from a GI standpoint, though may need to consider higher hemoglobin goal given significant coronary artery disease (defer to transfusion goal to primary MICU 2 team +/- cardiology).       RECOMMENDATIONS:  -- Trend daily CBC with platelets, CMP and CRP.   -- Correct all coagulopathies.   -- Continue Zosyn for broad spectrum antibiotic coverage.   -- continue Pantoprazole 40 mg IV BID.   -- Tobacco Cessation.   -- Appreciate excellent cares by the primary MICU 2 team.     -- Strict documentation of rectal output.  - Appreciate detailed documentation of stool appearance, including color, consistency, frequency and amount.               - Consider smearing stool thinly on white paper towel to distinguish color.      -- Appreciate Colorectal Surgery team's involvement and recommendations.   -- Appreciate Nephrology and Neurology's involvement and recommendations.      -- Maintain 2 large bore IVs, 18G or larger.  -- Continue Supportive Cares  - Adequate volume resuscitation with IVF, pRBCs.  - Monitor Hemoglobin closely. Transfuse to keep Hgb > 7 g/dL from GI standpoint (consider higher hemoglobin goal given significant coronary artery disease).   - Monitor Platelets closely. Keep PLT > 50 10e3/uL from GI standpoint.  - Maintain hemodynamics: MAP >65 mmHg and HR <100.  - Monitor and optimize electrolytes.  - Reposition every 30 minutes while awake.  - Encourage Ambulation as able: 4-6 walks per day.   -- Avoid NSAIDs.  -- Analgesics/Antiemetics per primary team.  -- If the patient experiences overt GI bleeding with hemodynamic instability, please page the GI Luminal Service (listed on [facility]).       OUTPATIENT:   -- Follow-up in outpatient [facility] on 5/28/2024 with Darek Romero PA-C as currently scheduled.   -- Urology referral  for Kidney mass seen on MRE.   - MRE 3/18/2024 reports: Increasingly complex exophytic cyst along the lower pole of the left kidney with internal hemorrhagic/proteinaceous contents as well as increasing peripheral wall and septal thickening. 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      COVID status: not tested this admission.      Discussed with Dr. Faria - MICU 2 staff physician and MICU 2 team.     Thank you for involving us in this patient's care. Please do not hesitate to contact the GI service with any questions or concerns.     The patient was discussed and plan agreed upon with Dr. Eric Moncada, GI Luminal Service staff physician.    Overall time spent on the date of this encounter preparing to see the patient (including chart review of available notes, clinical status events, imaging and labs); obtaining and/or reviewing separately obtained history from patient's brother Onesimo and friend Emanuel; discussing, ordering, coordinating recommended medications, tests or procedures; communicating with other health care professionals; and documenting the above clinical information in the electronic medical record was 40 minutes.    Agueda Manzo PA-C  Inpatient Gastroenterology Service  [facility]  Text Page  _______________________________________________________________      Subjective: Nursing notes and 24hr events reviewed.     Patient seen and examined at 0945. Patient reports no abdominal pain. Denies nausea, vomiting, acid reflux, heartburn.    RN reports nor further bowel movements or rectal output since yesterday afternoon.     Onesimo (patient's brother) and Emanuel (patient's friend) confirm patient continues tobacco use (cigarette smoking) prior to admission.     Discussed assessment and plan as above with Onesimo and Emanuel. They had no additional questions/concerns for the GI Luminal service at  "this time.       Bowel Movements per I/O Flowsheet:   3/26/2024:   -- 1440: large, loose, bloody, red.  -- 1415: medium, loose, bloody, red.  -- 1400: small, loose, bloody, red.   -- 0041: large, loose, bloody, red.   3/25/2024:   -- 2000: large, loose, bloody, red.  -- 1900: large, loose, bloody red.  -- 0810: loose, watery, brown.   -- 0500: loose brown.  -- 0245: loose brown.   3/24/2024:   -- 2015: loose brown.  -- 1761: loose brown.         ROS:   4 pt ROS negative unless noted in subjective.     Objective:  Blood pressure 91/63, pulse 93, temperature 98  F (36.7  C), temperature source Oral, resp. rate (!) 7, height 1.803 m (5' 11\"), weight 75.9 kg (167 lb 5.3 oz), SpO2 98%.      General: 64 year old male lying in bed in NAD. Appears ill.  +Appears fatigued with left facial droop but appropriately answers questions when awoken.     HEENT: Head is AT/NC. Sclera anicteric. +Left facial droop, +right gaze deviation.  Lungs: No increased work of breathing, speaking in full sentences, equal chest rise.   Heart: Regular rate. Peripheral perfusion intact.  Abdomen: Soft, non-tender, non-distended. No peritoneal signs.  Extremities: WWP.  Musculoskeletal: No gross deformity.  Skin: No jaundice or rash on exposed skin.  Mental status/Psych: A&O. Asks/answers questions appropriately. Pleasant, interactive when awoken and asked questions.        Date 03/27/24 0700 - 03/28/24 0659   Shift 5049-5165 1526-6628 6728-0732 24 Hour Total   INTAKE   I.V. 141.28   141.28   Shift Total(mL/kg) 141.28(1.86)   141.28(1.86)   OUTPUT   Shift Total(mL/kg)       Weight (kg) 75.9 75.9 75.9 75.9       Previous Procedures:     2/27/2027 - EGD - Dr. Judson Woods, [facility]   Impression:               - Z-line irregular.                             - Congestive and erythematous gastropathy. Biopsied.                             - Normal examined duodenum.   Recommendation:           - Await pathology " results.                             - Proceed with colonoscopy      2/27/2024 - Colonoscopy - Dr. Judson Woods, [facility]  Impression:               - Preparation of the colon was poor due to stool                             and blood. Lavaged and suctioned with limited                             visualization.                             - Patent end-to-end ileo-colonic anastomosis,                             characterized by relatively healthy appearing                             mucosa.                             - Multiple huge ulcers in the ileum about 30 cm                             proximal to the anastomosis. Visible vessel with                             adherent clot that was oozing. Injected with                             epinephrine. Treated with bipolar cautery. Bleeding                             stopped completely.                             - The examined portion of the ileum proximal to the                             ulcerated area was normal.                             - The ileum distal to the ulcerated site was                             inflamed. It did not contain any further                             ulcerations however.                             - No active Crohn's seen in the colon.                             - Three diverticulum (or previous fistulas) in the                             rectum. No inflammation around this site.                             - No specimens collected.   Recommendation:           - Follow Hgb and stools for active bleeding.                             - Follow up with primary gastroenterologist for                             further recommendations regarding treatment for                             Crohn's                             - Will discuss steroid treatment with patient.           Final Diagnosis   A(1). Stomach, body, biopsy:  -Oxyntic type gastric mucosa with reactive changes of the type  seen in reactive gastropathy (chemical gastritis) (see comment).  -Negative for H. Pylori organisms on routine stains.  -Negative for intestinal metaplasia.  -Negative for dysplasia or malignancy.         Electronically signed by Cedric Grewal MD on 2/28/2024 at 10:24 AM   Comment   [facility]   Reactive (chemical) gastropathy, best assessed in antral mucosa, is associated with previous gastrectomy, adjacent ulcer, bile reflux, alcohol, drug therapy (PPI's, NSAIDs, etc.).         12/7/2022 - Colonoscopy - Dr. Willian Pedroza   Impression:               - Preparation of the colon was poor. This exam was                             not adequate for colorectal cancer or polyps                             screening.                             - Perianal skin tags found on perianal exam.                             - A single ulcer in the small bowel 20 cm from the                             ileocolonic anastomosis. Biopsied. This is                             presumably the same ulcer that was seen before.                             This was smaller (now 5mm) with surrounding                             granular mucosa that may represent healing. Biopsies                             taken.                             - Congested mucosa in the distal small bowel.                             Biopsied. Otherwise remainder of ileum normal                             - Patent end-to-end ileo-colonic anastomosis,                             characterized by healthy appearing mucosa.                             - Diverticulosis vs old fistulas in the distal                             rectum.                             - Excoriated mucosa at the anus and in the distal                             rectum. Biopsied.                             - The examination was otherwise normal on direct                             and retroflexion views.                             - Simple Endoscopic Score for Crohn's Disease: 5,                              mucosal inflammatory changes secondary to Crohn's                             disease with ileitis.                             Overall exam is similar to previous but improved                             with decreased size of chronic small bowel ulcer -                             now only 5mm. Remainder of small bowel really looks                             almost normal apart from some patchy mild erythema                             and congestion. Given partial improvement IBD                             clinic may consider increasing Stelara to q 6 weeks                             or q 4 weeks. Would also consider colorectal                             evaluation for anoscopy.   Recommendation:           - Discharge patient to home (with escort).                             - Resume previous diet.                             - Continue present medications.                             - Await pathology results.                             - Repeat colonoscopy for surveillance based on                             pathology results. ALL FUTURE COLONOSCOPIES SHOULD                             HAVE DOUBLE PREP. This was not adequate for                             colorectal cancer or polyps screening.                             - Return to GI clinic as previously scheduled.                             - IBD clinic can consider dose escalation of                             Stelara. Can also consider CRS for anoscopy.      Final Diagnosis   A.  Ileum, ulcer: Biopsy:  - Chronic active ileitis with architectural disarray, pyloric gland metaplasia, and active inflammation with ulceration and granulation tissue  - Negative for granulomas and dysplasia  - Immunostain for CMV is negative     B.  Ileum: Biopsy:  - Mild chronic active ileitis with architectural disarray, pyloric gland metaplasia, and active inflammation  - Negative for granulomas and dysplasia  - Immunostain for CMV is negative      C.  Rectum: Biopsy:  - Benign anorectal mucosa with quiescent colitis  - No active colitis  - Negative for granulomas and dysplasia         7/20/2021 - Upper EUS - Dr. Dewayne Preston  Impression:          - Markedly abnormal pancreas as described above. There                        are features suggestive of diffuse chronic pancreatitis,                        however also marked cystic dilation of the duct in the                        pancreatic tail.                        There were no obstructing lesions. Needle aspiration                        confirmed the presence of thick mucin in the tail which                        is highly suggestive of IPMN. There were no focal mural                        nodules.                        The fluid was too viscous to send for tumor marker                        analysis.   Recommendation:      - Discharge patient to home (ambulatory).                        - Levaquin 250 mg po qod x 2 beginning 7/22/21.                        - Await cytology results.                        See separate colonoscopy report.                        Will address further management when cytology returns.                        Likely referral for formal surgical consultation and                        then surveillance.         7/20/2021 - Colonoscopy - Dr. Dewayne Preston  Impression:          - Normal perirectal exam.                        - Apparently end-end ileocolonic anastomosis at                        approximately 70 cm (likely distal transverse colon).                        - 15 mm ulcer in the terminal ileum. Additional                        cobblestoning in this region. Biopsies obtained.                        - Unusual diverticulae in the distal 5-10 cm of the                        rectum without ulceration. Perhaps related to past                        fistulous disease.                        - No polyps or masses.                        - Questionable mild  inflammation at dentate line on                        retroflex exam. This would be better assessed by                        anoscopy if would alter management.   Recommendation:      - Discharge patient to home (ambulatory).                        - Await pathology results.                        - Follow-up in IBD clinic to discuss further management.                        - See separate EUS report.      Addendum   A. Immunostain for CMV, with appropriate controls, on block A1 is negative for cytomegalovirus              Comments:   This is an appended report. These results have been appended to a previously final verified report.      Addendum electronically signed by Yvette Lugo MD on 7/22/2021 at  2:11 PM   Final Diagnosis   A. TERMINAL ILEUM, BIOPSY:  Severe ileitis with ulceration and granulation tissue; consistent with severe chronic (Crohn) ileitis; negative for dysplasia; report of CMV immunohistochemistry to follow     B. TERMINAL ILEUM, LABELED BIOPSY OF ULCER:  Chronic active ileitis with granulomas; consistent with mildly active Crohn; no ulceration or dysplasia identified         7/20/2007 - Colonoscopy - Dr. Karri Rock   Impression:     - The colon is normal. The anastomosis was normal as was the                   jejunum. These were biopsied.   Recommendation: - Await pathology results.                   - Return to GI clinic as previously scheduled.     LABS:  [facility]  Recent Labs   Lab 03/27/24  1120 03/27/24  0924 03/27/24  0353 03/27/24  0351 03/26/24  0410 03/26/24  0344 03/25/24  0726 03/25/24  0618 03/24/24  0748 03/24/24  0705   NA  --   --  139  --   --  133*  --  131*  --  133*   POTASSIUM  --   --  4.6  --   --  3.7  --  4.5  --  3.7   CHLORIDE  --   --  104  --   --  99  --  98  --  96*   KARY  --   --  8.1*  --   --  8.3*  --  8.6*  --  9.0   CO2  --   --  22  --   --  26  --  20*  --  24   BUN  --   --  37.8*  --   --  20.9  --  34.5*  --  23.9*   CR  --   --  3.93*  --   --   2.98*  --  4.49*  --  3.53*   * 140* 179* 162*   < > 121*   < > 169*   < > 142*    < > = values in this interval not displayed.     CBC  Recent Labs   Lab 03/27/24  1009 03/27/24  0353 03/26/24 2234 03/26/24 2047 03/26/24  1543   WBC 8.8 8.8 8.0  --  10.1   RBC 2.65* 2.75* 2.69*  --  2.16*   HGB 8.3* 8.3* 8.1*   < > 6.8*   HCT 24.5* 25.3* 24.7*  --  21.0*   MCV 93 92 92  --  97   MCH 31.3 30.2 30.1  --  31.5   MCHC 33.9 32.8 32.8  --  32.4   RDW 22.3* 21.4* 20.0*  --  22.3*   PLT 63* 59* 59*  --  41*    < > = values in this interval not displayed.     INR  Recent Labs   Lab 03/27/24  0353 03/26/24 2234 03/26/24  1543 03/25/24 2054   INR 1.59* 1.58* 1.65* 2.10*     LFTs  Recent Labs   Lab 03/27/24  0353 03/25/24  0618 03/23/24  0759   ALKPHOS 73 96 142   AST 14 20 15   ALT 10 18 21   BILITOTAL 2.3* 1.1 0.7   PROTTOTAL 3.6* 4.0* 4.6*   ALBUMIN 2.1* 2.5* 3.0*      PANCNo lab results found in last 7 days.      IMAGING:    IMAGING:     CT HEAD W/O CONTRAST 3/26/2024 2:48 PM     Provided History: Code Stroke to evaluate for potential thrombolysis  and thrombectomy. PLEASE READ IMMEDIATELY.     Comparison: Same day CT head performed at 6:56 AM.     Technique: Using multidetector thin collimation helical acquisition  technique, axial, coronal and sagittal CT images from the skull base  to the vertex were obtained without intravenous contrast.      Findings:    Focal area of low density and gray-white differentiation loss in the  anterior right frontal lobe is again visualized, unchanged from same  date exam. There is no new gray-white infiltration loss identified.     No intracranial hemorrhage. No mass effect. No midline shift. No  extra-axial fluid collection. Ventricles are proportionate to the  sulci. No sulcal effacement. The basal cisterns are patent.  Calcification the region of the M1 branch of the right middle cerebral  artery, better seen on same-day CTA head. Scattered periventricular  and subcortical  white matter hypodensities likely secondary to chronic  small vessel ischemic disease.  The visualized paranasal sinuses are clear. The mastoid air cells are  clear. Orbits appear unremarkable. No acute fracture.                                                                      Impression: Unchanged small focal area of low density in the anterior  right frontal lobe, seen on same day CT head. No new gray-white  differentiation loss or intracranial hemorrhage.      CTA CHEST, ABDOMEN, AND PELVIS 3/26/2024 2:30 PM  CLINICAL HISTORY: recurrent GI bleeding in setting of Crohn's disease,  and findings of pneumatosis on CT. GI and CRS following. Not a good  candidate for endoscopy or surgery at this time. wondering if any  targets for IR to intervene. History of subtotal pancreatectomy.     COMPARISONS: CTA abdomen and pelvis 3/25/2024     REFERRING PROVIDER: SHAE DELEON     TECHNIQUE: Unenhanced CT performed through the chest, abdomen, and  pelvis. After the uneventful administration of intravenous contrast  CTA performed through the abdomen and pelvis. Venous phase CT  performed through the chest, abdomen, and pelvis. Coronal and sagittal  reconstructions were produced.      CONTRAST: 96 mL Isovue 370     DOSE (DLP): 2375 mGy*cm     FINDINGS: CTA: Extravasation of contrast in the loop of small bowel in  the right lower quadrant hernia accumulates over time (series 11,  images 195 - 229  series 14, images 18 - 24). The offending artery  is not visualized but is presumably a branch from the superior  mesenteric artery.     Superior mesenteric artery 50-69% diameter stenosis by calcified  plaque at its origin. Ilial and jejunal arteries are thin and blurred  by patient motion.     Calcified dissection flap in the aorta inferior to the inferior  mesenteric artery and right common iliac artery.     Normal aortic branching pattern. Right innominate, carotid, and  subclavian, left carotid, and left subclavian  arteries patent.  Diminutive right vertebral artery. Left vertebral artery patent.     Cephalic internal jugular graft 73% diameter stenosis at the cephalic  anastomosis. Occluded left subclavian vein.     Celiac artery hook-shaped in configuration with string-sign near  occlusion as it crosses under the diaphragm.     Atretic native renal arteries.     Inferior mesenteric artery patent.     Calcified iliac plaques cause up to 50% diameter stenosis. Multifocal  internal iliac stenoses. Bilateral common femoral arteries patent.     Bilateral common femoral veins patent. Bilateral common, internal, and  external iliac veins patent. Inferior vena cava patent. Renal and  hepatic veins patent.     It may be blurring from patient motion but non occlusive thrombus is  suggested in the superior mesenteric vein (series 16, images 176 - 184   series 17, images 27 - 31). Splenic vein is not visualized. Portal  veins patent. No portal venous gas.     CT: Motion blurs detail. No suspicious pulmonary nodule. Moderate  bilateral effusions with bilateral lower lobe consolidation.     Right PICC tip in the left internal jugular vein.     Hepatic cyst.     Cholecystectomy.      Renal stones. Cystic left renal mass with thickened walls,  calcifications and septa. Atrophic native kidneys.     Free intraperitoneal air. Tiny air bubbles, difficult to distinguish  if intraluminal or intramural.     Prostate calcifications.     Spine degenerative changes.                                                                   IMPRESSION:  1. Active extravasation into the small bowel loop in the right lower  quadrant hernia. Offending artery not visualized but presumed a branch  from the superior mesenteric artery, Aortic and iliac dissection,  superior mesenteric origin stenosis, and tiny ileal and jejunal  arteries make endovascular  catheterization and treatment extremely  challenging if not impossible.     2. Free intraperitoneal air.  Possible pneumatosis but small bubbles  are difficult to localize. No portal venous gas.     3. 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 Splenic  vein not visualized, possibly sacrificed with pancreatectomy. Portal  veins patent.     4. Celiac artery median arcuate ligament anatomy.     5. Left subclavian venous occlusion. Left cephalic to internal jugular  graft for dialysis access outflow. 73% diameter stenosis at the  cephalic-graft anastomosis.     6. Right PICC tip in the left internal jugular vein.     7. Bilateral effusions and lower lobe consolidation. Correlate for  pneumonia.      DOPPLER VENOUS ULTRASOUND OF THE RIGHT LOWER EXTREMITY,  3/26/2024 11:37 AM   COMPARISON: Ultrasound lower extremities Doppler 3/14/2024.     HISTORY: Swelling, erythema     TECHNIQUE:  Gray-scale evaluation with compression, spectral flow, and  color Doppler assessment of the deep venous system of the right leg  from groin to knee, and then at the ankle. Examination was ordered as  bilateral lower extremities however due to patient's symptoms,  technologist was unable to scan the left leg at this time.     FINDINGS:  In the right lower extremity, the common femoral, femoral, popliteal  and posterior tibial veins demonstrate normal compressibility and  blood flow.                                                                   IMPRESSION:  No evidence of right lower extremity deep venous thrombosis.      CT HEAD W/O CONTRAST 3/26/2024 7:08 AM  History:  r/o stroke     Comparison: None available      Technique: Using multidetector thin collimation helical acquisition  technique, axial, coronal and sagittal CT images from the skull base  to the vertex were obtained without intravenous contrast.      Findings: There is no intracranial hemorrhage, mass effect or midline  shift. Small focal area of low density and gray-white differentiation  loss in the anterior right frontal lobe, best seen on sagittal image  45  of series 5. There is a questionable tiny focus of calcification in  this area. Otherwise, there is no focal loss of gray-white matter  differentiation, insular ribbon sign, or focally hyperdense artery to  suggest acute infarction. Scattered periventricular and subcortical  white matter hypodensities, likely secondary to chronic small vessel  ischemic disease given patient's age. The ventricles are proportionate  to the cerebral sulci.     The bony calvaria and the bones of the skull base appear normal. The  visualized portions of the paranasal sinuses and mastoid air cells are  unremarkable.                                                                    Impression:   1. Small focal area of low-density in the anterior right frontal lobe,  more likely chronic than related to acute infarct, although that is  difficult to entirely exclude. No acute intracranial hemorrhage.  2. MCA ASPECT Score: 10/10.        CT ABDOMEN PELVIS W CONTRAST, 3/26/2024 12:26 AM  INDICATION: Elevated LA, Hgb drop. C/f acute abdominal process     COMPARISON STUDY: CT abdomen and pelvis 2/25/2024     TECHNIQUE: CT scan of the abdomen and pelvis was performed on  multidetector CT scanner using volumetric acquisition technique and  images were reconstructed in multiple planes with variable thickness  and reviewed on dedicated workstations.      CONTRAST: iopamidol (ISOVUE-370) solution 90 mL injected IV without  oral contrast     CT scan radiation dose is optimized to minimum requisite dose using  automated dose modulation techniques.     FINDINGS:     Angiogram abdomen/pelvis: Patent major abdominal vasculature with  chronically calcified aortic dissection flap extending into the right  common iliac artery; severe aortobiiliac atherosclerotic  calcification. No abdominal aortic aneurysm. Celiac artery and  branches are patent. Unchanged severe ostial stenosis of the superior  mesenteric artery. Severe stenosis of the renal arteries  bilaterally.  Inferior mesenteric artery is patent. Mild stenosis of the common  iliac arteries and bilateral proximal internal iliac arteries. Mild  stenosis of the bilateral external iliac arteries and common femoral  arteries. Partially visualized superficial femoral arteries are  patent. Portal, splenic, superior mesenteric veins are patent.  Perigastric varices.     Lower thorax: Small bilateral pleural effusions with associated  compressive atelectasis. Centrilobular emphysematous changes.       Liver: No mass. No intrahepatic biliary ductal dilation. Unchanged  right hepatic lobe simple cyst.     Biliary System: Surgically absent gallbladder. No biliary dilatation.     Spleen: Multiple splenules, otherwise within normal limits.     Pancreas: Status post distal pancreatectomy. Remaining pancreas is  within normal limits.     Adrenal glands: No mass or nodules     Kidneys: Unchanged bilateral renal cortical atrophy. Multiple  nonobstructing bilateral intrarenal calculi. Unchanged hemorrhagic rim  calcified cyst at the lower pole of the left kidney measures up to 8  cm in greatest axial dimension, previously 9.7 cm. Additional  unchanged benign simple renal cyst. No hydronephrosis.     Gastrointestinal tract: Postsurgical changes of ileostomy takedown  with ileocolic anastomosis. Extensive pneumatosis of the large bowel  with air scattered throughout the mesentery resulting in septated  appearance(best seen on the lung window settings), which has improved  compared to 3/13/2024 CT scan. There is normal bowel wall enhancement.   Normal caliber small bowel.     Peritoneum/Retroperitoneum: Hazy attenuation of the peritoneal fat.  Small simple fluid layering posterior to the rectum. No significant  lymphadenopathy.     Pelvis: Urinary bladder is decompressed.     Osseous structures: No aggressive or acute osseous lesion. Multilevel  degenerative changes of the visualized spine and hips. Increased bone  density  likely related to renal osteodystrophy.      Soft tissues: Right lower quadrant ventral hernia containing  nonobstructed small  bowel. Diffuse anasarca.                                                                        IMPRESSION:  1.  Diffuse air in large bowel wall and extraluminal air with septated  appearance in the mesentery is improved compared to 3/13/2024 CT scan.  No portal venous gas. Normal bowel wall enhancement. Findings favored  to represent improving extensive pneumatosis intestinalis. Recommend  follow up and repeat CT scan if clinical condition worsens.  2.  No evidence of acute abdominal/pelvic hemorrhage.  3.  Mild ascites, diffuse anasarca, and bilateral small pleural  effusions with adjacent atelectasis.     Critical Result: Impression #1  Finding was identified on 3/26/2024 12:27 AM.   Discussion between Archana Gilliam, Lucila Leiva, and Errol Venegas on 3/26/2024 3:28 AM.        ________________________________________________________________________     Previous Imaging:      MRI ENTEROGRAPHY/MRI ABDOMEN AND MRI PELVIS WITHOUT AND WITH CONTRAST  LOCATION: [facility]  DATE: 3/18/2024     INDICATION:  Crohn's disease of both small and large intestine with other complication (H)  COMPARISON: 10/24/2023     TECHNIQUE: Routine enterography protocol including imaging of abdomen and pelvis with axial T1 in/out phase, axial T2, coronal T2. Post contrast abdomen and pelvis axial and coronal thin-section T1 with fat sat. 1 mg Glucagon. Oral VoLumen.  CONTRAST: 7 mL Gadavist     FINDINGS:      ENTEROGRAPHY: Redemonstrated anterior right abdominal wall hernia containing loops of small bowel extending between the right rectus abdominis musculature and lateral abdominal wall musculature. There is increasing distention of a small bowel loop within   the hernia sac measuring up to 6.5 cm in diameter (series 16, image 19). There are at least 2 transition points seen along  the neck of the hernia on either side of a distended small bowel loop (series 16, images 20-28). Several dilated small bowel loops   are also noted along the right hemiabdomen adjacent to the hernia measuring up to 3.1 cm in diameter (series 19, image 24) with a transition point seen inferior to the right hepatic lobe (series 19, image 24). There is subsegmental wall thickening and   enhancement seen along this transition point measuring up to 5.2 cm in length (series 19, image 24). Furthermore another area of distended small bowel is seen along the left upper and central abdomen measuring up to 3.9 cm (series 19, image 27). A   transition point is noted near the neck of the hernia (series 19, image 24) with associated segmental area of wall thickening measuring up to 2.6 cm (series 19, image 25).     ADDITIONAL FINDINGS: Diffuse loss of parenchymal signal on in phase images compared to out of phase images compatible with iron deposition in the liver and spleen. Stable cystic lesion within the right hepatic lobe measuring up to 1.4 cm. Gallbladder is   surgically absent. No intrahepatic or extra hepatic biliary duct dilatation is seen. Kidneys appear atrophic with bilateral renal cysts. A 9.3 x 6.9 cm T1 hyperintense cystic mass along the inferior pole of the left kidney appears slightly decreased from   the prior measurement of 9.7 x 6.8 cm (series 7, image 45). Mixed T2 signal and internal septations are present measuring up to 3 mm. There is also increasing peripheral wall thickening measuring up to 8 mm (series 25, image 48) as well as areas of 2 mm   obtusely marginated convex protrusions (series 26, image 56). Postsurgical changes are present status post distal pancreatectomy with stable appearance of the residual pancreatic head and neck. Adrenal glands appear unremarkable. No intra-abdominal or   pelvic lymphadenopathy is seen. Moderate to severe atherosclerotic plaque along the abdominal aorta with  unchanged chronic dissection flap in the infrarenal abdominal aorta (series 11, image 49). Multilevel degenerative changes are seen in the spine.                                                                      IMPRESSION:  1.  Multifocal areas of segmental wall thickening and stricturing seen along small bowel loops in the right hemiabdomen compatible with acute Crohn's disease. These areas of stricturing result in a complex, multifocal small bowel obstruction as described   above. For example, there are at least 2 transition points on either side of distended small bowel loop along the hernia sac measuring up to 6.5 cm. Dilated loops of small bowel also seen along the right hemiabdomen adjacent to the hernia with   associated transition point demonstrating segmental wall thickening measuring up to 5.2 cm. Furthermore, a third area of distended small bowel seen along the left upper and central abdomen with associated transition point seen along the neck of the   hernia containing segmental wall thickening measuring up to 2.6 cm.  2.  Increasingly complex exophytic cyst along the lower pole of the left kidney with internal hemorrhagic/proteinaceous contents as well as increasing peripheral wall and septal thickening. 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 Recommend continued attention on 3-6 month follow-up exam as well as urological consultation.  3.  Hepatic and splenic iron deposition.  4.  Stable appearance of dissection flap along the infrarenal abdominal aorta.

## 2024-03-27 NOTE — PLAN OF CARE
Goal Outcome Evaluation:      Plan of Care Reviewed With: sibling          Outcome Evaluation: Care Management Assessment completed d/t elevated risk score. Please see CMA for more information.Discharge needs TBD while pt remains in ICU.

## 2024-03-27 NOTE — CONSULTS
SPIRITUAL HEALTH SERVICES Progress Note  Fr. John Ramirez offered pt the sacrament of anointing on 3/26.       Willian Thomas  Associate

## 2024-03-27 NOTE — PROGRESS NOTES
MEDICAL ICU PROGRESS NOTE  03/27/2024      Date of Service (when I saw the patient): 03/27/2024    ASSESSMENT:  Tacos Dominguez is a 63 yo male with PMHx of ESRD on HD, PHTN, partially calcified abdominal aortic dissection, severe mitral insufficiency, moderate tricuspid insufficiency, Crohn's disease s/p multiple bowel resections with end to end ileocolonic anastomosis, IPMN s/p subtotal pancreatectomy and SANTOS 3/17/2022, chronic anemia, and recent admissions for acute lower GI bleed secondary to Crohn's flare (2/25-3/1/24) and colonic pneumatosis (3/14-3/17/24) at OSH treated conservatively. Admission for acute respiratory failure 2/2 chronic HFpEF 2/2 acute on chronic blood loss anemia now with hematochezia and c/f bowel perforation and ischemic stroke of the R MCA. Transferred to ICU on 3/26 for further management of hemorrhagic shock requiring recurrent transfusions and vasopressors; bleeding has since stabilized.    Changes Today:  - Decreased coag checks to Q12  - Decreased CBC checks to Q6  - Hold off on dialysis today, will reassess tomorrow (likely needs line placed for CRRT)  - Started methylpred stress dosing   - Trending daily CRP  - Stopped vancomycin, continuing Zosyn + Micafungin  - OK to travel to MRI today  - MDSSI added      PLAN:     Neuro:  # Pain and sedation  Not on sedation. Denying pain.  - RASS goal 0 -1    # Cerebral hypoperfusion due to underlying R. MCA stenosis in the setting of acute blood loss anemia/hypotension   # R MCA M2 severe stenosis   Stroke code called on 3/26. CT with small focal low density in the anterior right frontal lobe, assumed more likely chronic than acute, no intracranial hemorrhage. CTA head and neck with severe stenosis of the Right MCA segment. No aneurysm. Symptoms appeared consistent with R MCA syndrome and symptoms transient in nature. Pt was transferred to IR suite with intention to treat, but pt's symptoms resolved back to almost back baseline, so the  decision was made not to complete the procedure. Second stroke code called in afternoon due to worsening neuro symptoms. Per Neuro likely worsening of stroke in setting of profound hypotension from hemorrhagic shock. Current deficits include R gaze deviation, L sided visual field defect, L sided sensation loss (improving), L sided weakness (3/5), L sided arm posturing (resolved), L sided crystal-neglect, dysarthria. Embolectomy considered initially but thrombus is too distal to be reached.    - Neurocrit following                - MRI Brain w/ and w/o contrast when more stable   - Neurochecks every 1 hrs  - BP goal normotension  - HOB > 30   - PT/OT/SLP  - Avoid sedating meds   - NPO     # History of anxiety w/ panic attacks  Psychiatry consulted 3/25. Started remeron d/t poor sleep quality.   - Hold Remeron 7.5mg at bedtime while NPO  - Hold Ativan PRN while NPO     Pulmonary:   # Acute hypoxic respiratory distress, resolved  Overnight patient's SpO2 dropped to the 80s and he required 2L NC.  - Supplemental O2 as needed to maintain SpO2 > 88%    Cardiovascular:  # Shock likely 2/2 hemorrhagic vs septic   SBP decreased to 50/60s in setting of hemorrhagic shock with acute GI bleed and septic shock with bowel perforation and/or Chron's flare.  - NE to maintain MAP >65      # Hx of NSTEMI (10/2020)  # Elevated troponin, suspect type II NSTEMI   Trop 206 on arrival, repeat 174. No ischemic changes on EKG. Suspect demand ischemia in setting of heart failure. Likely in part elevated d/t ESRD.   - CTM     # Chronic HFpEF  #Severe symptomatic mitral valve regurgitation with significant MAC  #Severe functional tricuspid regurgitation   #Moderate aortic stenosis   # HLD  He was scheduled to undergo MVR, AVR and TV repair on 3/5/2023. However, the surgery got postponed because of recent admission to the hospital for GI bleed and anemia. Follows with Cardiology and [facility] w/ plan for Mitral valve replacement- has eleazar pushed back d/t  ongoing ABL and Crohn's flare. TTE 03/26 with EF 60-65%.   - Strict I/Os  - Hold simvastatin 20mg w/ NPO status      GI/Nutrition:  # BRBPR  # Hx of bleeding ulcers at terminal ileum near anastomosis  # Extravasation into small bowel loop in RLQ hernia  # Pneumoperitoneum   # Pneumatosis Intestinalis   Bright red blood per rectum seen on 3/26 associated with acute Hgb drop from 8 to 5.2.  Pt became hypotensive, requring pressors and transfusions. CTA showing active extravasation into small bowel loop in RLQ hernia and free intraperitoneal air. Due to amount of active bleeding, the benefit of giving TXA outweighed the harm of worsening his stroke. In the acute setting, patient required 5 PRBC + 1U platelets + 2U plasma. Colorectal surgery consulted - surgery offered and discussed with proxy decision maker patient's brother (Nando), who felt this would not be in line w/ patient's GOC. Brother would like to medically manage. GI also involved would like to continue to manage acute Chron's flare as well.   - TXA bolus + infusion for 8 hours (finished)  - Goal Hgb >8 and PLT >50k (s/p 5 PRBC + 1U platelets + 2U plasma - last transfused 8pm last night)  - CBC Q6  - Coags Q12  - Trend iCal Q6 w/ blood product transfusions   - Lactate Q6  - GI consulted, appreciate recs  - Not current candidate for a scope with perforation  - Colorectal surgery consulted, appreciate input    - No acute surgical interventions at this time     # Crohns disease:   Hx multiple bowel resections with end to end ileocolonic anastomosis. Since 02/2024 had iron deficiency anemia requiring multiple transfusions. Recent admission at [facility] in 02/2024 for GI bleed and acute flare; terminal ileal ulcer with visible vessel that was clipped during colonoscopy. Subsequent admission to [facility] and incidentally found to have colonic pneumatosis that was treated conservatively with antibiotics. MR enterography 03/18/2024 showed evidence of multiple small  bowel loops with obstruction and inflammation. Plan at that time to switch to Remicade from Stelara with prednisone 40mg daily as a bridge. CT 03/26 with concerns for active Chron's flare; pneumatosis intestinalis makes GI endoscopy very high risk for complete bowel perforation. Remicade 10mg/kg given 03/26 for active Chron's.   - GI consulted, apprecitate recs   - Remicade 10mg/kg given 03/26  - OK with stress dose steroids (previously on prednisone 40mg daily as OP and switched to IV methylpred 32 BID during this admission)  - Started methylpred 16mg IV Q24 for stress dosing  - CRP daily     Renal/Fluids/Electrolytes:  # ESRD on HD   MWF. A-V fistula R UE.   - Nephrology following   - No urgent indication for HD; however will need to initiate CRRT (and will need CVC placed) in setting of acute GIB and stroke   - Hold PTA phoslo given NPO      #Hyponatremia, resolved  Likely multifactorial with renal disease and hyperglycemia contributing.   - BMP daily     #Hypomagnesemia, resolved  - Replacement protocol     #Lactic acidosis, resolved  3/25 lactate 4.8, now decreased to 1.3. Likely 2/2 bowel perforation, hypotension.   - Abx as below  - CTM     # Renal exophytic cyst   Recent MRE noted increasingly complex exophytic cyst along lower pole of L kidney with internal hemorrhagic/proteinaceous contents and peripheral wall and septal thickening. Developing malignancy cannot be ruled out.   - Urology referral ordered as outpatient  - Will need follow up in 3-6 months      Endocrine:  #Hypoglycemia, resolved  In setting of acute illness (bowel perforation and GI bleed) and NPO status.   - Hypoglycemia protocol     # Hyperglycemia, likely stress/steroid-induced  Initially presented with BG >800. Hx subtotal pancreatectomy but no prior diabetes.   No evidence of DKA or HHS on arrival. Started on insulin drip. Endocrine consulted, felt QID dosing of prednisone could be contributing. Switched to daily prednisone. BG  improved. Transitioned off insulin drip 3/24.  Hgb A1C 6.4%, up-trending recently in setting of steroids for Crohn's disease.  - MDSSI     ID:  # Sepsis 2/2 bowel perforation   WBC 20 on 3/24 and lactate 4.6. Afebrile. CXR with streaky perihilar opacities favored to be pulmonary edema, although infection could not be ruled out. Does not make urine. BC x 2 NGTD. Started on cefepime + vancomycin. Lactate improved w/ fluid bolus. Pneumoperitoneum seen on CT 03/26 concerning for possible bowel perforation.  - Continue Zosyn and Micafungin, stopped Vancomycin today  - Monitoring lactates     Hematology:    # Acute on chronic anemia 2/2 blood loss:  - See above      Musculoskeletal:  #Deconditioning secondary to acute on chronic illness  - PT/OT       Skin:  - Skin checks per ICU protocol     General Cares/Prophylaxis:    DVT Prophylaxis: Pneumatic Compression Devices  GI Prophylaxis: PPI bid  Restraints: none  Family Communication: Nando Andujar is patient's proxy decision maker, number in Chart. Friend Emanuel is at bedside   Code Status: DNR/DNI (discussed w/ Nando)     Lines/tubes/drains:  - PICC line (3/26 -   - Arterial Line (3/26 -   - 2 large bore Ivs   - Fem CVC, large bore     Disposition:  - Medical ICU pending clinical course    Patient seen and findings/plan discussed with medical ICU staff, Dr. Faria.    Annette Toth MD  Internal Medicine - Pediatrics Resident PGY-1  [facility]    Clinically Significant Risk Factors            # Hypomagnesemia: Lowest Mg = 1.5 mg/dL in last 2 days, will replace as needed   # Hypoalbuminemia: Lowest albumin = 2.1 g/dL at 3/27/2024  3:53 AM, will monitor as appropriate    # Coagulation Defect: INR = 1.59 (Ref range: 0.85 - 1.15) and/or PTT = 26 Seconds (Ref range: 22 - 38 Seconds), will monitor for bleeding  # Thrombocytopenia: Lowest platelets = 41 in last 2 days, will monitor for bleeding   # Hypertension: Noted on problem list            # Financial/Environmental  Concerns:    # Support System: poor social support noted in nursing assessment        Code Status: No CPR- Do NOT Intubate       ====================================  INTERVAL HISTORY:   Nursing notes reviewed.  SpO2 80s on RA overnight, 2L NC applied with improvement.   Afebrile.  MAPs >65 off NE.    OBJECTIVE:   1. VITAL SIGNS:   Temp:  [97  F (36.1  C)-97.7  F (36.5  C)] 97.7  F (36.5  C)  Pulse:  [] 93  Resp:  [7-24] 7  MAP:  [56 mmHg-91 mmHg] 73 mmHg  Arterial Line BP: (107-158)/(36-62) 131/47  SpO2:  [83 %-100 %] 98 %  Resp: (!) 7    2. INTAKE/ OUTPUT:   I/O last 3 completed shifts:  In: 3717.33 [I.V.:1716.33]  Out: -     3. PHYSICAL EXAMINATION:  General: Sleeping but awakes to voice, alert, oriented to person and situation  HEENT: L facial droop, R gaze deviation  Neuro: R gaze deviation, L sided visual field defect, L sided sensation loss (improving), L sided weakness (3/5), L sided arm posturing (resolved), L sided crystal-neglect, dysarthria.  Pulm/Resp: Clear breath sounds bilaterally without rhonchi, crackles or wheeze, breathing non-labored  CV: Mildly tachycardic, 2/6 systolic murmur, warm extremities  Abdomen: Distended, soft, RLQ hernia, no tenderness    4. LABS:   Arterial Blood Gases   Recent Labs   Lab 03/27/24  1009 03/27/24  0353 03/26/24 2234 03/26/24  1544   PH 7.37 7.35 7.39 7.37   PCO2 40 43 40 43   PO2 73* 90 66* 67*   HCO3 23 24 24 25     Complete Blood Count   Recent Labs   Lab 03/27/24  1009 03/27/24  0353 03/26/24 2234 03/26/24  2047 03/26/24  1543   WBC 8.8 8.8 8.0  --  10.1   HGB 8.3* 8.3* 8.1* 7.8* 6.8*   PLT 63* 59* 59*  --  41*     Basic Metabolic Panel  Recent Labs   Lab 03/27/24  1120 03/27/24  0924 03/27/24  0353 03/27/24  0351 03/26/24  0410 03/26/24  0344 03/25/24  0726 03/25/24  0618 03/24/24  0748 03/24/24  0705   NA  --   --  139  --   --  133*  --  131*  --  133*   POTASSIUM  --   --  4.6  --   --  3.7  --  4.5  --  3.7   CHLORIDE  --   --  104  --   --  99  --  98   --  96*   CO2  --   --  22  --   --  26  --  20*  --  24   BUN  --   --  37.8*  --   --  20.9  --  34.5*  --  23.9*   CR  --   --  3.93*  --   --  2.98*  --  4.49*  --  3.53*   * 140* 179* 162*   < > 121*   < > 169*   < > 142*    < > = values in this interval not displayed.     Liver Function Tests  Recent Labs   Lab 03/27/24  0353 03/26/24 2234 03/26/24  1543 03/25/24 2054 03/25/24  0618 03/23/24  0759   AST 14  --   --   --  20 15   ALT 10  --   --   --  18 21   ALKPHOS 73  --   --   --  96 142   BILITOTAL 2.3*  --   --   --  1.1 0.7   ALBUMIN 2.1*  --   --   --  2.5* 3.0*   INR 1.59* 1.58* 1.65* 2.10*  --   --      Coagulation Profile  Recent Labs   Lab 03/27/24  0353 03/26/24 2234 03/26/24  1543 03/25/24 2054   INR 1.59* 1.58* 1.65* 2.10*   PTT 26 30 31  --        5. RADIOLOGY:   Recent Results (from the past 24 hour(s))   CTA Chest Abdomen Pelvis w Contrast    Narrative    CTA CHEST, ABDOMEN, AND PELVIS 3/26/2024 2:30 PM    CLINICAL HISTORY: recurrent GI bleeding in setting of Crohn's disease,  and findings of pneumatosis on CT. GI and CRS following. Not a good  candidate for endoscopy or surgery at this time. wondering if any  targets for IR to intervene. History of subtotal pancreatectomy.    COMPARISONS: CTA abdomen and pelvis 3/25/2024    REFERRING PROVIDER: SHAE DELEON    TECHNIQUE: Unenhanced CT performed through the chest, abdomen, and  pelvis. After the uneventful administration of intravenous contrast  CTA performed through the abdomen and pelvis. Venous phase CT  performed through the chest, abdomen, and pelvis. Coronal and sagittal  reconstructions were produced.     CONTRAST: 96 mL Isovue 370    DOSE (DLP): 2375 mGy*cm    FINDINGS: CTA: Extravasation of contrast in the loop of small bowel in  the right lower quadrant hernia accumulates over time (series 11,  images 195 - 229  series 14, images 18 - 24). The offending artery  is not visualized but is presumably a branch from the  superior  mesenteric artery.    Superior mesenteric artery 50-69% diameter stenosis by calcified  plaque at its origin. Ilial and jejunal arteries are thin and blurred  by patient motion.    Calcified dissection flap in the aorta inferior to the inferior  mesenteric artery and right common iliac artery.    Normal aortic branching pattern. Right innominate, carotid, and  subclavian, left carotid, and left subclavian arteries patent.  Diminutive right vertebral artery. Left vertebral artery patent.    Cephalic internal jugular graft 73% diameter stenosis at the cephalic  anastomosis. Occluded left subclavian vein.    Celiac artery hook-shaped in configuration with string-sign near  occlusion as it crosses under the diaphragm.    Atretic native renal arteries.    Inferior mesenteric artery patent.    Calcified iliac plaques cause up to 50% diameter stenosis. Multifocal  internal iliac stenoses. Bilateral common femoral arteries patent.    Bilateral common femoral veins patent. Bilateral common, internal, and  external iliac veins patent. Inferior vena cava patent. Renal and  hepatic veins patent.    It may be blurring from patient motion but non occlusive thrombus is  suggested in the superior mesenteric vein (series 16, images 176 - 184   series 17, images 27 - 31). Splenic vein is not visualized. Portal  veins patent. No portal venous gas.    CT: Motion blurs detail. No suspicious pulmonary nodule. Moderate  bilateral effusions with bilateral lower lobe consolidation.    Right PICC tip in the left internal jugular vein.    Hepatic cyst.    Cholecystectomy.     Renal stones. Cystic left renal mass with thickened walls,  calcifications and septa. Atrophic native kidneys.    Free intraperitoneal air. Tiny air bubbles, difficult to distinguish  if intraluminal or intramural.    Prostate calcifications.    Spine degenerative changes.      Impression    IMPRESSION:  1. Active extravasation into the small bowel loop in the  right lower  quadrant hernia. Offending artery not visualized but presumed a branch  from the superior mesenteric artery, Aortic and iliac dissection,  superior mesenteric origin stenosis, and tiny ileal and jejunal  arteries make endovascular  catheterization and treatment extremely  challenging if not impossible.    2. Free intraperitoneal air. Possible pneumatosis but small bubbles  are difficult to localize. No portal venous gas.    3. 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 Splenic  vein not visualized, possibly sacrificed with pancreatectomy. Portal  veins patent.    4. Celiac artery median arcuate ligament anatomy.    5. Left subclavian venous occlusion. Left cephalic to internal jugular  graft for dialysis access outflow. 73% diameter stenosis at the  cephalic-graft anastomosis.    6. Right PICC tip in the left internal jugular vein.    7. Bilateral effusions and lower lobe consolidation. Correlate for  pneumonia.    ADAIR GARNER MD         SYSTEM ID:  [id number]   CTA Head Neck w Contrast    Narrative    EXAM: CTA HEAD NECK W CONTRAST  3/26/2024 2:47 PM     HISTORY: Code Stroke to evaluate for potential thrombolysis and  thrombectomy. PLEASE READ IMMEDIATELY.       COMPARISON: Same day CTA head and neck at 6:58 AM    TECHNIQUE:  HEAD and NECK CTA: During rapid bolus intravenous injection of  nonionic contrast material, axial images were obtained using thin  collimation multidetector helical technique from the base of the upper  aortic arch through the [place] of [place]. This CT angiogram data was  reconstructed at thin intervals with mild overlap. Images were sent to  the 3D workstation, and 3D reconstructions were obtained. The axial  source images, multiplanar reformations, 3D reconstructions in both  maximum intensity projection display and volume rendered models were  reviewed, with reconstructions performed by the technologist.    CONTRAST: 67 mL Isovue-370    FINDINGS:  Head CTA again  demonstrates calcified plaque of the right M1 and A1  segments of the middle  and anterior cerebral arteries causing severe  stenosis. No occlusion is identified. No aneurysm. Anterior commuting  artery is patent. There is atherosclerotic calcifications of the  cavernous portions of the internal carotid arteries bilaterally and  right vertebral artery.    Neck CTA again demonstrates stenosis of the right carotid bifurcation  secondary to partially calcified plaque approximately 50% lumen  narrowing. There is approximately 50% left carotid bifurcation  narrowing as well due to calcified plaque. Patent great vessel  origins. Calcifications of the aortic arch and origins of the great  vessels and along the common carotid arteries without significant  stenosis. The distal right internal carotid artery is patent measuring  4 mm. The distal left internal carotid artery is patent measuring 4  mm. No vertebral artery stenosis. Right upper extremity PICC line  present. Stent present in the region of the left subclavian vein.    No acute finding in the visualized intracranial, orbital, skull base  and soft tissue neck structures. Clear mastoid air cells and paranasal  sinuses.    Bilateral pleural effusions.       Impression    IMPRESSION:    1. Head CTA demonstrates severe stenosis of the proximal portion of  the right M1 and A1 segments of the middle and anterior cerebral  arteries due to calcified plaque, this appears unchanged compared to  same day CTA Head and Neck. There is no occlusion or aneurysm  identified.  2. Neck CTA demonstrates patent major cervical arteries. Approximately  50% stenosis of the bilateral internal carotid arteries near the  bifurcations.    I have personally reviewed the examination and initial interpretation  and I agree with the findings.    DEBORAH ALBA MD         SYSTEM ID:  [id number]   CT Head w/o Contrast    Narrative    CT HEAD W/O CONTRAST 3/26/2024 2:48 PM    Provided History: Code  Stroke to evaluate for potential thrombolysis  and thrombectomy. PLEASE READ IMMEDIATELY.    Comparison: Same day CT head performed at 6:56 AM.    Technique: Using multidetector thin collimation helical acquisition  technique, axial, coronal and sagittal CT images from the skull base  to the vertex were obtained without intravenous contrast.     Findings:    Focal area of low density and gray-white differentiation loss in the  anterior right frontal lobe is again visualized, unchanged from same  date exam. There is no new gray-white infiltration loss identified.    No intracranial hemorrhage. No mass effect. No midline shift. No  extra-axial fluid collection. Ventricles are proportionate to the  sulci. No sulcal effacement. The basal cisterns are patent.  Calcification the region of the M1 branch of the right middle cerebral  artery, better seen on same-day CTA head. Scattered periventricular  and subcortical white matter hypodensities likely secondary to chronic  small vessel ischemic disease.  The visualized paranasal sinuses are clear. The mastoid air cells are  clear. Orbits appear unremarkable. No acute fracture.      Impression    Impression: Unchanged small focal area of low density in the anterior  right frontal lobe, seen on same day CT head. No new gray-white  differentiation loss or intracranial hemorrhage.    I have personally reviewed the examination and initial interpretation  and I agree with the findings.    DEBORAH ALBA MD         SYSTEM ID:  [id number]

## 2024-03-27 NOTE — PROVIDER NOTIFICATION
03/26/24 1400   Call Information   Date of Call 03/26/24   Time of Call 1130   Name of person requesting the team Scar   Title of person requesting team RN   RRT Arrival time 1130   Time RRT ended 1300   Reason for call   Type of RRT Adult   Primary reason for call Sepsis suspected;Cardiovascular;Nurse is concerned/worried   Cardiovascular SBP less than 90   Sepsis Suspected SPB <90 or MAP 40mmHG   Was patient transferred from the ED, ICU, or PACU within last 24 hours prior to RRT call? No   SBAR   Situation BP with MAPs in 40-50's   Background 64 year old male with history of ESRD on HD, HTN, CAD, prior NSTEMI, PAF, pulmonary HTN, partially calcified abdominal aortic dissection, mitral annular calcification with severe mitral insufficiency, moderate tricuspid insufficiency, prolonged QTc, Crohn's disease s/p multiple bowel resections, IPMN s/p subtotal pancreatectomy, chronic anemia, anxiety, and recent admissions for acute lower GI bleed secondary to Crohn's flare (2/25-3/1/24) and colonic pneumatosis (3/14-3/17/24) who presented from infusion center with hyperglycemia and was subsequently admitted for further evaluation and management.   Notable History/Conditions Cardiac;Congestive heart failure;End-Stage disease;Hypertension;Organ failure   Assessment Lethargic, MAPs consistently 40-50 from BLE and R forearm. Continues to have bloody/clotty stool. Hgb 4.6, recheck 5.0; 2 units ordered while awaiting recheck. Started on levo and 1L NS given. A-line by MICU resident with /100. Levo weaned down. Observed by flyer RNs while awaiting ICU bed. Continued to have bloody stool, 2 units of plasma also given. Transferred to 4C.   Interventions Fluid bolus;Labs;Meds   Patient Outcome   Patient Outcome Transferred to  (4C)   RRT Team   Attending/Primary/Covering Physician Dr. Ambriz   Date Attending Physician notified 03/26/24   Time Attending Physician notified 1130   Physician(s) Quique Lambert RN  Barry/Bharti Abbott

## 2024-03-27 NOTE — PLAN OF CARE
Major Shift Events: Q2hr neuros. Pt lethargic, arouse to voice/touch, oriented x4 but confused. Not tracking with L sided visual neglect. L facial droop with garbled speech continues but noted to be improving. L sided weakness with decreased sensation continues. SR, rates 60s-90s. MAP goal >65 maintained. Desat'd to the 80s on RA, 2L NC applied with improvement. No BM. Anuric, pt on HD.     Plan: Continue with plan of care.    For vital signs and complete assessments, please see documentation flowsheets.

## 2024-03-27 NOTE — PROGRESS NOTES
Care Management Initial Consult    General Information  Assessment completed with: VM-chart review, Family, Friend, Pt brother (Onesimo), pt friend (Emanuel)  Type of CM/SW Visit: Initial Assessment    Primary Care Provider verified and updated as needed: No   Readmission within the last 30 days: other (see comments)      Reason for Consult: other (see comments) (elevated risk score)  Advance Care Planning: Advance Care Planning Reviewed: other (see comments) (Pt does not have a health care directive that family or friends are aware of.)          Communication Assessment  Patient's communication style: spoken language (English or Bilingual)    Hearing Difficulty or Deaf: no   Wear Glasses or Blind: yes    Cognitive  Cognitive/Neuro/Behavioral: .WDL except  Level of Consciousness: confused, lethargic  Arousal Level: arouses to voice, arouses to touch/gentle shaking  Orientation: oriented x 4  Mood/Behavior: calm, cooperative  Best Language: 1 - Mild to moderate  Speech: garbled    Living Environment:   People in home: alone     Current living Arrangements: house      Able to return to prior arrangements: yes       Family/Social Support:  Care provided by: self  Provides care for: pet(s) (Pt has a dog. Pt's neighbor is currently taking care of pt dog.)  Marital Status:   Sibling(s), Other (specify) (Pt brother (Onesimo) involved and supportive. Onesimo lives in [place] and pt's sister (Cortney) lives in [place], [place]. Onesimo reports that they also have communication with another brother but not with the other.)          Description of Support System: Supportive, Involved         Current Resources:   Patient receiving home care services: No     Community Resources: OP Dialysis  Equipment currently used at home: none  Supplies currently used at home:      Employment/Financial:  Employment Status: employed full-time        Financial Concerns: none   Referral to Financial Worker: No       Does the patient's insurance plan have a 3  "day qualifying hospital stay waiver?  No    Lifestyle & Psychosocial Needs:  Social Determinants of Health     Food Insecurity: Not on file   Depression: Not at risk (3/20/2024)    PHQ-2     PHQ-2 Score: 0   Housing Stability: Not on file   Tobacco Use: High Risk (3/20/2024)    Patient History     Smoking Tobacco Use: Every Day     Smokeless Tobacco Use: Never     Passive Exposure: Current   Financial Resource Strain: Not on file   Alcohol Use: Unknown (2/3/2021)    AUDIT-C     Frequency of Alcohol Consumption: Monthly or less     Average Number of Drinks: Not asked     Frequency of Binge Drinking: Not asked   Transportation Needs: Not on file   Physical Activity: Not on file   Interpersonal Safety: Not on file   Stress: Not on file   Social Connections: Not on file       Functional Status:  Prior to admission patient needed assistance:   Dependent ADLs:: Independent  Dependent IADLs:: Independent       Mental Health Status:  Mental Health Status: Current Concern (Onesimo mentioned that pt may have been feeling anxious during this hospitalization but reports that typically pt does not have any mental health concerns.)       Chemical Dependency Status:  Chemical Dependency Status: No Current Concerns             Values/Beliefs:  Spiritual, Cultural Beliefs, Moravian Practices, Values that affect care:                 Additional Information:  Per H&P, \" Tacos Dominguez is a 63 yo male with PMHx of ESRD on HD, PHTN, partially calcified abdominal aortic dissection, severe mitral insufficiency, moderate tricuspid insufficiency, Crohn's disease s/p multiple bowel resections with end to end ileocolonic anastomosis, IPMN s/p subtotal pancreatectomy and SANTOS 3/17/2022, chronic anemia, and recent admissions for acute lower GI bleed secondary to Crohn's flare (2/25-3/1/24) and colonic pneumatosis (3/14-3/17/24) at OSH treated conservatively. Admission for acute respiratory failure 2/2 chronic HFpEF 2/2 acute on chronic blood loss " "anemia now with hematochezia and c/f bowel perforation and ischemic stroke of the R MCA. Transferred to ICU on 3/26 for further management of hemorrhagic shock requiring recurrent transfusions and vasopressors. \"    TAL attempted to complete Care Management Assessment at bedside with pt due to elevated risk score. Pt friend (Emanuel) and pt ex spouse also present with pt. SW introduced self and purpose of visit. All parties requested that SW call pt brother (Nando) to complete assessment.     Emanuel met with TAL in hallway and recounted events from yesterday concerning pt and getting in touch with pt's siblings. Emanuel reports that he has been friends with pt for the past 50 years. Emanuel reported being unaware of pt having any HCD and discussing this with Nando. Emanuel reported that Nando and Nando's spouse were planning to go to pt's house to see if pt has any paperwork like this at home. Pt also has a dog who pt's neighbor is watching while pt is hospitalized. Nando was also going to introduce self to pt neighbor and provide update/contact information for neighbor relating to pt's dog. TAL reported that CM team could assist with HCD but reported that pt would need to be alert and oriented.     TAL called pt brother (Onesimo) and spoke with him and pt's sister in law over the phone to complete Care Management Assessment. Onesimo confirmed that they were currently driving to pt's house to see if they could find paperwork for pt such as a health care directive. Onesimo and pt SOLANGE answered questions to the best of their ability. Onesimo described pt as a \"private sherly\". Pt was living in house in [place] with pet dog. Pt was independent with I/ADLs and was working full-time. Onesimo reported that pt's life was \"working and going to dialysis\". Onesimo reported that pt has 4 brothers and 1 sister. They do not talk to one of their brothers. Onesimo does not believe pt had any home care or community services. Onesimo denied any mental health concerns. Onesimo " reported that they plan to check in with pt's boss tomorrow to see if there is any paperwork that needs to be completed during pt's hospitalization. Onesimo is aware that CM team can assist with coordinating getting paperwork completed if FMLA paperwork needs to be filled out by provider team. Onesimo and SOLANGE had no other questions for SW at this time.    TAL called [facility] and spoke with [facility]. Selam confirmed that pt does go to their facility on MWF schedule. Selam requested discharge orders and summary to be faxed to them when pt able to discharge. Selam reports that updates are welcome too but she will call nursing station periodically to get these. Selam reported that they do not have a health care directive on file for pt.     [facility]   [address] [place] [address] [place] [address]  Schedule: [facility]  Ph: [phone]  F: [phone]    TAMAR Chapin   [facility] 4C/E SW  4C/E SW ph: [phone]

## 2024-03-27 NOTE — PROGRESS NOTES
Nephrology Progress Note  03/27/2024         Assessment & Recommendations:   Tacos Dominguez is a 64 year old male with PMH of HTN, CAD, NSTEMI, pulmonary HTN, abdominal aorta partially calcified dissection, afib, aortic stenosis, ESRD, GERD,  s/p appendectomy and cholecystectomy, Crohn's disease s/p multiple bowel resections w/ minimal bowel remaining  s/p subtotal pancreatectomy for main duct IPMN (3/2022), tobacco use disorder, recently admitted at [facility] 3/14-3/17/24 with large bowel pneumatosis, anemia, and scrotal swelling (seen by GI and CRS), admitted with hyperglycemia in setting of steroids. Admission complicated by severe GIB, extensive pneumatosis intestinalis, and stroke.       # ESRD on HD MWF at [facility] with Dr. Scherer. Access LUE AVG. EDW 69 kg. Time: 3.5 hrs via left AVF   - Last dialysis Monday 3/25  - no urgent indication for HD; however later today or tomorrow will need to initiate CRRT (appreciate CVC placement) in setting of acute GIB and stroke    # Electrolytes: K 4.6, Na 139  # Acid/base: bicarb 22    # Severe mitral valve insufficiency  - cards following    # BP/Volume: anuric, EDW 71 kg; echo 3/24/24 with normal IVC; minimal UOP; has large IDWG, often requires 3-4 kg UF  - volume up s/p multiple blood products  - art line, BP's 120-140's, not on pressors  - O2 99% RA      # BMD: Ca 8's, alb 2.5, phos 3.2    # CVA: MCA     # Severe GIB: requiring multiple transfusions/blood products  # Pneumatosis  - GI and CRS following    # Anemia, acute (GIB) on chronic of ESKD: Thought d/t ongoing Crohn's flare. Recent EGD and colonoscopy with bleeding noted in the ileum        # Kidney cyst: indeterminate  - urology consult       Recommendations were communicated to primary team via this note and in person         RENETTA Vyas   Division of Renal Disease and Hypertension  [phone]    Interval History :   Seen bedside, transferred to ICU yesterday in setting of severe GIB,  "pneumatosis, and stroke. Not on pressors and on RA. Lethargic but wakes up. Will need CRRT but ok to hold off today, lytes stable, on RA    Review of Systems:   4 point ROS neg other than as noted above    Physical Exam:   I/O last 3 completed shifts:  In: 0994.33 [I.V.:4355.33]  Out: -    BP 91/63   Pulse 96   Temp 97.4  F (36.3  C) (Axillary)   Resp 19   Ht 1.803 m (5' 11\")   Wt 75.9 kg (167 lb 5.3 oz)   SpO2 100%   BMI 23.34 kg/m       GENERAL APPEARANCE: NAD  PULM: CTA  CV: RRR     -edema 2+ upper ext  GI: soft, NT  INTEGUMENT: no cyanosis, no rash  NEURO: s/p stroke w/ facial droop  Access L AVG    Labs:   All labs reviewed by me  Electrolytes/Renal -   Recent Labs   Lab Test 03/27/24  0353 03/27/24  0351 03/26/24  2049 03/26/24  2047 03/26/24  0620 03/26/24  0533 03/26/24  0410 03/26/24  0344 03/25/24  0726 03/25/24  0618     --   --   --   --   --   --  133*  --  131*   POTASSIUM 4.6  --   --   --   --   --   --  3.7  --  4.5   CHLORIDE 104  --   --   --   --   --   --  99  --  98   CO2 22  --   --   --   --   --   --  26  --  20*   BUN 37.8*  --   --   --   --   --   --  20.9  --  34.5*   CR 3.93*  --   --   --   --   --   --  2.98*  --  4.49*   * 162* 168*  --    < >  --    < > 121*   < > 169*   KARY 8.1*  --   --   --   --   --   --  8.3*  --  8.6*   MAG 2.3  --   --  1.8  --  1.5*  --   --    < > 1.0*   PHOS 6.5*  --   --   --   --  3.6  --   --   --  3.2    < > = values in this interval not displayed.       CBC -   Recent Labs   Lab Test 03/27/24  0353 03/26/24  2234 03/26/24  2047 03/26/24  1543   WBC 8.8 8.0  --  10.1   HGB 8.3* 8.1* 7.8* 6.8*   PLT 59* 59*  --  41*       LFTs -   Recent Labs   Lab Test 03/27/24  0353 03/25/24  0618 03/23/24  0759   ALKPHOS 73 96 142   BILITOTAL 2.3* 1.1 0.7   ALT 10 18 21   AST 14 20 15   PROTTOTAL 3.6* 4.0* 4.6*   ALBUMIN 2.1* 2.5* 3.0*       Iron Panel -   Recent Labs   Lab Test 03/14/24  0535 02/20/24  1426 08/09/23  0935   IRON 45* 75 58* "   IRONSAT 19 26 22   GRACE  --  813* 1,455*         Imaging:  Reviewed    Current Medications:   [Held by provider] budesonide  9 mg Oral QAM    [Held by provider] calcium acetate  2,668 mg Oral TID w/meals    [Held by provider] folic acid  1 mg Oral QAM    heparin lock flush  5-20 mL Intracatheter Q24H    [Held by provider] insulin glargine  7 Units Subcutaneous Q24H    [Held by provider] insulin NPH  7 Units Subcutaneous Q24H    [Held by provider] methylPREDNISolone  32 mg Intravenous Q12H    [Held by provider] metroNIDAZOLE  500 mg Oral TID    micafungin  100 mg Intravenous Q24H    [Held by provider] mirtazapine  7.5 mg Oral At Bedtime    [Held by provider] multivitamin RENAL  1 capsule Oral QAM    nicotine  1 patch Transdermal Daily    pantoprazole  40 mg Intravenous Q12H    piperacillin-tazobactam  2.25 g Intravenous Q6H    [Held by provider] predniSONE  40 mg Oral Daily    [Held by provider] simvastatin  20 mg Oral QAM    sodium chloride (PF)  10 mL Intracatheter Q8H    sodium chloride (PF)  10 mL Intracatheter Q8H    sodium chloride (PF)  10-40 mL Intracatheter Q8H    sodium chloride (PF)  10-40 mL Intracatheter Q8H    vancomycin place arteaga - receiving intermittent dosing  1 each Intravenous See Admin Instructions      dextrose Stopped (03/26/24 1800)    insulin regular      norepinephrine Stopped (03/27/24 0513)    vasopressin       RENETTA Vyas

## 2024-03-27 NOTE — CONSULTS
[facility]    Stroke progress Note    Reason for Consult: Stroke Code           Assessment     Tacos Dominguez is a 64 year old male with past medical history of ESRD on hemodialysis, PHTN, partially calcified abdominal aortic dissection, severe mitral valve insufficiency, moderate tricuspid insufficiency, Crohn's disease status post multiple bowel resections with end-to-end ileocolonic anastomosis, subtotal pancreatectomy and SANTOS 3/17/2022, chronic anemia, recent admission for acute lower GI bleed secondary to Crohn's flare from 2/25/2024 to 3/1/2024 and colonic pneumatosis 3/14/2024 to 3/17/2024 at outside hospital treated conservatively who was brought into [facility] for acute respiratory failure secondary to HFpEF secondary to acute on chronic blood loss anemia.  Stroke orders initiated  3/26 at 6:37 AM after the patient was found to have acute onset left sided weakness and altered mental status.  Patient was given an NIH stroke score of 14.  CT head and CTA was done which showed no acute intracranial pathology with possible right M2 occlusion distally.  Patient was not a candidate for tenecteplase as there was recent history of GI bleed with a platelet count of 49.  Patient was transferred to angio suite for IR angiogram with intention to treat if there was a thrombus but patient's symptoms resolved almost back to baseline to a NIH of 2 with mild left lower facial droop and extinction to double stimulation in the left, hence the decision was taken to not go ahead with the IR angiogram.     Patient's presentation consistent with right MCA syndrome.  Initially, patient's examination improved. However, patient's examination worsened in the afternoon following multiple episodes of BRBPR and profound hypotension. On CTA, patient does have severe stenosis of proximal portion of right M1 and A1 segments. Given patient's episode of hypotension, it is possible  that patient had hypoperfusion to brain leading to worsening examination and stroke.  Case was again discussed with neurointerventional team, and given no clear occlusion and active GI bleed, no indication for thrombectomy or endovascular intervention at this time.     Today patient was seen with right sided weakness, hemineglect and dysarthria. MRI brain is awated     Recommendations    - Follow MRI Brain with and without contrast and MRA Head and Neck with and without contrast  - BP goal per primary team, from stroke perspective if possible, maintain MAP>65 but defer to primary team        The patient was seen and discussed with attending, Dr. Yoder.     Comfort Chavez, [facility] fellow   ______________________________________________________          PHYSICAL EXAMINATION  Temp:  [97  F (36.1  C)-98  F (36.7  C)] 98  F (36.7  C)  Pulse:  [] 91  Resp:  [7-27] 16  MAP:  [55 mmHg-91 mmHg] 55 mmHg  Arterial Line BP: ()/(36-65) 97/36  SpO2:  [83 %-100 %] 99 %     Awake and alert     Dysphagia Screen      Stroke Scales    NIHSS  1a. Level of Consciousness 0   1b. LOC Questions 0-->Answers both questions correctly   1c. LOC Commands 0-->Performs both tasks correctly   2.   Best Gaze 1-->Partial gaze palsy, gaze is abnormal in one or both eyes, but forced deviation or total gaze paresis is not present   3.   Visual 0   4.   Facial Palsy 2   5a. Motor Arm, Left 3   5b. Motor Arm, Right 0-->No drift, limb holds 90 (or 45) degrees for full 10 secs   6a. Motor Leg, Left 3   6b. Motor Leg, right 0-->No drift, leg holds 30 degree position for full 5 secs   7.   Limb Ataxia 0-->Absent   8.   Sensory 2-->Severe to total sensory loss, patient is not aware of being touched in the face, arm, and leg   9.   Best Language 0-->No aphasia, normal   10. Dysarthria 2-->Severe dysarthria, patients speech is so slurred as to be unintelligible in the absence of or out of proportion to any dysphasia, or is mute/anarthric   11. Extinction  and Inattention  2-->Profound crystal-inattention/extinction more than 1 modality   Total 15         Imaging  I personally reviewed all imaging; relevant findings per HPI.       Liver Panel

## 2024-03-27 NOTE — PROGRESS NOTES
ICU End of Shift Summary. See flowsheets for vital signs and detailed assessment.    Changes this shift: Transferred to  at 1515. Lethargic/obtunded, arouses to repeated requests or pain. Q2h neuros. L facial droop and L sided deficits. No sensation in LUE. L visual neglect. Stroke team at bedside at pt arrival to confirm no neuro changes. Meeting MAP goal >65 off Levophed. RA. NPO. 2/2 PRBC given and 1 unit platelets given. Txa initiated per orders. Friend Emanuel and brother Onesimo at bedside and updated. Code status changed to DNR/DNI this evening.    Plan: Continue to closely monitor CBC and labs q6h. Notify team for any changes or worsening in condition.

## 2024-03-27 NOTE — PROGRESS NOTES
"Progress Note - Surgery Cross Cover  Serial Abdominal Examination     Interval History  64M w ESRD on HD, pHTN, IPMN s/p subtotal pancreatectomy and SANTOS, Crohn's s/p multiple prior bowel resections w ileocolonic anastomosis and recent admissions for LGIB and colonic pneumatosis (2/25-3/1 and 3/14-3/17). Initially admitted with acute respiratory failure and transferred to ICU with hypotensive shock and concern for R MCA stroke. CT a/p demonstrated extensive pneumatosis intestinalis for which colorectal surgery was consulted. Colorectal surgery's recommendations today were to pursue nonoperative management. I was asked to perform an abdominal exam overnight. Code status changed to DNR/DNI this evening.    Patient has denies abdominal pain, nausea, vomiting. RN supplements history and shares that he has not had any subsequent bloody BM tonight.      Physical Exam  Vital Signs: BP 91/63   Pulse 89   Temp 97  F (36.1  C) (Axillary)   Resp 13   Ht 1.803 m (5' 11\")   Wt 72.1 kg (159 lb)   SpO2 100%   BMI 22.18 kg/m      Gen: Alert, garbled speech, appears comfortable  Abdomen: soft, nontender, nondistended. No guarding.    Recent labs reviewed:  Hgb 8.1 (7.8), PLT 59 (41)    Plan  - Stable abdominal exam    Tacos Garcia MD  General Surgery, PGY-1  [phone]    "

## 2024-03-27 NOTE — PROGRESS NOTES
IR consult for GIB.    No formal consultation placed for IR, but IR was updated that this patient was proceeding to CTA given GIB. Briefly, this is a 64 year old male with complex past medical history including ESRD on HD, severe mitral regurg, chronic thrombocytopenia, pHTN, IPMN s/p subtotal pancreatectomy and SANTOS, Crohn's disease s/p multiple prior bowel resections w ileocolonic anastomosis and persistent flare and recent admissions for LGIB and colonic pneumatosis (2/25-3/1 and 3/14-3/17). Patient presented to the ED 3/23 with hyperglycemia (BS >800). He is currently admitted with GIB and stroke (3/26). He has requiring intermittent transfusion for ongoing GIB during this admission. CT scan 3/26 at 12:26 am was negative for active arterial bleeding. Ongoing bleeding during the day prompted rapid response and repeat CTA. IR was called to review CTA and discuss possible options for catheter angiography and embolization. Pt is not an operative candidate per colorectal surgery and he is not a candidate for further colonoscopy given free air.    Case and imaging was reviewed with Dr. Burt, Dr. Viramontes and Dr. Lacy from IR. CTA completed 3/26 at 2:30 pm does show active extravasation in the small bowel loop within in the right lower quadrant hernia unfortunately the offending artery is unclear and given patient's underlying extensive vascular disease (aortic and iliac dissection, SMA stenosis and tiny ileal and jejunal arteries) catheterization would not only be technically challenging but likely impossible. Additionally, this bowel is enhancing throughout and given underlying crohn's and history of recurrently GIB there is likely multiple areas of bleeding that cannot be safely treated with catheter embolization. Recommend GOC discussion with patient and family.    Quique Montenegro PA-C was updated. Pt has reportedly now transferred to MICU and he will update their serviced reg. IR recommendations.    Cassie  ILANA Ferrari, APRN  Interventional Radiology   IR on-call pager: [phone]

## 2024-03-27 NOTE — CONSULTS
SPIRITUAL HEALTH SERVICES - Consult Note  [facility] 4C  Referral Source/Reason for Visit: Family/Staff    Summary and Recommendations -  Scar is on comfort care. His family and friends are with him. We told stories and I talked with Scar for a while.     Plan:  support remains available on request.    Katina Anglin  Staff

## 2024-03-27 NOTE — PROGRESS NOTES
Colorectal Surgery Progress Note  [facility]      Subjective:  Resting in bed. Confused with garbled speech.     Vitals:  Vitals:    03/27/24 0730 03/27/24 0800 03/27/24 0830 03/27/24 0900   BP:       BP Location:       Cuff Size:       Pulse: 96 91 92 88   Resp: 19 15 15 13   Temp:       TempSrc:       SpO2: 100% 98% 96% 96%   Weight:       Height:         I/O:  I/O last 3 completed shifts:  In: 7707.33 [I.V.:6526.33]  Out: -     Physical Exam:  Gen: Awake, NAD  Pulm: Non-labored breathing  Abd: Soft, non-tender to palpation, non-distended, right sided hernia present  Ext:  Warm and well-perfused    BMP  Recent Labs   Lab 03/27/24  0924 03/27/24  0353 03/27/24  0351 03/26/24  2049 03/26/24  2047 03/26/24  0620 03/26/24  0533 03/26/24  0410 03/26/24  0344 03/25/24  1950 03/25/24  1939 03/25/24  0726 03/25/24  0618 03/24/24  0748 03/24/24  0705 03/23/24  1427 03/23/24  1421   NA  --  139  --   --   --   --   --   --  133*  --   --   --  131*  --  133*   < >  --    POTASSIUM  --  4.6  --   --   --   --   --   --  3.7  --   --   --  4.5  --  3.7   < >  --    CHLORIDE  --  104  --   --   --   --   --   --  99  --   --   --  98  --  96*   < >  --    CO2  --  22  --   --   --   --   --   --  26  --   --   --  20*  --  24   < >  --    BUN  --  37.8*  --   --   --   --   --   --  20.9  --   --   --  34.5*  --  23.9*   < >  --    CR  --  3.93*  --   --   --   --   --   --  2.98*  --   --   --  4.49*  --  3.53*   < >  --    * 179* 162* 168*  --    < >  --    < > 121*   < >  --    < > 169*   < > 142*   < > 795*  796*   MAG  --  2.3  --   --  1.8  --  1.5*  --   --   --  1.6*  --  1.0*  --   --    < > 1.3*   PHOS  --  6.5*  --   --   --   --  3.6  --   --   --   --   --  3.2  --   --   --  3.9    < > = values in this interval not displayed.     CBC  Recent Labs   Lab 03/27/24  0353 03/26/24  2234 03/26/24  2047 03/26/24  1543 03/26/24  1254 03/26/24  1130   WBC 8.8 8.0  --  10.1  --  7.7    HGB 8.3* 8.1* 7.8* 6.8*   < > 5.5*   HCT 25.3* 24.7*  --  21.0*  --  17.5*   PLT 59* 59*  --  41*  --  44*    < > = values in this interval not displayed.         ASSESSMENT:   Patient is a 65 yo male with PMHx of ESRD on HD, PHTN, partially calcified abdominal aortic dissection, severe mitral insufficiency, moderate tricuspid insufficiency, Crohn's disease s/p multiple bowel resections with end to end ileocolonic anastomosis, IPMN s/p subtotal pancreatectomy and SANTOS 3/17/2022, chronic anemia, and recent admissions for acute lower GI bleed secondary to Crohn's flare (2/25-3/1/24) and colonic pneumatosis (3/14-3/17/24) at OSH treated conservatively. Current admission for acute respiratory failure 2/2 chronic HFpEF 2/2 acute on chronic blood loss anemia. CT angiogram of the abdomen pelvis with active extravasation. On pressors. Now DNR/DNI.     - No acute surgical interventions at this time  - Supportive measures  - Colorectal surgery will continue to follow    Clinically Significant Risk Factors            # Hypomagnesemia: Lowest Mg = 1.5 mg/dL in last 2 days, will replace as needed   # Hypoalbuminemia: Lowest albumin = 2.1 g/dL at 3/27/2024  3:53 AM, will monitor as appropriate  # Coagulation Defect: INR = 1.59 (Ref range: 0.85 - 1.15) and/or PTT = 26 Seconds (Ref range: 22 - 38 Seconds), will monitor for bleeding  # Thrombocytopenia: Lowest platelets = 41 in last 2 days, will monitor for bleeding   # Hypertension: Noted on problem list            # Financial/Environmental Concerns:    # Support System: poor social support noted in nursing assessment         Seen and discussed with the colorectal fellow who discussed with staff.     Lyndsey Mckeon, DO  General Surgery, PGY-1

## 2024-03-27 NOTE — PROGRESS NOTES
Inpatient Diabetes Management Service: Daily Progress Note   HPI: Tacos Dominguez is a 64 year old  is a 64 year old male admitted on 3/23/2024. 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 He then presented to OSH ED on 3/13/24 and found to have Evidence of large bowel pneumatosis, anemia and scrotal swelling prompting transfer to [facility] and admitted from (3/14-3/17)w/ GI and CRS consultations who presents today from infusion center to the ED for evaluation of Hyperglycemia. Patient admitted to Medicine for further evaluation and care.           Assessment/Plan:     Assessment:   Pre diabetes or Diabetes Mellitus, or just steroid induced hyperglycemia--->  A1c=6.4% but multiple RBC transfusion making this A1c  inaccurate  Steroid induced hyperglycemia  Post  subtotal pancreatectomy for main duct IPMN (3/2022  Hyperglycemia, labs x2 without DKA, resolved  Anemia with history of recent RBC transfusions, RBC on 3/26/2024  ESRD, on HD  Stress induced hyperglycemia with imaging showing pneumoperitoneum  Septic versus hemorrhagic shock      Pt remains NPO, MP decreased dose and frequency today to 16 mg daily    - Hold NPH and monitor BG on lower dose/frequency of Methylpred  --Hold Lantus  as remains NPO but monitor  - Hold Novolog Meal Coverage: since NPO  - Restarted Novolog Correction scale for BG >140   - BG monitoring:   every 4 hours while acutely ill in ICU   - Hypoglycemia protocol    -  Carb counting protocol    Discussion:    No more low BG with holding lantus and NPO.  BG running now 140-173. They added back MP stress dose but lower dose/frequency at 16 mg IV daily so added back medium sliding scale. No lantus, concerning for previous low  BG when MP wears off after 12 hours.  Waiting for plan of care.    Discharge Planning:(tentative)  Medications: TBD  Test Claims:  Lantus, Novolog, NPH today (3/25/24)   Education: Will need DM education closer to discharge.   Outpatient Follow-up:Recommend [facility] Endocrinology vs PCP     Please notify Inpatient Diabetes Service if changes are planned to steroids, nutrition, TPN/TF and anticipated procedures requiring prolonged NPO status.         Interval History/Review of Systems :   The last 24 hours progress and nursing notes reviewed.  Some lows yesterday but then BG running in 140-170 now. No lantus because got low.  Stop prn insulin drip.  See if need to cover lower dose of MP with NPH.  DNR/DNI- waiting for plan of care.    Planned Procedures/Surgeries: none    Inpatient Glucose Control:     Recent Labs   Lab 03/27/24  1120 03/27/24  0924 03/27/24  0353 03/27/24  0351 03/26/24  2049 03/26/24  1530   * 140* 179* 162* 168* 173*             Medications Impacting Glycemia:   Steroids:  Prednisone 16mg IV daily  D5W-containing solutions/medications: none   Other medications impacting glucose: none         Nutrition:   Orders Placed This Encounter      NPO for Medical/Clinical Reasons Except for: No Exceptions    Supplements: none   TF: none   TPN: none         Diabetes History: see full consult note for complete diabetes history   Diabetes Type and Duration: not known to be diabetic    Last A1c: 6.4 on 3/23/2024    Hemoglobin at time of last A1c: 6.6  RBC transfusion in past 3 months: yes      PTA Medication Regimen: none  Historical Diabetes Medications: none     Glucose monitoring device and frequency: none  Outpatient Diabetes Provider: none       "  Physical Exam:   BP 91/63   Pulse 91   Temp 98  F (36.7  C) (Oral)   Resp 16   Ht 1.803 m (5' 11\")   Wt 75.9 kg (167 lb 5.3 oz)   SpO2 99%   BMI 23.34 kg/m    General:  appears ill. Sleepy but awakens, slurred speech  HEENT:  NC/AT. MMM, sclera not injected  Lungs:  unremarkable, no new cough, 2 liters NC  ABD:  rounded, soft, non-tender  Skin:  warm and dry, no obvious lesions  Lymp:    Bilateral LE edema and UE edema  Mental Status:  eyes are closed but opens to voice, does not answer questions today, has garbled speech, cannot             Data:     Lab Results   Component Value Date    A1C 6.4 03/23/2024    A1C 6.1 03/19/2024    A1C 5.8 03/14/2024       ROUTINE IP LABS (Last four results)  BMP  Recent Labs   Lab 03/27/24  1120 03/27/24  0924 03/27/24  0353 03/27/24  0351 03/26/24  0410 03/26/24  0344 03/25/24  0726 03/25/24  0618 03/24/24  0748 03/24/24  0705   NA  --   --  139  --   --  133*  --  131*  --  133*   POTASSIUM  --   --  4.6  --   --  3.7  --  4.5  --  3.7   CHLORIDE  --   --  104  --   --  99  --  98  --  96*   KARY  --   --  8.1*  --   --  8.3*  --  8.6*  --  9.0   CO2  --   --  22  --   --  26  --  20*  --  24   BUN  --   --  37.8*  --   --  20.9  --  34.5*  --  23.9*   CR  --   --  3.93*  --   --  2.98*  --  4.49*  --  3.53*   * 140* 179* 162*   < > 121*   < > 169*   < > 142*    < > = values in this interval not displayed.     CBC  Recent Labs   Lab 03/27/24  1009 03/27/24  0353 03/26/24  2234 03/26/24  2047 03/26/24  1543   WBC 8.8 8.8 8.0  --  10.1   RBC 2.65* 2.75* 2.69*  --  2.16*   HGB 8.3* 8.3* 8.1* 7.8* 6.8*   HCT 24.5* 25.3* 24.7*  --  21.0*   MCV 93 92 92  --  97   MCH 31.3 30.2 30.1  --  31.5   MCHC 33.9 32.8 32.8  --  32.4   RDW 22.3* 21.4* 20.0*  --  22.3*   PLT 63* 59* 59*  --  41*     INR  Recent Labs   Lab 03/27/24  0353 03/26/24  2234 03/26/24  1543 03/25/24 2054   INR 1.59* 1.58* 1.65* 2.10*       Inpatient Diabetes Service will continue to follow, please don't " hesitate to contact the team with any questions or concerns.     Daniela Doran PA-C  Inpatient Diabetes Service  Pager   [phone]  Available by [employer]  Date of Service: 3/27/2024      Plan discussed with patient/family present, bedside RN, and primary team MICU via phone    To contact Inpatient Diabetes Service:     7 AM - 5 PM: Page the IDS [email or web address] following the patient that day (see filed or incomplete progress notes/consult notes under Endocrinology)    OR if uncertain of provider assignment: page job code [phone]    5 PM - 7 AM: First call after hours is to primary service.    For urgent after-hours questions, page job code for on call fellow: [phone]     I spent a total of 35 minutes on the date of the encounter doing prep/post-work, chart review, history and exam, documentation and further activities per the note including lab review, multidisciplinary communication, counseling the patient and/or coordinating care regarding acute hyper/hypoglycemic management, as well as discharge management and planning/communication.  See note for details.

## 2024-03-27 NOTE — PROGRESS NOTES
Critical Care Services Progress Note:  I personally examined and evaluated the patient today. I formulated today s plan with the house staff team or resident(s) and agree with the findings and plan in the associated note (see separately attested resident note).   I have evaluated all laboratory values and imaging studies from the past 24 hours.  Summary of hospital course:  64M pmh of ESRD on HD, crohns disease, severe mitral regurg who presented 3/23 with respiratory failure suspected chf exacerbation for which he was being treated until 3/26 when his course was complicated by a R MCA stroke as well as lower GI bleeding.  He underwent CT angio which showed active extravasation from a loop of bowel in an abdominal hernia as well as peritoneal free air.  Overnight events/pertinent findings today:  See above.    Data  Still on and off levophed to maintain MAP 65.  Satting acceptably on room air.  Sleepy but rousable.  Labs (personally reviewed):  Ical 4.8 ok.  Lactate 1.4 ok.  abg 7.35/43/90.  hgb 8.3 now stabilizing, acute blood loss anemia.  Pltts 59 thrombocytopenia.  INR 1.59 coagulopathic but acceptable.  CTA abdomen/pelvis:  free intraperitoneal air.  Active extravasation into R sided ventral hernia bowel loop.    Assessment/plan:   Hemorrhagic shock:  seems to be improving.  Hgb stabilizing.  Suspect his continued pressor needs may be more septic from peritonitis.  Continue to trend hgb, coags, pltls, but can reduce frequency.  Septic shock: likely started yesterday due to acute peritonitis made apparent on CT scan by peritoneal free air.  S/p volume resus yesterday.  Lactates ok.  Blood cx sent yesterday. Continue abx and levophed.  He continues to have a high chance of rapid deterioration due to uncontrolled intraabdominal source (below).  GI bleeding:  active extravasation from loop of small bowel in R sided ventral hernia.  Txt options discussed with GI, IR and general surgery, but no service is able to  safely intervene. Bleeding seems to have stemmed with TXA as well as product transfusion.  Continue to monitor.  Acute peritonitis:  appreciate CR surg input.  They have discussed operative options with family but family felt a more conservative approach in line with pt wishes.  Continue zosyn.  Acute CVA:  appreciate neuro support.  Inappropriate for lytics or antipltlt agents given the above.   ESRD:  appreciate nephrology support.  Dialysis per their mgmt.  High risk for life threatening deterioration over the next 24 hours.  Clinically Significant Risk Factors            # Hypomagnesemia: Lowest Mg = 1.5 mg/dL in last 2 days, will replace as needed   # Hypoalbuminemia: Lowest albumin = 2.1 g/dL at 3/27/2024  3:53 AM, will monitor as appropriate    # Coagulation Defect: INR = 1.59 (Ref range: 0.85 - 1.15) and/or PTT = 26 Seconds (Ref range: 22 - 38 Seconds), will monitor for bleeding  # Thrombocytopenia: Lowest platelets = 41 in last 2 days, will monitor for bleeding   # Hypertension: Noted on problem list            # Financial/Environmental Concerns:    # Support System: poor social support noted in nursing assessment        Code Status: No CPR- Do NOT Intubate     Rest per resident note.   I spent a total of 45 minutes (excluding procedure time) personally providing and directing critical care services at the bedside and on the critical care unit for this patient.     Nando Faria

## 2024-03-27 NOTE — PROGRESS NOTES
03/27/24 1412   Appointment Info   Signing Clinician's Name / Credentials (SLP) Micki Rosales MA CCC-SLP   General Information   Onset of Illness/Injury or Date of Surgery 03/23/24   Referring Physician Quique Montenegro PA-C   Patient/Family Therapy Goal Statement (SLP) Pt wants water   Pertinent History of Current Problem Pt is a 63 yo male with PMHx of ESRD on HD, PHTN, partially calcified abdominal aortic dissection, severe mitral insufficiency, moderate tricuspid insufficiency, Crohn's disease s/p multiple bowel resections with end to end ileocolonic anastomosis, IPMN s/p subtotal pancreatectomy and SANTOS 3/17/2022, chronic anemia, and recent admissions for acute lower GI bleed secondary to Crohn's flare (2/25-3/1/24) and colonic pneumatosis (3/14-3/17/24) at OSH treated conservatively. Admission for acute respiratory failure 2/2 chronic HFpEF 2/2 acute on chronic blood loss anemia now with hematochezia and c/f bowel perforation and ischemic stroke of the R MCA (Patient's presentation consistent with right MCA syndrome per neuro). Transferred to ICU on 3/26 for further management of hemorrhagic shock requiring recurrent transfusions and vasopressors; bleeding has since stabilized. Clinical swallow eval completed per MD order, pt OK for PO per RN.   General Observations Occasionally slow to respond, preference for eyes closed   Type of Evaluation   Type of Evaluation Swallow Evaluation   Oral Motor   Oral Musculature anomalies present   Structural Abnormalities none present   Mucosal Quality dry   Dentition (Oral Motor)   Dentition (Oral Motor) natural dentition   Facial Symmetry (Oral Motor)   Facial Symmetry (Oral Motor) left side impairment   Left Side Facial Asymmetry severe impairment   Lip Function (Oral Motor)   Lip Range of Motion (Oral Motor) protrusion impairment;retraction impairment   Protrusion, Lip Range of Motion left side   Tongue Function (Oral Motor)   Tongue ROM (Oral Motor) WNL    Jaw Function (Oral Motor)   Jaw Function (Oral Motor) WNL   Facial Sensation   Facial Sensation WNL   Cough/Swallow/Gag Reflex (Oral Motor)   Comment, Cough/Swallow/Gag Reflex (Oral Motor) adequate   Vocal Quality/Secretion Management (Oral Motor)   Vocal Quality (Oral Motor) WFL   Comment, Vocal Quality/Secretion Management (Oral Motor) mod-severe dysarthria   General Swallowing Observations   Past History of Dysphagia None per chart review   Respiratory Support room air   Current Diet/Method of Nutritional Intake (General Swallowing Observations, NIS) NPO   Swallowing Evaluation Clinical swallow evaluation   Clinical Swallow Evaluation   Feeding Assistance dependent   Clinical Swallow Evaluation Textures Trialed thin liquids;pureed;solid foods   Clinical Swallow Eval: Thin Liquid Texture Trial   Mode of Presentation, Thin Liquids spoon;straw;fed by clinician   Volume of Liquid or Food Presented 3oz thin water   Oral Phase of Swallow WFL   Pharyngeal Phase of Swallow coughing/choking   Diagnostic Statement Overt s/sx of aspiration appreciated when pt attempted to talk with bolus in his mouth - otherwise, no overt s/sx of aspiration   Clinical Swallow Evaluation: Puree Solid Texture Trial   Mode of Presentation, Puree spoon;fed by clinician   Volume of Puree Presented 3oz pudding   Oral Phase, Puree WFL   Pharyngeal Phase, Puree intact   Diagnostic Statement No overt s/sx of aspiration   Clinical Swallow Evaluation: Solid Food Texture Trial   Diagnostic Statement Attempted bite of cracker - pt unable to masticate and expectorated   Esophageal Phase of Swallow   Patient reports or presents with symptoms of esophageal dysphagia No   Swallowing Recommendations   Diet Consistency Recommendations full liquid diet;thin liquids (level 0)   Supervision Level for Intake 1:1 supervision needed   Mode of Delivery Recommendations bolus size, small;food moistened;slow rate of intake   Monitoring/Assistance Required  (Eating/Swallowing) stop eating activities when fatigue is present   Recommended Feeding/Eating Techniques (Swallow Eval) maintain upright sitting position for eating   Medication Administration Recommendations, Swallowing (SLP) as tolerated   Instrumental Assessment Recommendations reassess via non-instrumental clinical swallow evaluation   General Therapy Interventions   Planned Therapy Interventions Dysphagia Treatment   Dysphagia treatment Oropharyngeal exercise training;Modified diet education;Instruction of safe swallow strategies;Compensatory strategies for swallowing   Clinical Impression   Criteria for Skilled Therapeutic Interventions Met (SLP Eval) Yes, treatment indicated   SLP Diagnosis Dysphagia in setting of L sided facial weakness, fatigue   Risks & Benefits of therapy have been explained evaluation/treatment results reviewed;care plan/treatment goals reviewed;risks/benefits reviewed;current/potential barriers reviewed;participants voiced agreement with care plan;participants included;patient   Clinical Impression Comments   Clinical swallow eval completed per MD order. Pt presents with dysphagia in setting of L sided facial weakness and fatigue. Oral mech exam remarkable for L sided facial weakness, mod-severe dysarthria. Pt with eyes closed for much of eval. Pt assessed with thin liquids, pudding, and bite of cracker. Oral phase characterized by anterior loss of boluses. Pt unable to masticate cracker. Pharyngeal phase characterized by cough x1 with sip of thin water when he was attempting to talk with bolus in his mouth. No other s/sx of aspiration. Cautiously recommend full liquid diet (thin liquids OK) with 1:1 assistance. The pt must be fully upright and alert for all PO. Place boluses on R side of oral cavity. SLP will follow.     SLP Total Evaluation Time   Eval: oral/pharyngeal swallow function, clinical swallow Minutes (51886) 18   SLP Discharge Recommendation Transitional Care Facility    SLP Rationale for DC Rec dysphagia, dysarthria   Total Session Time   Total Session Time (sum of timed and untimed services) 24

## 2024-03-28 ENCOUNTER — POST MORTEM DOCUMENTATION (OUTPATIENT)
Dept: TRANSPLANT | Facility: CLINIC | Age: 64
End: 2024-03-28
Payer: COMMERCIAL

## 2024-03-28 LAB
BACTERIA BLD CULT: NO GROWTH
BACTERIA BLD CULT: NO GROWTH
GLUCOSE BLDC GLUCOMTR-MCNC: 162 MG/DL (ref 70–99)

## 2024-03-28 NOTE — PROGRESS NOTES
Received notification on 3/28/24 at 0800 of patient's death from Acute GI bleed, hemorrhagic shock.  Place of death was reported as inpatient/[facility].  The Transplant Office has been notified that patient is . The Post Mortem Encounter has been completed. Notifications have been sent to the Care team.

## 2024-03-28 NOTE — DEATH PRONOUNCEMENT
MD DEATH PRONOUNCEMENT    Called to pronounce Tacos Dominguez dead.    Physical Exam: Unresponsive to noxious stimuli, Spontaneous respirations absent, Breath sounds absent, Carotid pulse absent, Heart sounds absent, Pupillary light reflex absent, and Corneal blink reflex absent    Patient was pronounced dead at 7:34 PM, 2024.    Preliminary Cause of Death: Hemorrhagic shock secondary to acute GI bleed    Principal Problem:    Hyperglycemia  Active Problems:    Other acute kidney failure (H)     Infectious disease present?: NO    Communicable disease present? (examples: HIV, chicken pox, TB, Ebola, CJD) :  NO    Multi-drug resistant organism present? (example: MRSA): NO    Please consider an autopsy if any of the following exist:  NO Unexpected or unexplained death during or following any dental, medical, or surgical diagnostic treatment procedures.   NO Death of mother at or up to seven days after delivery.     NO All  and pediatric deaths.     NO Death where the cause is sufficiently obscure to delay completion of the death certificate.   NO Deaths in which autopsy would confirm a suspected illness/condition that would affect surviving family members or recipients of transplanted organs.     The following deaths must be reported to the 's Office:  NO A death that may be due entirely or in part to any factors other than natural disease (recent surgery, recent trauma, suspected abuse/neglect).   NO A death that may be an accident, suicide, or homicide.     NO Any sudden, unexpected death in which there is no prior history of significant heart disease or any other condition associated with sudden death.   NO A death under suspicious, unusual, or unexpected circumstances.    NO Any death which is apparently due to natural causes but in which the  does not have a personal physician familiar with the patient s medical history, social, or environmental situation or the circumstances of  the terminal event.   NO Any death apparently due to Sudden Infant Death Syndrome.     NO Deaths that occur during, in association with, or as consequences of a diagnostic, therapeutic, or anesthetic procedure.   NO Any death in which a fracture of a major bone has occurred within the past (6) six months.   NO A death of persons note seen by their physician within 120 days of demise.     NO Any death in which the  was an inmate of a public institution or was in the custody of Law Enforcement personnel.   NO  All unexpected deaths of children   NO Solid organ donors   NO Unidentified bodies   NO Deaths of persons whose bodies are to be cremated or otherwise disposed of so that the bodies will later be unavailable for examination;   NO Deaths unattended by a physician outside of a licensed healthcare facility or licensed residential hospice program   NO Deaths occurring within 24 hours of arrival to a health care facility if death is unexpected.    NO Deaths associated with the decedent s employment.   NO Deaths attributed to acts of terrorism.   NO Any death in which there is uncertainty as to whether it is a medical examiner s care should be discussed with the medical investigator.        Body disposition: Autopsy was discussed with family member:  Family members in person.  Permission for autopsy was declined.    Sebastien Douglas MD  Internal Medicine PGY-3  [facility] N[facility]

## 2024-03-28 NOTE — PROGRESS NOTES
Time of death . [facility] resident at bedside to assess. [employer] notified (ref. # [id number]) not a candidate for donation. Patients glasses sent home with brother Nando. Family has not chosen a  home, will contact security when decided. Brother, Nando, is main contact [phone]. Patient sent to the [facility] [place] at [id number].

## 2024-03-28 NOTE — DISCHARGE SUMMARY
[facility]    Death Summary - Hospitalist Service     Date of Admission:  3/23/2024  Date of Death: 3/27/2024 10:48 PM  Attending Provider: Dr. Nando Faria    Discharge Diagnoses   # Cerebral hypoperfusion due to underlying R. MCA stenosis in the setting of acute blood loss anemia/hypotension   # R MCA M2 severe stenosis   # Hemorrhagic and septic shock  # BRBPR  # Acute on chronic anemia 2/2 blood loss:  # Hx of bleeding ulcers at terminal ileum near anastomosis  # Extravasation into small bowel loop in RLQ hernia  # Pneumoperitoneum   # Pneumatosis Intestinalis   # Crohns disease  #Hyponatremia, resolved  #Lactic acidosis, resolved  # Hyperglycemia, likely stress/steroid-induced, resolved    Cause of death: Hemorrhagic shock secondary to acute GI bleed     Hospital Course   Tacos Dominguez is a 65 yo male with PMHx of ESRD on HD, PHTN, partially calcified abdominal aortic dissection, severe mitral insufficiency, moderate tricuspid insufficiency, Crohn's disease s/p multiple bowel resections with end to end ileocolonic anastomosis, IPMN s/p subtotal pancreatectomy and SANTOS 3/17/2022, chronic anemia, and recent admissions for acute lower GI bleed secondary to Crohn's flare (2/25-3/1/24) and colonic pneumatosis (3/14-3/17/24) at OSH treated conservatively.     He was admitted on 03/23/2024 for hyperglycemia in the setting of steroid use. He also developed acute respiratory failure 2/2 chronic HFpEF 2/2 acute on chronic blood loss anemia.    On 03/26, multiple stroke codes called found to have severe stenosis of the R MCA with worsening symptoms likely in the setting of acute blood loss with hematochezia and hypotension. CT showed active extravasation into small bowel loop in RLQ hernia and free intraperitoneal air with concerns for bowel perforation. Transferred to ICU on 03/26 for further management of hemorrhagic shock requiring recurrent transfusions and vasopressors.      The following problems were addressed during his hospitalization:     # Cerebral hypoperfusion due to underlying R. MCA stenosis in the setting of acute blood loss anemia/hypotension   # R MCA M2 severe stenosis   Stroke code called on 3/26. CT with small focal low density in the anterior right frontal lobe, assumed more likely chronic than acute, no intracranial hemorrhage. CTA head and neck with severe stenosis of the Right MCA segment. No aneurysm. Symptoms appeared consistent with R MCA syndrome and symptoms transient in nature. Pt was transferred to IR suite with intention to treat, but pt's symptoms resolved back to almost back baseline, so the decision was made not to complete the procedure. Second stroke code called in afternoon due to worsening neuro symptoms. Per Neuro likely worsening of stroke in setting of profound hypotension from hemorrhagic shock. Current deficits include R gaze deviation, L sided visual field defect, L sided sensation loss (improving), L sided weakness (3/5), L sided arm posturing (resolved), L sided crystal-neglect, dysarthria. Embolectomy considered initially but thrombus is too distal to be reached.  In the setting of hemorrhagic shock from GI bleed that was essentially inoperable, opted to give TXA to stop the bleed, knowing that this might worsen his stroke symptoms.     # History of anxiety w/ panic attacks  Psychiatry consulted 3/25 and started remeron d/t poor sleep quality while hospitalized. Also had PRN Ativan available.    # Acute hypoxic respiratory distress, resolved  Overnight patient's SpO2 dropped to the 80s and he required 2L NC briefly. May have been related to undiagnosed sleep apnea.     # Hemorrhagic and septic shock  SBP decreased to 50/60s in setting of hemorrhagic shock with acute GI bleed and septic shock with bowel perforation and/or Chron's flare. Patient required only NE initially to maintain MAP >65. Unfortunately, patient did require addition of  vasopressin without success in keeping MAP > 65, likely due to ongoing GI hemorrhage. Family opted to not escalate cares at that point.     # Hx of NSTEMI (10/2020)  # Elevated troponin, suspect type II NSTEMI   Trop 206 on arrival, repeat 174. No ischemic changes on EKG. Suspect demand ischemia in setting of heart failure. Likely in part elevated d/t ESRD.      # BRBPR  # Acute on chronic anemia 2/2 blood loss:  # Hx of bleeding ulcers at terminal ileum near anastomosis  # Extravasation into small bowel loop in RLQ hernia  # Pneumoperitoneum   # Pneumatosis Intestinalis   Bright red blood per rectum seen on 3/26 associated with acute Hgb drop from 8 to 5.2. Pt became hypotensive, requring pressors and transfusions. CTA showing active extravasation into small bowel loop in RLQ hernia and free intraperitoneal air. Due to amount of active bleeding, the benefit of giving TXA outweighed the harm of worsening his stroke. In the acute setting, patient was given TXA and did required 5 PRBC + 1U platelets + 2U plasma.  Patient was started on Zosyn + Micafungin + Vancomycin for intraabdominal coverage in the setting of bowel perforation. Colorectal surgery consulted - surgery offered and discussed with proxy decision maker patient's brother (Nando), who felt this would not be in line w/ patient's GOC. GI stated patient was not a candidate for scope given perforation. Brother opted to medically manage. The initial acute bleed seemed to slow/stop overnight, however the afternoon of 03/27 patient had a large bowel movement with melena and then continued to have BRBPR subsequently. Ultimately, family decided not to escalate cares that that time and unfortunately the cause of death was hemorrhagic shock 2/2 to this GI bleed.     # Crohns disease:   Hx multiple bowel resections with end to end ileocolonic anastomosis. Since 02/2024 had iron deficiency anemia requiring multiple transfusions. Recent admission at [facility] in 02/2024  for GI bleed and acute flare; terminal ileal ulcer with visible vessel that was clipped during colonoscopy. Subsequent admission to [facility] and incidentally found to have colonic pneumatosis that was treated conservatively with antibiotics. MR enterography 03/18/2024 showed evidence of multiple small bowel loops with obstruction and inflammation. Plan at that time to switch to Remicade from Stelara with prednisone 40mg daily as a bridge. Unfortunately, patient did become hyperglycemic with the steroids, prompting this admission. CT 03/26 with concerns for active Chron's flare; pneumatosis intestinalis and already present free air makes GI endoscopy very high risk for complete bowel perforation. Remicade 10mg/kg given 03/26 for active Chron's. He was continued on a lower dose of steroids to avoid risk of adrenal crisis, which benefit thought to outweigh the risk of steroids with current GI perforation.     #Hyponatremia, resolved  Likely multifactorial with renal disease and hyperglycemia contributing.      #Lactic acidosis, resolved  3/25 lactate 4.8, subsequently decreased to 1.3. Likely 2/2 bowel perforation, sepsis, and hypotension.      # Hyperglycemia, likely stress/steroid-induced, resolved  Initially presented with BG >800. Hx subtotal pancreatectomy but no prior diabetes.  No evidence of DKA or HHS on arrival. Started on insulin drip. Endocrine consulted, felt QID dosing of prednisone could be contributing. Switched to daily prednisone. BG improved. Transitioned off insulin drip 3/24.  Hgb A1C 6.4%, up-trending recently in setting of steroids for Crohn's disease.      Annette Toth MD  Internal Medicine - Pediatrics Resident PGY-1  [facility]  ______________________________________________________________________      Significant Results and Procedures   Most Recent 3 CBC's:  Recent Labs   Lab Test 03/27/24  1515 03/27/24  1009 03/27/24  0353   WBC 11.3* 8.8 8.8   HGB 8.3* 8.3* 8.3*   MCV 93  93 92   PLT 88* 63* 59*     Most Recent INR's and Anticoagulation Dosing History:  Anticoagulation Dose History  More data exists         Latest Ref Rng & Units 2/15/2024 2/25/2024 3/14/2024 3/19/2024 3/25/2024 3/26/2024 3/27/2024   Recent Dosing and Labs   INR 0.85 - 1.15 1.97  1.41  1.96  2.14  1.46  2.10  1.58  1.65  1.74  1.59      Most Recent 3 Troponin's:No lab results found.  7-Day Micro Results       Collected Updated Procedure Result Status      03/25/2024 2135 03/27/2024 2202 Blood Culture Hand, Right [[id number]]   Blood from Hand, Right    Preliminary result Component Value   Culture No growth after 2 days  [P]                03/25/2024 2109 03/27/2024 2202 Blood Culture Hand, Right [[id number]]   Blood from Hand, Right    Preliminary result Component Value   Culture No growth after 2 days  [P]                03/23/2024 2114 03/24/2024 0321 Enteric Bacteria and Virus Panel PCR [[id number]]    Stool from Per Rectum    Final result Component Value   Campylobacter species Negative   Salmonella species Negative   Vibrio species Negative   Vibrio cholerae Negative   Yersinia enterocolitica Negative   Enteropathogenic E. coli (EPEC) Negative   Shiga-like toxin-producing E. coli (STEC) Negative   Shigella/Enteroinvasive E. coli (EIEC) Negative   Cryptosporidium species Negative   Giardia lamblia Negative   Norovirus Gl/Gll Negative   Rotavirus A Negative   Plesiomonas shigelloides Negative   Enteroaggregative E. coli (EAEC) Negative   Enterotoxigenic E. coli (ETEC) Negative   E. coli O157 NA   Cyclospora cayetanensis Negative   Entamoeba histolytica Negative   Adenovirus F40/41 Negative   Astrovirus Negative   Sapovirus Negative            03/23/2024 2114 03/23/2024 2255 C. difficile Toxin B PCR with reflex to C. difficile Antigen and Toxins A/B EIA [[id number]]    Stool from Per Rectum    Final result Component Value   C Difficile Toxin B by PCR Negative   A negative result does not exclude actual  disease due to C. difficile and may be due to improper collection, handling and storage of the specimen or the number of organisms in the specimen is below the detection limit of the assay.            03/23/2024 1612 03/27/2024 1805 Blood Culture Hand, Right [[id number]]    Blood from Hand, Right    Preliminary result Component Value   Culture No growth after 4 days  [P]                03/23/2024 1421 03/27/2024 1517 Blood Culture Peripheral Blood [[id number]]   Peripheral Blood    Preliminary result Component Value   Culture No growth after 4 days  [P]                03/23/2024 0759 03/24/2024 1033 Quantiferon TB Gold Plus Grey Tube [[id number]]   Blood from Arm, Right    Final result Component Value Units   Quantiferon Nil Tube 0.01 IU/mL            03/23/2024 0759 03/24/2024 1033 Quantiferon TB Gold Plus Green Tube [[id number]]   Blood from Arm, Right    Final result Component Value Units   Quantiferon TB1 Tube 0.00 IU/mL            03/23/2024 0759 03/24/2024 1033 Quantiferon TB Gold Plus Yellow Tube [[id number]]   Blood from Arm, Right    Final result Component Value   Quantiferon TB2 Tube 0.00            03/23/2024 0759 03/24/2024 1034 Quantiferon TB Gold Plus Purple Tube [[id number]]   Blood from Arm, Right    Final result Component Value Units   Quantiferon Mitogen 0.47 IU/mL            03/23/2024 0759 03/24/2024 1307 Quantiferon TB Gold Plus [[id number]]   (Abnormal)   Blood from Arm, Right    Final result Component Value Units   Quantiferon-TB Gold Plus Indeterminate    Unable to evaluate interferon gamma response due to a low   mitogen value, which may be the result of insufficient lymphocytes, reduced   lymphocyte activity due to improper specimen handling, or inability of the   patient's lymphocytes to generate interferon gamma.   TB1 Ag minus Nil Value -0.01 IU/mL   TB2 Ag minus Nil Value -0.01 IU/mL   Mitogen minus Nil Result 0.46 IU/mL   Nil Result 0.01 IU/mL                  Most Recent  Hemoglobin A1c:  Recent Labs   Lab Test 03/23/24  0759   A1C 6.4*     Most Recent ABG:  Recent Labs   Lab Test 03/27/24  1905   PH 7.37   PO2 118*   PCO2 19*   HCO3 11*   AMIRA -12.4*   ,   Results for orders placed or performed during the hospital encounter of 03/23/24   XR Chest Port 1 View    Narrative    EXAM: XR CHEST PORT 1 VIEW 3/23/2024 1:41 PM      HISTORY: Hyperglycemia. Please eval for acute process.    COMPARISON: Chest radiograph 2/15/2024, CT chest 10/24/2023    TECHNIQUE: Frontal view of the chest.    FINDINGS: Trachea is midline. Cardiac silhouette is within normal  limits. Calcification of the aortic arch. Pulmonary vasculature is  distinct. No visualized focal airspace opacity, or pneumothorax.  Slight blunting of the right costophrenic angle. The left costophrenic  angle is collimated out of the field-of-view. Calcification near the  cardiac apex, appears unchanged compared to prior prior radiograph and  present on CT 10/24/2023 along the pleura/pericardium. Streaky  perihilar opacities present.    No acute osseous abnormality. Visualized soft tissues and upper  abdomen are unremarkable.      Impression    IMPRESSION:   Streaky perihilar opacities, likely to represent edema, could also  represent atelectasis or atypical infection. Possible trace right  pleural effusion. There is no focal consolidative opacity.    I have personally reviewed the examination and initial interpretation  and I agree with the findings.    PRUDENCE KONG MD         SYSTEM ID:  [id number]   CT Abdomen Pelvis w Contrast     Value    Radiologist flags Gastrointestinal perforation with free (AA)    Narrative    EXAMINATION: CT ABDOMEN PELVIS W CONTRAST, 3/26/2024 12:26 AM    INDICATION: Elevated LA, Hgb drop. C/f acute abdominal process    COMPARISON STUDY: CT abdomen and pelvis 2/25/2024    TECHNIQUE: CT scan of the abdomen and pelvis was performed on  multidetector CT scanner using volumetric acquisition technique  and  images were reconstructed in multiple planes with variable thickness  and reviewed on dedicated workstations.     CONTRAST: iopamidol (ISOVUE-370) solution 90 mL injected IV without  oral contrast    CT scan radiation dose is optimized to minimum requisite dose using  automated dose modulation techniques.    FINDINGS:    Angiogram abdomen/pelvis: Patent major abdominal vasculature with  chronically calcified aortic dissection flap extending into the right  common iliac artery; severe aortobiiliac atherosclerotic  calcification. No abdominal aortic aneurysm. Celiac artery and  branches are patent. Unchanged severe ostial stenosis of the superior  mesenteric artery. Severe stenosis of the renal arteries bilaterally.  Inferior mesenteric artery is patent. Mild stenosis of the common  iliac arteries and bilateral proximal internal iliac arteries. Mild  stenosis of the bilateral external iliac arteries and common femoral  arteries. Partially visualized superficial femoral arteries are  patent. Portal, splenic, superior mesenteric veins are patent.  Perigastric varices.    Lower thorax: Small bilateral pleural effusions with associated  compressive atelectasis. Centrilobular emphysematous changes.      Liver: No mass. No intrahepatic biliary ductal dilation. Unchanged  right hepatic lobe simple cyst.    Biliary System: Surgically absent gallbladder. No biliary dilatation.    Spleen: Multiple splenules, otherwise within normal limits.    Pancreas: Status post distal pancreatectomy. Remaining pancreas is  within normal limits.    Adrenal glands: No mass or nodules    Kidneys: Unchanged bilateral renal cortical atrophy. Multiple  nonobstructing bilateral intrarenal calculi. Unchanged hemorrhagic rim  calcified cyst at the lower pole of the left kidney measures up to 8  cm in greatest axial dimension, previously 9.7 cm. Additional  unchanged benign simple renal cyst. No hydronephrosis.    Gastrointestinal tract:  Postsurgical changes of ileostomy takedown  with ileocolic anastomosis. Extensive pneumatosis of the large bowel  with air scattered throughout the mesentery resulting in septated  appearance(best seen on the lung window settings), which has improved  compared to 3/13/2024 CT scan. There is normal bowel wall enhancement.   Normal caliber small bowel.    Peritoneum/Retroperitoneum: Hazy attenuation of the peritoneal fat.  Small simple fluid layering posterior to the rectum. No significant  lymphadenopathy.    Pelvis: Urinary bladder is decompressed.    Osseous structures: No aggressive or acute osseous lesion. Multilevel  degenerative changes of the visualized spine and hips. Increased bone  density likely related to renal osteodystrophy.      Soft tissues: Right lower quadrant ventral hernia containing  nonobstructed small  bowel. Diffuse anasarca.        Impression    IMPRESSION:  1.  Diffuse air in large bowel wall and extraluminal air with septated  appearance in the mesentery is improved compared to 3/13/2024 CT scan.  No portal venous gas. Normal bowel wall enhancement. Findings favored  to represent improving extensive pneumatosis intestinalis. Recommend  follow up and repeat CT scan if clinical condition worsens.  2.  No evidence of acute abdominal/pelvic hemorrhage.  3.  Mild ascites, diffuse anasarca, and bilateral small pleural  effusions with adjacent atelectasis.    Critical Result: Impression #1  Finding was identified on 3/26/2024 12:27 AM.   Discussion between Archana Gilliam, Lucila Leiva, and Errol Venegas on 3/26/2024 3:28 AM.    I have personally reviewed the examination and initial interpretation  and I agree with the findings.    GASPER GILLIAM MD         SYSTEM ID:  [id number]   CT Head w/o Contrast    Narrative    CT HEAD W/O CONTRAST 3/26/2024 7:08 AM    History:  r/o stroke     Comparison: None available     Technique: Using multidetector thin collimation helical acquisition  technique,  axial, coronal and sagittal CT images from the skull base  to the vertex were obtained without intravenous contrast.     Findings: There is no intracranial hemorrhage, mass effect or midline  shift. Small focal area of low density and gray-white differentiation  loss in the anterior right frontal lobe, best seen on sagittal image  45 of series 5. There is a questionable tiny focus of calcification in  this area. Otherwise, there is no focal loss of gray-white matter  differentiation, insular ribbon sign, or focally hyperdense artery to  suggest acute infarction. Scattered periventricular and subcortical  white matter hypodensities, likely secondary to chronic small vessel  ischemic disease given patient's age. The ventricles are proportionate  to the cerebral sulci.    The bony calvaria and the bones of the skull base appear normal. The  visualized portions of the paranasal sinuses and mastoid air cells are  unremarkable.       Impression    Impression:   1. Small focal area of low-density in the anterior right frontal lobe,  more likely chronic than related to acute infarct, although that is  difficult to entirely exclude. No acute intracranial hemorrhage.  2. MCA ASPECT Score: 10/10.    I have personally reviewed the examination and initial interpretation  and I agree with the findings.    PAUL BAH MD         SYSTEM ID:  [id number]   CTA Head Neck with Contrast    Narrative    CTA HEAD NECK W CONTRAST 3/26/2024 7:07 AM    CT Angiogram of the Neck with contrast   CT Angiogram of the Head with contrast    History:  R/O LVO stroke code    Comparison: Same day head CT.     Technique:     HEAD and NECK CTA: Thereafter, postcontrast images were obtained from  the aortic arch through the [place].  Axial images were  obtained using thin collimation multidetector helical technique. This  CT angiogram data was reconstructed at thin intervals with mild  overlap. Images were sent to the iRewind workstation, and  3D  reconstructions and multiplanar reformations were obtained with  reconstructions performed by the technologist and the radiologist. The  source images, multiplanar reformations, 3D reconstructions in both  maximum intensity projection display and volume rendered models were  reviewed,     Contrast: 67cc of isovue 370    Findings:   Head CTA demonstrates no aneurysm of the major intracranial arteries.  Suspect severe stenosis in the proximal right MCA M1 segment secondary  to calcified plaque. The anterior communicating artery is patent. The  posterior communicating arteries are patent bilaterally. Moderate  atherosclerotic calcifications within the cavernous portions of the  bilateral internal carotid arteries.    Neck CTA demonstrates approximately 54% stenosis within the right  carotid bifurcation secondary to small mostly lipid rich partially  calcified plaque and 46% within the left carotid bifurcation secondary  to calcified plaques. The origins of the great vessels from the aortic  arch are patent. The normal distal right internal carotid artery  measures 4 mm. The normal distal left internal carotid artery measures  4 mm. The bilateral vertebral arteries are patent. Dominant left  vertebral artery. Partially visualized stent within the left  subclavian vein.    No mass is noted within the visualized portions of the cervical soft  tissues or lung apices. Prominent pleural fat within the posterior  lung fields. Degenerative disease of the bilateral sternoclavicular  joints.       Impression    Impression:   1. Head CTA demonstrates severe stenosis in the proximal portion of  the right MCA M1 segment secondary to bulky calcified plaque. No  aneurysm of the major intracranial arteries. Moderate atherosclerotic  ossifications within the intracranial portions of the internal carotid  arteries.  2. Neck CTA demonstrates approximately 54% stenosis within the right  carotid artery location secondary to small  mostly lipid rich,  partially calcified plaque and 46% stenosis within the left cervical  carotid bifurcation.     I have personally reviewed the examination and initial interpretation  and I agree with the findings.    KUSH PIRES MD         SYSTEM ID:  [id number]   CTA Chest Abdomen Pelvis w Contrast    Narrative    CTA CHEST, ABDOMEN, AND PELVIS 3/26/2024 2:30 PM    CLINICAL HISTORY: recurrent GI bleeding in setting of Crohn's disease,  and findings of pneumatosis on CT. GI and CRS following. Not a good  candidate for endoscopy or surgery at this time. wondering if any  targets for IR to intervene. History of subtotal pancreatectomy.    COMPARISONS: CTA abdomen and pelvis 3/25/2024    REFERRING PROVIDER: SHAE DELEON    TECHNIQUE: Unenhanced CT performed through the chest, abdomen, and  pelvis. After the uneventful administration of intravenous contrast  CTA performed through the abdomen and pelvis. Venous phase CT  performed through the chest, abdomen, and pelvis. Coronal and sagittal  reconstructions were produced.     CONTRAST: 96 mL Isovue 370    DOSE (DLP): 2375 mGy*cm    FINDINGS: CTA: Extravasation of contrast in the loop of small bowel in  the right lower quadrant hernia accumulates over time (series 11,  images 195 - 229  series 14, images 18 - 24). The offending artery  is not visualized but is presumably a branch from the superior  mesenteric artery.    Superior mesenteric artery 50-69% diameter stenosis by calcified  plaque at its origin. Ilial and jejunal arteries are thin and blurred  by patient motion.    Calcified dissection flap in the aorta inferior to the inferior  mesenteric artery and right common iliac artery.    Normal aortic branching pattern. Right innominate, carotid, and  subclavian, left carotid, and left subclavian arteries patent.  Diminutive right vertebral artery. Left vertebral artery patent.    Cephalic internal jugular graft 73% diameter stenosis at the  cephalic  anastomosis. Occluded left subclavian vein.    Celiac artery hook-shaped in configuration with string-sign near  occlusion as it crosses under the diaphragm.    Atretic native renal arteries.    Inferior mesenteric artery patent.    Calcified iliac plaques cause up to 50% diameter stenosis. Multifocal  internal iliac stenoses. Bilateral common femoral arteries patent.    Bilateral common femoral veins patent. Bilateral common, internal, and  external iliac veins patent. Inferior vena cava patent. Renal and  hepatic veins patent.    It may be blurring from patient motion but non occlusive thrombus is  suggested in the superior mesenteric vein (series 16, images 176 - 184   series 17, images 27 - 31). Splenic vein is not visualized. Portal  veins patent. No portal venous gas.    CT: Motion blurs detail. No suspicious pulmonary nodule. Moderate  bilateral effusions with bilateral lower lobe consolidation.    Right PICC tip in the left internal jugular vein.    Hepatic cyst.    Cholecystectomy.     Renal stones. Cystic left renal mass with thickened walls,  calcifications and septa. Atrophic native kidneys.    Free intraperitoneal air. Tiny air bubbles, difficult to distinguish  if intraluminal or intramural.    Prostate calcifications.    Spine degenerative changes.      Impression    IMPRESSION:  1. Active extravasation into the small bowel loop in the right lower  quadrant hernia. Offending artery not visualized but presumed a branch  from the superior mesenteric artery, Aortic and iliac dissection,  superior mesenteric origin stenosis, and tiny ileal and jejunal  arteries make endovascular  catheterization and treatment extremely  challenging if not impossible.    2. Free intraperitoneal air. Possible pneumatosis but small bubbles  are difficult to localize. No portal venous gas.    3. 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 Splenic  vein not visualized, possibly sacrificed with  pancreatectomy. Portal  veins patent.    4. Celiac artery median arcuate ligament anatomy.    5. Left subclavian venous occlusion. Left cephalic to internal jugular  graft for dialysis access outflow. 73% diameter stenosis at the  cephalic-graft anastomosis.    6. Right PICC tip in the left internal jugular vein.    7. Bilateral effusions and lower lobe consolidation. Correlate for  pneumonia.    ADAIR GARNER MD         SYSTEM ID:  [id number]   US Lower Extremity Venous Duplex Right    Narrative    EXAMINATION: DOPPLER VENOUS ULTRASOUND OF THE RIGHT LOWER EXTREMITY,  3/26/2024 11:37 AM     COMPARISON: Ultrasound lower extremities Doppler 3/14/2024.    HISTORY: Swelling, erythema    TECHNIQUE:  Gray-scale evaluation with compression, spectral flow, and  color Doppler assessment of the deep venous system of the right leg  from groin to knee, and then at the ankle. Examination was ordered as  bilateral lower extremities however due to patient's symptoms,  technologist was unable to scan the left leg at this time.    FINDINGS:  In the right lower extremity, the common femoral, femoral, popliteal  and posterior tibial veins demonstrate normal compressibility and  blood flow.      Impression    IMPRESSION:  No evidence of right lower extremity deep venous thrombosis.    PRUDENCE KONG MD         SYSTEM ID:  [id number]   CT Head w/o Contrast    Narrative    CT HEAD W/O CONTRAST 3/26/2024 2:48 PM    Provided History: Code Stroke to evaluate for potential thrombolysis  and thrombectomy. PLEASE READ IMMEDIATELY.    Comparison: Same day CT head performed at 6:56 AM.    Technique: Using multidetector thin collimation helical acquisition  technique, axial, coronal and sagittal CT images from the skull base  to the vertex were obtained without intravenous contrast.     Findings:    Focal area of low density and gray-white differentiation loss in the  anterior right frontal lobe is again visualized, unchanged from same  date exam.  There is no new gray-white infiltration loss identified.    No intracranial hemorrhage. No mass effect. No midline shift. No  extra-axial fluid collection. Ventricles are proportionate to the  sulci. No sulcal effacement. The basal cisterns are patent.  Calcification the region of the M1 branch of the right middle cerebral  artery, better seen on same-day CTA head. Scattered periventricular  and subcortical white matter hypodensities likely secondary to chronic  small vessel ischemic disease.  The visualized paranasal sinuses are clear. The mastoid air cells are  clear. Orbits appear unremarkable. No acute fracture.      Impression    Impression: Unchanged small focal area of low density in the anterior  right frontal lobe, seen on same day CT head. No new gray-white  differentiation loss or intracranial hemorrhage.    I have personally reviewed the examination and initial interpretation  and I agree with the findings.    DEBORAH ABLA MD         SYSTEM ID:  [id number]   CTA Head Neck w Contrast    Narrative    EXAM: CTA HEAD NECK W CONTRAST  3/26/2024 2:47 PM     HISTORY: Code Stroke to evaluate for potential thrombolysis and  thrombectomy. PLEASE READ IMMEDIATELY.       COMPARISON: Same day CTA head and neck at 6:58 AM    TECHNIQUE:  HEAD and NECK CTA: During rapid bolus intravenous injection of  nonionic contrast material, axial images were obtained using thin  collimation multidetector helical technique from the base of the upper  aortic arch through the [place]. This CT angiogram data was  reconstructed at thin intervals with mild overlap. Images were sent to  the 3D workstation, and 3D reconstructions were obtained. The axial  source images, multiplanar reformations, 3D reconstructions in both  maximum intensity projection display and volume rendered models were  reviewed, with reconstructions performed by the technologist.    CONTRAST: 67 mL [id number]    FINDINGS:  Head CTA again demonstrates  calcified plaque of the right M1 and A1  segments of the middle  and anterior cerebral arteries causing severe  stenosis. No occlusion is identified. No aneurysm. Anterior commuting  artery is patent. There is atherosclerotic calcifications of the  cavernous portions of the internal carotid arteries bilaterally and  right vertebral artery.    Neck CTA again demonstrates stenosis of the right carotid bifurcation  secondary to partially calcified plaque approximately 50% lumen  narrowing. There is approximately 50% left carotid bifurcation  narrowing as well due to calcified plaque. Patent great vessel  origins. Calcifications of the aortic arch and origins of the great  vessels and along the common carotid arteries without significant  stenosis. The distal right internal carotid artery is patent measuring  4 mm. The distal left internal carotid artery is patent measuring 4  mm. No vertebral artery stenosis. Right upper extremity PICC line  present. Stent present in the region of the left subclavian vein.    No acute finding in the visualized intracranial, orbital, skull base  and soft tissue neck structures. Clear mastoid air cells and paranasal  sinuses.    Bilateral pleural effusions.       Impression    IMPRESSION:    1. Head CTA demonstrates severe stenosis of the proximal portion of  the right M1 and A1 segments of the middle and anterior cerebral  arteries due to calcified plaque, this appears unchanged compared to  same day CTA Head and Neck. There is no occlusion or aneurysm  identified.  2. Neck CTA demonstrates patent major cervical arteries. Approximately  50% stenosis of the bilateral internal carotid arteries near the  bifurcations.    I have personally reviewed the examination and initial interpretation  and I agree with the findings.    DEBORAH ALBA MD         SYSTEM ID:  [id number]   Echo Complete     Value    LVEF  60-65%    Narrative    [id number]  [id number]  [id number]  [id number]^ISABELL^ETHAN^[place]      [facility],[place]  Echocardiography Laboratory  [address] [place] [address]     Name: KRAIG CHAUDHARI  MRN: [id number]  : 1960  Study Date: 2024 07:32 AM  Age: 64 yrs  Gender: Male  Patient Location: [facility]  Reason For Study: CHF  Ordering Physician: ETHAN WHYTE  Performed By: Domonique Day RDCS     BSA: 1.9 m2  Height: 71 in  Weight: 155 lb  BP: 131/83 mmHg  ______________________________________________________________________________  Procedure  Echocardiogram with two-dimensional, color and spectral Doppler performed.  ______________________________________________________________________________  Interpretation Summary  Global and regional left ventricular function is normal with an EF of 60-65%.  Global right ventricular function is borderline reduced. The right ventricle  is normal size.  Severe mitral insufficiency is present. The etiology is MAC causing posterior  leaflet restriction/failure of coaptation. There is also a severely elevated  mitral gradient, in part due to the severity of the regurgitation.  Mild to moderate tricuspid insufficiency is present.  The estimated PA systolic pressure is 65 mmHg.  IVC diameter <2.1 cm collapsing >50% with sniff suggests a normal RA pressure  of 3 mmHg.  This study was compared with the study from 2024. No significant  changes. The LV function was underestimated on the last study.  ______________________________________________________________________________  Left Ventricle  Global and regional left ventricular function is normal with an EF of 60-65%.  Left ventricular wall thickness is normal. Left ventricular size is normal.  Diastolic function not assessed due to significant valvular regurgitation.     Right Ventricle  The right ventricle is normal size. Global right ventricular function is  borderline reduced.     Atria  The right atria appears normal. Mild to moderate left atrial  enlargement is  present.     Mitral Valve  Moderate mitral annular calcification is present. Severe mitral insufficiency  is present. The etiology is MAC causing posterior leaflet restriction/failure  of coaptation. The mean gradient is 11 mmHg at 122 bpm.     Aortic Valve  The aortic valve is tricuspid. Mild aortic insufficiency is present.     Tricuspid Valve  Mild to moderate tricuspid insufficiency is present. The right ventricular  systolic pressure is 62mmHg above the right atrial pressure.     Pulmonic Valve  The pulmonic valve cannot be assessed.     Vessels  The aorta root cannot be assessed. The thoracic aorta cannot be assessed. IVC  diameter <2.1 cm collapsing >50% with sniff suggests a normal RA pressure of 3  mmHg.     Pericardium  No pericardial effusion is present.     Compared to Previous Study  This study was compared with the study from 2024 . No significant  changes. The LV function was underestimated on the last study.  ______________________________________________________________________________  MMode/2D Measurements & Calculations  IVSd: 0.76 cm  LVIDd: 6.4 cm  LVIDs: 3.6 cm  LVPWd: 0.99 cm  FS: 43.7 %  LV mass(C)d: 229.5 grams  LV mass(C)dI: 121.3 grams/m2  LVOT diam: 2.0 cm  LVOT area: 3.1 cm2  RWT: 0.31     Doppler Measurements & Calculations  MV E max josué: 223.5 cm/sec  MV A max josué: 190.0 cm/sec  MV E/A: 1.2  MV max P.5 mmHg  MV mean P.0 mmHg  MV V2 VTI: 48.6 cm  MVA(VTI): 1.5 cm2  MV dec slope: 429.0 cm/sec2  MV dec time: 0.30 sec  Ao V2 max: 326.0 cm/sec  Ao max P.5 mmHg  Ao V2 mean: 227.0 cm/sec  Ao mean P.0 mmHg  Ao V2 VTI: 56.8 cm  LAURA(I,D): 1.3 cm2  LAURA(V,D): 1.4 cm2  LV V1 max P.3 mmHg  LV V1 max: 144.0 cm/sec  LV V1 VTI: 22.9 cm  SV(LVOT): 71.9 ml  SI(LVOT): 38.0 ml/m2  AV Josué Ratio (DI): 0.44     LAURA Index (cm2/m2): 0.67     ______________________________________________________________________________  Report approved by: Brain Rey  2024 11:01 AM         Echo Complete     Value    LVEF  60-65%    [facility]    [id number]  [id number]  [id number]  [id number]^THONY^ANN MARIE     [facility],[place]  Echocardiography Laboratory  [address] [place] [address]     Name: KRAIG CHAUDHARI  MRN: [id number]  : 1960  Study Date: 2024 08:56 AM  Age: 64 yrs  Gender: Male  Patient Location: [facility]  Reason For Study: CVA  Ordering Physician: ANN MARIE BHANDARI  Performed By: Jil Hess     BSA: 1.9 m2  Height: 71 in  Weight: 157 lb  HR: 67  BP: 95/51 mmHg  ______________________________________________________________________________  Procedure  Complete Portable Echo Adult.  ______________________________________________________________________________  Interpretation Summary  No cardiac source for embolus identified.  ______________________________________________________________________________  Left Ventricle  Global and regional left ventricular function is normal with an EF of 60-65%.  Left ventricular wall thickness is normal. Left ventricular size is normal.  Left ventricular diastolic function is not assessable. No regional wall motion  abnormalities are seen.     Right Ventricle  Right ventricular function, chamber size, wall motion, and thickness are  normal.     Atria  The atria cannot be assessed.     Mitral Valve  Calcified fixed posterior mitral leaflet with severe MR.     Aortic Valve  Aortic valve sclerosis is present. Trace aortic insufficiency is present.     Tricuspid Valve  The tricuspid valve is normal. Trace to mild tricuspid insufficiency is  present. Pulmonary artery systolic pressure cannot be assessed.     Pulmonic Valve  The pulmonic valve is normal.     Vessels  The thoracic aorta cannot be assessed. The pulmonary artery cannot be  assessed. The inferior vena cava is normal.     Pericardium  No pericardial effusion is present.      ______________________________________________________________________________  Report approved by: Brain Villegas 03/26/2024 10:06 AM     ______________________________________________________________________________          Consultations [facility] Stay   NEPHROLOGY ESRD ADULT IP CONSULT  PHYSICAL THERAPY ADULT IP CONSULT  OCCUPATIONAL THERAPY ADULT IP CONSULT  ENDOCRINE NON-DIABETES ADULT IP CONSULT  ENDOCRINE DIABETES ADULT IP CONSULT  PSYCHIATRY IP CONSULT  NURSING TO CONSULT FOR VASCULAR ACCESS CARE IP CONSULT  LYMPHEDEMA THERAPY IP CONSULT  PHARMACY LIAISON FOR MEDICATION COVERAGE CONSULT  VASCULAR ACCESS ADULT IP CONSULT  DIABETES EDUCATION IP CONSULT  PHARMACY LIAISON FOR MEDICATION COVERAGE CONSULT  NURSING TO CONSULT FOR VASCULAR ACCESS CARE IP CONSULT  GI LUMINAL ADULT IP CONSULT  NURSING TO CONSULT FOR VASCULAR ACCESS CARE IP CONSULT  NURSING TO CONSULT FOR VASCULAR ACCESS CARE IP CONSULT  NURSING TO CONSULT FOR VASCULAR ACCESS CARE IP CONSULT  NURSING TO CONSULT FOR VASCULAR ACCESS CARE IP CONSULT  NURSING TO CONSULT FOR VASCULAR ACCESS CARE IP CONSULT  VASCULAR ACCESS ADULT IP CONSULT  SPEECH LANGUAGE PATH ADULT IP CONSULT  VASCULAR ACCESS FOR PICC PLACEMENT ADULT IP CONSULT  PHARMACY TO DOSE [facility] SERVICES IP CONSULT  NURSING TO CONSULT FOR VASCULAR ACCESS CARE IP CONSULT    Primary Care Physician   PETER BARRIGA

## 2024-03-28 NOTE — PROGRESS NOTES
ICU End of Shift Summary. See flowsheets for vital signs and detailed assessment.    Changes this shift: At start of shift VSS were stable off pressors. Demonstrating significant L sided deficits. See flowsheets for details. Pt A&O x3 with garbled speech. @ approximately 1300 pt had started having dark red liquid stools requiring increased pressor needs. Needs increased significantly this evening. MD in communication with family who agreed to no escalation of cares in attempts to allow additional family to arrive. Despite being on max dose levo and vaso pt  @ 1934. See MD notes. All belongings sent with family.

## 2024-03-30 LAB
BACTERIA BLD CULT: NO GROWTH
BACTERIA BLD CULT: NO GROWTH

## 2025-06-24 NOTE — PROVIDER NOTIFICATION
Shelly Harris at 2004:    Pt's BG has had two consecutive blood sugars under 200 (184 and 180). The insulin drip is in algorithm 1 at 1.5 units. The D5 in 1/2 NS is started. When do you want to start SQ insulin?    Rylie Trimble, BSN  Shift: 3285 - 5107   Not applicable

## (undated) DEVICE — PACK AV FISTULA CUSTOM SCV15AVFS1

## (undated) DEVICE — SOL NACL 0.9% IRRIG 1000ML BOTTLE 2F7124

## (undated) DEVICE — SU PDS II 0 TP-1 60" Z991G

## (undated) DEVICE — STPL ENDO LINEAR CUT ECHELON GST 45MM COMPACT PCEE45A

## (undated) DEVICE — DECANTER VIAL 2006S

## (undated) DEVICE — PROBE BIPOLAR HEMOSTASIS GOLD 07FRX210CM M00560150

## (undated) DEVICE — NDL 19GA 1.5"

## (undated) DEVICE — CATH ANGIO INFINITI 3DRC 4FRX100CM 538476

## (undated) DEVICE — DRAPE SHEET REV FOLD 3/4 9349

## (undated) DEVICE — ESU LIGASURE IMPACT OPEN SEALER/DVDR CVD LG JAW LF4418

## (undated) DEVICE — SU SILK 3-0 TIE 24" SA74H

## (undated) DEVICE — SYR 10ML SLIP TIP W/O NDL

## (undated) DEVICE — SU SILK 0 TIE 6X30" A306H

## (undated) DEVICE — DRAPE ISOLATION BAG 1003

## (undated) DEVICE — SU PROLENE 6-0 RB-2DA 30" 8711H

## (undated) DEVICE — BLANKET BAIR HUGGER UNDERBODY AU63500

## (undated) DEVICE — SU UMBILICAL TAPE .125X30" U11T

## (undated) DEVICE — Device

## (undated) DEVICE — PREP CHLORAPREP 26ML TINTED HI-LITE ORANGE 930815

## (undated) DEVICE — SLEEVE TR BAND RADIAL COMPRESSION DEVICE 24CM TRB24-REG

## (undated) DEVICE — GLOVE BIOGEL PI ULTRATOUCH SZ 7.5 41175

## (undated) DEVICE — SPONGE LAP 18X18" X8435

## (undated) DEVICE — SOL WATER IRRIG 1000ML BOTTLE 2F7114

## (undated) DEVICE — SU VICRYL 3-0 SH 27" J316H

## (undated) DEVICE — INTRO SHEATH 7FRX10CM PINNACLE RSS702

## (undated) DEVICE — SURGICEL ABSORBABLE HEMOSTAT SNOW 4"X4" 2083

## (undated) DEVICE — ADH SKIN CLOSURE PREMIERPRO EXOFIN MICOR HV 0.5ML 3471

## (undated) DEVICE — CATH ANGIO INFINITI JL4 4FRX100CM 538420

## (undated) DEVICE — SU PROLENE 5-0 RB-1DA 36"  8556H

## (undated) DEVICE — SYR 10ML LL W/O NDL

## (undated) DEVICE — DRSG TEGADERM 2 3/8X2 3/4" 1624W

## (undated) DEVICE — NDL SCLEROTHERAPY 25GA CARR-LOCK  00711811

## (undated) DEVICE — LINEN TOWEL PACK X6 WHITE 5487

## (undated) DEVICE — DRSG PRIMAPORE 02X3" 7133

## (undated) DEVICE — SURGICEL HEMOSTAT 4X8" 1952

## (undated) DEVICE — DRAPE LAP W/ARMBOARD 29410

## (undated) DEVICE — DRAPE IOBAN INCISE 23X17" 6650EZ

## (undated) DEVICE — SYR 20ML LL W/O NDL 302830

## (undated) DEVICE — MANIFOLD KIT ANGIO AUTOMATED 014613

## (undated) DEVICE — SU SILK 3-0 TIE 12X30" A304H

## (undated) DEVICE — SU ETHILON 3-0 PS-1 18" 1663H

## (undated) DEVICE — TUBING PRESSURE 30"

## (undated) DEVICE — CLIP HORIZON LG ORANGE 004200

## (undated) DEVICE — GOWN XLG DISP 9545

## (undated) DEVICE — STPL LINE REINFORCEMENT 60MM ECHELON ECH60R

## (undated) DEVICE — INTRO SHEATH AVANTI 4FRX23CM 504604T

## (undated) DEVICE — SU SILK 2-0 TIE 12X30" A305H

## (undated) DEVICE — LINEN TOWEL PACK X5 5464

## (undated) DEVICE — LINEN TOWEL PACK X30 5481

## (undated) DEVICE — PACK HEART LEFT CUSTOM

## (undated) DEVICE — SU SILK 4-0 TIE 24" SA73H

## (undated) DEVICE — SPONGE KITTNER 30-101

## (undated) DEVICE — DRAIN JACKSON PRATT RESERVOIR 100ML SU130-1305

## (undated) DEVICE — DRAIN JACKSON PRATT CHANNEL 15FR ROUND HUBLESS SIL JP-2228

## (undated) DEVICE — SYR 03ML LL W/O NDL 309657

## (undated) DEVICE — SUCTION MINISQUAIR SMOKE EVAC CAPTURE DEVICE SQ20012-01

## (undated) DEVICE — NDL COUNTER 40CT  31142311

## (undated) DEVICE — PAD CHUX UNDERPAD 30X36" P3036C

## (undated) DEVICE — SU VICRYL 3-0 SH 27" UND J416H

## (undated) DEVICE — DECANTER BAG 2002S

## (undated) DEVICE — ESU GROUND PAD ADULT W/CORD E7507

## (undated) DEVICE — GEL ULTRASOUND AQUASONIC 20GM 01-01

## (undated) DEVICE — NDL ANGIOCATH 20GA 1.25" 4056

## (undated) DEVICE — SUCTION CANISTER MEDIVAC LINER 3000ML W/LID 65651-530

## (undated) DEVICE — BAG CLEAR TRASH 1.3M 39X33" P4040C

## (undated) DEVICE — SU PROLENE 6-0 C-1DA 30" 8706H

## (undated) DEVICE — KIT HAND CONTROL ACIST 014644 AR-P54

## (undated) DEVICE — VESSEL LOOPS BLUE SUPERMAXI 011022PBX

## (undated) DEVICE — SHTH INTRO 0.021IN ID 6FR DIA

## (undated) DEVICE — SU MONOCRYL 5-0 PS-2 18" UND Y495G

## (undated) DEVICE — SU PROLENE 4-0 RB-1DA 36" 8557H

## (undated) DEVICE — CLIP HORIZON MED BLUE 002200

## (undated) DEVICE — BLADE CLIPPER SGL USE 9680

## (undated) DEVICE — SUCTION MANIFOLD NEPTUNE 2 SYS 4 PORT 0702-020-000

## (undated) DEVICE — SOL NACL 0.9% INJ 250ML BAG 2B1322Q

## (undated) DEVICE — ESU GROUND PAD UNIVERSAL W/O CORD

## (undated) DEVICE — STPL ENDO LINEAR CUT ECHELON GST 60MM COMPACT PCEE60A

## (undated) DEVICE — 4FR X 40CM VSI MICRO-INTRODUCER ACCESS KIT, STAINLESS STEEL MANDREL/PLATINUM SOFT TIP WIRE, 21GA X 7CM ECHOGENIC NEEDLE, STIFFEN (FORMERLY VASCULAR SOLUTIONS)

## (undated) DEVICE — SU SILK 4-0 TIE 12X30" A303H

## (undated) DEVICE — VESSEL LOOPS YELLOW MAXI 31145694

## (undated) DEVICE — SU SILK 3-0 SH CR 8X18" C013D

## (undated) DEVICE — FASTENER CATH BALLOON CLAMPX2 STATLOCK 0684-00-493

## (undated) DEVICE — TUBING SUCTION MEDI-VAC SOFT 3/16"X20' N520A

## (undated) DEVICE — SU MONOCRYL 4-0 PS-2 27" UND Y426H

## (undated) DEVICE — KIT HAND CONTROL ACIST 016795

## (undated) DEVICE — ENDO FORCEP ENDOJAW BIOPSY 2.8MMX160CM FB-220K

## (undated) DEVICE — CLIP HORIZON SM RED WIDE SLOT 001201

## (undated) RX ORDER — ALBUMIN, HUMAN INJ 5% 5 %
SOLUTION INTRAVENOUS
Status: DISPENSED
Start: 2022-03-17

## (undated) RX ORDER — PROPOFOL 10 MG/ML
INJECTION, EMULSION INTRAVENOUS
Status: DISPENSED
Start: 2021-07-20

## (undated) RX ORDER — HEPARIN SODIUM 1000 [USP'U]/ML
INJECTION, SOLUTION INTRAVENOUS; SUBCUTANEOUS
Status: DISPENSED
Start: 2024-01-01

## (undated) RX ORDER — LIDOCAINE HYDROCHLORIDE 20 MG/ML
INJECTION, SOLUTION EPIDURAL; INFILTRATION; INTRACAUDAL; PERINEURAL
Status: DISPENSED
Start: 2022-03-17

## (undated) RX ORDER — FENTANYL CITRATE 50 UG/ML
INJECTION, SOLUTION INTRAMUSCULAR; INTRAVENOUS
Status: DISPENSED
Start: 2021-05-25

## (undated) RX ORDER — LEVOFLOXACIN 5 MG/ML
INJECTION, SOLUTION INTRAVENOUS
Status: DISPENSED
Start: 2022-03-17

## (undated) RX ORDER — SIMETHICONE 40MG/0.6ML
SUSPENSION, DROPS(FINAL DOSAGE FORM)(ML) ORAL
Status: DISPENSED
Start: 2024-01-01

## (undated) RX ORDER — FENTANYL CITRATE 50 UG/ML
INJECTION, SOLUTION INTRAMUSCULAR; INTRAVENOUS
Status: DISPENSED
Start: 2022-03-17

## (undated) RX ORDER — FENTANYL CITRATE 50 UG/ML
INJECTION, SOLUTION INTRAMUSCULAR; INTRAVENOUS
Status: DISPENSED
Start: 2023-01-13

## (undated) RX ORDER — PROPOFOL 10 MG/ML
INJECTION, EMULSION INTRAVENOUS
Status: DISPENSED
Start: 2022-03-17

## (undated) RX ORDER — HEPARIN SODIUM 1000 [USP'U]/ML
INJECTION, SOLUTION INTRAVENOUS; SUBCUTANEOUS
Status: DISPENSED
Start: 2023-02-14

## (undated) RX ORDER — FENTANYL CITRATE 50 UG/ML
INJECTION, SOLUTION INTRAMUSCULAR; INTRAVENOUS
Status: DISPENSED
Start: 2024-01-01

## (undated) RX ORDER — LIDOCAINE HYDROCHLORIDE 20 MG/ML
SOLUTION OROPHARYNGEAL
Status: DISPENSED
Start: 2023-01-01

## (undated) RX ORDER — FENTANYL CITRATE 50 UG/ML
INJECTION, SOLUTION INTRAMUSCULAR; INTRAVENOUS
Status: DISPENSED
Start: 2023-01-01

## (undated) RX ORDER — NICARDIPINE HCL-0.9% SOD CHLOR 1 MG/10 ML
SYRINGE (ML) INTRAVENOUS
Status: DISPENSED
Start: 2024-01-01

## (undated) RX ORDER — LEVOFLOXACIN 5 MG/ML
INJECTION, SOLUTION INTRAVENOUS
Status: DISPENSED
Start: 2021-07-20

## (undated) RX ORDER — FENTANYL CITRATE 50 UG/ML
INJECTION, SOLUTION INTRAMUSCULAR; INTRAVENOUS
Status: DISPENSED
Start: 2023-02-14

## (undated) RX ORDER — ROCURONIUM BROMIDE 50 MG/5 ML
SYRINGE (ML) INTRAVENOUS
Status: DISPENSED
Start: 2022-03-17

## (undated) RX ORDER — HEPARIN SODIUM 200 [USP'U]/100ML
INJECTION, SOLUTION INTRAVENOUS
Status: DISPENSED
Start: 2023-01-13

## (undated) RX ORDER — IOPAMIDOL 612 MG/ML
INJECTION, SOLUTION INTRATHECAL
Status: DISPENSED
Start: 2022-03-17

## (undated) RX ORDER — LIDOCAINE HYDROCHLORIDE 10 MG/ML
INJECTION, SOLUTION EPIDURAL; INFILTRATION; INTRACAUDAL; PERINEURAL
Status: DISPENSED
Start: 2024-01-01

## (undated) RX ORDER — EPINEPHRINE 0.1 MG/ML
INJECTION INTRAVENOUS
Status: DISPENSED
Start: 2024-01-01

## (undated) RX ORDER — FENTANYL CITRATE-0.9 % NACL/PF 10 MCG/ML
PLASTIC BAG, INJECTION (ML) INTRAVENOUS
Status: DISPENSED
Start: 2022-03-17

## (undated) RX ORDER — DEXAMETHASONE SODIUM PHOSPHATE 4 MG/ML
INJECTION, SOLUTION INTRA-ARTICULAR; INTRALESIONAL; INTRAMUSCULAR; INTRAVENOUS; SOFT TISSUE
Status: DISPENSED
Start: 2022-03-17

## (undated) RX ORDER — SIMETHICONE 40MG/0.6ML
SUSPENSION, DROPS(FINAL DOSAGE FORM)(ML) ORAL
Status: DISPENSED
Start: 2021-07-20

## (undated) RX ORDER — FENTANYL CITRATE 0.05 MG/ML
INJECTION, SOLUTION INTRAMUSCULAR; INTRAVENOUS
Status: DISPENSED
Start: 2023-02-14

## (undated) RX ORDER — METRONIDAZOLE 500 MG/100ML
INJECTION, SOLUTION INTRAVENOUS
Status: DISPENSED
Start: 2022-03-17

## (undated) RX ORDER — SODIUM CHLORIDE, SODIUM LACTATE, POTASSIUM CHLORIDE, CALCIUM CHLORIDE 600; 310; 30; 20 MG/100ML; MG/100ML; MG/100ML; MG/100ML
INJECTION, SOLUTION INTRAVENOUS
Status: DISPENSED
Start: 2022-03-17

## (undated) RX ORDER — NITROGLYCERIN 5 MG/ML
VIAL (ML) INTRAVENOUS
Status: DISPENSED
Start: 2024-01-01

## (undated) RX ORDER — HEPARIN SODIUM 1000 [USP'U]/ML
INJECTION, SOLUTION INTRAVENOUS; SUBCUTANEOUS
Status: DISPENSED
Start: 2021-05-25

## (undated) RX ORDER — EPHEDRINE SULFATE 50 MG/ML
INJECTION, SOLUTION INTRAMUSCULAR; INTRAVENOUS; SUBCUTANEOUS
Status: DISPENSED
Start: 2024-01-01

## (undated) RX ORDER — HEPARIN SODIUM 200 [USP'U]/100ML
INJECTION, SOLUTION INTRAVENOUS
Status: DISPENSED
Start: 2024-01-01

## (undated) RX ORDER — HYDRALAZINE HYDROCHLORIDE 20 MG/ML
INJECTION INTRAMUSCULAR; INTRAVENOUS
Status: DISPENSED
Start: 2023-01-13

## (undated) RX ORDER — GLYCOPYRROLATE 0.2 MG/ML
INJECTION, SOLUTION INTRAMUSCULAR; INTRAVENOUS
Status: DISPENSED
Start: 2022-03-17

## (undated) RX ORDER — SODIUM CHLORIDE 9 MG/ML
INJECTION, SOLUTION INTRAVENOUS
Status: DISPENSED
Start: 2021-05-25

## (undated) RX ORDER — ONDANSETRON 2 MG/ML
INJECTION INTRAMUSCULAR; INTRAVENOUS
Status: DISPENSED
Start: 2023-02-14

## (undated) RX ORDER — LIDOCAINE HYDROCHLORIDE 10 MG/ML
INJECTION, SOLUTION EPIDURAL; INFILTRATION; INTRACAUDAL; PERINEURAL
Status: DISPENSED
Start: 2021-05-25

## (undated) RX ORDER — HYDROMORPHONE HYDROCHLORIDE 1 MG/ML
INJECTION, SOLUTION INTRAMUSCULAR; INTRAVENOUS; SUBCUTANEOUS
Status: DISPENSED
Start: 2023-02-14

## (undated) RX ORDER — ASPIRIN 325 MG
TABLET ORAL
Status: DISPENSED
Start: 2024-01-01

## (undated) RX ORDER — SODIUM CHLORIDE 9 MG/ML
INJECTION, SOLUTION INTRAVENOUS
Status: DISPENSED
Start: 2024-01-01

## (undated) RX ORDER — HYDRALAZINE HYDROCHLORIDE 20 MG/ML
INJECTION INTRAMUSCULAR; INTRAVENOUS
Status: DISPENSED
Start: 2021-05-25

## (undated) RX ORDER — ONDANSETRON 2 MG/ML
INJECTION INTRAMUSCULAR; INTRAVENOUS
Status: DISPENSED
Start: 2022-03-17

## (undated) RX ORDER — BUPIVACAINE HYDROCHLORIDE 5 MG/ML
INJECTION, SOLUTION EPIDURAL; INTRACAUDAL
Status: DISPENSED
Start: 2023-02-14

## (undated) RX ORDER — LIDOCAINE HYDROCHLORIDE 10 MG/ML
INJECTION, SOLUTION INFILTRATION; PERINEURAL
Status: DISPENSED
Start: 2023-01-13

## (undated) RX ORDER — DEXAMETHASONE SODIUM PHOSPHATE 4 MG/ML
INJECTION, SOLUTION INTRA-ARTICULAR; INTRALESIONAL; INTRAMUSCULAR; INTRAVENOUS; SOFT TISSUE
Status: DISPENSED
Start: 2023-02-14

## (undated) RX ORDER — ONDANSETRON 2 MG/ML
INJECTION INTRAMUSCULAR; INTRAVENOUS
Status: DISPENSED
Start: 2024-01-01

## (undated) RX ORDER — HYDROMORPHONE HCL IN WATER/PF 6 MG/30 ML
PATIENT CONTROLLED ANALGESIA SYRINGE INTRAVENOUS
Status: DISPENSED
Start: 2023-02-14

## (undated) RX ORDER — ASPIRIN 325 MG
TABLET ORAL
Status: DISPENSED
Start: 2021-05-25

## (undated) RX ORDER — HEPARIN SODIUM 5000 [USP'U]/.5ML
INJECTION, SOLUTION INTRAVENOUS; SUBCUTANEOUS
Status: DISPENSED
Start: 2022-03-17

## (undated) RX ORDER — HALOPERIDOL 5 MG/ML
INJECTION INTRAMUSCULAR
Status: DISPENSED
Start: 2022-03-17

## (undated) RX ORDER — FENTANYL CITRATE 50 UG/ML
INJECTION, SOLUTION INTRAMUSCULAR; INTRAVENOUS
Status: DISPENSED
Start: 2022-12-01

## (undated) RX ORDER — CEFAZOLIN SODIUM/WATER 2 G/20 ML
SYRINGE (ML) INTRAVENOUS
Status: DISPENSED
Start: 2023-02-14

## (undated) RX ORDER — LIDOCAINE HYDROCHLORIDE 10 MG/ML
INJECTION, SOLUTION INFILTRATION; PERINEURAL
Status: DISPENSED
Start: 2023-02-14